# Patient Record
Sex: FEMALE | Race: AMERICAN INDIAN OR ALASKA NATIVE | NOT HISPANIC OR LATINO | Employment: FULL TIME | URBAN - METROPOLITAN AREA
[De-identification: names, ages, dates, MRNs, and addresses within clinical notes are randomized per-mention and may not be internally consistent; named-entity substitution may affect disease eponyms.]

---

## 2022-11-02 ENCOUNTER — TRANSFERRED RECORDS (OUTPATIENT)
Dept: HEALTH INFORMATION MANAGEMENT | Facility: CLINIC | Age: 32
End: 2022-11-02

## 2022-11-30 ENCOUNTER — TRANSFERRED RECORDS (OUTPATIENT)
Dept: HEALTH INFORMATION MANAGEMENT | Facility: CLINIC | Age: 32
End: 2022-11-30

## 2022-12-06 ENCOUNTER — APPOINTMENT (OUTPATIENT)
Dept: CARDIOLOGY | Facility: CLINIC | Age: 32
End: 2022-12-06
Attending: INTERNAL MEDICINE
Payer: COMMERCIAL

## 2022-12-06 ENCOUNTER — HOSPITAL ENCOUNTER (INPATIENT)
Facility: CLINIC | Age: 32
LOS: 56 days | Discharge: HOME | End: 2023-01-31
Attending: INTERNAL MEDICINE | Admitting: INTERNAL MEDICINE
Payer: COMMERCIAL

## 2022-12-06 ENCOUNTER — APPOINTMENT (OUTPATIENT)
Dept: GENERAL RADIOLOGY | Facility: CLINIC | Age: 32
End: 2022-12-06
Attending: INTERNAL MEDICINE
Payer: COMMERCIAL

## 2022-12-06 ENCOUNTER — TRANSFERRED RECORDS (OUTPATIENT)
Dept: HEALTH INFORMATION MANAGEMENT | Facility: CLINIC | Age: 32
End: 2022-12-06

## 2022-12-06 DIAGNOSIS — I50.9 ACUTE DECOMPENSATED HEART FAILURE (H): Primary | ICD-10-CM

## 2022-12-06 LAB
ALBUMIN MFR UR ELPH: 7.5 MG/DL
ALBUMIN SERPL BCG-MCNC: 3.4 G/DL (ref 3.5–5.2)
ALBUMIN SERPL BCG-MCNC: 3.6 G/DL (ref 3.5–5.2)
ALP SERPL-CCNC: 108 U/L (ref 35–104)
ALP SERPL-CCNC: 114 U/L (ref 35–104)
ALT SERPL W P-5'-P-CCNC: 15 U/L (ref 10–35)
ALT SERPL W P-5'-P-CCNC: 15 U/L (ref 10–35)
ANION GAP SERPL CALCULATED.3IONS-SCNC: 13 MMOL/L (ref 7–15)
ANION GAP SERPL CALCULATED.3IONS-SCNC: 14 MMOL/L (ref 7–15)
ANION GAP SERPL CALCULATED.3IONS-SCNC: 15 MMOL/L (ref 7–15)
APTT PPP: 35 SECONDS (ref 22–38)
AST SERPL W P-5'-P-CCNC: 29 U/L (ref 10–35)
AST SERPL W P-5'-P-CCNC: 31 U/L (ref 10–35)
ATRIAL RATE - MUSE: 88 BPM
BASE EXCESS BLDV CALC-SCNC: 2.1 MMOL/L (ref -7.7–1.9)
BILIRUB DIRECT SERPL-MCNC: <0.2 MG/DL (ref 0–0.3)
BILIRUB SERPL-MCNC: 0.3 MG/DL
BILIRUB SERPL-MCNC: 0.4 MG/DL
BUN SERPL-MCNC: 13.1 MG/DL (ref 6–20)
BUN SERPL-MCNC: 13.8 MG/DL (ref 6–20)
BUN SERPL-MCNC: 14.1 MG/DL (ref 6–20)
CA-I BLD-MCNC: 4.7 MG/DL (ref 4.4–5.2)
CALCIUM SERPL-MCNC: 9.3 MG/DL (ref 8.6–10)
CALCIUM SERPL-MCNC: 9.7 MG/DL (ref 8.6–10)
CALCIUM SERPL-MCNC: 9.8 MG/DL (ref 8.6–10)
CHLORIDE SERPL-SCNC: 98 MMOL/L (ref 98–107)
CHLORIDE SERPL-SCNC: 98 MMOL/L (ref 98–107)
CHLORIDE SERPL-SCNC: 99 MMOL/L (ref 98–107)
CREAT SERPL-MCNC: 0.69 MG/DL (ref 0.51–0.95)
CREAT SERPL-MCNC: 0.69 MG/DL (ref 0.51–0.95)
CREAT SERPL-MCNC: 0.7 MG/DL (ref 0.51–0.95)
CREAT UR-MCNC: 62 MG/DL
DEPRECATED HCO3 PLAS-SCNC: 20 MMOL/L (ref 22–29)
DEPRECATED HCO3 PLAS-SCNC: 21 MMOL/L (ref 22–29)
DEPRECATED HCO3 PLAS-SCNC: 22 MMOL/L (ref 22–29)
DIASTOLIC BLOOD PRESSURE - MUSE: NORMAL MMHG
ERYTHROCYTE [DISTWIDTH] IN BLOOD BY AUTOMATED COUNT: 14.4 % (ref 10–15)
FERRITIN SERPL-MCNC: 28 NG/ML (ref 6–175)
GFR SERPL CREATININE-BSD FRML MDRD: >90 ML/MIN/1.73M2
GLUCOSE BLDC GLUCOMTR-MCNC: 108 MG/DL (ref 70–99)
GLUCOSE BLDC GLUCOMTR-MCNC: 124 MG/DL (ref 70–99)
GLUCOSE BLDC GLUCOMTR-MCNC: 131 MG/DL (ref 70–99)
GLUCOSE BLDC GLUCOMTR-MCNC: 132 MG/DL (ref 70–99)
GLUCOSE BLDC GLUCOMTR-MCNC: 91 MG/DL (ref 70–99)
GLUCOSE SERPL-MCNC: 111 MG/DL (ref 70–99)
GLUCOSE SERPL-MCNC: 116 MG/DL (ref 70–99)
GLUCOSE SERPL-MCNC: 97 MG/DL (ref 70–99)
HBA1C MFR BLD: 5.8 %
HCO3 BLDV-SCNC: 27 MMOL/L (ref 21–28)
HCT VFR BLD AUTO: 37.4 % (ref 35–47)
HGB BLD-MCNC: 12.1 G/DL (ref 11.7–15.7)
INR PPP: 1.13 (ref 0.85–1.15)
INTERPRETATION ECG - MUSE: NORMAL
IRON BINDING CAPACITY (ROCHE): 569 UG/DL (ref 240–430)
IRON SATN MFR SERPL: 6 % (ref 15–46)
IRON SERPL-MCNC: 32 UG/DL (ref 37–145)
LACTATE SERPL-SCNC: 1.2 MMOL/L (ref 0.7–2)
LVEF ECHO: NORMAL
MAGNESIUM SERPL-MCNC: 1.9 MG/DL (ref 1.7–2.3)
MAGNESIUM SERPL-MCNC: 2 MG/DL (ref 1.7–2.3)
MAGNESIUM SERPL-MCNC: 2.1 MG/DL (ref 1.7–2.3)
MAGNESIUM SERPL-MCNC: 2.3 MG/DL (ref 1.7–2.3)
MCH RBC QN AUTO: 25 PG (ref 26.5–33)
MCHC RBC AUTO-ENTMCNC: 32.4 G/DL (ref 31.5–36.5)
MCV RBC AUTO: 77 FL (ref 78–100)
NT-PROBNP SERPL-MCNC: 1410 PG/ML (ref 0–450)
O2/TOTAL GAS SETTING VFR VENT: 21 %
OXYHGB MFR BLDV: 69 % (ref 70–75)
P AXIS - MUSE: NORMAL DEGREES
PCO2 BLDV: 40 MM HG (ref 40–50)
PH BLDV: 7.43 [PH] (ref 7.32–7.43)
PHOSPHATE SERPL-MCNC: 4.2 MG/DL (ref 2.5–4.5)
PHOSPHATE SERPL-MCNC: 4.7 MG/DL (ref 2.5–4.5)
PLATELET # BLD AUTO: 318 10E3/UL (ref 150–450)
PO2 BLDV: 40 MM HG (ref 25–47)
POTASSIUM SERPL-SCNC: 3.7 MMOL/L (ref 3.4–5.3)
POTASSIUM SERPL-SCNC: 3.7 MMOL/L (ref 3.4–5.3)
POTASSIUM SERPL-SCNC: 3.9 MMOL/L (ref 3.4–5.3)
POTASSIUM SERPL-SCNC: 4 MMOL/L (ref 3.4–5.3)
POTASSIUM SERPL-SCNC: 4.1 MMOL/L (ref 3.4–5.3)
PR INTERVAL - MUSE: 160 MS
PROT SERPL-MCNC: 7.3 G/DL (ref 6.4–8.3)
PROT SERPL-MCNC: 7.6 G/DL (ref 6.4–8.3)
PROT/CREAT 24H UR: 0.12 MG/MG CR (ref 0–0.2)
QRS DURATION - MUSE: 136 MS
QT - MUSE: 424 MS
QTC - MUSE: 513 MS
R AXIS - MUSE: 3 DEGREES
RBC # BLD AUTO: 4.84 10E6/UL (ref 3.8–5.2)
SODIUM SERPL-SCNC: 133 MMOL/L (ref 136–145)
SODIUM SERPL-SCNC: 133 MMOL/L (ref 136–145)
SODIUM SERPL-SCNC: 134 MMOL/L (ref 136–145)
SYSTOLIC BLOOD PRESSURE - MUSE: NORMAL MMHG
T AXIS - MUSE: -20 DEGREES
TROPONIN T SERPL HS-MCNC: 45 NG/L
TROPONIN T SERPL HS-MCNC: 45 NG/L
TSH SERPL DL<=0.005 MIU/L-ACNC: 2.96 UIU/ML (ref 0.3–4.2)
UFH PPP CHRO-ACNC: <0.1 IU/ML
VENTRICULAR RATE- MUSE: 88 BPM
WBC # BLD AUTO: 16.7 10E3/UL (ref 4–11)

## 2022-12-06 PROCEDURE — 83550 IRON BINDING TEST: CPT | Performed by: INTERNAL MEDICINE

## 2022-12-06 PROCEDURE — 250N000013 HC RX MED GY IP 250 OP 250 PS 637: Performed by: STUDENT IN AN ORGANIZED HEALTH CARE EDUCATION/TRAINING PROGRAM

## 2022-12-06 PROCEDURE — 83735 ASSAY OF MAGNESIUM: CPT | Performed by: INTERNAL MEDICINE

## 2022-12-06 PROCEDURE — 85027 COMPLETE CBC AUTOMATED: CPT | Performed by: INTERNAL MEDICINE

## 2022-12-06 PROCEDURE — 93306 TTE W/DOPPLER COMPLETE: CPT | Mod: 26 | Performed by: INTERNAL MEDICINE

## 2022-12-06 PROCEDURE — 85520 HEPARIN ASSAY: CPT | Performed by: INTERNAL MEDICINE

## 2022-12-06 PROCEDURE — 250N000011 HC RX IP 250 OP 636: Performed by: STUDENT IN AN ORGANIZED HEALTH CARE EDUCATION/TRAINING PROGRAM

## 2022-12-06 PROCEDURE — 36415 COLL VENOUS BLD VENIPUNCTURE: CPT | Performed by: INTERNAL MEDICINE

## 2022-12-06 PROCEDURE — 83036 HEMOGLOBIN GLYCOSYLATED A1C: CPT | Performed by: INTERNAL MEDICINE

## 2022-12-06 PROCEDURE — 83735 ASSAY OF MAGNESIUM: CPT | Performed by: STUDENT IN AN ORGANIZED HEALTH CARE EDUCATION/TRAINING PROGRAM

## 2022-12-06 PROCEDURE — 83605 ASSAY OF LACTIC ACID: CPT | Performed by: INTERNAL MEDICINE

## 2022-12-06 PROCEDURE — 71045 X-RAY EXAM CHEST 1 VIEW: CPT | Mod: 26 | Performed by: RADIOLOGY

## 2022-12-06 PROCEDURE — 82728 ASSAY OF FERRITIN: CPT | Performed by: INTERNAL MEDICINE

## 2022-12-06 PROCEDURE — 93005 ELECTROCARDIOGRAM TRACING: CPT

## 2022-12-06 PROCEDURE — 272N000278 HC DEVICE 5FR SECURACATH

## 2022-12-06 PROCEDURE — 99251 PR INITIAL INPATIENT CONSULT,LEVEL I: CPT | Mod: GC | Performed by: OBSTETRICS & GYNECOLOGY

## 2022-12-06 PROCEDURE — 255N000002 HC RX 255 OP 636: Performed by: STUDENT IN AN ORGANIZED HEALTH CARE EDUCATION/TRAINING PROGRAM

## 2022-12-06 PROCEDURE — 36569 INSJ PICC 5 YR+ W/O IMAGING: CPT

## 2022-12-06 PROCEDURE — 250N000013 HC RX MED GY IP 250 OP 250 PS 637: Performed by: INTERNAL MEDICINE

## 2022-12-06 PROCEDURE — 272N000451 HC KIT SHRLOCK 5FR POWER PICC DOUBLE LUMEN

## 2022-12-06 PROCEDURE — 84484 ASSAY OF TROPONIN QUANT: CPT | Performed by: STUDENT IN AN ORGANIZED HEALTH CARE EDUCATION/TRAINING PROGRAM

## 2022-12-06 PROCEDURE — 200N000002 HC R&B ICU UMMC

## 2022-12-06 PROCEDURE — 85730 THROMBOPLASTIN TIME PARTIAL: CPT | Performed by: INTERNAL MEDICINE

## 2022-12-06 PROCEDURE — 83880 ASSAY OF NATRIURETIC PEPTIDE: CPT | Performed by: INTERNAL MEDICINE

## 2022-12-06 PROCEDURE — 93010 ELECTROCARDIOGRAM REPORT: CPT | Mod: 76 | Performed by: INTERNAL MEDICINE

## 2022-12-06 PROCEDURE — 84443 ASSAY THYROID STIM HORMONE: CPT | Performed by: INTERNAL MEDICINE

## 2022-12-06 PROCEDURE — 84132 ASSAY OF SERUM POTASSIUM: CPT | Performed by: INTERNAL MEDICINE

## 2022-12-06 PROCEDURE — 99291 CRITICAL CARE FIRST HOUR: CPT | Mod: 25 | Performed by: INTERNAL MEDICINE

## 2022-12-06 PROCEDURE — 85610 PROTHROMBIN TIME: CPT | Performed by: INTERNAL MEDICINE

## 2022-12-06 PROCEDURE — 82248 BILIRUBIN DIRECT: CPT | Performed by: INTERNAL MEDICINE

## 2022-12-06 PROCEDURE — 84156 ASSAY OF PROTEIN URINE: CPT | Performed by: INTERNAL MEDICINE

## 2022-12-06 PROCEDURE — 82330 ASSAY OF CALCIUM: CPT | Performed by: STUDENT IN AN ORGANIZED HEALTH CARE EDUCATION/TRAINING PROGRAM

## 2022-12-06 PROCEDURE — 84100 ASSAY OF PHOSPHORUS: CPT | Performed by: INTERNAL MEDICINE

## 2022-12-06 PROCEDURE — 84450 TRANSFERASE (AST) (SGOT): CPT | Performed by: STUDENT IN AN ORGANIZED HEALTH CARE EDUCATION/TRAINING PROGRAM

## 2022-12-06 PROCEDURE — 999N000208 ECHOCARDIOGRAM COMPLETE

## 2022-12-06 PROCEDURE — 82805 BLOOD GASES W/O2 SATURATION: CPT | Performed by: STUDENT IN AN ORGANIZED HEALTH CARE EDUCATION/TRAINING PROGRAM

## 2022-12-06 PROCEDURE — 99223 1ST HOSP IP/OBS HIGH 75: CPT | Performed by: NURSE PRACTITIONER

## 2022-12-06 PROCEDURE — 36415 COLL VENOUS BLD VENIPUNCTURE: CPT | Performed by: STUDENT IN AN ORGANIZED HEALTH CARE EDUCATION/TRAINING PROGRAM

## 2022-12-06 PROCEDURE — 84155 ASSAY OF PROTEIN SERUM: CPT | Performed by: STUDENT IN AN ORGANIZED HEALTH CARE EDUCATION/TRAINING PROGRAM

## 2022-12-06 PROCEDURE — 84484 ASSAY OF TROPONIN QUANT: CPT | Performed by: INTERNAL MEDICINE

## 2022-12-06 PROCEDURE — 250N000011 HC RX IP 250 OP 636: Performed by: INTERNAL MEDICINE

## 2022-12-06 PROCEDURE — 71045 X-RAY EXAM CHEST 1 VIEW: CPT

## 2022-12-06 PROCEDURE — 80053 COMPREHEN METABOLIC PANEL: CPT | Performed by: INTERNAL MEDICINE

## 2022-12-06 RX ORDER — MAGNESIUM SULFATE HEPTAHYDRATE 40 MG/ML
2 INJECTION, SOLUTION INTRAVENOUS ONCE
Status: COMPLETED | OUTPATIENT
Start: 2022-12-06 | End: 2022-12-06

## 2022-12-06 RX ORDER — ACETAMINOPHEN 325 MG/1
650 TABLET ORAL EVERY 4 HOURS PRN
Status: DISCONTINUED | OUTPATIENT
Start: 2022-12-06 | End: 2023-01-24

## 2022-12-06 RX ORDER — HEPARIN SODIUM 10000 [USP'U]/100ML
0-5000 INJECTION, SOLUTION INTRAVENOUS CONTINUOUS
Status: DISPENSED | OUTPATIENT
Start: 2022-12-06 | End: 2022-12-27

## 2022-12-06 RX ORDER — MAGNESIUM HYDROXIDE/ALUMINUM HYDROXICE/SIMETHICONE 120; 1200; 1200 MG/30ML; MG/30ML; MG/30ML
30 SUSPENSION ORAL EVERY 4 HOURS PRN
Status: DISCONTINUED | OUTPATIENT
Start: 2022-12-06 | End: 2023-01-31 | Stop reason: HOSPADM

## 2022-12-06 RX ORDER — DEXTROSE MONOHYDRATE 25 G/50ML
25-50 INJECTION, SOLUTION INTRAVENOUS
Status: DISCONTINUED | OUTPATIENT
Start: 2022-12-06 | End: 2022-12-25 | Stop reason: CLARIF

## 2022-12-06 RX ORDER — METOPROLOL TARTRATE 25 MG/1
25 TABLET, FILM COATED ORAL 2 TIMES DAILY
Status: DISCONTINUED | OUTPATIENT
Start: 2022-12-06 | End: 2022-12-06

## 2022-12-06 RX ORDER — POTASSIUM CHLORIDE 1.5 G/1.58G
20 POWDER, FOR SOLUTION ORAL ONCE
Status: COMPLETED | OUTPATIENT
Start: 2022-12-06 | End: 2022-12-06

## 2022-12-06 RX ORDER — NICOTINE POLACRILEX 4 MG
15-30 LOZENGE BUCCAL
Status: DISCONTINUED | OUTPATIENT
Start: 2022-12-06 | End: 2022-12-25 | Stop reason: CLARIF

## 2022-12-06 RX ORDER — FUROSEMIDE 10 MG/ML
40 INJECTION INTRAMUSCULAR; INTRAVENOUS ONCE
Status: COMPLETED | OUTPATIENT
Start: 2022-12-06 | End: 2022-12-06

## 2022-12-06 RX ORDER — METOPROLOL TARTRATE 25 MG/1
25 TABLET, FILM COATED ORAL EVERY 8 HOURS
Status: DISCONTINUED | OUTPATIENT
Start: 2022-12-06 | End: 2022-12-07

## 2022-12-06 RX ORDER — LIDOCAINE 40 MG/G
CREAM TOPICAL
Status: DISCONTINUED | OUTPATIENT
Start: 2022-12-06 | End: 2022-12-06

## 2022-12-06 RX ORDER — HEPARIN SODIUM,PORCINE 10 UNIT/ML
5-20 VIAL (ML) INTRAVENOUS
Status: DISCONTINUED | OUTPATIENT
Start: 2022-12-06 | End: 2023-01-31 | Stop reason: HOSPADM

## 2022-12-06 RX ORDER — AMOXICILLIN 250 MG
2 CAPSULE ORAL 2 TIMES DAILY PRN
Status: DISCONTINUED | OUTPATIENT
Start: 2022-12-06 | End: 2022-12-27

## 2022-12-06 RX ORDER — LIDOCAINE 40 MG/G
CREAM TOPICAL
Status: ACTIVE | OUTPATIENT
Start: 2022-12-06 | End: 2022-12-09

## 2022-12-06 RX ORDER — HEPARIN SODIUM,PORCINE 10 UNIT/ML
5-20 VIAL (ML) INTRAVENOUS EVERY 24 HOURS
Status: DISCONTINUED | OUTPATIENT
Start: 2022-12-06 | End: 2023-01-31 | Stop reason: HOSPADM

## 2022-12-06 RX ORDER — POTASSIUM CHLORIDE 750 MG/1
40 TABLET, EXTENDED RELEASE ORAL ONCE
Status: COMPLETED | OUTPATIENT
Start: 2022-12-06 | End: 2022-12-06

## 2022-12-06 RX ORDER — AMOXICILLIN 250 MG
1 CAPSULE ORAL 2 TIMES DAILY PRN
Status: DISCONTINUED | OUTPATIENT
Start: 2022-12-06 | End: 2022-12-27

## 2022-12-06 RX ADMIN — METOPROLOL TARTRATE 25 MG: 25 TABLET, FILM COATED ORAL at 07:56

## 2022-12-06 RX ADMIN — SODIUM CHLORIDE, PRESERVATIVE FREE 5 ML: 5 INJECTION INTRAVENOUS at 20:07

## 2022-12-06 RX ADMIN — POTASSIUM CHLORIDE 20 MEQ: 1.5 POWDER, FOR SOLUTION ORAL at 22:00

## 2022-12-06 RX ADMIN — FUROSEMIDE 40 MG: 10 INJECTION, SOLUTION INTRAVENOUS at 06:22

## 2022-12-06 RX ADMIN — HEPARIN SODIUM 1200 UNITS/HR: 10000 INJECTION, SOLUTION INTRAVENOUS at 02:33

## 2022-12-06 RX ADMIN — MAGNESIUM SULFATE IN WATER 2 G: 40 INJECTION, SOLUTION INTRAVENOUS at 22:00

## 2022-12-06 RX ADMIN — HUMAN ALBUMIN MICROSPHERES AND PERFLUTREN 5 ML: 10; .22 INJECTION, SOLUTION INTRAVENOUS at 11:49

## 2022-12-06 RX ADMIN — POTASSIUM CHLORIDE 40 MEQ: 750 TABLET, EXTENDED RELEASE ORAL at 12:00

## 2022-12-06 RX ADMIN — METOPROLOL TARTRATE 25 MG: 25 TABLET, FILM COATED ORAL at 18:47

## 2022-12-06 RX ADMIN — ACETAMINOPHEN 650 MG: 325 TABLET, FILM COATED ORAL at 20:06

## 2022-12-06 RX ADMIN — MAGNESIUM SULFATE IN WATER 2 G: 40 INJECTION, SOLUTION INTRAVENOUS at 12:00

## 2022-12-06 RX ADMIN — HEPARIN SODIUM 1800 UNITS/HR: 10000 INJECTION, SOLUTION INTRAVENOUS at 18:34

## 2022-12-06 RX ADMIN — ACETAMINOPHEN 650 MG: 325 TABLET, FILM COATED ORAL at 07:56

## 2022-12-06 ASSESSMENT — ACTIVITIES OF DAILY LIVING (ADL)
ADLS_ACUITY_SCORE: 25
ADLS_ACUITY_SCORE: 25
HEARING_DIFFICULTY_OR_DEAF: NO
ADLS_ACUITY_SCORE: 26
TOILETING_ISSUES: NO
WALKING_OR_CLIMBING_STAIRS_DIFFICULTY: NO
ADLS_ACUITY_SCORE: 25
CONCENTRATING,_REMEMBERING_OR_MAKING_DECISIONS_DIFFICULTY: NO
WEAR_GLASSES_OR_BLIND: YES
ADLS_ACUITY_SCORE: 23
ADLS_ACUITY_SCORE: 26
DRESSING/BATHING_DIFFICULTY: NO
DOING_ERRANDS_INDEPENDENTLY_DIFFICULTY: NO
ADLS_ACUITY_SCORE: 20
ADLS_ACUITY_SCORE: 23
VISION_MANAGEMENT: GLASSES
DIFFICULTY_EATING/SWALLOWING: NO
ADLS_ACUITY_SCORE: 26
ADLS_ACUITY_SCORE: 26
CHANGE_IN_FUNCTIONAL_STATUS_SINCE_ONSET_OF_CURRENT_ILLNESS/INJURY: NO
DIFFICULTY_COMMUNICATING: NO
ADLS_ACUITY_SCORE: 25
FALL_HISTORY_WITHIN_LAST_SIX_MONTHS: NO

## 2022-12-06 NOTE — CONSULTS
Maternal-Fetal Medicine Consultation    Anjali Carmen  : 1990  MRN: [id number]    HPI:  Anjali Carmen is a 32 year old  at 24w1d by 6w1d US admitted to CICU for acute HFrEF, cardiac arrhythmia after transferring from [facility].    Patient states she presented to her primary OB approximately 1 week ago for significant fatigue upon waking. Patient states she has had chest pain and shortness of breath ongoing for some time, possibly years. Her fatigue became worse, so she went to OB clinic where they performed EKG and discovered cardiac arrhythmia. She was subsequently sent to ED at [facility] and admitted to [facility] service. She was diagnosed with HFrEF with EF 20% and paroxysmal atrial fibrilllation. She was started on metoprolol and heparin drip. She received a course of betamethasone -. She had a NNP consult. Patient was transferred to the [place] for concern for acute decompensation, especially at time of delivery, and potential need for ECMO.     Today, patient states she feels like her normal self, which is short of breath at baseline and intermittent chest pain that has both sharp and pressure components. She denies headaches, changes in vision. She does endorse some RUQ/epigastric pain from time to time. She is currently having no obstetric complaints and denies contractions, bleeding, leaking fluid. Her baby is active today.     Patient notes that she has had significant nausea and vomiting this pregnancy. She also states that she has lost approximately 50 pounds so far in this pregnancy. She recently had a growth ultrasound, which she states was normal. She has not yet had her GCT. She states her prior deliveries were uncomplicated. She denies history of HTN or diabetes. She denies history of PPH. She states she had two  followed by a CS in 2017 for Cat II tracing. She desires to TOLAC this pregnancy. This pregnancy is unplanned but desired.     Notably,  patient states her father passed away at the age of 30 due to an enlarged heart after an infection. She also states her grandmother passed from heart attack.     Pren[facility] Care:  [facility] in [place]    Obstetrics History:  OB History    Para Term  AB Living   4 3 3 0 0 3   SAB IAB Ectopic Multiple Live Births   0 0 0 0 3      # Outcome Date GA Lbr Henrique/2nd Weight Sex Delivery Anes PTL Lv   4 Current            3 Term      CS-LTranv   ANIA   2 Term      Vag-Spont   ANIA   1 Term      Vag-Spont   ANIA       Gynecologic History:  - Menstrual history: Normal periods per patient  - Denies prior cervical surgery or procedures  - Denies any history of frequent UTIs, vaginal infections, or STIs    Past Medical History:  No past medical history on file.    Past Surgical History:  Past Surgical History:   Procedure Laterality Date      SECTION         Current Medications:  Prior to Admission medications    Not on File       Allergies:  Patient has no known allergies.    Social History:   Occupation: Works for [employer]  Status:   Denies use of alcohol, drugs or smoking.    Family History:  Father with enlarged heart,  at age 30  Denies history of genetic disorders, preeclampsia, thromboembolic disease, bleeding disorders, developmental delay.    ROS:  10-point ROS negative except as in HPI     PHYSICAL EXAM:  Gen: NAD, resting in bed  Cardiac exam: Irregular beats on tele in patient's room, exam per cards fellow  Lung exam: Exam per cards fellow  Abd: Nontender, nondistended, Soft. Fundus difficult to assess due to habitus    Doptones: approximately 130s at admission       ASSESSMENT/PLAN:  Anjali Carmen is a 32 year old  at 24w1d by 6w1d US admitted to CICU on HD#1 for HFrEF and cardiac arrhythmia.       # Acute decompensated HFrEF  # Possible atrial fibrillation  - Admitted to CICU for management of new cardiac diagnoses  - Metoprolol for rate control safe in  pregnancy  - Lasix safe in pregnancy  - Recommend continue heparin gtt in setting of reduced ejection fraction  - BP goal 120-140/80-90. If patient begins to have pressures >160/110, call MFM for guidance of management     # IUP at 24w1d  - Bedside doptones on admission normal  - Growth US at OSH () with normal anatomy, EFW 87%ile, no need to repeat growth US at this time  - Prenatal labs collected at OSH  - HELLP labs normal  - UPC pending  - Recommend BID NST. L&D RN will perform NST BID, M team to communicate with nursing for staffing  - BMZ (-) at OSH, rescue BMZ PRN  - Mg for neuroprotection PRN  - Briefly discussed on admission that this pregnancy is not planned, but is desired. Briefly discussed that some patients in her situation may choose to terminate their pregnancies. Patient strongly desires to continue pregnancy and would desire full  resuscitation. She herself is also full code.  - Routine indications for emergent delivery including maternal decompensation or fetal compromise  - Patient had desired to TOLAC. In setting of decompensated HF and very high risk for PTD, a CD will most likely be the route of delivery.   - [place] requires routine prenatal care while in house, will need 28w labs and GCT    The patient was seen and evaluated with Dr. Joshi.     Thank you for allowing us to participate in the care of your patient. Please do not hesitate to contact us if you have further questions regarding the management of your patient.     Joesph Sheth MD  Obstetrics, Gynecology, and Women's Health  PGY3  4:26 AM 2022    Physician Attestation   I, Elena Joshi, , saw and evaluated Anjali Carmen with the resident.  I have reviewed and discussed with Dr. Sheth.    I personally reviewed the vital signs, medications, labs and imaging.    My key history or physical exam findings:   Transferred from [place] for higher level of care if maternal decompensation  requiring ECMO, etc.      Obstetrically, there have been no concerns this pregnancy. She had a normal detailed US on 12/1 with limited views of ductal arch and hand.  Normal growth- 87%ile.  Normal fluid.  No GCT, s/p BMZ x 2.  GBS positive.     HFrEF: Dr. Szymanski's team is managing her CV status.  mWHO class IV.     Echo today performed with [employer] (anticpated to be safe, though no data in pregnancy).  Felt benefit outweigh risks to further delineate the LV and evaluate for thrombus which would change maternal management.  LV with severely reduced function, thinning of the wall and hypokinesis of LV segments. Mild RV dilation and moderately reduced RV function.  Findings are compatible with ischemic CM.     Discussed with Dr. Szymanski - she will involve Interventional Cardiology to weigh in on possible coronary angiogram and possible stenting.  ASA and plavix can be utilized in pregnancy.  Arrhythmia is concerning and ischemic CM may be the etiology for short runs of VT.      We will discuss Anjali's case/care during our CardioObstetrics meeting tomorrow.      NST: reassuring for gestational age.     HF agents compatible with pregnancy- beta blockers, hydralazine, isosorbide, lasix.      Please reach out to our MFM team with any questions/concerns:  MFM resident pager (24/7): [phone]    MFM attending through Thursday at 7 am  [phone]    Elena Joshi DO  Date of Service (when I saw the patient): 12/06/22    Time Spent on this Encounter   I, Elena Joshi DO, spent a total of 30 minutes face to face or coordinating care of Anjali Carmen.  Over 50% of my time on the unit was spent counseling the patient and/or coordinating care regarding Anjali Johnson Bull.

## 2022-12-06 NOTE — Clinical Note
Report called to [facility] on 4E. VSS. TR band in place with 11cc in band. Chart sent back to unit with pt.

## 2022-12-06 NOTE — PLAN OF CARE
Patient monitored while on cardiovascular ICU. EFM as charted, patient denies any pain or contractions, leaking fluid or bleeding.

## 2022-12-06 NOTE — LETTER
To Whom It May Concern,    Anjali Carmen has been admitted to the [facility] since 12/6/2022 and is expected to remain at the hospital for an indefinite amount of time. Please excuse her from work pending her clinical course.    Thank you,  Shante Sharpe MD

## 2022-12-06 NOTE — H&P
[facility]  CARDIOLOGY HEART FAILURE SERVICE (CARDS II) H&P     Patient Name: Anjali Carmen    Medical Record Number: [id number]    YOB: 1990  Admit Date/Time: 2022 2:30 AM     History of Presenting Illness:    Anjali Carmen is a 32 year old female (24w1d) pregnant, A0 with no significant past medical history, who presents as a transfer from [facility] to [facility] on 2022 for further management of newly diagnosed systolic heart failure.     The patient lives in [place], [place] and initially presented to the [facility] on 2022 with complaints of palpitations, shortness of breath and tiredness since 2022, as well as chest pain radiating to the left arm of one day's duration. EKG at the clinic reportedly showed possible atrial fibrillation (EKG unavailable for review). Therefore, she was transfered from [place] to [facility] on 2022 for further evaluation.    On arrival to [place], she was hemodynamically stable, in NSR with HR 80's but decompensated from a volume perspective. Labs showed preserved end-organ function- creatinine 0.55 mg/dL, AST/ALT 33/11, T.Samy 0.6, HGB slightly low at 10.2 g/dL, WBC 9.9 PLT 271k, troponin 0.77 >> 0.6, . TTE on 2022 showed moderate to severe LV dilation (LVIDd 65 mm), severely reduced LV function,  LVEF 20%; mildly dilated RV with normal RV size, mild MR & TR. She was diuresed with IV lasix for pulmonary edema (40 mg BID f/b 80 mg BID for 3 days), initiated on metoprolol tartrate 25 mg BID and heparin gtt for Afib/cardiomyopathy. On telemetry, she was noted to have multiple runs of NSVT (strips below). The patient was also given betamethasone on  and  for fetal lung maturation. Due to concern for possible decompensation and high risk delivery, she was transferred to [facility] on 2022 for further management.      On interview today, the patient reports being in her usual state of health until this summer and denies any symptoms in  after her last delivery or over the past few years. She reports symptoms of tiredness, palpitations and dyspnea on exertion since 2022. She reports a sensation of skipped beats as well as short bursts of racing of the heart, 2-3 times a day, every day for the past 6 months. Additionally she reports poor energy levels and shortness of breath on exertion since July.  She reports progression of her symptoms with onset of PND and the desire to contemplate sleeping in a recliner since 2022.  She has been sleeping with 2 pillows since October; also reports pedal edema for the past 3 months.  She reports poor appetite, constant tiredness, and intermittent nausea for the past 3 to 4 months.  She reports an episode of chest tightness radiating to the left arm on 2022, which prompted her going to the [facility] clinic. She denies history of exertional angina. The patient reports going to the S clinic for her symptoms back in July, but was disappointed by the providers' response, therefore did not follow-up about the worsening severity of her symptoms.  She denies presyncope or syncope.  Additionally on ROS, the patient reports loss of weight despite being pregnant since 2022; previously weighed 357 pounds- down to 301 lbs at the time of admission >> further down to 292 lbs after diuresis.     She works as a  and is functionally very active at baseline, waking for 12-hour shifts 5 days a week; in addition to raising 3 kids.    OB-GYN history:     1st pregnancy: 2009- spontaneous vaginal delivery at term.    2nd pregnancy: 2012- spontaneous vaginal delivery at term.    3rd pregnancy: 2017-  at [facility] due to fetal distress (cord wrapped around the body).    4th pregnancy: current; estimated date of delivery  2023     According to the [facility] one-call triage note, the patient has history of preeclampsia during one of her pregnancies, however patient unaware of any history of preeclampsia.    Social history:     Lives at home in [place], [place] with her spouse who is a ; and 3 kids.     Parents in their 60s, functionally independent.  Multiple siblings, good social support network.     No history of alcohol, tobacco, marijuana or recreational drug use.     Family history:   1. Father  at 30 years of age secondary to 'enlarged heart'.  Patient believes his cardiomyopathy was infectious in etiology.  No other history of sudden cardiac death or cardiomyopathy in any other members of the family.  2. Grandma: DM.      Review Of Systems  A 4-point ROS was negative aside from those listed above.    OBJECTIVE FINDINGS:    Temp:  [98.2  F (36.8  C)] 98.2  F (36.8  C)  Pulse:  [88] 88  Resp:  [18] 18  BP: (114)/(57) 114/57  SpO2:  [96 %] 96 %    Gen: Patient is awake and alert, NAD. Appears comfortable.    HEENT: PERRLA, EOMI, MMM  Neck: JVP 10-12 mm Hg  Resp: clear to auscultation bilaterally, no crackles or wheezing   CV: RRR, no murmurs appreciated  Abd: soft, fetal heart beat heard with USG; NT, ND, no hepatosplenomegaly, normal BS  Ext: warm and well perfused, no LE edema    EK2022: NSR with sinus arrhythmia      Rhythm strips:                   TTE 2022 4:58 PM CST    1. Left Ventricle: Severely reduced left ventricular systolic function. The EF is visually estimated to be 20%. Global hypokinesis of the left ventricular wall segments. Grade II LVDD present. LVIDD 65 mm.   2. Right Ventricle: The right ventricle is mildly dilated. Normal RV systolic function.   3. Mitral Valve: There is mild regurgitation.  4. Tricuspid Valve: There is mild regurgitation.  5. No pericardial effusion.     Assessment and Plan:  Anjali Carmen is a 32 year old female  A0 and no significant past  medical history, who presents as a transfer from [facility] to [facility] on 12/6/2022 for further management of newly diagnosed, acute decompensated systolic heart failure (LVEF 20%) as well as high risk delivery.     # Acute decompensated systolic and diastolic heart failure  # ACC/AHA Stage C systolic heart failure  # Heart failure with reduced EF (LVEF 20%)  # Mild right ventricular systolic dysfunction  # Non-sustained VT    Etiology of HFrEF: uncertain at this time.  Concern for familial dilated cardiomyopathy given history of CM in father at 30 years of age. Last pregnancy in 2017, timeline not fitting in for PPCM. Greater concern for NICM over ICMP, however, no ischemic assessment performed so far. No known history of pre-existing structural heart disease.    1. Patient hemodynamically stable with preserved end-organ function. No indication for inotrope support/MCS at this time.   2. GDMT and diuresis as outlined below.  Traditional afterload targets for poor LV function and low LVEF of 20-25% cannot be met due to need for fetoplacental circulation.  Will target SBP in 120-140 range as per discussion with OB-GYN.  3. Repeat transthoracic echocardiogram to reassess biventricular function coming morning.   4. Will consider cardiac MRI w.r.t etiology of cardiomyopathy once clinically permissible.  5. Currently on heparin gtt for Afib and risk of LV thrombus formation given low LVEF and hypercoagulable state of pregnancy.     GDMT:   ACEi/ARB/ARNI: contraindicated due to pregnancy  BB: continue metoprolol tartrate 25 mg BID  Aldosterone antagonist: high adverse risk medication in pregnancy  SCD prophylaxis: does not meet criteria for implant  Fluid status: Approaching euvolemia. Decrease IV lasix dose from 80 mg BID to 40 mg coming AM.      # New onset possible paroxysmal atrial fibrillation:   [facility] at [facility] [place]- isolated episode, EKG unavailable for review. Currently in NSR.     -  Continue to monitor rhythm on telemetry.   - Currently on heparin gtt for AC, however, CHADS VASc low and isolated episode of Afib. Can consider discontinuing heparin gtt due to high risk of  delivery.     # 24 weeks 1 day gestational age:   - Appreciate MFM input.   - Check OB US for fetal growth coming AM.     # Screening for Gestational DM:   # Monitor for hyperglycemia  Risk factors: High BMI and received steroids for fetal lung maturation.     - Check HbA1c & urine proteins.   - q6 glucose checks with sliding scale insulin.     # Mild anemia:   HGB 10.2 g/dL on arrival (MCV 79). Retic count 1.9%  Etiology: AVINASH vs anemia in the setting of HF.    - Check iron, ferritin, TIBC levels.   - Limit blood draws.       Pt was discussed and evaluated with Sea Michaels MD, attending physician, who agrees with the assessment and plan above.     Karina Stark MD,   Cardiovascular Disease Fellow  Pager [phone]

## 2022-12-06 NOTE — CONSULTS
Electrophysiology Consultation Note   EP Attending: Dr.Lin Trudi Barclay.   Reason for consultation: runs of NSVT, AAT options in pregnancy.   Provider requesting consultation: , Cardiology II Service.  Date of Service: 12/6/2022      HPI:   Ms. Elizabeth Carmen is a 32 year old female who has no significant past medical history.   She presented to [facility] as a transfer from [facility] for further management of newly diagnosed systolic heart failure in pregnancy.  She initially presented to [facility] clinic on 11/30/2022 reporting palpitations, shortness of breath, and tiredness that had been ongoing since 7/2022.  She also reported that she was having some intermittent chest pain radiating to the left arm that started 1 day prior.  An ECG that was done at that clinic was reported to show AF however unavailable for our review.  She was transferred to [facility] and admitted on 11/30/2022.  She was found to be hypervolemic troponin 0.77 -> 0.6,  and preserved endorgan function with normal SCR and LFTs.  An echo showed moderate to severe LV dilation LVIDD 65 mm, severely reduced LV function LVEF 20%, mildly dilated RV, mild MR, and mild TR.  She underwent diuresis was started on metoprolol 25 mg twice daily.  On telemetry she was noted to have multiple runs of NSVT.  Due to ongoing concerns for possible decompensation and high risk for delivery, she was transferred to [facility] on 12/6/2022.  Here, she reports that she was in her usual state of health until 7/2022 when she started noticing fatigue, palpitations, PENA.  She also reports having a sensation of skipped beats as well as short bursts of racing heartbeat 2-3 times per day for the last 6 months.  She had progression of her symptoms with onset of PND since 10/2022.  She says she is a  and has 3 other young children at home and had normally been active without issues.  She has 3 other pregnancies the first 2 with  "spontaneous vaginal delivery in the last with  due to cord being wrapped around baby's body.  This is now her fourth pregnancy with estimated delivery of 3/27/2023.  She has no known prior cardiac history.  Her father  around age 30 secondary to \"enlarged heart\".  Patient believes that she was told his cardiomyopathy was infectious in etiology.  No other known history of SCD or centimeters in other family members.  An echo here shows LVEF 20-25%, severe diffuse hypokinesis, wall thinning and akinesis of the basal-mid inferior, basal-mid inferior septal, and basal inferior lateral segments,  akinesis of apical septum, mild RV dilation, moderately reduced RV function, and mild TI.  VSS.  Current cardiac medications include metoprolol and heparin gtt.    Past Medical History:   No past medical history on file.  Past Surgical History:   Past Surgical History:   Procedure Laterality Date     SECTION       Allergies: Per MAR   No Known Allergies  Medications:   Per MAR current outpatient cardiovascular medications include:   No medications prior to admission.     No current outpatient medications on file.     Current Facility-Administered Medications   Medication Dose Route Frequency    insulin aspart  1-3 Units Subcutaneous TID AC    insulin aspart  1-3 Units Subcutaneous At Bedtime    metoprolol tartrate  25 mg Oral BID    sodium chloride (PF)  3 mL Intracatheter Q8H     Family History:   No family history on file.  Social History:   Social History     Tobacco Use    Smoking status: Not on file    Smokeless tobacco: Not on file   Substance Use Topics    Alcohol use: Not on file       ROS:   A comprehensive 10 point ROS was negative other than as mentioned in HPI.    Physical Examination:   VITALS: /55   Pulse 85   Temp 97.9  F (36.6  C) (Oral)   Resp 15   Ht 1.753 m (5' 9\")   Wt 132.7 kg (292 lb 8.8 oz)   SpO2 96%   BMI 43.20 kg/m    GENERAL APPEARANCE: AxO, NAD   HEENT: NCAT, EOMI, " MMM. PERRLA.   NECK: Supple. No JVD or bruit. Good carotid upstroke.   CHEST: CTAB   CARDIOVASCULAR: S1S2, Reg, No m/r/g.   ABDOMEN: BS+, soft, pregnant   EXTREMITIES: trace pedal edema. Distal pulses intact.   NEURO: Grossly nonfocal.   PSYCH: Normal affect.  SKIN: Warm and dry.   Data:   Labs:  BMP  Recent Labs   Lab 22  1423 22  1143 22  0748 22  0202   NA  --  133*  --  133*   POTASSIUM 4.0 3.7  3.7  --  3.9   CHLORIDE  --  98  --  98   AYDEN  --  9.8  --  9.7   CO2  --  21*  --  20*   BUN  --  14.1  --  13.8   CR  --  0.70  --  0.69   GLC  --  108*  97 91 111*     CBC  Recent Labs   Lab 22  0202   WBC 16.7*   RBC 4.84   HGB 12.1   HCT 37.4   MCV 77*   MCH 25.0*   MCHC 32.4   RDW 14.4        INR  Recent Labs   Lab 22  0202   INR 1.13     EK2022: NSR with sinus arrhythmia       Rhythm strips:                  TTE 2022 4:58 PM CST    Left Ventricle: Severely reduced left ventricular systolic function. The EF is visually estimated to be 20%. Global hypokinesis of the left ventricular wall segments. Grade II LVDD present. LVIDD 65 mm.   Right Ventricle: The right ventricle is mildly dilated. Normal RV systolic function.   Mitral Valve: There is mild regurgitation.  Tricuspid Valve: There is mild regurgitation.  No pericardial effusion.   22 ECHO:   Interpretation Summary  Left ventricular function is decreased. The ejection fraction is 20-25%  (severely reduced). Severe diffuse hypokinesis is present. There is wall  thinning and akinesis of the basal-mid inferior, basal-mid inferoseptal and  basal inferolateral segments. There is akinesis of the apical septum. This is  compatible with ischemic cardiomyopathy.  Mild right ventricular dilation is present. Global right ventricular function  is moderately reduced.  Mild tricuspid insufficiency is present.  Pulmonary artery systolic pressure is normal.  IVC diameter and respiratory changes fall into an  intermediate range  suggesting an RA pressure of 8 mmHg.  No pericardial effusion is present.  Assessment:   Ms. Elizabeth Carmen is a 32 year old female who has no significant past medical history.   She presented to [facility] as a transfer from [facility] for further management of newly diagnosed systolic heart failure in pregnancy.  She initially presented to [facility] clinic on 2022 reporting palpitations, shortness of breath, and tiredness that had been ongoing since 2022.  She also reported that she was having some intermittent chest pain radiating to the left arm that started 1 day prior.  An ECG that was done at that clinic was reported to show AF however unavailable for our review.  She was transferred to [facility] and admitted on 2022.  She was found to be hypervolemic troponin 0.77 -> 0.6,  and preserved endorgan function with normal SCR and LFTs.  An echo showed moderate to severe LV dilation LVIDD 65 mm, severely reduced LV function LVEF 20%, mildly dilated RV, mild MR, and mild TR.  She underwent diuresis was started on metoprolol 25 mg twice daily.  On telemetry she was noted to have multiple runs of NSVT.  Due to ongoing concerns for possible decompensation and high risk for delivery, she was transferred to [facility] on 2022.  Here, she reports that she was in her usual state of health until 2022 when she started noticing fatigue, palpitations, PENA.  She also reports having a sensation of skipped beats as well as short bursts of racing heartbeat 2-3 times per day for the last 6 months.  She had progression of her symptoms with onset of PND since 10/2022.  She says she is a  and has 3 other young children at home and had normally been active without issues.  She has 3 other pregnancies the first 2 with spontaneous vaginal delivery in the last with  due to cord being wrapped around baby's body.  This is now her fourth pregnancy with  "estimated delivery of 3/27/2023.  She has no known prior cardiac history.  Her father  around age 30 secondary to \"enlarged heart\".  Patient believes that she was told his cardiomyopathy was infectious in etiology.  No other known history of SCD or centimeters in other family members.  An echo here shows LVEF 20-25%, severe diffuse hypokinesis, wall thinning and akinesis of the basal-mid inferior, basal-mid inferior septal, and basal inferior lateral segments,  akinesis of apical septum, mild RV dilation, moderately reduced RV function, and mild TI.  VSS.  Current cardiac medications include metoprolol and heparin gtt.  EP Recommendations:  NICM LVEF 20-25%, NYHA III  Runs of NSVT and multi-focal PVCs:  1. ACEi/ARB: Not currently on due to pregnancy.  2. BB: Continue metoprolol and uptitrate as possible  3. Aldosterone antagonist: Not currently on due to pregnancy.  4. SCD prophylaxis: Not currently indicated due to newly diagnosed HF/CM.  Will need to follow post partum after GDMT to assess if recovery or if LVEF remains suppressed after several months of treatment then may need to consider defibrillator.  5. Fluid status: Volume status is slightly difficult to assess but appears slightly hypervolemic.  6. NSVT/PVCs: Limited AAT options given pregnancy.  Sotalol is pregnancy category B and is likely the safest to use of the AAT's in the setting of pregnancy.  However, if patient is significantly decompensated or in cardiogenic shock this would obviously not be a great choice from a HF standpoint.  Mexiletine, procainamide, and flecainide are all pregnancy category C and could be considered but not preferred.  Amiodarone is pregnancy category D and should be avoided.  7. Etiology: Given LV dilation and family history high likelihood for genetic CM. Recommend CMR postpartum for further evaluation and consideration of genetic testing. One echo does mention possible ischemic etiology, thus do need to keep " hypercoagulable state and embolic MI on differential but less likely.     The patient states understanding and is agreeable with plan.   Thank you for allowing us to participate in the care of this patient.     The patient was discussed w/ . Carolina Barclay.  The above note reflects our joint plan.    NAHID Abernathy CNP  Electrophysiology Consult Service  Pager: [phone]    NOHEMY Total time spent on patient visit, reviewing notes, imaging, labs, orders, and completing necessary documentation: 60 minutes.  >50% of visit spent on counseling patient and/or coordination of care.    This is a shared visit. Patient seen and examined and management plan personally reviewed with the patient. I agree with the note above by NAHID/PA, Yany Cordova. I  reviewed today's vital signs and medications. I have reviewed and discussed with the advanced practice provider their physical examination, assessment, and plan.     Carolina Barclay MD, MS  EP/Cardiology Staff

## 2022-12-06 NOTE — LETTER
[facility] [address] [address]  Phone: [phone]    December 7, 2022        Anjali Carmen  [address]  [place] [address]          To whom it may concern:    RE: Anjali Carmen    Patient was admitted to the [facility] on 12/06/2022 and remains hospitalized at this time. Her clinical course is still evolving and it's not yet clear how long she'll be in the hospital. Thank you very much for your understanding.               Sincerely,        Beltran Doherty MD  [facility] Department of Cardiology

## 2022-12-06 NOTE — PLAN OF CARE
Admitted/transferred from: [facility]  Reason for admission/transfer: VT needing advanced cares  2 RN skin assessment: completed by Meredith Shoemaker, RN and Dayanna Olivares RN  Result of skin assessment and interventions/actions: WNL  Height, weight, drug calc weight: Done  Patient belongings (see Flowsheet): backpack of clothes, ring, cell phone, glasses, blanket, ivania bear  MDRO education added to care plan: N/A  ?

## 2022-12-06 NOTE — Clinical Note
dry, intact, no bleeding and no hematoma. 6Fr sheath removed from LRA. TR band in place with 11cc in balloon. Hemostasis obtained.

## 2022-12-06 NOTE — PROGRESS NOTES
MFM Antepartum Progress Note    Subjective:   The patient was resting and having an echocardiogram at the time of this visit.    Objective:  Vitals:    22 0600 22 0630 22 0700 22 0800   BP: 110/58 114/57  106/58   BP Location:    Left arm   Pulse: 74 77 84 104   Resp: 23 25 23 28   Temp:    98.2  F (36.8  C)   TempSrc:    Oral   SpO2: 95% 96% 96% 96%   Weight:       Height:           I/O last 3 completed shifts:  In: 141.4 [P.O.:100; I.V.:41.4]  Out: -     Gen: Resting comfortably in bed, NAD  Abd: Gravid, non-tender, non-distended    Imaging:  Echo,  - Interpretation Summary  Left ventricular function is decreased. The ejection fraction is 20-25% (severely reduced). Severe diffuse hypokinesis is present. There is wall thinning and akinesis of the basal-mid inferior, basal-mid inferoseptal and basal inferolateral segments. There is akinesis of the apical septum. This is compatible with ischemic cardiomyopathy. Mild right ventricular dilation is present. Global right ventricular function is moderately reduced. Mild tricuspid insufficiency is present. Pulmonary artery systolic pressure is normal. IVC diameter and respiratory changes fall into an intermediate range suggesting an RA pressure of 8 mmHg. No pericardial effusion is present.    Assessment/Plan:   Anjali Carmen is a 32 year old  at 24w1d by 6w1d US admitted to CICU for acute HFrEF, cardiac arrhythmia after transferring from [facility].    # Acute decompensated HFrEF  # Possible atrial fibrillation, VT  There are limited human reports on pharmacological therapy for the treatment of sustained ventricular tachycardia in hemodynamically stable patients; in general, intravenous procainamide and lidocaine are considered safe. Synchronized cardioversion is used if there is hemodynamically significant supraventricular tachycardia, atrial fibrillation, and ventricular tachyarrhythmia, similar to nonpregnant patients.  Digoxin, ?-blockers, and calcium channel blockers can be used for rate control; however, amiodarone should be avoided. If necessary, catheter ablation can be used if refractory to medication, avoiding/limiting fluoroscopy if possible and preferably delaying the ablation until the second trimester. (AHA, 2020)  - Admitted to CICU for management of new cardiac diagnoses  - Agree with continued heparin gtt in setting of reduced ejection fraction until further echocardiogram studies (also discussed at bedside use of Optison, which is FDA category B in pregnancy - has not been studied in the pregnant populations but the benefits outweigh the theoretical risks)  - BP goal 130/80, MAP >65. If patient begins to have blood pressures >160/110 for at least 15 minutes, call MFM for guidance of management (pager [phone]), although typically will initiate treatment with 5-10 mg of IV hydralazine or 20 mg of IV labetalol     # Pregnancy at 24w1d  - Growth US at OSH () with normal anatomy, EFW 87%ile, no need to repeat growth US at this time (typically repeated every 3-4 weeks)  - Prenatal labs collected at OSH  - Non-stress test (NST) by L&D RN BID  - Betamethasone (BMZ) given - at OSH, rescue BMZ PRN (typically not repeated until at least 2 weeks from initial course and if there is concern for delivery in the next week)  - Magnesium for neuroprotection if moving towards delivery prior to 32 weeks with 4g bolus followed by 2g/hr maintenance until delivery  - Routine indications for emergent delivery including maternal decompensation or fetal compromise  - Patient had desired to have a trial of labor after a prior  section. In setting of decompensated HF, will discuss delivery planning moving forward as she may not be a candidate to valsalva in labor.   - Patinet requires routine prenatal care while in house with next milestone at 28 weeks when typically Tdap is administered, as well as repeat CBC, RPR  (syphilis screening), and type and screen to evaluate antibodies, as well as one hour glucose challenge test (GCT)     The patient was seen and evaluated with Dr. Joshi.     Faculty note:  I was present and evaluated Ms. Elizabeth Carmen with Dr. Soliz.  We discussed the patient with the primary Cards team as well.  See above note for details on recommendations/management plans.     Elena Joshi DO FACOG  Maternal Fetal Medicine Specialist

## 2022-12-06 NOTE — PLAN OF CARE
Major Shift Events:  Neuro intact, on RA, frequent PVCs and short self-limiting runs of VT even with rest. Electrolytes replaced. FMF evaluated fetus at bedside, will return BID.  Plan: Optimize hemodynamics, continue to monitor fetus, likely place PICC for future F/E, advanced HF, and fetal management.  For vital signs and complete assessments, please see documentation flowsheets.    Problem: Risk for Delirium  Goal: Optimal Coping  Outcome: Progressing     Problem: Heart Failure  Goal: Optimal Coping  Outcome: Progressing  Goal: Stable Heart Rate and Rhythm  Outcome: Progressing  Goal: Fluid and Electrolyte Balance  Outcome: Progressing  Goal: Improved Oral Intake  Outcome: Progressing   Goal Outcome Evaluation:

## 2022-12-06 NOTE — LETTER
Date: Dec 5, 2022    TO WHOM IT MAY CONCERN:    Patient Anjali Carmen has been managed for acute obstetric and cardiovascular care in our hospital, and delivered her pregnancy on 2023 by  section. We recommend 12 weeks of parental leave for her post delivery due to her  section. I am a healthcare practitioner who is caring for Anjali during her pregnancy and I confirm that she delivered on the date listed above at the [facility].     Please contact me with any additional questions.     Thank you,       Luis Mast MD  Maternal Fetal Medicine  [phone]  [email or web address]

## 2022-12-07 LAB
ALBUMIN SERPL BCG-MCNC: 3.4 G/DL (ref 3.5–5.2)
ALP SERPL-CCNC: 105 U/L (ref 35–104)
ALT SERPL W P-5'-P-CCNC: 15 U/L (ref 10–35)
ANION GAP SERPL CALCULATED.3IONS-SCNC: 12 MMOL/L (ref 7–15)
ANION GAP SERPL CALCULATED.3IONS-SCNC: 14 MMOL/L (ref 7–15)
AST SERPL W P-5'-P-CCNC: 34 U/L (ref 10–35)
ATRIAL RATE - MUSE: 75 BPM
BASOPHILS # BLD AUTO: 0 10E3/UL (ref 0–0.2)
BASOPHILS NFR BLD AUTO: 0 %
BILIRUB SERPL-MCNC: 0.5 MG/DL
BUN SERPL-MCNC: 10.2 MG/DL (ref 6–20)
BUN SERPL-MCNC: 13.2 MG/DL (ref 6–20)
CALCIUM SERPL-MCNC: 9.3 MG/DL (ref 8.6–10)
CALCIUM SERPL-MCNC: 9.4 MG/DL (ref 8.6–10)
CHLORIDE SERPL-SCNC: 101 MMOL/L (ref 98–107)
CHLORIDE SERPL-SCNC: 97 MMOL/L (ref 98–107)
CREAT SERPL-MCNC: 0.58 MG/DL (ref 0.51–0.95)
CREAT SERPL-MCNC: 0.71 MG/DL (ref 0.51–0.95)
DEPRECATED HCO3 PLAS-SCNC: 20 MMOL/L (ref 22–29)
DEPRECATED HCO3 PLAS-SCNC: 21 MMOL/L (ref 22–29)
DIASTOLIC BLOOD PRESSURE - MUSE: NORMAL MMHG
EOSINOPHIL # BLD AUTO: 0.1 10E3/UL (ref 0–0.7)
EOSINOPHIL NFR BLD AUTO: 0 %
ERYTHROCYTE [DISTWIDTH] IN BLOOD BY AUTOMATED COUNT: 14.5 % (ref 10–15)
GFR SERPL CREATININE-BSD FRML MDRD: >90 ML/MIN/1.73M2
GFR SERPL CREATININE-BSD FRML MDRD: >90 ML/MIN/1.73M2
GLUCOSE BLDC GLUCOMTR-MCNC: 117 MG/DL (ref 70–99)
GLUCOSE BLDC GLUCOMTR-MCNC: 74 MG/DL (ref 70–99)
GLUCOSE BLDC GLUCOMTR-MCNC: 84 MG/DL (ref 70–99)
GLUCOSE BLDC GLUCOMTR-MCNC: 91 MG/DL (ref 70–99)
GLUCOSE SERPL-MCNC: 101 MG/DL (ref 70–99)
GLUCOSE SERPL-MCNC: 130 MG/DL (ref 70–99)
HCT VFR BLD AUTO: 35.1 % (ref 35–47)
HGB BLD-MCNC: 11.2 G/DL (ref 11.7–15.7)
IMM GRANULOCYTES # BLD: 0.1 10E3/UL
IMM GRANULOCYTES NFR BLD: 1 %
INTERPRETATION ECG - MUSE: NORMAL
LYMPHOCYTES # BLD AUTO: 3 10E3/UL (ref 0.8–5.3)
LYMPHOCYTES NFR BLD AUTO: 23 %
MAGNESIUM SERPL-MCNC: 2.2 MG/DL (ref 1.7–2.3)
MAGNESIUM SERPL-MCNC: 2.2 MG/DL (ref 1.7–2.3)
MCH RBC QN AUTO: 24.9 PG (ref 26.5–33)
MCHC RBC AUTO-ENTMCNC: 31.9 G/DL (ref 31.5–36.5)
MCV RBC AUTO: 78 FL (ref 78–100)
MONOCYTES # BLD AUTO: 0.7 10E3/UL (ref 0–1.3)
MONOCYTES NFR BLD AUTO: 6 %
NEUTROPHILS # BLD AUTO: 9.4 10E3/UL (ref 1.6–8.3)
NEUTROPHILS NFR BLD AUTO: 70 %
NRBC # BLD AUTO: 0 10E3/UL
NRBC BLD AUTO-RTO: 0 /100
P AXIS - MUSE: 41 DEGREES
PHOSPHATE SERPL-MCNC: 3.9 MG/DL (ref 2.5–4.5)
PLATELET # BLD AUTO: 289 10E3/UL (ref 150–450)
POTASSIUM SERPL-SCNC: 4.4 MMOL/L (ref 3.4–5.3)
POTASSIUM SERPL-SCNC: 4.5 MMOL/L (ref 3.4–5.3)
PR INTERVAL - MUSE: 152 MS
PROT SERPL-MCNC: 7.2 G/DL (ref 6.4–8.3)
QRS DURATION - MUSE: 130 MS
QT - MUSE: 426 MS
QTC - MUSE: 475 MS
R AXIS - MUSE: 0 DEGREES
RBC # BLD AUTO: 4.5 10E6/UL (ref 3.8–5.2)
SODIUM SERPL-SCNC: 131 MMOL/L (ref 136–145)
SODIUM SERPL-SCNC: 134 MMOL/L (ref 136–145)
SYSTOLIC BLOOD PRESSURE - MUSE: NORMAL MMHG
T AXIS - MUSE: -2 DEGREES
UFH PPP CHRO-ACNC: 0.15 IU/ML
UFH PPP CHRO-ACNC: 0.4 IU/ML
VENTRICULAR RATE- MUSE: 75 BPM
WBC # BLD AUTO: 13.3 10E3/UL (ref 4–11)

## 2022-12-07 PROCEDURE — 250N000013 HC RX MED GY IP 250 OP 250 PS 637: Performed by: INTERNAL MEDICINE

## 2022-12-07 PROCEDURE — 99152 MOD SED SAME PHYS/QHP 5/>YRS: CPT | Performed by: INTERNAL MEDICINE

## 2022-12-07 PROCEDURE — 250N000011 HC RX IP 250 OP 636: Performed by: INTERNAL MEDICINE

## 2022-12-07 PROCEDURE — 83735 ASSAY OF MAGNESIUM: CPT | Performed by: INTERNAL MEDICINE

## 2022-12-07 PROCEDURE — 84100 ASSAY OF PHOSPHORUS: CPT | Performed by: INTERNAL MEDICINE

## 2022-12-07 PROCEDURE — 93454 CORONARY ARTERY ANGIO S&I: CPT | Mod: 26 | Performed by: INTERNAL MEDICINE

## 2022-12-07 PROCEDURE — 83735 ASSAY OF MAGNESIUM: CPT | Performed by: STUDENT IN AN ORGANIZED HEALTH CARE EDUCATION/TRAINING PROGRAM

## 2022-12-07 PROCEDURE — 82310 ASSAY OF CALCIUM: CPT | Performed by: STUDENT IN AN ORGANIZED HEALTH CARE EDUCATION/TRAINING PROGRAM

## 2022-12-07 PROCEDURE — 93005 ELECTROCARDIOGRAM TRACING: CPT

## 2022-12-07 PROCEDURE — 250N000011 HC RX IP 250 OP 636: Performed by: STUDENT IN AN ORGANIZED HEALTH CARE EDUCATION/TRAINING PROGRAM

## 2022-12-07 PROCEDURE — 250N000009 HC RX 250: Performed by: INTERNAL MEDICINE

## 2022-12-07 PROCEDURE — 85520 HEPARIN ASSAY: CPT | Performed by: INTERNAL MEDICINE

## 2022-12-07 PROCEDURE — 250N000013 HC RX MED GY IP 250 OP 250 PS 637: Performed by: STUDENT IN AN ORGANIZED HEALTH CARE EDUCATION/TRAINING PROGRAM

## 2022-12-07 PROCEDURE — B2111ZZ FLUOROSCOPY OF MULTIPLE CORONARY ARTERIES USING LOW OSMOLAR CONTRAST: ICD-10-PCS | Performed by: INTERNAL MEDICINE

## 2022-12-07 PROCEDURE — 99292 CRITICAL CARE ADDL 30 MIN: CPT | Performed by: INTERNAL MEDICINE

## 2022-12-07 PROCEDURE — 93454 CORONARY ARTERY ANGIO S&I: CPT | Performed by: INTERNAL MEDICINE

## 2022-12-07 PROCEDURE — 200N000002 HC R&B ICU UMMC

## 2022-12-07 PROCEDURE — C1894 INTRO/SHEATH, NON-LASER: HCPCS | Performed by: INTERNAL MEDICINE

## 2022-12-07 PROCEDURE — 80053 COMPREHEN METABOLIC PANEL: CPT | Performed by: INTERNAL MEDICINE

## 2022-12-07 PROCEDURE — 99291 CRITICAL CARE FIRST HOUR: CPT | Mod: GC | Performed by: INTERNAL MEDICINE

## 2022-12-07 PROCEDURE — 85025 COMPLETE CBC W/AUTO DIFF WBC: CPT | Performed by: INTERNAL MEDICINE

## 2022-12-07 PROCEDURE — 272N000001 HC OR GENERAL SUPPLY STERILE: Performed by: INTERNAL MEDICINE

## 2022-12-07 RX ORDER — FLUMAZENIL 0.1 MG/ML
0.2 INJECTION, SOLUTION INTRAVENOUS
Status: ACTIVE | OUTPATIENT
Start: 2022-12-07 | End: 2022-12-08

## 2022-12-07 RX ORDER — POTASSIUM CHLORIDE 750 MG/1
40 TABLET, EXTENDED RELEASE ORAL ONCE
Status: COMPLETED | OUTPATIENT
Start: 2022-12-07 | End: 2022-12-07

## 2022-12-07 RX ORDER — HEPARIN SODIUM 1000 [USP'U]/ML
INJECTION, SOLUTION INTRAVENOUS; SUBCUTANEOUS
Status: DISCONTINUED | OUTPATIENT
Start: 2022-12-07 | End: 2022-12-07 | Stop reason: HOSPADM

## 2022-12-07 RX ORDER — ATROPINE SULFATE 0.1 MG/ML
0.5 INJECTION INTRAVENOUS
Status: ACTIVE | OUTPATIENT
Start: 2022-12-07 | End: 2022-12-08

## 2022-12-07 RX ORDER — NICARDIPINE HYDROCHLORIDE 2.5 MG/ML
INJECTION INTRAVENOUS
Status: DISCONTINUED | OUTPATIENT
Start: 2022-12-07 | End: 2022-12-07 | Stop reason: HOSPADM

## 2022-12-07 RX ORDER — NALOXONE HYDROCHLORIDE 0.4 MG/ML
0.4 INJECTION, SOLUTION INTRAMUSCULAR; INTRAVENOUS; SUBCUTANEOUS
Status: ACTIVE | OUTPATIENT
Start: 2022-12-07 | End: 2022-12-08

## 2022-12-07 RX ORDER — FENTANYL CITRATE 50 UG/ML
INJECTION, SOLUTION INTRAMUSCULAR; INTRAVENOUS
Status: DISCONTINUED | OUTPATIENT
Start: 2022-12-07 | End: 2022-12-07 | Stop reason: HOSPADM

## 2022-12-07 RX ORDER — FENTANYL CITRATE 50 UG/ML
25 INJECTION, SOLUTION INTRAMUSCULAR; INTRAVENOUS
Status: DISCONTINUED | OUTPATIENT
Start: 2022-12-07 | End: 2022-12-08

## 2022-12-07 RX ORDER — NALOXONE HYDROCHLORIDE 0.4 MG/ML
0.2 INJECTION, SOLUTION INTRAMUSCULAR; INTRAVENOUS; SUBCUTANEOUS
Status: ACTIVE | OUTPATIENT
Start: 2022-12-07 | End: 2022-12-08

## 2022-12-07 RX ORDER — NITROGLYCERIN 5 MG/ML
VIAL (ML) INTRAVENOUS
Status: DISCONTINUED | OUTPATIENT
Start: 2022-12-07 | End: 2022-12-07 | Stop reason: HOSPADM

## 2022-12-07 RX ORDER — MAGNESIUM SULFATE 1 G/100ML
1 INJECTION INTRAVENOUS ONCE
Status: COMPLETED | OUTPATIENT
Start: 2022-12-07 | End: 2022-12-07

## 2022-12-07 RX ORDER — OXYCODONE HYDROCHLORIDE 10 MG/1
10 TABLET ORAL EVERY 4 HOURS PRN
Status: DISCONTINUED | OUTPATIENT
Start: 2022-12-07 | End: 2022-12-27

## 2022-12-07 RX ORDER — OXYCODONE HYDROCHLORIDE 5 MG/1
5 TABLET ORAL EVERY 4 HOURS PRN
Status: DISCONTINUED | OUTPATIENT
Start: 2022-12-07 | End: 2022-12-27

## 2022-12-07 RX ORDER — IOPAMIDOL 755 MG/ML
INJECTION, SOLUTION INTRAVASCULAR
Status: DISCONTINUED | OUTPATIENT
Start: 2022-12-07 | End: 2022-12-07 | Stop reason: HOSPADM

## 2022-12-07 RX ORDER — SODIUM CHLORIDE 9 MG/ML
75 INJECTION, SOLUTION INTRAVENOUS CONTINUOUS
Status: ACTIVE | OUTPATIENT
Start: 2022-12-07 | End: 2022-12-07

## 2022-12-07 RX ORDER — FUROSEMIDE 10 MG/ML
40 INJECTION INTRAMUSCULAR; INTRAVENOUS ONCE
Status: COMPLETED | OUTPATIENT
Start: 2022-12-07 | End: 2022-12-07

## 2022-12-07 RX ORDER — MAGNESIUM SULFATE HEPTAHYDRATE 40 MG/ML
2 INJECTION, SOLUTION INTRAVENOUS ONCE
Status: COMPLETED | OUTPATIENT
Start: 2022-12-07 | End: 2022-12-07

## 2022-12-07 RX ORDER — ASPIRIN 325 MG
325 TABLET ORAL ONCE
Status: COMPLETED | OUTPATIENT
Start: 2022-12-07 | End: 2022-12-07

## 2022-12-07 RX ADMIN — MAGNESIUM SULFATE IN WATER 2 G: 40 INJECTION, SOLUTION INTRAVENOUS at 09:17

## 2022-12-07 RX ADMIN — ASPIRIN 325 MG ORAL TABLET 325 MG: 325 PILL ORAL at 15:04

## 2022-12-07 RX ADMIN — METOPROLOL TARTRATE 25 MG: 25 TABLET, FILM COATED ORAL at 02:36

## 2022-12-07 RX ADMIN — POTASSIUM CHLORIDE 40 MEQ: 750 TABLET, EXTENDED RELEASE ORAL at 09:16

## 2022-12-07 RX ADMIN — ACETAMINOPHEN 650 MG: 325 TABLET, FILM COATED ORAL at 02:36

## 2022-12-07 RX ADMIN — HEPARIN SODIUM 1950 UNITS/HR: 10000 INJECTION, SOLUTION INTRAVENOUS at 14:47

## 2022-12-07 RX ADMIN — HEPARIN SODIUM 1950 UNITS/HR: 10000 INJECTION, SOLUTION INTRAVENOUS at 02:37

## 2022-12-07 RX ADMIN — MAGNESIUM SULFATE IN DEXTROSE 1 G: 10 INJECTION, SOLUTION INTRAVENOUS at 23:01

## 2022-12-07 RX ADMIN — Medication 37.5 MG: at 15:04

## 2022-12-07 RX ADMIN — FUROSEMIDE 40 MG: 10 INJECTION, SOLUTION INTRAVENOUS at 09:17

## 2022-12-07 RX ADMIN — METOPROLOL TARTRATE 25 MG: 25 TABLET, FILM COATED ORAL at 10:11

## 2022-12-07 RX ADMIN — Medication 37.5 MG: at 21:47

## 2022-12-07 ASSESSMENT — ACTIVITIES OF DAILY LIVING (ADL)
ADLS_ACUITY_SCORE: 26
ADLS_ACUITY_SCORE: 24
ADLS_ACUITY_SCORE: 26

## 2022-12-07 NOTE — PROGRESS NOTES
Patient monitored on CVICU. See flowsheets for FHR and uterine activity. NST results documented. The patient denies leaking of fluids, bleeding, cramping, back pain, and or contractions.

## 2022-12-07 NOTE — PROGRESS NOTES
Cardiology ICU Progress Note  2022      ASSESSMENT/PLAN:  Anjali Carmen is a 32 year old female, admitted on 22. She year old female  A0 and no significant past medical history, who presents as a transfer from [facility] to [facility] on 2022 for further management of newly diagnosed, acute decompensated systolic heart failure (LVEF 20%) as well as high risk delivery.     Interval history:   Patient continues to have persistent premature ventricular tachycardia with metoprolol dosing increased from 25 mg bid to tid. TTE done on  notable for regional wall motion abnormalities combined with elevated high sensitivity troponin of 45, this was concerning for possible ischemic history vs. ACS. This concern was discussed with patient, MFM, and interventional cardiologist and patient decided to proceed with coronary angiogram today.     Of note, patient reports of dyspnea with her frequent ectopies but otherwise her chest pressure symptoms have improved and are very minimal. JVP ~13-14    Today:  - Increase metoprolol tartrate from 25 mg q8hr to  37.5 mg q8hr  - Give Lasix 40 mg IV x1  - Continue Heparin gtt  - Coronary angiogram done this afternoon; formal results pending but no intervention was needed/done   - Continue close monitoring in the ICU with frequent BMP checks to keep K > 4.5 and Mg > 2.5      ===NEURO===  No acute issues      ===CARDIOVASCULAR===  # Acute decompensated systolic and diastolic heart failure  # ACC/AHA Stage C systolic heart failure  # Heart failure with reduced EF (LVEF 20%)  # Mild right ventricular systolic dysfunction  # Non-sustained VT    Etiology of HFrEF: uncertain at this time.  Concern for familial dilated cardiomyopathy given history of CM in father at 30 years of age. Last pregnancy in 2017, timeline not fitting in for PPCM. Greater concern for NICM over ICMP, however, no ischemic assessment performed so far. No known history of pre-existing  structural heart disease.     1. Patient hemodynamically stable with preserved end-organ function. No indication for inotrope support/MCS at this time.   2. GDMT and diuresis as outlined below.  Traditional afterload targets for poor LV function and low LVEF of 20-25% cannot be met due to need for fetoplacental circulation.  Will target SBP in 120-140 range as per discussion with OB-GYN.  3. Repeat transthoracic echocardiogram done on 12/6 notable for severely reduced left ventricular function with an LVEF of 20-25%. Also notable for RWM (details below)  4. Will consider cardiac MRI w.r.t etiology of cardiomyopathy once clinically permissible (likely to be done post partum).  5. Currently on heparin gtt for Afib and risk of LV thrombus formation given low LVEF and hypercoagulable state of pregnancy.      GDMT:   ACEi/ARB/ARNI: contraindicated due to pregnancy  BB: continue metoprolol tartrate at 97.5 mg q8hr  Aldosterone antagonist: high adverse risk medication in pregnancy  SCD prophylaxis: does not meet criteria for implant  Fluid status: Approaching euvolemia. Give 40 mg IV Lasix x1 on 12/7       # New onset possible paroxysmal atrial fibrillation:   [facility] [place]- isolated episode, EKG unavailable for review. Currently in NSR.  - Continue to monitor rhythm on telemetry.   - Currently on heparin gtt for AC, however, CHADS VASc low and isolated episode of Afib. Will continue for now as its safe in pregnancy (discussed with MFM).     ===PULMONARY===  # No acute issues    ===GASTROINTESTINAL===  # No acute issues, tolerating oral meals without nausea.     ===RENAL===  # No acute issues    ===HEME/ONC===  # Mild anemia:   HGB 10.2 g/dL on arrival (MCV 79). Retic count 1.9%  Etiology: AVINASH vs anemia in the setting of HF.  - Check iron, ferritin, TIBC levels.   - Limit blood draws.     ===ENDOCRINE===  # 24 weeks 1 day gestational age:   - Appreciate MFM input.   - Check OB US for fetal growth  "coming AM.      # Screening for Gestational DM:   # Monitor for hyperglycemia  Risk factors: High BMI and received steroids for fetal lung maturation.      - Check HbA1c & urine proteins.   - q6 glucose checks with sliding scale insulin.     ===INFECTIOUS DISEASE===  # No acute issues    ===SKIN/MSK===  # No acute issues      Prophylaxis:  DVT: heparin gtt   GI: not indicated  Family: Updated via telephone by the bedside  Disposition: Critically Ill and needs to remain ICU status at this time.   Code Status: Full     Plan d/w Dr. Nessa M.D., who is in agreement. Please, see staff addendum for changes/additions to the plan.    Maicol Camacho MD, PhD  Cardiology Fellow  Pgr: [phone]    ===================================================================    SUBJECTIVE: JOAQUIN o/n.     Nursing notes reviewed.    OBJECTIVE:  BP 98/53   Pulse 73   Temp 98.2  F (36.8  C) (Oral)   Resp 19   Ht 1.753 m (5' 9\")   Wt 133.3 kg (293 lb 14 oz)   SpO2 97%   BMI 43.40 kg/m    Temp (24hrs), Av.2  F (36.8  C), Min:97.9  F (36.6  C), Max:98.7  F (37.1  C)      I/O:    Intake/Output Summary (Last 24 hours) at 2022 0753  Last data filed at 2022 0700  Gross per 24 hour   Intake 1651.45 ml   Output 1400 ml   Net 251.45 ml       Wt:   Wt Readings from Last 5 Encounters:   22 133.3 kg (293 lb 14 oz)       GEN: pleasant, no acute distress  HEENT: no icterus  CV: RRR, normal s1/s2, no murmurs/rubs/s3/s4, no heave. JVP 13-14.   CHEST: clear to ausculation bilaterally, no rales or wheezing  ABD: soft, non-tender, normal active bowel sounds  EXTR: pulses +2 bilaterally. No clubbing, cyanosis or edema.   NEURO: alert oriented, speech fluent/appropriate, motor grossly nonfocal    Labs: reviewed in EMR  CBC  Recent Labs   Lab 22  0450 22  0202   WBC 13.3* 16.7*   RBC 4.50 4.84   HGB 11.2* 12.1   HCT 35.1 37.4   MCV 78 77*   MCH 24.9* 25.0*   MCHC 31.9 32.4   RDW 14.5 14.4    318     BMP  Recent Labs "   Lab 12/07/22  0450 12/06/22  2154 12/06/22  1838 12/06/22  1622 12/06/22  1423 12/06/22  1143 12/06/22  0748 12/06/22  0202   *  --  134*  --   --  133*  --  133*   POTASSIUM 4.4  --  4.1  --  4.0 3.7  3.7  --  3.9   CHLORIDE 101  --  99  --   --  98  --  98   CO2 21*  --  22  --   --  21*  --  20*   ANIONGAP 12  --  13  --   --  14  --  15   * 132* 116* 131*  --  108*  97   < > 111*   BUN 10.2  --  13.1  --   --  14.1  --  13.8   CR 0.58  --  0.69  --   --  0.70  --  0.69   GFRESTIMATED >90  --  >90  --   --  >90  --  >90   AYDEN 9.3  --  9.3  --   --  9.8  --  9.7   MAG 2.2  --  2.1  --  2.3 1.9  --  2.0   PHOS 3.9  --   --   --   --  4.2  --  4.7*    < > = values in this interval not displayed.     Troponins:   No results found for: TROPI, TROPONIN, TROPR, TROPN  INR  Recent Labs   Lab 12/06/22  0202   INR 1.13       EKG  12/6/22: SNR at a ventricular rate of 88 with PVCs and interpolated PVCs    Echocardiogram (12/6/22):  Interpretation Summary  Left ventricular function is decreased. The ejection fraction is 20-25%  (severely reduced). Severe diffuse hypokinesis is present. There is wall  thinning and akinesis of the basal-mid inferior, basal-mid inferoseptal and  basal inferolateral segments. There is akinesis of the apical septum. This is  compatible with ischemic cardiomyopathy.  Mild right ventricular dilation is present. Global right ventricular function  is moderately reduced.  Mild tricuspid insufficiency is present.  Pulmonary artery systolic pressure is normal.  IVC diameter and respiratory changes fall into an intermediate range  suggesting an RA pressure of 8 mmHg.  No pericardial effusion is present.    Coronary Angiogram:  12/7/22:   Formal results pending, but no obstructive disease seen on preliminary evaluation.     Imaging: reviewed in EMR.

## 2022-12-07 NOTE — PROGRESS NOTES
M Antepartum Progress Note    Subjective:   Anjali is feeling well. She is not having chest pain, SOB, abdominal pain, vaginal bleeding.     Objective:  Vitals:    22 1200 22 1300 22 1400 22 1500   BP: 94/55 96/55 102/57 100/57   BP Location: Right arm      Pulse: 76 75 85 85   Resp: 18 20 20 20   Temp: 97.5  F (36.4  C)      TempSrc: Oral      SpO2: 94% 95% 98% 94%   Weight:       Height:           I/O last 3 completed shifts:  In: 1836.8 [P.O.:1360; I.V.:476.8]  Out: 2425 [Urine:2425]    Gen: Resting comfortably in bed, NAD  Abd: Gravid, non-tender, non-distended    Imaging:  Echo,  - Interpretation Summary  Left ventricular function is decreased. The ejection fraction is 20-25% (severely reduced). Severe diffuse hypokinesis is present. There is wall thinning and akinesis of the basal-mid inferior, basal-mid inferoseptal and basal inferolateral segments. There is akinesis of the apical septum. This is compatible with ischemic cardiomyopathy. Mild right ventricular dilation is present. Global right ventricular function is moderately reduced. Mild tricuspid insufficiency is present. Pulmonary artery systolic pressure is normal. IVC diameter and respiratory changes fall into an intermediate range suggesting an RA pressure of 8 mmHg. No pericardial effusion is present.    Assessment/Plan:   Anjali Carmen is a 32 year old  at 24w2d by 6w1d US admitted to CICU for acute HFrEF, cardiac arrhythmia after transferring from [facility].     # Acute decompensated HFrEF  # Possible atrial fibrillation, VT  There are limited human reports on pharmacological therapy for the treatment of sustained ventricular tachycardia in hemodynamically stable patients; in general, intravenous procainamide and lidocaine are considered safe. Synchronized cardioversion is used if there is hemodynamically significant supraventricular tachycardia, atrial fibrillation, and ventricular tachyarrhythmia,  similar to nonpregnant patients. Digoxin, ?-blockers, and calcium channel blockers can be used for rate control; however, amiodarone should be avoided. If necessary, catheter ablation can be used if refractory to medication, avoiding/limiting fluoroscopy if possible and preferably delaying the ablation until the second trimester. (AHA, 2020)  - Admitted to CICU for management of new cardiac diagnoses  - BP goal 130/80, MAP >65. If patient begins to have blood pressures >160/110 for at least 15 minutes, call M for guidance of management (pager [phone]), although typically will initiate treatment with 5-10 mg of IV hydralazine or 20 mg of IV labetalol  - Agree with continued anticoagulation/antiplatelet per primary team. Will need to consider anticoagulation at delivery. For regional anesthesia to be an option, we would need to stop anticoagulation/antiplatelet therapy. She will need an anesthesia consult and multi-disciplinary discussion regarding this.   - we support coronary angiography in this patient.    Positioning: L lateral tilt if possible   Monitoring: pre and post NST   Lead covering abdomen to reduce floro dose, encourage minimizing floro time, though this low dose is acceptable for the maternal benefit.      # Pregnancy at 24w2d  - Growth US at OSH (12/1) with normal anatomy, EFW 87%ile, no need to repeat growth US at this time (typically repeated every 3-4 weeks)  - Prenatal labs collected at OSH  - Non-stress test (NST) by L&D RN BID  - Betamethasone (BMZ) given 11/30-12/1 at OSH, rescue BMZ PRN (typically not repeated until at least 2 weeks from initial course and if there is concern for delivery in the next week)  - Magnesium for neuroprotection if moving towards delivery prior to 32 weeks with 4g bolus followed by 2g/hr maintenance until delivery  - Routine indications for emergent delivery including maternal decompensation or fetal compromise  - Patient had desired to have a trial of labor  after a prior  section. In setting of decompensated HF, will discuss delivery planning moving forward as she may not be a candidate to valsalva in labor. at this time, would recommend repeat CS, however, will continue to discuss this  - delivery timing: goal would be 37w, though she may require delivery sooner if she decompensates sooner, will continue to evaluate delivery timing during her stay.   - Jaclyn requires routine prenatal care while in house with next milestone at 28 weeks when typically Tdap is administered, as well as repeat CBC, RPR (syphilis screening), and type and screen to evaluate antibodies, as well as one hour glucose challenge test (GCT)     The patient was seen and evaluated with Dr. Joshi.     Dandre Hercules MD  OB/GYN PGY-3  2022 5:00 PM      Faculty note:  I saw Anjali with Dr. Szymanski and Dr. Hercules.  Anjali overall is feeling fairly well, but still symptomatic from short runs of VT.      No obstetric complaints.     Today our focus was no discussion of coronary angiography during pregnancy.  We reviewed the acceptable risks of radiation for this test and how the results may help the cardiac team considerably in managing her HF.      Discussed approach and positioning with the cardiac team.  Plan for NST before and after the procedure.     Continue cares per Cardiology.     Elena Joshi, DO FACOG  Maternal Fetal Medicine Specialist

## 2022-12-07 NOTE — PLAN OF CARE
Major Shift Events:    Neuro: A/O x4, follolwing commands, moving all extremities purposefully. SBA. Complained of Headache, given PRN tylenol   CV: afebrile. ST/SR with frequent PVCs and PACs. Did have a 6 beat run of V-tach, cards aware. Pt complained of heart palpations and flutter in her heart, PVCs and PACs were noted on EKG being more frequent. Given Metoprolol, did provide some relief, but flutter and palpations resumed some time after.  Cards team aware, continue to monitor HR . MAP >65, soft BP in the 90s.   Resp: 2L NC for comfort d/t to SOB, LS diminished.   GI: No BM, tolerating diet. Denied nausea  :  UOP adequate   IV/Drips:  L DL PICC  Heparin 1950 units/hr, not therapeutic   Plan: Monitor hemodynamics, trend anti xa, FWF to US fetus BID  For vital signs and complete assessments, please see documentation flowsheets.

## 2022-12-07 NOTE — PLAN OF CARE
Neuro: A&Ox4.   Cardiac: SR 70-90 with occasional PVC's; one 8 beat run of VT; felt palpitation and dizzy after.  BP 's/60.   Respiratory: Sating >92% on RA.  GI/: Adequate urine output. BM yesterday.  Diet/appetite: NPO prior to cath lab; ate dinner, good appetite.   Activity:  Assist of SB, up to chair and in room; tolerates activity well.  Pain: Denies   Skin: R A-line from angio.  LDA's:  Double lumen PICC to LUE, PIV x2    Plan: Angio this afternoon via L radial; TR Band on, started at 11 ml, down to 5 at 1900, oncoming RN to continue deflating.  Good radial pulse, CMS intact. Heparin was therapeutic at 1950 units/hr but was stopped for cath lab case, hit floor at 1730; plan to restart at 1930. Continue with POC. Notify primary team with changes.

## 2022-12-07 NOTE — PLAN OF CARE
Patient monitored on CVICU, see flowsheets for FHR and uterine activity. Patient denies cramping, back ache, contractions, leakage of fluid or bleeding.

## 2022-12-08 ENCOUNTER — APPOINTMENT (OUTPATIENT)
Dept: ULTRASOUND IMAGING | Facility: CLINIC | Age: 32
End: 2022-12-08
Attending: STUDENT IN AN ORGANIZED HEALTH CARE EDUCATION/TRAINING PROGRAM
Payer: COMMERCIAL

## 2022-12-08 ENCOUNTER — TRANSFERRED RECORDS (OUTPATIENT)
Dept: HEALTH INFORMATION MANAGEMENT | Facility: CLINIC | Age: 32
End: 2022-12-08

## 2022-12-08 LAB
ALBUMIN SERPL BCG-MCNC: 3.2 G/DL (ref 3.5–5.2)
ALP SERPL-CCNC: 102 U/L (ref 35–104)
ALT SERPL W P-5'-P-CCNC: 16 U/L (ref 10–35)
ANION GAP SERPL CALCULATED.3IONS-SCNC: 11 MMOL/L (ref 7–15)
ANION GAP SERPL CALCULATED.3IONS-SCNC: 11 MMOL/L (ref 7–15)
AST SERPL W P-5'-P-CCNC: 37 U/L (ref 10–35)
BASE EXCESS BLDV CALC-SCNC: -1.3 MMOL/L (ref -7.7–1.9)
BASE EXCESS BLDV CALC-SCNC: 0.2 MMOL/L (ref -7.7–1.9)
BASOPHILS # BLD AUTO: 0 10E3/UL (ref 0–0.2)
BASOPHILS NFR BLD AUTO: 0 %
BILIRUB SERPL-MCNC: 0.4 MG/DL
BUN SERPL-MCNC: 11.6 MG/DL (ref 6–20)
BUN SERPL-MCNC: 12 MG/DL (ref 6–20)
CALCIUM SERPL-MCNC: 8.9 MG/DL (ref 8.6–10)
CALCIUM SERPL-MCNC: 9.1 MG/DL (ref 8.6–10)
CHLORIDE SERPL-SCNC: 102 MMOL/L (ref 98–107)
CHLORIDE SERPL-SCNC: 99 MMOL/L (ref 98–107)
CREAT SERPL-MCNC: 0.66 MG/DL (ref 0.51–0.95)
CREAT SERPL-MCNC: 0.78 MG/DL (ref 0.51–0.95)
DEPRECATED HCO3 PLAS-SCNC: 21 MMOL/L (ref 22–29)
DEPRECATED HCO3 PLAS-SCNC: 22 MMOL/L (ref 22–29)
EOSINOPHIL # BLD AUTO: 0 10E3/UL (ref 0–0.7)
EOSINOPHIL NFR BLD AUTO: 0 %
ERYTHROCYTE [DISTWIDTH] IN BLOOD BY AUTOMATED COUNT: 14.3 % (ref 10–15)
GFR SERPL CREATININE-BSD FRML MDRD: >90 ML/MIN/1.73M2
GFR SERPL CREATININE-BSD FRML MDRD: >90 ML/MIN/1.73M2
GLUCOSE BLDC GLUCOMTR-MCNC: 125 MG/DL (ref 70–99)
GLUCOSE BLDC GLUCOMTR-MCNC: 142 MG/DL (ref 70–99)
GLUCOSE SERPL-MCNC: 122 MG/DL (ref 70–99)
GLUCOSE SERPL-MCNC: 131 MG/DL (ref 70–99)
HCO3 BLDV-SCNC: 23 MMOL/L (ref 21–28)
HCO3 BLDV-SCNC: 25 MMOL/L (ref 21–28)
HCT VFR BLD AUTO: 34.5 % (ref 35–47)
HGB BLD-MCNC: 10.8 G/DL (ref 11.7–15.7)
IMM GRANULOCYTES # BLD: 0.1 10E3/UL
IMM GRANULOCYTES NFR BLD: 1 %
LYMPHOCYTES # BLD AUTO: 2.6 10E3/UL (ref 0.8–5.3)
LYMPHOCYTES NFR BLD AUTO: 24 %
MAGNESIUM SERPL-MCNC: 2.3 MG/DL (ref 1.7–2.3)
MCH RBC QN AUTO: 24.8 PG (ref 26.5–33)
MCHC RBC AUTO-ENTMCNC: 31.3 G/DL (ref 31.5–36.5)
MCV RBC AUTO: 79 FL (ref 78–100)
MONOCYTES # BLD AUTO: 0.9 10E3/UL (ref 0–1.3)
MONOCYTES NFR BLD AUTO: 8 %
NEUTROPHILS # BLD AUTO: 7.3 10E3/UL (ref 1.6–8.3)
NEUTROPHILS NFR BLD AUTO: 67 %
NRBC # BLD AUTO: 0 10E3/UL
NRBC BLD AUTO-RTO: 0 /100
O2/TOTAL GAS SETTING VFR VENT: 21 %
O2/TOTAL GAS SETTING VFR VENT: 21 %
OXYHGB MFR BLDV: 66 % (ref 70–75)
OXYHGB MFR BLDV: 75 % (ref 70–75)
PCO2 BLDV: 38 MM HG (ref 40–50)
PCO2 BLDV: 40 MM HG (ref 40–50)
PH BLDV: 7.4 [PH] (ref 7.32–7.43)
PH BLDV: 7.41 [PH] (ref 7.32–7.43)
PHOSPHATE SERPL-MCNC: 3.7 MG/DL (ref 2.5–4.5)
PLATELET # BLD AUTO: 262 10E3/UL (ref 150–450)
PO2 BLDV: 39 MM HG (ref 25–47)
PO2 BLDV: 44 MM HG (ref 25–47)
POTASSIUM SERPL-SCNC: 4.1 MMOL/L (ref 3.4–5.3)
POTASSIUM SERPL-SCNC: 4.3 MMOL/L (ref 3.4–5.3)
PROT SERPL-MCNC: 6.9 G/DL (ref 6.4–8.3)
RBC # BLD AUTO: 4.36 10E6/UL (ref 3.8–5.2)
SODIUM SERPL-SCNC: 132 MMOL/L (ref 136–145)
SODIUM SERPL-SCNC: 134 MMOL/L (ref 136–145)
UFH PPP CHRO-ACNC: 0.22 IU/ML
UFH PPP CHRO-ACNC: 0.36 IU/ML
UFH PPP CHRO-ACNC: 0.46 IU/ML
WBC # BLD AUTO: 11 10E3/UL (ref 4–11)

## 2022-12-08 PROCEDURE — 82805 BLOOD GASES W/O2 SATURATION: CPT | Performed by: STUDENT IN AN ORGANIZED HEALTH CARE EDUCATION/TRAINING PROGRAM

## 2022-12-08 PROCEDURE — 83735 ASSAY OF MAGNESIUM: CPT | Performed by: INTERNAL MEDICINE

## 2022-12-08 PROCEDURE — 250N000011 HC RX IP 250 OP 636: Performed by: STUDENT IN AN ORGANIZED HEALTH CARE EDUCATION/TRAINING PROGRAM

## 2022-12-08 PROCEDURE — 84100 ASSAY OF PHOSPHORUS: CPT | Performed by: INTERNAL MEDICINE

## 2022-12-08 PROCEDURE — 200N000002 HC R&B ICU UMMC

## 2022-12-08 PROCEDURE — 250N000013 HC RX MED GY IP 250 OP 250 PS 637: Performed by: STUDENT IN AN ORGANIZED HEALTH CARE EDUCATION/TRAINING PROGRAM

## 2022-12-08 PROCEDURE — 99291 CRITICAL CARE FIRST HOUR: CPT | Mod: GC | Performed by: INTERNAL MEDICINE

## 2022-12-08 PROCEDURE — 250N000011 HC RX IP 250 OP 636: Performed by: INTERNAL MEDICINE

## 2022-12-08 PROCEDURE — 76811 OB US DETAILED SNGL FETUS: CPT | Mod: 26 | Performed by: OBSTETRICS & GYNECOLOGY

## 2022-12-08 PROCEDURE — 85520 HEPARIN ASSAY: CPT | Performed by: STUDENT IN AN ORGANIZED HEALTH CARE EDUCATION/TRAINING PROGRAM

## 2022-12-08 PROCEDURE — 76811 OB US DETAILED SNGL FETUS: CPT

## 2022-12-08 PROCEDURE — 80053 COMPREHEN METABOLIC PANEL: CPT | Performed by: INTERNAL MEDICINE

## 2022-12-08 PROCEDURE — 85025 COMPLETE CBC W/AUTO DIFF WBC: CPT | Performed by: INTERNAL MEDICINE

## 2022-12-08 PROCEDURE — 250N000013 HC RX MED GY IP 250 OP 250 PS 637: Performed by: INTERNAL MEDICINE

## 2022-12-08 PROCEDURE — 85520 HEPARIN ASSAY: CPT | Performed by: INTERNAL MEDICINE

## 2022-12-08 RX ORDER — NALOXONE HYDROCHLORIDE 0.4 MG/ML
0.2 INJECTION, SOLUTION INTRAMUSCULAR; INTRAVENOUS; SUBCUTANEOUS
Status: DISCONTINUED | OUTPATIENT
Start: 2022-12-08 | End: 2023-01-31 | Stop reason: HOSPADM

## 2022-12-08 RX ORDER — MAGNESIUM SULFATE 1 G/100ML
1 INJECTION INTRAVENOUS ONCE
Status: COMPLETED | OUTPATIENT
Start: 2022-12-08 | End: 2022-12-08

## 2022-12-08 RX ORDER — POTASSIUM CHLORIDE 20MEQ/15ML
20 LIQUID (ML) ORAL DAILY
Status: DISCONTINUED | OUTPATIENT
Start: 2022-12-08 | End: 2022-12-13

## 2022-12-08 RX ORDER — NALOXONE HYDROCHLORIDE 0.4 MG/ML
0.4 INJECTION, SOLUTION INTRAMUSCULAR; INTRAVENOUS; SUBCUTANEOUS
Status: DISCONTINUED | OUTPATIENT
Start: 2022-12-08 | End: 2023-01-31 | Stop reason: HOSPADM

## 2022-12-08 RX ORDER — PRENATAL VIT/IRON FUM/FOLIC AC 27MG-0.8MG
1 TABLET ORAL DAILY
Status: DISCONTINUED | OUTPATIENT
Start: 2022-12-08 | End: 2023-01-31 | Stop reason: HOSPADM

## 2022-12-08 RX ORDER — METOPROLOL TARTRATE 25 MG/1
25 TABLET, FILM COATED ORAL EVERY 6 HOURS
Status: DISCONTINUED | OUTPATIENT
Start: 2022-12-08 | End: 2022-12-09

## 2022-12-08 RX ORDER — FUROSEMIDE 10 MG/ML
40 INJECTION INTRAMUSCULAR; INTRAVENOUS ONCE
Status: COMPLETED | OUTPATIENT
Start: 2022-12-08 | End: 2022-12-08

## 2022-12-08 RX ADMIN — HEPARIN SODIUM 2100 UNITS/HR: 10000 INJECTION, SOLUTION INTRAVENOUS at 15:50

## 2022-12-08 RX ADMIN — METOPROLOL TARTRATE 25 MG: 25 TABLET, FILM COATED ORAL at 13:07

## 2022-12-08 RX ADMIN — POTASSIUM CHLORIDE 20 MEQ: 40 SOLUTION ORAL at 08:49

## 2022-12-08 RX ADMIN — SODIUM CHLORIDE, PRESERVATIVE FREE 5 ML: 5 INJECTION INTRAVENOUS at 15:14

## 2022-12-08 RX ADMIN — HEPARIN SODIUM 2100 UNITS/HR: 10000 INJECTION, SOLUTION INTRAVENOUS at 05:15

## 2022-12-08 RX ADMIN — ACETAMINOPHEN 650 MG: 325 TABLET, FILM COATED ORAL at 15:13

## 2022-12-08 RX ADMIN — FUROSEMIDE 40 MG: 10 INJECTION, SOLUTION INTRAVENOUS at 08:43

## 2022-12-08 RX ADMIN — METOPROLOL TARTRATE 25 MG: 25 TABLET, FILM COATED ORAL at 18:34

## 2022-12-08 RX ADMIN — Medication 37.5 MG: at 05:15

## 2022-12-08 RX ADMIN — MAGNESIUM SULFATE 1 G: 1 INJECTION INTRAVENOUS at 05:16

## 2022-12-08 RX ADMIN — SODIUM CHLORIDE, PRESERVATIVE FREE 5 ML: 5 INJECTION INTRAVENOUS at 17:53

## 2022-12-08 RX ADMIN — PRENATAL VIT W/ FE FUMARATE-FA TAB 27-0.8 MG 1 TABLET: 27-0.8 TAB at 21:19

## 2022-12-08 ASSESSMENT — ACTIVITIES OF DAILY LIVING (ADL)
ADLS_ACUITY_SCORE: 22
ADLS_ACUITY_SCORE: 24
ADLS_ACUITY_SCORE: 20
ADLS_ACUITY_SCORE: 22
ADLS_ACUITY_SCORE: 20
DEPENDENT_IADLS:: INDEPENDENT
ADLS_ACUITY_SCORE: 24
ADLS_ACUITY_SCORE: 20
ADLS_ACUITY_SCORE: 22
ADLS_ACUITY_SCORE: 20

## 2022-12-08 NOTE — PROGRESS NOTES
MFM Antepartum Progress Note    Subjective:   Anjali is feeling well. She is not having chest pain, SOB, abdominal pain, vaginal bleeding. She has no questions about her pregnancy.    Objective:  Vitals:    22 1000 22 1100 22 1200 22 1300   BP:  105/58 101/51    BP Location:   Right arm    Pulse: 74 74 70 81   Resp: 27 20 24 20   Temp:   99  F (37.2  C)    TempSrc:   Axillary    SpO2: 97% 97% 96% 96%   Weight:  130.6 kg (287 lb 14.4 oz)     Height:           I/O last 3 completed shifts:  In: 2145.01 [P.O.:1440; I.V.:705.01]  Out: 2550 [Urine:2550]    Gen: Resting comfortably in bed, NAD  Abd: Gravid, non-tender, non-distended    Imagin. Jaquez intrauterine pregnancy at 24 weeks 3 days gestational age here for evaluation of fetal anatomy.   2. No fetal anomalies commonly detected by ultrasound or soft markers of aneuploidy were identified in the detailed fetal anatomic survey within the limits of prenatal ultrasound, however some views were suboptimal, as described above.   3. Growth parameters and estimated fetal weight were consistent with established dates.  4. The amniotic fluid volume appeared normal.  5. On transabdominal imaging the cervix appears long and closed.    Assessment/Plan:   Anjali Carmen is a 32 year old  at 24w3d by 6w1d US admitted to CICU for acute HFrEF, cardiac arrhythmia after transferring from [facility]. The patient has had an echocardiogram since her arrival concerning for ischemic cardiomyopathy, which was ruled out by a cardiac catheterization on  with no coronary disease. Continuing to medically manage dilated cardiomyopathy with metoprolol and diuresis by cardiology team. MFM will continue to follow along closely and are available  at [phone].    # Acute decompensated HFrEF  # Possible atrial fibrillation, VT  - Admitted to CICU for management of new cardiac diagnoses  - BP goal 130/80, MAP >65. If patient begins to have  blood pressures >160/110 for at least 15 minutes, call [facility] for guidance of management (pager [phone]), although typically will initiate treatment with 5-10 mg of IV hydralazine or 20 mg of IV labetalol  - Agree with continued anticoagulation/antiplatelet per primary team. Will need to consider anticoagulation at delivery. For regional anesthesia to be an option, we would need to stop anticoagulation/antiplatelet therapy. She will need an anesthesia consult and multi-disciplinary discussion regarding this  - Metoprolol is safe in pregnancy with no dose restrictions     # Pregnancy at 24w2d  - Comprehensive ultrasound  with growth at 76th percentile  - Repeat growth US in 3 weeks  - Prenatal labs collected at OSH  - Non-stress test (NST) by L&D RN twice daily, consider daily NST once stable  - Betamethasone (BMZ) given - at OSH, rescue BMZ PRN (typically not repeated until at least 2 weeks from initial course and if there is concern for delivery in the next week)  - Magnesium for neuroprotection if moving towards delivery prior to 32 weeks with 4g bolus followed by 2g/hr maintenance until delivery  - Routine indications for emergent delivery including maternal decompensation or fetal compromise  - Patient had desired to have a trial of labor after a prior  section. In setting of decompensated HF, will discuss delivery planning moving forward as she may not be a candidate to valsalva in labor. at this time, would recommend repeat CS, however, will continue to discuss this  - Patinet requires routine prenatal care while in house with next milestone at 28 weeks when typically Tdap is administered, as well as repeat CBC, RPR (syphilis screening), and type and screen to evaluate antibodies, as well as one hour glucose challenge test (GCT)  - Reviewed that the goal will keep the patient pregnant as long as is safe for her with next milestone at 28 weeks     The patient was seen and evaluated with  Dr. Mast.    Physician Attestation     I, Luis Mast MD, saw this patient with the fellow and agree with the fellow's findings and plan of care as documented in the fellow s note.       I personally reviewed vital signs, medications, labs, imaging and EFM.     Key findings: Patient remains critically ill and will likely require inpatient care until delivery.       Luis Mast MD  Maternal Fetal Medicine  Date of Service (when I saw the patient): 12/08/22

## 2022-12-08 NOTE — CARE PLAN
Care Management Initial Consult    General Information  Assessment completed with: patient       Primary Care Provider verified and updated as needed:   Yes  Readmission within the last 30 days:   No        Advance Care Planning:            Communication Assessment  Patient's communication style: spoken language (English or Bilingual)    Hearing Difficulty or Deaf: no   Wear Glasses or Blind: yes    Cognitive  Cognitive/Neuro/Behavioral: WDL  Level of Consciousness: alert  Arousal Level: opens eyes spontaneously  Orientation: oriented x 4  Mood/Behavior: calm, cooperative  Best Language: 0 - No aphasia  Speech: clear, spontaneous, logical    Living Environment:   People in home:  and children  Current living Arrangements: home  Able to return to prior arrangements: Yes        Family/Social Support:  Care provided by: spouse  Provides care for:                  Description of Support System:      Spouse and parents       Current Resources:   Patient receiving home care services:  No     Community Resources:  Connected to a local [place].  Equipment currently used at home: none  Supplies currently used at home:  none    Employment/Financial:  Employment Status: full time       Financial Concerns:   none          Lifestyle & Psychosocial Needs:  Social Determinants of Health     Tobacco Use: Not on file   Alcohol Use: Not on file   Financial Resource Strain: Not on file   Food Insecurity: Not on file   Transportation Needs: Not on file   Physical Activity: Not on file   Stress: Not on file   Social Connections: Not on file   Intimate Partner Violence: Not on file   Depression: Not on file   Housing Stability: Not on file       Functional Status:  Prior to admission patient needed assistance: Independent              Mental Health Status:  No concerns          Chemical Dependency Status:                Values/Beliefs:  Spiritual, Cultural Beliefs, Zoroastrianism Practices, Values that affect care:               Additional Information:  SW met with Pt in her room. Pt was sitting up in the chair by the window. Pt was pleasant and spoke with SW. Pt indicated to have no questions or concerns regarding her cares and stated assistance from  is not needed at this time. Pt indicated to be  and to have three children. Pt stated to have support system of  and parents that are involved and supportive. Pt stated  and her oldest child will be visiting next week Tuesday. Pt stated to have a full time job as a  and has been working there for the past 9 years.  Pt's  is a . Pt stated parents are available to help with childcare as needed. Pt stated to have an OB GYN she sees in town. SW will continue to follow and provide assistance if/when needed.    Vira Burkett

## 2022-12-08 NOTE — INTERIM SUMMARY
Impression:  1. Acute congestive heart failure  2. NSVT  3. Anticoagulation  4. IUP  5. Cardiomyopathy  6. Anemia with microcytosis/iron deficiency  7. Family history of cardiomyopathy  8. Anemia with microcytosis  9. Elevated body mass index  10. Insulin resistance    Current Facility-Administered Medications   Medication     acetaminophen (TYLENOL) tablet 650 mg     alum & mag hydroxide-simethicone (MAALOX) suspension 30 mL     glucose gel 15-30 g    Or     dextrose 50 % injection 25-50 mL    Or     glucagon injection 1 mg     fentaNYL (PF) (SUBLIMAZE) injection 25 mcg     furosemide (LASIX) injection 40 mg     heparin 25,000 units in 0.45% NaCl 250 mL ANTICOAGULANT infusion     heparin lock flush 10 UNIT/ML injection 5-20 mL     heparin lock flush 10 UNIT/ML injection 5-20 mL     HOLD:  Metformin and metformin containing medications if patient received IV contrast with acute kidney injury or severe chronic kidney disease (stage IV or stage V; i.e., eGFR less than 30)     insulin aspart (NovoLOG) injection (RAPID ACTING)     insulin aspart (NovoLOG) injection (RAPID ACTING)     lidocaine (LMX4) cream     lidocaine 1 % 0.1-5 mL     medication instruction     metoprolol tartrate (LOPRESSOR) half-tab 37.5 mg     midazolam (VERSED) injection 0.5 mg     oxyCODONE (ROXICODONE) tablet 5 mg    Or     oxyCODONE IR (ROXICODONE) tablet 10 mg     Patient is already receiving anticoagulation with heparin, enoxaparin (LOVENOX), warfarin (COUMADIN)  or other anticoagulant medication     potassium chloride (KAYCIEL) solution 20 mEq     senna-docusate (SENOKOT-S/PERICOLACE) 8.6-50 MG per tablet 1 tablet    Or     senna-docusate (SENOKOT-S/PERICOLACE) 8.6-50 MG per tablet 2 tablet     sodium chloride (PF) 0.9% PF flush 10-20 mL     sodium chloride (PF) 0.9% PF flush 10-20 mL     sodium chloride (PF) 0.9% PF flush 10-40 mL     sodium chloride (PF) 0.9% PF flush 10-40 mL     sodium chloride (PF) 0.9% PF flush 10-40 mL     sodium  chloride (PF) 0.9% PF flush 3 mL     sodium chloride (PF) 0.9% PF flush 3 mL       Wt Readings from Last 24 Encounters:   22 133.3 kg (293 lb 14 oz)     Name: KELLEY ROYAL  MRN: [id number]  : 1990  Study Date: 2022 11:21 AM  Age: 32 yrs  Gender: Female  Patient Location: [facility]  Reason For Study: Heart Failure  Ordering Physician: ANNY SOMMERS  Referring Physician: OSMAR ESTRADA  Performed By: Hallie Rogers     BSA: 2.4 m2  Height: 69 in  Weight: 292 lb  BP: 102/53 mmHg  ______________________________________________________________________________  Procedure  Complete Portable Echo Adult. Contrast Optison. Optison (NDC #6640-3220-00)  given intravenously. Patient was given 5 ml mixture of 3 ml Optison and 6 ml  saline. 4 ml wasted.  ______________________________________________________________________________  Interpretation Summary  Left ventricular function is decreased. The ejection fraction is 20-25%  (severely reduced). Severe diffuse hypokinesis is present. There is wall  thinning and akinesis of the basal-mid inferior, basal-mid inferoseptal and  basal inferolateral segments. There is akinesis of the apical septum. This is  compatible with ischemic cardiomyopathy.  Mild right ventricular dilation is present. Global right ventricular function  is moderately reduced.  Mild tricuspid insufficiency is present.  Pulmonary artery systolic pressure is normal.  IVC diameter and respiratory changes fall into an intermediate range  suggesting an RA pressure of 8 mmHg.  No pericardial effusion is present.     ______________________________________________________________________________  Left Ventricle  Severe left ventricular dilation is present. Left ventricular function is  decreased. The ejection fraction is 20-25% (severely reduced). Severe diffuse  hypokinesis is present. There is wall thinning and akinesis of the basal-mid  inferior, basal-mid inferoseptal and basal inferolateral  segments. There is  akinesis of the apical septum. There is no apical thrombus.     Right Ventricle  Mild right ventricular dilation is present. Global right ventricular function  is moderately reduced.     Atria  Both atria appear normal.     Mitral Valve  Trace mitral insufficiency is present.     Aortic Valve  On Doppler interrogation, there is no significant stenosis or regurgitation.     Tricuspid Valve  Mild tricuspid insufficiency is present. The right ventricular systolic  pressure is approximated at 15.5 mmHg plus the right atrial pressure.  Pulmonary artery systolic pressure is normal.     Vessels  IVC diameter and respiratory changes fall into an intermediate range  suggesting an RA pressure of 8 mmHg.     Pericardium  No pericardial effusion is present.     ______________________________________________________________________________  MMode/2D Measurements & Calculations  IVSd: 1.1 cm  LVIDd: 6.5 cm  LVIDs: 6.0 cm  LVPWd: 1.0 cm  FS: 7.2 %  LV mass(C)d: 297.6 grams  LV mass(C)dI: 122.7 grams/m2  Ao root diam: 3.1 cm  asc Aorta Diam: 2.8 cm     LVOT diam: 2.3 cm  LVOT area: 4.2 cm2  LA Volume (BP): 65.1 ml  LA Volume Index (BP): 26.8 ml/m2  RWT: 0.31     Doppler Measurements & Calculations  PA acc time: 0.13 sec  TR max radha: 196.8 cm/sec  TR max PG: 15.5 mmHg

## 2022-12-08 NOTE — PLAN OF CARE
Major Shift Events: Pt A/Ox4. SR with PVCs. 2 short runs of VT noted this afternoon. VSS on RA. Metoprolol dose increased. Heparin gtt therapeutic @ 2100units/hr. IVP Lasix given with adequate response. Seen by OB RNs at bedside.  Plan: Continue to monitor pt, VS/rhythm, ectopy, etc.  For vital signs and complete assessments, please see documentation flowsheets.

## 2022-12-08 NOTE — PROGRESS NOTES
Please see full imaging report from View[employer] program under imaging tab.    1. Jaquez intrauterine pregnancy at 24 weeks 3 days gestational age here for evaluation of fetal anatomy.   2. No fetal anomalies commonly detected by ultrasound or soft markers of aneuploidy were identified in the detailed fetal anatomic survey within the limits of prenatal ultrasound, however some views were suboptimal, as described above.   3. Growth parameters and estimated fetal weight were consistent with established dates.  4. The amniotic fluid volume appeared normal.  5. On transabdominal imaging the cervix appears long and closed.    MFM team will update patient on reassuring US findings today. Recheck growth in 3 weeks.     Luis Mast MD  Maternal Fetal Medicine

## 2022-12-08 NOTE — PROGRESS NOTES
SPIRITUAL HEALTH SERVICES  SPIRITUAL ASSESSMENT Progress Note  [facility] 4E     REFERRAL SOURCE: LOS    Pt reported no needs at this time.   introduced role of SHS and let her know to reach out should she have further needs.   PLAN: SHS will remain available     Monika Arcos  Pager: [phone]

## 2022-12-08 NOTE — PROGRESS NOTES
Cardiology ICU Progress Note  2022      ASSESSMENT/PLAN:  Anjali Carmen is a 32 year old female, admitted on 22. She year old female  A0 and no significant past medical history, who presents as a transfer from [facility] to [facility] on 2022 for further management of newly diagnosed, acute decompensated systolic heart failure (LVEF 20%) as well as high risk delivery.     Interval history:   No acute overnight events. Continues to have frequent ectopies. Per chart reviews, appears to have had 3 runs of NSVT overnight ranging from 7 beats to 13 beats in duration. Patient was symptomatic during these episodes (woke her up from sleep, dyspnea). Patient reports that she is tired given poor sleep overnight but otherwise no issues this morning. JVP similar to yesterday at ~13 - 14 cm.     Today:  - Change metoprolol tartrate dosing from  37.5 mg q8hr to 25 mg q6hr; for a 25% increase (dose change and frequency discussed with pharmacy and MFM)   - Give Lasix 40 mg IV x1 this morning, monitor Is and Os and reassess this evening  - Continue close monitoring in the ICU with frequent BMP checks to keep K > 4.5 and Mg > 2.5  - Continue Heparin gtt     ===NEURO===  No acute issues      ===CARDIOVASCULAR===  # Acute decompensated systolic and diastolic heart failure  # ACC/AHA Stage C systolic heart failure  # Heart failure with reduced EF (LVEF 20%)  # Mild right ventricular systolic dysfunction  # Non-sustained VT    Etiology of HFrEF: uncertain at this time.  Concern for familial dilated cardiomyopathy given history of CM in father at 30 years of age. Last pregnancy in , timeline not fitting in for PPCM. Greater concern for NICM over ICMP, however, TTE on  with thinned out LV wall and notable regional wall motion abnormalities. Coronary angiogram done on  with no significat lesions seen.  No known history of pre-existing structural heart disease prior to this  admission.     1. Patient hemodynamically stable with preserved end-organ function. No indication for inotrope support/MCS at this time.   2. GDMT and diuresis as outlined below.  Traditional afterload targets for poor LV function and low LVEF of 20-25% cannot be met due to need for fetoplacental circulation.  Will target SBP in 120-140 range as per discussion with OB-GYN.  3. Repeat transthoracic echocardiogram done on 12/6 notable for severely reduced left ventricular function with an LVEF of 20-25%. Also notable for RWM (details below) Coronanry angiogram on 12/8 without significant CAD.   4. Will consider cardiac MRI w.r.t etiology of cardiomyopathy once clinically permissible (likely to be done post partum).  5. Currently on heparin gtt for Afib and risk of LV thrombus formation given low LVEF and hypercoagulable state of pregnancy.     GDMT:   ACEi/ARB/ARNI: contraindicated due to pregnancy  BB: continue metoprolol tartrate at slightly increased dose of 25 mg q6hr (changed from 37.5 mg q8hr)  Aldosterone antagonist: high adverse risk medication in pregnancy  SCD prophylaxis: does not meet criteria for implant  Fluid status: Approaching euvolemia. Give 40 mg IV Lasix x1 on 12/8, may need to re dose in the evening but will wait to reassess before     # New onset possible paroxysmal atrial fibrillation:   Diagnosed at the [facility] in [place]- isolated episode, EKG unavailable for review. Currently in NSR at [facility].  - Continue to monitor rhythm on telemetry.   - Currently on heparin gtt for AC, however, [id number] score of  2 (sex, congestive heart failure) and isolated episode of Afib. Will continue for now as its safe in pregnancy (discussed with MARYAM).     ===PULMONARY===  # No acute issues    ===GASTROINTESTINAL===  # No acute issues, tolerating oral meals without nausea.     ===RENAL===  # No acute issues    ===HEME/ONC===  # Mild anemia:   HGB 10.2 g/dL on arrival (MCV 79). Retic count 1.9%  Etiology: AVINASH vs  "anemia in the setting of HF.  - Check iron, ferritin, TIBC levels.   - Limit blood draws.     ===ENDOCRINE===  # 24 weeks 1 day gestational age:   - Appreciate MFM input., they are closely following with daily checks. They can be reached via their pager (listed and updated regularly on the summary page)  - OB US for fetal growth done on 22 with normal fetal findings and no abnormalities     # Screening for Gestational DM:   # Monitor for hyperglycemia  Risk factors: High BMI and received steroids for fetal lung maturation prior to transfer to [facility].      - HbA1c 5.8 on 22  - Urine proteins pending (ordered on 22)  - q6 glucose checks with sliding scale insulin (to monitor for hypoglycemia while in the ICU)     ===INFECTIOUS DISEASE===  # No acute issues    ===SKIN/MSK===  # No acute issues      Prophylaxis:  DVT: heparin gtt   GI: not indicated  Family: Updated via telephone by the bedside  Disposition: Critically Ill and needs to remain ICU status at this time.   Code Status: Full     Plan d/w Dr. Nessa M.D., who is in agreement. Please, see staff addendum for changes/additions to the plan.    Maicol Camacho MD, PhD  Cardiology Fellow  Pgr: [phone]    ===================================================================    SUBJECTIVE: JOAQUIN o/n.     Nursing notes reviewed.    OBJECTIVE:  /56   Pulse 79   Temp 98.1  F (36.7  C) (Oral)   Resp 23   Ht 1.753 m (5' 9\")   Wt 133.3 kg (293 lb 14 oz)   SpO2 98%   BMI 43.40 kg/m    Temp (24hrs), Av.2  F (36.8  C), Min:97.9  F (36.6  C), Max:98.7  F (37.1  C)      I/O:    Intake/Output Summary (Last 24 hours) at 2022 0753  Last data filed at 2022 0700  Gross per 24 hour   Intake 1651.45 ml   Output 1400 ml   Net 251.45 ml       Wt:   Wt Readings from Last 5 Encounters:   22 133.3 kg (293 lb 14 oz)       GEN: pleasant, no acute distress  HEENT: no icterus  CV: RRR, normal s1/s2, no murmurs/rubs/s3/s4, no heave. JVP ~ 14cm,. "   CHEST: clear to ausculation bilaterally, no rales or wheezing  ABD: soft, non-tender, normal active bowel sounds  EXTR: pulses +2 bilaterally. No clubbing, cyanosis or edema.   NEURO: alert oriented, speech fluent/appropriate, motor grossly nonfocal    Labs: reviewed in EMR  CBC  Recent Labs   Lab 12/08/22  0327 12/07/22  0450 12/06/22  0202   WBC 11.0 13.3* 16.7*   RBC 4.36 4.50 4.84   HGB 10.8* 11.2* 12.1   HCT 34.5* 35.1 37.4   MCV 79 78 77*   MCH 24.8* 24.9* 25.0*   MCHC 31.3* 31.9 32.4   RDW 14.3 14.5 14.4    289 318     BMP  Recent Labs   Lab 12/08/22  0327 12/07/22  2142 12/07/22  1900 12/07/22  1802 12/07/22  0850 12/07/22  0450 12/06/22  2154 12/06/22  1838 12/06/22  1423 12/06/22  1143 12/06/22  0748 12/06/22  0202   *  --  131*  --   --  134*  --  134*  --  133*  --  133*   POTASSIUM 4.1  --  4.5  --   --  4.4  --  4.1   < > 3.7  3.7  --  3.9   CHLORIDE 99  --  97*  --   --  101  --  99  --  98  --  98   CO2 22  --  20*  --   --  21*  --  22  --  21*  --  20*   ANIONGAP 11  --  14  --   --  12  --  13  --  14  --  15   * 117* 130* 74   < > 101*   < > 116*   < > 108*  97   < > 111*   BUN 12.0  --  13.2  --   --  10.2  --  13.1  --  14.1  --  13.8   CR 0.66  --  0.71  --   --  0.58  --  0.69  --  0.70  --  0.69   GFRESTIMATED >90  --  >90  --   --  >90  --  >90  --  >90  --  >90   AYDEN 8.9  --  9.4  --   --  9.3  --  9.3  --  9.8  --  9.7   MAG 2.3  --  2.2  --   --  2.2  --  2.1   < > 1.9  --  2.0   PHOS 3.7  --   --   --   --  3.9  --   --   --  4.2  --  4.7*    < > = values in this interval not displayed.     Troponins:   No results found for: TROPI, TROPONIN, TROPR, TROPN  INR  Recent Labs   Lab 12/06/22  0202   INR 1.13       EKG  12/6/22: SNR at a ventricular rate of 88 with PVCs and interpolated PVCs    Echocardiogram (12/6/22):  Interpretation Summary  Left ventricular function is decreased. The ejection fraction is 20-25%  (severely reduced). Severe diffuse hypokinesis is  present. There is wall  thinning and akinesis of the basal-mid inferior, basal-mid inferoseptal and  basal inferolateral segments. There is akinesis of the apical septum. This is  compatible with ischemic cardiomyopathy.  Mild right ventricular dilation is present. Global right ventricular function  is moderately reduced.  Mild tricuspid insufficiency is present.  Pulmonary artery systolic pressure is normal.  IVC diameter and respiratory changes fall into an intermediate range  suggesting an RA pressure of 8 mmHg.  No pericardial effusion is present.    Coronary Angiogram:  12/7/22:   Formal results pending, but no obstructive disease seen on preliminary evaluation.     Imaging: reviewed in EMR.

## 2022-12-08 NOTE — PLAN OF CARE
ICU End of Shift Summary. See flowsheets for vital signs and detailed assessment.    Changes this shift: Mag replaced x2, K+ replaced x1. TR band and arm board removed per orders, no bleeding noted. 3 runs of VT overnight, 13 beats, 7 beats, 7 beats. Occasional PVCs and PACs. Heparin below therapeutic level at 0230, increased to 2100 units/hr.     Plan: Xa @ 0830. Keep Mag >2.5, K+ >4.5 per Cards      Goal Outcome Evaluation:      Plan of Care Reviewed With: patient    Overall Patient Progress: no changeOverall Patient Progress: no change

## 2022-12-09 ENCOUNTER — TRANSFERRED RECORDS (OUTPATIENT)
Dept: HEALTH INFORMATION MANAGEMENT | Facility: CLINIC | Age: 32
End: 2022-12-09

## 2022-12-09 LAB
ALBUMIN SERPL BCG-MCNC: 3.1 G/DL (ref 3.5–5.2)
ALBUMIN SERPL BCG-MCNC: 3.4 G/DL (ref 3.5–5.2)
ALBUMIN SERPL BCG-MCNC: 3.4 G/DL (ref 3.5–5.2)
ALBUMIN SERPL BCG-MCNC: 3.5 G/DL (ref 3.5–5.2)
ALP SERPL-CCNC: 102 U/L (ref 35–104)
ALP SERPL-CCNC: 110 U/L (ref 35–104)
ALP SERPL-CCNC: 112 U/L (ref 35–104)
ALP SERPL-CCNC: 113 U/L (ref 35–104)
ALT SERPL W P-5'-P-CCNC: 12 U/L (ref 10–35)
ALT SERPL W P-5'-P-CCNC: 15 U/L (ref 10–35)
ALT SERPL W P-5'-P-CCNC: 17 U/L (ref 10–35)
ALT SERPL W P-5'-P-CCNC: 18 U/L (ref 10–35)
ANION GAP SERPL CALCULATED.3IONS-SCNC: 10 MMOL/L (ref 7–15)
ANION GAP SERPL CALCULATED.3IONS-SCNC: 13 MMOL/L (ref 7–15)
ANION GAP SERPL CALCULATED.3IONS-SCNC: 14 MMOL/L (ref 7–15)
ANION GAP SERPL CALCULATED.3IONS-SCNC: 9 MMOL/L (ref 7–15)
AST SERPL W P-5'-P-CCNC: 30 U/L (ref 10–35)
AST SERPL W P-5'-P-CCNC: 34 U/L (ref 10–35)
AST SERPL W P-5'-P-CCNC: 35 U/L (ref 10–35)
AST SERPL W P-5'-P-CCNC: 36 U/L (ref 10–35)
BASE EXCESS BLDV CALC-SCNC: -2.6 MMOL/L (ref -7.7–1.9)
BASE EXCESS BLDV CALC-SCNC: -2.8 MMOL/L (ref -7.7–1.9)
BASOPHILS # BLD AUTO: 0 10E3/UL (ref 0–0.2)
BASOPHILS NFR BLD AUTO: 0 %
BILIRUB SERPL-MCNC: 0.3 MG/DL
BILIRUB SERPL-MCNC: 0.3 MG/DL
BILIRUB SERPL-MCNC: 0.4 MG/DL
BILIRUB SERPL-MCNC: 0.4 MG/DL
BUN SERPL-MCNC: 10.7 MG/DL (ref 6–20)
BUN SERPL-MCNC: 9.5 MG/DL (ref 6–20)
BUN SERPL-MCNC: 9.9 MG/DL (ref 6–20)
BUN SERPL-MCNC: 9.9 MG/DL (ref 6–20)
CALCIUM SERPL-MCNC: 9.1 MG/DL (ref 8.6–10)
CALCIUM SERPL-MCNC: 9.2 MG/DL (ref 8.6–10)
CALCIUM SERPL-MCNC: 9.3 MG/DL (ref 8.6–10)
CALCIUM SERPL-MCNC: 9.4 MG/DL (ref 8.6–10)
CHLORIDE SERPL-SCNC: 101 MMOL/L (ref 98–107)
CHLORIDE SERPL-SCNC: 104 MMOL/L (ref 98–107)
CHLORIDE SERPL-SCNC: 104 MMOL/L (ref 98–107)
CHLORIDE SERPL-SCNC: 99 MMOL/L (ref 98–107)
CREAT SERPL-MCNC: 0.59 MG/DL (ref 0.51–0.95)
CREAT SERPL-MCNC: 0.64 MG/DL (ref 0.51–0.95)
CREAT SERPL-MCNC: 0.64 MG/DL (ref 0.51–0.95)
CREAT SERPL-MCNC: 0.71 MG/DL (ref 0.51–0.95)
DEPRECATED HCO3 PLAS-SCNC: 18 MMOL/L (ref 22–29)
DEPRECATED HCO3 PLAS-SCNC: 18 MMOL/L (ref 22–29)
DEPRECATED HCO3 PLAS-SCNC: 20 MMOL/L (ref 22–29)
DEPRECATED HCO3 PLAS-SCNC: 21 MMOL/L (ref 22–29)
EOSINOPHIL # BLD AUTO: 0 10E3/UL (ref 0–0.7)
EOSINOPHIL NFR BLD AUTO: 0 %
ERYTHROCYTE [DISTWIDTH] IN BLOOD BY AUTOMATED COUNT: 14.4 % (ref 10–15)
GFR SERPL CREATININE-BSD FRML MDRD: >90 ML/MIN/1.73M2
GLUCOSE BLDC GLUCOMTR-MCNC: 104 MG/DL (ref 70–99)
GLUCOSE BLDC GLUCOMTR-MCNC: 109 MG/DL (ref 70–99)
GLUCOSE BLDC GLUCOMTR-MCNC: 123 MG/DL (ref 70–99)
GLUCOSE BLDC GLUCOMTR-MCNC: 138 MG/DL (ref 70–99)
GLUCOSE BLDC GLUCOMTR-MCNC: 84 MG/DL (ref 70–99)
GLUCOSE BLDC GLUCOMTR-MCNC: 89 MG/DL (ref 70–99)
GLUCOSE SERPL-MCNC: 100 MG/DL (ref 70–99)
GLUCOSE SERPL-MCNC: 102 MG/DL (ref 70–99)
GLUCOSE SERPL-MCNC: 118 MG/DL (ref 70–99)
GLUCOSE SERPL-MCNC: 93 MG/DL (ref 70–99)
HCO3 BLDV-SCNC: 22 MMOL/L (ref 21–28)
HCO3 BLDV-SCNC: 23 MMOL/L (ref 21–28)
HCT VFR BLD AUTO: 33.8 % (ref 35–47)
HGB BLD-MCNC: 10.7 G/DL (ref 11.7–15.7)
IMM GRANULOCYTES # BLD: 0.1 10E3/UL
IMM GRANULOCYTES NFR BLD: 1 %
LYMPHOCYTES # BLD AUTO: 2.7 10E3/UL (ref 0.8–5.3)
LYMPHOCYTES NFR BLD AUTO: 24 %
MAGNESIUM SERPL-MCNC: 1.7 MG/DL (ref 1.7–2.3)
MAGNESIUM SERPL-MCNC: 1.7 MG/DL (ref 1.7–2.3)
MAGNESIUM SERPL-MCNC: 2.9 MG/DL (ref 1.7–2.3)
MCH RBC QN AUTO: 24.9 PG (ref 26.5–33)
MCHC RBC AUTO-ENTMCNC: 31.7 G/DL (ref 31.5–36.5)
MCV RBC AUTO: 79 FL (ref 78–100)
MONOCYTES # BLD AUTO: 0.8 10E3/UL (ref 0–1.3)
MONOCYTES NFR BLD AUTO: 7 %
NEUTROPHILS # BLD AUTO: 7.6 10E3/UL (ref 1.6–8.3)
NEUTROPHILS NFR BLD AUTO: 68 %
NRBC # BLD AUTO: 0 10E3/UL
NRBC BLD AUTO-RTO: 0 /100
O2/TOTAL GAS SETTING VFR VENT: 21 %
O2/TOTAL GAS SETTING VFR VENT: 21 %
OXYHGB MFR BLDV: 66 % (ref 70–75)
OXYHGB MFR BLDV: 72 % (ref 70–75)
PCO2 BLDV: 38 MM HG (ref 40–50)
PCO2 BLDV: 39 MM HG (ref 40–50)
PH BLDV: 7.37 [PH] (ref 7.32–7.43)
PH BLDV: 7.37 [PH] (ref 7.32–7.43)
PHOSPHATE SERPL-MCNC: 4.2 MG/DL (ref 2.5–4.5)
PHOSPHATE SERPL-MCNC: 4.2 MG/DL (ref 2.5–4.5)
PLATELET # BLD AUTO: 258 10E3/UL (ref 150–450)
PO2 BLDV: 39 MM HG (ref 25–47)
PO2 BLDV: 43 MM HG (ref 25–47)
POTASSIUM SERPL-SCNC: 4.3 MMOL/L (ref 3.4–5.3)
POTASSIUM SERPL-SCNC: 4.8 MMOL/L (ref 3.4–5.3)
PROT SERPL-MCNC: 6.8 G/DL (ref 6.4–8.3)
PROT SERPL-MCNC: 7.2 G/DL (ref 6.4–8.3)
PROT SERPL-MCNC: 7.2 G/DL (ref 6.4–8.3)
PROT SERPL-MCNC: 7.3 G/DL (ref 6.4–8.3)
RBC # BLD AUTO: 4.3 10E6/UL (ref 3.8–5.2)
SODIUM SERPL-SCNC: 130 MMOL/L (ref 136–145)
SODIUM SERPL-SCNC: 132 MMOL/L (ref 136–145)
SODIUM SERPL-SCNC: 133 MMOL/L (ref 136–145)
SODIUM SERPL-SCNC: 136 MMOL/L (ref 136–145)
UFH PPP CHRO-ACNC: 0.29 IU/ML
WBC # BLD AUTO: 11.2 10E3/UL (ref 4–11)

## 2022-12-09 PROCEDURE — 85520 HEPARIN ASSAY: CPT | Performed by: INTERNAL MEDICINE

## 2022-12-09 PROCEDURE — 82805 BLOOD GASES W/O2 SATURATION: CPT | Performed by: STUDENT IN AN ORGANIZED HEALTH CARE EDUCATION/TRAINING PROGRAM

## 2022-12-09 PROCEDURE — 250N000013 HC RX MED GY IP 250 OP 250 PS 637: Performed by: STUDENT IN AN ORGANIZED HEALTH CARE EDUCATION/TRAINING PROGRAM

## 2022-12-09 PROCEDURE — 83735 ASSAY OF MAGNESIUM: CPT | Performed by: INTERNAL MEDICINE

## 2022-12-09 PROCEDURE — 84450 TRANSFERASE (AST) (SGOT): CPT | Performed by: STUDENT IN AN ORGANIZED HEALTH CARE EDUCATION/TRAINING PROGRAM

## 2022-12-09 PROCEDURE — 85025 COMPLETE CBC W/AUTO DIFF WBC: CPT | Performed by: INTERNAL MEDICINE

## 2022-12-09 PROCEDURE — 83735 ASSAY OF MAGNESIUM: CPT | Performed by: STUDENT IN AN ORGANIZED HEALTH CARE EDUCATION/TRAINING PROGRAM

## 2022-12-09 PROCEDURE — 84100 ASSAY OF PHOSPHORUS: CPT | Performed by: STUDENT IN AN ORGANIZED HEALTH CARE EDUCATION/TRAINING PROGRAM

## 2022-12-09 PROCEDURE — 84155 ASSAY OF PROTEIN SERUM: CPT | Performed by: STUDENT IN AN ORGANIZED HEALTH CARE EDUCATION/TRAINING PROGRAM

## 2022-12-09 PROCEDURE — 80053 COMPREHEN METABOLIC PANEL: CPT | Performed by: STUDENT IN AN ORGANIZED HEALTH CARE EDUCATION/TRAINING PROGRAM

## 2022-12-09 PROCEDURE — 250N000011 HC RX IP 250 OP 636: Performed by: STUDENT IN AN ORGANIZED HEALTH CARE EDUCATION/TRAINING PROGRAM

## 2022-12-09 PROCEDURE — 200N000002 HC R&B ICU UMMC

## 2022-12-09 PROCEDURE — 99232 SBSQ HOSP IP/OBS MODERATE 35: CPT | Mod: GC | Performed by: INTERNAL MEDICINE

## 2022-12-09 PROCEDURE — 250N000011 HC RX IP 250 OP 636: Performed by: INTERNAL MEDICINE

## 2022-12-09 RX ORDER — MAGNESIUM SULFATE HEPTAHYDRATE 40 MG/ML
4 INJECTION, SOLUTION INTRAVENOUS ONCE
Status: COMPLETED | OUTPATIENT
Start: 2022-12-09 | End: 2022-12-09

## 2022-12-09 RX ORDER — POTASSIUM CHLORIDE 1.5 G/1.58G
40 POWDER, FOR SOLUTION ORAL ONCE
Status: COMPLETED | OUTPATIENT
Start: 2022-12-09 | End: 2022-12-09

## 2022-12-09 RX ORDER — POLYETHYLENE GLYCOL 3350 17 G/17G
17 POWDER, FOR SOLUTION ORAL DAILY
Status: DISCONTINUED | OUTPATIENT
Start: 2022-12-09 | End: 2023-01-31 | Stop reason: HOSPADM

## 2022-12-09 RX ORDER — FUROSEMIDE 10 MG/ML
40 INJECTION INTRAMUSCULAR; INTRAVENOUS ONCE
Status: COMPLETED | OUTPATIENT
Start: 2022-12-09 | End: 2022-12-09

## 2022-12-09 RX ORDER — POTASSIUM CHLORIDE 20MEQ/15ML
40 LIQUID (ML) ORAL ONCE
Status: COMPLETED | OUTPATIENT
Start: 2022-12-09 | End: 2022-12-09

## 2022-12-09 RX ADMIN — METOPROLOL TARTRATE 25 MG: 25 TABLET, FILM COATED ORAL at 05:39

## 2022-12-09 RX ADMIN — SODIUM CHLORIDE, PRESERVATIVE FREE 5 ML: 5 INJECTION INTRAVENOUS at 15:35

## 2022-12-09 RX ADMIN — POTASSIUM CHLORIDE 40 MEQ: 40 SOLUTION ORAL at 22:16

## 2022-12-09 RX ADMIN — POTASSIUM CHLORIDE 40 MEQ: 1.5 POWDER, FOR SOLUTION ORAL at 11:10

## 2022-12-09 RX ADMIN — METOPROLOL TARTRATE 25 MG: 25 TABLET, FILM COATED ORAL at 00:15

## 2022-12-09 RX ADMIN — MAGNESIUM SULFATE IN WATER 4 G: 40 INJECTION, SOLUTION INTRAVENOUS at 10:51

## 2022-12-09 RX ADMIN — Medication 37.5 MG: at 17:51

## 2022-12-09 RX ADMIN — PRENATAL VIT W/ FE FUMARATE-FA TAB 27-0.8 MG 1 TABLET: 27-0.8 TAB at 08:53

## 2022-12-09 RX ADMIN — POLYETHYLENE GLYCOL 3350 17 G: 17 POWDER, FOR SOLUTION ORAL at 11:10

## 2022-12-09 RX ADMIN — Medication 37.5 MG: at 23:09

## 2022-12-09 RX ADMIN — HEPARIN SODIUM 2100 UNITS/HR: 10000 INJECTION, SOLUTION INTRAVENOUS at 04:13

## 2022-12-09 RX ADMIN — Medication 37.5 MG: at 11:09

## 2022-12-09 RX ADMIN — FUROSEMIDE 40 MG: 10 INJECTION, SOLUTION INTRAVENOUS at 10:06

## 2022-12-09 RX ADMIN — HEPARIN SODIUM 2100 UNITS/HR: 10000 INJECTION, SOLUTION INTRAVENOUS at 15:45

## 2022-12-09 RX ADMIN — POTASSIUM CHLORIDE 20 MEQ: 40 SOLUTION ORAL at 08:53

## 2022-12-09 ASSESSMENT — ACTIVITIES OF DAILY LIVING (ADL)
ADLS_ACUITY_SCORE: 22

## 2022-12-09 NOTE — PROGRESS NOTES
[facility] Antepartum Progress Note    Subjective:   Anjali is feeling well physically. 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 She is usually quite active at work and at home, so it has been a difficult transition to being confined to her hospital room while feeling so poorly. She is looking forward to seeing her children next week. She was walking the unit with her nurse when we arrived.     The patient confirms that her father  at age 30 with a large heart after having untreated strep throat. She denies any viral infections this year.    She is not having chest pain, SOB, abdominal pain, vaginal bleeding. She has no questions about her pregnancy.    Objective:  Vitals:    22 1400 22 1500 22 1545 22 1600   BP: 110/57 116/57 107/56    BP Location:       Pulse: 81 86 84 94   Resp: 25 28 30 (!) 34   Temp:    98.3  F (36.8  C)   TempSrc:    Oral   SpO2: 92% 94% 94% 96%   Weight:       Height:           I/O last 3 completed shifts:  In: 2949 [P.O.:2430; I.V.:519]  Out: 4250 [Urine:4250]    Gen: Walking in the hallway without difficult, sitting up in the chair, tearful at times  Abd: Gravid, non-tender, non-distended    Assessment/Plan:   Anjali Carmen is a 32 year old  at 24w4d by 6w1d US admitted to CICU for acute HFrEF, cardiac arrhythmia after transferring from [facility].     The patient has had an echocardiogram since her arrival concerning for ischemic cardiomyopathy, which was ruled out by a cardiac catheterization on  with no coronary disease. Continuing to medically manage dilated cardiomyopathy with metoprolol and diuresis per cardiology team with improvement in her symptoms. Still unclear etiology for such severe disease at her age with no prior co-morbidities.    Will continue to evaluate ongoing need for admission on the [place] and consider transfer once stable and safe to  the antepartum unit, which the patient strongly desires. We reviewed this with the patient, because although she is feeling improved, she was noted to have a desaturation into the 70's while ambulating in the russo, resolved with rest.    MFM will continue to follow along closely and are available  at [phone].    # Acute decompensated HFrEF  # Possible atrial fibrillation, non-sustained VT  - Admitted to CICU for management of new cardiac diagnoses  - BP goal 130/80, MAP >65. If patient begins to have blood pressures >160/110 for at least 15 minutes, call MFM for guidance of management, although typically will initiate treatment with 5-10 mg of IV hydralazine or 20 mg of IV labetalol  - Agree with continued anticoagulation/antiplatelet per primary team. Will need to consider anticoagulation at delivery. For regional anesthesia to be an option, we would need to stop anticoagulation/antiplatelet therapy. She will need an anesthesia consult and multi-disciplinary discussion regarding this  - Metoprolol is safe in pregnancy with no dose restrictions     # Pregnancy  - Comprehensive ultrasound  with growth at 76th percentile, repeat growth US in 3 weeks ()  - Non-stress test (NST) by L&D RN twice daily, consider daily NST once stable  - Betamethasone (BMZ) given - at OSH for fetal lung maturity, rescue BMZ typically not repeated until at least 2 weeks from initial course and if there is concern for delivery in the next week  - Magnesium for neuroprotection if moving towards delivery prior to 32 weeks with 4g bolus followed by 2g/hr maintenance until delivery  - Routine indications for emergent delivery including maternal decompensation or fetal compromise  - Patient had desired to have a trial of labor after a prior  section. In setting of decompensated HF, will discuss delivery planning moving forward as she may not be a candidate to valsalva in labor. at this time, would recommend  repeat CS, however, will continue to discuss this  - S/p  section consent on admission  - Patient requires routine prenatal care while in house with next milestone at 28 weeks when typically Tdap is administered, as well as repeat CBC, RPR (syphilis screening), and type and screen to evaluate antibodies, as well as one hour glucose challenge test (GCT)     The patient was seen and evaluated with Dr. Mast.    Physician Attestation     I, Luis Mast MD, saw this patient with the fellow and agree with the fellow's findings and plan of care as documented in the fellow s note.       I personally reviewed vital signs, medications, labs, and EFM.     Anjali remains stable from an OB standpoint and continues to have adjustments in treatment for her heart failure. We will continue to follow closely. We may not be in person for rounding this weekend due to staffing and we discussed this with the patient today. We are available  for calls/questions [phone]    Luis Mast MD  Maternal Fetal Medicine  Date of Service (when I saw the patient): 22    Addendum 22 -   Please note that after we saw the patient today she had signed a release for her outside records and we noted 22 + strep culture and 22 +COVID-19 - results from [facility] in [place], [place]. The patient does report that her dad had an enlarged heart after a strep infection. This should be inquired about with the patient by her primary team - as we did not discuss with her today and we don't know if she underwent treatment for this strep infection (can't find evidence of antibiotic treatment in the records that were faxed to us.     Luis Mast MD

## 2022-12-09 NOTE — PLAN OF CARE
ICU End of Shift Summary. See flowsheets for vital signs and detailed assessment.    Changes this shift: Alert and oriented, VSS on RA. few short runs of VT overnight (2-3 beats). One extended run of VT at 0526, 2 runs of 10 beats coupled with short runs of 2-3 beats with intermittent beats of sinus, team notified. Delay in morning labs, new stat labs set, still awaiting results     Plan: follow up on AM CMP, mag, and phos. Goal to keep Mag >2.5 and K+ >4.5 per Cards.

## 2022-12-09 NOTE — PROGRESS NOTES
Cardiology ICU Progress Note  2022    I personally saw and examined with fellow  this patient who continues in ICU bed while monitoring pregnancy, evaluating ventricular ectopy,  She does not require ICU.  Emphasis should be maintaining or increasing physical prowess, while  Avoiding bedrests    ASSESSMENT/PLAN:  Anjali Carmen is a 32 year old female, admitted on 22. She year old female  A0 and no significant past medical history, who presents as a transfer from [facility] to [facility] on 2022 for further management of newly diagnosed, acute decompensated systolic heart failure (LVEF 20%) as well as high risk delivery.     Interval history:   No acute overnight events. Continues to have frequent ectopies. Per chart reviews, again had 3 runs of NSVT overnight ranging from 7 beats to 17 beats in duration similar to the night before. Patient remains symptomatic during these episodes. JVP similar to yesterday at ~13 - 14 cm.     Today:  - Increase metoprolol tartrate from 25 mg q6hr to 37.5 mg q6hr  - Give x1 dose of 40 mg IV Lasix monitor Is and Os and reassess this evening  - Continue close monitoring in the ICU with frequent BMP checks to keep K > 4.5 and Mg > 2.5 (lytes replaced this morning)  - Continue Heparin gtt (will reach out to [facility] to obtain records and confirm the AFib concern)      ===NEURO===  No acute issues    ===CARDIOVASCULAR===  # Acute decompensated systolic and diastolic heart failure  # ACC/AHA Stage C systolic heart failure  # Heart failure with reduced EF (LVEF 20%)  # Mild right ventricular systolic dysfunction  # Non-sustained VT    Etiology of HFrEF: uncertain at this time.  Concern for familial dilated cardiomyopathy given history of CM in father at 30 years of age. Last pregnancy in 2017, timeline not fitting in for PPCM. Greater concern for NICM over ICMP, however, TTE on  with thinned out LV wall and notable regional wall motion abnormalities.  Coronary angiogram done on 12/8 with no significat lesions seen.  No known history of pre-existing structural heart disease prior to this admission.     1. Patient hemodynamically stable with preserved end-organ function. No indication for inotrope support/MCS at this time.   2. GDMT and diuresis as outlined below.  Traditional afterload targets for poor LV function and low LVEF of 20-25% cannot be met due to need for fetoplacental circulation.  Will target SBP in 120-140 range as per discussion with OB-GYN.  3. Repeat transthoracic echocardiogram done on 12/6 notable for severely reduced left ventricular function with an LVEF of 20-25%. Also notable for RWM (details below) Coronanry angiogram on 12/8 without significant CAD.   4. Will consider cardiac MRI w.r.t etiology of cardiomyopathy once clinically permissible (likely to be done post partum).  5. Currently on heparin gtt for Afib and risk of LV thrombus formation given low LVEF and hypercoagulable state of pregnancy.     GDMT:   ACEi/ARB/ARNI: contraindicated due to pregnancy  BB: continue metoprolol tartrate at slightly increased dose of 37.5 mg q6hr (changed from 25 mg q6hr)  Aldosterone antagonist: high adverse risk medication in pregnancy  SCD prophylaxis: does not meet criteria for implant  Fluid status: Approaching euvolemia. Give 40 mg IV Lasix x1 on 12/8, with goal of net even to slightly net negative, BMP lytes with goal K > 4.5 and Mg > 2.5     # New onset possible paroxysmal atrial fibrillation:   Diagn[facility] in [place]- isolated episode, EKG unavailable for review. Currently in NSR at [facility].  - Continue to monitor rhythm on telemetry.   - Currently on heparin gtt for AC, however, [id number] score of  2 (sex, congestive heart failure) and isolated episode of Afib. Will continue for now as its safe in pregnancy (discussed with MFM).     ===PULMONARY===  # No acute issues    ===GASTROINTESTINAL===  # No acute issues, tolerating oral  "meals without nausea.     ===RENAL===  # No acute issues    ===HEME/ONC===  # Mild anemia:   HGB 10.2 g/dL on arrival (MCV 79). Retic count 1.9%  Etiology: AVINASH vs anemia in the setting of HF.  - Check iron, ferritin, TIBC levels.   - Limit blood draws.     ===ENDOCRINE===  # 24 weeks 4 days gestational age:   - Appreciate MFM input., they are closely following with daily checks. They can be reached via their pager (listed and updated regularly on the summary page)  - OB US for fetal growth done on 22 with normal fetal findings and no abnormalities     # Screening for Gestational DM:   # Monitor for hyperglycemia  Risk factors: High BMI and received steroids for fetal lung maturation prior to transfer to [facility].      - HbA1c 5.8 on 22  - Urine proteins pending (ordered on 22)  - q6 glucose checks with sliding scale insulin (to monitor for hypoglycemia while in the ICU)     ===INFECTIOUS DISEASE===  # No acute issues    ===SKIN/MSK===  # No acute issues      Prophylaxis:  DVT: heparin gtt   GI: not indicated  Family: Updated via telephone by the bedside  Disposition: Critically Ill and needs to remain ICU status at this time.   Code Status: Full     Plan d/w Dr. Denise M.D., who is in agreement. Please, see staff addendum for changes/additions to the plan.    Maicol Camacho MD, PhD  Cardiology Fellow  Pgr: [phone]    ===================================================================    SUBJECTIVE: JOAQUIN o/n.     Nursing notes reviewed.    OBJECTIVE:  /57   Pulse 81   Temp 98.2  F (36.8  C) (Oral)   Resp 25   Ht 1.753 m (5' 9\")   Wt 130.6 kg (287 lb 14.4 oz)   SpO2 92%   BMI 42.52 kg/m    Temp (24hrs), Av.2  F (36.8  C), Min:97.9  F (36.6  C), Max:98.7  F (37.1  C)      I/O:    Intake/Output Summary (Last 24 hours) at 2022 0753  Last data filed at 2022 0700  Gross per 24 hour   Intake 1651.45 ml   Output 1400 ml   Net 251.45 ml       Wt:   Wt Readings from Last 5 Encounters: "   12/08/22 130.6 kg (287 lb 14.4 oz)       GEN: pleasant, no acute distress  HEENT: no icterus  CV: RRR, normal s1/s2, no murmurs/rubs/s3/s4, no heave. JVP ~ 14cm,.   CHEST: clear to ausculation bilaterally, no rales or wheezing  ABD: soft, non-tender, normal active bowel sounds  EXTR: pulses +2 bilaterally. No clubbing, cyanosis or edema.   NEURO: alert oriented, speech fluent/appropriate, motor grossly nonfocal    Labs: reviewed in EMR  CBC  Recent Labs   Lab 12/09/22  0327 12/08/22  0327 12/07/22  0450 12/06/22  0202   WBC 11.2* 11.0 13.3* 16.7*   RBC 4.30 4.36 4.50 4.84   HGB 10.7* 10.8* 11.2* 12.1   HCT 33.8* 34.5* 35.1 37.4   MCV 79 79 78 77*   MCH 24.9* 24.8* 24.9* 25.0*   MCHC 31.7 31.3* 31.9 32.4   RDW 14.4 14.3 14.5 14.4    262 289 318     BMP  Recent Labs   Lab 12/09/22  1244 12/09/22  1238 12/09/22  0845 12/09/22  0658 12/09/22  0327 12/08/22  2118 12/08/22  2117 12/08/22  0840 12/08/22  0327 12/07/22  2142 12/07/22  1900 12/07/22  0850 12/07/22  0450   NA  --  130*  --  133* 136  --  134*  --  132*  --  131*  --  134*   POTASSIUM  --  4.8  --  4.3 4.3  --  4.3  --  4.1  --  4.5  --  4.4   CHLORIDE  --  99  --  104 104  --  102  --  99  --  97*  --  101   CO2  --  21*  --  20* 18*  --  21*  --  22  --  20*  --  21*   ANIONGAP  --  10  --  9 14  --  11  --  11  --  14  --  12   GLC 84 93 89 100* 102*   < > 131*   < > 122*   < > 130*   < > 101*   BUN  --  9.9  --  9.5 9.9  --  11.6  --  12.0  --  13.2  --  10.2   CR  --  0.71  --  0.59 0.64  --  0.78  --  0.66  --  0.71  --  0.58   GFRESTIMATED  --  >90  --  >90 >90  --  >90  --  >90  --  >90  --  >90   AYDEN  --  9.2  --  9.1 9.4  --  9.1  --  8.9  --  9.4  --  9.3   MAG  --   --   --  1.7 1.7  --   --   --  2.3  --  2.2  --  2.2   PHOS  --   --   --  4.2 4.2  --   --   --  3.7  --   --   --  3.9    < > = values in this interval not displayed.     Troponins:   No results found for: TROPI, TROPONIN, TROPR, TROPN  INR  Recent Labs   Lab 12/06/22  7354    INR 1.13       EKG  12/6/22: SNR at a ventricular rate of 88 with PVCs and interpolated PVCs    Echocardiogram (12/6/22):  Interpretation Summary  Left ventricular function is decreased. The ejection fraction is 20-25%  (severely reduced). Severe diffuse hypokinesis is present. There is wall  thinning and akinesis of the basal-mid inferior, basal-mid inferoseptal and  basal inferolateral segments. There is akinesis of the apical septum. This is  compatible with ischemic cardiomyopathy.  Mild right ventricular dilation is present. Global right ventricular function  is moderately reduced.  Mild tricuspid insufficiency is present.  Pulmonary artery systolic pressure is normal.  IVC diameter and respiratory changes fall into an intermediate range  suggesting an RA pressure of 8 mmHg.  No pericardial effusion is present.    Coronary Angiogram:  12/7/22:   Formal results pending, but no obstructive disease seen on preliminary evaluation.     Imaging: reviewed in EMR.

## 2022-12-09 NOTE — PROGRESS NOTES
ICU End of Shift Summary. See flowsheets for vital signs and detailed assessment.    Changes this shift: A&O x4. VSS. RA. Occasional runs of Vtach (6-13 beats). Cards team aware. Replaced K and Mag this afternoon. Diuresed 40mg lasix IV with good response.     Plan: Walk TID. Monitor ectopy, BP and electrolytes.

## 2022-12-10 ENCOUNTER — TRANSFERRED RECORDS (OUTPATIENT)
Dept: HEALTH INFORMATION MANAGEMENT | Facility: CLINIC | Age: 32
End: 2022-12-10

## 2022-12-10 LAB
ALBUMIN SERPL BCG-MCNC: 3.3 G/DL (ref 3.5–5.2)
ALBUMIN SERPL BCG-MCNC: 3.3 G/DL (ref 3.5–5.2)
ALBUMIN SERPL BCG-MCNC: 3.4 G/DL (ref 3.5–5.2)
ALP SERPL-CCNC: 105 U/L (ref 35–104)
ALP SERPL-CCNC: 105 U/L (ref 35–104)
ALP SERPL-CCNC: 111 U/L (ref 35–104)
ALT SERPL W P-5'-P-CCNC: 17 U/L (ref 10–35)
ALT SERPL W P-5'-P-CCNC: 19 U/L (ref 10–35)
ALT SERPL W P-5'-P-CCNC: 20 U/L (ref 10–35)
ANION GAP SERPL CALCULATED.3IONS-SCNC: 11 MMOL/L (ref 7–15)
ANION GAP SERPL CALCULATED.3IONS-SCNC: 11 MMOL/L (ref 7–15)
ANION GAP SERPL CALCULATED.3IONS-SCNC: 13 MMOL/L (ref 7–15)
AST SERPL W P-5'-P-CCNC: 34 U/L (ref 10–35)
AST SERPL W P-5'-P-CCNC: 38 U/L (ref 10–35)
AST SERPL W P-5'-P-CCNC: 39 U/L (ref 10–35)
BASE EXCESS BLDV CALC-SCNC: -3 MMOL/L (ref -7.7–1.9)
BASOPHILS # BLD AUTO: 0 10E3/UL (ref 0–0.2)
BASOPHILS NFR BLD AUTO: 0 %
BILIRUB SERPL-MCNC: 0.3 MG/DL
BILIRUB SERPL-MCNC: 0.3 MG/DL
BILIRUB SERPL-MCNC: 0.4 MG/DL
BUN SERPL-MCNC: 12.3 MG/DL (ref 6–20)
BUN SERPL-MCNC: 8.8 MG/DL (ref 6–20)
BUN SERPL-MCNC: 9 MG/DL (ref 6–20)
CALCIUM SERPL-MCNC: 8.7 MG/DL (ref 8.6–10)
CALCIUM SERPL-MCNC: 9.3 MG/DL (ref 8.6–10)
CALCIUM SERPL-MCNC: 9.5 MG/DL (ref 8.6–10)
CHLORIDE SERPL-SCNC: 100 MMOL/L (ref 98–107)
CHLORIDE SERPL-SCNC: 103 MMOL/L (ref 98–107)
CHLORIDE SERPL-SCNC: 103 MMOL/L (ref 98–107)
CREAT SERPL-MCNC: 0.58 MG/DL (ref 0.51–0.95)
CREAT SERPL-MCNC: 0.62 MG/DL (ref 0.51–0.95)
CREAT SERPL-MCNC: 0.77 MG/DL (ref 0.51–0.95)
DEPRECATED HCO3 PLAS-SCNC: 19 MMOL/L (ref 22–29)
DEPRECATED HCO3 PLAS-SCNC: 19 MMOL/L (ref 22–29)
DEPRECATED HCO3 PLAS-SCNC: 20 MMOL/L (ref 22–29)
EOSINOPHIL # BLD AUTO: 0.1 10E3/UL (ref 0–0.7)
EOSINOPHIL NFR BLD AUTO: 1 %
ERYTHROCYTE [DISTWIDTH] IN BLOOD BY AUTOMATED COUNT: 14.4 % (ref 10–15)
GFR SERPL CREATININE-BSD FRML MDRD: >90 ML/MIN/1.73M2
GLUCOSE BLDC GLUCOMTR-MCNC: 101 MG/DL (ref 70–99)
GLUCOSE BLDC GLUCOMTR-MCNC: 106 MG/DL (ref 70–99)
GLUCOSE BLDC GLUCOMTR-MCNC: 92 MG/DL (ref 70–99)
GLUCOSE BLDC GLUCOMTR-MCNC: 95 MG/DL (ref 70–99)
GLUCOSE SERPL-MCNC: 120 MG/DL (ref 70–99)
GLUCOSE SERPL-MCNC: 96 MG/DL (ref 70–99)
GLUCOSE SERPL-MCNC: 97 MG/DL (ref 70–99)
HCO3 BLDV-SCNC: 22 MMOL/L (ref 21–28)
HCT VFR BLD AUTO: 33.5 % (ref 35–47)
HGB BLD-MCNC: 10.6 G/DL (ref 11.7–15.7)
IMM GRANULOCYTES # BLD: 0.1 10E3/UL
IMM GRANULOCYTES NFR BLD: 1 %
LYMPHOCYTES # BLD AUTO: 2.9 10E3/UL (ref 0.8–5.3)
LYMPHOCYTES NFR BLD AUTO: 23 %
MAGNESIUM SERPL-MCNC: 1.6 MG/DL (ref 1.7–2.3)
MAGNESIUM SERPL-MCNC: 1.7 MG/DL (ref 1.7–2.3)
MAGNESIUM SERPL-MCNC: 1.9 MG/DL (ref 1.7–2.3)
MCH RBC QN AUTO: 24.7 PG (ref 26.5–33)
MCHC RBC AUTO-ENTMCNC: 31.6 G/DL (ref 31.5–36.5)
MCV RBC AUTO: 78 FL (ref 78–100)
MONOCYTES # BLD AUTO: 0.8 10E3/UL (ref 0–1.3)
MONOCYTES NFR BLD AUTO: 6 %
NEUTROPHILS # BLD AUTO: 8.9 10E3/UL (ref 1.6–8.3)
NEUTROPHILS NFR BLD AUTO: 69 %
NRBC # BLD AUTO: 0 10E3/UL
NRBC BLD AUTO-RTO: 0 /100
O2/TOTAL GAS SETTING VFR VENT: 21 %
OXYHGB MFR BLDV: 69 % (ref 70–75)
PCO2 BLDV: 37 MM HG (ref 40–50)
PH BLDV: 7.37 [PH] (ref 7.32–7.43)
PHOSPHATE SERPL-MCNC: 4.2 MG/DL (ref 2.5–4.5)
PLATELET # BLD AUTO: 257 10E3/UL (ref 150–450)
PO2 BLDV: 41 MM HG (ref 25–47)
POTASSIUM SERPL-SCNC: 3.8 MMOL/L (ref 3.4–5.3)
POTASSIUM SERPL-SCNC: 4.5 MMOL/L (ref 3.4–5.3)
POTASSIUM SERPL-SCNC: 4.5 MMOL/L (ref 3.4–5.3)
PROT SERPL-MCNC: 7 G/DL (ref 6.4–8.3)
PROT SERPL-MCNC: 7.1 G/DL (ref 6.4–8.3)
PROT SERPL-MCNC: 7.2 G/DL (ref 6.4–8.3)
RBC # BLD AUTO: 4.29 10E6/UL (ref 3.8–5.2)
SODIUM SERPL-SCNC: 132 MMOL/L (ref 136–145)
SODIUM SERPL-SCNC: 133 MMOL/L (ref 136–145)
SODIUM SERPL-SCNC: 134 MMOL/L (ref 136–145)
UFH PPP CHRO-ACNC: 0.31 IU/ML
WBC # BLD AUTO: 12.8 10E3/UL (ref 4–11)

## 2022-12-10 PROCEDURE — 250N000013 HC RX MED GY IP 250 OP 250 PS 637: Performed by: STUDENT IN AN ORGANIZED HEALTH CARE EDUCATION/TRAINING PROGRAM

## 2022-12-10 PROCEDURE — 84132 ASSAY OF SERUM POTASSIUM: CPT | Performed by: INTERNAL MEDICINE

## 2022-12-10 PROCEDURE — 83735 ASSAY OF MAGNESIUM: CPT | Performed by: STUDENT IN AN ORGANIZED HEALTH CARE EDUCATION/TRAINING PROGRAM

## 2022-12-10 PROCEDURE — 83735 ASSAY OF MAGNESIUM: CPT | Performed by: INTERNAL MEDICINE

## 2022-12-10 PROCEDURE — 200N000002 HC R&B ICU UMMC

## 2022-12-10 PROCEDURE — 84450 TRANSFERASE (AST) (SGOT): CPT | Performed by: STUDENT IN AN ORGANIZED HEALTH CARE EDUCATION/TRAINING PROGRAM

## 2022-12-10 PROCEDURE — 84155 ASSAY OF PROTEIN SERUM: CPT | Performed by: STUDENT IN AN ORGANIZED HEALTH CARE EDUCATION/TRAINING PROGRAM

## 2022-12-10 PROCEDURE — 85520 HEPARIN ASSAY: CPT | Performed by: STUDENT IN AN ORGANIZED HEALTH CARE EDUCATION/TRAINING PROGRAM

## 2022-12-10 PROCEDURE — 85025 COMPLETE CBC W/AUTO DIFF WBC: CPT | Performed by: INTERNAL MEDICINE

## 2022-12-10 PROCEDURE — 250N000011 HC RX IP 250 OP 636: Performed by: STUDENT IN AN ORGANIZED HEALTH CARE EDUCATION/TRAINING PROGRAM

## 2022-12-10 PROCEDURE — 84100 ASSAY OF PHOSPHORUS: CPT | Performed by: INTERNAL MEDICINE

## 2022-12-10 PROCEDURE — 250N000011 HC RX IP 250 OP 636: Performed by: INTERNAL MEDICINE

## 2022-12-10 PROCEDURE — 80053 COMPREHEN METABOLIC PANEL: CPT | Performed by: STUDENT IN AN ORGANIZED HEALTH CARE EDUCATION/TRAINING PROGRAM

## 2022-12-10 PROCEDURE — 99233 SBSQ HOSP IP/OBS HIGH 50: CPT | Mod: GC | Performed by: INTERNAL MEDICINE

## 2022-12-10 PROCEDURE — 82805 BLOOD GASES W/O2 SATURATION: CPT | Performed by: STUDENT IN AN ORGANIZED HEALTH CARE EDUCATION/TRAINING PROGRAM

## 2022-12-10 RX ORDER — MAGNESIUM SULFATE HEPTAHYDRATE 40 MG/ML
2 INJECTION, SOLUTION INTRAVENOUS ONCE
Status: COMPLETED | OUTPATIENT
Start: 2022-12-10 | End: 2022-12-10

## 2022-12-10 RX ORDER — FUROSEMIDE 10 MG/ML
40 INJECTION INTRAMUSCULAR; INTRAVENOUS DAILY
Status: DISCONTINUED | OUTPATIENT
Start: 2022-12-10 | End: 2022-12-11

## 2022-12-10 RX ORDER — POTASSIUM CHLORIDE 29.8 MG/ML
20 INJECTION INTRAVENOUS ONCE
Status: COMPLETED | OUTPATIENT
Start: 2022-12-10 | End: 2022-12-10

## 2022-12-10 RX ADMIN — POTASSIUM CHLORIDE 20 MEQ: 40 SOLUTION ORAL at 08:19

## 2022-12-10 RX ADMIN — Medication 37.5 MG: at 17:03

## 2022-12-10 RX ADMIN — Medication 37.5 MG: at 23:31

## 2022-12-10 RX ADMIN — MAGNESIUM SULFATE IN WATER 2 G: 40 INJECTION, SOLUTION INTRAVENOUS at 21:51

## 2022-12-10 RX ADMIN — HEPARIN SODIUM 2100 UNITS/HR: 10000 INJECTION, SOLUTION INTRAVENOUS at 02:40

## 2022-12-10 RX ADMIN — MAGNESIUM SULFATE IN WATER 2 G: 40 INJECTION, SOLUTION INTRAVENOUS at 19:44

## 2022-12-10 RX ADMIN — HEPARIN SODIUM 2100 UNITS/HR: 10000 INJECTION, SOLUTION INTRAVENOUS at 13:49

## 2022-12-10 RX ADMIN — PRENATAL VIT W/ FE FUMARATE-FA TAB 27-0.8 MG 1 TABLET: 27-0.8 TAB at 08:19

## 2022-12-10 RX ADMIN — Medication 37.5 MG: at 12:17

## 2022-12-10 RX ADMIN — POTASSIUM CHLORIDE 20 MEQ: 29.8 INJECTION, SOLUTION INTRAVENOUS at 20:42

## 2022-12-10 RX ADMIN — Medication 37.5 MG: at 05:34

## 2022-12-10 RX ADMIN — MAGNESIUM SULFATE IN WATER 2 G: 40 INJECTION, SOLUTION INTRAVENOUS at 05:34

## 2022-12-10 RX ADMIN — FUROSEMIDE 40 MG: 10 INJECTION, SOLUTION INTRAVENOUS at 17:02

## 2022-12-10 ASSESSMENT — ACTIVITIES OF DAILY LIVING (ADL)
ADLS_ACUITY_SCORE: 22
ADLS_ACUITY_SCORE: 20
ADLS_ACUITY_SCORE: 22
ADLS_ACUITY_SCORE: 22
ADLS_ACUITY_SCORE: 20
ADLS_ACUITY_SCORE: 22
ADLS_ACUITY_SCORE: 22

## 2022-12-10 NOTE — PROGRESS NOTES
Brief cardiology progress note    We were informed by the patient's  that patient was offended after our earlier meeting with her in the morning.  She felt that she was unfairly judged when she was encouraged to move around as much as possible.     We apologized for offending patient as the goal was to encourage her and help her feel empowered to ask for help if she needs it to be able to get up and walk around at least three times a day.     We explained that the reason for discussing activity level was to ensure that she does not get deconditioned while in the cardiac ICU setting as this is often the issue we run into with our ICU patients on drip medications.       Attending physician Dr. Walker.     Maicol Camacho MD, PhD  Cardiology Fellow  Pager: [phone]

## 2022-12-10 NOTE — PLAN OF CARE
Major Shift Events:  A&Ox4. Ambulated in halls. VSS on RA. SR with PACs/PVCs. Replaced K+ and Mag.   Plan: Keep Mag >2.5 and K+ > 4.5 per Cards.   For vital signs and complete assessments, please see documentation flowsheets.

## 2022-12-10 NOTE — PROGRESS NOTES
"  José Luis Walker M.D.  Cardiovascular Medicine  ICU critical care 45 minutes    I personally saw and examined this patient, discussed care with housestaff and other consultants, reviewed current laboratories and imaging studies, and conveyed impression and diagnostic/therapeutic plan to patient.    Problem List  1. IUP  2. Cardiomyopathy  3. Ventricular ectopy  4. Anemia    Plan:  1. Increase physical activity  2. PT consult and therapy  3. Avoid ACE, ARB, Entresto      History    The patient has no complaints.  She is starting to sleep at night.  She has PND, orthopnea.     Objective  /64 (BP Location: Right arm)   Pulse 82   Temp 98.2  F (36.8  C) (Oral)   Resp 29   Ht 1.753 m (5' 9\")   Wt 130.6 kg (287 lb 14.4 oz)   SpO2 97%   BMI 42.52 kg/m     Constitutional: alert, oriented, normal gait and station, normal mentation.  Oral: moist mucous membrans  Lymph: without pathologic adenopathy  Chest: clear to ausculation and percussion save for basilar atelectatic rales  Cor: No evidence of left or right ventricular activity.  Rhythm is regular.  S1 normal, S2 split physiologically. Murmurs are not present  Abdomen: without tenderness, rebound, guarding, masses, ascites  Extremities1+: Edema not present  Neuro: no focal defects, normal mentation  Skin: without open lesions  Psych: oriented, verbal, mental status in tact    Wt Readings from Last 24 Encounters:   12/08/22 130.6 kg (287 lb 14.4 oz)       Intake/Output Summary (Last 24 hours) at 12/10/2022 1428  Last data filed at 12/10/2022 1400  Gross per 24 hour   Intake 2843 ml   Output 2175 ml   Net 668 ml     Wt Readings from Last 5 Encounters:   12/08/22 130.6 kg (287 lb 14.4 oz)       Meds    heparin lock flush  5-20 mL Intracatheter Q24H     insulin aspart  1-3 Units Subcutaneous TID AC     insulin aspart  1-3 Units Subcutaneous At Bedtime     metoprolol tartrate  37.5 mg Oral Q6H MARISOL     polyethylene glycol  17 g Oral Daily     potassium chloride  20 " mEq Oral Daily     prenatal multivitamin w/iron  1 tablet Oral Daily     sodium chloride (PF)  10-40 mL Intracatheter Q8H     sodium chloride (PF)  10-40 mL Intracatheter Q8H     sodium chloride (PF)  3 mL Intracatheter Q8H       Labs  CMP  Recent Labs   Lab 12/10/22  1222 12/10/22  1216 12/10/22  0821 12/10/22  0340 12/09/22  2221 12/09/22  1945 12/09/22  1244 12/09/22  1238 12/09/22  0845 12/09/22  0658 12/09/22  0327 12/08/22  0840 12/08/22 0327   *  --   --  133*  --  132*  --  130*  --  133* 136   < > 132*   POTASSIUM 4.5  --   --  4.5  --  4.3  --  4.8  --  4.3 4.3   < > 4.1   CHLORIDE 103  --   --  103  --  101  --  99  --  104 104   < > 99   CO2 20*  --   --  19*  --  18*  --  21*  --  20* 18*   < > 22   ANIONGAP 11  --   --  11  --  13  --  10  --  9 14   < > 11   GLC 96 95 92 97   < > 118*   < > 93   < > 100* 102*   < > 122*   BUN 9.0  --   --  8.8  --  10.7  --  9.9  --  9.5 9.9   < > 12.0   CR 0.62  --   --  0.58  --  0.64  --  0.71  --  0.59 0.64   < > 0.66   GFRESTIMATED >90  --   --  >90  --  >90  --  >90  --  >90 >90   < > >90   AYDEN 9.5  --   --  9.3  --  9.3  --  9.2  --  9.1 9.4   < > 8.9   MAG  --   --   --  1.9  --  2.9*  --   --   --  1.7 1.7  --  2.3   PHOS  --   --   --  4.2  --   --   --   --   --  4.2 4.2  --  3.7   PROTTOTAL 7.0  --   --  7.2  --  7.2  --  7.3  --  6.8 7.2  --  6.9   ALBUMIN 3.3*  --   --  3.4*  --  3.4*  --  3.5  --  3.1* 3.4*  --  3.2*   BILITOTAL 0.3  --   --  0.4  --  0.3  --  0.4  --  0.4 0.3  --  0.4   ALKPHOS 105*  --   --  105*  --  112*  --  113*  --  102 110*  --  102   AST 39*  --   --  34  --  35  --  36*  --  30 34  --  37*   ALT 20  --   --  17  --  18  --  17  --  12 15  --  16    < > = values in this interval not displayed.     CBC  Recent Labs   Lab 12/10/22  0340 12/09/22  0327 12/08/22  0327 12/07/22  0450   WBC 12.8* 11.2* 11.0 13.3*   RBC 4.29 4.30 4.36 4.50   HGB 10.6* 10.7* 10.8* 11.2*   HCT 33.5* 33.8* 34.5* 35.1   MCV 78 79 79 78   MCH 24.7*  24.9* 24.8* 24.9*   MCHC 31.6 31.7 31.3* 31.9   RDW 14.4 14.4 14.3 14.5    258 262 289     INR  Recent Labs   Lab 22  0202   INR 1.13     Arterial Blood Gas  Recent Labs   Lab 12/10/22  0340 22  1534 22  0327 22  2117   O2PER 21  21 21       Imaging   RN: [id number]  : 1990  Study Date: 2022 11:21 AM  Age: 32 yrs  Gender: Female  Patient Location: [facility]  Reason For Study: Heart Failure  Ordering Physician: ANNY SOMMERS  Referring Physician: OSMAR ESTRADA  Performed By: Hallie Rogers     BSA: 2.4 m2  Height: 69 in  Weight: 292 lb  BP: 102/53 mmHg  ______________________________________________________________________________  Procedure  Complete Portable Echo Adult. Contrast Optison. Optison (NDC #2058-2558-82)  given intravenously. Patient was given 5 ml mixture of 3 ml Optison and 6 ml  saline. 4 ml wasted.  ______________________________________________________________________________  Interpretation Summary  Left ventricular function is decreased. The ejection fraction is 20-25%  (severely reduced). Severe diffuse hypokinesis is present. There is wall  thinning and akinesis of the basal-mid inferior, basal-mid inferoseptal and  basal inferolateral segments. There is akinesis of the apical septum. This is  compatible with ischemic cardiomyopathy.  Mild right ventricular dilation is present. Global right ventricular function  is moderately reduced.  Mild tricuspid insufficiency is present.  Pulmonary artery systolic pressure is normal.  IVC diameter and respiratory changes fall into an intermediate range  suggesting an RA pressure of 8 mmHg.  No pericardial effusion is present.     ______________________________________________________________________________  Left Ventricle  Severe left ventricular dilation is present. Left ventricular function is  decreased. The ejection fraction is 20-25% (severely reduced). Severe diffuse  hypokinesis is present. There is  wall thinning and akinesis of the basal-mid  inferior, basal-mid inferoseptal and basal inferolateral segments. There is  akinesis of the apical septum. There is no apical thrombus.     Right Ventricle  Mild right ventricular dilation is present. Global right ventricular function  is moderately reduced.     Atria  Both atria appear normal.     Mitral Valve  Trace mitral insufficiency is present.     Aortic Valve  On Doppler interrogation, there is no significant stenosis or regurgitation.     Tricuspid Valve  Mild tricuspid insufficiency is present. The right ventricular systolic  pressure is approximated at 15.5 mmHg plus the right atrial pressure.  Pulmonary artery systolic pressure is normal.     Vessels  IVC diameter and respiratory changes fall into an intermediate range  suggesting an RA pressure of 8 mmHg.     Pericardium  No pericardial effusion is present.     ______________________________________________________________________________  MMode/2D Measurements & Calculations  IVSd: 1.1 cm  LVIDd: 6.5 cm  LVIDs: 6.0 cm  LVPWd: 1.0 cm  FS: 7.2 %  LV mass(C)d: 297.6 grams  LV mass(C)dI: 122.7 grams/m2  Ao root diam: 3.1 cm  asc Aorta Diam: 2.8 cm     LVOT diam: 2.3 cm  LVOT area: 4.2 cm2  LA Volume (BP): 65.1 ml  LA Volume Index (BP): 26.8 ml/m2  RWT: 0.31     Doppler Measurements & Calculations  PA acc time: 0.13 sec  TR max radha: 196.8 cm/sec  TR max PG: 15.5 mmHg       Sinus rhythm   Non-specific intra-ventricular conduction block   Cannot rule out Anterior infarct , age undetermined   Abnormal ECG   When compared with ECG of 06-DEC-2022 07:40, (unconfirmed)   Aberrant conduction is no longer Present   Confirmed by Santa Leija ([id number]) on 12/7/2022 4:16:28 PM

## 2022-12-10 NOTE — PROGRESS NOTES
MFM consult team -     Patient not seen today given no change in OB status and weekend staffing coverage on [place].   Patient made aware of this plan yesterday.     Events overnight reviewed.   Support plan to increase metoprolol  Continue with fetal  Monitoring, which is reassuring at 140 with moderate variability, 10 x 10 accelerations, no decelerations and no contractions.     We will continue to follow closely.   Appreciate ongoing comprehensive care by cardiology team.     Luis Mast MD

## 2022-12-10 NOTE — PROGRESS NOTES
Cardiology ICU Progress Note        I personally saw and examined patient and agree with assumptions and comments recorded here. Patient continues to have symptomatic complex NSVT.      Critical Care ICU Note - Cardiology  José Luis Walker M.D.      The patient remains unstable in the ICU with on-going need for ventilator support, parenteral medications for the adjustment of blood pressure and cardiac output and maintenance of renal function.      The patient is seen for t necessitating  inotropic agents, vasopressors and afterload reducing agents; limited mechanical support remaintain blood pressure and secondary organ function    I personally reviewed:    Arterial and venous blood gases to assess acid base balance, oxygenation, and ventilator settings.      Hemodynamic parameters obtained by central hemodynamic monitoring including RAP, estimated LVEDP, pulmonary artery pressure, cardiac output and vascular resistances in order to adjust fluids and infused medications for blood pressure and cardiac output maintenance.      Volume status, renal function and nutritional support as judged by intake, output, daily weight and appropriate laboratories.    I reviewed individual and serial imaging studies including echocardiogram, chest x-ray, CT scan    I personally supervised the prescription of parenteral fluids, inotropes, vasodilators , and vasopressors in order to correct cardiac output, maintain urine output and renal function.    I personally met with patient or family to discuss current and future diagnostic and therapeutic strategies                      ASSESSMENT/PLAN:  Anjali Carmen is a 32 year old female, admitted on 22. She year old female  A0 and no significant past medical history, who presents as a transfer from [facility] to [facility] on 2022 for further management of newly diagnosed, acute decompensated systolic heart failure (LVEF 20%) as well as high risk delivery.      Interval history:   No acute overnight events. Overall, having less frequent ventricular ectopy. No concerns this morning.     Today:  - Continue metoprolol tartrate 37.5 mg every 6 hours   - Continue lasix 40 mg IV daily and continue to monitor BMP closely (keep K>4.5, Mg >2.5)  - Continue Heparin gtt  - Transfer to floor pending bed availability       ===NEURO===  No acute issues    ===CARDIOVASCULAR===  # Acute decompensated systolic and diastolic heart failure  # ACC/AHA Stage C systolic heart failure  # Heart failure with reduced EF (LVEF 20%)  # Mild right ventricular systolic dysfunction  # Non-sustained VT    Etiology of HFrEF: uncertain at this time.  Concern for familial dilated cardiomyopathy given history of CM in father at 30 years of age. Last pregnancy in 2017, timeline not fitting in for PPCM. Greater concern for NICM over ICMP, however, TTE on 12/7 with thinned out LV wall and notable regional wall motion abnormalities. Coronary angiogram done on 12/8 with no significat lesions seen.  No known history of pre-existing structural heart disease prior to this admission.     1. Patient hemodynamically stable with preserved end-organ function. No indication for inotrope support/MCS at this time.   2. GDMT and diuresis as outlined below.  Traditional afterload targets for poor LV function and low LVEF of 20-25% cannot be met due to need for fetoplacental circulation.  Will target SBP in 120-140 range as per discussion with OB-GYN.  3. Repeat transthoracic echocardiogram done on 12/6 notable for severely reduced left ventricular function with an LVEF of 20-25%. Also notable for RWM (details below) Coronanry angiogram on 12/8 without significant CAD.   4. Will consider cardiac MRI w.r.t etiology of cardiomyopathy once clinically permissible (likely to be done post partum).  5. Currently on heparin gtt for Afib and risk of LV thrombus formation given low LVEF and hypercoagulable state of  pregnancy.     GDMT:   ACEi/ARB/ARNI: contraindicated due to pregnancy  BB: continue metoprolol tartrate at slightly increased dose of 37.5 mg q6hr (changed from 25 mg q6hr)  Aldosterone antagonist: high adverse risk medication in pregnancy  SCD prophylaxis: does not meet criteria for implant  Fluid status: Approaching euvolemia. Continue lasix 40 mg IV daily and continue to monitor BMP closely (keep K>4.5, Mg >2.5)     # New onset possible paroxysmal atrial fibrillation:   [facility] [place]- isolated episode, EKG unavailable for review. Currently in NSR at [facility].  - Continue to monitor rhythm on telemetry.   - Currently on heparin gtt for AC, however, [id number] score of  2 (sex, congestive heart failure) and isolated episode of Afib. Will continue for now as its safe in pregnancy (discussed with MFM).     ===PULMONARY===  # No acute issues    ===GASTROINTESTINAL===  # No acute issues, tolerating oral meals without nausea.     ===RENAL===  # No acute issues    ===HEME/ONC===  # Mild anemia:   HGB 10.2 g/dL on arrival (MCV 79). Retic count 1.9%  Etiology: AVINASH vs anemia in the setting of HF.  - Check iron, ferritin, TIBC levels.   - Limit blood draws.     ===ENDOCRINE===  # 24 weeks 4 days gestational age:   - Appreciate MFM input., they are closely following with daily checks. They can be reached via their pager (listed and updated regularly on the summary page)  - OB US for fetal growth done on 12/8/22 with normal fetal findings and no abnormalities     # Screening for Gestational DM:   # Monitor for hyperglycemia  Risk factors: High BMI and received steroids for fetal lung maturation prior to transfer to [facility].      - HbA1c 5.8 on 12/7/22  - Urine proteins pending (ordered on 12/8/22)  - q6 glucose checks with sliding scale insulin (to monitor for hypoglycemia while in the ICU)     ===INFECTIOUS DISEASE===  # No acute issues    ===SKIN/MSK===  # No acute issues      Prophylaxis:  DVT: heparin  "gtt   GI: not indicated  Family: Updated via telephone by the bedside  Disposition: Critically Ill and needs to remain ICU status at this time.   Code Status: Full     Plan d/w Dr. Denise M.D., who is in agreement. Please, see staff addendum for changes/additions to the plan.    Beltran Doherty MD  Cardiology Fellow     ===================================================================    SUBJECTIVE: JOAQUIN o/n.     Nursing notes reviewed.    OBJECTIVE:  /56   Pulse 71   Temp 97.9  F (36.6  C) (Oral)   Resp 25   Ht 1.753 m (5' 9\")   Wt 130.6 kg (287 lb 14.4 oz)   SpO2 100%   BMI 42.52 kg/m    Temp (24hrs), Av.2  F (36.8  C), Min:97.9  F (36.6  C), Max:98.7  F (37.1  C)    GEN: pleasant, no acute distress  HEENT: no icterus  CV: RRR, normal s1/s2, no murmurs. JVP 12.   CHEST: clear to ausculation bilaterally, no rales or wheezing  ABD: soft, non-tender, normal active bowel sounds  EXTR: pulses +2 bilaterally. No edema.   NEURO: alert oriented, speech fluent/appropriate, motor grossly nonfocal    Labs: reviewed in EMR  CBC  Recent Labs   Lab 12/10/22  0340 22  0327 22  0327 22  0450   WBC 12.8* 11.2* 11.0 13.3*   RBC 4.29 4.30 4.36 4.50   HGB 10.6* 10.7* 10.8* 11.2*   HCT 33.5* 33.8* 34.5* 35.1   MCV 78 79 79 78   MCH 24.7* 24.9* 24.8* 24.9*   MCHC 31.6 31.7 31.3* 31.9   RDW 14.4 14.4 14.3 14.5    258 262 289     BMP  Recent Labs   Lab 12/10/22  0340 22  2221 22  1945 22  1755 22  1244 22  1238 22  0845 22  0658 22  0327 22  0840 22   *  --  132*  --   --  130*  --  133* 136   < > 132*   POTASSIUM 4.5  --  4.3  --   --  4.8  --  4.3 4.3   < > 4.1   CHLORIDE 103  --  101  --   --  99  --  104 104   < > 99   CO2 19*  --  18*  --   --  21*  --  20* 18*   < > 22   ANIONGAP 11  --  13  --   --  10  --  9 14   < > 11   GLC 97 138* 118* 109*   < > 93   < > 100* 102*   < > 122*   BUN 8.8  --  10.7  --   --  9.9  -- "  9.5 9.9   < > 12.0   CR 0.58  --  0.64  --   --  0.71  --  0.59 0.64   < > 0.66   GFRESTIMATED >90  --  >90  --   --  >90  --  >90 >90   < > >90   AYDEN 9.3  --  9.3  --   --  9.2  --  9.1 9.4   < > 8.9   MAG 1.9  --  2.9*  --   --   --   --  1.7 1.7  --  2.3   PHOS 4.2  --   --   --   --   --   --  4.2 4.2  --  3.7    < > = values in this interval not displayed.     Troponins:   No results found for: TROPI, TROPONIN, TROPR, TROPN  INR  Recent Labs   Lab 12/06/22  0202   INR 1.13       EKG  12/6/22: SNR at a ventricular rate of 88 with PVCs and interpolated PVCs    Echocardiogram (12/6/22):  Interpretation Summary  Left ventricular function is decreased. The ejection fraction is 20-25%  (severely reduced). Severe diffuse hypokinesis is present. There is wall  thinning and akinesis of the basal-mid inferior, basal-mid inferoseptal and  basal inferolateral segments. There is akinesis of the apical septum. This is  compatible with ischemic cardiomyopathy.  Mild right ventricular dilation is present. Global right ventricular function  is moderately reduced.  Mild tricuspid insufficiency is present.  Pulmonary artery systolic pressure is normal.  IVC diameter and respiratory changes fall into an intermediate range  suggesting an RA pressure of 8 mmHg.  No pericardial effusion is present.    Coronary Angiogram:  12/7/22:   Formal results pending, but no obstructive disease seen on preliminary evaluation.     Imaging: reviewed in EMR.

## 2022-12-11 ENCOUNTER — TRANSFERRED RECORDS (OUTPATIENT)
Dept: HEALTH INFORMATION MANAGEMENT | Facility: CLINIC | Age: 32
End: 2022-12-11

## 2022-12-11 LAB
ALBUMIN SERPL BCG-MCNC: 3.3 G/DL (ref 3.5–5.2)
ALBUMIN SERPL BCG-MCNC: 3.3 G/DL (ref 3.5–5.2)
ALBUMIN SERPL BCG-MCNC: 3.5 G/DL (ref 3.5–5.2)
ALP SERPL-CCNC: 100 U/L (ref 35–104)
ALP SERPL-CCNC: 104 U/L (ref 35–104)
ALP SERPL-CCNC: 109 U/L (ref 35–104)
ALT SERPL W P-5'-P-CCNC: 19 U/L (ref 10–35)
ALT SERPL W P-5'-P-CCNC: 19 U/L (ref 10–35)
ALT SERPL W P-5'-P-CCNC: 21 U/L (ref 10–35)
ANION GAP SERPL CALCULATED.3IONS-SCNC: 11 MMOL/L (ref 7–15)
ANION GAP SERPL CALCULATED.3IONS-SCNC: 12 MMOL/L (ref 7–15)
ANION GAP SERPL CALCULATED.3IONS-SCNC: 13 MMOL/L (ref 7–15)
AST SERPL W P-5'-P-CCNC: 38 U/L (ref 10–35)
AST SERPL W P-5'-P-CCNC: 38 U/L (ref 10–35)
AST SERPL W P-5'-P-CCNC: 39 U/L (ref 10–35)
BASE EXCESS BLDV CALC-SCNC: -3.3 MMOL/L (ref -7.7–1.9)
BASOPHILS # BLD AUTO: 0 10E3/UL (ref 0–0.2)
BASOPHILS NFR BLD AUTO: 0 %
BILIRUB SERPL-MCNC: 0.3 MG/DL
BUN SERPL-MCNC: 11.5 MG/DL (ref 6–20)
BUN SERPL-MCNC: 11.8 MG/DL (ref 6–20)
BUN SERPL-MCNC: 8.7 MG/DL (ref 6–20)
CALCIUM SERPL-MCNC: 9.1 MG/DL (ref 8.6–10)
CALCIUM SERPL-MCNC: 9.5 MG/DL (ref 8.6–10)
CALCIUM SERPL-MCNC: 9.9 MG/DL (ref 8.6–10)
CHLORIDE SERPL-SCNC: 101 MMOL/L (ref 98–107)
CHLORIDE SERPL-SCNC: 101 MMOL/L (ref 98–107)
CHLORIDE SERPL-SCNC: 104 MMOL/L (ref 98–107)
CREAT SERPL-MCNC: 0.59 MG/DL (ref 0.51–0.95)
CREAT SERPL-MCNC: 0.66 MG/DL (ref 0.51–0.95)
CREAT SERPL-MCNC: 0.72 MG/DL (ref 0.51–0.95)
DEPRECATED HCO3 PLAS-SCNC: 17 MMOL/L (ref 22–29)
DEPRECATED HCO3 PLAS-SCNC: 19 MMOL/L (ref 22–29)
DEPRECATED HCO3 PLAS-SCNC: 20 MMOL/L (ref 22–29)
EOSINOPHIL # BLD AUTO: 0 10E3/UL (ref 0–0.7)
EOSINOPHIL NFR BLD AUTO: 0 %
ERYTHROCYTE [DISTWIDTH] IN BLOOD BY AUTOMATED COUNT: 14.4 % (ref 10–15)
GFR SERPL CREATININE-BSD FRML MDRD: >90 ML/MIN/1.73M2
GLUCOSE SERPL-MCNC: 103 MG/DL (ref 70–99)
GLUCOSE SERPL-MCNC: 104 MG/DL (ref 70–99)
GLUCOSE SERPL-MCNC: 86 MG/DL (ref 70–99)
HCO3 BLDV-SCNC: 22 MMOL/L (ref 21–28)
HCT VFR BLD AUTO: 32.2 % (ref 35–47)
HGB BLD-MCNC: 10.3 G/DL (ref 11.7–15.7)
IMM GRANULOCYTES # BLD: 0.1 10E3/UL
IMM GRANULOCYTES NFR BLD: 1 %
LYMPHOCYTES # BLD AUTO: 3.1 10E3/UL (ref 0.8–5.3)
LYMPHOCYTES NFR BLD AUTO: 24 %
MAGNESIUM SERPL-MCNC: 1.9 MG/DL (ref 1.7–2.3)
MAGNESIUM SERPL-MCNC: 2.1 MG/DL (ref 1.7–2.3)
MAGNESIUM SERPL-MCNC: 2.3 MG/DL (ref 1.7–2.3)
MAGNESIUM SERPL-MCNC: 2.6 MG/DL (ref 1.7–2.3)
MCH RBC QN AUTO: 25.2 PG (ref 26.5–33)
MCHC RBC AUTO-ENTMCNC: 32 G/DL (ref 31.5–36.5)
MCV RBC AUTO: 79 FL (ref 78–100)
MONOCYTES # BLD AUTO: 0.7 10E3/UL (ref 0–1.3)
MONOCYTES NFR BLD AUTO: 6 %
NEUTROPHILS # BLD AUTO: 8.8 10E3/UL (ref 1.6–8.3)
NEUTROPHILS NFR BLD AUTO: 69 %
NRBC # BLD AUTO: 0 10E3/UL
NRBC BLD AUTO-RTO: 0 /100
O2/TOTAL GAS SETTING VFR VENT: 21 %
OXYHGB MFR BLDV: 67 % (ref 70–75)
PCO2 BLDV: 37 MM HG (ref 40–50)
PH BLDV: 7.37 [PH] (ref 7.32–7.43)
PHOSPHATE SERPL-MCNC: 4.5 MG/DL (ref 2.5–4.5)
PLATELET # BLD AUTO: 241 10E3/UL (ref 150–450)
PO2 BLDV: 40 MM HG (ref 25–47)
POTASSIUM SERPL-SCNC: 4 MMOL/L (ref 3.4–5.3)
POTASSIUM SERPL-SCNC: 4.2 MMOL/L (ref 3.4–5.3)
POTASSIUM SERPL-SCNC: 4.4 MMOL/L (ref 3.4–5.3)
POTASSIUM SERPL-SCNC: 4.5 MMOL/L (ref 3.4–5.3)
PROT SERPL-MCNC: 7 G/DL (ref 6.4–8.3)
PROT SERPL-MCNC: 7.4 G/DL (ref 6.4–8.3)
PROT SERPL-MCNC: 7.4 G/DL (ref 6.4–8.3)
RBC # BLD AUTO: 4.08 10E6/UL (ref 3.8–5.2)
SODIUM SERPL-SCNC: 131 MMOL/L (ref 136–145)
SODIUM SERPL-SCNC: 133 MMOL/L (ref 136–145)
SODIUM SERPL-SCNC: 134 MMOL/L (ref 136–145)
UFH PPP CHRO-ACNC: 0.43 IU/ML
WBC # BLD AUTO: 12.8 10E3/UL (ref 4–11)

## 2022-12-11 PROCEDURE — 84450 TRANSFERASE (AST) (SGOT): CPT | Performed by: STUDENT IN AN ORGANIZED HEALTH CARE EDUCATION/TRAINING PROGRAM

## 2022-12-11 PROCEDURE — 85520 HEPARIN ASSAY: CPT | Performed by: INTERNAL MEDICINE

## 2022-12-11 PROCEDURE — 82805 BLOOD GASES W/O2 SATURATION: CPT | Performed by: STUDENT IN AN ORGANIZED HEALTH CARE EDUCATION/TRAINING PROGRAM

## 2022-12-11 PROCEDURE — 250N000013 HC RX MED GY IP 250 OP 250 PS 637: Performed by: STUDENT IN AN ORGANIZED HEALTH CARE EDUCATION/TRAINING PROGRAM

## 2022-12-11 PROCEDURE — 214N000001 HC R&B CCU UMMC

## 2022-12-11 PROCEDURE — 250N000013 HC RX MED GY IP 250 OP 250 PS 637

## 2022-12-11 PROCEDURE — 80053 COMPREHEN METABOLIC PANEL: CPT | Performed by: STUDENT IN AN ORGANIZED HEALTH CARE EDUCATION/TRAINING PROGRAM

## 2022-12-11 PROCEDURE — 250N000013 HC RX MED GY IP 250 OP 250 PS 637: Performed by: INTERNAL MEDICINE

## 2022-12-11 PROCEDURE — 85025 COMPLETE CBC W/AUTO DIFF WBC: CPT | Performed by: INTERNAL MEDICINE

## 2022-12-11 PROCEDURE — 250N000011 HC RX IP 250 OP 636: Performed by: INTERNAL MEDICINE

## 2022-12-11 PROCEDURE — 84100 ASSAY OF PHOSPHORUS: CPT | Performed by: INTERNAL MEDICINE

## 2022-12-11 PROCEDURE — 84155 ASSAY OF PROTEIN SERUM: CPT | Performed by: STUDENT IN AN ORGANIZED HEALTH CARE EDUCATION/TRAINING PROGRAM

## 2022-12-11 PROCEDURE — 83735 ASSAY OF MAGNESIUM: CPT | Performed by: STUDENT IN AN ORGANIZED HEALTH CARE EDUCATION/TRAINING PROGRAM

## 2022-12-11 PROCEDURE — 250N000011 HC RX IP 250 OP 636: Performed by: STUDENT IN AN ORGANIZED HEALTH CARE EDUCATION/TRAINING PROGRAM

## 2022-12-11 PROCEDURE — 36592 COLLECT BLOOD FROM PICC: CPT

## 2022-12-11 PROCEDURE — 82805 BLOOD GASES W/O2 SATURATION: CPT

## 2022-12-11 PROCEDURE — 99233 SBSQ HOSP IP/OBS HIGH 50: CPT | Mod: GC | Performed by: INTERNAL MEDICINE

## 2022-12-11 RX ORDER — FUROSEMIDE 10 MG/ML
20 INJECTION INTRAMUSCULAR; INTRAVENOUS DAILY
Status: DISCONTINUED | OUTPATIENT
Start: 2022-12-12 | End: 2022-12-18

## 2022-12-11 RX ORDER — MAGNESIUM SULFATE HEPTAHYDRATE 40 MG/ML
4 INJECTION, SOLUTION INTRAVENOUS ONCE
Status: COMPLETED | OUTPATIENT
Start: 2022-12-11 | End: 2022-12-11

## 2022-12-11 RX ORDER — POTASSIUM CHLORIDE 20MEQ/15ML
10 LIQUID (ML) ORAL DAILY
Status: DISCONTINUED | OUTPATIENT
Start: 2022-12-11 | End: 2022-12-13

## 2022-12-11 RX ORDER — POTASSIUM CHLORIDE 29.8 MG/ML
20 INJECTION INTRAVENOUS ONCE
Status: COMPLETED | OUTPATIENT
Start: 2022-12-11 | End: 2022-12-11

## 2022-12-11 RX ORDER — MAGNESIUM SULFATE HEPTAHYDRATE 40 MG/ML
2 INJECTION, SOLUTION INTRAVENOUS ONCE
Status: COMPLETED | OUTPATIENT
Start: 2022-12-11 | End: 2022-12-11

## 2022-12-11 RX ORDER — POTASSIUM CHLORIDE 20MEQ/15ML
10 LIQUID (ML) ORAL ONCE
Status: COMPLETED | OUTPATIENT
Start: 2022-12-11 | End: 2022-12-11

## 2022-12-11 RX ADMIN — HEPARIN SODIUM 2100 UNITS/HR: 10000 INJECTION, SOLUTION INTRAVENOUS at 12:06

## 2022-12-11 RX ADMIN — ACETAMINOPHEN 650 MG: 325 TABLET, FILM COATED ORAL at 23:45

## 2022-12-11 RX ADMIN — HEPARIN SODIUM 2100 UNITS/HR: 10000 INJECTION, SOLUTION INTRAVENOUS at 01:01

## 2022-12-11 RX ADMIN — POTASSIUM CHLORIDE 20 MEQ: 29.8 INJECTION, SOLUTION INTRAVENOUS at 01:00

## 2022-12-11 RX ADMIN — Medication 37.5 MG: at 11:11

## 2022-12-11 RX ADMIN — POTASSIUM CHLORIDE 10 MEQ: 40 SOLUTION ORAL at 15:00

## 2022-12-11 RX ADMIN — Medication 37.5 MG: at 05:40

## 2022-12-11 RX ADMIN — MAGNESIUM SULFATE IN WATER 2 G: 40 INJECTION, SOLUTION INTRAVENOUS at 04:33

## 2022-12-11 RX ADMIN — POTASSIUM CHLORIDE 20 MEQ: 29.8 INJECTION, SOLUTION INTRAVENOUS at 15:00

## 2022-12-11 RX ADMIN — FUROSEMIDE 40 MG: 10 INJECTION, SOLUTION INTRAVENOUS at 07:30

## 2022-12-11 RX ADMIN — POTASSIUM CHLORIDE 20 MEQ: 40 SOLUTION ORAL at 07:30

## 2022-12-11 RX ADMIN — Medication 37.5 MG: at 17:14

## 2022-12-11 RX ADMIN — Medication 37.5 MG: at 23:45

## 2022-12-11 RX ADMIN — MAGNESIUM SULFATE IN WATER 4 G: 40 INJECTION, SOLUTION INTRAVENOUS at 15:00

## 2022-12-11 RX ADMIN — HEPARIN SODIUM 2100 UNITS/HR: 10000 INJECTION, SOLUTION INTRAVENOUS at 23:44

## 2022-12-11 RX ADMIN — POTASSIUM CHLORIDE 10 MEQ: 20 SOLUTION ORAL at 23:46

## 2022-12-11 RX ADMIN — PRENATAL VIT W/ FE FUMARATE-FA TAB 27-0.8 MG 1 TABLET: 27-0.8 TAB at 07:30

## 2022-12-11 ASSESSMENT — ACTIVITIES OF DAILY LIVING (ADL)
ADLS_ACUITY_SCORE: 20

## 2022-12-11 NOTE — PLAN OF CARE
Major Shift Events:  A&Ox4. VSS on RA. 27 beat run of self limiting VT. SR with PACs and PVCs. Mag and K+ replaced. Adequate output.    Plan: Monitor lytes and replete for K+ >4.5 and Mag >2.5  For vital signs and complete assessments, please see documentation flowsheets.

## 2022-12-11 NOTE — INTERIM SUMMARY
"TC to [place] to get update on patient status.     NST reviewed this am - reassuring and appropriate for gestational age, no contractions.     /57   Pulse 78   Temp 98.2  F (36.8  C) (Oral)   Resp 27   Ht 1.753 m (5' 9\")   Wt 130.6 kg (287 lb 14.4 oz)   SpO2 98%   BMI 42.52 kg/m        Per RN Tanya patient had ongoing intermittent runs of V tach overnight. CP is now resolved and patient walked and is doing well.     We will continue with daily fetal monitoring and plan to see her in person tomorrow and any time prior to that if circumstances change or OB issues arise. We will continue to send OB RN over for monitoring as well.     If patient status changes to that in which [facility] care (closer to OB and NICU) is appropriate, we would be happy to take her on the [facility] (medicine or ICU care with MFM or on OB unit if appropriate).     With any questions or urgent issues, please call OB/MFM team at pager [phone] 24/7.     Luis Mast MD    "

## 2022-12-11 NOTE — PROGRESS NOTES
"José Luis Walker M.D.  Cardiovascular Medicine    I personally saw and examined this patient, discussed care with housestaff and other consultants, reviewed current laboratories and imaging studies, and conveyed impression and diagnostic/therapeutic plan to patient.    Problem List  1. IUP  2. Cardiomyopathy  3. Ventricular ectopic activity  4. Elevated body mass index  5. Anemia  6. Diabetes  7. Iron deficiency    Plan:  1. Parenteral replacement of iron  2. Discontinue ICU    Ventricular ectopy has diminished and       History  The patient is seen for heart failure.  There is no interim history of chest pain, tightness, paroxysmal nocturnal dyspnea, orthopnea, peripheral edema, palpitation, pre-syncope, syncope, device discharge.  Exercise tolerance is stable.  The patient is attempting to exercise regularly and following a sodium restricted, calorically appropriate diet.  Medications are reviewed and the patient is taking medications as prescribed.  The patient is generally sleeping well as she transition from occupational night day reversal.    Objective  BP 96/55 (BP Location: Right arm)   Pulse 84   Temp 98.4  F (36.9  C) (Oral)   Resp (!) 35   Ht 1.753 m (5' 9\")   Wt 130.6 kg (287 lb 14.4 oz)   SpO2 96%   BMI 42.52 kg/m     Constitutional: alert, oriented, normal gait and station, normal mentation.  Oral: moist mucous membrans  Lymph: without pathologic adenopathy  Chest: clear to ausculation and percussion save basilar rales clear withcouhg  Cor: No evidence of left or right ventricular activity.  Rhythm is irregular.  S1 normal, S2 split physiologically. Murmurs are not present  Abdomen: without tenderness, rebound, guarding, masses, ascites  Extremities: Edema not present  Neuro: no focal defects, normal mentation  Skin: without open lesions  Psych: oriented, verbal, mental status in tact    Intake/Output Summary (Last 24 hours) at 12/11/2022 2698  Last data filed at 12/11/2022 1600  Gross per 24 hour "   Intake 3087 ml   Output 5175 ml   Net -2088 ml     Vitals:    12/06/22 0130 12/07/22 0600 12/08/22 1100   Weight: 132.7 kg (292 lb 8.8 oz) 133.3 kg (293 lb 14 oz) 130.6 kg (287 lb 14.4 oz)     Wt Readings from Last 5 Encounters:   12/08/22 130.6 kg (287 lb 14.4 oz)     Meds    [START ON 12/12/2022] furosemide  20 mg Intravenous Daily     heparin lock flush  5-20 mL Intracatheter Q24H     insulin aspart  1-3 Units Subcutaneous TID AC     insulin aspart  1-3 Units Subcutaneous At Bedtime     metoprolol tartrate  37.5 mg Oral Q6H MARISOL     polyethylene glycol  17 g Oral Daily     potassium chloride  10 mEq Oral Daily     potassium chloride  20 mEq Oral Daily     prenatal multivitamin w/iron  1 tablet Oral Daily     sodium chloride (PF)  10-40 mL Intracatheter Q8H     sodium chloride (PF)  3 mL Intracatheter Q8H       Labs  CMP  Recent Labs   Lab 12/11/22  1207 12/11/22  0330 12/10/22  2330 12/10/22  2049 12/10/22  1932 12/10/22  1706 12/10/22  1222 12/10/22  0821 12/10/22  0340 12/09/22  0845 12/09/22  0658 12/09/22  0327   * 134*  --   --  132*  --  134*  --  133*   < > 133* 136   POTASSIUM 4.2 4.5 4.0  --  3.8  --  4.5  --  4.5   < > 4.3 4.3   CHLORIDE 101 104  --   --  100  --  103  --  103   < > 104 104   CO2 20* 17*  --   --  19*  --  20*  --  19*   < > 20* 18*   ANIONGAP 12 13  --   --  13  --  11  --  11   < > 9 14   GLC 86 103*  --  101* 120*   < > 96   < > 97   < > 100* 102*   BUN 11.5 8.7  --   --  12.3  --  9.0  --  8.8   < > 9.5 9.9   CR 0.72 0.59  --   --  0.77  --  0.62  --  0.58   < > 0.59 0.64   GFRESTIMATED >90 >90  --   --  >90  --  >90  --  >90   < > >90 >90   AYDEN 9.9 9.1  --   --  8.7  --  9.5  --  9.3   < > 9.1 9.4   MAG 1.9 2.1 2.6*  --  1.6*  --  1.7  --  1.9   < > 1.7 1.7   PHOS  --  4.5  --   --   --   --   --   --  4.2  --  4.2 4.2   PROTTOTAL 7.4 7.0  --   --  7.1  --  7.0  --  7.2   < > 6.8 7.2   ALBUMIN 3.5 3.3*  --   --  3.3*  --  3.3*  --  3.4*   < > 3.1* 3.4*   BILITOTAL 0.3 0.3   --   --  0.3  --  0.3  --  0.4   < > 0.4 0.3   ALKPHOS 109* 100  --   --  111*  --  105*  --  105*   < > 102 110*   AST 38* 39*  --   --  38*  --  39*  --  34   < > 30 34   ALT 21 19  --   --  19  --  20  --  17   < > 12 15    < > = values in this interval not displayed.     CBC  Recent Labs   Lab 12/11/22  0330 12/10/22  0340 12/09/22  0327 12/08/22  0327   WBC 12.8* 12.8* 11.2* 11.0   RBC 4.08 4.29 4.30 4.36   HGB 10.3* 10.6* 10.7* 10.8*   HCT 32.2* 33.5* 33.8* 34.5*   MCV 79 78 79 79   MCH 25.2* 24.7* 24.9* 24.8*   MCHC 32.0 31.6 31.7 31.3*   RDW 14.4 14.4 14.4 14.3    257 258 262     INR  Recent Labs   Lab 12/06/22  0202   INR 1.13     Arterial Blood Gas  Recent Labs   Lab 12/11/22  0330 12/10/22  0340 12/09/22  1534 12/09/22  0327   O2PER 21 21 21 21       Imaging     Assessment/Plan

## 2022-12-11 NOTE — PROGRESS NOTES
Pt complains of heaviness and pressure in chest, and says she does not feel very good. Cards 2 fellow called and notified.

## 2022-12-11 NOTE — PROVIDER NOTIFICATION
12/11/22 1710   Provider Notification   Provider Name/Title Dr. Harrington   Method of Notification In Department   Notification Reason Decels  (FHT strip review)   FHT strip review with Dr. Harrington on return from [facility] ICU. Per provider no further interventions needed and to continue with plan of care.

## 2022-12-11 NOTE — PROGRESS NOTES
Overnight events:    Repleted K and Mag to maintain K>4.5 and Mag >2.5.    Net neg -740 ml in the last 24 hours.    SVO2: 67

## 2022-12-11 NOTE — PROVIDER NOTIFICATION
12/11/22 0932   Uterine Activity Assessment   Method external tocotransducer   Contraction Intensity no contractions   Uterine Resting Tone soft by palpation   Fetal Assessment   Fetal HR Assessment Method external US   Fetal HR (beats/min) 145   Fetal HR Baseline normal range   Fetal HR Variability moderate (amplitude range 6 to 25 bpm)   Fetal HR Accelerations increase 10 bpm above baseline lasting 10 seconds (less than 32 weeks gestation)   Fetal HR Decelerations variable   NST Start Time 0912   NST Stop Time 0932   NST Extended Time No   Non-stress Test Interpretation Appropriate for gestational age   Patient reports active fetal movement. Denies LOF, vaginal bleeding and cramping/ctx.

## 2022-12-12 LAB
ALBUMIN SERPL BCG-MCNC: 3.1 G/DL (ref 3.5–5.2)
ALBUMIN SERPL BCG-MCNC: 3.2 G/DL (ref 3.5–5.2)
ALBUMIN SERPL BCG-MCNC: 3.2 G/DL (ref 3.5–5.2)
ALP SERPL-CCNC: 96 U/L (ref 35–104)
ALP SERPL-CCNC: 98 U/L (ref 35–104)
ALP SERPL-CCNC: 99 U/L (ref 35–104)
ALT SERPL W P-5'-P-CCNC: 16 U/L (ref 10–35)
ALT SERPL W P-5'-P-CCNC: 17 U/L (ref 10–35)
ALT SERPL W P-5'-P-CCNC: 17 U/L (ref 10–35)
ANION GAP SERPL CALCULATED.3IONS-SCNC: 10 MMOL/L (ref 7–15)
ANION GAP SERPL CALCULATED.3IONS-SCNC: 10 MMOL/L (ref 7–15)
ANION GAP SERPL CALCULATED.3IONS-SCNC: 16 MMOL/L (ref 7–15)
AST SERPL W P-5'-P-CCNC: 33 U/L (ref 10–35)
AST SERPL W P-5'-P-CCNC: 34 U/L (ref 10–35)
AST SERPL W P-5'-P-CCNC: 37 U/L (ref 10–35)
BASE EXCESS BLDV CALC-SCNC: -2.7 MMOL/L (ref -7.7–1.9)
BASE EXCESS BLDV CALC-SCNC: -3 MMOL/L (ref -7.7–1.9)
BASE EXCESS BLDV CALC-SCNC: -3.1 MMOL/L (ref -7.7–1.9)
BASOPHILS # BLD AUTO: 0 10E3/UL (ref 0–0.2)
BASOPHILS NFR BLD AUTO: 0 %
BILIRUB SERPL-MCNC: 0.2 MG/DL
BILIRUB SERPL-MCNC: 0.3 MG/DL
BILIRUB SERPL-MCNC: 0.3 MG/DL
BUN SERPL-MCNC: 12.9 MG/DL (ref 6–20)
BUN SERPL-MCNC: 12.9 MG/DL (ref 6–20)
BUN SERPL-MCNC: 13.4 MG/DL (ref 6–20)
CALCIUM SERPL-MCNC: 9.3 MG/DL (ref 8.6–10)
CALCIUM SERPL-MCNC: 9.4 MG/DL (ref 8.6–10)
CALCIUM SERPL-MCNC: 9.6 MG/DL (ref 8.6–10)
CHLORIDE SERPL-SCNC: 102 MMOL/L (ref 98–107)
CHLORIDE SERPL-SCNC: 104 MMOL/L (ref 98–107)
CHLORIDE SERPL-SCNC: 104 MMOL/L (ref 98–107)
CREAT SERPL-MCNC: 0.69 MG/DL (ref 0.51–0.95)
CREAT SERPL-MCNC: 0.7 MG/DL (ref 0.51–0.95)
CREAT SERPL-MCNC: 0.7 MG/DL (ref 0.51–0.95)
DEPRECATED HCO3 PLAS-SCNC: 15 MMOL/L (ref 22–29)
DEPRECATED HCO3 PLAS-SCNC: 19 MMOL/L (ref 22–29)
DEPRECATED HCO3 PLAS-SCNC: 19 MMOL/L (ref 22–29)
EOSINOPHIL # BLD AUTO: 0.1 10E3/UL (ref 0–0.7)
EOSINOPHIL NFR BLD AUTO: 1 %
ERYTHROCYTE [DISTWIDTH] IN BLOOD BY AUTOMATED COUNT: 14.4 % (ref 10–15)
GFR SERPL CREATININE-BSD FRML MDRD: >90 ML/MIN/1.73M2
GLUCOSE BLDC GLUCOMTR-MCNC: 102 MG/DL (ref 70–99)
GLUCOSE BLDC GLUCOMTR-MCNC: 102 MG/DL (ref 70–99)
GLUCOSE BLDC GLUCOMTR-MCNC: 125 MG/DL (ref 70–99)
GLUCOSE BLDC GLUCOMTR-MCNC: 141 MG/DL (ref 70–99)
GLUCOSE BLDC GLUCOMTR-MCNC: 144 MG/DL (ref 70–99)
GLUCOSE BLDC GLUCOMTR-MCNC: 80 MG/DL (ref 70–99)
GLUCOSE BLDC GLUCOMTR-MCNC: 83 MG/DL (ref 70–99)
GLUCOSE BLDC GLUCOMTR-MCNC: 93 MG/DL (ref 70–99)
GLUCOSE BLDC GLUCOMTR-MCNC: 95 MG/DL (ref 70–99)
GLUCOSE SERPL-MCNC: 100 MG/DL (ref 70–99)
GLUCOSE SERPL-MCNC: 122 MG/DL (ref 70–99)
GLUCOSE SERPL-MCNC: 90 MG/DL (ref 70–99)
HCO3 BLDV-SCNC: 22 MMOL/L (ref 21–28)
HCT VFR BLD AUTO: 31.6 % (ref 35–47)
HGB BLD-MCNC: 9.9 G/DL (ref 11.7–15.7)
IMM GRANULOCYTES # BLD: 0.1 10E3/UL
IMM GRANULOCYTES NFR BLD: 1 %
LYMPHOCYTES # BLD AUTO: 3.1 10E3/UL (ref 0.8–5.3)
LYMPHOCYTES NFR BLD AUTO: 26 %
MAGNESIUM SERPL-MCNC: 1.8 MG/DL (ref 1.7–2.3)
MCH RBC QN AUTO: 25.3 PG (ref 26.5–33)
MCHC RBC AUTO-ENTMCNC: 31.3 G/DL (ref 31.5–36.5)
MCV RBC AUTO: 81 FL (ref 78–100)
MONOCYTES # BLD AUTO: 0.7 10E3/UL (ref 0–1.3)
MONOCYTES NFR BLD AUTO: 6 %
NEUTROPHILS # BLD AUTO: 8 10E3/UL (ref 1.6–8.3)
NEUTROPHILS NFR BLD AUTO: 66 %
NRBC # BLD AUTO: 0 10E3/UL
NRBC BLD AUTO-RTO: 0 /100
O2/TOTAL GAS SETTING VFR VENT: 0 %
O2/TOTAL GAS SETTING VFR VENT: 21 %
O2/TOTAL GAS SETTING VFR VENT: 21 %
OXYHGB MFR BLDV: 61 % (ref 70–75)
OXYHGB MFR BLDV: 67 % (ref 70–75)
OXYHGB MFR BLDV: 69 % (ref 70–75)
PCO2 BLDV: 36 MM HG (ref 40–50)
PCO2 BLDV: 37 MM HG (ref 40–50)
PCO2 BLDV: 39 MM HG (ref 40–50)
PH BLDV: 7.36 [PH] (ref 7.32–7.43)
PH BLDV: 7.38 [PH] (ref 7.32–7.43)
PH BLDV: 7.39 [PH] (ref 7.32–7.43)
PHOSPHATE SERPL-MCNC: 4.8 MG/DL (ref 2.5–4.5)
PLATELET # BLD AUTO: 213 10E3/UL (ref 150–450)
PO2 BLDV: 37 MM HG (ref 25–47)
PO2 BLDV: 39 MM HG (ref 25–47)
PO2 BLDV: 40 MM HG (ref 25–47)
POTASSIUM SERPL-SCNC: 4.1 MMOL/L (ref 3.4–5.3)
POTASSIUM SERPL-SCNC: 4.1 MMOL/L (ref 3.4–5.3)
POTASSIUM SERPL-SCNC: 4.2 MMOL/L (ref 3.4–5.3)
PROT SERPL-MCNC: 6.6 G/DL (ref 6.4–8.3)
PROT SERPL-MCNC: 7.1 G/DL (ref 6.4–8.3)
PROT SERPL-MCNC: 7.2 G/DL (ref 6.4–8.3)
RBC # BLD AUTO: 3.91 10E6/UL (ref 3.8–5.2)
SODIUM SERPL-SCNC: 131 MMOL/L (ref 136–145)
SODIUM SERPL-SCNC: 133 MMOL/L (ref 136–145)
SODIUM SERPL-SCNC: 135 MMOL/L (ref 136–145)
UFH PPP CHRO-ACNC: 0.27 IU/ML
WBC # BLD AUTO: 12 10E3/UL (ref 4–11)

## 2022-12-12 PROCEDURE — 84155 ASSAY OF PROTEIN SERUM: CPT | Performed by: STUDENT IN AN ORGANIZED HEALTH CARE EDUCATION/TRAINING PROGRAM

## 2022-12-12 PROCEDURE — 250N000013 HC RX MED GY IP 250 OP 250 PS 637

## 2022-12-12 PROCEDURE — 250N000011 HC RX IP 250 OP 636: Performed by: INTERNAL MEDICINE

## 2022-12-12 PROCEDURE — 214N000001 HC R&B CCU UMMC

## 2022-12-12 PROCEDURE — 83735 ASSAY OF MAGNESIUM: CPT | Performed by: STUDENT IN AN ORGANIZED HEALTH CARE EDUCATION/TRAINING PROGRAM

## 2022-12-12 PROCEDURE — 82805 BLOOD GASES W/O2 SATURATION: CPT

## 2022-12-12 PROCEDURE — 250N000013 HC RX MED GY IP 250 OP 250 PS 637: Performed by: STUDENT IN AN ORGANIZED HEALTH CARE EDUCATION/TRAINING PROGRAM

## 2022-12-12 PROCEDURE — 36592 COLLECT BLOOD FROM PICC: CPT | Performed by: STUDENT IN AN ORGANIZED HEALTH CARE EDUCATION/TRAINING PROGRAM

## 2022-12-12 PROCEDURE — 250N000013 HC RX MED GY IP 250 OP 250 PS 637: Performed by: INTERNAL MEDICINE

## 2022-12-12 PROCEDURE — 250N000011 HC RX IP 250 OP 636: Performed by: STUDENT IN AN ORGANIZED HEALTH CARE EDUCATION/TRAINING PROGRAM

## 2022-12-12 PROCEDURE — 85520 HEPARIN ASSAY: CPT | Performed by: INTERNAL MEDICINE

## 2022-12-12 PROCEDURE — 99231 SBSQ HOSP IP/OBS SF/LOW 25: CPT | Mod: GC | Performed by: INTERNAL MEDICINE

## 2022-12-12 PROCEDURE — 99233 SBSQ HOSP IP/OBS HIGH 50: CPT | Performed by: OBSTETRICS & GYNECOLOGY

## 2022-12-12 PROCEDURE — 84100 ASSAY OF PHOSPHORUS: CPT | Performed by: INTERNAL MEDICINE

## 2022-12-12 PROCEDURE — 84450 TRANSFERASE (AST) (SGOT): CPT | Performed by: STUDENT IN AN ORGANIZED HEALTH CARE EDUCATION/TRAINING PROGRAM

## 2022-12-12 PROCEDURE — 999N000128 HC STATISTIC PERIPHERAL IV START W/O US GUIDANCE

## 2022-12-12 PROCEDURE — 80053 COMPREHEN METABOLIC PANEL: CPT | Performed by: STUDENT IN AN ORGANIZED HEALTH CARE EDUCATION/TRAINING PROGRAM

## 2022-12-12 PROCEDURE — 85014 HEMATOCRIT: CPT | Performed by: INTERNAL MEDICINE

## 2022-12-12 PROCEDURE — 36415 COLL VENOUS BLD VENIPUNCTURE: CPT

## 2022-12-12 PROCEDURE — 36415 COLL VENOUS BLD VENIPUNCTURE: CPT | Performed by: STUDENT IN AN ORGANIZED HEALTH CARE EDUCATION/TRAINING PROGRAM

## 2022-12-12 RX ORDER — POTASSIUM CHLORIDE 20MEQ/15ML
20 LIQUID (ML) ORAL ONCE
Status: COMPLETED | OUTPATIENT
Start: 2022-12-12 | End: 2022-12-12

## 2022-12-12 RX ORDER — MAGNESIUM OXIDE 400 MG/1
400 TABLET ORAL EVERY 4 HOURS
Status: COMPLETED | OUTPATIENT
Start: 2022-12-12 | End: 2022-12-12

## 2022-12-12 RX ADMIN — POTASSIUM CHLORIDE 20 MEQ: 20 SOLUTION ORAL at 23:50

## 2022-12-12 RX ADMIN — Medication 37.5 MG: at 16:15

## 2022-12-12 RX ADMIN — POTASSIUM CHLORIDE 10 MEQ: 40 SOLUTION ORAL at 08:29

## 2022-12-12 RX ADMIN — HEPARIN SODIUM 2100 UNITS/HR: 10000 INJECTION, SOLUTION INTRAVENOUS at 11:48

## 2022-12-12 RX ADMIN — PRENATAL VIT W/ FE FUMARATE-FA TAB 27-0.8 MG 1 TABLET: 27-0.8 TAB at 08:29

## 2022-12-12 RX ADMIN — POTASSIUM CHLORIDE 20 MEQ: 40 SOLUTION ORAL at 08:29

## 2022-12-12 RX ADMIN — Medication 37.5 MG: at 05:48

## 2022-12-12 RX ADMIN — Medication 25 MG: at 23:49

## 2022-12-12 RX ADMIN — MAGNESIUM OXIDE TAB 400 MG (241.3 MG ELEMENTAL MG) 400 MG: 400 (241.3 MG) TAB at 08:29

## 2022-12-12 RX ADMIN — Medication 37.5 MG: at 11:44

## 2022-12-12 RX ADMIN — MAGNESIUM OXIDE TAB 400 MG (241.3 MG ELEMENTAL MG) 400 MG: 400 (241.3 MG) TAB at 11:44

## 2022-12-12 RX ADMIN — ACETAMINOPHEN 650 MG: 325 TABLET, FILM COATED ORAL at 19:46

## 2022-12-12 RX ADMIN — FUROSEMIDE 20 MG: 10 INJECTION, SOLUTION INTRAMUSCULAR; INTRAVENOUS at 08:29

## 2022-12-12 RX ADMIN — SODIUM CHLORIDE, PRESERVATIVE FREE 5 ML: 5 INJECTION INTRAVENOUS at 04:46

## 2022-12-12 ASSESSMENT — ACTIVITIES OF DAILY LIVING (ADL)
ADLS_ACUITY_SCORE: 20

## 2022-12-12 NOTE — PLAN OF CARE
Transfer 200Goal Outcome Evaluation:      Plan of Care Reviewed With: patient    Overall Patient Progress: no changeOverall Patient Progress: no change     Transfer 200-2330    D: Pt transferred from  around . Pt admitted from [facility]SH with c/o chest pain SOB. Pt has familiar history of CM ,dad  at 30, Pt currently 24 weeks pregnant and will require hospitalization until baby born which due date end of March. Pt has EF of 20-25 %.   A/I: Pt settled into room , oriented to bed controls and call light. Pt has been in SR 90's with multiple PAC, PVC. Pt has denied any c/o pain. Pt currently on heparin gtt at 2100 units next 10 a in am. Pt has fetal heart tones checked 2 times a day.  P: Continue current monitoring

## 2022-12-12 NOTE — PROGRESS NOTES
[id number] 12/12/2022    Patient is a 32 year old female admitted for HF with no extensive PMH who presented as a transfer from [facility] to [facility] on 12/6/2022 for further management of newly diagnosed, acute decompensated systolic heart failure (LVEF 20%) as well as high risk delivery. Patient is 24 weeks pregnant. Team is cards 2.      Neuro: A&Ox4  Cardiac: Sinus rhythm; Mg, Phos, and K+ protocols; anemia issues; heparin drip (next 10a tomorrow AM); history of Afib and VT; keep K>4.5, Mg >2.5  Respiratory: WDL; on room air; clear lungs; no cough  GI/: Rounded abdomen; produces urine without issue; bowel sounds active  Endocrine: BS ACHS  Diet/Appetite: 2 gram sodium diet; thin liquids  Skin: No overt skin issues noted  LDA: Left double lumen PICC; left PIV  Activity: Independent   Pain: Patient has no complaints of pain  Plan: Monitor and report any changes to the team    -1448: 6 second run of irregular tachycardic rhythm, wide complex

## 2022-12-12 NOTE — PLAN OF CARE
Goal Outcome Evaluation:      Plan of Care Reviewed With: patient    Overall Patient Progress: improvingOverall Patient Progress: improving    Outcome Evaluation: transfer from  12/11, heparin gtt infusing, K+ replaced for high replacement protocols    Neuro: A&O x4  Cardiac/Telemetry: SR w/ PVCs and PACs, HR's 90s. Multiple runs of asymptomatic, nonsustained VT (runs between 4-7 beats). MDs aware this has been occurring.   Respiratory: RA, no c/o SOB  GI/: voiding, LBM 12/11  Endocrine: ACHS, no insulin needed at HS  Diet/Appetite: 2 gram sodium restricted diet  Skin: skin intact  LDA: L forearm PIV infusing heparin at 2100 units/hr, L double lumen PICC saline locked, flushes and draws appropriately   Activity: independent   Pain: c/o headache, PRN tylenol given  Labs: antixa this morning was therapeutic, AM checks     Plan:   -heparin gtt, antiXa in AM's   -monitor HR/rhythm  -manage electrolytes  -plan to stay in hospital for close monitoring until she gives birth

## 2022-12-12 NOTE — PLAN OF CARE
Transfer  Transferred from:   Via: bed  Reason for transfer: Pt appropriate for 6C- improved patient condition  Family: Aware of transfer  Belongings: Sent with pt  Chart: Sent with pt  Medications: Meds from bin sent with pt  Report called from: EARLE Martínez    Belongings: Backpack, clothes, cell phone, ipad, apple pen, chargers, personal belongings    2 RN skin check completed by EARLE Darling and EARLE Stover

## 2022-12-12 NOTE — PROGRESS NOTES
Transferred to: 6C @ 2015  Status at time of transfer: Pt ambulatory, on room air  Belongings: w/ pt, phone, ipad, bags x2, backpack, ceremonial smudge  Bhardwaj removed? (if no, why?): N/A  Chart and medications: w/ pt  Family notified: aware

## 2022-12-12 NOTE — PROGRESS NOTES
Cardiology ICU Progress Note  I personally saw and examined this patient, reviewed imaging and laboratory studies, confirmed physical examination and discussed results and plan with patient       ASSESSMENT/PLAN:  Anjali Carmen is a 32 year old female, admitted on 22. She year old female  A0 and no significant past medical history, who presents as a transfer from [facility] to [facility] on 2022 for further management of newly diagnosed, acute decompensated systolic heart failure (LVEF 20%) as well as high risk delivery.     Interval history:   No acute overnight events. Transferred to acute care floor overnight without issues. Telemetry reviewed this AM with less ectopies and less NSVT with only two episodes of < 5 beats runs in the past 18 hrs. Patient reports feeling about the same today and had no complaints. Fetal monitoring done per MFM/OBGyn direction this morning without issues (notes pending).     Today:   - Continue metoprolol tartrate 37.5 mg every 6 hours   - Continue lasix 20 mg IV daily and continue to monitor BMP closely (keep K>4.5, Mg >2.5)  - Continue Heparin gtt      ===NEURO===  No acute issues    ===CARDIOVASCULAR===  # Acute decompensated systolic and diastolic heart failure  # ACC/AHA Stage C systolic heart failure  # Heart failure with reduced EF (LVEF 20%)  # Mild right ventricular systolic dysfunction  # Non-sustained VT    Etiology of HFrEF: uncertain at this time.  Concern for familial dilated cardiomyopathy given history of CM in father at 30 years of age. Last pregnancy in 2017, timeline not fitting in for PPCM. Greater concern for NICM over ICMP, however, TTE on  with thinned out LV wall and notable regional wall motion abnormalities. Coronary angiogram done on  with no significat lesions seen.  No known history of pre-existing structural heart disease prior to this admission.     1. Patient hemodynamically stable with preserved end-organ  function. No indication for inotrope support/MCS at this time.   2. GDMT and diuresis as outlined below.  Traditional afterload targets for poor LV function and low LVEF of 20-25% cannot be met due to need for fetoplacental circulation.  Will target SBP in 120-140 range as per discussion with OB-GYN.  3. Repeat transthoracic echocardiogram done on 12/6 notable for severely reduced left ventricular function with an LVEF of 20-25%. Also notable for RWM (details below) Coronanry angiogram on 12/8 without significant CAD.   4. Will consider cardiac MRI w.r.t etiology of cardiomyopathy once clinically permissible (likely to be done post partum).  5. Currently on heparin gtt for Afib and risk of LV thrombus formation given low LVEF and hypercoagulable state of pregnancy.     GDMT:   ACEi/ARB/ARNI: contraindicated due to pregnancy  BB: continue metoprolol tartrate at slightly increased dose of 37.5 mg q6hr (changed from 25 mg q6hr)  Aldosterone antagonist: high adverse risk medication in pregnancy  SCD prophylaxis: does not meet criteria for implant  Fluid status: Approaching euvolemia. Continue lasix 20 mg IV daily and continue to monitor BMP closely (keep K>4.5, Mg >2.5), was on 40 mg IV daily until today (12/12/22)     # New onset possible paroxysmal atrial fibrillation:   [facility] [place]- isolated episode, EKG unavailable for review. Currently in [facility]R at [facility].  - Continue to monitor rhythm on telemetry.   - Currently on heparin gtt for AC, however, [id number] score of  2 (sex, congestive heart failure) and isolated episode of Afib. Will continue for now as its safe in pregnancy (discussed with MFM).     ===PULMONARY===  # No acute issues    ===GASTROINTESTINAL===  # No acute issues, tolerating oral meals without nausea.     ===RENAL===  # No acute issues    ===HEME/ONC===  # Mild anemia:   HGB 10.2 g/dL on arrival (MCV 79). Retic count 1.9%  Etiology: AVINASH vs anemia in the setting of HF.  - Check  "iron, ferritin, TIBC levels.   - Limit blood draws.     ===ENDOCRINE===  # 24 weeks 4 days gestational age:   - Appreciate MFM input., they are closely following with daily checks. They can be reached via their pager (listed and updated regularly on the summary page)  - OB US for fetal growth done on 22 with normal fetal findings and no abnormalities     # Screening for Gestational DM:   # Monitor for hyperglycemia  Risk factors: High BMI and received steroids for fetal lung maturation prior to transfer to [facility].      - HbA1c 5.8 on 22  - Urine proteins pending (ordered on 22)  - q6 glucose checks with sliding scale insulin (to monitor for hypoglycemia while in the ICU)     ===INFECTIOUS DISEASE===  # No acute issues    ===SKIN/MSK===  # No acute issues      Prophylaxis:  DVT: heparin gtt   GI: not indicated  Family: Updated via telephone by the bedside  Disposition: Critically Ill and needs to remain ICU status at this time.   Code Status: Full     Plan d/w Dr. Denise M.D., who is in agreement. Please, see staff addendum for changes/additions to the plan.    Maicol Camacho MD, PhD  Cardiology Fellow  Pager: [phone]  ===================================================================    SUBJECTIVE: JOAQUIN o/n.     Nursing notes reviewed. See above for other details.     OBJECTIVE:  /67 (BP Location: Right arm)   Pulse 105   Temp 97.9  F (36.6  C) (Oral)   Resp 18   Ht 1.753 m (5' 9\")   Wt 134.2 kg (295 lb 14.4 oz)   SpO2 98%   BMI 43.70 kg/m    Temp (24hrs), Av.2  F (36.8  C), Min:97.9  F (36.6  C), Max:98.7  F (37.1  C)    GEN: pleasant, no acute distress  HEENT: no icterus  CV: RRR, normal s1/s2, no murmurs. JVP ~ 12.   CHEST: clear to ausculation bilaterally, no rales or wheezing  ABD: soft, non-tender, normal active bowel sounds  EXTR: pulses +2 bilaterally. Trace LE edema.   NEURO: alert oriented, speech fluent/appropriate, motor grossly nonfocal    Labs: reviewed in " EMR  CBC  Recent Labs   Lab 12/12/22  0453 12/11/22  0330 12/10/22  0340 12/09/22  0327   WBC 12.0* 12.8* 12.8* 11.2*   RBC 3.91 4.08 4.29 4.30   HGB 9.9* 10.3* 10.6* 10.7*   HCT 31.6* 32.2* 33.5* 33.8*   MCV 81 79 78 79   MCH 25.3* 25.2* 24.7* 24.9*   MCHC 31.3* 32.0 31.6 31.7   RDW 14.4 14.4 14.4 14.4    241 257 258     BMP  Recent Labs   Lab 12/12/22  1142 12/12/22  0828 12/12/22  0453 12/12/22  0005 12/11/22  1933 12/11/22  1921 12/11/22  1639 12/11/22  1207 12/11/22  0720 12/11/22  0330 12/10/22  0821 12/10/22  0340 12/09/22  0845 12/09/22  0658   NA  --   --  133*  --   --  131*  --  133*  --  134*   < > 133*   < > 133*   POTASSIUM  --   --  4.1  --   --  4.4  --  4.2  --  4.5   < > 4.5   < > 4.3   CHLORIDE  --   --  104  --   --  101  --  101  --  104   < > 103   < > 104   CO2  --   --  19*  --   --  19*  --  20*  --  17*   < > 19*   < > 20*   ANIONGAP  --   --  10  --   --  11  --  12  --  13   < > 11   < > 9   * 95 100* 141*   < > 104*   < > 86   < > 103*   < > 97   < > 100*   BUN  --   --  13.4  --   --  11.8  --  11.5  --  8.7   < > 8.8   < > 9.5   CR  --   --  0.70  --   --  0.66  --  0.72  --  0.59   < > 0.58   < > 0.59   GFRESTIMATED  --   --  >90  --   --  >90  --  >90  --  >90   < > >90   < > >90   AYDEN  --   --  9.4  --   --  9.5  --  9.9  --  9.1   < > 9.3   < > 9.1   MAG  --   --  1.8  --   --  2.3  --  1.9  --  2.1   < > 1.9   < > 1.7   PHOS  --   --  4.8*  --   --   --   --   --   --  4.5  --  4.2  --  4.2    < > = values in this interval not displayed.     Troponins:   No results found for: TROPI, TROPONIN, TROPR, TROPN  INR  Recent Labs   Lab 12/06/22  0202   INR 1.13       EKG  12/6/22: SNR at a ventricular rate of 88 with PVCs and interpolated PVCs    Echocardiogram (12/6/22):  Interpretation Summary  Left ventricular function is decreased. The ejection fraction is 20-25%  (severely reduced). Severe diffuse hypokinesis is present. There is wall  thinning and akinesis of the  basal-mid inferior, basal-mid inferoseptal and  basal inferolateral segments. There is akinesis of the apical septum. This is  compatible with ischemic cardiomyopathy.  Mild right ventricular dilation is present. Global right ventricular function  is moderately reduced.  Mild tricuspid insufficiency is present.  Pulmonary artery systolic pressure is normal.  IVC diameter and respiratory changes fall into an intermediate range  suggesting an RA pressure of 8 mmHg.  No pericardial effusion is present.    Coronary Angiogram:  12/7/22:   Formal results pending, but no obstructive disease seen on preliminary evaluation.     Imaging: reviewed in EMR.

## 2022-12-12 NOTE — PROVIDER NOTIFICATION
12/12/22 1700   Provider Notification   Provider Name/Title Dr Soliz   Method of Notification In Department   Request Evaluate - Remote   Notification Reason Decels   Reviewed fetal heart rate tracing with Dr Soliz. FHT having intermittent variables, baseline 140bpm, accelerations present, no contractions. After review, plan is continue to monitor as planned BID per Dr Soliz.

## 2022-12-12 NOTE — PROGRESS NOTES
MFM Antepartum Progress Note    Subjective:   Anjali is feeling well today and she was resting when we arrived.  She denies contractions, LOF or VB.  States the baby has been active.  She denies chest pain, shortness of breath, HA or vision changes.       Objective:  Vitals:    22 0010 22 0408 22 0747 22 1101   BP:  106/61 (!) 82/42 115/67   BP Location:  Right arm Right arm Right arm   Pulse:  81 65 105   Resp:  16 18 18   Temp:  97.7  F (36.5  C) 98.3  F (36.8  C) 97.9  F (36.6  C)   TempSrc:  Oral Oral Oral   SpO2:  98% 93% 98%   Weight: 134.2 kg (295 lb 14.4 oz)      Height:           I/O last 3 completed shifts:  In: 1746.55 [P.O.:950; I.V.:796.55]  Out: 1500 [Urine:1500]    Gen: Resting in bed.   Abd: Gravid, non-tender, non-distended    Assessment/Plan:   Anjali Cramen is a 32 year old  at 25w0d by 6w1d US admitted to CICU for acute HFrEF, cardiac arrhythmia after transferring from [facility].     The patient has had an echocardiogram since her arrival concerning for ischemic cardiomyopathy, which was ruled out by a cardiac catheterization on  with no coronary disease. Continuing to medically manage dilated cardiomyopathy with metoprolol and diuresis per cardiology team with improvement in her symptoms. Still unclear etiology for such severe disease at her age with no prior co-morbidities.    MFM will continue to follow along closely and are available  at [phone].    # Acute decompensated HFrEF  # Possible atrial fibrillation, non-sustained VT  - Admitted to CICU for management of new cardiac diagnoses  - BP goal 130/80, MAP >65. If patient begins to have blood pressures >160/110 for at least 15 minutes, call MFM for guidance of management, although typically will initiate treatment with 5-10 mg of IV hydralazine or 20 mg of IV labetalol  - Agree with continued anticoagulation per primary team. Will need to stop anticoagulation prior to delivery. For  regional anesthesia to be an option, we would need to stop anticoagulation/antiplatelet therapy. She will need an anesthesia consult and multi-disciplinary discussion regarding this.  - Metoprolol is safe in pregnancy with no dose restrictions  - There is no current indication for delivery, but if worsening symptoms of heart failure were noted, delivery may be indicated at that time.      # Pregnancy  - Comprehensive ultrasound  with growth at 76th percentile, repeat growth US in 3 weeks ()  - Non-stress test (NST) by L&D RN twice daily, consider daily NST once stable  - Betamethasone (BMZ) given - at OSH for fetal lung maturity, rescue BMZ typically not repeated until at least 2 weeks from initial course and if there is concern for delivery in the next week  - Magnesium for neuroprotection if moving towards delivery prior to 32 weeks with 4g bolus followed by 2g/hr maintenance until delivery  - Routine indications for emergent delivery including maternal decompensation or fetal compromise  - Patient had desired to have a trial of labor after a prior  section. In setting of decompensated HF, will discuss delivery planning moving forward as she may not be a candidate to valsalva in labor. at this time, would recommend repeat CS, however, will continue to discuss this  - S/p  section consent on admission  - Patient requires routine prenatal care while in house with next milestone at 28 weeks when typically Tdap is administered, as well as repeat CBC, RPR (syphilis screening), and type and screen to evaluate antibodies, as well as one hour glucose challenge test (GCT)  - Can stop serial glucose assessment at this time and we will coordinate GCT 2 weeks after her BMZ administration.      Jordyn Bills MD    Time Spent on this Encounter   I spent 35 minutes on the unit/floor managing the care of Anjali Carmen. Over 50% of my time was spent on the following:   - Counseling the  patient and/or family regarding: diagnostic results, prognosis, risks and benefits of treatment options and medical compliance  - Coordination of care with the: nurse and patient    In summary, Anjali Carmen is a  at 25w0d admitted with systolic heart failure, etiology uncertain.  Anjali continues to have non-sustained runs of Vtach for which she is continuing on metoprolol and close inpatient monitoring, in addition to gentle diuresis with Lasix 20 mg daily.  We will continue to follow along closely at this time.     Jordyn Bills MD

## 2022-12-13 ENCOUNTER — TRANSFERRED RECORDS (OUTPATIENT)
Dept: HEALTH INFORMATION MANAGEMENT | Facility: CLINIC | Age: 32
End: 2022-12-13

## 2022-12-13 LAB
ALBUMIN SERPL BCG-MCNC: 3.3 G/DL (ref 3.5–5.2)
ALBUMIN SERPL BCG-MCNC: 3.4 G/DL (ref 3.5–5.2)
ALP SERPL-CCNC: 104 U/L (ref 35–104)
ALP SERPL-CCNC: 99 U/L (ref 35–104)
ALT SERPL W P-5'-P-CCNC: 14 U/L (ref 10–35)
ALT SERPL W P-5'-P-CCNC: 20 U/L (ref 10–35)
ANION GAP SERPL CALCULATED.3IONS-SCNC: 14 MMOL/L (ref 7–15)
ANION GAP SERPL CALCULATED.3IONS-SCNC: 14 MMOL/L (ref 7–15)
AST SERPL W P-5'-P-CCNC: 39 U/L (ref 10–35)
AST SERPL W P-5'-P-CCNC: 40 U/L (ref 10–35)
BASE EXCESS BLDV CALC-SCNC: -3.8 MMOL/L (ref -7.7–1.9)
BASOPHILS # BLD AUTO: 0 10E3/UL (ref 0–0.2)
BASOPHILS NFR BLD AUTO: 0 %
BILIRUB SERPL-MCNC: 0.2 MG/DL
BILIRUB SERPL-MCNC: 0.3 MG/DL
BUN SERPL-MCNC: 11.7 MG/DL (ref 6–20)
BUN SERPL-MCNC: 12 MG/DL (ref 6–20)
CALCIUM SERPL-MCNC: 10 MG/DL (ref 8.6–10)
CALCIUM SERPL-MCNC: 10 MG/DL (ref 8.6–10)
CHLORIDE SERPL-SCNC: 102 MMOL/L (ref 98–107)
CHLORIDE SERPL-SCNC: 104 MMOL/L (ref 98–107)
CREAT SERPL-MCNC: 0.65 MG/DL (ref 0.51–0.95)
CREAT SERPL-MCNC: 0.71 MG/DL (ref 0.51–0.95)
DEPRECATED HCO3 PLAS-SCNC: 16 MMOL/L (ref 22–29)
DEPRECATED HCO3 PLAS-SCNC: 17 MMOL/L (ref 22–29)
EOSINOPHIL # BLD AUTO: 0.1 10E3/UL (ref 0–0.7)
EOSINOPHIL NFR BLD AUTO: 1 %
ERYTHROCYTE [DISTWIDTH] IN BLOOD BY AUTOMATED COUNT: 14.6 % (ref 10–15)
GFR SERPL CREATININE-BSD FRML MDRD: >90 ML/MIN/1.73M2
GFR SERPL CREATININE-BSD FRML MDRD: >90 ML/MIN/1.73M2
GLUCOSE SERPL-MCNC: 83 MG/DL (ref 70–99)
GLUCOSE SERPL-MCNC: 99 MG/DL (ref 70–99)
HCO3 BLDV-SCNC: 21 MMOL/L (ref 21–28)
HCT VFR BLD AUTO: 33.5 % (ref 35–47)
HGB BLD-MCNC: 10.5 G/DL (ref 11.7–15.7)
IMM GRANULOCYTES # BLD: 0.1 10E3/UL
IMM GRANULOCYTES NFR BLD: 1 %
LYMPHOCYTES # BLD AUTO: 3.5 10E3/UL (ref 0.8–5.3)
LYMPHOCYTES NFR BLD AUTO: 30 %
MAGNESIUM SERPL-MCNC: 1.7 MG/DL (ref 1.7–2.3)
MAGNESIUM SERPL-MCNC: 1.8 MG/DL (ref 1.7–2.3)
MCH RBC QN AUTO: 25.3 PG (ref 26.5–33)
MCHC RBC AUTO-ENTMCNC: 31.3 G/DL (ref 31.5–36.5)
MCV RBC AUTO: 81 FL (ref 78–100)
MONOCYTES # BLD AUTO: 0.7 10E3/UL (ref 0–1.3)
MONOCYTES NFR BLD AUTO: 6 %
NEUTROPHILS # BLD AUTO: 7.4 10E3/UL (ref 1.6–8.3)
NEUTROPHILS NFR BLD AUTO: 62 %
NRBC # BLD AUTO: 0 10E3/UL
NRBC BLD AUTO-RTO: 0 /100
O2/TOTAL GAS SETTING VFR VENT: 0 %
OXYHGB MFR BLDV: 52 % (ref 70–75)
PCO2 BLDV: 38 MM HG (ref 40–50)
PH BLDV: 7.36 [PH] (ref 7.32–7.43)
PHOSPHATE SERPL-MCNC: 5.2 MG/DL (ref 2.5–4.5)
PLATELET # BLD AUTO: 244 10E3/UL (ref 150–450)
PO2 BLDV: 32 MM HG (ref 25–47)
POTASSIUM SERPL-SCNC: 4.2 MMOL/L (ref 3.4–5.3)
POTASSIUM SERPL-SCNC: 4.5 MMOL/L (ref 3.4–5.3)
PROT SERPL-MCNC: 7.3 G/DL (ref 6.4–8.3)
PROT SERPL-MCNC: 7.5 G/DL (ref 6.4–8.3)
RBC # BLD AUTO: 4.15 10E6/UL (ref 3.8–5.2)
SODIUM SERPL-SCNC: 133 MMOL/L (ref 136–145)
SODIUM SERPL-SCNC: 134 MMOL/L (ref 136–145)
UFH PPP CHRO-ACNC: 0.33 IU/ML
WBC # BLD AUTO: 11.8 10E3/UL (ref 4–11)

## 2022-12-13 PROCEDURE — 83735 ASSAY OF MAGNESIUM: CPT | Performed by: INTERNAL MEDICINE

## 2022-12-13 PROCEDURE — 82805 BLOOD GASES W/O2 SATURATION: CPT

## 2022-12-13 PROCEDURE — 36415 COLL VENOUS BLD VENIPUNCTURE: CPT

## 2022-12-13 PROCEDURE — 85048 AUTOMATED LEUKOCYTE COUNT: CPT | Performed by: INTERNAL MEDICINE

## 2022-12-13 PROCEDURE — 84450 TRANSFERASE (AST) (SGOT): CPT | Performed by: STUDENT IN AN ORGANIZED HEALTH CARE EDUCATION/TRAINING PROGRAM

## 2022-12-13 PROCEDURE — 36592 COLLECT BLOOD FROM PICC: CPT | Performed by: STUDENT IN AN ORGANIZED HEALTH CARE EDUCATION/TRAINING PROGRAM

## 2022-12-13 PROCEDURE — 250N000013 HC RX MED GY IP 250 OP 250 PS 637: Performed by: STUDENT IN AN ORGANIZED HEALTH CARE EDUCATION/TRAINING PROGRAM

## 2022-12-13 PROCEDURE — 250N000011 HC RX IP 250 OP 636: Performed by: STUDENT IN AN ORGANIZED HEALTH CARE EDUCATION/TRAINING PROGRAM

## 2022-12-13 PROCEDURE — 85014 HEMATOCRIT: CPT | Performed by: INTERNAL MEDICINE

## 2022-12-13 PROCEDURE — 99232 SBSQ HOSP IP/OBS MODERATE 35: CPT | Mod: GC | Performed by: INTERNAL MEDICINE

## 2022-12-13 PROCEDURE — 84155 ASSAY OF PROTEIN SERUM: CPT | Performed by: STUDENT IN AN ORGANIZED HEALTH CARE EDUCATION/TRAINING PROGRAM

## 2022-12-13 PROCEDURE — 84100 ASSAY OF PHOSPHORUS: CPT | Performed by: INTERNAL MEDICINE

## 2022-12-13 PROCEDURE — 83735 ASSAY OF MAGNESIUM: CPT

## 2022-12-13 PROCEDURE — 250N000011 HC RX IP 250 OP 636: Performed by: INTERNAL MEDICINE

## 2022-12-13 PROCEDURE — 85520 HEPARIN ASSAY: CPT | Performed by: INTERNAL MEDICINE

## 2022-12-13 PROCEDURE — 250N000013 HC RX MED GY IP 250 OP 250 PS 637: Performed by: INTERNAL MEDICINE

## 2022-12-13 PROCEDURE — 80053 COMPREHEN METABOLIC PANEL: CPT | Performed by: STUDENT IN AN ORGANIZED HEALTH CARE EDUCATION/TRAINING PROGRAM

## 2022-12-13 PROCEDURE — 214N000001 HC R&B CCU UMMC

## 2022-12-13 RX ORDER — POTASSIUM CHLORIDE 20MEQ/15ML
20 LIQUID (ML) ORAL ONCE
Status: COMPLETED | OUTPATIENT
Start: 2022-12-13 | End: 2022-12-14

## 2022-12-13 RX ORDER — MAGNESIUM OXIDE 400 MG/1
400 TABLET ORAL EVERY 4 HOURS
Status: COMPLETED | OUTPATIENT
Start: 2022-12-13 | End: 2022-12-14

## 2022-12-13 RX ORDER — PRENATAL VIT/IRON FUM/FOLIC AC 27MG-0.8MG
1 TABLET ORAL DAILY
Status: ON HOLD | COMMUNITY
End: 2023-01-30

## 2022-12-13 RX ORDER — MAGNESIUM SULFATE HEPTAHYDRATE 40 MG/ML
2 INJECTION, SOLUTION INTRAVENOUS ONCE
Status: COMPLETED | OUTPATIENT
Start: 2022-12-13 | End: 2022-12-13

## 2022-12-13 RX ORDER — POTASSIUM CHLORIDE 20MEQ/15ML
30 LIQUID (ML) ORAL DAILY
Status: DISCONTINUED | OUTPATIENT
Start: 2022-12-14 | End: 2022-12-13

## 2022-12-13 RX ORDER — MAGNESIUM SULFATE 1 G/100ML
1 INJECTION INTRAVENOUS ONCE
Status: DISCONTINUED | OUTPATIENT
Start: 2022-12-13 | End: 2022-12-13

## 2022-12-13 RX ORDER — MAGNESIUM OXIDE 400 MG/1
400 TABLET ORAL EVERY 4 HOURS
Status: COMPLETED | OUTPATIENT
Start: 2022-12-13 | End: 2022-12-13

## 2022-12-13 RX ORDER — MAGNESIUM OXIDE 400 MG/1
400 TABLET ORAL 2 TIMES DAILY
Status: DISCONTINUED | OUTPATIENT
Start: 2022-12-14 | End: 2022-12-13

## 2022-12-13 RX ADMIN — MAGNESIUM SULFATE IN WATER 2 G: 40 INJECTION, SOLUTION INTRAVENOUS at 11:55

## 2022-12-13 RX ADMIN — FUROSEMIDE 20 MG: 10 INJECTION, SOLUTION INTRAMUSCULAR; INTRAVENOUS at 08:15

## 2022-12-13 RX ADMIN — Medication 400 MG: at 23:21

## 2022-12-13 RX ADMIN — SODIUM CHLORIDE, PRESERVATIVE FREE 10 ML: 5 INJECTION INTRAVENOUS at 06:11

## 2022-12-13 RX ADMIN — Medication 37.5 MG: at 06:06

## 2022-12-13 RX ADMIN — HEPARIN SODIUM 2100 UNITS/HR: 10000 INJECTION, SOLUTION INTRAVENOUS at 23:23

## 2022-12-13 RX ADMIN — Medication 37.5 MG: at 11:56

## 2022-12-13 RX ADMIN — SODIUM CHLORIDE, PRESERVATIVE FREE 7 ML: 5 INJECTION INTRAVENOUS at 17:32

## 2022-12-13 RX ADMIN — Medication 37.5 MG: at 17:32

## 2022-12-13 RX ADMIN — HEPARIN SODIUM 2100 UNITS/HR: 10000 INJECTION, SOLUTION INTRAVENOUS at 00:06

## 2022-12-13 RX ADMIN — Medication 37.5 MG: at 23:21

## 2022-12-13 RX ADMIN — PRENATAL VIT W/ FE FUMARATE-FA TAB 27-0.8 MG 1 TABLET: 27-0.8 TAB at 08:19

## 2022-12-13 RX ADMIN — HEPARIN SODIUM 2100 UNITS/HR: 10000 INJECTION, SOLUTION INTRAVENOUS at 12:05

## 2022-12-13 RX ADMIN — POTASSIUM CHLORIDE 10 MEQ: 40 SOLUTION ORAL at 08:17

## 2022-12-13 ASSESSMENT — ACTIVITIES OF DAILY LIVING (ADL)
ADLS_ACUITY_SCORE: 20
ADLS_ACUITY_SCORE: 24

## 2022-12-13 NOTE — PLAN OF CARE
Anjali Carmen is a 32 year old female, admitted on 22. She year old female  A0 and no significant past medical history, who presents as a transfer from [facility] to [facility] on 2022 for further management of newly diagnosed, acute decompensated systolic heart failure (LVEF 20%) as well as high risk delivery.     Temp: 98  F (36.7  C) Temp src: Oral BP: 107/55 Pulse: 84   Resp: 18 SpO2: 98 % O2 Device: None (Room air)        Neuro: AOx4  Resp: RA, no reports of SOB  Cardiac: SR w/ regular PVC's and PAC's  GI/: good urine output, no BM this shift  BG: ACHS but has not needed any coverage  LDA's: New left PIV put in and running Hep at 2100u/hr, double lumen PICC   Activity: Independent, up ad danis  Pain: reported headache at the beginning of shift. Gave tylenol, pt no longer reports pain  Skin: No deficits noted  Diet: 2 gram Na  Plan: Continue to monitor and contact provider with any changes/concerns.    OB pager # [phone]

## 2022-12-13 NOTE — PROGRESS NOTES
MFM Antepartum Progress Note    Subjective:   Denies contractions, leaking of fluid, vaginal bleeding, or decreased fetal movement.    Patient's  and two sons are here to visit from [place] today.    Objective:  Vitals:    22 2353 22 0240 22 0448   BP:  110/69  107/55   BP Location:  Right arm  Right arm   Pulse: 77 90  84   Resp:  18  18   Temp:  98.5  F (36.9  C)  98  F (36.7  C)   TempSrc:  Oral  Oral   SpO2:  99%  98%   Weight:   133.2 kg (293 lb 9.6 oz)    Height:           I/O last 3 completed shifts:  In: 1579.65 [P.O.:1160; I.V.:419.65]  Out: 2450 [Urine:2450]    Gen: Sitting up in bed, NAD  Resp: Speaking full sentences, breathing comfortably   Abd: Gravid, non-tender, non-distended    Assessment/Plan:   Anjali Carmen is a 32 year old  at 25w1d by 6w1d US admitted to CICU for acute HFrEF, cardiac arrhythmia after transferring from [facility].     The patient has had an echocardiogram since her arrival concerning for ischemic cardiomyopathy, which was ruled out by a cardiac catheterization on  with no coronary disease. Continuing to medically manage dilated cardiomyopathy with metoprolol and diuresis per cardiology team with improvement in her symptoms.     MFM will continue to follow along closely and are available  at [phone].    # Acute decompensated HFrEF  # Possible atrial fibrillation, non-sustained VT  - Admitted to CICU for management of new cardiac diagnoses, now transferred out of ICU  - BP goal 130/80, MAP >65. If patient begins to have blood pressures >160/110 for at least 15 minutes, call MFM for guidance of management, although typically will initiate treatment with 5-10 mg of IV hydralazine or 20 mg of IV labetalol  - Agree with continued anticoagulation per primary team. Will need to stop anticoagulation prior to delivery. For regional anesthesia to be an option, we would need to stop anticoagulation/antiplatelet  therapy. She will need an anesthesia consult and multi-disciplinary discussion regarding this.  - Metoprolol is safe in pregnancy with no dose restrictions  - There is no current indication for delivery, but if worsening symptoms of heart failure were noted, delivery may be indicated at that time.      # Pregnancy  - Comprehensive ultrasound  with growth at 76th percentile, repeat growth US in 3 weeks ()  - Non-stress test (NST) by L&D RN twice daily, consider daily NST once stable  - Betamethasone (BMZ) given - at OSH for fetal lung maturity, rescue BMZ typically not repeated until at least 2 weeks from initial course and if there is concern for delivery in the next week  - Magnesium for neuroprotection if moving towards delivery prior to 32 weeks with 4g bolus followed by 2g/hr maintenance until delivery  - Routine indications for emergent delivery including maternal decompensation or fetal compromise  - Patient had desired to have a trial of labor after a prior  section. In setting of decompensated HF, will discuss delivery planning moving forward as she may not be a candidate to valsalva in labor. at this time, would recommend repeat CS, however, will continue to discuss this  - S/p  section consent on admission  - Patient requires routine prenatal care while in house with next milestone at 28 weeks when typically Tdap is administered, as well as repeat CBC, RPR (syphilis screening), and type and screen to evaluate antibodies, as well as one hour glucose challenge test (GCT)  - Will coordinate GCT 2 weeks after her BMZ administration (12/15)     Meredith Soliz MD  Maternal Fetal Medicine Fellow    Physician Attestation   I saw this patient with the resident and agree with the resident/fellow's findings and plan of care as documented in the note.      I personally reviewed vital signs, medications, labs, imaging and EFM.    Key findings:  In summary, Anjali Carmen is a   at 25w1d  admitted with systolic heart failure, etiology uncertain.  Anjali continues to have non-sustained runs of Vtach for which she is continuing on metoprolol and close inpatient monitoring, in addition to gentle diuresis with Lasix 20 mg daily.  We will continue to follow along closely at this time.     Jordyn Bills MD  Date of Service (when I saw the patient): 22    Time Spent on this Encounter   I spent 20 minutes on the unit/floor managing the care of Anjali Carmen. Over 50% of my time was spent on the following:   - Counseling the patient and/or family regarding: diagnostic results  - Coordination of care with the: nurse and patient      In summary, Anjali Carmen is a  at 25w1d  admitted with systolic heart failure, etiology uncertain.  Anjali continues to have non-sustained runs of Vtach for which she is continuing on metoprolol and close inpatient monitoring, in addition to gentle diuresis with Lasix 20 mg daily.  We will continue to follow along closely at this time.     Jordyn Bills MD

## 2022-12-13 NOTE — PROGRESS NOTES
Cardiology ICU Progress Note        ASSESSMENT/PLAN:  Anjali Carmen is a 32 year old female, admitted on 22. She year old female  A0 and no significant past medical history, who presents as a transfer from  to [facility] on 2022 for further management of newly diagnosed, acute decompensated systolic heart failure (LVEF 20%) as well as high risk delivery.       Faculty Attestation  José Luis Walker M.D.    I personally saw and examined this patient, reviewed recent laboratories and imaging studies, discussed the case with the housestaff, and conveyed plan to the patient.  I answered all questions from patient and/or family. I agree with the examination, assessment and plan outlined here.        Changes Today:   - 2g magnesium sulfate ordered  - Keep K > 4.5 and Mg > 2.5  - Discontinued q6H blood sugar checks    ===NEURO===  No acute issues    ===CARDIOVASCULAR===  # Acute decompensated systolic and diastolic heart failure  # ACC/AHA Stage C systolic heart failure  # Heart failure with reduced EF (LVEF 20%)  # Mild right ventricular systolic dysfunction  # Non-sustained VT    Etiology of HFrEF: uncertain at this time.  Concern for familial dilated cardiomyopathy given history of CM in father at 30 years of age. Last pregnancy in , timeline not fitting in for PPCM. Greater concern for NICM over ICMP, however, TTE on  with thinned out LV wall and notable regional wall motion abnormalities. Coronary angiogram done on  with no significat lesions seen.  No known history of pre-existing structural heart disease prior to this admission.    1. Patient hemodynamically stable with preserved end-organ function. No indication for inotrope support/MCS at this time.   2. GDMT and diuresis as outlined below.  Traditional afterload targets for poor LV function and low LVEF of 20-25% cannot be met due to need for fetoplacental circulation.  Will target SBP in 120-140 range as per  discussion with OB-GYN.  3. Repeat transthoracic echocardiogram done on 12/6 notable for severely reduced left ventricular function with an LVEF of 20-25%. Also notable for RWM (details below) Coronanry angiogram on 12/8 without significant CAD.   4. Will consider cardiac MRI w.r.t etiology of cardiomyopathy once clinically permissible (likely to be done post partum).  5. Currently on heparin gtt for Afib and risk of LV thrombus formation given low LVEF and hypercoagulable state of pregnancy.     GDMT:   ACEi/ARB/ARNI: contraindicated due to pregnancy  BB: continue metoprolol tartrate 37.5 mg q6hr (changed from 25 mg q6hr)  Aldosterone antagonist: high adverse risk medication in pregnancy  SCD prophylaxis: does not meet criteria for implant  Fluid status: Approaching euvolemia. Continue lasix 20 mg IV daily and continue to monitor BMP closely (keep K>4.5, Mg >2.5), was on 40 mg IV daily until 12/12/22.     # New onset possible paroxysmal atrial fibrillation:   [facility] [place]- isolated episode, EKG unavailable for review. Currently in NSR at [facility].  - Continue to monitor rhythm on telemetry.   - Currently on heparin gtt for AC, however, [id number] score of 2 (sex, congestive heart failure) and isolated episode of Afib. Will continue for now as its safe in pregnancy (discussed with MFM).     ===PULMONARY===  # No acute issues    ===GASTROINTESTINAL===  # No acute issues, tolerating oral meals without nausea.     ===RENAL===  # No acute issues    ===HEME/ONC===  # Mild anemia:   HGB 10.2 g/dL on arrival (MCV 79). Retic count 1.9%  Etiology: AVINASH vs anemia in the setting of HF.  - Check iron, ferritin, TIBC levels.   - Limit blood draws.     ===ENDOCRINE===  # Pregnancy:   - Appreciate MFM input., they are closely following with daily checks. They can be reached via their pager (listed and updated regularly on the summary page)  - OB US for fetal growth done on 12/8/22 with normal fetal findings  "and no abnormalities     # Screening for Gestational DM:   # Monitor for hyperglycemia  Risk factors: High BMI and received steroids for fetal lung maturation prior to transfer to [facility].   - HbA1c 5.8 on 22  - Urine proteins pending (ordered on 22)     ===INFECTIOUS DISEASE===  # No acute issues    ===SKIN/MSK===  # No acute issues      Prophylaxis:  DVT: heparin gtt   GI: not indicated  Family: Updated by the bedside  Disposition: Floor  Code Status: Full      Plan d/w Dr. Denise M.D., who is in agreement. Please, see staff addendum for changes/additions to the plan.    Shante Sharpe MD  Internal Medicine, PGY-1  Pager: [phone]    ===================================================================    SUBJECTIVE:   No acute events overnight. Nursing notes reviewed. Feels sleepy this morning since she remains awake at night d/t prior work schedule. Denies new concerns. Denies chest pain, SOB, palpitations, extremity pain. Telemetry reviewed w/ a few episodes of NSVT (< 5 beats) over past 12 hrs.     OBJECTIVE:  /55 (BP Location: Right arm)   Pulse 84   Temp 98  F (36.7  C) (Oral)   Resp 18   Ht 1.753 m (5' 9\")   Wt 133.2 kg (293 lb 9.6 oz)   SpO2 98%   BMI 43.36 kg/m    Temp (24hrs), Av.2  F (36.8  C), Min:97.9  F (36.6  C), Max:98.7  F (37.1  C)    GEN: pleasant, no acute distress  HEENT: no icterus  CV: RRR, normal s1/s2, no murmurs. JVP ~ 12.   CHEST: clear to ausculation bilaterally, no rales or wheezing  ABD: soft, non-tender, normal active bowel sounds  EXTR: pulses +2 bilaterally. Trace LE edema.   NEURO: alert oriented, speech fluent/appropriate, motor grossly nonfocal    Labs: reviewed in EMR  CBC  Recent Labs   Lab 12/13/22  0705 22  0453 22  0330 12/10/22  0340   WBC 11.8* 12.0* 12.8* 12.8*   RBC 4.15 3.91 4.08 4.29   HGB 10.5* 9.9* 10.3* 10.6*   HCT 33.5* 31.6* 32.2* 33.5*   MCV 81 81 79 78   MCH 25.3* 25.3* 25.2* 24.7*   MCHC 31.3* 31.3* 32.0 31.6   RDW " 14.6 14.4 14.4 14.4    213 241 257     [facility]  Recent Labs   Lab 12/12/22  2205 12/12/22 2015 12/12/22  1142 12/12/22  1139 12/12/22  0828 12/12/22  0453 12/11/22  1933 12/11/22  1921 12/11/22  1639 12/11/22  1207 12/11/22  0720 12/11/22  0330 12/10/22  0821 12/10/22  0340 12/09/22  0845 12/09/22  0658   NA  --  131*  --  135*  --  133*  --  131*  --  133*  --  134*   < > 133*   < > 133*   POTASSIUM  --  4.1  --  4.2  --  4.1  --  4.4  --  4.2  --  4.5   < > 4.5   < > 4.3   CHLORIDE  --  102  --  104  --  104  --  101  --  101  --  104   < > 103   < > 104   CO2  --  19*  --  15*  --  19*  --  19*  --  20*  --  17*   < > 19*   < > 20*   ANIONGAP  --  10  --  16*  --  10  --  11  --  12  --  13   < > 11   < > 9   GLC 80 122* 102* 90   < > 100*   < > 104*   < > 86   < > 103*   < > 97   < > 100*   BUN  --  12.9  --  12.9  --  13.4  --  11.8  --  11.5  --  8.7   < > 8.8   < > 9.5   CR  --  0.69  --  0.70  --  0.70  --  0.66  --  0.72  --  0.59   < > 0.58   < > 0.59   GFRESTIMATED  --  >90  --  >90  --  >90  --  >90  --  >90  --  >90   < > >90   < > >90   AYDEN  --  9.3  --  9.6  --  9.4  --  9.5  --  9.9  --  9.1   < > 9.3   < > 9.1   MAG  --   --   --   --   --  1.8  --  2.3  --  1.9  --  2.1   < > 1.9   < > 1.7   PHOS  --   --   --   --   --  4.8*  --   --   --   --   --  4.5  --  4.2  --  4.2    < > = values in this interval not displayed.     Troponins:   No results found for: TROPI, TROPONIN, TROPR, TROPN  INR  No lab results found in last 7 days.    EKG  12/6/22: SNR at a ventricular rate of 88 with PVCs and interpolated PVCs    Echocardiogram (12/6/22):  Interpretation Summary  Left ventricular function is decreased. The ejection fraction is 20-25%  (severely reduced). Severe diffuse hypokinesis is present. There is wall  thinning and akinesis of the basal-mid inferior, basal-mid inferoseptal and  basal inferolateral segments. There is akinesis of the apical septum. This is  compatible with ischemic  cardiomyopathy.  Mild right ventricular dilation is present. Global right ventricular function  is moderately reduced.  Mild tricuspid insufficiency is present.  Pulmonary artery systolic pressure is normal.  IVC diameter and respiratory changes fall into an intermediate range  suggesting an RA pressure of 8 mmHg.  No pericardial effusion is present.    Coronary Angiogram:  12/7/22:   Formal results pending, but no obstructive disease seen on preliminary evaluation.     Imaging: reviewed in EMR.

## 2022-12-13 NOTE — PHARMACY-ADMISSION MEDICATION HISTORY
Admission Medication History Completed by Pharmacy    See [facility] Admission Navigator for allergy information, preferred outpatient pharmacy, prior to admission medications and immunization status.     Medication History Sources:     EMR review (transferred from [facility] on 12/6/22; per their admission H&P patient was only on a prenatal vitamin prior to admission)    Changes made to PTA medication list (reason):    Added: prenatal multivitamin    Deleted: None    Changed: None    Additional Information:    Patient was started on metoprolol tartrate, heparin infusion (for Atrial Fibrillation) and IV furosemide/KCl supplementation at [facility] to transfer but these were newly started during her hospital stay.     Medication Sig Last Dose Taking? Auth Provider   Prenatal Vit-Fe Fumarate-FA (PRENATAL MULTIVITAMIN W/IRON) 27-0.8 MG tablet Take 1 tablet by mouth daily  Yes Unknown, Entered By History       Date completed: 12/13/22    Medication history completed by: Callie Abad, PharmD, BCCP, BCPS

## 2022-12-14 LAB
ALBUMIN SERPL BCG-MCNC: 3.1 G/DL (ref 3.5–5.2)
ALP SERPL-CCNC: 92 U/L (ref 35–104)
ALT SERPL W P-5'-P-CCNC: 16 U/L (ref 10–35)
ANION GAP SERPL CALCULATED.3IONS-SCNC: 11 MMOL/L (ref 7–15)
AST SERPL W P-5'-P-CCNC: 37 U/L (ref 10–35)
BASE EXCESS BLDV CALC-SCNC: -3.6 MMOL/L (ref -7.7–1.9)
BASOPHILS # BLD AUTO: 0 10E3/UL (ref 0–0.2)
BASOPHILS NFR BLD AUTO: 0 %
BILIRUB SERPL-MCNC: 0.3 MG/DL
BUN SERPL-MCNC: 11 MG/DL (ref 6–20)
CALCIUM SERPL-MCNC: 9.1 MG/DL (ref 8.6–10)
CHLORIDE SERPL-SCNC: 105 MMOL/L (ref 98–107)
CREAT SERPL-MCNC: 0.63 MG/DL (ref 0.51–0.95)
DEPRECATED HCO3 PLAS-SCNC: 18 MMOL/L (ref 22–29)
EOSINOPHIL # BLD AUTO: 0.1 10E3/UL (ref 0–0.7)
EOSINOPHIL NFR BLD AUTO: 1 %
ERYTHROCYTE [DISTWIDTH] IN BLOOD BY AUTOMATED COUNT: 14.6 % (ref 10–15)
GFR SERPL CREATININE-BSD FRML MDRD: >90 ML/MIN/1.73M2
GLUCOSE SERPL-MCNC: 89 MG/DL (ref 70–99)
HCO3 BLDV-SCNC: 21 MMOL/L (ref 21–28)
HCT VFR BLD AUTO: 32.2 % (ref 35–47)
HGB BLD-MCNC: 10 G/DL (ref 11.7–15.7)
IMM GRANULOCYTES # BLD: 0.1 10E3/UL
IMM GRANULOCYTES NFR BLD: 1 %
LYMPHOCYTES # BLD AUTO: 3.1 10E3/UL (ref 0.8–5.3)
LYMPHOCYTES NFR BLD AUTO: 25 %
MAGNESIUM SERPL-MCNC: 1.7 MG/DL (ref 1.7–2.3)
MCH RBC QN AUTO: 25.2 PG (ref 26.5–33)
MCHC RBC AUTO-ENTMCNC: 31.1 G/DL (ref 31.5–36.5)
MCV RBC AUTO: 81 FL (ref 78–100)
MONOCYTES # BLD AUTO: 0.7 10E3/UL (ref 0–1.3)
MONOCYTES NFR BLD AUTO: 6 %
NEUTROPHILS # BLD AUTO: 8.1 10E3/UL (ref 1.6–8.3)
NEUTROPHILS NFR BLD AUTO: 67 %
NRBC # BLD AUTO: 0 10E3/UL
NRBC BLD AUTO-RTO: 0 /100
O2/TOTAL GAS SETTING VFR VENT: 0 %
OXYHGB MFR BLDV: 65 % (ref 70–75)
PCO2 BLDV: 37 MM HG (ref 40–50)
PH BLDV: 7.37 [PH] (ref 7.32–7.43)
PLATELET # BLD AUTO: 168 10E3/UL (ref 150–450)
PO2 BLDV: 38 MM HG (ref 25–47)
POTASSIUM SERPL-SCNC: 4.3 MMOL/L (ref 3.4–5.3)
PROT SERPL-MCNC: 6.8 G/DL (ref 6.4–8.3)
RBC # BLD AUTO: 3.97 10E6/UL (ref 3.8–5.2)
SODIUM SERPL-SCNC: 134 MMOL/L (ref 136–145)
UFH PPP CHRO-ACNC: 0.25 IU/ML
WBC # BLD AUTO: 12.2 10E3/UL (ref 4–11)

## 2022-12-14 PROCEDURE — 99356 PR PROLONGED SERV,INPATIENT,1ST HR: CPT | Performed by: INTERNAL MEDICINE

## 2022-12-14 PROCEDURE — 99233 SBSQ HOSP IP/OBS HIGH 50: CPT | Mod: GC | Performed by: OBSTETRICS & GYNECOLOGY

## 2022-12-14 PROCEDURE — 250N000013 HC RX MED GY IP 250 OP 250 PS 637: Performed by: STUDENT IN AN ORGANIZED HEALTH CARE EDUCATION/TRAINING PROGRAM

## 2022-12-14 PROCEDURE — 250N000013 HC RX MED GY IP 250 OP 250 PS 637: Performed by: INTERNAL MEDICINE

## 2022-12-14 PROCEDURE — 250N000011 HC RX IP 250 OP 636: Performed by: NURSE PRACTITIONER

## 2022-12-14 PROCEDURE — 250N000011 HC RX IP 250 OP 636

## 2022-12-14 PROCEDURE — 83735 ASSAY OF MAGNESIUM: CPT | Performed by: INTERNAL MEDICINE

## 2022-12-14 PROCEDURE — 250N000011 HC RX IP 250 OP 636: Performed by: STUDENT IN AN ORGANIZED HEALTH CARE EDUCATION/TRAINING PROGRAM

## 2022-12-14 PROCEDURE — 80053 COMPREHEN METABOLIC PANEL: CPT

## 2022-12-14 PROCEDURE — 214N000001 HC R&B CCU UMMC

## 2022-12-14 PROCEDURE — 85520 HEPARIN ASSAY: CPT | Performed by: INTERNAL MEDICINE

## 2022-12-14 PROCEDURE — 82805 BLOOD GASES W/O2 SATURATION: CPT

## 2022-12-14 PROCEDURE — 250N000013 HC RX MED GY IP 250 OP 250 PS 637: Performed by: NURSE PRACTITIONER

## 2022-12-14 PROCEDURE — 250N000011 HC RX IP 250 OP 636: Performed by: INTERNAL MEDICINE

## 2022-12-14 PROCEDURE — 36592 COLLECT BLOOD FROM PICC: CPT

## 2022-12-14 PROCEDURE — 85004 AUTOMATED DIFF WBC COUNT: CPT | Performed by: INTERNAL MEDICINE

## 2022-12-14 PROCEDURE — 99233 SBSQ HOSP IP/OBS HIGH 50: CPT | Mod: GC | Performed by: INTERNAL MEDICINE

## 2022-12-14 PROCEDURE — 36415 COLL VENOUS BLD VENIPUNCTURE: CPT

## 2022-12-14 PROCEDURE — 250N000013 HC RX MED GY IP 250 OP 250 PS 637

## 2022-12-14 RX ORDER — POTASSIUM CHLORIDE 20MEQ/15ML
40 LIQUID (ML) ORAL DAILY
Status: DISCONTINUED | OUTPATIENT
Start: 2022-12-14 | End: 2022-12-20

## 2022-12-14 RX ORDER — MAGNESIUM OXIDE 400 MG/1
400 TABLET ORAL EVERY 4 HOURS
Status: COMPLETED | OUTPATIENT
Start: 2022-12-14 | End: 2022-12-14

## 2022-12-14 RX ORDER — MAGNESIUM SULFATE HEPTAHYDRATE 40 MG/ML
2 INJECTION, SOLUTION INTRAVENOUS
Status: COMPLETED | OUTPATIENT
Start: 2022-12-14 | End: 2022-12-14

## 2022-12-14 RX ADMIN — ALTEPLASE 2 MG: 2.2 INJECTION, POWDER, LYOPHILIZED, FOR SOLUTION INTRAVENOUS at 22:00

## 2022-12-14 RX ADMIN — MAGNESIUM OXIDE TAB 400 MG (241.3 MG ELEMENTAL MG) 400 MG: 400 (241.3 MG) TAB at 18:29

## 2022-12-14 RX ADMIN — HEPARIN SODIUM 2100 UNITS/HR: 10000 INJECTION, SOLUTION INTRAVENOUS at 11:02

## 2022-12-14 RX ADMIN — Medication 400 MG: at 03:29

## 2022-12-14 RX ADMIN — PRENATAL VIT W/ FE FUMARATE-FA TAB 27-0.8 MG 1 TABLET: 27-0.8 TAB at 08:31

## 2022-12-14 RX ADMIN — Medication 37.5 MG: at 11:03

## 2022-12-14 RX ADMIN — MAGNESIUM SULFATE IN WATER 2 G: 40 INJECTION, SOLUTION INTRAVENOUS at 08:32

## 2022-12-14 RX ADMIN — POTASSIUM CHLORIDE 40 MEQ: 40 SOLUTION ORAL at 08:32

## 2022-12-14 RX ADMIN — HEPARIN SODIUM 2100 UNITS/HR: 10000 INJECTION, SOLUTION INTRAVENOUS at 22:12

## 2022-12-14 RX ADMIN — SODIUM CHLORIDE, PRESERVATIVE FREE 5 ML: 5 INJECTION INTRAVENOUS at 20:24

## 2022-12-14 RX ADMIN — MAGNESIUM SULFATE IN WATER 2 G: 40 INJECTION, SOLUTION INTRAVENOUS at 11:03

## 2022-12-14 RX ADMIN — Medication 37.5 MG: at 18:28

## 2022-12-14 RX ADMIN — FUROSEMIDE 20 MG: 10 INJECTION, SOLUTION INTRAMUSCULAR; INTRAVENOUS at 08:32

## 2022-12-14 RX ADMIN — Medication 37.5 MG: at 05:54

## 2022-12-14 RX ADMIN — Medication 37.5 MG: at 23:57

## 2022-12-14 RX ADMIN — SODIUM CHLORIDE, PRESERVATIVE FREE 5 ML: 5 INJECTION INTRAVENOUS at 06:22

## 2022-12-14 RX ADMIN — MAGNESIUM OXIDE TAB 400 MG (241.3 MG ELEMENTAL MG) 400 MG: 400 (241.3 MG) TAB at 13:35

## 2022-12-14 RX ADMIN — POTASSIUM CHLORIDE 20 MEQ: 20 SOLUTION ORAL at 01:02

## 2022-12-14 ASSESSMENT — ACTIVITIES OF DAILY LIVING (ADL)
ADLS_ACUITY_SCORE: 20
ADLS_ACUITY_SCORE: 23
ADLS_ACUITY_SCORE: 20
ADLS_ACUITY_SCORE: 23

## 2022-12-14 NOTE — PROGRESS NOTES
"CLINICAL NUTRITION SERVICES - ASSESSMENT NOTE     Nutrition Prescription    RECOMMENDATIONS FOR MDs/PROVIDERS TO ORDER:  Consider ordering 200-300 mg DHA/day.    Malnutrition Status:    Unable to determine due to pt has a do not disturb sign on door today until 16:00. Minimally, pt at risk.     Recommendations already ordered by Registered Dietitian (RD):  Oral supplements    Future/Additional Recommendations:  1. Rec continue current diet, as ordered. If oral intake decreases, rec liberalize diet order and consider continuing with no fluid restriction. Rec small, frequent meals to help increase oral intake.   2. Continue prenatal multivitamin with iron.   3. Monitor potential need to adjust scheduled miralax to prn.   4. Monitor phos trends. Phos of 5.2 on 2022.   5. BG was 89 on . Hgb A1c of 5.8 on 2022.   6. Monitor potential need for iron supplementation.      REASON FOR ASSESSMENT  Anjali Carmen is a/an 32 year old female assessed by the dietitian for LOS    NUTRITION/ADDITIONAL HISTORY  Pt not known to this service PTA.   Per H & P, \"32 year-old woman who is 24 weeks pregnant [A0] who presents with acute systolic heart failure from [facility]. She has had symptoms including fatigue, palpitations, and dyspnea since 2022. She reports poor appetite, constant tiredness, and intermittent nausea for the past 3 to 4 months. Functionally very active at baseline. According to the [facility] one-call triage note, the patient has history of preeclampsia during one of her pregnancies, however patient unaware of any history of preeclampsia.\" Poor energy levels. Pt was ordered to take, noting, PRENATAL MULTIVITAMIN W/IRON PTA.   Unable to obtain nutrition hx at this time. Pt has a do not disturb sign on door today until 16:00.    CURRENT NUTRITION ORDERS  Diet: 2 g Sodium since . Was NPO at times for tests/procedures.   Intake/Tolerance: Tolerating diet. Per nursing flowsheets (% " "intake), pt consuming 100% from 12/6-12/12. Good appetite per RN on 12/7. [employer] (meal ordering system) indicates food/beverages sent 12/11-12/13 totaled 1173 kcals and 57 g protein daily on average which does not meet estimated needs below. Suspect restrictive diet order is also impacting oral intake. Pt is ordering two to three meals daily.     LABS  Labs reviewed    MEDICATIONS  Medications reviewed    ANTHROPOMETRICS  Height: 175.3 cm (5' 9\")  Most Recent Weight: 132.6 kg (292 lb 6.4 oz)    IBW: 65.9 kg   BMI: Obesity Grade III BMI >40. BMI impacted by pregnancy.   Weight History: 141.5 kg = 312# (11/30/2022), 132.9 kg (12/5/2022) - Limited wt hx in chart review. Per provider H& P, \"Additionally on ROS, the patient reports loss of weight despite being pregnant since July 2022; previously weighed 357 pounds- down to 301 lbs at the time of admission >> further down to 292 lbs after diuresis. Difficult to assess wt gain with pregnancy and wt loss due to diuresis.\"   Dosing Weight: 82 kg (adjusted, based on lowest wt so far this admission of 130.6 kg on 12/8)    ASSESSED NUTRITION NEEDS (for inpatient hospital stay)  Estimated Energy Needs: 4529-7895 kcals/day (20 - 25 kcals/kg + 340 kcals/day for second trimester) vs [place] [employer] equation estimates 2890+ kcals/day  Justification: Decreased needs with BMI but increased needs with pregnancy. Rec highest end of this range.   Estimated Protein Needs: 115-140 grams protein/day (1.1 - 1.4 grams of pro/kg +25 g/ day) additional protein for braden pregnancy   Justification: Increased needs with heart failure and pregnancy  Estimated Fluid Needs: Up to 3000 mL/day   Justification: Increased needs with pregnancy but per provider, pending fluid status and pt has HF    PHYSICAL FINDINGS/OTHER FINDINGS  See malnutrition section below.  GI: Having zero to one stool daily. Last stool on 12/14. On scheduled miralax, held at times. Stools are soft and brown. "     MALNUTRITION  % Intake: < 75% for > 7 days (moderate)  % Weight Loss: Difficult to assess with diuresis and pregnancy.   Subcutaneous Fat Loss: Unable to assess. Pt has a do not disturb sign on door.  Muscle Loss: Unable to assess. Pt has a do not disturb sign on door.  Fluid Accumulation/Edema: None noted  Malnutrition Diagnosis: Unable to determine due to pt has a do not disturb sign on door today until 16:00. Minimally, pt at risk.     NUTRITION DIAGNOSIS  Inadequate oral intake related to recently decreased appetite, nausea, and diet restriction as evidenced by [employer] (meal ordering system) indicates food/beverages sent 12/11-12/13 totaled 1173 kcals and 57 g protein daily on average which does not meet estimated needs of approximately 0738-2552 kcals/day and 115-140 grams protein/day.    INTERVENTIONS  Implementation  Nutrition Education: Per provider order if indicated.  Medical food supplement therapy: Placed order for oral supplement trial, Ensure MAX at HS daily.     Goals  Patient to consume % of nutritionally adequate meal trays TID, or the equivalent with supplements/snacks.     Monitoring/Evaluation  Progress toward goals will be monitored and evaluated per protocol.       Nutrition will continue to follow.     Penny Maloney, MS, RD, LD, [facility]   6C Pgr: [phone]

## 2022-12-14 NOTE — PROGRESS NOTES
Cardiology Progress Note    I personally saw and examined this patient with resident.  She is clinically cardiac stable with no sustained arrhythmia, signs or symptoms of increasing congestive heart failure.  MFM fetal monitoring is stable.  However, patient is very unhappy with the care that she is receiving and feels that her voice is not heard nor that she is being respected.  I participated in a 60 minute prolonged family and nursing staff discussion individually and collectively with patient, patient's , Mukund, the head nurse for station, She requests:  1. Change in room  2. Ambulatory monitoring  3. Change in nursing staff  4. Discussed adverse effects of discharge home in view of pregnancy and cardiac status.      The patient agreed to stay providing substantial changes can be made over the next 48 hours.        ASSESSMENT/PLAN:  Anjali Carmen is a 32 year old female, admitted on 22. She year old female  A0 and no significant past medical history, who presents as a transfer from [facility] to [facility] on 2022 for further management of newly diagnosed, acute decompensated systolic heart failure (LVEF 20%) as well as high risk delivery.     Changes Today:   - No major changes  - Continue current doses of furosemide and metoprolol tartrate  - Keep K > 4.5 and Mg > 2.5    # Acute decompensated systolic and diastolic heart failure  # ACC/AHA Stage C systolic heart failure  # Heart failure with reduced EF (LVEF 20%)  # Mild right ventricular systolic dysfunction  # Non-sustained VT  Etiology of HFrEF: uncertain at this time.  Concern for familial dilated cardiomyopathy given history of CM in father at 30 years of age. Last pregnancy in , timeline not fitting in for PPCM. Greater concern for NICM over ICMP, however, TTE on  with thinned out LV wall and notable regional wall motion abnormalities. Coronary angiogram done on  with no significat lesions seen.  No known  history of pre-existing structural heart disease prior to this admission.  1. Patient hemodynamically stable with preserved end-organ function. No indication for inotrope support/MCS at this time.   2. GDMT and diuresis as outlined below.  Traditional afterload targets for poor LV function and low LVEF of 20-25% cannot be met due to need for fetoplacental circulation.  Will target /80 and MAP > 65. Repeat transthoracic echocardiogram done on 12/6 notable for severely reduced left ventricular function with an LVEF of 20-25%. Also notable for RWM (details below) Coronanry angiogram on 12/8 without significant CAD.   3. Will consider cardiac MRI w.r.t etiology of cardiomyopathy once clinically permissible (likely to be done post partum).  4. Currently on heparin gtt for Afib and risk of LV thrombus formation given low LVEF and hypercoagulable state of pregnancy.  GDMT:   ACEi/ARB/ARNI: contraindicated due to pregnancy  BB: continue metoprolol tartrate 37.5 mg q6hr (changed from 25 mg q6hr)  Aldosterone antagonist: high adverse risk medication in pregnancy  SCD prophylaxis: does not meet criteria for implant  Fluid status: Approaching euvolemia. Continue lasix 20 mg IV daily and continue to monitor BMP closely (keep K>4.5, Mg >2.5), was on 40 mg IV daily until 12/12/22.     # New onset possible paroxysmal atrial fibrillation  [facility] in [place]- isolated episode, EKG unavailable for review. Currently in NSR at [facility].  - Continue to monitor rhythm on telemetry.   - Currently on heparin gtt for AC, however, [id number] score of 2 (sex, congestive heart failure) and isolated episode of Afib. Will continue for now as its safe in pregnancy (discussed with MFM).     # Mild anemia  HGB 10.2 g/dL on arrival (MCV 79). Retic count 1.9%  Etiology: AVINASH vs anemia in the setting of HF and pregnancy.  - Check iron, ferritin, TIBC levels.   - Limit blood draws.     # Pregnancy  - Appreciate MFM input., they are  "closely following with daily checks. They can be reached via their pager (listed and updated regularly on the summary page)  - OB US for fetal growth done on 22 with normal fetal findings and no abnormalities     # Screening for gestational DM  # Monitor for hyperglycemia  Risk factors: High BMI and received steroids for fetal lung maturation prior to transfer to [facility].   - HbA1c 5.8 on 22  - Urine proteins pending (ordered on 22)       Prophylaxis:  DVT: heparin gtt   GI: not indicated  Family: Updated by the bedside  Disposition: Floor  Code Status: Full      Plan d/w Dr. Denise M.D., who is in agreement. Please, see staff addendum for changes/additions to the plan.    Shante Sharpe MD  Internal Medicine, PGY-1  Pager: [phone]    ===================================================================    SUBJECTIVE:   No acute events overnight. Nursing notes reviewed. Patient states that she feels well overall. No new concerns. Denies chest pain, SOB, palpitations, extremity pain. Family visited yesterday and will visit again today. Telemetry reviewed w/ a few episodes of NSVT (< 6 beats) over past 12 hrs.     OBJECTIVE:  /65 (BP Location: Right arm)   Pulse 83   Temp 98.3  F (36.8  C) (Oral)   Resp 18   Ht 1.753 m (5' 9\")   Wt 132.6 kg (292 lb 6.4 oz)   SpO2 98%   BMI 43.18 kg/m    Temp (24hrs), Av.2  F (36.8  C), Min:97.9  F (36.6  C), Max:98.7  F (37.1  C)    GEN: pleasant, no acute distress  HEENT: no icterus  CV: RRR, normal s1/s2, no murmurs. JVP ~ 8.   CHEST: clear to ausculation bilaterally, no rales or wheezing  ABD: soft, non-tender, normal active bowel sounds  EXTR: pulses +2 bilaterally. Trace LE edema.   NEURO: alert oriented, speech fluent/appropriate, motor grossly nonfocal    Labs: reviewed in EMR  CBC  Recent Labs   Lab 22  0631 22  0705 22  0453 22  0330   WBC 12.2* 11.8* 12.0* 12.8*   RBC 3.97 4.15 3.91 4.08   HGB 10.0* 10.5* 9.9* " 10.3*   HCT 32.2* 33.5* 31.6* 32.2*   MCV 81 81 81 79   MCH 25.2* 25.3* 25.3* 25.2*   MCHC 31.1* 31.3* 31.3* 32.0   RDW 14.6 14.6 14.4 14.4    244 213 241     BMP  Recent Labs   Lab 12/13/22  1612 12/13/22  0705 12/12/22  2205 12/12/22 2015 12/12/22  1142 12/12/22  1139 12/12/22  0828 12/12/22  0453 12/11/22  1933 12/11/22  1921 12/11/22  0720 12/11/22  0330 12/10/22  0821 12/10/22  0340   * 134*  --  131*  --  135*  --  133*  --  131*   < > 134*   < > 133*   POTASSIUM 4.2 4.5  --  4.1  --  4.2  --  4.1  --  4.4   < > 4.5   < > 4.5   CHLORIDE 102 104  --  102  --  104  --  104  --  101   < > 104   < > 103   CO2 17* 16*  --  19*  --  15*  --  19*  --  19*   < > 17*   < > 19*   ANIONGAP 14 14  --  10  --  16*  --  10  --  11   < > 13   < > 11   GLC 99 83 80 122*   < > 90   < > 100*   < > 104*   < > 103*   < > 97   BUN 12.0 11.7  --  12.9  --  12.9  --  13.4  --  11.8   < > 8.7   < > 8.8   CR 0.71 0.65  --  0.69  --  0.70  --  0.70  --  0.66   < > 0.59   < > 0.58   GFRESTIMATED >90 >90  --  >90  --  >90  --  >90  --  >90   < > >90   < > >90   AYDEN 10.0 10.0  --  9.3  --  9.6  --  9.4  --  9.5   < > 9.1   < > 9.3   MAG 1.8 1.7  --   --   --   --   --  1.8  --  2.3   < > 2.1   < > 1.9   PHOS  --  5.2*  --   --   --   --   --  4.8*  --   --   --  4.5  --  4.2    < > = values in this interval not displayed.     Troponins:   No results found for: TROPI, TROPONIN, TROPR, TROPN  INR  No lab results found in last 7 days.    Echocardiogram (12/6/22):  Interpretation Summary  Left ventricular function is decreased. The ejection fraction is 20-25%  (severely reduced). Severe diffuse hypokinesis is present. There is wall  thinning and akinesis of the basal-mid inferior, basal-mid inferoseptal and  basal inferolateral segments. There is akinesis of the apical septum. This is  compatible with ischemic cardiomyopathy.  Mild right ventricular dilation is present. Global right ventricular function  is moderately  reduced.  Mild tricuspid insufficiency is present.  Pulmonary artery systolic pressure is normal.  IVC diameter and respiratory changes fall into an intermediate range  suggesting an RA pressure of 8 mmHg.  No pericardial effusion is present.    Coronary Angiogram:  12/7/22:   Formal results pending, but no obstructive disease seen on preliminary evaluation.     Imaging: reviewed in EMR.

## 2022-12-14 NOTE — PLAN OF CARE
Goal Outcome Evaluation:      Plan of Care Reviewed With:  (Pt has do not disturb sign on door)    Overall Patient Progress: no changeOverall Patient Progress: no change    Outcome Evaluation: Encourage intake at meals and oral supplements. Pt with increased needs. Rec small, frequent meals.

## 2022-12-14 NOTE — SUMMARY OF CARE
-Pt's potassium replacement protocol is not consistent with instructions in the orders to keep the potassium level above 4.5.  -Efforts are being made to contact the cross cover of the primary group for clarification.

## 2022-12-14 NOTE — PLAN OF CARE
Anjali Carmen is a 32 year old female, admitted on 22. She year old female  A0 and no significant past medical history, who presents as a transfer from [facility] to [facility] on 2022 for further management of newly diagnosed, acute decompensated systolic heart failure (LVEF 20%) as well as high risk delivery.    Temp: 98.3  F (36.8  C) Temp src: Oral BP: 118/65 Pulse: 83   Resp: 18 SpO2: 98 % O2 Device: None (Room air)       Neuro: Aox4, communicates needs well, though doesn't ask for much  Resp: Sating well on RA, denies SOB  Cardiac: SR in 80's with occasional PVC, HFrEF (20%)  GI/: Good urine output and 2 BM's this shift  Endo: Was taken off glucose checks yesterday  LDA's: Left PIV running hep @2100 unit(s)/hr  Activity: Ilene/ up ad danis  Pain: Denies  Skin: No deficits  Diet: 2 G Na  Plan: Continue to monitor and contact Cards 2 with any changes/concerns    OB pager- [phone]

## 2022-12-15 ENCOUNTER — TRANSFERRED RECORDS (OUTPATIENT)
Dept: HEALTH INFORMATION MANAGEMENT | Facility: CLINIC | Age: 32
End: 2022-12-15

## 2022-12-15 LAB
ALBUMIN SERPL BCG-MCNC: 2.9 G/DL (ref 3.5–5.2)
ALP SERPL-CCNC: 88 U/L (ref 35–104)
ALT SERPL W P-5'-P-CCNC: 16 U/L (ref 10–35)
ANION GAP SERPL CALCULATED.3IONS-SCNC: 11 MMOL/L (ref 7–15)
AST SERPL W P-5'-P-CCNC: 38 U/L (ref 10–35)
BASOPHILS # BLD AUTO: 0.1 10E3/UL (ref 0–0.2)
BASOPHILS NFR BLD AUTO: 0 %
BILIRUB SERPL-MCNC: 0.3 MG/DL
BUN SERPL-MCNC: 11.7 MG/DL (ref 6–20)
CALCIUM SERPL-MCNC: 8.8 MG/DL (ref 8.6–10)
CHLORIDE SERPL-SCNC: 106 MMOL/L (ref 98–107)
CREAT SERPL-MCNC: 0.61 MG/DL (ref 0.51–0.95)
DEPRECATED HCO3 PLAS-SCNC: 16 MMOL/L (ref 22–29)
EOSINOPHIL # BLD AUTO: 0.1 10E3/UL (ref 0–0.7)
EOSINOPHIL NFR BLD AUTO: 1 %
ERYTHROCYTE [DISTWIDTH] IN BLOOD BY AUTOMATED COUNT: 14.6 % (ref 10–15)
GFR SERPL CREATININE-BSD FRML MDRD: >90 ML/MIN/1.73M2
GLUCOSE SERPL-MCNC: 83 MG/DL (ref 70–99)
HCT VFR BLD AUTO: 32.3 % (ref 35–47)
HGB BLD-MCNC: 10 G/DL (ref 11.7–15.7)
IMM GRANULOCYTES # BLD: 0.2 10E3/UL
IMM GRANULOCYTES NFR BLD: 1 %
LYMPHOCYTES # BLD AUTO: 3.2 10E3/UL (ref 0.8–5.3)
LYMPHOCYTES NFR BLD AUTO: 26 %
MAGNESIUM SERPL-MCNC: 1.7 MG/DL (ref 1.7–2.3)
MCH RBC QN AUTO: 25.3 PG (ref 26.5–33)
MCHC RBC AUTO-ENTMCNC: 31 G/DL (ref 31.5–36.5)
MCV RBC AUTO: 82 FL (ref 78–100)
MONOCYTES # BLD AUTO: 0.7 10E3/UL (ref 0–1.3)
MONOCYTES NFR BLD AUTO: 6 %
NEUTROPHILS # BLD AUTO: 8.1 10E3/UL (ref 1.6–8.3)
NEUTROPHILS NFR BLD AUTO: 66 %
NRBC # BLD AUTO: 0 10E3/UL
NRBC BLD AUTO-RTO: 0 /100
PHOSPHATE SERPL-MCNC: 4.5 MG/DL (ref 2.5–4.5)
PLATELET # BLD AUTO: 227 10E3/UL (ref 150–450)
POTASSIUM SERPL-SCNC: 4.3 MMOL/L (ref 3.4–5.3)
PROT SERPL-MCNC: 6.8 G/DL (ref 6.4–8.3)
RBC # BLD AUTO: 3.96 10E6/UL (ref 3.8–5.2)
SODIUM SERPL-SCNC: 133 MMOL/L (ref 136–145)
UFH PPP CHRO-ACNC: 0.28 IU/ML
WBC # BLD AUTO: 12.3 10E3/UL (ref 4–11)

## 2022-12-15 PROCEDURE — 85014 HEMATOCRIT: CPT | Performed by: INTERNAL MEDICINE

## 2022-12-15 PROCEDURE — 36592 COLLECT BLOOD FROM PICC: CPT

## 2022-12-15 PROCEDURE — 84100 ASSAY OF PHOSPHORUS: CPT | Performed by: INTERNAL MEDICINE

## 2022-12-15 PROCEDURE — 99232 SBSQ HOSP IP/OBS MODERATE 35: CPT | Performed by: OBSTETRICS & GYNECOLOGY

## 2022-12-15 PROCEDURE — 83735 ASSAY OF MAGNESIUM: CPT | Performed by: INTERNAL MEDICINE

## 2022-12-15 PROCEDURE — 250N000013 HC RX MED GY IP 250 OP 250 PS 637: Performed by: INTERNAL MEDICINE

## 2022-12-15 PROCEDURE — 214N000001 HC R&B CCU UMMC

## 2022-12-15 PROCEDURE — 250N000013 HC RX MED GY IP 250 OP 250 PS 637: Performed by: STUDENT IN AN ORGANIZED HEALTH CARE EDUCATION/TRAINING PROGRAM

## 2022-12-15 PROCEDURE — 85520 HEPARIN ASSAY: CPT | Performed by: INTERNAL MEDICINE

## 2022-12-15 PROCEDURE — 250N000013 HC RX MED GY IP 250 OP 250 PS 637: Performed by: NURSE PRACTITIONER

## 2022-12-15 PROCEDURE — 250N000011 HC RX IP 250 OP 636: Performed by: STUDENT IN AN ORGANIZED HEALTH CARE EDUCATION/TRAINING PROGRAM

## 2022-12-15 PROCEDURE — 99233 SBSQ HOSP IP/OBS HIGH 50: CPT | Mod: GC | Performed by: INTERNAL MEDICINE

## 2022-12-15 PROCEDURE — 250N000011 HC RX IP 250 OP 636: Performed by: INTERNAL MEDICINE

## 2022-12-15 PROCEDURE — 80053 COMPREHEN METABOLIC PANEL: CPT

## 2022-12-15 RX ORDER — MAGNESIUM OXIDE 400 MG/1
400 TABLET ORAL EVERY 4 HOURS
Status: COMPLETED | OUTPATIENT
Start: 2022-12-15 | End: 2022-12-15

## 2022-12-15 RX ADMIN — PRENATAL VIT W/ FE FUMARATE-FA TAB 27-0.8 MG 1 TABLET: 27-0.8 TAB at 08:47

## 2022-12-15 RX ADMIN — Medication 400 MG: at 13:19

## 2022-12-15 RX ADMIN — HEPARIN SODIUM 2100 UNITS/HR: 10000 INJECTION, SOLUTION INTRAVENOUS at 09:43

## 2022-12-15 RX ADMIN — Medication 37.5 MG: at 18:29

## 2022-12-15 RX ADMIN — HEPARIN SODIUM 2100 UNITS/HR: 10000 INJECTION, SOLUTION INTRAVENOUS at 22:13

## 2022-12-15 RX ADMIN — Medication 400 MG: at 08:47

## 2022-12-15 RX ADMIN — FUROSEMIDE 20 MG: 10 INJECTION, SOLUTION INTRAMUSCULAR; INTRAVENOUS at 08:47

## 2022-12-15 RX ADMIN — Medication 37.5 MG: at 23:18

## 2022-12-15 RX ADMIN — Medication 37.5 MG: at 05:05

## 2022-12-15 RX ADMIN — Medication 37.5 MG: at 13:19

## 2022-12-15 RX ADMIN — POTASSIUM CHLORIDE 40 MEQ: 40 SOLUTION ORAL at 08:47

## 2022-12-15 ASSESSMENT — ACTIVITIES OF DAILY LIVING (ADL)
ADLS_ACUITY_SCORE: 20

## 2022-12-15 NOTE — PLAN OF CARE
Pt alert, oriented, no c/o pain or nausea. Able to eat dinner and ensure supplement. Good PO fluid intake, adequate voiding. VSS. Frequent ectopy on monitor, PACs, PVCs. No runs of vtach this shift. Electrolytes corrected earlier today, recheck in am. Pt moved to a different room to allow for cardiac monitoring with tele box instead of hard wire to allow for ambulation without disconnecting from monitor.  and 2 sons present at bedside and supportive.     Red port of DL PICC occluded, orders for tpa, which is dwelling. Next shift to monitor. Heparin gtt continues at 2100units/hour.     Assess for changes, continue plan of care.       Goal Outcome Evaluation:      Plan of Care Reviewed With: patient    Overall Patient Progress: no change

## 2022-12-15 NOTE — PROGRESS NOTES
Cardiology Progress Note      Faculty Attestation  José Luis Walker M.D.    I personally saw and examined this patient with resident, reviewed recent laboratories and imaging studies, discussed the case with the housestaff, and conveyed plan to the patient.  I answered all questions from patient and/or family. I agree with the examination, assessment and plan outlined here.  Patients room has been changed and ambulatory monitoring provided.  She is feeling better than yesterday.  No complaints were provided by patient or .  One son has asthma and may need ER visit.  We suggested we would aid in arranging, if necessary..        ASSESSMENT/PLAN:  Anjali Carmen is a 32 year old female, admitted on 22. She year old female  A0 and no significant past medical history, who presents as a transfer from [facility] to [facility] on 2022 for further management of newly diagnosed, acute decompensated systolic heart failure (LVEF 20%) as well as high risk delivery.     Changes Today:   - No major changes  - Continue current doses of furosemide and metoprolol tartrate  - Keep K > 4.5 and Mg > 2.5    # Acute decompensated systolic and diastolic heart failure  # ACC/AHA Stage C systolic heart failure  # Heart failure with reduced EF (LVEF 20%)  # Mild right ventricular systolic dysfunction  # Non-sustained VT  Etiology of HFrEF: uncertain at this time.  Concern for familial dilated cardiomyopathy given history of CM in father at 30 years of age. Last pregnancy in , timeline not fitting in for PPCM. Greater concern for NICM over ICMP, however, TTE on  with thinned out LV wall and notable regional wall motion abnormalities. Coronary angiogram done on  with no significat lesions seen.  No known history of pre-existing structural heart disease prior to this admission.  1. Patient hemodynamically stable with preserved end-organ function. No indication for inotrope support/MCS at this  time.   2. GDMT and diuresis as outlined below.  Traditional afterload targets for poor LV function and low LVEF of 20-25% cannot be met due to need for fetoplacental circulation.  Will target /80 and MAP > 65. Repeat transthoracic echocardiogram done on 12/6 notable for severely reduced left ventricular function with an LVEF of 20-25%. Also notable for RWM (details below) Coronanry angiogram on 12/8 without significant CAD.   3. Will consider cardiac MRI w.r.t etiology of cardiomyopathy once clinically permissible (likely to be done post partum).  4. Currently on heparin gtt for Afib and risk of LV thrombus formation given low LVEF and hypercoagulable state of pregnancy.  5.   GDMT:   ACEi/ARB/ARNI: contraindicated due to pregnancy  BB: continue metoprolol tartrate 37.5 mg q6hr (changed from 25 mg q6hr)  Aldosterone antagonist: high adverse risk medication in pregnancy  SCD prophylaxis: does not meet criteria for implant  Fluid status: Approaching euvolemia. Continue lasix 20 mg IV daily and continue to monitor BMP closely (keep K>4.5, Mg >2.5), was on 40 mg IV daily until 12/12/22.     # New onset possible paroxysmal atrial fibrillation  [facility] [place]- isolated episode, EKG unavailable for review. Currently in [facility].  - Continue to monitor rhythm on telemetry.   - Currently on heparin gtt for AC, however, [id number] score of 2 (sex, congestive heart failure) and isolated episode of Afib. Will continue for now as its safe in pregnancy (discussed with MFM).     # Mild anemia  HGB 10.2 g/dL on arrival (MCV 79). Retic count 1.9%  Etiology: AVINASH vs anemia in the setting of HF and pregnancy.  - Check iron, ferritin, TIBC levels.   - Limit blood draws.     # Pregnancy  - Appreciate MFM input., they are closely following with daily checks. They can be reached via their pager (listed and updated regularly on the summary page)  - OB US for fetal growth done on 12/8/22 with normal fetal  "findings and no abnormalities     # Screening for gestational DM  # Monitor for hyperglycemia  Gestational age 25 wks and 3 days on 12/15  Risk factors: High BMI and received steroids for fetal lung maturation prior to transfer to [facility].   - HbA1c 5.8 on 22  - Urine proteins pending (ordered on 22)     Prophylaxis:  DVT: heparin gtt   GI: not indicated  Family: Updated by the bedside  Disposition: Floor  Code Status: Full      Plan d/w Dr. Denise M.D., who is in agreement. Please, see staff addendum for changes/additions to the plan.    Maicol Camacho MD, PhD  Cardiology Fellow  Pager: [phone]  ===================================================================    SUBJECTIVE:   No acute events overnight. Patient reports that she feels well and has no complaints today.     OBJECTIVE:  /57 (BP Location: Right arm)   Pulse 72   Temp 97.9  F (36.6  C) (Oral)   Resp 17   Ht 1.753 m (5' 9\")   Wt 132.6 kg (292 lb 6.4 oz)   SpO2 98%   BMI 43.18 kg/m    Temp (24hrs), Av.2  F (36.8  C), Min:97.9  F (36.6  C), Max:98.7  F (37.1  C)    GEN: pleasant, no acute distress  HEENT: no icterus  CV: RRR, normal s1/s2, no murmurs. JVP ~ 8-10 cm.   CHEST: clear to ausculation bilaterally, no rales or wheezing  ABD: soft, non-tender, normal active bowel sounds  EXTR: pulses +2 bilaterally. Trace LE edema.   NEURO: alert oriented, speech fluent/appropriate, motor grossly nonfocal    Labs: reviewed in EMR  CBC  Recent Labs   Lab 12/15/22  0646 22  0631 22  0705 22  0453   WBC 12.3* 12.2* 11.8* 12.0*   RBC 3.96 3.97 4.15 3.91   HGB 10.0* 10.0* 10.5* 9.9*   HCT 32.3* 32.2* 33.5* 31.6*   MCV 82 81 81 81   MCH 25.3* 25.2* 25.3* 25.3*   MCHC 31.0* 31.1* 31.3* 31.3*   RDW 14.6 14.6 14.6 14.4    168 244 213     BMP  Recent Labs   Lab 12/15/22  0646 22  0631 22  1612 22  0705 22  0828 22  0453 22  0720 22  0330   * 134* 133* 134*   < > 133*   < " > 134*   POTASSIUM 4.3 4.3 4.2 4.5   < > 4.1   < > 4.5   CHLORIDE 106 105 102 104   < > 104   < > 104   CO2 16* 18* 17* 16*   < > 19*   < > 17*   ANIONGAP 11 11 14 14   < > 10   < > 13   GLC 83 89 99 83   < > 100*   < > 103*   BUN 11.7 11.0 12.0 11.7   < > 13.4   < > 8.7   CR 0.61 0.63 0.71 0.65   < > 0.70   < > 0.59   GFRESTIMATED >90 >90 >90 >90   < > >90   < > >90   AYDEN 8.8 9.1 10.0 10.0   < > 9.4   < > 9.1   MAG 1.7 1.7 1.8 1.7  --  1.8   < > 2.1   PHOS 4.5  --   --  5.2*  --  4.8*  --  4.5    < > = values in this interval not displayed.     Troponins:   No results found for: TROPI, TROPONIN, TROPR, TROPN  INR  No lab results found in last 7 days.    Echocardiogram (12/6/22):  Interpretation Summary  Left ventricular function is decreased. The ejection fraction is 20-25%  (severely reduced). Severe diffuse hypokinesis is present. There is wall  thinning and akinesis of the basal-mid inferior, basal-mid inferoseptal and  basal inferolateral segments. There is akinesis of the apical septum. This is  compatible with ischemic cardiomyopathy.  Mild right ventricular dilation is present. Global right ventricular function  is moderately reduced.  Mild tricuspid insufficiency is present.  Pulmonary artery systolic pressure is normal.  IVC diameter and respiratory changes fall into an intermediate range  suggesting an RA pressure of 8 mmHg.  No pericardial effusion is present.    Coronary Angiogram:  12/7/22:   Formal results pending, but no obstructive disease seen on preliminary evaluation.     Imaging: reviewed in EMR.

## 2022-12-15 NOTE — PLAN OF CARE
End of Shift Summary. See flowsheets for vital signs and detailed assessment.    Changes this shift (8424-3752): A&Ox4. VSS on RA. Denies SOB, nausea, pain. Remains in SR 80-90's with frequent PVC's and PAC's. No BM this shift. Adequate UO. Heparin gtt remains at 2100 units/hr. Recheck this morning.     Plan: Continue to monitor patient, notify team of any changes.     Goal Outcome Evaluation:      Plan of Care Reviewed With: patient    Overall Patient Progress: no changeOverall Patient Progress: no change    Outcome Evaluation: VSS on RA. Heparin gtt remains, therapeutic on 2100 units/hr.

## 2022-12-15 NOTE — SIGNIFICANT EVENT
-Pt had been calmly interacting with this staff member through two shifts including discussing her medical issues and cares without complaint.  -The  arrived and spoke to me about a host of complaints about all interactions with all staff during this entire hospital stay.  -I spoke to the Pt directly, appologized for her dissatisfaction and she declined to name any complaints about me personally or suggest any changes.  -Instructions were given by the primary physicians group to have all prior staff who worked with this Pt to be banned from serving her and arrangements were made to modify physician interaction with the Pt.

## 2022-12-15 NOTE — PROGRESS NOTES
MFM Antepartum Progress Note    Subjective:   Denies contractions, leaking of fluid, vaginal bleeding, or decreased fetal movement.    Patient's  and two sons are here to visit from [place] today. The patient and her  are expressing extreme frustration with their care while in the hospital, feeling as though Anjali has been disrespected and she is stating that she plans to leave, even though she understands the risk of arrhythmia and sudden cardiac death. She is frustrated with the cardiac monitoring and feeling as though there is nothing happening while she is in the hospital.    Objective:  Vitals:    22 1647 22 1700 22 1824 22 1828   BP: (!) 116/98  111/60    BP Location: Right arm      Pulse:  88  81   Resp: 18      Temp: 98.6  F (37  C)      TempSrc: Oral      SpO2: 98%      Weight:       Height:         I/O last 3 completed shifts:  In: 1723 [P.O.:1209; I.V.:514]  Out: 3250 [Urine:3250]    Gen: Sitting up in bed, NAD  Resp: Speaking full sentences, breathing comfortably   Abd: Gravid, non-tender, non-distended    Assessment/Plan:   Anjali Carmen is a 32 year old  at 25w2d by 6w1d US admitted to CICU for acute HFrEF, cardiac arrhythmia after transferring from [facility].     The patient has had an echocardiogram since her arrival concerning for ischemic cardiomyopathy, which was ruled out by a cardiac catheterization on  with no coronary disease. Continuing to medically manage dilated cardiomyopathy with metoprolol and diuresis per cardiology team with improvement in her symptoms.     We expressed our sincere apologies for how Anjali has been feeling while she is in the hospital. We met with Anjali, her  Muknud, and the cardiology team to hear the concerns. She agrees to stay in house while the team attempts to make changes to improve her care delivery. She agrees to stay at least until Friday, .    We reviewed the rationale for our  recommendation for ongoing hospitalization despite her improvement in symptoms. We expressed our concern over a sudden unexpected arrhythmia that could not be acted upon if she is not in the hospital. She lives 1.5 hours from the nearest [facility] hospital, which is where she was transferred from, who felt she needed a higher level of care, so the [facility] is the closest hospital with the resources she needs, despite being 5 hours away from her home. Moreover, as pregnancy progresses, her volume will increase and the third trimester, as well as the postpartum periods are the most stressful times for cardiac disease.    MFM will continue to follow along closely and are available 24/7 at [phone].    # Acute decompensated HFrEF  # Possible atrial fibrillation, non-sustained VT  - Admitted to CICU for management of new cardiac diagnoses, now transferred out of ICU  - BP goal 130/80, MAP >65. If patient begins to have blood pressures >160/110 for at least 15 minutes, call MFM for guidance of management, although typically will initiate treatment with 5-10 mg of IV hydralazine or 20 mg of IV labetalol  - Agree with continued anticoagulation per primary team. Will need to stop anticoagulation prior to delivery. For regional anesthesia to be an option, we would need to stop anticoagulation/antiplatelet therapy. She will need an anesthesia consult and multi-disciplinary discussion regarding this.  - Metoprolol is safe in pregnancy with no dose restrictions  - There is no current indication for delivery, but if worsening symptoms of heart failure were noted, delivery may be indicated at that time.      # Pregnancy  - Comprehensive ultrasound 12/8 with growth at 76th percentile, repeat growth US in 3 weeks (12/29)  - Non-stress test (NST) by L&D RN twice daily, consider daily NST once stable  - Betamethasone (BMZ) given 11/30-12/1 at OSH for fetal lung maturity, rescue BMZ typically not repeated until at least  2 weeks from initial course and if there is concern for delivery in the next week  - Magnesium for neuroprotection if moving towards delivery prior to 32 weeks with 4g bolus followed by 2g/hr maintenance until delivery  - Routine indications for emergent delivery including maternal decompensation or fetal compromise  - Patient had desired to have a trial of labor after a prior  section. In setting of decompensated HF, will discuss delivery planning moving forward as she may not be a candidate to valsalva in labor. at this time, would recommend repeat CS, however, will continue to discuss this  - S/p  section consent on admission  - Patient requires routine prenatal care while in house with next milestone at 28 weeks when typically Tdap is administered, as well as repeat CBC, RPR (syphilis screening), and type and screen to evaluate antibodies, as well as one hour glucose challenge test (GCT)  - Will coordinate GCT 2 weeks after her BMZ administration (12/15)     Meredith Soliz MD  Maternal Fetal Medicine Fellow    Physician Attestation   I saw this patient with the resident and agree with the resident/fellow's findings and plan of care as documented in the note.      I personally reviewed vital signs, medications, labs, imaging and EFM.    Key findings: In summary, Anjali Carmen is a  at 25w2d  admitted with systolic heart failure, etiology uncertain.  Anjali continues to have non-sustained runs of Vtach for which she is continuing on metoprolol and close inpatient monitoring, in addition to gentle diuresis with Lasix 20 mg daily.  We had a long discussion today about how we can better work to meet Anjali's needs and ensure we are treating her in a manner that she deserved.  We extended our most sincere apologies that we have not met her expectations and will work as a team to improve thing for her.  We will continue to follow along closely at this time.    Jordyn Bills MD  Date of  Service (when I saw the patient): 22    Time Spent on this Encounter   I spent 50 minutes on the unit/floor managing the care of Anjali Carmen. Over 50% of my time was spent on the following:   - Counseling the patient and/or family regarding: diagnostic results, prognosis, risks and benefits of treatment options, recommended follow-up, medical compliance and prevention of disease  - Coordination of care with the: nurse and patient     In summary, Anjali Carmen is a  at 25w2d  admitted with systolic heart failure, etiology uncertain.  Anjali continues to have non-sustained runs of Vtach for which she is continuing on metoprolol and close inpatient monitoring, in addition to gentle diuresis with Lasix 20 mg daily.  We had a long discussion today about how we can better work to meet Anjali's needs and ensure we are treating her in a manner that she deserved.  We extended our most sincere apologies that we have not met her expectations and will work as a team to improve thing for her.  We will continue to follow along closely at this time.      Jordyn Bills MD

## 2022-12-15 NOTE — PROGRESS NOTES
MFM Antepartum Progress Note    Subjective:   Denies contractions, leaking of fluid, vaginal bleeding, or decreased fetal movement.    Patient's  and two sons are here to visit from [place] today. The patient and her  relay that their frustrations with Anjali's care while in the hospital have been addressed, and she agrees to stay inpatient at this time.       Objective:  Vitals:    12/15/22 0354 12/15/22 0505 12/15/22 0755 12/15/22 1100   BP: 106/72 110/65 102/62 116/74   BP Location: Right arm  Right arm Right arm   Pulse: 82 78 75 88   Resp: 18  18 17   Temp: 98.2  F (36.8  C)  98  F (36.7  C) 98  F (36.7  C)   TempSrc: Oral  Oral Oral   SpO2: 98%  99% 99%   Weight:       Height:         I/O last 3 completed shifts:  In: 3201.05 [P.O.:2640; I.V.:561.05]  Out: 3550 [Urine:3550]    Gen: Sitting up in bed, NAD  Resp: Speaking full sentences, breathing comfortably   Abd: Gravid, non-tender, non-distended    FHT: baseline 140, moderate variability, accelerations present, no decelerations.  [place]: 0 contractions in 10 minutes.   Category 1 tracing.     Assessment/Plan:   Anjali Carmen is a 32 year old  at 25w3d by 6w1d US admitted to CCU for acute HFrEF, cardiac arrhythmia after transferring from [facility].     The patient has had an echocardiogram since her arrival concerning for ischemic cardiomyopathy, which was ruled out by a cardiac catheterization on  with no coronary disease. Continuing to medically manage dilated cardiomyopathy with metoprolol and diuresis per cardiology team with improvement in her symptoms.     We expressed our sincere apologies for how Anjali has been feeling while she is in the hospital. We met with Anjali, her  Mukund, and the cardiology team to hear the concerns. Today, she agrees to stay in house and appreciates the teams attempts to make changes to improve her care delivery. Her  is taking the kids home tomorrow, and will return next  week. We will connect him with resources for hotel accommodations while Anjali is inpatient.     MFM will continue to follow along closely and are available  at [phone].    # Acute decompensated HFrEF  # Possible atrial fibrillation, non-sustained VT  - Admitted to CICU for management of new cardiac diagnoses, now transferred out of ICU  - BP goal 130/80, MAP >65. If patient begins to have blood pressures >160/110 for at least 15 minutes, call MFM for guidance of management, although typically will initiate treatment with 5-10 mg of IV hydralazine or 20 mg of IV labetalol  - Agree with continued anticoagulation per primary team. Will need to stop anticoagulation prior to delivery. For regional anesthesia to be an option, we would need to stop anticoagulation/antiplatelet therapy. She will need an anesthesia consult and multi-disciplinary discussion regarding this.  - Metoprolol is safe in pregnancy with no dose restrictions  - There is no current indication for delivery, but if worsening symptoms of heart failure were noted, delivery may be indicated at that time.      # Pregnancy  - Comprehensive ultrasound  with growth at 76th percentile, repeat growth US in 3 weeks ()  - Non-stress test (NST) by L&D RN twice daily, consider daily NST once stable  - Betamethasone (BMZ) given - at OSH for fetal lung maturity, rescue BMZ typically not repeated until at least 2 weeks from initial course and if there is concern for delivery in the next week  - Magnesium for neuroprotection if moving towards delivery prior to 32 weeks with 4g bolus followed by 2g/hr maintenance until delivery  - Routine indications for emergent delivery including maternal decompensation or fetal compromise  - Patient had desired to have a trial of labor after a prior  section. In setting of decompensated HF, will discuss delivery planning moving forward as she may not be a candidate to valsalva in labor. at this time,  would recommend repeat CS, however, will continue to discuss this  - S/p  section consent on admission  - Patient requires routine prenatal care while in house with next milestone at 28 weeks when typically Tdap is administered, as well as repeat CBC, RPR (syphilis screening), and type and screen to evaluate antibodies, as well as one hour glucose challenge test (GCT)  - Will coordinate GCT 2 weeks after her BMZ administration ()    This patient was seen by myself and Dr. Bills, Assessment and plan under advisement of Dr. Bills.   Dinah Ruvalcaba, MS4     Physician Attestation   I, Jordyn Bills MD, was present with the medical/NOHEMY student who participated in the service and in the documentation of the note.  I have verified the history and personally performed the physical exam and medical decision making.  I agree with the assessment and plan of care as documented in the note.      I personally reviewed vital signs, medications, labs, imaging and EFM.    In summary, Anjali Carmen is a  at 25w3d admitted with systolic heart failure, etiology uncertain.  Anjali continues to have non-sustained runs of Vtach for which she is continuing on metoprolol and close inpatient monitoring, in addition to gentle diuresis with Lasix 20 mg daily.   Anjali feels we have improved in meeting her needs and is satisfied with our response to her concerns.  We will continue to follow.     Jordyn Bills MD  Date of Service (when I saw the patient): 12/15/22    Time Spent on this Encounter   I spent 30 minutes on the unit/floor managing the care of Anjali aCrmen. Over 50% of my time was spent on the following:   - Counseling the patient and/or family regarding: diagnostic results, prognosis and medical compliance  - Coordination of care with the: nurse and patient    In summary, Anjali Carmen is a  at 25w3d admitted with systolic heart failure, etiology uncertain.  Anjali continues  to have non-sustained runs of Vtach for which she is continuing on metoprolol and close inpatient monitoring, in addition to gentle diuresis with Lasix 20 mg daily.   Anjali feels we have improved in meeting her needs and is satisfied with our response to her concerns.  We will continue to follow.     Jordyn Bills MD

## 2022-12-16 LAB
ALBUMIN SERPL BCG-MCNC: 3.4 G/DL (ref 3.5–5.2)
ALP SERPL-CCNC: 90 U/L (ref 35–104)
ALT SERPL W P-5'-P-CCNC: 22 U/L (ref 10–35)
ANION GAP SERPL CALCULATED.3IONS-SCNC: 11 MMOL/L (ref 7–15)
AST SERPL W P-5'-P-CCNC: 37 U/L (ref 10–35)
BASOPHILS # BLD AUTO: 0 10E3/UL (ref 0–0.2)
BASOPHILS NFR BLD AUTO: 0 %
BILIRUB SERPL-MCNC: 0.3 MG/DL
BUN SERPL-MCNC: 11.5 MG/DL (ref 6–20)
CALCIUM SERPL-MCNC: 9.7 MG/DL (ref 8.6–10)
CHLORIDE SERPL-SCNC: 104 MMOL/L (ref 98–107)
CREAT SERPL-MCNC: 0.64 MG/DL (ref 0.51–0.95)
DEPRECATED HCO3 PLAS-SCNC: 19 MMOL/L (ref 22–29)
EOSINOPHIL # BLD AUTO: 0.1 10E3/UL (ref 0–0.7)
EOSINOPHIL NFR BLD AUTO: 1 %
ERYTHROCYTE [DISTWIDTH] IN BLOOD BY AUTOMATED COUNT: 14.7 % (ref 10–15)
GFR SERPL CREATININE-BSD FRML MDRD: >90 ML/MIN/1.73M2
GLUCOSE SERPL-MCNC: 95 MG/DL (ref 70–99)
HCT VFR BLD AUTO: 32.4 % (ref 35–47)
HGB BLD-MCNC: 10.3 G/DL (ref 11.7–15.7)
IMM GRANULOCYTES # BLD: 0.1 10E3/UL
IMM GRANULOCYTES NFR BLD: 1 %
LYMPHOCYTES # BLD AUTO: 2.9 10E3/UL (ref 0.8–5.3)
LYMPHOCYTES NFR BLD AUTO: 27 %
MAGNESIUM SERPL-MCNC: 1.7 MG/DL (ref 1.7–2.3)
MCH RBC QN AUTO: 25.6 PG (ref 26.5–33)
MCHC RBC AUTO-ENTMCNC: 31.8 G/DL (ref 31.5–36.5)
MCV RBC AUTO: 81 FL (ref 78–100)
MONOCYTES # BLD AUTO: 0.6 10E3/UL (ref 0–1.3)
MONOCYTES NFR BLD AUTO: 6 %
NEUTROPHILS # BLD AUTO: 7 10E3/UL (ref 1.6–8.3)
NEUTROPHILS NFR BLD AUTO: 65 %
NRBC # BLD AUTO: 0 10E3/UL
NRBC BLD AUTO-RTO: 0 /100
PHOSPHATE SERPL-MCNC: 4.4 MG/DL (ref 2.5–4.5)
PLATELET # BLD AUTO: 246 10E3/UL (ref 150–450)
POTASSIUM SERPL-SCNC: 4.2 MMOL/L (ref 3.4–5.3)
PROT SERPL-MCNC: 7.3 G/DL (ref 6.4–8.3)
RBC # BLD AUTO: 4.02 10E6/UL (ref 3.8–5.2)
SODIUM SERPL-SCNC: 134 MMOL/L (ref 136–145)
UFH PPP CHRO-ACNC: 0.2 IU/ML
UFH PPP CHRO-ACNC: 0.31 IU/ML
WBC # BLD AUTO: 10.7 10E3/UL (ref 4–11)

## 2022-12-16 PROCEDURE — 99232 SBSQ HOSP IP/OBS MODERATE 35: CPT | Mod: GC | Performed by: STUDENT IN AN ORGANIZED HEALTH CARE EDUCATION/TRAINING PROGRAM

## 2022-12-16 PROCEDURE — 83735 ASSAY OF MAGNESIUM: CPT | Performed by: INTERNAL MEDICINE

## 2022-12-16 PROCEDURE — 250N000013 HC RX MED GY IP 250 OP 250 PS 637: Performed by: STUDENT IN AN ORGANIZED HEALTH CARE EDUCATION/TRAINING PROGRAM

## 2022-12-16 PROCEDURE — 84100 ASSAY OF PHOSPHORUS: CPT | Performed by: INTERNAL MEDICINE

## 2022-12-16 PROCEDURE — 36592 COLLECT BLOOD FROM PICC: CPT | Performed by: INTERNAL MEDICINE

## 2022-12-16 PROCEDURE — 85025 COMPLETE CBC W/AUTO DIFF WBC: CPT | Performed by: INTERNAL MEDICINE

## 2022-12-16 PROCEDURE — 250N000011 HC RX IP 250 OP 636: Performed by: INTERNAL MEDICINE

## 2022-12-16 PROCEDURE — 250N000011 HC RX IP 250 OP 636: Performed by: STUDENT IN AN ORGANIZED HEALTH CARE EDUCATION/TRAINING PROGRAM

## 2022-12-16 PROCEDURE — 80053 COMPREHEN METABOLIC PANEL: CPT

## 2022-12-16 PROCEDURE — 36415 COLL VENOUS BLD VENIPUNCTURE: CPT | Performed by: INTERNAL MEDICINE

## 2022-12-16 PROCEDURE — 214N000001 HC R&B CCU UMMC

## 2022-12-16 PROCEDURE — 250N000013 HC RX MED GY IP 250 OP 250 PS 637: Performed by: NURSE PRACTITIONER

## 2022-12-16 PROCEDURE — 85520 HEPARIN ASSAY: CPT | Performed by: INTERNAL MEDICINE

## 2022-12-16 PROCEDURE — 250N000013 HC RX MED GY IP 250 OP 250 PS 637: Performed by: INTERNAL MEDICINE

## 2022-12-16 RX ORDER — MAGNESIUM OXIDE 400 MG/1
400 TABLET ORAL EVERY 4 HOURS
Status: COMPLETED | OUTPATIENT
Start: 2022-12-16 | End: 2022-12-16

## 2022-12-16 RX ADMIN — Medication 37.5 MG: at 05:37

## 2022-12-16 RX ADMIN — SODIUM CHLORIDE, PRESERVATIVE FREE 5 ML: 5 INJECTION INTRAVENOUS at 14:42

## 2022-12-16 RX ADMIN — Medication 400 MG: at 10:31

## 2022-12-16 RX ADMIN — SODIUM CHLORIDE, PRESERVATIVE FREE 5 ML: 5 INJECTION INTRAVENOUS at 06:42

## 2022-12-16 RX ADMIN — Medication 37.5 MG: at 17:05

## 2022-12-16 RX ADMIN — POTASSIUM CHLORIDE 40 MEQ: 40 SOLUTION ORAL at 08:38

## 2022-12-16 RX ADMIN — SODIUM CHLORIDE, PRESERVATIVE FREE 5 ML: 5 INJECTION INTRAVENOUS at 17:04

## 2022-12-16 RX ADMIN — Medication 37.5 MG: at 23:16

## 2022-12-16 RX ADMIN — HEPARIN SODIUM 2250 UNITS/HR: 10000 INJECTION, SOLUTION INTRAVENOUS at 08:43

## 2022-12-16 RX ADMIN — FUROSEMIDE 20 MG: 10 INJECTION, SOLUTION INTRAMUSCULAR; INTRAVENOUS at 08:38

## 2022-12-16 RX ADMIN — PRENATAL VIT W/ FE FUMARATE-FA TAB 27-0.8 MG 1 TABLET: 27-0.8 TAB at 08:38

## 2022-12-16 RX ADMIN — HEPARIN SODIUM 2250 UNITS/HR: 10000 INJECTION, SOLUTION INTRAVENOUS at 18:49

## 2022-12-16 RX ADMIN — Medication 400 MG: at 14:46

## 2022-12-16 RX ADMIN — Medication 37.5 MG: at 12:25

## 2022-12-16 ASSESSMENT — ACTIVITIES OF DAILY LIVING (ADL)
ADLS_ACUITY_SCORE: 20

## 2022-12-16 NOTE — PLAN OF CARE
D: Admitted 12/6/2022 for further management of newly diagnosed, acute decompensated systolic heart failure (LVEF 20%) as well as high risk delivery.      I: Monitored vitals and assessed patient status.   Running: heparin gtt at 2100 units/hr     A: A&Ox4. On room air. SR with frequent PVCs and PACs 70s-80s, VSS, afebrile. Urinating adequately. LBM 12/15. Up ad danis.     P: Continue to monitor.

## 2022-12-16 NOTE — PROGRESS NOTES
Care Management Follow Up    Length of Stay (days): 10    Expected Discharge Date:  unknown      Concerns to be Addressed:     Hotel resources  Patient plan of care discussed at interdisciplinary rounds: Yes    Anticipated Discharge Disposition: Home        Additional Information:  Per the bedside RN this morning, pt was requesting local hotel resources for her  and her children. The pt is staying IP until delivery so assistance was needed in finding housing/lodging. SW printed out resources for local hotels, discounted hotels, and shuttle info for patients/families. SW met the pt at bedside and dropped it off to the pt. Pt didn't have any follow up questions and thanked the SW.    ___________________    NASIM Nesbitt, LGSW  6C   [facility]  Phone: [phone]  Pager: [phone]

## 2022-12-16 NOTE — PROGRESS NOTES
MFM Antepartum Progress Note    Subjective:   Denies contractions, leaking of fluid, vaginal bleeding, or decreased fetal movement. She denies chest pain, shortness of breath, or worsening edema. She is relieved to be in a room with a remote monitor so that she can be more ambulatory. Her two sons and , Mukund, are still visiting.    Of note, due to the acuity of the [facility]aternal Fetal Medicine service, the team was not able to travel to the [place] today. This visit was conducted via telephone with Anjali's consent and we will plan an in person visit this weekend.    Objective:  Vitals:    22 0535 22 0539 22 0800 22 0802   BP: 101/47  108/61    BP Location: Right arm  Right arm    Pulse: 75  75    Resp: 18  16    Temp: 98  F (36.7  C)   98.2  F (36.8  C)   TempSrc: Oral   Oral   SpO2: 98%  98%    Weight:  132.5 kg (292 lb 3.2 oz)     Height:         I/O last 3 completed shifts:  In: 1577.65 [P.O.:1200; I.V.:377.65]  Out: 2150 [Urine:2150]    Gen: No acute distress  Resp: Speaking full sentences    Assessment/Plan:   Anjali Carmen is a 32 year old  at 25w4d by 6w1d US admitted to CCU for acute HFrEF, cardiac arrhythmia after transferring from [facility].     The patient has had an echocardiogram since her arrival concerning for ischemic cardiomyopathy, which was ruled out by a cardiac catheterization on  with no coronary disease. Continuing to medically manage dilated cardiomyopathy with metoprolol and diuresis per cardiology team with improvement in her symptoms. She will be monitored inpatient with continuous telemetry given her risk of arrhythmia. She is high risk for volume overload and worsening of symptoms in the third trimester and postpartum.    [facility]ER[facility] will continue to follow along closely and are available  at [phone].    # Acute decompensated HFrEF  # Possible atrial fibrillation, non-sustained VT  - Admitted to CICU for management of new  cardiac diagnoses, now transferred out of ICU  - BP goal 130/80, MAP >65. If patient begins to have blood pressures >160/110 for at least 15 minutes, call MFM for guidance of management, although typically will initiate treatment with 5-10 mg of IV hydralazine or 20 mg of IV labetalol  - Agree with continued anticoagulation per primary team. Will need to stop anticoagulation prior to delivery. For regional anesthesia to be an option, we would need to stop anticoagulation/antiplatelet therapy. She will need an anesthesia consult and multi-disciplinary discussion regarding this.  - Metoprolol is safe in pregnancy with no dose restrictions  - There is no current indication for delivery, but if worsening symptoms of heart failure were noted, delivery may be indicated at that time.      # Pregnancy  - Comprehensive ultrasound  with growth at 76th percentile, repeat growth US in 3 weeks ()  - Non-stress test (NST) by L&D RN twice daily, consider daily NST once stable  - Betamethasone (BMZ) given - at OSH for fetal lung maturity, rescue BMZ typically not repeated until at least 2 weeks from initial course and if there is concern for delivery in the next week  - Magnesium for neuroprotection if moving towards delivery prior to 32 weeks with 4g bolus followed by 2g/hr maintenance until delivery  - Routine indications for emergent delivery including maternal decompensation or fetal compromise  - Patient had desired to have a trial of labor after a prior  section. In setting of decompensated HF, will discuss delivery planning moving forward as she may not be a candidate to valsalva in labor. at this time, would recommend repeat CS, however, will continue to discuss this  - S/p  section consent on admission  - Patient requires routine prenatal care while in house with next milestone at 28 weeks when typically Tdap is administered, as well as repeat CBC, RPR (syphilis screening), and type and  screen to evaluate antibodies, as well as one hour glucose challenge test (GCT)  - Will order 1 hour GCT for tomorrow to screen for diabetes in pregnancy, our team will follow up this result and advise on next steps    Discussed recommendations with Cardiology Fellow, Dr. Camacho.    Patient called with and discussed with MARYAM Shirley attending.    Meredith Soliz MD  Maternal Fetal Medicine Fellow

## 2022-12-16 NOTE — PLAN OF CARE
Vital signs:  Temp: 98.2  F (36.8  C) Temp src: Oral BP: 113/69 Pulse: 72   Resp: 17 SpO2: 98 % O2 Device: None (Room air)    Time of care: 7084-8060    D: newly diagnosed, acute decompensated systolic heart failure (LVEF 20%) as well as high risk delivery    I/A: A0x4, makes needs known. SR with frequent PACs and PVCs. On RA. BP stable and WDL. Denies pain, palpitations, SOB.   Heparin infusing and therapeutic at 2100 units/hour. Urinating adequately, had 1 BM. Ambulating independently.     P: Continue to monitor Pt status and report changes to Cards 2.     Patient is concerned about housing for her  and kids because they are unable to extend at the hotel they are staying at. They were originally planning to drive home tomorrow but do not feel safe to due to the snow. Requesting RNCC assistance tomorrow (12/16).

## 2022-12-16 NOTE — PROGRESS NOTES
Shift: 700 -     32 year old  at 25w4d by 6w1d US admitted to CCU for acute HFrEF, cardiac arrhythmia after transferring from [facility].      VS: Temp: 98.1  F (36.7  C) Temp src: Oral BP: 109/66 Pulse: 85   Resp: 16 SpO2: 96 % O2 Device: None (Room air)       Pain: Denies pain.   Neuro: A&Ox4. Calls appropriately. Cooperative with care.   Cardiac:   SR w/PAC's/PVC's. Heparin 2250, last Hep10a therapeutic at 0.31. Next check at 2300. Telemetry reported patient having 22 PVC's/minute starting at 1630 this shift, up from 10 PVC's/min. Team notified, recommended K+ recheck now, Medical team ordered for recheck in AM as planned. Patient is asymptomatic  Respiratory: Lung sounds clear/diminished on RA.   GI/Diet/Appetite: 2gm Na diet with good appetite. LBM 12/15.  :  Voiding w/adequate output.   LDA's: PICC, DL LUE.   Skin: Intact.   Activity: Up ad danis.  Tests/Procedures:   Pertinent Labs/Lab Collection: Mg replacement this shift. Scheduled KCl 40meq given.      Plan: Continue to monitor for irregular cardiac rhythm. Contact Cards 2 w/questions/concerns.

## 2022-12-16 NOTE — PROGRESS NOTES
Cardiology Progress Note    ASSESSMENT/PLAN:  Anjali Carmen is a 32 year old female, admitted on 22. She year old female  A0 and no significant past medical history, who presents as a transfer from [facility] to [facility] on 2022 for further management of newly diagnosed, acute decompensated systolic heart failure (LVEF 20%) as well as high risk delivery.     Interval history:   NAOE. Nursing notes reviewed. Patient reports doing well with no issues. She remains more aware of her premature beats/arrythmia when she is asleep but has no dyspnea orthopnea, syncope or pre-syncope.     Telemetry reviewed and the most NSVT runs patient have had in the past 24 hrs was 3 beat runs and other wise though she continues to have frequent PVCs and PACs but she has not had significant consecutive runs of NSVT which is an improvement when compared to her first few days of hospitalization.     Changes Today:   - No major changes   - Continue current doses of furosemide (20 mg IV daily) and metoprolol tartrate ( 37.5 mg q6hr)  - Keep K > 4.5 and Mg > 2.5  - If remain stable over the weekend, will plan on transitioning to oral equivalent of her IV Lasix and checking labs 3 times a week starting next week Monday.    # Acute decompensated systolic and diastolic heart failure  # ACC/AHA Stage C systolic heart failure  # Heart failure with reduced EF (LVEF 20%)  # Mild right ventricular systolic dysfunction  # Non-sustained VT  Etiology of HFrEF: uncertain at this time.  Concern for familial dilated cardiomyopathy given history of CM in father at 30 years of age. Last pregnancy in , timeline not fitting in for PPCM. Greater concern for NICM over ICMP, however, TTE on  with thinned out LV wall and notable regional wall motion abnormalities. Coronary angiogram done on  with no significat lesions seen.  No known history of pre-existing structural heart disease prior to this admission.  1. Patient  hemodynamically stable with preserved end-organ function. No indication for inotrope support/MCS at this time.   2. GDMT and diuresis as outlined below.  Traditional afterload targets for poor LV function and low LVEF of 20-25% cannot be met due to need for fetoplacental circulation.  Will target /80 and MAP > 65. Repeat transthoracic echocardiogram done on 12/6 notable for severely reduced left ventricular function with an LVEF of 20-25%. Also notable for RWM (details below) Coronanry angiogram on 12/8 without significant CAD.   3. Will consider cardiac MRI w.r.t etiology of cardiomyopathy once clinically permissible (likely to be done post partum).  4. Currently on heparin gtt for Afib and risk of LV thrombus formation given low LVEF and hypercoagulable state of pregnancy.  5.   GDMT:   ACEi/ARB/ARNI: contraindicated due to pregnancy  BB: continue metoprolol tartrate 37.5 mg q6hr (changed from 25 mg q6hr)  Aldosterone antagonist: high adverse risk medication in pregnancy  SCD prophylaxis: does not meet criteria for implant  Fluid status: Approaching euvolemia. Continue lasix 20 mg IV daily and continue to monitor BMP closely (keep K>4.5, Mg >2.5, of note, was on 40 mg IV daily until 12/12/22 when she was transitioned to 20 mg)     # New onset possible paroxysmal atrial fibrillation  [facility] [place]- isolated episode, EKG unavailable for review. Currently in NSR at [facility].  - Continue to monitor rhythm on telemetry.   - Currently on heparin gtt for AC, however, [id number] score of 2 (sex, congestive heart failure) and isolated episode of Afib. Will continue for now as its safe in pregnancy (discussed with MFM).     # Mild anemia  HGB 10.2 g/dL on arrival (MCV 79). Retic count 1.9%  Etiology: AVINASH vs anemia in the setting of HF and pregnancy.  - Check iron, ferritin, TIBC levels.   - Limit blood draws.     # Pregnancy  - Appreciate MFM input., they are closely following with daily checks.  "They can be reached via their pager (listed and updated regularly on the summary page)  - OB US for fetal growth done on 22 with normal fetal findings and no abnormalities     # Screening for gestational DM  # Monitor for hyperglycemia  Gestational age 25 wks and 4 days on   Risk factors: High BMI and received steroids for fetal lung maturation prior to transfer to [facility].   - HbA1c 5.8 on 22  - Urine proteins pending (ordered on 22)     Prophylaxis:  DVT: heparin gtt   GI: not indicated  Family: Updated by the bedside  Disposition: Floor  Code Status: Full      Plan d/w Dr. Ken M.D., who is in agreement. Please, see staff addendum for changes/additions to the plan.    Maicol Camacho MD, PhD  Cardiology Fellow  Pager: [phone]  ===================================================================    SUBJECTIVE:   No acute events overnight. Patient reports that she feels well and has no complaints today.     OBJECTIVE:  /66 (BP Location: Right arm)   Pulse 85   Temp 98.1  F (36.7  C) (Oral)   Resp 16   Ht 1.753 m (5' 9\")   Wt 132.5 kg (292 lb 3.2 oz)   SpO2 96%   BMI 43.15 kg/m    Temp (24hrs), Av.2  F (36.8  C), Min:97.9  F (36.6  C), Max:98.7  F (37.1  C)    GEN: pleasant, no acute distress  HEENT: no icterus  CV: RRR, normal s1/s2, no murmurs. JVP ~ 8-10 cm.   CHEST: clear to ausculation bilaterally, no rales or wheezing  ABD: soft, non-tender, normal active bowel sounds  EXTR: pulses +2 bilaterally. Trace LE edema.   NEURO: alert oriented, speech fluent/appropriate, motor grossly nonfocal    Labs: reviewed in EMR  CBC  Recent Labs   Lab 22  0648 12/15/22  0646 22  0631 22  0705   WBC 10.7 12.3* 12.2* 11.8*   RBC 4.02 3.96 3.97 4.15   HGB 10.3* 10.0* 10.0* 10.5*   HCT 32.4* 32.3* 32.2* 33.5*   MCV 81 82 81 81   MCH 25.6* 25.3* 25.2* 25.3*   MCHC 31.8 31.0* 31.1* 31.3*   RDW 14.7 14.6 14.6 14.6    227 168 244     [facility]  Recent Labs   Lab 22  0648 " 12/15/22  0646 12/14/22  0631 12/13/22  1612 12/13/22  0705 12/12/22  0828 12/12/22  0453   * 133* 134* 133* 134*   < > 133*   POTASSIUM 4.2 4.3 4.3 4.2 4.5   < > 4.1   CHLORIDE 104 106 105 102 104   < > 104   CO2 19* 16* 18* 17* 16*   < > 19*   ANIONGAP 11 11 11 14 14   < > 10   GLC 95 83 89 99 83   < > 100*   BUN 11.5 11.7 11.0 12.0 11.7   < > 13.4   CR 0.64 0.61 0.63 0.71 0.65   < > 0.70   GFRESTIMATED >90 >90 >90 >90 >90   < > >90   AYDEN 9.7 8.8 9.1 10.0 10.0   < > 9.4   MAG 1.7 1.7 1.7 1.8 1.7  --  1.8   PHOS 4.4 4.5  --   --  5.2*  --  4.8*    < > = values in this interval not displayed.     Troponins:   No results found for: TROPI, TROPONIN, TROPR, TROPN  INR  No lab results found in last 7 days.    Echocardiogram (12/6/22):  Interpretation Summary  Left ventricular function is decreased. The ejection fraction is 20-25%  (severely reduced). Severe diffuse hypokinesis is present. There is wall  thinning and akinesis of the basal-mid inferior, basal-mid inferoseptal and  basal inferolateral segments. There is akinesis of the apical septum. This is  compatible with ischemic cardiomyopathy.  Mild right ventricular dilation is present. Global right ventricular function  is moderately reduced.  Mild tricuspid insufficiency is present.  Pulmonary artery systolic pressure is normal.  IVC diameter and respiratory changes fall into an intermediate range  suggesting an RA pressure of 8 mmHg.  No pericardial effusion is present.    Coronary Angiogram:  12/7/22:   Formal results pending, but no obstructive disease seen on preliminary evaluation.     Imaging: reviewed in EMR.

## 2022-12-17 ENCOUNTER — TRANSFERRED RECORDS (OUTPATIENT)
Dept: CARDIOLOGY | Facility: CLINIC | Age: 32
End: 2022-12-17

## 2022-12-17 LAB
ALBUMIN SERPL BCG-MCNC: 3.2 G/DL (ref 3.5–5.2)
ALP SERPL-CCNC: 89 U/L (ref 35–104)
ALT SERPL W P-5'-P-CCNC: 21 U/L (ref 10–35)
ANION GAP SERPL CALCULATED.3IONS-SCNC: 13 MMOL/L (ref 7–15)
AST SERPL W P-5'-P-CCNC: 36 U/L (ref 10–35)
BASOPHILS # BLD AUTO: 0 10E3/UL (ref 0–0.2)
BASOPHILS NFR BLD AUTO: 0 %
BILIRUB SERPL-MCNC: 0.3 MG/DL
BUN SERPL-MCNC: 10.6 MG/DL (ref 6–20)
CALCIUM SERPL-MCNC: 9.5 MG/DL (ref 8.6–10)
CHLORIDE SERPL-SCNC: 105 MMOL/L (ref 98–107)
CREAT SERPL-MCNC: 0.62 MG/DL (ref 0.51–0.95)
DEPRECATED HCO3 PLAS-SCNC: 17 MMOL/L (ref 22–29)
EOSINOPHIL # BLD AUTO: 0.1 10E3/UL (ref 0–0.7)
EOSINOPHIL NFR BLD AUTO: 1 %
ERYTHROCYTE [DISTWIDTH] IN BLOOD BY AUTOMATED COUNT: 14.7 % (ref 10–15)
GFR SERPL CREATININE-BSD FRML MDRD: >90 ML/MIN/1.73M2
GLUCOSE 1H P 50 G GLC PO SERPL-MCNC: 173 MG/DL (ref 70–129)
GLUCOSE SERPL-MCNC: 76 MG/DL (ref 70–99)
HCT VFR BLD AUTO: 32.8 % (ref 35–47)
HGB BLD-MCNC: 10.2 G/DL (ref 11.7–15.7)
IMM GRANULOCYTES # BLD: 0.1 10E3/UL
IMM GRANULOCYTES NFR BLD: 1 %
LYMPHOCYTES # BLD AUTO: 2.9 10E3/UL (ref 0.8–5.3)
LYMPHOCYTES NFR BLD AUTO: 27 %
MAGNESIUM SERPL-MCNC: 1.6 MG/DL (ref 1.7–2.3)
MCH RBC QN AUTO: 25.7 PG (ref 26.5–33)
MCHC RBC AUTO-ENTMCNC: 31.1 G/DL (ref 31.5–36.5)
MCV RBC AUTO: 83 FL (ref 78–100)
MONOCYTES # BLD AUTO: 0.6 10E3/UL (ref 0–1.3)
MONOCYTES NFR BLD AUTO: 5 %
NEUTROPHILS # BLD AUTO: 7.3 10E3/UL (ref 1.6–8.3)
NEUTROPHILS NFR BLD AUTO: 66 %
NRBC # BLD AUTO: 0 10E3/UL
NRBC BLD AUTO-RTO: 0 /100
PLATELET # BLD AUTO: 231 10E3/UL (ref 150–450)
POTASSIUM SERPL-SCNC: 4 MMOL/L (ref 3.4–5.3)
PROT SERPL-MCNC: 7.1 G/DL (ref 6.4–8.3)
RBC # BLD AUTO: 3.97 10E6/UL (ref 3.8–5.2)
SODIUM SERPL-SCNC: 135 MMOL/L (ref 136–145)
UFH PPP CHRO-ACNC: 0.28 IU/ML
WBC # BLD AUTO: 11 10E3/UL (ref 4–11)

## 2022-12-17 PROCEDURE — 250N000013 HC RX MED GY IP 250 OP 250 PS 637: Performed by: STUDENT IN AN ORGANIZED HEALTH CARE EDUCATION/TRAINING PROGRAM

## 2022-12-17 PROCEDURE — 250N000011 HC RX IP 250 OP 636: Performed by: STUDENT IN AN ORGANIZED HEALTH CARE EDUCATION/TRAINING PROGRAM

## 2022-12-17 PROCEDURE — 250N000013 HC RX MED GY IP 250 OP 250 PS 637

## 2022-12-17 PROCEDURE — 99232 SBSQ HOSP IP/OBS MODERATE 35: CPT | Mod: GC | Performed by: STUDENT IN AN ORGANIZED HEALTH CARE EDUCATION/TRAINING PROGRAM

## 2022-12-17 PROCEDURE — 80053 COMPREHEN METABOLIC PANEL: CPT

## 2022-12-17 PROCEDURE — 250N000013 HC RX MED GY IP 250 OP 250 PS 637: Performed by: NURSE PRACTITIONER

## 2022-12-17 PROCEDURE — 214N000001 HC R&B CCU UMMC

## 2022-12-17 PROCEDURE — 36592 COLLECT BLOOD FROM PICC: CPT

## 2022-12-17 PROCEDURE — 85004 AUTOMATED DIFF WBC COUNT: CPT | Performed by: INTERNAL MEDICINE

## 2022-12-17 PROCEDURE — 250N000009 HC RX 250: Performed by: STUDENT IN AN ORGANIZED HEALTH CARE EDUCATION/TRAINING PROGRAM

## 2022-12-17 PROCEDURE — 36592 COLLECT BLOOD FROM PICC: CPT | Performed by: INTERNAL MEDICINE

## 2022-12-17 PROCEDURE — 250N000011 HC RX IP 250 OP 636: Performed by: INTERNAL MEDICINE

## 2022-12-17 PROCEDURE — 83735 ASSAY OF MAGNESIUM: CPT | Performed by: INTERNAL MEDICINE

## 2022-12-17 PROCEDURE — 82950 GLUCOSE TEST: CPT | Performed by: INTERNAL MEDICINE

## 2022-12-17 RX ORDER — MAGNESIUM OXIDE 400 MG/1
400 TABLET ORAL 2 TIMES DAILY
Status: DISCONTINUED | OUTPATIENT
Start: 2022-12-17 | End: 2022-12-20

## 2022-12-17 RX ORDER — MAGNESIUM SULFATE HEPTAHYDRATE 40 MG/ML
2 INJECTION, SOLUTION INTRAVENOUS ONCE
Status: COMPLETED | OUTPATIENT
Start: 2022-12-17 | End: 2022-12-17

## 2022-12-17 RX ORDER — METOPROLOL TARTRATE 50 MG
50 TABLET ORAL EVERY 6 HOURS SCHEDULED
Status: DISCONTINUED | OUTPATIENT
Start: 2022-12-17 | End: 2022-12-30

## 2022-12-17 RX ADMIN — HEPARIN SODIUM 2250 UNITS/HR: 10000 INJECTION, SOLUTION INTRAVENOUS at 15:39

## 2022-12-17 RX ADMIN — MAGNESIUM OXIDE TAB 400 MG (241.3 MG ELEMENTAL MG) 400 MG: 400 (241.3 MG) TAB at 19:38

## 2022-12-17 RX ADMIN — SODIUM CHLORIDE, PRESERVATIVE FREE 5 ML: 5 INJECTION INTRAVENOUS at 06:35

## 2022-12-17 RX ADMIN — ALCOHOL 50 G: 65 GEL TOPICAL at 09:56

## 2022-12-17 RX ADMIN — METOPROLOL TARTRATE 50 MG: 50 TABLET, FILM COATED ORAL at 18:00

## 2022-12-17 RX ADMIN — Medication 37.5 MG: at 06:14

## 2022-12-17 RX ADMIN — METOPROLOL TARTRATE 50 MG: 50 TABLET, FILM COATED ORAL at 23:54

## 2022-12-17 RX ADMIN — MAGNESIUM OXIDE TAB 400 MG (241.3 MG ELEMENTAL MG) 400 MG: 400 (241.3 MG) TAB at 10:00

## 2022-12-17 RX ADMIN — FUROSEMIDE 20 MG: 10 INJECTION, SOLUTION INTRAMUSCULAR; INTRAVENOUS at 07:35

## 2022-12-17 RX ADMIN — SODIUM CHLORIDE, PRESERVATIVE FREE 5 ML: 5 INJECTION INTRAVENOUS at 10:58

## 2022-12-17 RX ADMIN — SODIUM CHLORIDE, PRESERVATIVE FREE 5 ML: 5 INJECTION INTRAVENOUS at 15:44

## 2022-12-17 RX ADMIN — PRENATAL VIT W/ FE FUMARATE-FA TAB 27-0.8 MG 1 TABLET: 27-0.8 TAB at 07:35

## 2022-12-17 RX ADMIN — POTASSIUM CHLORIDE 40 MEQ: 40 SOLUTION ORAL at 07:35

## 2022-12-17 RX ADMIN — Medication 37.5 MG: at 11:11

## 2022-12-17 RX ADMIN — HEPARIN SODIUM 2250 UNITS/HR: 10000 INJECTION, SOLUTION INTRAVENOUS at 04:41

## 2022-12-17 RX ADMIN — MAGNESIUM SULFATE IN WATER 2 G: 40 INJECTION, SOLUTION INTRAVENOUS at 11:11

## 2022-12-17 ASSESSMENT — ACTIVITIES OF DAILY LIVING (ADL)
ADLS_ACUITY_SCORE: 20

## 2022-12-17 NOTE — PROGRESS NOTES
Cardiology Progress Note    ASSESSMENT/PLAN:  Anjali Carmen is a 32 year old  at 25w4d w/ no significant past medical history, who presents as a transfer from [facility] to [facility] on 2022 for further management of newly diagnosed, acute decompensated systolic heart failure (LVEF 20%) as well as high risk delivery.     Changes Today:   - Increased metoprolol tartrate to 50mg q6H  - Repleted and scheduled Mg  - Will transition to PO Lasix on   - Will decrease lab frequency on   - Keep K > 4.5 and Mg > 2.5    # Acute decompensated systolic and diastolic heart failure  # ACC/AHA Stage C systolic heart failure  # Heart failure with reduced EF (LVEF 20%)  # Mild right ventricular systolic dysfunction  # Non-sustained VT  Etiology of HFrEF: uncertain at this time.  Concern for familial dilated cardiomyopathy given history of CM in father at 30 years of age. Last pregnancy in , timeline not fitting in for PPCM. Greater concern for NICM over ICMP, however, TTE on  with thinned out LV wall and notable regional wall motion abnormalities. Coronary angiogram done on  with no significat lesions seen.  No known history of pre-existing structural heart disease prior to this admission.  1. Patient hemodynamically stable with preserved end-organ function. No indication for inotrope support/MCS at this time.   2. GDMT and diuresis as outlined below.  Traditional afterload targets for poor LV function and low LVEF of 20-25% cannot be met due to need for fetoplacental circulation.  Will target /80 and MAP > 65. Repeat transthoracic echocardiogram done on  notable for severely reduced left ventricular function with an LVEF of 20-25%. Also notable for RWM (details below) Coronanry angiogram on  without significant CAD.   3. Will consider cardiac MRI w.r.t etiology of cardiomyopathy once clinically permissible (likely to be done post partum).  4. Currently on heparin gtt  for Afib and risk of LV thrombus formation given low LVEF and hypercoagulable state of pregnancy.  GDMT:   ACEi/ARB/ARNI: contraindicated due to pregnancy  BB: continue metoprolol tartrate 50 mg q6hr  Aldosterone antagonist: high adverse risk medication in pregnancy  SCD prophylaxis: does not meet criteria for implant  Fluid status: Approaching euvolemia. Continue lasix 20 mg IV daily and continue to monitor BMP closely (keep K>4.5, Mg >2.5, of note, was on 40 mg IV daily until 12/12/22 when she was transitioned to 20 mg). Likely transition to PO lasix on 12/18.     # New onset possible paroxysmal atrial fibrillation  [facility] [place]- isolated episode, EKG unavailable for review. Currently in NSR at [facility].  - Continue to monitor rhythm on telemetry.   - Currently on heparin gtt for AC, however, [id number] score of 2 (sex, congestive heart failure) and isolated episode of Afib. Will continue for now as its safe in pregnancy (discussed with MFM).     # Mild anemia  HGB 10.2 g/dL on arrival (MCV 79). Retic count 1.9%  Etiology: AVINASH vs anemia in the setting of HF and pregnancy.  - Check iron, ferritin, TIBC levels.   - Limit blood draws.     # Pregnancy  Gestational age 25 wks and 5 days.  - Appreciate MFM input., they are closely following with daily checks. They can be reached via their pager (listed and updated regularly on the summary page)  - OB US for fetal growth done on 12/8/22 with normal fetal findings and no abnormalities  - Plan for 1 hour GCT to screen for DM    Prophylaxis:  DVT: heparin gtt   Disposition: Floor  Code Status: Full      Plan discussed with Dr. Ken M.D., who is in agreement. Please, see staff addendum for changes/additions to the plan.    Shante Sharpe MD  Internal Medicine, PGY-1  Pager: [phone]    ===================================================================    SUBJECTIVE:   No acute events overnight. Nursing notes reviewed. Patient reports feeling  "palpitations consistently from 9pm to 6am. Denies chest pain, SOB, PND, orthopnea, pre-syncope, syncope.     OBJECTIVE:  /60 (BP Location: Right arm, Patient Position: Supine, Cuff Size: Adult Regular)   Pulse 79   Temp 97.9  F (36.6  C) (Oral)   Resp 16   Ht 1.753 m (5' 9\")   Wt 133 kg (293 lb 4.8 oz)   SpO2 97%   BMI 43.31 kg/m    Temp (24hrs), Av.2  F (36.8  C), Min:97.9  F (36.6  C), Max:98.7  F (37.1  C)    GEN: pleasant, no acute distress  HEENT: no icterus  CV: RRR, normal s1/s2, no murmurs.   CHEST: clear to ausculation bilaterally, no rales or wheezing  ABD: soft, non-tender, normal active bowel sounds  EXTR: pulses +2 bilaterally. Trace LE edema.   NEURO: alert oriented, speech fluent/appropriate, motor grossly nonfocal    Telemetry (past 12 hrs):  - Most runs of NSVT 3 beats  - Longest run of NSVT 6 beats  - Frequent PACs and PVCs    Labs: reviewed in EMR  CBC  Recent Labs   Lab 22  0640 22  0648 12/15/22  0646 22  0631   WBC 11.0 10.7 12.3* 12.2*   RBC 3.97 4.02 3.96 3.97   HGB 10.2* 10.3* 10.0* 10.0*   HCT 32.8* 32.4* 32.3* 32.2*   MCV 83 81 82 81   MCH 25.7* 25.6* 25.3* 25.2*   MCHC 31.1* 31.8 31.0* 31.1*   RDW 14.7 14.7 14.6 14.6    246 227 168     BMP  Recent Labs   Lab 22  0640 22  0648 12/15/22  0646 22  0631 22  1612 22  0705 22  0828 22  0453   * 134* 133* 134*   < > 134*   < > 133*   POTASSIUM 4.0 4.2 4.3 4.3   < > 4.5   < > 4.1   CHLORIDE 105 104 106 105   < > 104   < > 104   CO2 17* 19* 16* 18*   < > 16*   < > 19*   ANIONGAP 13 11 11 11   < > 14   < > 10   GLC 76 95 83 89   < > 83   < > 100*   BUN 10.6 11.5 11.7 11.0   < > 11.7   < > 13.4   CR 0.62 0.64 0.61 0.63   < > 0.65   < > 0.70   GFRESTIMATED >90 >90 >90 >90   < > >90   < > >90   AYDEN 9.5 9.7 8.8 9.1   < > 10.0   < > 9.4   MAG 1.6* 1.7 1.7 1.7   < > 1.7  --  1.8   PHOS  --  4.4 4.5  --   --  5.2*  --  4.8*    < > = values in this interval not " displayed.     Troponins:   No results found for: TROPI, TROPONIN, TROPR, TROPN  INR  No lab results found in last 7 days.    Echocardiogram (12/6/22):  Interpretation Summary  Left ventricular function is decreased. The ejection fraction is 20-25%  (severely reduced). Severe diffuse hypokinesis is present. There is wall  thinning and akinesis of the basal-mid inferior, basal-mid inferoseptal and  basal inferolateral segments. There is akinesis of the apical septum. This is  compatible with ischemic cardiomyopathy.  Mild right ventricular dilation is present. Global right ventricular function  is moderately reduced.  Mild tricuspid insufficiency is present.  Pulmonary artery systolic pressure is normal.  IVC diameter and respiratory changes fall into an intermediate range  suggesting an RA pressure of 8 mmHg.  No pericardial effusion is present.    Coronary Angiogram:  12/7/22:   Formal results pending, but no obstructive disease seen on preliminary evaluation.     Imaging: reviewed in EMR.

## 2022-12-17 NOTE — PROGRESS NOTES
Neuro: A&Ox4  CV: SR w/ frequent PVCs. K/Mg checks this am. No edema noted. Normotensive, Afebrile  Resp: RA sats %  GI/: 2g Na diet. Voiding w/ adequate uop (~60mL/hr). No n/v. Having bms, none overnight  Skin: No issues  Lines: PICCx2 LUE. Heparin infusing @ 2250-therapeutic, next heparin level tomorrow am per protocol    Uneventful night. Will continue to monitor

## 2022-12-17 NOTE — PROGRESS NOTES
MFM Antepartum Progress Note    Subjective:   Anjali is very tired when I saw her today. States she did not get much sleep and desires rest. Is able to let me know that she feels fetal movement. No contractions or abdominal pain. No leaking of fluid.    Objective:  Vitals:    22 0612 22 0803 22 1121 22 1523   BP: 118/73 105/52 114/60 96/47   BP Location: Right arm Right arm Right arm Right arm   Patient Position:   Supine    Cuff Size:   Adult Regular    Pulse: 94 84 79 67   Resp: 16 16 16 16   Temp:  97.9  F (36.6  C)  97.9  F (36.6  C)   TempSrc:  Oral  Oral   SpO2: 97% 99% 97% 98%   Weight: 133 kg (293 lb 4.8 oz)      Height:         I/O last 3 completed shifts:  In: 1140 [P.O.:1140]  Out: 1200 [Urine:1200]    Gen: No acute distress  Resp: Speaking full sentences  Abd: nontender  Extrem: trace edema bilaterally    Assessment/Plan:   Anjali Carmen is a 32 year old  at 25w5d by 6w1d US admitted to CCU for acute HFrEF, cardiac arrhythmia after transferring from [facility].     The patient has had an echocardiogram since her arrival concerning for ischemic cardiomyopathy, which was ruled out by a cardiac catheterization on  with no coronary disease. Continuing to medically manage dilated cardiomyopathy with metoprolol and diuresis per cardiology team with improvement in her symptoms. She will be monitored inpatient with continuous telemetry given her risk of arrhythmia. She is high risk for volume overload and worsening of symptoms in the third trimester and postpartum.    MFM will continue to follow along closely and are available  at [phone].    # Acute decompensated HFrEF  # Possible atrial fibrillation, non-sustained VT  - Admitted to CICU for management of new cardiac diagnoses, now transferred out of ICU  - BP goal 130/80, MAP >65. If patient begins to have blood pressures >160/110 for at least 15 minutes, call MFM for guidance of management, although  typically will initiate treatment with 5-10 mg of IV hydralazine or 20 mg of IV labetalol  - Agree with continued anticoagulation per primary team. Will need to stop anticoagulation prior to delivery. For regional anesthesia to be an option, we would need to stop anticoagulation/antiplatelet therapy. She will need an anesthesia consult and multi-disciplinary discussion regarding this.  - Metoprolol is safe in pregnancy with no dose restrictions  - There is no current indication for delivery, but if worsening symptoms of heart failure were noted, delivery may be indicated at that time.      # Pregnancy  - Comprehensive ultrasound  with growth at 76th percentile, repeat growth US in 3 weeks ()  - Non-stress test (NST) by L&D RN twice daily, consider daily NST once stable  - Betamethasone (BMZ) given - at OSH for fetal lung maturity, rescue BMZ typically not repeated until at least 2 weeks from initial course and if there is concern for delivery in the next week  - Magnesium for neuroprotection if moving towards delivery prior to 32 weeks with 4g bolus followed by 2g/hr maintenance until delivery  - Routine indications for emergent delivery including maternal decompensation or fetal compromise  - Patient had desired to have a trial of labor after a prior  section. In setting of decompensated HF, will discuss delivery planning moving forward as she may not be a candidate to valsalva in labor. at this time, would recommend repeat CS, however, will continue to discuss this  - S/p  section consent on admission  - Patient requires routine prenatal care while in house with next milestone at 28 weeks when typically Tdap is administered, as well as repeat CBC, RPR (syphilis screening), and type and screen to evaluate antibodies, as well as one hour glucose challenge test (GCT)  - Upon review of records, GCT is elevated. Will need feasting 3 hour GTT. Will discuss with pt prior to  ordering    Yany Power MD  Maternal Fetal Medicine

## 2022-12-18 ENCOUNTER — TRANSFERRED RECORDS (OUTPATIENT)
Dept: HEALTH INFORMATION MANAGEMENT | Facility: CLINIC | Age: 32
End: 2022-12-18

## 2022-12-18 LAB
ALBUMIN SERPL BCG-MCNC: 3 G/DL (ref 3.5–5.2)
ALP SERPL-CCNC: 81 U/L (ref 35–104)
ALT SERPL W P-5'-P-CCNC: 22 U/L (ref 10–35)
ANION GAP SERPL CALCULATED.3IONS-SCNC: 13 MMOL/L (ref 7–15)
ANION GAP SERPL CALCULATED.3IONS-SCNC: 9 MMOL/L (ref 7–15)
AST SERPL W P-5'-P-CCNC: 33 U/L (ref 10–35)
BASE EXCESS BLDV CALC-SCNC: -5.1 MMOL/L (ref -7.7–1.9)
BILIRUB SERPL-MCNC: 0.3 MG/DL
BUN SERPL-MCNC: 7.6 MG/DL (ref 6–20)
BUN SERPL-MCNC: 9.6 MG/DL (ref 6–20)
CALCIUM SERPL-MCNC: 9.2 MG/DL (ref 8.6–10)
CALCIUM SERPL-MCNC: 9.4 MG/DL (ref 8.6–10)
CHLORIDE SERPL-SCNC: 101 MMOL/L (ref 98–107)
CHLORIDE SERPL-SCNC: 104 MMOL/L (ref 98–107)
CREAT SERPL-MCNC: 0.58 MG/DL (ref 0.51–0.95)
CREAT SERPL-MCNC: 0.67 MG/DL (ref 0.51–0.95)
DEPRECATED HCO3 PLAS-SCNC: 17 MMOL/L (ref 22–29)
DEPRECATED HCO3 PLAS-SCNC: 17 MMOL/L (ref 22–29)
ERYTHROCYTE [DISTWIDTH] IN BLOOD BY AUTOMATED COUNT: 14.9 % (ref 10–15)
GFR SERPL CREATININE-BSD FRML MDRD: >90 ML/MIN/1.73M2
GFR SERPL CREATININE-BSD FRML MDRD: >90 ML/MIN/1.73M2
GLUCOSE SERPL-MCNC: 105 MG/DL (ref 70–99)
GLUCOSE SERPL-MCNC: 141 MG/DL (ref 70–99)
HCO3 BLDV-SCNC: 19 MMOL/L (ref 21–28)
HCT VFR BLD AUTO: 31.1 % (ref 35–47)
HGB BLD-MCNC: 9.8 G/DL (ref 11.7–15.7)
MAGNESIUM SERPL-MCNC: 1.6 MG/DL (ref 1.7–2.3)
MCH RBC QN AUTO: 26.1 PG (ref 26.5–33)
MCHC RBC AUTO-ENTMCNC: 31.5 G/DL (ref 31.5–36.5)
MCV RBC AUTO: 83 FL (ref 78–100)
O2/TOTAL GAS SETTING VFR VENT: 0 %
OXYHGB MFR BLDV: 94 % (ref 70–75)
PCO2 BLDV: 32 MM HG (ref 40–50)
PH BLDV: 7.39 [PH] (ref 7.32–7.43)
PHOSPHATE SERPL-MCNC: 4.2 MG/DL (ref 2.5–4.5)
PLATELET # BLD AUTO: 200 10E3/UL (ref 150–450)
PO2 BLDV: 79 MM HG (ref 25–47)
POTASSIUM SERPL-SCNC: 3.8 MMOL/L (ref 3.4–5.3)
POTASSIUM SERPL-SCNC: 4.6 MMOL/L (ref 3.4–5.3)
PROT SERPL-MCNC: 6.5 G/DL (ref 6.4–8.3)
RBC # BLD AUTO: 3.75 10E6/UL (ref 3.8–5.2)
SODIUM SERPL-SCNC: 127 MMOL/L (ref 136–145)
SODIUM SERPL-SCNC: 134 MMOL/L (ref 136–145)
UFH PPP CHRO-ACNC: 0.3 IU/ML
WBC # BLD AUTO: 11.4 10E3/UL (ref 4–11)

## 2022-12-18 PROCEDURE — 250N000013 HC RX MED GY IP 250 OP 250 PS 637: Performed by: STUDENT IN AN ORGANIZED HEALTH CARE EDUCATION/TRAINING PROGRAM

## 2022-12-18 PROCEDURE — 250N000013 HC RX MED GY IP 250 OP 250 PS 637: Performed by: INTERNAL MEDICINE

## 2022-12-18 PROCEDURE — 82805 BLOOD GASES W/O2 SATURATION: CPT

## 2022-12-18 PROCEDURE — 80053 COMPREHEN METABOLIC PANEL: CPT

## 2022-12-18 PROCEDURE — 85014 HEMATOCRIT: CPT

## 2022-12-18 PROCEDURE — 250N000011 HC RX IP 250 OP 636: Performed by: STUDENT IN AN ORGANIZED HEALTH CARE EDUCATION/TRAINING PROGRAM

## 2022-12-18 PROCEDURE — 36415 COLL VENOUS BLD VENIPUNCTURE: CPT

## 2022-12-18 PROCEDURE — 999N000044 HC STATISTIC CVC DRESSING CHANGE

## 2022-12-18 PROCEDURE — 84100 ASSAY OF PHOSPHORUS: CPT | Performed by: INTERNAL MEDICINE

## 2022-12-18 PROCEDURE — 36415 COLL VENOUS BLD VENIPUNCTURE: CPT | Performed by: INTERNAL MEDICINE

## 2022-12-18 PROCEDURE — 83735 ASSAY OF MAGNESIUM: CPT | Performed by: INTERNAL MEDICINE

## 2022-12-18 PROCEDURE — 250N000013 HC RX MED GY IP 250 OP 250 PS 637: Performed by: NURSE PRACTITIONER

## 2022-12-18 PROCEDURE — 85520 HEPARIN ASSAY: CPT

## 2022-12-18 PROCEDURE — 99232 SBSQ HOSP IP/OBS MODERATE 35: CPT | Mod: GC | Performed by: STUDENT IN AN ORGANIZED HEALTH CARE EDUCATION/TRAINING PROGRAM

## 2022-12-18 PROCEDURE — 250N000011 HC RX IP 250 OP 636: Performed by: INTERNAL MEDICINE

## 2022-12-18 PROCEDURE — 250N000013 HC RX MED GY IP 250 OP 250 PS 637

## 2022-12-18 PROCEDURE — 214N000001 HC R&B CCU UMMC

## 2022-12-18 RX ORDER — FUROSEMIDE 40 MG
40 TABLET ORAL DAILY
Status: DISCONTINUED | OUTPATIENT
Start: 2022-12-18 | End: 2022-12-18

## 2022-12-18 RX ORDER — POTASSIUM CHLORIDE 750 MG/1
20 TABLET, EXTENDED RELEASE ORAL ONCE
Status: COMPLETED | OUTPATIENT
Start: 2022-12-18 | End: 2022-12-18

## 2022-12-18 RX ORDER — FUROSEMIDE 40 MG
40 TABLET ORAL DAILY
Status: DISCONTINUED | OUTPATIENT
Start: 2022-12-19 | End: 2023-01-10

## 2022-12-18 RX ADMIN — MAGNESIUM OXIDE TAB 400 MG (241.3 MG ELEMENTAL MG) 400 MG: 400 (241.3 MG) TAB at 19:35

## 2022-12-18 RX ADMIN — SODIUM CHLORIDE, PRESERVATIVE FREE 5 ML: 5 INJECTION INTRAVENOUS at 16:15

## 2022-12-18 RX ADMIN — METOPROLOL TARTRATE 50 MG: 50 TABLET, FILM COATED ORAL at 06:30

## 2022-12-18 RX ADMIN — METOPROLOL TARTRATE 50 MG: 50 TABLET, FILM COATED ORAL at 17:41

## 2022-12-18 RX ADMIN — MAGNESIUM OXIDE TAB 400 MG (241.3 MG ELEMENTAL MG) 400 MG: 400 (241.3 MG) TAB at 09:21

## 2022-12-18 RX ADMIN — HEPARIN SODIUM 2250 UNITS/HR: 10000 INJECTION, SOLUTION INTRAVENOUS at 02:15

## 2022-12-18 RX ADMIN — POTASSIUM CHLORIDE 40 MEQ: 40 SOLUTION ORAL at 09:19

## 2022-12-18 RX ADMIN — HEPARIN SODIUM 2250 UNITS/HR: 10000 INJECTION, SOLUTION INTRAVENOUS at 12:40

## 2022-12-18 RX ADMIN — METOPROLOL TARTRATE 50 MG: 50 TABLET, FILM COATED ORAL at 11:16

## 2022-12-18 RX ADMIN — HEPARIN SODIUM 2250 UNITS/HR: 10000 INJECTION, SOLUTION INTRAVENOUS at 23:18

## 2022-12-18 RX ADMIN — METOPROLOL TARTRATE 50 MG: 50 TABLET, FILM COATED ORAL at 23:39

## 2022-12-18 RX ADMIN — POTASSIUM CHLORIDE 20 MEQ: 750 TABLET, EXTENDED RELEASE ORAL at 11:16

## 2022-12-18 RX ADMIN — FUROSEMIDE 20 MG: 10 INJECTION, SOLUTION INTRAMUSCULAR; INTRAVENOUS at 09:20

## 2022-12-18 RX ADMIN — PRENATAL VIT W/ FE FUMARATE-FA TAB 27-0.8 MG 1 TABLET: 27-0.8 TAB at 09:20

## 2022-12-18 ASSESSMENT — ACTIVITIES OF DAILY LIVING (ADL)
ADLS_ACUITY_SCORE: 20

## 2022-12-18 NOTE — PROGRESS NOTES
Cardiology Progress Note    ASSESSMENT/PLAN:  Anjali Carmen is a 32 year old  at 25w4d w/ no significant past medical history, who presents as a transfer from  to [facility] on 2022 for further management of newly diagnosed, acute decompensated systolic heart failure (LVEF 20%) as well as high risk delivery.     Changes Today:   - Continue metoprolol tartrate to 50mg q6H  - Transitioned to PO Lasix  - Decreased lab frequency  - Keep K > 4.5 and Mg > 2.5  - Plan for GTT on  per MFM    # Acute decompensated systolic and diastolic heart failure  # ACC/AHA Stage C systolic heart failure  # Heart failure with reduced EF (LVEF 20%)  # Mild right ventricular systolic dysfunction  # Non-sustained VT  Etiology of HFrEF: uncertain at this time.  Concern for familial dilated cardiomyopathy given history of CM in father at 30 years of age. Last pregnancy in , timeline not fitting in for PPCM. Greater concern for NICM over ICMP, however, TTE on  with thinned out LV wall and notable regional wall motion abnormalities. Coronary angiogram done on  with no significat lesions seen.  No known history of pre-existing structural heart disease prior to this admission.  1. Patient hemodynamically stable with preserved end-organ function. No indication for inotrope support/MCS at this time.   2. GDMT and diuresis as outlined below.  Traditional afterload targets for poor LV function and low LVEF of 20-25% cannot be met due to need for fetoplacental circulation.  Will target /80 and MAP > 65. Repeat transthoracic echocardiogram done on  notable for severely reduced left ventricular function with an LVEF of 20-25%. Also notable for RWM (details below) Coronanry angiogram on  without significant CAD.   3. Will consider cardiac MRI w.r.t etiology of cardiomyopathy once clinically permissible (likely to be done post partum).  4. Currently on heparin gtt for Afib and risk of LV  thrombus formation given low LVEF and hypercoagulable state of pregnancy.  GDMT:   ACEi/ARB/ARNI: contraindicated due to pregnancy  BB: metoprolol tartrate 50 mg q6hr  Aldosterone antagonist: high adverse risk medication in pregnancy  SCD prophylaxis: does not meet criteria for implant  Fluid status: Lasix 40mg PO daily     # New onset possible paroxysmal atrial fibrillation  [facility] [place]- isolated episode, EKG unavailable for review. Currently in NSR at [facility].  - Continue to monitor rhythm on telemetry.   - Currently on heparin gtt for AC, however, [id number] score of 2 (sex, congestive heart failure) and isolated episode of Afib. Will continue for now as its safe in pregnancy (discussed with MFM).     # Mild anemia  HGB 10.2 g/dL on arrival (MCV 79). Retic count 1.9%  Etiology: AVINASH vs anemia in the setting of HF and pregnancy.  - Check iron, ferritin, TIBC levels.   - Limit blood draws.     # Pregnancy  Gestational age 25 wks and 6 days.  - Appreciate MFM input., they are closely following with daily checks. They can be reached via their pager (listed and updated regularly on the summary page)  - OB US for fetal growth done on 12/8/22 with normal fetal findings and no abnormalities  - Plan for GTT on 12/19    Prophylaxis:  DVT: heparin gtt   Disposition: Floor  Code Status: Full      Plan discussed with Dr. Ken M.D., who is in agreement. Please, see staff addendum for changes/additions to the plan.    Shante Sharpe MD  Internal Medicine, PGY-1  Pager: [phone]    ===================================================================    SUBJECTIVE:   No acute events overnight. Nursing notes reviewed. Patient states that she did not experience palpitations overnight. Denies chest pain, SOB, PND, orthopnea, pre-syncope, syncope. Reports fetal movement. Resting comfortably in bed.     OBJECTIVE:  /67 (BP Location: Right arm, Cuff Size: Adult Regular)   Pulse 77   Temp 98  F  "(36.7  C) (Oral)   Resp 18   Ht 1.753 m (5' 9\")   Wt 133.6 kg (294 lb 8 oz)   SpO2 100%   BMI 43.49 kg/m    Temp (24hrs), Av.2  F (36.8  C), Min:97.9  F (36.6  C), Max:98.7  F (37.1  C)    GEN: pleasant, no acute distress  HEENT: no icterus  CV: RRR, normal s1/s2, no murmurs.   CHEST: clear to ausculation bilaterally, no rales or wheezing  ABD: soft, non-tender, normal active bowel sounds  EXTR: pulses +2 bilaterally. Trace LE edema.   NEURO: alert oriented, speech fluent/appropriate, motor grossly nonfocal    Telemetry (past 12 hrs):  - Longest run of NSVT 5 beats  - Frequent PACs and PVCs (max 11/min)    Labs: reviewed in EMR  CBC  Recent Labs   Lab 22  0859 22  0640 22  0648 12/15/22  0646   WBC 11.4* 11.0 10.7 12.3*   RBC 3.75* 3.97 4.02 3.96   HGB 9.8* 10.2* 10.3* 10.0*   HCT 31.1* 32.8* 32.4* 32.3*   MCV 83 83 81 82   MCH 26.1* 25.7* 25.6* 25.3*   MCHC 31.5 31.1* 31.8 31.0*   RDW 14.9 14.7 14.7 14.6    231 246 227     BMP  Recent Labs   Lab 22  0859 22  0640 22  0648 12/15/22  0646 22  0631 22  1612 22  0705 22  0828 22  0453   * 135* 134* 133* 134*   < > 134*   < > 133*   POTASSIUM 3.8 4.0 4.2 4.3 4.3   < > 4.5   < > 4.1   CHLORIDE 101 105 104 106 105   < > 104   < > 104   CO2 17* 17* 19* 16* 18*   < > 16*   < > 19*   ANIONGAP 9 13 11 11 11   < > 14   < > 10   * 76 95 83 89   < > 83   < > 100*   BUN 7.6 10.6 11.5 11.7 11.0   < > 11.7   < > 13.4   CR 0.58 0.62 0.64 0.61 0.63   < > 0.65   < > 0.70   GFRESTIMATED >90 >90 >90 >90 >90   < > >90   < > >90   AYDEN 9.2 9.5 9.7 8.8 9.1   < > 10.0   < > 9.4   MAG  --  1.6* 1.7 1.7 1.7   < > 1.7  --  1.8   PHOS  --   --  4.4 4.5  --   --  5.2*  --  4.8*    < > = values in this interval not displayed.     Troponins:   No results found for: TROPI, TROPONIN, TROPR, TROPN  INR  No lab results found in last 7 days.    Echocardiogram (22):  Interpretation Summary  Left " ventricular function is decreased. The ejection fraction is 20-25%  (severely reduced). Severe diffuse hypokinesis is present. There is wall  thinning and akinesis of the basal-mid inferior, basal-mid inferoseptal and  basal inferolateral segments. There is akinesis of the apical septum. This is  compatible with ischemic cardiomyopathy.  Mild right ventricular dilation is present. Global right ventricular function  is moderately reduced.  Mild tricuspid insufficiency is present.  Pulmonary artery systolic pressure is normal.  IVC diameter and respiratory changes fall into an intermediate range  suggesting an RA pressure of 8 mmHg.  No pericardial effusion is present.    Coronary Angiogram:  12/7/22:   Formal results pending, but no obstructive disease seen on preliminary evaluation.     Imaging: reviewed in EMR.

## 2022-12-18 NOTE — PLAN OF CARE
Care provided from [id number]    Neuro: A&Ox4. Pupils are equal, round, and reactive to light. Speech is logical and clear. Pt denies headache.  Cardiac: Pt is on tele and has been in sinus rhythm this shift with occasional PVC/PAC. Pt denies chest pain or palpitations. Per tele, pt had episode of aberrant  A-fib at approximately 1550 while pt was taking a nap. Pt was asymptomatic and provider was notified.    Respiratory: Pt has been sating 98% or greater on room air. Pt denies shortness of breath or difficulty breathing.   GI/: Pt is independent and is up to bathroom as needed. Pt denies difficulty voiding or having a bowel movement. Bowel sounds audible in all four quadrants. Pt denies nausea. No bowel movement reported this shift.   Diet/appetite: Pt is on low sodium diet. Pt ordered and ate 100% of breakfast this AM. Pt also ate snacks that family brought late this AM. Pt ate 100% of lunch this afternoon.   Activity:  Pt is independent. Pt was up to bathroom x3.  Pain: Pt denies pain and rates 0/10  Skin: No deficits noted.  LDA's: Left PICC    Plan: Continue with POC. Notify Cards #2 team with changes.

## 2022-12-18 NOTE — PLAN OF CARE
D: 32 year old  at 25w4d by 6w1d US admitted to CCU for acute HFrEF, cardiac arrhythmia after transferring from [facility].       PRN:   Tele: SR w frequent PVC & PACs  O2: RA  Mobility: ind     A: Neuro: A/O x4.  Call light appropriate.  Able to make needs known.  Respiratory:  On room air.  Lung sounds clear.  Denies shortness of breath at rest.  Cardiac: VSS.  SR.   GI: Last BM .  No report of nausea or vomiting.  : Voiding adequate clear, yellow urine.  Endo:  Blood sugars ACHS.  Skin:  Intact  LDA: PICC DL    Pain: Denies  Isolation:   Precautions  Diet: 2g Na     P: Continue to monitor Pt status and report changes to Cards 2.      Houston Barr RN Goal Outcome Evaluation:      Plan of Care Reviewed With: patient    Overall Patient Progress: no change

## 2022-12-18 NOTE — PLAN OF CARE
Pt denies contractions, leaking of fluid, vaginal bleeding. Fetal assessment reactive see flow sheet for NST interpretation. Will continue with EFM assessments per order.

## 2022-12-18 NOTE — PROGRESS NOTES
Shift: 700    32 year old  at 25w4d by 6w1d US admitted to CCU for acute HFrEF, cardiac arrhythmia after transferring from [facility].      VS: Temp: 97.9  F (36.6  C) Temp src: Oral BP: 96/47 Pulse: 86   Resp: 16 SpO2: 98 % O2 Device: None (Room air)       Pain: Denies pain.   Neuro: A&Ox4. Calls appropriately. Cooperative with care.   Cardiac:   SR w/PAC's/PVC's. Metoprolol dose increased this shift to 50mg Q6hr. Heparin 2250, last Hep10a therapeutic at 0.28, recheck in AM. Per Medical team K+ 4.0 is WDL, no additional replacement needed. Mg low at 1.6, replaced with oral and IV Mg.  Respiratory: Lung sounds clear/diminished on RA.   GI/Diet/Appetite: 2gm Na diet with good appetite. LBM 12/17. Glucose gestational screening completed this shift.   :  Voiding w/adequate output.   LDA's: PICC, DL LUE.   Skin: Intact.   Activity: Up ad danis.  Tests/Procedures:   Pertinent Labs/Lab Collection: Mg replacement this shift. Scheduled KCl 40meq given.      Plan: Continue to monitor for irregular cardiac rhythm. Contact Cards 2 w/questions/concerns.

## 2022-12-18 NOTE — CARE PLAN
Data: Pt denies contractions, vaginal bleed, leaking of fluid, or decreased fetal movement. Fetal assessment Reactive, see flowsheet.  Interventions: Continue uterine/fetal assessment per order.  Observe for and notify care provider of indications of progressing labor or signs of fetal/maternal compromise.

## 2022-12-18 NOTE — PROGRESS NOTES
MFM Antepartum Progress Note    Subjective:   Spoke with Anjali over the phone. Feeling better today than yesterday. Reviewed the elevated one hour glucose. No contractions, loss of fluid, vaginal bleeding. Feeling fetal movement.    Objective:  Vitals:    22 0500 22 0630 22 0900 22 1117   BP:  101/56 104/58 106/67   BP Location:   Right arm Right arm   Cuff Size:   Adult Regular Adult Regular   Pulse:   72 77   Resp:    18   Temp:   97.8  F (36.6  C) 98  F (36.7  C)   TempSrc:   Oral Oral   SpO2:   99% 100%   Weight: 133.6 kg (294 lb 8 oz)      Height:         I/O last 3 completed shifts:  In: 2877.05 [P.O.:1800; I.V.:1077.05]  Out: -     Gen: No acute distress  Resp: Speaking full sentences    Assessment/Plan:   Anjali Carmen is a 32 year old  at 25w6d by 6w1d US admitted to CCU for acute HFrEF, cardiac arrhythmia after transferring from [facility].     The patient has had an echocardiogram since her arrival concerning for ischemic cardiomyopathy, which was ruled out by a cardiac catheterization on  with no coronary disease. Continuing to medically manage dilated cardiomyopathy with metoprolol and diuresis per cardiology team with improvement in her symptoms. She will be monitored inpatient with continuous telemetry given her risk of arrhythmia. She is high risk for volume overload and worsening of symptoms in the third trimester and postpartum.    MFM will continue to follow along closely and are available  at [phone].    # Acute decompensated HFrEF  # Possible atrial fibrillation, non-sustained VT  - Admitted to CICU for management of new cardiac diagnoses, now transferred out of ICU  - BP goal 130/80, MAP >65. If patient begins to have blood pressures >160/110 for at least 15 minutes, call MFM for guidance of management, although typically will initiate treatment with 5-10 mg of IV hydralazine or 20 mg of IV labetalol  - Agree with continued anticoagulation  per primary team. Will need to stop anticoagulation prior to delivery. For regional anesthesia to be an option, we would need to stop anticoagulation/antiplatelet therapy. She will need an anesthesia consult and multi-disciplinary discussion regarding this.  - Metoprolol is safe in pregnancy with no dose restrictions  - There is no current indication for delivery, but if worsening symptoms of heart failure were noted, delivery may be indicated at that time.      # Pregnancy  - Comprehensive ultrasound  with growth at 76th percentile, repeat growth US in 3 weeks ()  - Non-stress test (NST) by L&D RN twice daily, consider daily NST once stable  - Betamethasone (BMZ) given - at OSH for fetal lung maturity, rescue BMZ typically not repeated until at least 2 weeks from initial course and if there is concern for delivery in the next week  - Magnesium for neuroprotection if moving towards delivery prior to 32 weeks with 4g bolus followed by 2g/hr maintenance until delivery  - Routine indications for emergent delivery including maternal decompensation or fetal compromise  - Patient had desired to have a trial of labor after a prior  section. In setting of decompensated HF, will discuss delivery planning moving forward as she may not be a candidate to valsalva in labor. at this time, would recommend repeat CS, however, will continue to discuss this  - S/p  section consent on admission  - Patient requires routine prenatal care while in house with next milestone at 28 weeks when typically Tdap is administered, as well as repeat CBC, RPR (syphilis screening), and type and screen to evaluate antibodies   - Will order GTT for tomorrow morning.    Yany Power MD  Maternal Fetal Medicine   independent

## 2022-12-18 NOTE — PROVIDER NOTIFICATION
Time of notification: 4:05 PM  Provider notified: Caleb Grayson (Cards 2 resident)  Patient status: stable/asymptomatic  Temp:  [97.8  F (36.6  C)-98.1  F (36.7  C)] 98.1  F (36.7  C)  Pulse:  [72-86] 79  Resp:  [16-18] 18  BP: ()/(45-67) 91/45  SpO2:  [98 %-100 %] 98 %     Per tele, pt had episode of abarent a-fib at 1550 while taking a nap.      Orders received: None

## 2022-12-19 ENCOUNTER — TRANSFERRED RECORDS (OUTPATIENT)
Dept: HEALTH INFORMATION MANAGEMENT | Facility: CLINIC | Age: 32
End: 2022-12-19

## 2022-12-19 LAB
ALBUMIN SERPL BCG-MCNC: 3.4 G/DL (ref 3.5–5.2)
ALP SERPL-CCNC: 94 U/L (ref 35–104)
ALT SERPL W P-5'-P-CCNC: 24 U/L (ref 10–35)
ANION GAP SERPL CALCULATED.3IONS-SCNC: 13 MMOL/L (ref 7–15)
AST SERPL W P-5'-P-CCNC: 35 U/L (ref 10–35)
BASE EXCESS BLDV CALC-SCNC: -5.3 MMOL/L (ref -7.7–1.9)
BASOPHILS # BLD AUTO: 0.1 10E3/UL (ref 0–0.2)
BASOPHILS NFR BLD AUTO: 0 %
BILIRUB SERPL-MCNC: 0.3 MG/DL
BUN SERPL-MCNC: 11.1 MG/DL (ref 6–20)
CALCIUM SERPL-MCNC: 9.8 MG/DL (ref 8.6–10)
CHLORIDE SERPL-SCNC: 105 MMOL/L (ref 98–107)
CREAT SERPL-MCNC: 0.69 MG/DL (ref 0.51–0.95)
DEPRECATED HCO3 PLAS-SCNC: 17 MMOL/L (ref 22–29)
EOSINOPHIL # BLD AUTO: 0.1 10E3/UL (ref 0–0.7)
EOSINOPHIL NFR BLD AUTO: 1 %
ERYTHROCYTE [DISTWIDTH] IN BLOOD BY AUTOMATED COUNT: 15 % (ref 10–15)
GFR SERPL CREATININE-BSD FRML MDRD: >90 ML/MIN/1.73M2
GLUCOSE BLDC GLUCOMTR-MCNC: 122 MG/DL (ref 70–99)
GLUCOSE BLDC GLUCOMTR-MCNC: 77 MG/DL (ref 70–99)
GLUCOSE P FAST SERPL-MCNC: 166 MG/DL (ref 60–94)
GLUCOSE SERPL-MCNC: 127 MG/DL (ref 70–99)
GLUCOSE SERPL-MCNC: 135 MG/DL (ref 70–99)
GLUCOSE SERPL-MCNC: 74 MG/DL (ref 70–99)
HCO3 BLDV-SCNC: 19 MMOL/L (ref 21–28)
HCT VFR BLD AUTO: 33.5 % (ref 35–47)
HGB BLD-MCNC: 10.5 G/DL (ref 11.7–15.7)
IMM GRANULOCYTES # BLD: 0.2 10E3/UL
IMM GRANULOCYTES NFR BLD: 1 %
LYMPHOCYTES # BLD AUTO: 3 10E3/UL (ref 0.8–5.3)
LYMPHOCYTES NFR BLD AUTO: 25 %
MAGNESIUM SERPL-MCNC: 1.6 MG/DL (ref 1.7–2.3)
MCH RBC QN AUTO: 25.6 PG (ref 26.5–33)
MCHC RBC AUTO-ENTMCNC: 31.3 G/DL (ref 31.5–36.5)
MCV RBC AUTO: 82 FL (ref 78–100)
MONOCYTES # BLD AUTO: 0.6 10E3/UL (ref 0–1.3)
MONOCYTES NFR BLD AUTO: 5 %
NEUTROPHILS # BLD AUTO: 8 10E3/UL (ref 1.6–8.3)
NEUTROPHILS NFR BLD AUTO: 68 %
NRBC # BLD AUTO: 0 10E3/UL
NRBC BLD AUTO-RTO: 0 /100
O2/TOTAL GAS SETTING VFR VENT: 21 %
OXYHGB MFR BLDV: 90 % (ref 70–75)
PCO2 BLDV: 32 MM HG (ref 40–50)
PH BLDV: 7.38 [PH] (ref 7.32–7.43)
PLATELET # BLD AUTO: 253 10E3/UL (ref 150–450)
PO2 BLDV: 66 MM HG (ref 25–47)
POTASSIUM SERPL-SCNC: 4.2 MMOL/L (ref 3.4–5.3)
PROT SERPL-MCNC: 7.3 G/DL (ref 6.4–8.3)
RBC # BLD AUTO: 4.1 10E6/UL (ref 3.8–5.2)
SODIUM SERPL-SCNC: 135 MMOL/L (ref 136–145)
UFH PPP CHRO-ACNC: 0.4 IU/ML
WBC # BLD AUTO: 11.9 10E3/UL (ref 4–11)

## 2022-12-19 PROCEDURE — 250N000013 HC RX MED GY IP 250 OP 250 PS 637: Performed by: NURSE PRACTITIONER

## 2022-12-19 PROCEDURE — 83735 ASSAY OF MAGNESIUM: CPT

## 2022-12-19 PROCEDURE — 250N000011 HC RX IP 250 OP 636: Performed by: INTERNAL MEDICINE

## 2022-12-19 PROCEDURE — 250N000013 HC RX MED GY IP 250 OP 250 PS 637

## 2022-12-19 PROCEDURE — 36415 COLL VENOUS BLD VENIPUNCTURE: CPT

## 2022-12-19 PROCEDURE — 250N000011 HC RX IP 250 OP 636: Performed by: STUDENT IN AN ORGANIZED HEALTH CARE EDUCATION/TRAINING PROGRAM

## 2022-12-19 PROCEDURE — 82947 ASSAY GLUCOSE BLOOD QUANT: CPT | Performed by: INTERNAL MEDICINE

## 2022-12-19 PROCEDURE — 85520 HEPARIN ASSAY: CPT

## 2022-12-19 PROCEDURE — 214N000001 HC R&B CCU UMMC

## 2022-12-19 PROCEDURE — 80053 COMPREHEN METABOLIC PANEL: CPT

## 2022-12-19 PROCEDURE — 82805 BLOOD GASES W/O2 SATURATION: CPT

## 2022-12-19 PROCEDURE — 250N000013 HC RX MED GY IP 250 OP 250 PS 637: Performed by: STUDENT IN AN ORGANIZED HEALTH CARE EDUCATION/TRAINING PROGRAM

## 2022-12-19 PROCEDURE — 250N000009 HC RX 250: Performed by: STUDENT IN AN ORGANIZED HEALTH CARE EDUCATION/TRAINING PROGRAM

## 2022-12-19 PROCEDURE — 85025 COMPLETE CBC W/AUTO DIFF WBC: CPT

## 2022-12-19 PROCEDURE — 99232 SBSQ HOSP IP/OBS MODERATE 35: CPT | Mod: GC | Performed by: STUDENT IN AN ORGANIZED HEALTH CARE EDUCATION/TRAINING PROGRAM

## 2022-12-19 PROCEDURE — 82947 ASSAY GLUCOSE BLOOD QUANT: CPT | Performed by: STUDENT IN AN ORGANIZED HEALTH CARE EDUCATION/TRAINING PROGRAM

## 2022-12-19 PROCEDURE — 36415 COLL VENOUS BLD VENIPUNCTURE: CPT | Performed by: STUDENT IN AN ORGANIZED HEALTH CARE EDUCATION/TRAINING PROGRAM

## 2022-12-19 PROCEDURE — 36415 COLL VENOUS BLD VENIPUNCTURE: CPT | Performed by: INTERNAL MEDICINE

## 2022-12-19 RX ORDER — MAGNESIUM SULFATE HEPTAHYDRATE 40 MG/ML
2 INJECTION, SOLUTION INTRAVENOUS ONCE
Status: COMPLETED | OUTPATIENT
Start: 2022-12-19 | End: 2022-12-19

## 2022-12-19 RX ADMIN — MAGNESIUM OXIDE TAB 400 MG (241.3 MG ELEMENTAL MG) 400 MG: 400 (241.3 MG) TAB at 09:13

## 2022-12-19 RX ADMIN — Medication 100 G: at 11:14

## 2022-12-19 RX ADMIN — METOPROLOL TARTRATE 50 MG: 50 TABLET, FILM COATED ORAL at 05:35

## 2022-12-19 RX ADMIN — HEPARIN SODIUM 2250 UNITS/HR: 10000 INJECTION, SOLUTION INTRAVENOUS at 22:08

## 2022-12-19 RX ADMIN — HEPARIN SODIUM 2250 UNITS/HR: 10000 INJECTION, SOLUTION INTRAVENOUS at 10:37

## 2022-12-19 RX ADMIN — METOPROLOL TARTRATE 50 MG: 50 TABLET, FILM COATED ORAL at 11:23

## 2022-12-19 RX ADMIN — PRENATAL VIT W/ FE FUMARATE-FA TAB 27-0.8 MG 1 TABLET: 27-0.8 TAB at 09:12

## 2022-12-19 RX ADMIN — MAGNESIUM OXIDE TAB 400 MG (241.3 MG ELEMENTAL MG) 400 MG: 400 (241.3 MG) TAB at 19:46

## 2022-12-19 RX ADMIN — POLYETHYLENE GLYCOL 3350 17 G: 17 POWDER, FOR SOLUTION ORAL at 09:14

## 2022-12-19 RX ADMIN — METOPROLOL TARTRATE 50 MG: 50 TABLET, FILM COATED ORAL at 23:47

## 2022-12-19 RX ADMIN — FUROSEMIDE 40 MG: 40 TABLET ORAL at 09:12

## 2022-12-19 RX ADMIN — POTASSIUM CHLORIDE 40 MEQ: 40 SOLUTION ORAL at 09:12

## 2022-12-19 RX ADMIN — MAGNESIUM SULFATE IN WATER 2 G: 40 INJECTION, SOLUTION INTRAVENOUS at 09:12

## 2022-12-19 RX ADMIN — METOPROLOL TARTRATE 50 MG: 50 TABLET, FILM COATED ORAL at 18:01

## 2022-12-19 RX ADMIN — SODIUM CHLORIDE, PRESERVATIVE FREE 5 ML: 5 INJECTION INTRAVENOUS at 16:17

## 2022-12-19 RX ADMIN — SODIUM CHLORIDE, PRESERVATIVE FREE 5 ML: 5 INJECTION INTRAVENOUS at 12:33

## 2022-12-19 ASSESSMENT — ACTIVITIES OF DAILY LIVING (ADL)
ADLS_ACUITY_SCORE: 20
ADLS_ACUITY_SCORE: 24
ADLS_ACUITY_SCORE: 20

## 2022-12-19 NOTE — PLAN OF CARE
Neuro: A&Ox4. Calm and quiet  Cardiac: Afebrile, VSS. SR with frequent PVCs and PACs; had 12beats of accelerated junctional tachycardia this AM. Taking PO lasix. Respiratory: RA, denies SOB  GI/: Voiding spontaneously. Reporting last BM 12/18  Diet/appetite: Tolerating low Na diet. Denies nausea.   Activity: Up with independently  Pain: Denies   Skin: No new deficits noted.  Lines: L PICCx2  Drains: n/a  Replacements: Mag, K, and Phos protocol ordered. Mag 1.6 replaced.      Heparin infusing at 2250, next Xa check in AM. Gestational glucose tolerance testing done this AM. Letter from MD faxed to pt employer. Copy in paper chart.  Continue to monitor pt status and report changes to Cards 2.     Problem: Heart Failure  Goal: Optimal Coping  Outcome: Progressing  Goal: Optimal Cardiac Output  Outcome: Progressing  Goal: Stable Heart Rate and Rhythm  Outcome: Progressing

## 2022-12-19 NOTE — PROGRESS NOTES
MFM Antepartum Progress Note    Subjective:   Denies contractions, vaginal bleeding, leaking of fluid, or decreased fetal movement. She is overall feeling well, except she is hungry, as she has been waiting for her 3 hour GTT and it has been delayed as the wrong dose of glucose drink was sent up early this morning and the new one has not arrived yet.  She reports mild stomach upset from PO potassium repletion, which she usually takes with food.    She was offered spiritual health consultation and declined. Her family returned home and her  will return later this week.     Objective:  Vitals:    22 2340 22 0440 22 0535 22 0759   BP: 115/68 97/61 104/60 100/70   BP Location: Right arm Right arm  Right arm   Cuff Size:       Pulse: 80 71  73   Resp: 16 16  16   Temp: 98  F (36.7  C) 97.9  F (36.6  C)  98.1  F (36.7  C)   TempSrc: Oral Oral  Oral   SpO2: 97% 99%  97%   Weight:  132.8 kg (292 lb 12.8 oz)     Height:         I/O last 3 completed shifts:  In: 1040 [P.O.:500; I.V.:540]  Out: -     Gen: No acute distress  Resp: Speaking full sentences    Assessment/Plan:   Anjali Carmen is a 32 year old  at 26w0d by 6w1d US admitted to for acute HFrEF, cardiac arrhythmia after transferring from [facility].     The patient has had an echocardiogram since her arrival concerning for ischemic cardiomyopathy, which was ruled out by a cardiac catheterization on  with no coronary disease. Continuing to medically manage dilated cardiomyopathy with metoprolol and diuresis per cardiology team with improvement in her symptoms. She will be monitored inpatient with continuous telemetry given her risk of arrhythmia. She is high risk for volume overload and worsening of symptoms in the third trimester and postpartum.    Shortly after seeing Anjali, the 3 hour glucose tolerance test was under way. If 0-1 of the values are elevated, she needs no further glucose monitoring from a pregnancy  perspective (defer to primary team for other indications). However, if 2-4 values are elevated, then she would have a diagnosis of gestational diabetes. This doesn't always require medication. Therefore, we would recommend checking fasting blood glucose in the AM, and then 1 or 2 hour postprandial glucose checks. Goal blood glucose for AM fasting is 95 mg/dL, 1 hour postprandial check is under 140 mg/dL, and for 2 hour postprandial is 125 mg/dL. If after about a week of monitoring, >50% of blood glucose values are elevated, then we would typically initiate insulin for treatment of gestational diabetes. We will watch for these results today and communicate these recommendations to the primary team depending on the results.    MFM will continue to follow along closely and are available  at [phone].    # Acute decompensated HFrEF  # Possible atrial fibrillation, non-sustained VT  - BP goal 130/80, MAP >65. If patient begins to have blood pressures >160/110 for at least 15 minutes, call MFM for guidance of management, although typically will initiate treatment with 5-10 mg of IV hydralazine or 20 mg of IV labetalol  - Will need to stop anticoagulation prior to delivery. For regional anesthesia to be an option, we would need to stop anticoagulation/antiplatelet therapy. She will need an anesthesia consult and multi-disciplinary discussion regarding this.  - Metoprolol is safe in pregnancy with no dose restrictions     # Pregnancy  - Repeat growth US in 3 weeks ()  - Non-stress test (NST) BID - has been reassuring thus far.   - Betamethasone (BMZ) given - at OSH for fetal lung maturity  - Magnesium for neuroprotection if moving towards delivery prior to 32 weeks with 4g bolus followed by 2g/hr maintenance until delivery  - Routine indications for emergent delivery including maternal decompensation or fetal compromise  - S/p  section consent on admission  - At 28 weeks: Tdap, as well as  repeat CBC, RPR, and type and screen    Meredith Soliz MD  Maternal Fetal Medicine Fellow    Physician Attestation     I, Luis Mast MD, saw this patient with the fellow and agree with the fellow's findings and plan of care as documented in the fellow s note.       I personally reviewed vital signs, medications, labs, imaging and EFM and was present for her visit at the bedside today. The above is an accurate representation of our care today. We appreciate the ongoing care of her cardiology team. We are happy to take her on the [facility] OB service if she ever reaches a point where this is an option for her.      Luis Mast MD  Maternal Fetal Medicine  Date of Service (when I saw the patient): 12/19/22

## 2022-12-19 NOTE — PROGRESS NOTES
Cardiology Progress Note    ASSESSMENT/PLAN:  Anjali Carmen is a 32 year old  at 25w4d w/ no significant past medical history, who presents as a transfer from [facility] to [facility] on 2022 for further management of newly diagnosed, acute decompensated systolic heart failure (LVEF 20%) as well as high risk delivery.     Changes Today:   - Continue furosemide 40mg daily PO and metoprolol tartrate to 50mg q6H  - Magnesium repleted  - Keep K > 4.5 and Mg > 2.5  - GTT today  - Work letter provided in chart    # Acute decompensated systolic and diastolic heart failure  # ACC/AHA Stage C systolic heart failure  # Heart failure with reduced EF (LVEF 20%)  # Mild right ventricular systolic dysfunction  # Non-sustained VT  Etiology of HFrEF: uncertain at this time.  Concern for familial dilated cardiomyopathy given history of CM in father at 30 years of age. Last pregnancy in , timeline not fitting in for PPCM. Greater concern for NICM over ICMP, however, TTE on  with thinned out LV wall and notable regional wall motion abnormalities. Coronary angiogram done on  with no significat lesions seen.  No known history of pre-existing structural heart disease prior to this admission.  1. Patient hemodynamically stable with preserved end-organ function. No indication for inotrope support/MCS at this time.   2. GDMT and diuresis as outlined below.  Traditional afterload targets for poor LV function and low LVEF of 20-25% cannot be met due to need for fetoplacental circulation.  Will target /80 and MAP > 65. Repeat transthoracic echocardiogram done on  notable for severely reduced left ventricular function with an LVEF of 20-25%. Also notable for RWM (details below) Coronanry angiogram on  without significant CAD.   3. Will consider cardiac MRI w.r.t etiology of cardiomyopathy once clinically permissible (likely to be done post partum).  4. Currently on heparin gtt for Afib and  risk of LV thrombus formation given low LVEF and hypercoagulable state of pregnancy.  GDMT:   ACEi/ARB/ARNI: contraindicated due to pregnancy  BB: metoprolol tartrate 50 mg q6hr  Aldosterone antagonist: high adverse risk medication in pregnancy  SCD prophylaxis: does not meet criteria for implant  Fluid status: Lasix 40mg PO daily     # New onset possible paroxysmal atrial fibrillation  [facility] [place]- isolated episode, EKG unavailable for review. Currently in NSR at [facility].  - Continue to monitor rhythm on telemetry.   - Currently on heparin gtt for AC, however, [id number] score of 2 (sex, congestive heart failure) and isolated episode of Afib. Will continue for now as its safe in pregnancy (discussed with MFM).     # Pregnancy  Gestational age 26 wks. Betamethasone (BMZ) given - at OSH for fetal lung maturity. OB US for fetal growth done on 22 with normal fetal findings and no abnormalities.  - Appreciate MFM input., they are closely following with daily checks; they can be reached via their pager (listed and updated regularly on the summary page)  - Plan for GTT on   - Plan for growth US on   - Non-stress test BID  - Magnesium for neuroprotection if moving towards delivery prior to 32 weeks with 4g bolus followed by 2g/hr maintenance until delivery  - Routine indications for emergent delivery including maternal decompensation or fetal compromise  - S/p  section consent on admission  - At 28 weeks: Tdap, as well as repeat CBC, RPR, and type and screen    # Mild anemia  HGB 10.2 g/dL on arrival (MCV 79). Retic count 1.9%  Etiology: AVINASH vs anemia in the setting of HF and pregnancy.  - Check iron, ferritin, TIBC levels.   - Limit blood draws.     Prophylaxis:  DVT: heparin gtt   Disposition: Floor  Code Status: Full      Plan discussed with Dr. Ken M.D., who is in agreement. Please, see staff addendum for changes/additions to the plan.    Shante Sharpe,  "MD  Internal Medicine, PGY-1  Pager: [phone]    ===================================================================    SUBJECTIVE:   No acute events overnight. Nursing notes reviewed. Patient feels well this morning. Feels hungry while awaiting GTT. Denies increasing palpitations. Denies chest pain, SOB, PND, orthopnea, pre-syncope, syncope. Reports fetal movement.    OBJECTIVE:  /77 (BP Location: Right arm)   Pulse 90   Temp 97.7  F (36.5  C) (Oral)   Resp 16   Ht 1.753 m (5' 9\")   Wt 132.8 kg (292 lb 12.8 oz)   SpO2 96%   BMI 43.24 kg/m    Temp (24hrs), Av.2  F (36.8  C), Min:97.9  F (36.6  C), Max:98.7  F (37.1  C)    GEN: pleasant, no acute distress  HEENT: no icterus  CV: RRR, normal s1/s2, no murmurs.   CHEST: clear to ausculation bilaterally, no rales or wheezing  ABD: soft, non-tender, normal active bowel sounds  EXTR: pulses +2 bilaterally. Trace LE edema.   NEURO: alert oriented, speech fluent/appropriate, motor grossly nonfocal    Labs: reviewed in EMR  CBC  Recent Labs   Lab 22  0859 22  0640 22  0648   WBC 11.9* 11.4* 11.0 10.7   RBC 4.10 3.75* 3.97 4.02   HGB 10.5* 9.8* 10.2* 10.3*   HCT 33.5* 31.1* 32.8* 32.4*   MCV 82 83 83 81   MCH 25.6* 26.1* 25.7* 25.6*   MCHC 31.3* 31.5 31.1* 31.8   RDW 15.0 14.9 14.7 14.7    200 231 246     BMP  Recent Labs   Lab 228 22  1133 22  0859 22  0640 22  0648 12/15/22  0646 22  1612 22  0705   * 134* 127* 135* 134* 133*   < > 134*   POTASSIUM 4.2 4.6 3.8 4.0 4.2 4.3   < > 4.5   CHLORIDE 105 104 101 105 104 106   < > 104   CO2 17* 17* 17* 17* 19* 16*   < > 16*   ANIONGAP 13 13 9 13 11 11   < > 14   GLC 74 141* 105* 76 95 83   < > 83   BUN 11.1 9.6 7.6 10.6 11.5 11.7   < > 11.7   CR 0.69 0.67 0.58 0.62 0.64 0.61   < > 0.65   GFRESTIMATED >90 >90 >90 >90 >90 >90   < > >90   AYDEN 9.8 9.4 9.2 9.5 9.7 8.8   < > 10.0   MAG 1.6* 1.6*  --  1.6* 1.7 1.7   < > 1.7 "   PHOS  --  4.2  --   --  4.4 4.5  --  5.2*    < > = values in this interval not displayed.     Troponins:   No results found for: TROPI, TROPONIN, TROPR, TROPN  INR  No lab results found in last 7 days.    Echocardiogram (12/6/22):  Interpretation Summary  Left ventricular function is decreased. The ejection fraction is 20-25%  (severely reduced). Severe diffuse hypokinesis is present. There is wall  thinning and akinesis of the basal-mid inferior, basal-mid inferoseptal and  basal inferolateral segments. There is akinesis of the apical septum. This is  compatible with ischemic cardiomyopathy.  Mild right ventricular dilation is present. Global right ventricular function  is moderately reduced.  Mild tricuspid insufficiency is present.  Pulmonary artery systolic pressure is normal.  IVC diameter and respiratory changes fall into an intermediate range  suggesting an RA pressure of 8 mmHg.  No pericardial effusion is present.    Coronary Angiogram:  12/7/22:   Formal results pending, but no obstructive disease seen on preliminary evaluation.     Imaging: reviewed in EMR.

## 2022-12-19 NOTE — PLAN OF CARE
D: 32 year old  at 25w4d by 6w1d US admitted to CCU for acute HFrEF, cardiac arrhythmia after transferring from [facility].       PRN:   Tele: SR w frequent PVC & PACs  O2: RA  Mobility: ind     A: Neuro: A/O x4.  Call light appropriate.  Able to make needs known.  Respiratory:  On room air.  Lung sounds clear.  Denies shortness of breath at rest.  Cardiac: VSS.  SR. Had a run of 10 beats of A-fib - provider notified  GI: Last BM .  No report of nausea or vomiting.  : Voiding adequate clear, yellow urine.  Skin:  Intact  LDA: PICC DL    Pain: Denies  Diet: 2g Na, NPO since 0000     P: Do glucose test this morning. Continue to monitor Pt status and report changes to Cards 2.        Goal Outcome Evaluation:      Plan of Care Reviewed With: patient    Overall Patient Progress: no change

## 2022-12-20 ENCOUNTER — TRANSFERRED RECORDS (OUTPATIENT)
Dept: HEALTH INFORMATION MANAGEMENT | Facility: CLINIC | Age: 32
End: 2022-12-20

## 2022-12-20 LAB
GLUCOSE BLDC GLUCOMTR-MCNC: 124 MG/DL (ref 70–99)
GLUCOSE BLDC GLUCOMTR-MCNC: 78 MG/DL (ref 70–99)
GLUCOSE BLDC GLUCOMTR-MCNC: 97 MG/DL (ref 70–99)
MAGNESIUM SERPL-MCNC: 1.7 MG/DL (ref 1.7–2.3)
PHOSPHATE SERPL-MCNC: 4.3 MG/DL (ref 2.5–4.5)
POTASSIUM SERPL-SCNC: 4.1 MMOL/L (ref 3.4–5.3)
UFH PPP CHRO-ACNC: 0.29 IU/ML

## 2022-12-20 PROCEDURE — 250N000013 HC RX MED GY IP 250 OP 250 PS 637: Performed by: STUDENT IN AN ORGANIZED HEALTH CARE EDUCATION/TRAINING PROGRAM

## 2022-12-20 PROCEDURE — 250N000011 HC RX IP 250 OP 636: Performed by: INTERNAL MEDICINE

## 2022-12-20 PROCEDURE — 99232 SBSQ HOSP IP/OBS MODERATE 35: CPT | Mod: GC | Performed by: STUDENT IN AN ORGANIZED HEALTH CARE EDUCATION/TRAINING PROGRAM

## 2022-12-20 PROCEDURE — 250N000013 HC RX MED GY IP 250 OP 250 PS 637

## 2022-12-20 PROCEDURE — 250N000013 HC RX MED GY IP 250 OP 250 PS 637: Performed by: NURSE PRACTITIONER

## 2022-12-20 PROCEDURE — 999N000007 HC SITE CHECK

## 2022-12-20 PROCEDURE — 84100 ASSAY OF PHOSPHORUS: CPT | Performed by: INTERNAL MEDICINE

## 2022-12-20 PROCEDURE — 250N000013 HC RX MED GY IP 250 OP 250 PS 637: Performed by: INTERNAL MEDICINE

## 2022-12-20 PROCEDURE — 85520 HEPARIN ASSAY: CPT | Performed by: INTERNAL MEDICINE

## 2022-12-20 PROCEDURE — 36592 COLLECT BLOOD FROM PICC: CPT | Performed by: INTERNAL MEDICINE

## 2022-12-20 PROCEDURE — 84132 ASSAY OF SERUM POTASSIUM: CPT | Performed by: INTERNAL MEDICINE

## 2022-12-20 PROCEDURE — 83735 ASSAY OF MAGNESIUM: CPT | Performed by: INTERNAL MEDICINE

## 2022-12-20 PROCEDURE — 214N000001 HC R&B CCU UMMC

## 2022-12-20 PROCEDURE — 999N000044 HC STATISTIC CVC DRESSING CHANGE

## 2022-12-20 RX ORDER — MAGNESIUM SULFATE HEPTAHYDRATE 40 MG/ML
2 INJECTION, SOLUTION INTRAVENOUS ONCE
Status: COMPLETED | OUTPATIENT
Start: 2022-12-20 | End: 2022-12-20

## 2022-12-20 RX ORDER — MAGNESIUM OXIDE 400 MG/1
800 TABLET ORAL 2 TIMES DAILY
Status: DISCONTINUED | OUTPATIENT
Start: 2022-12-20 | End: 2023-01-07

## 2022-12-20 RX ORDER — MAGNESIUM OXIDE 400 MG/1
400 TABLET ORAL ONCE
Status: COMPLETED | OUTPATIENT
Start: 2022-12-20 | End: 2022-12-20

## 2022-12-20 RX ORDER — POTASSIUM CHLORIDE 20MEQ/15ML
80 LIQUID (ML) ORAL DAILY
Status: DISCONTINUED | OUTPATIENT
Start: 2022-12-21 | End: 2022-12-22

## 2022-12-20 RX ADMIN — PRENATAL VIT W/ FE FUMARATE-FA TAB 27-0.8 MG 1 TABLET: 27-0.8 TAB at 08:29

## 2022-12-20 RX ADMIN — METOPROLOL TARTRATE 50 MG: 50 TABLET, FILM COATED ORAL at 18:15

## 2022-12-20 RX ADMIN — POTASSIUM CHLORIDE 40 MEQ: 40 SOLUTION ORAL at 08:28

## 2022-12-20 RX ADMIN — HEPARIN SODIUM 2250 UNITS/HR: 10000 INJECTION, SOLUTION INTRAVENOUS at 08:18

## 2022-12-20 RX ADMIN — FUROSEMIDE 40 MG: 40 TABLET ORAL at 08:30

## 2022-12-20 RX ADMIN — METOPROLOL TARTRATE 50 MG: 50 TABLET, FILM COATED ORAL at 23:50

## 2022-12-20 RX ADMIN — METOPROLOL TARTRATE 50 MG: 50 TABLET, FILM COATED ORAL at 06:13

## 2022-12-20 RX ADMIN — MAGNESIUM SULFATE IN WATER 2 G: 40 INJECTION, SOLUTION INTRAVENOUS at 08:28

## 2022-12-20 RX ADMIN — POLYETHYLENE GLYCOL 3350 17 G: 17 POWDER, FOR SOLUTION ORAL at 08:28

## 2022-12-20 RX ADMIN — HEPARIN SODIUM 2250 UNITS/HR: 10000 INJECTION, SOLUTION INTRAVENOUS at 20:30

## 2022-12-20 RX ADMIN — METOPROLOL TARTRATE 50 MG: 50 TABLET, FILM COATED ORAL at 11:37

## 2022-12-20 RX ADMIN — MAGNESIUM OXIDE TAB 400 MG (241.3 MG ELEMENTAL MG) 800 MG: 400 (241.3 MG) TAB at 18:15

## 2022-12-20 RX ADMIN — ACETAMINOPHEN 650 MG: 325 TABLET, FILM COATED ORAL at 23:59

## 2022-12-20 RX ADMIN — Medication 400 MG: at 14:18

## 2022-12-20 RX ADMIN — MAGNESIUM OXIDE TAB 400 MG (241.3 MG ELEMENTAL MG) 400 MG: 400 (241.3 MG) TAB at 08:30

## 2022-12-20 ASSESSMENT — ACTIVITIES OF DAILY LIVING (ADL)
ADLS_ACUITY_SCORE: 20

## 2022-12-20 NOTE — PLAN OF CARE
Neuro: A&Ox4. Calm and quiet  Cardiac: Afebrile, VSS. SR with frequent PVCs and PACs; Taking PO lasix.   Respiratory: RA, denies SOB  GI/: Voiding spontaneously. Reporting last BM 12/19 and taking miralax.  Diet/appetite: Tolerating low Na diet. Nausea this AM with medications relieved by drinking fluids and breakfast.   Activity: Up independently  Pain: Denies   Skin: No new deficits noted. Pt washed up, CHG wipes done.   Lines: L PICCx2, dressing and caps changed today  Drains: n/a  Replacements: Mag, K, and Phos protocol ordered. Mag 1.7 replaced.      Heparin infusing at 2250, next Xa check in AM.  Fasting glucose checks in AM and glucose checks 1-2hrs after meals. Continue to monitor pt status and report changes to Cards 2.        Problem: Heart Failure  Goal: Optimal Cardiac Output  Outcome: Progressing  Goal: Stable Heart Rate and Rhythm  Outcome: Progressing  Goal: Fluid and Electrolyte Balance  Outcome: Progressing

## 2022-12-20 NOTE — PROGRESS NOTES
MFM Antepartum Progress Note    Subjective:   Denies contractions, vaginal bleeding, leaking of fluid, or decreased fetal movement.    Objective:  Vitals:    22 2340 22 0414 22 0813 22 1132   BP: 111/68 106/56 112/53 115/69   BP Location: Right arm Right arm Right arm Right arm   Cuff Size: Adult Large Adult Large Adult Large Adult Large   Pulse: 87 82 76 90   Resp: 16 16 16 16   Temp: 97.8  F (36.6  C) 97.5  F (36.4  C) 97.8  F (36.6  C) 97.8  F (36.6  C)   TempSrc: Oral Oral Oral Oral   SpO2: 98% 98% 98% 99%   Weight:  132.7 kg (292 lb 9.6 oz)     Height:         I/O last 3 completed shifts:  In: 1338.01 [P.O.:800; I.V.:538.01]  Out: 1825 [Urine:1825]     Gen: No acute distress  Resp: Speaking full sentences    FHT: 135 bpm, moderate variability, accelerations present, no decelerations  Olean: no contractions or irritability  Appropriate for gestational age    Assessment/Plan:   Anjali Carmen is a 32 year old  at 26w1d by 6w1d US admitted to for acute HFrEF, cardiac arrhythmia after transferring from [facility].     The patient has had an echocardiogram since her arrival concerning for ischemic cardiomyopathy, which was ruled out by a cardiac catheterization on  with no coronary disease. Continuing to medically manage dilated cardiomyopathy with metoprolol and diuresis per cardiology team with improvement in her symptoms. She will be monitored inpatient with continuous telemetry given her risk of arrhythmia. She is high risk for volume overload and worsening of symptoms in the third trimester and postpartum.    Yesterday, 3 hour glucose test ruled in Anjali for gestational diabetes. Usually, patients need about 1-2 weeks of blood glucose monitoring to determine whether they are diet controlled or require medication. We offered Anjali a nutrition consult today, which can be helpful for some patients who hope to avoid medication. She declined, but is reassured the option  is available in the future if she wanted. However, sometimes despite a patient's best dietary efforts, pregnancy simply worsens glucose intolerance. Therefore, we would recommend checking fasting blood glucose in the AM, and then 1 or 2 hour postprandial glucose checks after Anjali's meals of choice. She doesn't have a strong preference which meals, so we will start with lunch and dinner and then this can be adjusted to her preference.    Goal blood glucose for AM fasting is 95 mg/dL, 1 hour postprandial check is under 140 mg/dL, and for 2 hour postprandial is 120 mg/dL. If after 2 weeks of monitoring, if >50% of blood glucose values are elevated, then we would typically initiate medication for treatment of gestational diabetes. We will watch closely and advise on medication as that data is collected. She strongly prefers to avoid insulin and is motivated to continue to be diligent about her dietary choices. Metformin is a completely reasonable alternative to insulin    MFM will continue to follow along closely and are available 24/7 at [phone].    # Acute decompensated HFrEF  # Possible atrial fibrillation, non-sustained VT  - BP goal 130/80, MAP >65. If patient begins to have blood pressures >160/110 for at least 15 minutes, call MFM for guidance of management, although typically will initiate treatment with 5-10 mg of IV hydralazine or 20 mg of IV labetalol  - Will need to stop anticoagulation prior to delivery. For regional anesthesia to be an option, we would need to stop anticoagulation/antiplatelet therapy. She will need an anesthesia consult and multi-disciplinary discussion regarding this.  - Metoprolol is safe in pregnancy with no dose restrictions     # Gestational Diabetes  See above    # Pregnancy  - Repeat growth US in 3 weeks from last US (12/29)  - Non-stress test (NST) BID  - Betamethasone (BMZ) given 11/30-12/1 at OSH for fetal lung maturity  - Magnesium for neuroprotection if moving towards  delivery prior to 32 weeks with 4g bolus followed by 2g/hr maintenance until delivery  - Routine indications for emergent delivery including maternal decompensation or fetal compromise  - S/p  section consent on admission  - At 28 weeks: Tdap, as well as repeat CBC, RPR, and type and screen    Meredith Soliz MD  Maternal Fetal Medicine Fellow    Physician Attestation     I, Luis Mast MD, saw this patient with the fellow and agree with the fellow's findings and plan of care as documented in the fellow s note.       I personally reviewed vital signs, medications, labs, imaging and EFM.     Key findings: Meets criteria for ongoing inpatient cardiac care due to low EF and arrhythmias. We will continue to follow closely from an OB standpoint and now with GDM.       Luis Mast MD  Maternal Fetal Medicine  Date of Service (when I saw the patient): 22

## 2022-12-20 NOTE — PLAN OF CARE
Pt declines contractions, leaking of fluid, vaginal bleeding, and decreased fetal movement. See flow sheet for NST interpretation. NST extended 20 minutes due to variable decel. Reviewed with MD Delisa. Will continue EFM assessments per order.

## 2022-12-20 NOTE — PLAN OF CARE
Goal Outcome Evaluation:      Plan of Care Reviewed With: patient    Overall Patient Progress: no changeOverall Patient Progress: no change    Outcome Evaluation: heparin gtt remains infusing, morning antiXa checks, oral diuresis, PO metop, electrolyte replacement as needed    Neuro: A&Ox4  Cardiac/Telemetry: SR with PAC's and PVC's. BP's 100s-110s/60s-50s  Respiratory: RA, no c/o SOB or PENA  GI/: voiding adequate amounts in hat, LBM 12/19  Endocrine: BG checks fasting in AM and 2 hours after meals  Diet/Appetite: good appetite, 2 gram Na diet  Skin: WDL  LDA: PICC flushes and draws appropriately, infusing heparin at 2,250 units/hr  Activity: up ad danis  Pain: no c/o pain  Labs: antiXa this AM      Plan:  -heparin gtt  -PO diuretics  -q6 50 mg metop  -electrolyte replacement as needed   -monitor HR/rhythm and BP

## 2022-12-20 NOTE — PROGRESS NOTES
Cardiology Progress Note    ASSESSMENT/PLAN:  Anjali Carmen is a 32 year old  at 25w4d w/ no significant past medical history, who presents as a transfer from [facility] to [facility] on 2022 for further management of newly diagnosed, acute decompensated systolic heart failure (LVEF 20%) as well as high risk delivery.     Changes Today:   - Continue furosemide 40mg daily PO and metoprolol tartrate to 50mg q6H    # Acute decompensated systolic and diastolic heart failure  # ACC/AHA Stage C systolic heart failure  # Heart failure with reduced EF (LVEF 20%)  # Mild right ventricular systolic dysfunction  # Non-sustained VT  Etiology of HFrEF: uncertain at this time.  Concern for familial dilated cardiomyopathy given history of CM in father at 30 years of age. Last pregnancy in , timeline not fitting in for PPCM. Greater concern for NICM over ICMP, however, TTE on  with thinned out LV wall and notable regional wall motion abnormalities. Coronary angiogram done on  with no significat lesions seen.  No known history of pre-existing structural heart disease prior to this admission.  1. Patient hemodynamically stable with preserved end-organ function. No indication for inotrope support/MCS at this time.   2. GDMT and diuresis as outlined below.  Traditional afterload targets for poor LV function and low LVEF of 20-25% cannot be met due to need for fetoplacental circulation.  Will target /80 and MAP > 65. Repeat transthoracic echocardiogram done on  notable for severely reduced left ventricular function with an LVEF of 20-25%. Also notable for RWM (details below) Coronanry angiogram on  without significant CAD.   3. Will consider cardiac MRI w.r.t etiology of cardiomyopathy once clinically permissible (likely to be done post partum).  4. Currently on heparin gtt for Afib and risk of LV thrombus formation given low LVEF and hypercoagulable state of pregnancy.  GDMT:    ACEi/ARB/ARNI: contraindicated due to pregnancy  BB: metoprolol tartrate 50 mg q6hr  Aldosterone antagonist: high adverse risk medication in pregnancy  SCD prophylaxis: does not meet criteria for implant  Fluid status: Lasix 40mg PO daily     # New onset possible paroxysmal atrial fibrillation  Diagn[facility] in [place]- isolated episode, EKG unavailable for review. Currently in NSR at [facility].  - Continue to monitor rhythm on telemetry.   - Currently on heparin gtt for AC, however, [id number] score of 2 (sex, congestive heart failure) and isolated episode of Afib. Will continue for now as its safe in pregnancy (discussed with MFM).     # Pregnancy  Gestational age 26 wks. Betamethasone (BMZ) given - at OSH for fetal lung maturity. OB US for fetal growth done on 22 with normal fetal findings and no abnormalities.  - Appreciate MFM input., they are closely following with daily checks; they can be reached via their pager (listed and updated regularly on the summary page)  - Plan for GTT on   - Plan for growth US on   - Non-stress test BID  - Magnesium for neuroprotection if moving towards delivery prior to 32 weeks with 4g bolus followed by 2g/hr maintenance until delivery  - Routine indications for emergent delivery including maternal decompensation or fetal compromise  - S/p  section consent on admission  - At 28 weeks: Tdap, as well as repeat CBC, RPR, and type and screen    # Mild anemia  HGB 10.2 g/dL on arrival (MCV 79). Retic count 1.9%   iron, ferritin, TIBC levels show AVINASH  - Limit blood draws.     Prophylaxis:  DVT: heparin gtt   Disposition: Floor  Code Status: Full      Plan discussed with Dr. Ken M.D., who is in agreement. Please, see staff addendum for changes/additions to the plan.    Blair Romo   Cardiology Fellow  This note was created using Dragon dictation software, so please excuse any mistakes and incorrect syntax and  "semantics.      ===================================================================    SUBJECTIVE:       NAEO. Nursing notes reviewed. Tele without events    Pt denies cp, SOB, palpitations, lightheadedness, syncope. Eating and drinking well. Net even yesterday.       OBJECTIVE:  /56 (BP Location: Right arm, Cuff Size: Adult Large)   Pulse 82   Temp 97.5  F (36.4  C) (Oral)   Resp 16   Ht 1.753 m (5' 9\")   Wt 132.7 kg (292 lb 9.6 oz)   SpO2 98%   BMI 43.21 kg/m    Temp (24hrs), Av.2  F (36.8  C), Min:97.9  F (36.6  C), Max:98.7  F (37.1  C)    GEN: pleasant, no acute distress  HEENT: no icterus  CV: RRR, normal s1/s2, no murmurs.   CHEST: clear to ausculation bilaterally, no rales or wheezing  ABD: soft, non-tender, normal active bowel sounds  EXTR: pulses +2 bilaterally. No LE edema.   NEURO: alert oriented, speech fluent/appropriate, motor grossly nonfocal    Labs: reviewed in EMR  CBC  Recent Labs   Lab 22  0448 22  0859 22  0640 22  0648   WBC 11.9* 11.4* 11.0 10.7   RBC 4.10 3.75* 3.97 4.02   HGB 10.5* 9.8* 10.2* 10.3*   HCT 33.5* 31.1* 32.8* 32.4*   MCV 82 83 83 81   MCH 25.6* 26.1* 25.7* 25.6*   MCHC 31.3* 31.5 31.1* 31.8   RDW 15.0 14.9 14.7 14.7    200 231 246     BMP  Recent Labs   Lab 22  0537 22  1943 22  1607 22  1435 22  1333 22  0448 22  1133 22  0859 22  0640 22  0648 12/15/22  0646   NA  --   --   --   --   --  135* 134* 127* 135* 134* 133*   POTASSIUM 4.1  --   --   --   --  4.2 4.6 3.8 4.0 4.2 4.3   CHLORIDE  --   --   --   --   --  105 104 101 105 104 106   CO2  --   --   --   --   --  17* 17* 17* 17* 19* 16*   ANIONGAP  --   --   --   --   --  13 13 9 13 11 11   GLC  --  122* 77 127* 135* 74 141* 105* 76 95 83   BUN  --   --   --   --   --  11.1 9.6 7.6 10.6 11.5 11.7   CR  --   --   --   --   --  0.69 0.67 0.58 0.62 0.64 0.61   GFRESTIMATED  --   --   --   --   --  >90 >90 >90 >90 >90 >90 "   AYDEN  --   --   --   --   --  9.8 9.4 9.2 9.5 9.7 8.8   MAG 1.7  --   --   --   --  1.6* 1.6*  --  1.6* 1.7 1.7   PHOS 4.3  --   --   --   --   --  4.2  --   --  4.4 4.5     Troponins:   No results found for: TROPI, TROPONIN, TROPR, TROPN  INR  No lab results found in last 7 days.    Echocardiogram (12/6/22):  Interpretation Summary  Left ventricular function is decreased. The ejection fraction is 20-25%  (severely reduced). Severe diffuse hypokinesis is present. There is wall  thinning and akinesis of the basal-mid inferior, basal-mid inferoseptal and  basal inferolateral segments. There is akinesis of the apical septum. This is  compatible with ischemic cardiomyopathy.  Mild right ventricular dilation is present. Global right ventricular function  is moderately reduced.  Mild tricuspid insufficiency is present.  Pulmonary artery systolic pressure is normal.  IVC diameter and respiratory changes fall into an intermediate range  suggesting an RA pressure of 8 mmHg.  No pericardial effusion is present.    Coronary Angiogram:  12/7/22:   Formal results pending, but no obstructive disease seen on preliminary evaluation.     Imaging: reviewed in EMR.

## 2022-12-20 NOTE — PLAN OF CARE
Goal Outcome Evaluation:      Plan of Care Reviewed With: patient    Overall Patient Progress: no changeOverall Patient Progress: no change     Shift summary [id number]    D:32 year old  at 25w4d w/ no significant past medical history, who presents as a transfer from [facility] to [facility] on 2022 for further management of newly diagnosed, acute decompensated systolic heart failure (LVEF 20%) as well as high risk delivery.    A/I: Rapid response called upon initial evaluation of pt. Pt's BP 82/35 recheck 70's/ 20's. Pt stated she had just woke up. HR in the 70's. Pt placed in reverse trendelenburg and 's/60's.  MD's came and evaluated. Pt had been NPO for gestational diabetes' testing. MD's came and evaluated and instructed pt to get up and walk around and eat something. Review of pt's chart shows pt typically runs low upon awaking. Pt reports episodes of lightheadedness and dizziness. Pt has been SR 70-90's with 0-4 PAC's. Pt's lungs diminished , sating well on RA. Pt up ambulating independently. Pt denies any report of fetal abnormalities. Pt's diabetic testing indicated need for further monitoring. Pt is to be checked each am fasting and then 2 hours after eating meals. Pt's FSBG this evening have been WNL. Pt denies any c/o pain. Pt voiding and has had BM. Pt has heparin gtt infusing via PICC at 2250 units hr next 10 a in am. Mag 1.6 pt started on po magnesium BID and given 2 gm IV mag.   P: Continue to monitor closely and update MD with concerns. Continue current POC

## 2022-12-20 NOTE — PROVIDER NOTIFICATION
22 1600   Call Information   Date of Call 22   Time of Call 1600   Name of person requesting the team Nancy Bailey   Title of person requesting team RN   RRT Arrival time 1601   Time RRT ended 1620   Reason for call   Type of RRT Adult   Primary reason for call Cardiovascular;Staff concerned   Cardiovascular SBP less than 90   Was patient transferred from the ED, ICU, or PACU within last 24 hours prior to RRT call? No   SBAR   Situation Hypotension 70/24 with MAP 71.   Background  at 26w0d by 6w1d US admitted to for acute HFrEF, cardiac arrhythmia after transferring from [facility]. Acute decompensated HFrEF, possible Afib, non-sustained VT, pregnancy.   Notable History/Conditions Cardiac   Assessment A+O x 4.  Denies C.P., SOB, fever, chills, n/v. Afebrile with vss.  Walked to bathroom and back without lightheadedness.  Repeat BP x 2;  103-106/60's with MAP mid 70's.  Remains in RA with 02 sat upper 90's, easy respirations.   Interventions No interventions   Patient Outcome   Patient Outcome Stabilized on unit   RRT Team   Attending/Primary/Covering Physician Primary team at bedside.   Date Attending Physician notified 22   Time Attending Physician notified 1600   Lead RN Lawanda Ramos   RN Nancy Bailey   RT N/A   Post RRT Intervention Assessment   Post RRT Assessment Stable/Improved   Date Follow Up Done 22   Time Follow Up Done 1820   Comments No further episodes of hypotension. MAP 82.  Afebrile with vss.  Remains in RA with easy respirations.

## 2022-12-21 LAB
ALBUMIN SERPL BCG-MCNC: 3.2 G/DL (ref 3.5–5.2)
ALP SERPL-CCNC: 88 U/L (ref 35–104)
ALT SERPL W P-5'-P-CCNC: 20 U/L (ref 10–35)
ANION GAP SERPL CALCULATED.3IONS-SCNC: 7 MMOL/L (ref 7–15)
AST SERPL W P-5'-P-CCNC: 34 U/L (ref 10–35)
BASOPHILS # BLD AUTO: 0 10E3/UL (ref 0–0.2)
BASOPHILS NFR BLD AUTO: 0 %
BILIRUB SERPL-MCNC: 0.3 MG/DL
BUN SERPL-MCNC: 9.5 MG/DL (ref 6–20)
CALCIUM SERPL-MCNC: 9.1 MG/DL (ref 8.6–10)
CHLORIDE SERPL-SCNC: 105 MMOL/L (ref 98–107)
CREAT SERPL-MCNC: 0.62 MG/DL (ref 0.51–0.95)
DEPRECATED HCO3 PLAS-SCNC: 22 MMOL/L (ref 22–29)
EOSINOPHIL # BLD AUTO: 0.1 10E3/UL (ref 0–0.7)
EOSINOPHIL NFR BLD AUTO: 1 %
ERYTHROCYTE [DISTWIDTH] IN BLOOD BY AUTOMATED COUNT: 15.2 % (ref 10–15)
GFR SERPL CREATININE-BSD FRML MDRD: >90 ML/MIN/1.73M2
GLUCOSE BLDC GLUCOMTR-MCNC: 81 MG/DL (ref 70–99)
GLUCOSE SERPL-MCNC: 88 MG/DL (ref 70–99)
HCT VFR BLD AUTO: 30.6 % (ref 35–47)
HGB BLD-MCNC: 9.9 G/DL (ref 11.7–15.7)
IMM GRANULOCYTES # BLD: 0.1 10E3/UL
IMM GRANULOCYTES NFR BLD: 1 %
LYMPHOCYTES # BLD AUTO: 2.5 10E3/UL (ref 0.8–5.3)
LYMPHOCYTES NFR BLD AUTO: 25 %
MAGNESIUM SERPL-MCNC: 1.8 MG/DL (ref 1.7–2.3)
MCH RBC QN AUTO: 26.1 PG (ref 26.5–33)
MCHC RBC AUTO-ENTMCNC: 32.4 G/DL (ref 31.5–36.5)
MCV RBC AUTO: 81 FL (ref 78–100)
MONOCYTES # BLD AUTO: 0.6 10E3/UL (ref 0–1.3)
MONOCYTES NFR BLD AUTO: 6 %
NEUTROPHILS # BLD AUTO: 6.9 10E3/UL (ref 1.6–8.3)
NEUTROPHILS NFR BLD AUTO: 67 %
NRBC # BLD AUTO: 0 10E3/UL
NRBC BLD AUTO-RTO: 0 /100
PHOSPHATE SERPL-MCNC: 4.2 MG/DL (ref 2.5–4.5)
PLATELET # BLD AUTO: 230 10E3/UL (ref 150–450)
POTASSIUM SERPL-SCNC: 4.2 MMOL/L (ref 3.4–5.3)
PROT SERPL-MCNC: 7 G/DL (ref 6.4–8.3)
RBC # BLD AUTO: 3.79 10E6/UL (ref 3.8–5.2)
SODIUM SERPL-SCNC: 134 MMOL/L (ref 136–145)
UFH PPP CHRO-ACNC: 0.3 IU/ML
WBC # BLD AUTO: 10.3 10E3/UL (ref 4–11)

## 2022-12-21 PROCEDURE — 85025 COMPLETE CBC W/AUTO DIFF WBC: CPT

## 2022-12-21 PROCEDURE — 85520 HEPARIN ASSAY: CPT | Performed by: INTERNAL MEDICINE

## 2022-12-21 PROCEDURE — 84100 ASSAY OF PHOSPHORUS: CPT | Performed by: INTERNAL MEDICINE

## 2022-12-21 PROCEDURE — 99232 SBSQ HOSP IP/OBS MODERATE 35: CPT | Mod: GC | Performed by: STUDENT IN AN ORGANIZED HEALTH CARE EDUCATION/TRAINING PROGRAM

## 2022-12-21 PROCEDURE — 250N000013 HC RX MED GY IP 250 OP 250 PS 637: Performed by: STUDENT IN AN ORGANIZED HEALTH CARE EDUCATION/TRAINING PROGRAM

## 2022-12-21 PROCEDURE — 214N000001 HC R&B CCU UMMC

## 2022-12-21 PROCEDURE — 36415 COLL VENOUS BLD VENIPUNCTURE: CPT

## 2022-12-21 PROCEDURE — 82435 ASSAY OF BLOOD CHLORIDE: CPT

## 2022-12-21 PROCEDURE — 83735 ASSAY OF MAGNESIUM: CPT

## 2022-12-21 PROCEDURE — 250N000013 HC RX MED GY IP 250 OP 250 PS 637

## 2022-12-21 PROCEDURE — 250N000011 HC RX IP 250 OP 636: Performed by: INTERNAL MEDICINE

## 2022-12-21 PROCEDURE — 250N000011 HC RX IP 250 OP 636: Performed by: STUDENT IN AN ORGANIZED HEALTH CARE EDUCATION/TRAINING PROGRAM

## 2022-12-21 PROCEDURE — 250N000013 HC RX MED GY IP 250 OP 250 PS 637: Performed by: INTERNAL MEDICINE

## 2022-12-21 RX ORDER — MAGNESIUM OXIDE 400 MG/1
400 TABLET ORAL EVERY 4 HOURS
Status: COMPLETED | OUTPATIENT
Start: 2022-12-21 | End: 2022-12-21

## 2022-12-21 RX ADMIN — POLYETHYLENE GLYCOL 3350 17 G: 17 POWDER, FOR SOLUTION ORAL at 08:03

## 2022-12-21 RX ADMIN — SODIUM CHLORIDE, PRESERVATIVE FREE 10 ML: 5 INJECTION INTRAVENOUS at 15:49

## 2022-12-21 RX ADMIN — MAGNESIUM OXIDE TAB 400 MG (241.3 MG ELEMENTAL MG) 800 MG: 400 (241.3 MG) TAB at 08:04

## 2022-12-21 RX ADMIN — METOPROLOL TARTRATE 50 MG: 50 TABLET, FILM COATED ORAL at 06:09

## 2022-12-21 RX ADMIN — MAGNESIUM OXIDE TAB 400 MG (241.3 MG ELEMENTAL MG) 800 MG: 400 (241.3 MG) TAB at 20:29

## 2022-12-21 RX ADMIN — METOPROLOL TARTRATE 50 MG: 50 TABLET, FILM COATED ORAL at 23:53

## 2022-12-21 RX ADMIN — FUROSEMIDE 40 MG: 40 TABLET ORAL at 08:03

## 2022-12-21 RX ADMIN — POTASSIUM CHLORIDE 80 MEQ: 40 SOLUTION ORAL at 08:03

## 2022-12-21 RX ADMIN — PRENATAL VIT W/ FE FUMARATE-FA TAB 27-0.8 MG 1 TABLET: 27-0.8 TAB at 08:03

## 2022-12-21 RX ADMIN — MAGNESIUM OXIDE TAB 400 MG (241.3 MG ELEMENTAL MG) 400 MG: 400 (241.3 MG) TAB at 08:03

## 2022-12-21 RX ADMIN — HEPARIN SODIUM 2250 UNITS/HR: 10000 INJECTION, SOLUTION INTRAVENOUS at 17:28

## 2022-12-21 RX ADMIN — HEPARIN SODIUM 2250 UNITS/HR: 10000 INJECTION, SOLUTION INTRAVENOUS at 06:10

## 2022-12-21 ASSESSMENT — ACTIVITIES OF DAILY LIVING (ADL)
ADLS_ACUITY_SCORE: 20

## 2022-12-21 NOTE — PROGRESS NOTES
CLINICAL NUTRITION SERVICES - REASSESSMENT NOTE     Nutrition Prescription    RECOMMENDATIONS FOR MDs/PROVIDERS TO ORDER:  Consider ordering 200-300 mg DHA/day.    Malnutrition Status:    Patient does not meet two of the established criteria necessary for diagnosing malnutrition but is at risk for malnutrition    Recommendations already ordered by Registered Dietitian (RD):  One-time Special K bar  Modified oral supplement    Future/Additional Recommendations:  1. Rec continue current diet, as ordered. Consider liberalizing diet order (3 g sodium diet) and consider continuing with no fluid restriction. Rec small, frequent meals to help increase oral intake. Protein intake was encouraged.   2. Continue prenatal multivitamin with iron.   3. Monitor potential need to adjust scheduled miralax to prn.   4. Monitor phos trends. Phos of 4.2 on 12/21/2022.   5. Hgb A1c of 5.8 on 12/6/2022. Monitor BG. Per provider note 12/20, ruled in for gestational diabetes.   6. Monitor potential need for iron supplementation.      EVALUATION OF THE PROGRESS TOWARD GOALS   Diet: 2 g Sodium since 12/7. Ordered to receive Ensure Max Protein at HS.   Intake: Tolerating diet. Per % intake flowsheets, pt is consistently consuming 100% with of meals with a good appetite with the exception of 75% on 12/15, 50% once on 12/17, and 0% once on 12/19. [employer] (meal ordering system) indicates food/beverages sent 12/17, 12/18, and 12/20 totaled 1323 kcals and 83 g protein daily on average. Not meeting estimated needs of approximately 2897-1472 kcals/day (20 - 25 kcals/kg) and 115-140 grams protein/day (1.1 - 1.4 grams of pro/kg). Per discussion with pt, does not feel restricted by diet order. States family was here to visit but they have travelled home (they did not bring in food for her so far this admission). States she tried vanilla and chocolate Ensure Max Protein and prefers chocolate.      NEW FINDINGS   Nutrition hx: Per discussion with pt,  "her appetite was good PTA. States she was eating adequately and incorporating snacks. Aware of sodium restriction.   Weight History: 141.5 kg = 312# (11/30/2022), 132.9 kg (12/5/2022), 132.7 kg (12/6, admit), 133.4 kg (12/21) - Limited wt hx in chart review. Per provider H& P, \"Additionally on ROS, the patient reports loss of weight despite being pregnant since July 2022; previously weighed 357 pounds- down to 301 lbs at the time of admission >> further down to 292 lbs after diuresis. Difficult to assess wt gain with pregnancy and wt loss due to diuresis.\"     Per provider note 12/20: \"Yesterday, 3 hour glucose test ruled in Anjali for gestational diabetes. Usually, patients need about 1-2 weeks of blood glucose monitoring to determine whether they are diet controlled or require medication. We offered Anjali a nutrition consult today, which can be helpful for some patients who hope to avoid medication. She declined, but is reassured the option is available in the future if she wanted. However, sometimes despite a patient's best dietary efforts, pregnancy simply worsens glucose intolerance. Therefore, we would recommend checking fasting blood glucose in the AM, and then 1 or 2 hour postprandial glucose checks after Anjali's meals of choice. She doesn't have a strong preference which meals, so we will start with lunch and dinner and then this can be adjusted to her preference.\"  GI: Pt having zero to one stool daily. Last stool was on 12/21. Per RN 12/20, \"Nausea this AM with medications relieved by drinking fluids and breakfast.\"     MALNUTRITION  % Intake: < 75% for > 7 days (moderate)  % Weight Loss: Difficult to assess with 26w1d and diuresis  Subcutaneous Fat Loss: None observed but difficult to assess with BMI  Muscle Loss: None observed but difficult to assess with BMI  Fluid Accumulation/Edema: None noted  Malnutrition Diagnosis: Patient does not meet two of the established criteria necessary for diagnosing " malnutrition but is at risk for malnutrition    Previous Goals   Patient to consume % of nutritionally adequate meal trays TID, or the equivalent with supplements/snacks.  Evaluation: Not meeting.     Previous Nutrition Diagnosis  Inadequate oral intake related to recently decreased appetite, nausea, and diet restriction as evidenced by [employer] (meal ordering system) indicates food/beverages sent 12/11-12/13 totaled 1173 kcals and 57 g protein daily on average which does not meet estimated needs of approximately 2901-8141 kcals/day and 115-140 grams protein/day.  Evaluation: Unresolved.     CURRENT NUTRITION DIAGNOSIS  Inadequate oral intake related to recently inadequate intake as evidenced by [employer] (meal ordering system) indicates food/beverages sent over three days totaled 1323 kcals and 83 g protein daily on average which does not meet estimated needs of approximately 6617-4159 kcals/day and 115-140 grams protein/day.    INTERVENTIONS  Implementation  Nutrition education for nutrition relationship to health/disease: Discussed increased needs and explained rationale. Rec she try to order additional food based on meals she is ordering. Gave room service sodium menu.   Modify composition of meals/snacks: Offered to order protein-containing snacks. Pt politely declined at this time.   Medical food supplement therapy: Modified oral supplements to send chocolate Ensure Max Protein. Placed a one-time Special K bar. Gave oral supplement menu.     Goals  Patient to consume % of nutritionally adequate meal trays TID, or the equivalent with supplements/snacks.    Monitoring/Evaluation  Progress toward goals will be monitored and evaluated per protocol.     Nutrition will continue to follow.      Penny Maloney, MS, RD, LD, [facility]   6C Pgr: [phone]

## 2022-12-21 NOTE — PROGRESS NOTES
MFM Antepartum Progress Note    Subjective:   Denies contractions, vaginal bleeding, leaking of fluid, or decreased fetal movement. She was sleeping on arrival, but easily awoken.    Objective:  Vitals:    22 0659 22 0700 22 0802 22 1115   BP: 102/51  118/58 93/52   BP Location: Right arm   Right arm   Cuff Size:       Pulse: 80  84 74   Resp: 16   18   Temp: 97.8  F (36.6  C)   98.3  F (36.8  C)   TempSrc: Oral   Oral   SpO2: 98% 97%  98%   Weight:       Height:         I/O last 3 completed shifts:  In: 1873.63 [P.O.:1340; I.V.:533.63]  Out: 900 [Urine:900]     Gen: No acute distress  Resp: Speaking full sentences    Pending NST for today    Assessment/Plan:   Anjali Carmen is a 32 year old  at 26w2d by 6w1d US admitted to for acute HFrEF, cardiac arrhythmia after transferring from [facility].     The patient has had an echocardiogram since her arrival concerning for ischemic cardiomyopathy, which was ruled out by a cardiac catheterization on  with no coronary disease. Continuing to medically manage dilated cardiomyopathy with metoprolol and diuresis per cardiology team with improvement in her symptoms. She will be monitored inpatient with continuous telemetry given her risk of arrhythmia. She is high risk for volume overload and worsening of symptoms in the third trimester and postpartum.    Blood glucose values yesterday within normal for fasting and postprandial values. Continue to monitor and will reassess for need for medication in 2 weeks.    MFM will continue to follow along closely and are available  at [phone].    # Acute decompensated HFrEF  # Possible atrial fibrillation, non-sustained VT  - BP goal 130/80, MAP >65. If patient begins to have blood pressures >160/110 for at least 15 minutes, call MFM for guidance of management, although typically will initiate treatment with 5-10 mg of IV hydralazine or 20 mg of IV labetalol  - Will need to stop  anticoagulation prior to delivery. For regional anesthesia to be an option, we would need to stop anticoagulation/antiplatelet therapy. She will need an anesthesia consult and multi-disciplinary discussion regarding this.  - Metoprolol is safe in pregnancy with no dose restrictions     # Gestational Diabetes  - Goal blood glucose for AM fasting is 95 mg/dL  - 1 hour postprandial check is under 140 mg/dL and for 2 hour postprandial is 120 mg/d  - If after 2 weeks of monitoring, if >50% of blood glucose values are elevated, then we would typically initiate medication for treatment of gestational diabetes - Anjali prefers Metformin over insulin  -  monitoring to be determined by need for medications    # Pregnancy  - Repeat growth US in 3 weeks from last US ()  - Non-stress test (NST) BID  - Betamethasone (BMZ) given - at OSH for fetal lung maturity  - Magnesium for neuroprotection if moving towards delivery prior to 32 weeks with 4g bolus followed by 2g/hr maintenance until delivery  - Routine indications for emergent delivery including maternal decompensation or fetal compromise  - S/p  section consent on admission  - At 28 weeks: Tdap, as well as repeat CBC, RPR, and type and screen    Meredith Soliz MD  Maternal Fetal Medicine Fellow    Physician Attestation     I, Luis Mast MD, saw this patient with the fellow and agree with the fellow's findings and plan of care as documented in the fellow s note.       I personally reviewed vital signs, medications, labs, and fetal heart rate monitoring. Patient remains stable but requires ongoing inpatient care due to low EF and risk of arrhythmia.     Luis Mast MD  Maternal Fetal Medicine  Date of Service (when I saw the patient): 22

## 2022-12-21 NOTE — PLAN OF CARE
Goal Outcome Evaluation:      Plan of Care Reviewed With: patient    Overall Patient Progress: decliningOverall Patient Progress: declining    Outcome Evaluation: Encourage adequate nutrition intake as pt with increased needs, including protein needs. Discussed high protein options. Pt is receiving a low carb, high protein oral suppelment

## 2022-12-21 NOTE — PLAN OF CARE
Neuro: WDL; vision corrected     -glasses available and used    Respiratory: WDL (lung sounds diminished but clear)   -Deep breaths encouraged    CV: Generalized edema, irregular heart sounds   -EKG monitoring   -Heart Rhythm: SR PACs  Outstanding lab(s): K 4.2 (pending replacement protocol change), Mg 1.8 replaced   Replacement protocol(s): K, Mg, Phos rechecks 0700 12/22    GI: WDL; audible sounds mildly difficult to hear r/t pregnancy   -Last BM 12/21   -Miralax given as ordered    : WDL    I&O: Hep gtt therapeutic    Musculoskeletal: WDL   -Encouraged to ambulate OOB    Psychosocial: WDL   -Questions answered   -Family available by phone    Skin: WDL   -double lumen PICC L arm; dressing and caps changed 12/20   -Wrist band updated    Plan: Plan to continue being monitored through pregnancy and birth; high-risk pregnancy related to low EF. Hep gtt continued. Monitoring electrolytes.      Provider(s) involved in care: CARDS 2, OB  Provider Notification(s):   Sticky note(s) left   Cards 2: Are you able to change the K protocol order to reflect a need for replacement when K<4.5? Unable to change on this end. Thanks. Letty Rush RN on 12/21/2022 at 1:08 PM  12/21 Cards 2: Are you able to set Metoprolol BP parameters? Thanks!  Letty Rush RN on 12/21/2022 at 6:30 PM        Problem: Plan of Care - These are the overarching goals to be used throughout the patient stay.    Goal: Plan of Care Review  Description: The Plan of Care Review/Shift note should be completed every shift.  The Outcome Evaluation is a brief statement about your assessment that the patient is improving, declining, or no change.  This information will be displayed automatically on your shift note.  Outcome: Progressing  Flowsheets (Taken 12/21/2022 0914)  Outcome Evaluation: Heparin gtt infusing with lab recheck scheduled 12/211. Diuresing via oral Lasix - output sufficient. Managing electrolyte replacements. Continued education on plan  "of care. Goal-setting encouraged. Plan to stay at hospital thru birth r/t high risk pregnancy and need for acute care.  Plan of Care Reviewed With: patient  Overall Patient Progress: no change  Goal: Patient-Specific Goal (Individualized)  Description: You can add care plan individualizations to a care plan. Examples of Individualization might be:  \"Parent requests to be called daily at 9am for status\", \"I have a hard time hearing out of my right ear\", or \"Do not touch me to wake me up as it startles me\".  Outcome: Progressing  Flowsheets (Taken 12/21/2022 0914)  Individualized Care Needs: Heparin gtt infusing with lab recheck scheduled 12/211. Diuresing via oral Lasix - output sufficient. Managing electrolyte replacements.  Anxieties, Fears or Concerns: Concerns regarding the high risk pregnancy, and being educated. Helpful to have fetal monitoring BID  Patient/Family-Specific Goals (Include Timeframe):    No particular goals stated for the day    encouraged to rest and continue with plan of care  Goal: Absence of Hospital-Acquired Illness or Injury  Outcome: Progressing  Intervention: Identify and Manage Fall Risk  Recent Flowsheet Documentation  Taken 12/21/2022 0802 by Letty Rush RN  Safety Promotion/Fall Prevention:    assistive device/personal items within reach    lighting adjusted    nonskid shoes/slippers when out of bed    treat underlying cause    room door open  Intervention: Prevent Skin Injury  Recent Flowsheet Documentation  Taken 12/21/2022 0802 by Letty Rush RN  Body Position:    position changed independently    sitting up in bed  Intervention: Prevent and Manage VTE (Venous Thromboembolism) Risk  Recent Flowsheet Documentation  Taken 12/21/2022 0802 by Letty Rush RN  VTE Prevention/Management: SCDs (sequential compression devices) off  Goal: Optimal Comfort and Wellbeing  Outcome: Progressing  Intervention: Provide Person-Centered Care  Recent Flowsheet Documentation  Taken " 12/21/2022 0802 by Letty Rush RN  Trust Relationship/Rapport:    care explained    choices provided    emotional support provided    questions encouraged    thoughts/feelings acknowledged  Goal: Readiness for Transition of Care  Outcome: Progressing     Problem: Risk for Delirium  Goal: Optimal Coping  Outcome: Progressing  Goal: Improved Behavioral Control  Outcome: Progressing  Intervention: Minimize Safety Risk  Recent Flowsheet Documentation  Taken 12/21/2022 0802 by Letty Rush RN  Trust Relationship/Rapport:    care explained    choices provided    emotional support provided    questions encouraged    thoughts/feelings acknowledged  Goal: Improved Attention and Thought Clarity  Outcome: Progressing  Goal: Improved Sleep  Outcome: Progressing     Problem: Heart Failure  Goal: Optimal Coping  Outcome: Progressing  Goal: Optimal Cardiac Output  Outcome: Progressing  Goal: Stable Heart Rate and Rhythm  Outcome: Progressing  Goal: Optimal Functional Ability  Outcome: Progressing  Intervention: Optimize Functional Ability  Recent Flowsheet Documentation  Taken 12/21/2022 0802 by Letty Rush RN  Activity Management: sitting, edge of bed  Goal: Fluid and Electrolyte Balance  Outcome: Progressing  Goal: Improved Oral Intake  Outcome: Progressing  Goal: Effective Oxygenation and Ventilation  Outcome: Progressing  Intervention: Promote Airway Secretion Clearance  Recent Flowsheet Documentation  Taken 12/21/2022 0802 by Letty Rush RN  Activity Management: sitting, edge of bed  Goal: Effective Breathing Pattern During Sleep  Outcome: Progressing  Intervention: Monitor and Manage Obstructive Sleep Apnea  Recent Flowsheet Documentation  Taken 12/21/2022 0802 by Letty Rush RN  Medication Review/Management:    medications reviewed    high-risk medications identified     Problem: Maternal-Fetal Wellbeing  Goal: Optimal Maternal-Fetal Wellbeing  Outcome: Progressing   Goal Outcome Evaluation:       Plan of Care Reviewed With: patient    Overall Patient Progress: no changeOverall Patient Progress: no change    Outcome Evaluation: Heparin gtt infusing with lab recheck scheduled 12/211. Diuresing via oral Lasix - output sufficient. Managing electrolyte replacements. Continued education on plan of care. Goal-setting encouraged. Plan to stay at hospital thru birth r/t high risk pregnancy and need for acute care.       [General Appearance - Well Developed] : well developed [General Appearance - Well Nourished] : well nourished [Normal Conjunctiva] : the conjunctiva exhibited no abnormalities [Normal Oropharynx] : normal oropharynx [II] : II [Neck Appearance] : the appearance of the neck was normal [Heart Rate And Rhythm] : heart rate and rhythm were normal [Heart Sounds] : normal S1 and S2 [Murmurs] : no murmurs present [Arterial Pulses Normal] : the arterial pulses were normal [Edema] : no peripheral edema present [Respiration, Rhythm And Depth] : normal respiratory rhythm and effort [Exaggerated Use Of Accessory Muscles For Inspiration] : no accessory muscle use [Auscultation Breath Sounds / Voice Sounds] : lungs were clear to auscultation bilaterally [Lungs Percussion] : the lungs were normal to percussion [Abnormal Walk] : normal gait [Nail Clubbing] : no clubbing of the fingernails [Cyanosis, Localized] : no localized cyanosis [No Focal Deficits] : no focal deficits [Oriented To Time, Place, And Person] : oriented to person, place, and time [Impaired Insight] : insight and judgment were intact [Affect] : the affect was normal [Memory Recent] : recent memory was not impaired [] : no rash [FreeTextEntry1] : no chest wall abn

## 2022-12-21 NOTE — PLAN OF CARE
Goal Outcome Evaluation:      Plan of Care Reviewed With: patient    Overall Patient Progress: no changeOverall Patient Progress: no change    Outcome Evaluation: heparin gtt remains infusing, morning antiXa checks, oral diureses, PO metop, electrolyte replacement as needed    Neuro: A&Ox4  Cardiac/Telemetry: SR with PAC's and PVC's. VSS.  Respiratory: RA, no c/o SOB or PENA  GI/: voiding adequate amounts in hat, LBM 12/19  Endocrine: BG checks fasting in AM and 2 hours after meals. BG of 81 this AM, pt encourage to eat or drink. Stated she is going to order breakfast.   Diet/Appetite: good appetite, 2 gram Na diet  Skin: WDL  LDA: PICC flushes and draws appropriately, infusing heparin at 2,250 units/hr  Activity: up ad danis  Pain: no c/o pain  Labs: antiXa this AM was therapeutic, next check 12/22 AM     Plan:  -heparin gtt  -PO diuretics  -q6 50 mg metop  -electrolyte replacement as needed   -monitor HR/rhythm and BP

## 2022-12-21 NOTE — PROGRESS NOTES
Cardiology Progress Note    ASSESSMENT/PLAN:  Anjali Carmen is a 32 year old  at 25w4d w/ no significant past medical history, who presents as a transfer from [facility] to [facility] on 2022 for further management of newly diagnosed, acute decompensated systolic heart failure (LVEF 20%) as well as high risk delivery.     Changes Today:   - Continue furosemide 40mg daily PO and metoprolol tartrate to 50mg q6H    # Acute decompensated systolic and diastolic heart failure  # ACC/AHA Stage C systolic heart failure  # Heart failure with reduced EF (LVEF 20%)  # Mild right ventricular systolic dysfunction  # Non-sustained VT  Etiology of HFrEF: uncertain at this time.  Concern for familial dilated cardiomyopathy given history of CM in father at 30 years of age. Last pregnancy in , timeline not fitting in for PPCM. Greater concern for NICM over ICMP, however, TTE on  with thinned out LV wall and notable regional wall motion abnormalities. Coronary angiogram done on  with no significat lesions seen.  No known history of pre-existing structural heart disease prior to this admission.  1. Patient hemodynamically stable with preserved end-organ function. No indication for inotrope support/MCS at this time.   2. GDMT and diuresis as outlined below.  Traditional afterload targets for poor LV function and low LVEF of 20-25% cannot be met due to need for fetoplacental circulation.  Will target /80 and MAP > 65. Repeat transthoracic echocardiogram done on  notable for severely reduced left ventricular function with an LVEF of 20-25%. Also notable for RWM (details below) Coronanry angiogram on  without significant CAD.   3. Will consider cardiac MRI w.r.t etiology of cardiomyopathy once clinically permissible (likely to be done post partum).  4. Currently on heparin gtt for Afib and risk of LV thrombus formation given low LVEF and hypercoagulable state of pregnancy.  GDMT:    ACEi/ARB/ARNI: contraindicated due to pregnancy  BB: metoprolol tartrate 50 mg q6hr  Aldosterone antagonist: high adverse risk medication in pregnancy  SCD prophylaxis: does not meet criteria for implant  Fluid status: Lasix 40mg PO daily     # New onset possible paroxysmal atrial fibrillation  [facility] in [place]- isolated episode, EKG unavailable for review. Currently in NSR at [facility].  - Continue to monitor rhythm on telemetry.   - Currently on heparin gtt for AC, however, [id number] score of 2 (sex, congestive heart failure) and isolated episode of Afib. Will continue for now as its safe in pregnancy (discussed with MFM).     # Pregnancy  Gestational age 26 wks. Betamethasone (BMZ) given - at OSH for fetal lung maturity. OB US for fetal growth done on 22 with normal fetal findings and no abnormalities.  - Appreciate MFM input., they are closely following with daily checks; they can be reached via their pager (listed and updated regularly on the summary page)  - Plan for GTT on   - Plan for growth US on   - Non-stress test BID  - Magnesium for neuroprotection if moving towards delivery prior to 32 weeks with 4g bolus followed by 2g/hr maintenance until delivery  - Routine indications for emergent delivery including maternal decompensation or fetal compromise  - S/p  section consent on admission  - At 28 weeks: Tdap, as well as repeat CBC, RPR, and type and screen    # Mild anemia  HGB 10.2 g/dL on arrival (MCV 79). Retic count 1.9%   iron, ferritin, TIBC levels show AVINASH  - Limit blood draws.     Prophylaxis:  DVT: heparin gtt   Disposition: Floor  Code Status: Full      Plan discussed with Dr. Ken M.D., who is in agreement. Please, see staff addendum for changes/additions to the plan.    Blair Romo   Cardiology Fellow  This note was created using Dragon dictation software, so please excuse any mistakes and incorrect syntax and  "semantics.      ===================================================================    SUBJECTIVE:       NAEO. Nursing notes reviewed. Tele: 4 beats NVST x2 overnight.   Net negative 200 ml. Made 1.125 L urine.   Nausea this AM.     OBJECTIVE:  BP 93/52 (BP Location: Right arm)   Pulse 74   Temp 98.3  F (36.8  C) (Oral)   Resp 18   Ht 1.753 m (5' 9\")   Wt 133.4 kg (294 lb)   SpO2 98%   BMI 43.42 kg/m    Temp (24hrs), Av.2  F (36.8  C), Min:97.9  F (36.6  C), Max:98.7  F (37.1  C)    GEN: pleasant, no acute distress  HEENT: no icterus  CV: RRR, normal s1/s2, no murmurs.   CHEST: clear to ausculation bilaterally, no rales or wheezing  ABD: soft, non-tender, normal active bowel sounds  EXTR: pulses +2 bilaterally. No LE edema.   NEURO: alert oriented, speech fluent/appropriate, motor grossly nonfocal    Labs: reviewed in EMR  CBC  Recent Labs   Lab 226 22  0448 22  0859 22  0640   WBC 10.3 11.9* 11.4* 11.0   RBC 3.79* 4.10 3.75* 3.97   HGB 9.9* 10.5* 9.8* 10.2*   HCT 30.6* 33.5* 31.1* 32.8*   MCV 81 82 83 83   MCH 26.1* 25.6* 26.1* 25.7*   MCHC 32.4 31.3* 31.5 31.1*   RDW 15.2* 15.0 14.9 14.7    253 200 231     BMP  Recent Labs   Lab 22  0616 22  0446 22  1946 22  1133 22  0818 22  0537 22  1333 22  0448 22  1133 22  0859 22  0640 22  0648   NA  --  134*  --   --   --   --   --  135* 134* 127*   < > 134*   POTASSIUM  --  4.2  --   --   --  4.1  --  4.2 4.6 3.8   < > 4.2   CHLORIDE  --  105  --   --   --   --   --  105 104 101   < > 104   CO2  --  22  --   --   --   --   --  17* 17* 17*   < > 19*   ANIONGAP  --  7  --   --   --   --   --  13 13 9   < > 11   GLC 81 88 124* 97   < >  --    < > 74 141* 105*   < > 95   BUN  --  9.5  --   --   --   --   --  11.1 9.6 7.6   < > 11.5   CR  --  0.62  --   --   --   --   --  0.69 0.67 0.58   < > 0.64   GFRESTIMATED  --  >90  --   --   --   --   --  >90 >90 >90   " < > >90   AYDEN  --  9.1  --   --   --   --   --  9.8 9.4 9.2   < > 9.7   MAG  --  1.8  --   --   --  1.7  --  1.6* 1.6*  --    < > 1.7   PHOS  --  4.2  --   --   --  4.3  --   --  4.2  --   --  4.4    < > = values in this interval not displayed.     Troponins:   No results found for: TROPI, TROPONIN, TROPR, TROPN  INR  No lab results found in last 7 days.    Echocardiogram (12/6/22):  Interpretation Summary  Left ventricular function is decreased. The ejection fraction is 20-25%  (severely reduced). Severe diffuse hypokinesis is present. There is wall  thinning and akinesis of the basal-mid inferior, basal-mid inferoseptal and  basal inferolateral segments. There is akinesis of the apical septum. This is  compatible with ischemic cardiomyopathy.  Mild right ventricular dilation is present. Global right ventricular function  is moderately reduced.  Mild tricuspid insufficiency is present.  Pulmonary artery systolic pressure is normal.  IVC diameter and respiratory changes fall into an intermediate range  suggesting an RA pressure of 8 mmHg.  No pericardial effusion is present.    Coronary Angiogram:  12/7/22:   Formal results pending, but no obstructive disease seen on preliminary evaluation.     Imaging: reviewed in EMR.

## 2022-12-21 NOTE — PLAN OF CARE
Goal Outcome Evaluation:       Shift summary [id number]    D: 32 year old  at 25w4d w/ no significant past medical history, who presents as a transfer from [facility] to [facility] on 2022 for further management of newly diagnosed, acute decompensated systolic heart failure (LVEF 20%) as well as high risk delivery.     A/I: Pt has been SB/SR 50-90 0-16 PVC 0-10 PAC, Pt has kept to herself most of shift. Pt   Slept on and off all shift. Pt's eating. Pt continues on heparin gtt at 2250 next 10 a in am. Pt's  apparently coming to visit. Pt would like him to stay in room. Pt is here until  her delivery. Pt has reported no complaints    P: Continue current  POC and monitoring.

## 2022-12-22 LAB
GLUCOSE BLDC GLUCOMTR-MCNC: 106 MG/DL (ref 70–99)
GLUCOSE BLDC GLUCOMTR-MCNC: 108 MG/DL (ref 70–99)
GLUCOSE BLDC GLUCOMTR-MCNC: 111 MG/DL (ref 70–99)
MAGNESIUM SERPL-MCNC: 1.8 MG/DL (ref 1.7–2.3)
PHOSPHATE SERPL-MCNC: 4.6 MG/DL (ref 2.5–4.5)
POTASSIUM SERPL-SCNC: 4.9 MMOL/L (ref 3.4–5.3)
UFH PPP CHRO-ACNC: 0.18 IU/ML
UFH PPP CHRO-ACNC: 0.23 IU/ML
UFH PPP CHRO-ACNC: >1.1 IU/ML

## 2022-12-22 PROCEDURE — 250N000011 HC RX IP 250 OP 636: Performed by: INTERNAL MEDICINE

## 2022-12-22 PROCEDURE — 85520 HEPARIN ASSAY: CPT | Performed by: INTERNAL MEDICINE

## 2022-12-22 PROCEDURE — 250N000011 HC RX IP 250 OP 636: Performed by: STUDENT IN AN ORGANIZED HEALTH CARE EDUCATION/TRAINING PROGRAM

## 2022-12-22 PROCEDURE — 83735 ASSAY OF MAGNESIUM: CPT | Performed by: INTERNAL MEDICINE

## 2022-12-22 PROCEDURE — 84132 ASSAY OF SERUM POTASSIUM: CPT | Performed by: INTERNAL MEDICINE

## 2022-12-22 PROCEDURE — 250N000013 HC RX MED GY IP 250 OP 250 PS 637: Performed by: STUDENT IN AN ORGANIZED HEALTH CARE EDUCATION/TRAINING PROGRAM

## 2022-12-22 PROCEDURE — 84100 ASSAY OF PHOSPHORUS: CPT | Performed by: INTERNAL MEDICINE

## 2022-12-22 PROCEDURE — 214N000001 HC R&B CCU UMMC

## 2022-12-22 PROCEDURE — 36415 COLL VENOUS BLD VENIPUNCTURE: CPT | Performed by: INTERNAL MEDICINE

## 2022-12-22 PROCEDURE — 250N000013 HC RX MED GY IP 250 OP 250 PS 637

## 2022-12-22 PROCEDURE — 36592 COLLECT BLOOD FROM PICC: CPT | Performed by: INTERNAL MEDICINE

## 2022-12-22 PROCEDURE — 99232 SBSQ HOSP IP/OBS MODERATE 35: CPT | Mod: GC | Performed by: STUDENT IN AN ORGANIZED HEALTH CARE EDUCATION/TRAINING PROGRAM

## 2022-12-22 RX ORDER — MAGNESIUM SULFATE HEPTAHYDRATE 40 MG/ML
2 INJECTION, SOLUTION INTRAVENOUS ONCE
Status: COMPLETED | OUTPATIENT
Start: 2022-12-22 | End: 2022-12-22

## 2022-12-22 RX ORDER — POTASSIUM CHLORIDE 20MEQ/15ML
40 LIQUID (ML) ORAL 2 TIMES DAILY
Status: DISCONTINUED | OUTPATIENT
Start: 2022-12-23 | End: 2022-12-26

## 2022-12-22 RX ADMIN — METOPROLOL TARTRATE 50 MG: 50 TABLET, FILM COATED ORAL at 18:36

## 2022-12-22 RX ADMIN — FUROSEMIDE 40 MG: 40 TABLET ORAL at 07:41

## 2022-12-22 RX ADMIN — MAGNESIUM OXIDE TAB 400 MG (241.3 MG ELEMENTAL MG) 800 MG: 400 (241.3 MG) TAB at 20:06

## 2022-12-22 RX ADMIN — MAGNESIUM OXIDE TAB 400 MG (241.3 MG ELEMENTAL MG) 800 MG: 400 (241.3 MG) TAB at 07:42

## 2022-12-22 RX ADMIN — POTASSIUM CHLORIDE 80 MEQ: 40 SOLUTION ORAL at 07:42

## 2022-12-22 RX ADMIN — METOPROLOL TARTRATE 50 MG: 50 TABLET, FILM COATED ORAL at 06:05

## 2022-12-22 RX ADMIN — POLYETHYLENE GLYCOL 3350 17 G: 17 POWDER, FOR SOLUTION ORAL at 07:41

## 2022-12-22 RX ADMIN — SODIUM CHLORIDE, PRESERVATIVE FREE 5 ML: 5 INJECTION INTRAVENOUS at 10:09

## 2022-12-22 RX ADMIN — HEPARIN SODIUM 2250 UNITS/HR: 10000 INJECTION, SOLUTION INTRAVENOUS at 04:37

## 2022-12-22 RX ADMIN — PRENATAL VIT W/ FE FUMARATE-FA TAB 27-0.8 MG 1 TABLET: 27-0.8 TAB at 07:42

## 2022-12-22 RX ADMIN — HEPARIN SODIUM 1900 UNITS/HR: 10000 INJECTION, SOLUTION INTRAVENOUS at 16:53

## 2022-12-22 RX ADMIN — METOPROLOL TARTRATE 50 MG: 50 TABLET, FILM COATED ORAL at 11:57

## 2022-12-22 RX ADMIN — METOPROLOL TARTRATE 50 MG: 50 TABLET, FILM COATED ORAL at 23:29

## 2022-12-22 RX ADMIN — MAGNESIUM SULFATE IN WATER 2 G: 40 INJECTION, SOLUTION INTRAVENOUS at 11:57

## 2022-12-22 RX ADMIN — SODIUM CHLORIDE, PRESERVATIVE FREE 10 ML: 5 INJECTION INTRAVENOUS at 15:14

## 2022-12-22 ASSESSMENT — ACTIVITIES OF DAILY LIVING (ADL)
ADLS_ACUITY_SCORE: 20

## 2022-12-22 NOTE — PROGRESS NOTES
Cardiology Progress Note    ASSESSMENT/PLAN:  Anjali Carmen is a 32 year old  at 25w4d w/ no significant past medical history, who presents as a transfer from [facility] to [facility] on 2022 for further management of newly diagnosed, acute decompensated systolic heart failure (LVEF 20%) as well as high risk delivery.     Changes Today:   - Continue furosemide 40mg daily PO and metoprolol tartrate to 50mg q6H  - Anti Xa level supratherapeutic; heparin temporarily held and infusion dose decreased    # Acute decompensated systolic and diastolic heart failure  # ACC/AHA Stage C systolic heart failure  # Heart failure with reduced EF (LVEF 20%)  # Mild right ventricular systolic dysfunction  # Non-sustained VT  Etiology of HFrEF: uncertain at this time.  Concern for familial dilated cardiomyopathy given history of CM in father at 30 years of age. Last pregnancy in , timeline not fitting in for PPCM. Greater concern for NICM over ICMP, however, TTE on  with thinned out LV wall and notable regional wall motion abnormalities. Coronary angiogram done on  with no significat lesions seen.  No known history of pre-existing structural heart disease prior to this admission.  1. Patient hemodynamically stable with preserved end-organ function. No indication for inotrope support/MCS at this time.   2. GDMT and diuresis as outlined below.  Traditional afterload targets for poor LV function and low LVEF of 20-25% cannot be met due to need for fetoplacental circulation.  Will target /80 and MAP > 65. Repeat transthoracic echocardiogram done on  notable for severely reduced left ventricular function with an LVEF of 20-25%. Also notable for RWM (details below) Coronanry angiogram on  without significant CAD.   3. Will consider cardiac MRI w.r.t etiology of cardiomyopathy once clinically permissible (likely to be done post partum).  4. Currently on heparin gtt for Afib and risk of LV  thrombus formation given low LVEF and hypercoagulable state of pregnancy.  GDMT:   ACEi/ARB/ARNI: contraindicated due to pregnancy  BB: metoprolol tartrate 50 mg q6hr  Aldosterone antagonist: high adverse risk medication in pregnancy  SCD prophylaxis: does not meet criteria for implant  Fluid status: Lasix 40mg PO daily     # New onset possible paroxysmal atrial fibrillation  Diagn[facility] at [facility] in [place]- isolated episode, EKG unavailable for review. Currently in NSR at [facility].  - Continue to monitor rhythm on telemetry.   - Currently on heparin gtt for AC, however, [id number] score of 2 (sex, congestive heart failure) and isolated episode of Afib. Will continue for now as its safe in pregnancy (discussed with MFM).     # Pregnancy  Gestational age 26 wks. Betamethasone (BMZ) given - at OSH for fetal lung maturity. OB US for fetal growth done on 22 with normal fetal findings and no abnormalities.  - Appreciate MFM input., they are closely following with daily checks; they can be reached via their pager (listed and updated regularly on the summary page)  - Plan for GTT on   - Plan for growth US on   - Non-stress test BID  - Magnesium for neuroprotection if moving towards delivery prior to 32 weeks with 4g bolus followed by 2g/hr maintenance until delivery  - Routine indications for emergent delivery including maternal decompensation or fetal compromise  - S/p  section consent on admission  - At 28 weeks: Tdap, as well as repeat CBC, RPR, and type and screen    # Mild anemia, stable  HGB 10.2 g/dL on arrival (MCV 79). Retic count 1.9%. Iron, ferritin, TIBC levels show AVINASH.  - Limit blood draws.     Prophylaxis:  DVT: heparin gtt   Disposition: Floor  Code Status: Full      Plan discussed with Dr. Ken M.D., who is in agreement. Please, see staff addendum for changes/additions to the plan.    Shante Sharpe MD  Internal Medicine,  "[id number]    ===================================================================    SUBJECTIVE:   No acute events overnight. Nursing notes reviewed. Nausea resolved. Brief, intermittent palpitations, but unchanged compared to prior. No new concerns this morning.     OBJECTIVE:  BP 97/57 (BP Location: Right arm)   Pulse 75   Temp 97.8  F (36.6  C) (Oral)   Resp 18   Ht 1.753 m (5' 9\")   Wt 132.7 kg (292 lb 9.6 oz)   SpO2 95%   BMI 43.21 kg/m    Temp (24hrs), Av.2  F (36.8  C), Min:97.9  F (36.6  C), Max:98.7  F (37.1  C)    GEN: pleasant, no acute distress  HEENT: no icterus  CV: RRR, normal s1/s2, no murmurs.   CHEST: clear to ausculation bilaterally, no rales or wheezing  ABD: soft, non-tender, normal active bowel sounds  EXTR: pulses +2 bilaterally. Trace LE edema.   NEURO: alert oriented, speech fluent/appropriate, motor grossly nonfocal    Telemetry:  - NSVT (max 9 beats)    Labs: reviewed in EMR  CBC  Recent Labs   Lab 22  0446 22  0448 22  0859 22  0640   WBC 10.3 11.9* 11.4* 11.0   RBC 3.79* 4.10 3.75* 3.97   HGB 9.9* 10.5* 9.8* 10.2*   HCT 30.6* 33.5* 31.1* 32.8*   MCV 81 82 83 83   MCH 26.1* 25.6* 26.1* 25.7*   MCHC 32.4 31.3* 31.5 31.1*   RDW 15.2* 15.0 14.9 14.7    253 200 231     BMP  Recent Labs   Lab 22  0604 22  0616 22  0446 22  1946 22  0818 22  0537 22  1333 22  0448 22  1133 22  0859 22  0640 22  0648   NA  --   --  134*  --   --   --   --  135* 134* 127*   < > 134*   POTASSIUM  --   --  4.2  --   --  4.1  --  4.2 4.6 3.8   < > 4.2   CHLORIDE  --   --  105  --   --   --   --  105 104 101   < > 104   CO2  --   --  22  --   --   --   --  17* 17* 17*   < > 19*   ANIONGAP  --   --  7  --   --   --   --  13 13 9   < > 11   * 81 88 124*   < >  --    < > 74 141* 105*   < > 95   BUN  --   --  9.5  --   --   --   --  11.1 9.6 7.6   < > 11.5   CR  --   --  0.62  --   --   --   --  0.69 0.67 " 0.58   < > 0.64   GFRESTIMATED  --   --  >90  --   --   --   --  >90 >90 >90   < > >90   AYDEN  --   --  9.1  --   --   --   --  9.8 9.4 9.2   < > 9.7   MAG  --   --  1.8  --   --  1.7  --  1.6* 1.6*  --    < > 1.7   PHOS  --   --  4.2  --   --  4.3  --   --  4.2  --   --  4.4    < > = values in this interval not displayed.     Troponins:   No results found for: TROPI, TROPONIN, TROPR, TROPN  INR  No lab results found in last 7 days.    Echocardiogram (12/6/22):  Interpretation Summary  Left ventricular function is decreased. The ejection fraction is 20-25%  (severely reduced). Severe diffuse hypokinesis is present. There is wall  thinning and akinesis of the basal-mid inferior, basal-mid inferoseptal and  basal inferolateral segments. There is akinesis of the apical septum. This is  compatible with ischemic cardiomyopathy.  Mild right ventricular dilation is present. Global right ventricular function  is moderately reduced.  Mild tricuspid insufficiency is present.  Pulmonary artery systolic pressure is normal.  IVC diameter and respiratory changes fall into an intermediate range  suggesting an RA pressure of 8 mmHg.  No pericardial effusion is present.    Coronary Angiogram:  12/7/22:   Formal results pending, but no obstructive disease seen on preliminary evaluation.     Imaging: reviewed in EMR.

## 2022-12-22 NOTE — PROGRESS NOTES
Tele informed RN that pt had 14 beat run of afib around 0415. Pt asymptomatic, VSS (BP 91/60). MD paged, no interventions ordered at this time.

## 2022-12-22 NOTE — PROVIDER NOTIFICATION
12/21/22 2015   Uterine Activity Assessment   Method external tocotransducer   Contraction Frequency (Minutes) none   Contraction Intensity no contractions   Uterine Resting Tone soft by palpation   Fetal Assessment   Fetal HR Assessment Method external US   Fetal HR (beats/min) 150   Fetal HR Baseline normal range   Fetal HR Variability moderate (amplitude range 6 to 25 bpm)   Fetal HR Accelerations increase 15 bpm above baseline lasting 15 seconds   Fetal HR Decelerations absent       Pt denies ctx, LOF, bleeding. FHR and toco monitoring normal. Plan to continue BID monitoring.

## 2022-12-22 NOTE — PLAN OF CARE
Neuro: WDL; vision corrected                           -glasses available and used     Respiratory: WDL              -Deep breaths encouraged     CV: Generalized edema, irregular heart sounds              -EKG monitoring              -Heart Rhythm: SR PACs; PVCs with a few bigeminal beats noted  Outstanding lab(s): Xa result in AM >1.10; provider notified              Replacement protocol(s): K, Mg, Phos rechecks 0700 12/23. Magnesium replaced IV form this afternoon.     GI: WDL; audible sounds mildly difficult to hear r/t pregnancy              -Last BM 12/22              -Miralax given as ordered     : WDL     I&O: Hep gtt continued; at shift change, running at 1900units/hr     Musculoskeletal: WDL              -Encouraged to ambulate OOB; ambulates in room BRP independently     Psychosocial: WDL              -Questions answered              -Family available by phone on multiple occasions     Skin: WDL              -double lumen PICC L arm; dressing and caps changed 12/20              -Upon shift change, still needs CHG wipes done and electrode patch replacement    Other: Fetal monitoring shows healthy baby.     Plan: Continue being monitored through pregnancy and birth; high-risk pregnancy related to low EF. Hep gtt continued. Monitoring electrolytes. Currently VSS. Stable. Denies pain.    Provider(s) involved in care: OB, Cards 2  Provider Notification(s):   Cards NOHEMY: Provider in room; discussed Xa critical lab value at 0740 and plan to continue the heparin gtt protocol. Also spoke with this provider regarding the NCO to keep K> 4.5 -for now plan to stick to high replacement protocol unless RN hears otherwise.        Goal Outcome Evaluation:      Plan of Care Reviewed With: patient, spouse    Overall Patient Progress: no changeOverall Patient Progress: no change    Outcome Evaluation: Encourage activity, continue with heparin and fetal monitoring

## 2022-12-22 NOTE — PLAN OF CARE
Goal Outcome Evaluation:      Plan of Care Reviewed With: patient    Overall Patient Progress: no changeOverall Patient Progress: no change    Outcome Evaluation: heparin gtt remains infusing, replace lytes as needed, encourage activity    Neuro: A&Ox4  Cardiac/Telemetry: SR with PAC's and PVC's. VSS. Tele informed RN that pt had 14 beat run of afib around 0415. Pt asymptomatic, VSS (BP 91/60). MD paged, no interventions ordered at this time.   Respiratory: RA, no c/o SOB or PENA  GI/: voiding adequate amounts in hat, LBM 12/21  Endocrine: BG checks fasting in AM and 2 hours after lunch and dinner. BG of 106 this AM  Diet/Appetite: good appetite, 2 gram Na diet  Skin: WDL  LDA: PICC flushes and draws appropriately, infusing heparin at 2,250 units/hr  Activity: up ad danis  Pain: no c/o pain  Labs: antiXa this AM     Plan:  -heparin gtt  -PO diuretics  -q6 50 mg metop  -electrolyte replacement as needed   -monitor HR/rhythm and BP

## 2022-12-23 LAB
ALBUMIN SERPL BCG-MCNC: 3.3 G/DL (ref 3.5–5.2)
ALP SERPL-CCNC: 94 U/L (ref 35–104)
ALT SERPL W P-5'-P-CCNC: 20 U/L (ref 10–35)
ANION GAP SERPL CALCULATED.3IONS-SCNC: 14 MMOL/L (ref 7–15)
AST SERPL W P-5'-P-CCNC: 35 U/L (ref 10–35)
BASOPHILS # BLD AUTO: 0 10E3/UL (ref 0–0.2)
BASOPHILS NFR BLD AUTO: 0 %
BILIRUB SERPL-MCNC: 0.4 MG/DL
BUN SERPL-MCNC: 8.5 MG/DL (ref 6–20)
CALCIUM SERPL-MCNC: 9.7 MG/DL (ref 8.6–10)
CHLORIDE SERPL-SCNC: 104 MMOL/L (ref 98–107)
CREAT SERPL-MCNC: 0.64 MG/DL (ref 0.51–0.95)
DEPRECATED HCO3 PLAS-SCNC: 16 MMOL/L (ref 22–29)
EOSINOPHIL # BLD AUTO: 0 10E3/UL (ref 0–0.7)
EOSINOPHIL NFR BLD AUTO: 0 %
ERYTHROCYTE [DISTWIDTH] IN BLOOD BY AUTOMATED COUNT: 15.2 % (ref 10–15)
GFR SERPL CREATININE-BSD FRML MDRD: >90 ML/MIN/1.73M2
GLUCOSE BLDC GLUCOMTR-MCNC: 110 MG/DL (ref 70–99)
GLUCOSE BLDC GLUCOMTR-MCNC: 156 MG/DL (ref 70–99)
GLUCOSE BLDC GLUCOMTR-MCNC: 88 MG/DL (ref 70–99)
GLUCOSE BLDC GLUCOMTR-MCNC: 92 MG/DL (ref 70–99)
GLUCOSE SERPL-MCNC: 75 MG/DL (ref 70–99)
HCT VFR BLD AUTO: 31.4 % (ref 35–47)
HGB BLD-MCNC: 9.8 G/DL (ref 11.7–15.7)
IMM GRANULOCYTES # BLD: 0.1 10E3/UL
IMM GRANULOCYTES NFR BLD: 1 %
LYMPHOCYTES # BLD AUTO: 2.5 10E3/UL (ref 0.8–5.3)
LYMPHOCYTES NFR BLD AUTO: 23 %
MAGNESIUM SERPL-MCNC: 1.7 MG/DL (ref 1.7–2.3)
MCH RBC QN AUTO: 25.4 PG (ref 26.5–33)
MCHC RBC AUTO-ENTMCNC: 31.2 G/DL (ref 31.5–36.5)
MCV RBC AUTO: 81 FL (ref 78–100)
MONOCYTES # BLD AUTO: 0.5 10E3/UL (ref 0–1.3)
MONOCYTES NFR BLD AUTO: 5 %
NEUTROPHILS # BLD AUTO: 7.4 10E3/UL (ref 1.6–8.3)
NEUTROPHILS NFR BLD AUTO: 71 %
NRBC # BLD AUTO: 0 10E3/UL
NRBC BLD AUTO-RTO: 0 /100
PHOSPHATE SERPL-MCNC: 4.5 MG/DL (ref 2.5–4.5)
PLATELET # BLD AUTO: 222 10E3/UL (ref 150–450)
POTASSIUM SERPL-SCNC: 4.2 MMOL/L (ref 3.4–5.3)
PROT SERPL-MCNC: 7.1 G/DL (ref 6.4–8.3)
RBC # BLD AUTO: 3.86 10E6/UL (ref 3.8–5.2)
SODIUM SERPL-SCNC: 134 MMOL/L (ref 136–145)
UFH PPP CHRO-ACNC: 0.26 IU/ML
UFH PPP CHRO-ACNC: 0.28 IU/ML
WBC # BLD AUTO: 10.5 10E3/UL (ref 4–11)

## 2022-12-23 PROCEDURE — 250N000011 HC RX IP 250 OP 636: Performed by: STUDENT IN AN ORGANIZED HEALTH CARE EDUCATION/TRAINING PROGRAM

## 2022-12-23 PROCEDURE — 99232 SBSQ HOSP IP/OBS MODERATE 35: CPT | Mod: GC | Performed by: INTERNAL MEDICINE

## 2022-12-23 PROCEDURE — 85520 HEPARIN ASSAY: CPT

## 2022-12-23 PROCEDURE — 84100 ASSAY OF PHOSPHORUS: CPT | Performed by: INTERNAL MEDICINE

## 2022-12-23 PROCEDURE — 80053 COMPREHEN METABOLIC PANEL: CPT

## 2022-12-23 PROCEDURE — 36415 COLL VENOUS BLD VENIPUNCTURE: CPT | Performed by: INTERNAL MEDICINE

## 2022-12-23 PROCEDURE — 85520 HEPARIN ASSAY: CPT | Performed by: INTERNAL MEDICINE

## 2022-12-23 PROCEDURE — 83735 ASSAY OF MAGNESIUM: CPT

## 2022-12-23 PROCEDURE — 214N000001 HC R&B CCU UMMC

## 2022-12-23 PROCEDURE — 250N000013 HC RX MED GY IP 250 OP 250 PS 637: Performed by: INTERNAL MEDICINE

## 2022-12-23 PROCEDURE — 99233 SBSQ HOSP IP/OBS HIGH 50: CPT | Mod: 25 | Performed by: OBSTETRICS & GYNECOLOGY

## 2022-12-23 PROCEDURE — 85025 COMPLETE CBC W/AUTO DIFF WBC: CPT

## 2022-12-23 PROCEDURE — 250N000013 HC RX MED GY IP 250 OP 250 PS 637

## 2022-12-23 PROCEDURE — 250N000013 HC RX MED GY IP 250 OP 250 PS 637: Performed by: STUDENT IN AN ORGANIZED HEALTH CARE EDUCATION/TRAINING PROGRAM

## 2022-12-23 PROCEDURE — 250N000011 HC RX IP 250 OP 636: Performed by: INTERNAL MEDICINE

## 2022-12-23 PROCEDURE — 59025 FETAL NON-STRESS TEST: CPT | Mod: 26 | Performed by: OBSTETRICS & GYNECOLOGY

## 2022-12-23 PROCEDURE — 36592 COLLECT BLOOD FROM PICC: CPT | Performed by: INTERNAL MEDICINE

## 2022-12-23 RX ORDER — MAGNESIUM OXIDE 400 MG/1
400 TABLET ORAL EVERY 4 HOURS
Status: COMPLETED | OUTPATIENT
Start: 2022-12-23 | End: 2022-12-23

## 2022-12-23 RX ADMIN — MAGNESIUM OXIDE TAB 400 MG (241.3 MG ELEMENTAL MG) 800 MG: 400 (241.3 MG) TAB at 20:25

## 2022-12-23 RX ADMIN — METOPROLOL TARTRATE 50 MG: 50 TABLET, FILM COATED ORAL at 06:25

## 2022-12-23 RX ADMIN — METOPROLOL TARTRATE 50 MG: 50 TABLET, FILM COATED ORAL at 18:30

## 2022-12-23 RX ADMIN — SODIUM CHLORIDE, PRESERVATIVE FREE 5 ML: 5 INJECTION INTRAVENOUS at 16:24

## 2022-12-23 RX ADMIN — Medication 400 MG: at 11:56

## 2022-12-23 RX ADMIN — HEPARIN SODIUM 2050 UNITS/HR: 10000 INJECTION, SOLUTION INTRAVENOUS at 04:20

## 2022-12-23 RX ADMIN — Medication 400 MG: at 08:41

## 2022-12-23 RX ADMIN — FUROSEMIDE 40 MG: 40 TABLET ORAL at 08:40

## 2022-12-23 RX ADMIN — POTASSIUM CHLORIDE 40 MEQ: 40 SOLUTION ORAL at 08:40

## 2022-12-23 RX ADMIN — POTASSIUM CHLORIDE 40 MEQ: 40 SOLUTION ORAL at 20:25

## 2022-12-23 RX ADMIN — PRENATAL VIT W/ FE FUMARATE-FA TAB 27-0.8 MG 1 TABLET: 27-0.8 TAB at 08:40

## 2022-12-23 RX ADMIN — HEPARIN SODIUM 2050 UNITS/HR: 10000 INJECTION, SOLUTION INTRAVENOUS at 16:24

## 2022-12-23 RX ADMIN — METOPROLOL TARTRATE 50 MG: 50 TABLET, FILM COATED ORAL at 11:56

## 2022-12-23 RX ADMIN — MAGNESIUM OXIDE TAB 400 MG (241.3 MG ELEMENTAL MG) 800 MG: 400 (241.3 MG) TAB at 08:40

## 2022-12-23 RX ADMIN — POLYETHYLENE GLYCOL 3350 17 G: 17 POWDER, FOR SOLUTION ORAL at 08:41

## 2022-12-23 ASSESSMENT — ACTIVITIES OF DAILY LIVING (ADL)
ADLS_ACUITY_SCORE: 20

## 2022-12-23 NOTE — PROVIDER NOTIFICATION
12/23/22 1000   Uterine Activity Assessment   Contraction Frequency (Minutes) 0   Contraction Duration (seconds) 0   Contraction Intensity no contractions   Uterine Resting Tone soft by palpation   Fetal Assessment   Fetal HR Assessment Method external US   Fetal HR (beats/min) 140   Fetal HR Baseline normal range   Fetal HR Variability moderate (amplitude range 6 to 25 bpm)   Fetal HR Accelerations increase 15 bpm above baseline lasting 15 seconds   Fetal HR Decelerations absent     Pt denies LOF, bleeding and contractions. Pt reports active fetal movement. Fetal monitoring for 20 minutes.

## 2022-12-23 NOTE — PROGRESS NOTES
[facility] Antepartum Progress Note    Subjective:   Denies contractions, vaginal bleeding, leaking of fluid, or decreased fetal movement. Had palpitations earlier while on left side, but now resolved with no chest pain, shortness of breath, or peripheral edema.    Objective:  Vitals:    22 0525 22 0623 22 0737 22 1158   BP: 102/49  119/65 108/65   BP Location: Right arm  Right arm Right arm   Pulse: 76  81 81   Resp: 16  16 18   Temp: 97.5  F (36.4  C)  97.6  F (36.4  C) 97.8  F (36.6  C)   TempSrc: Oral  Oral Oral   SpO2: 95%  97% 98%   Weight:  132.4 kg (291 lb 14.4 oz)     Height:         I/O last 3 completed shifts:  In: 742 [P.O.:240; I.V.:502]  Out: 600 [Urine:600]     Gen: No acute distress  CV: RRR  Resp: CTAB  Ext; no edema    Assessment/Plan:   Anjali Carmen is a 32 year old  at 27w1d by 6w1d US admitted to for acute HFrEF, cardiac arrhythmia after transferring from [facility].     The patient has had an echocardiogram since her arrival concerning for ischemic cardiomyopathy, which was ruled out by a cardiac catheterization on  with no coronary disease. Continuing to medically manage dilated cardiomyopathy with metoprolol and diuresis per cardiology team with improvement in her symptoms. She will be monitored inpatient with continuous telemetry given her risk of arrhythmia. She is high risk for volume overload and worsening of symptoms in the third trimester and postpartum, but has been doing very well since her admission.    We discussed contraception today. She is not interested in permanent contraception. She would like an IUD placed at the time of delivery with depo-provera for additional coverage against pregnancy. She was told that she cannot have other pregnancies and she is not certain this is her wish. She was counseled that certainly a future pregnancy would need to be timed and planned ideally and that if her cardiac function recovers prior to another  pregnancy, then pregnancy is not absolutely contraindicated. However, she would require MFM and cardiology care throughout any future pregnancy and close follow up in the interim.    We plan to visit twice a week given Anjali's stability, but continue twice daily NSTs. MFM will continue to follow along peripherally between visits and we are available  at [phone].    # Acute decompensated HFrEF  # Possible atrial fibrillation, non-sustained VT  - Metoprolol is safe in pregnancy with no dose restrictions  - Continuous telemetry given arrhythmia   - Consider repeat echocardiogram in order to guide anticoagulation and delivery timing/planning (last 4 weeks ago on admission)  - Continue therapeutic anticoagulation while EF < 35%, but from obstetric perspective okay for heparin drip or Lovenox  - Appreciate care by primary cardiology team     # Gestational Diabetes  - Goal blood glucose for AM fasting is 95 mg/dL  - 1 hour postprandial check is under 140 mg/dL and for 2 hour postprandial is 120 mg/d  - If after 2 weeks of monitoring, if >50% of blood glucose values are elevated, then we would typically initiate medication for treatment of gestational diabetes - Anjali prefers Metformin over insulin  -  monitoring to be determined by need for medications    # Pregnancy  - Repeat growth US in 3 weeks from last US ()  - Non-stress test (NST) BID  - Betamethasone (BMZ) given - at OSH for fetal lung maturity  - Magnesium for neuroprotection if moving towards delivery prior to 32 weeks with 6g bolus followed by 2g/hr maintenance until delivery  - Routine indications for emergent delivery including maternal decompensation or fetal compromise  - S/p  section consent on admission  - At 28 weeks: Tdap, as well as repeat CBC, RPR, and type and screen  - Plan IUD and depo-provera prior to discharge postpartum    Meredith Soliz MD  Maternal Fetal Medicine Fellow    MARYAM Attending Attestation  I  saw this patient with the resident and agree with the resident's findings and plan of care as documented in the resident's note. I personally reviewed her vital signs, medications, labs, pertinent imaging, and fetal monitoring. In summary, Ms. Elizabeth Carmen is a 32 year old  at 27w1d admitted in the setting of newly diagnosed acute heart failure with reduced ejection fraction (HFrEF). On echo her LVEF was 20-25% with severe diffuse hypokinesis and areas of wall thinning/akinesis. She was also noted to have mild right ventricular dilation and moderately reduced right ventricular function. No evidence of pulmonary hypertension or evidence of cardiac ischemia on cardiac catheterization . She has been admitted to the cardiology service for medical optimization and cardiac monitoring given intermittent ventricular arrhythmias. Her medical history is also complicated by A1GDM and obesity (BMI 43).      From a cardiac standpoint she is currently hemodynamically stable. Goal BP <130/80 mmHg and MAP > 65. On lasix 40mg daily to maintain euvolemia but held this AM given relative hypotension and net negative 800mL. Also receiving beta blockade with Metoprolol 50mg Q 6 hrs. Is on telemetry given frequent ectopy (atrial fibrillation) but ventricular arrhythmias. Currently episodes are self limiting = mostly in NSR. Afterload reduction held given relative hypotension however can be used as indicated/tolerated.     Additionally, given EF 20-25% and increased risk of thrombosis in this setting she is currently therapeutically anticoagulated on IV heparin drip. Discussed with primary team our recommendation to continue therapeutic anticoagulation if EF < 35% through pregnancy and until 6 weeks postpartum. Okay to transition, if desired, to LMWH (1mg/kg every 12 hour dosing). Anti-Xa level should be drawn 4-6 hours after her 3rd dose has been administered with a goal Anti-Xa level of 0.6-1.0 U/mL. Would recommend a repeat  "maternal echocardiogram at 28 weeks to reassess cardiac function especially given expected peak cardiac load between 28-32 weeks. Further frequency of echos based on repeat echo findings.    From a pregnancy standpoint - prenatal care is up to date. She received BMZ -, consider repeat course if worsening clinical status and < 34 weeks. If proceeding with delivery will need Magnesium for neuroprotection 6g bolus followed by 2g/hr until delivery. Repeat assessment of fetal growth . Continue twice daily NSTs and MFM rounding 2x weekly or sooner as needed. Continue fasting and 1 hour postprandial glucose checks given A1GDM. Iron deficiency anemia - receiving IV iron.     From a delivery perspective she has a history of  x 2 followed by a  delivery. She originally was interested in TOLAC. Mode of delivery at this time is likely via repeat  but will ultimately depend on clinical status and gestational age/circumstances surrounding delivery. Timing of delivery tentatively planned for 34 weeks - again ultimate timing based on clinic status, will continue to monitor and communicate with primary team closely. Currently delivery planned for [place].     /65 (BP Location: Right arm)   Pulse 81   Temp 97.8  F (36.6  C) (Oral)   Resp 18   Ht 1.753 m (5' 9\")   Wt 132.4 kg (291 lb 14.4 oz)   SpO2 98%   BMI 43.11 kg/m        FHT: Baseline 140s, moderate variability, +accels, no decels  TOCO: None  NST interpretation for today: reactive, reassuring and appropriate for GA    Time Spent on this Encounter   I spent a total of 40 minutes face-to-face or coordinating care of Ms. Elizabeth Carmen. Over 50% of my time on the unit was spent counseling the patient and /or coordinating care regarding diagnosis, diagnostic results, prognosis and risks and benefits of treatment options.     Date of service (when I saw the patient): 2022     Rox Donovan MD  , " OB/GYN  Maternal-Fetal Medicine  [phone] (Pager)

## 2022-12-23 NOTE — PLAN OF CARE
"/70 (BP Location: Right arm, Cuff Size: Adult Large)   Pulse 87   Temp 97.9  F (36.6  C) (Oral)   Resp 16   Ht 1.753 m (5' 9\")   Wt 131.4 kg (289 lb 9.6 oz)   SpO2 98%   BMI 42.77 kg/m       Shift: 9407-6773    Status: 32 year old  at 25w4d w/ no significant past medical history, who presents as a transfer from [facility] to [facility] on 2022 for further management of newly diagnosed, acute decompensated systolic heart failure (LVEF 20%) as well as high risk delivery  Neuro: A&O x4. Able to make needs known   Respiratory: RA, sating high 90s. Denies SOB  Cardiac: SR, HR 80s. Denies cardiac chest pain. BP's soft but within order parameters  GI/: Voids w/out difficulty. LBM yesterday   Diet: 2g NA  Pain: Denies   Skin: No concerns noted   IV Access: L DL PICC, one infusing hep gtt @  unit(s)/hr. 10a recheck at 0530  Labs: Reviewed,   Activity: Up ad  danis     Plan: Continue w/ POC     "

## 2022-12-23 NOTE — PROGRESS NOTES
MFM Antepartum Progress Note    Subjective:   Denies contractions, vaginal bleeding, leaking of fluid, or decreased fetal movement.  She is feeling good today.  Denies chest pain, SOB, palpitations or cough.     Objective:  Vitals:    22 0623 22 0813 22 1130 22 1534   BP: 100/70 102/69 103/60 101/59   BP Location: Right arm Right arm Right arm Left arm   Cuff Size: Adult Large Adult Large Adult Large Adult Large   Pulse: 87 83 87 78   Resp: 16 17 16 18   Temp:  98.2  F (36.8  C) 97.9  F (36.6  C) 97.5  F (36.4  C)   TempSrc:  Oral Oral Oral   SpO2:  98% 97% 98%   Weight: 131.4 kg (289 lb 9.6 oz)      Height:         I/O last 3 completed shifts:  In: 2190.83 [P.O.:2100; I.V.:90.83]  Out: 1300 [Urine:1300]     Gen: No acute distress  Resp: Speaking full sentences  Abd: Gravid, soft, NT, no palp ctx  Ext: No edema    NST : normal baseline, + accels, no decels  [place]: Quiet    Assessment/Plan:   Anjali Carmen is a 32 year old  at 26w4d by 6w1d US admitted to for acute HFrEF, cardiac arrhythmia after transferring from [facility].     The patient has had an echocardiogram since her arrival concerning for ischemic cardiomyopathy, which was ruled out by a cardiac catheterization on  with no coronary disease. Continuing to medically manage dilated cardiomyopathy with metoprolol and diuresis per cardiology team with improvement in her symptoms. She will be monitored inpatient with continuous telemetry given her risk of arrhythmia. She is high risk for volume overload and worsening of symptoms in the third trimester and postpartum.    Blood glucose values yesterday within normal for fasting and postprandial values. Continue to monitor fasting and one hour post-prandial and will reassess for need for medication in 2 weeks.    MFM will continue to follow along closely and are available  at [phone].  We will continue BID external fetal monitoring and MFM will round on Anjali  twice a week, which she is aware of.  However, we will come assess her promptly if any change in maternal status or any signs/symptoms of  labor or any FHR changes.     # Acute decompensated HFrEF  # Possible atrial fibrillation, non-sustained VT  - BP goal 130/80, MAP >65. If patient begins to have blood pressures >160/110 for at least 15 minutes, call M for guidance of management, although typically will initiate treatment with 5-10 mg of IV hydralazine or 20 mg of IV labetalol  - Will need to stop anticoagulation prior to delivery. For regional anesthesia to be an option, we would need to stop anticoagulation/antiplatelet therapy. She will need an anesthesia consult and multi-disciplinary discussion regarding this.  - Metoprolol is safe in pregnancy with no dose restrictions     # Gestational Diabetes  - Recommend QID blood glucose checks (fasting, and 1 hour after each meal)  - Goal blood glucose for AM fasting is 95 mg/dL  - 1 hour postprandial check is under 140 mg/dL  - If after 2 weeks of monitoring, if >50% of blood glucose values are elevated, then we would typically initiate medication for treatment of gestational diabetes - Anjali prefers Metformin over insulin  -  monitoring to be determined by need for medications    # Pregnancy  - Repeat growth US in 3 weeks from last US ()  - Non-stress test (NST) BID  - Betamethasone (BMZ) given - at OSH for fetal lung maturity  - Magnesium for neuroprotection if moving towards delivery prior to 32 weeks with 4g bolus followed by 2g/hr maintenance until delivery  - Routine indications for emergent delivery including maternal decompensation or fetal compromise  - S/p  section consent on admission  - Plan CS at 34 weeks, or sooner if more frequent arrhythmia or decline in cardiac function.   - At 28 weeks: Tdap, as well as repeat CBC, RPR, and type and screen    Jordyn Bills MD    Time Spent on this Encounter   I spent 40  minutes on the unit/floor managing the care of Anjali Carmen. Over 50% of my time was spent on the following:   - Counseling the patient and/or family regarding: diagnostic results, prognosis, risks and benefits of treatment options, recommended follow-up and medical compliance  - Coordination of care with the: nurse and patient    In summary, Anjali Carmen is a  at 26w4d admitted with systolic heart failure, etiology uncertain.  Anjali continues to have non-sustained runs of Vtach for which she is continuing on metoprolol and close inpatient monitoring, in addition to gentle diuresis with Lasix 40 mg daily.   We will continue to follow with twice daily fetal surveillance and twice weekly MFM in-person visits.  We will continue to follow along as well in between in-person visits and will come assess Anjali promptly with any change in maternal or fetal/pregnnacy status.    Jordyn Bills MD

## 2022-12-23 NOTE — PROGRESS NOTES
D: Pt admitted from [facility] to [facility] on 12/6/2022 for further management of newly diagnosed, acute decompensated systolic heart failure (LVEF 20%) as well as high risk delivery. Pt is pregnant at 25w4d w/ no significant past medical history,     I/A:   Neuro: A&O x4.   Cardiac: SR, PVCs HR 70-80s.   Respiratory: Sats >90% on RA, denied SOB.  GI/: Adequate UOP. Good appetite. BG taken 1-2 hours after lunch and dinner.  IV/Drips: Double lumen PICC in LUE  *Heparin gtt running at 2,050 units/hr, Xa to be drawn in the am.   Activity: Up ad danis, steady gait. Ambulating in room. In between bed and chair throughout the day.        P: OB continuing to monitor daily. Notifying Cards 2 team of changes.

## 2022-12-23 NOTE — PROGRESS NOTES
Cardiology Progress Note    ASSESSMENT/PLAN:  Anjali Carmen is a 32 year old  at 25w4d w/ no significant past medical history, who presents as a transfer from [facility] to [facility] on 2022 for further management of newly diagnosed, acute decompensated systolic heart failure (LVEF 20%) as well as high risk delivery.     Changes Today:   - No changes  - Continue furosemide 40mg daily PO and metoprolol tartrate to 50mg q6H    # Acute decompensated systolic and diastolic heart failure  # ACC/AHA Stage C systolic heart failure  # Heart failure with reduced EF (LVEF 20%)  # Mild right ventricular systolic dysfunction  # Non-sustained VT  Etiology of HFrEF: uncertain at this time.  Concern for familial dilated cardiomyopathy given history of CM in father at 30 years of age. Last pregnancy in , timeline not fitting in for PPCM. Greater concern for NICM over ICMP, however, TTE on  with thinned out LV wall and notable regional wall motion abnormalities. Coronary angiogram done on  with no significat lesions seen.  No known history of pre-existing structural heart disease prior to this admission.  1. Patient hemodynamically stable with preserved end-organ function. No indication for inotrope support/MCS at this time.   2. GDMT and diuresis as outlined below.  Traditional afterload targets for poor LV function and low LVEF of 20-25% cannot be met due to need for fetoplacental circulation.  Will target /80 and MAP > 65. Repeat transthoracic echocardiogram done on  notable for severely reduced left ventricular function with an LVEF of 20-25%. Also notable for RWM (details below) Coronanry angiogram on  without significant CAD.   3. Will consider cardiac MRI w.r.t etiology of cardiomyopathy once clinically permissible (likely to be done post partum).  4. Currently on heparin gtt for Afib and risk of LV thrombus formation given low LVEF and hypercoagulable state of  pregnancy.  GDMT:   ACEi/ARB/ARNI: contraindicated due to pregnancy  BB: metoprolol tartrate 50 mg q6hr  Aldosterone antagonist: high adverse risk medication in pregnancy  SCD prophylaxis: does not meet criteria for implant  Fluid status: Lasix 40mg PO daily     # New onset possible paroxysmal atrial fibrillation  [facility] [place]- isolated episode, EKG unavailable for review. Currently in NSR at [facility].  - Continue to monitor rhythm on telemetry.   - Currently on heparin gtt for AC, however, [id number] score of 2 (sex, congestive heart failure) and isolated episode of Afib. Will continue for now as its safe in pregnancy (discussed with MFM).     # Pregnancy  Gestational age 26 wks. Betamethasone (BMZ) given - at OSH for fetal lung maturity. OB US for fetal growth done on 22 with normal fetal findings and no abnormalities.  - Appreciate MFM input., they are closely following with daily checks; they can be reached via their pager (listed and updated regularly on the summary page)  - Plan for GTT on   - Plan for growth US on   - Non-stress test BID  - Magnesium for neuroprotection if moving towards delivery prior to 32 weeks with 4g bolus followed by 2g/hr maintenance until delivery  - Routine indications for emergent delivery including maternal decompensation or fetal compromise  - S/p  section consent on admission  - At 28 weeks: Tdap, as well as repeat CBC, RPR, and type and screen    # Mild anemia, stable  HGB 10.2 g/dL on arrival (MCV 79). Retic count 1.9%. Iron, ferritin, TIBC levels show AVINASH.  - Limit blood draws.     Prophylaxis:  DVT: heparin gtt   Disposition: Floor  Code Status: Full      Plan discussed with , who is in agreement. Please see staff addendum for changes/additions to the plan.    Shante Sharpe MD  Internal Medicine, PGY-1    ===================================================================    SUBJECTIVE:   No acute events  "overnight. Nursing notes reviewed. Patient denies concerns this morning. Intermittent, brief, unchanged palpitations. Denies chest pain, SOB, lightheadedness, nausea, vomiting.    OBJECTIVE:  /69 (BP Location: Right arm, Cuff Size: Adult Large)   Pulse 83   Temp 98.2  F (36.8  C) (Oral)   Resp 17   Ht 1.753 m (5' 9\")   Wt 131.4 kg (289 lb 9.6 oz)   SpO2 98%   BMI 42.77 kg/m    Temp (24hrs), Av.2  F (36.8  C), Min:97.9  F (36.6  C), Max:98.7  F (37.1  C)    GEN: pleasant, no acute distress  HEENT: no icterus  CV: RRR, normal s1/s2, no murmurs.   CHEST: clear to ausculation bilaterally, no rales or wheezing  ABD: soft, non-tender, normal active bowel sounds  EXTR: pulses +2 bilaterally. No LE edema.   NEURO: alert oriented, speech fluent/appropriate, motor grossly nonfocal    Telemetry:  - Intermittent NSVT (max 6 beats), PACs, PVCs    Labs: reviewed in EMR  CBC  Recent Labs   Lab 22  0557 22  0446 22  0448 22  0859   WBC 10.5 10.3 11.9* 11.4*   RBC 3.86 3.79* 4.10 3.75*   HGB 9.8* 9.9* 10.5* 9.8*   HCT 31.4* 30.6* 33.5* 31.1*   MCV 81 81 82 83   MCH 25.4* 26.1* 25.6* 26.1*   MCHC 31.2* 32.4 31.3* 31.5   RDW 15.2* 15.2* 15.0 14.9    230 253 200     BMP  Recent Labs   Lab 22  0557 22  2220 22  1615 22  1017 22  0604 22  0616 22  0446 22  0818 22  0537 22  1333 22  0448 22  1133   *  --   --   --   --   --  134*  --   --   --  135* 134*   POTASSIUM 4.2  --   --  4.9  --   --  4.2  --  4.1  --  4.2 4.6   CHLORIDE 104  --   --   --   --   --  105  --   --   --  105 104   CO2 16*  --   --   --   --   --  22  --   --   --  17* 17*   ANIONGAP 14  --   --   --   --   --  7  --   --   --  13 13   GLC 75 111* 108*  --  106*   < > 88   < >  --    < > 74 141*   BUN 8.5  --   --   --   --   --  9.5  --   --   --  11.1 9.6   CR 0.64  --   --   --   --   --  0.62  --   --   --  0.69 0.67   GFRESTIMATED >90  " --   --   --   --   --  >90  --   --   --  >90 >90   AYDEN 9.7  --   --   --   --   --  9.1  --   --   --  9.8 9.4   MAG 1.7  --   --  1.8  --   --  1.8  --  1.7  --  1.6* 1.6*   PHOS 4.5  --   --  4.6*  --   --  4.2  --  4.3  --   --  4.2    < > = values in this interval not displayed.     Troponins:   No results found for: TROPI, TROPONIN, TROPR, TROPN  INR  No lab results found in last 7 days.    Echocardiogram (12/6/22):  Interpretation Summary  Left ventricular function is decreased. The ejection fraction is 20-25%  (severely reduced). Severe diffuse hypokinesis is present. There is wall  thinning and akinesis of the basal-mid inferior, basal-mid inferoseptal and  basal inferolateral segments. There is akinesis of the apical septum. This is  compatible with ischemic cardiomyopathy.  Mild right ventricular dilation is present. Global right ventricular function  is moderately reduced.  Mild tricuspid insufficiency is present.  Pulmonary artery systolic pressure is normal.  IVC diameter and respiratory changes fall into an intermediate range  suggesting an RA pressure of 8 mmHg.  No pericardial effusion is present.    Coronary Angiogram:  12/7/22:   Formal results pending, but no obstructive disease seen on preliminary evaluation.     Imaging: reviewed in EMR.

## 2022-12-24 LAB
GLUCOSE BLDC GLUCOMTR-MCNC: 101 MG/DL (ref 70–99)
GLUCOSE BLDC GLUCOMTR-MCNC: 94 MG/DL (ref 70–99)
HOLD SPECIMEN: NORMAL
MAGNESIUM SERPL-MCNC: 1.7 MG/DL (ref 1.7–2.3)
PHOSPHATE SERPL-MCNC: 4.5 MG/DL (ref 2.5–4.5)
POTASSIUM SERPL-SCNC: 4.2 MMOL/L (ref 3.4–5.3)
UFH PPP CHRO-ACNC: 0.29 IU/ML

## 2022-12-24 PROCEDURE — 250N000013 HC RX MED GY IP 250 OP 250 PS 637: Performed by: STUDENT IN AN ORGANIZED HEALTH CARE EDUCATION/TRAINING PROGRAM

## 2022-12-24 PROCEDURE — 36592 COLLECT BLOOD FROM PICC: CPT | Performed by: INTERNAL MEDICINE

## 2022-12-24 PROCEDURE — 250N000011 HC RX IP 250 OP 636: Performed by: INTERNAL MEDICINE

## 2022-12-24 PROCEDURE — 99232 SBSQ HOSP IP/OBS MODERATE 35: CPT | Performed by: NURSE PRACTITIONER

## 2022-12-24 PROCEDURE — 250N000011 HC RX IP 250 OP 636: Performed by: NURSE PRACTITIONER

## 2022-12-24 PROCEDURE — 84132 ASSAY OF SERUM POTASSIUM: CPT | Performed by: INTERNAL MEDICINE

## 2022-12-24 PROCEDURE — 83735 ASSAY OF MAGNESIUM: CPT | Performed by: INTERNAL MEDICINE

## 2022-12-24 PROCEDURE — 250N000011 HC RX IP 250 OP 636: Performed by: STUDENT IN AN ORGANIZED HEALTH CARE EDUCATION/TRAINING PROGRAM

## 2022-12-24 PROCEDURE — 250N000013 HC RX MED GY IP 250 OP 250 PS 637: Performed by: INTERNAL MEDICINE

## 2022-12-24 PROCEDURE — 84100 ASSAY OF PHOSPHORUS: CPT | Performed by: INTERNAL MEDICINE

## 2022-12-24 PROCEDURE — 85520 HEPARIN ASSAY: CPT | Performed by: INTERNAL MEDICINE

## 2022-12-24 PROCEDURE — 214N000001 HC R&B CCU UMMC

## 2022-12-24 PROCEDURE — 258N000003 HC RX IP 258 OP 636: Performed by: NURSE PRACTITIONER

## 2022-12-24 PROCEDURE — 250N000013 HC RX MED GY IP 250 OP 250 PS 637

## 2022-12-24 RX ORDER — DIPHENHYDRAMINE HYDROCHLORIDE 50 MG/ML
50 INJECTION INTRAMUSCULAR; INTRAVENOUS
Status: DISCONTINUED | OUTPATIENT
Start: 2022-12-24 | End: 2022-12-29

## 2022-12-24 RX ORDER — METHYLPREDNISOLONE SODIUM SUCCINATE 125 MG/2ML
125 INJECTION, POWDER, LYOPHILIZED, FOR SOLUTION INTRAMUSCULAR; INTRAVENOUS
Status: DISCONTINUED | OUTPATIENT
Start: 2022-12-24 | End: 2022-12-29

## 2022-12-24 RX ORDER — MAGNESIUM SULFATE HEPTAHYDRATE 40 MG/ML
2 INJECTION, SOLUTION INTRAVENOUS ONCE
Status: COMPLETED | OUTPATIENT
Start: 2022-12-24 | End: 2022-12-24

## 2022-12-24 RX ADMIN — MAGNESIUM OXIDE TAB 400 MG (241.3 MG ELEMENTAL MG) 800 MG: 400 (241.3 MG) TAB at 08:04

## 2022-12-24 RX ADMIN — POTASSIUM CHLORIDE 40 MEQ: 40 SOLUTION ORAL at 08:04

## 2022-12-24 RX ADMIN — METOPROLOL TARTRATE 50 MG: 50 TABLET, FILM COATED ORAL at 00:17

## 2022-12-24 RX ADMIN — HEPARIN SODIUM 2050 UNITS/HR: 10000 INJECTION, SOLUTION INTRAVENOUS at 16:39

## 2022-12-24 RX ADMIN — METOPROLOL TARTRATE 50 MG: 50 TABLET, FILM COATED ORAL at 11:44

## 2022-12-24 RX ADMIN — IRON SUCROSE 200 MG: 20 INJECTION, SOLUTION INTRAVENOUS at 14:20

## 2022-12-24 RX ADMIN — METOPROLOL TARTRATE 50 MG: 50 TABLET, FILM COATED ORAL at 23:50

## 2022-12-24 RX ADMIN — FUROSEMIDE 40 MG: 40 TABLET ORAL at 08:03

## 2022-12-24 RX ADMIN — METOPROLOL TARTRATE 50 MG: 50 TABLET, FILM COATED ORAL at 05:06

## 2022-12-24 RX ADMIN — POTASSIUM CHLORIDE 40 MEQ: 40 SOLUTION ORAL at 20:05

## 2022-12-24 RX ADMIN — ACETAMINOPHEN 650 MG: 325 TABLET, FILM COATED ORAL at 20:05

## 2022-12-24 RX ADMIN — PRENATAL VIT W/ FE FUMARATE-FA TAB 27-0.8 MG 1 TABLET: 27-0.8 TAB at 08:03

## 2022-12-24 RX ADMIN — MAGNESIUM SULFATE IN WATER 2 G: 40 INJECTION, SOLUTION INTRAVENOUS at 11:43

## 2022-12-24 RX ADMIN — HEPARIN SODIUM 2050 UNITS/HR: 10000 INJECTION, SOLUTION INTRAVENOUS at 04:44

## 2022-12-24 RX ADMIN — METOPROLOL TARTRATE 50 MG: 50 TABLET, FILM COATED ORAL at 17:48

## 2022-12-24 RX ADMIN — MAGNESIUM OXIDE TAB 400 MG (241.3 MG ELEMENTAL MG) 800 MG: 400 (241.3 MG) TAB at 20:05

## 2022-12-24 RX ADMIN — SODIUM CHLORIDE, PRESERVATIVE FREE 5 ML: 5 INJECTION INTRAVENOUS at 07:05

## 2022-12-24 RX ADMIN — SODIUM CHLORIDE, PRESERVATIVE FREE 5 ML: 5 INJECTION INTRAVENOUS at 14:49

## 2022-12-24 ASSESSMENT — ACTIVITIES OF DAILY LIVING (ADL)
ADLS_ACUITY_SCORE: 20

## 2022-12-24 NOTE — PROVIDER NOTIFICATION
12/24/22 0926   Uterine Activity Assessment   Method external tocotransducer   Contraction Intensity no contractions   Uterine Resting Tone soft by palpation   Fetal Assessment   Fetal Movement active   Fetal HR Assessment Method external US   Fetal HR (beats/min) 140   Fetal HR Baseline normal range   Fetal HR Variability moderate (amplitude range 6 to 25 bpm)   Fetal HR Accelerations increase 15 bpm above baseline lasting 15 seconds   Fetal HR Decelerations variable   NST Start Time 0905   NST Stop Time 0925   Non-stress Test Interpretation Appropriate for gestational age     Denies LOF, cramping/ctx, and vaginal bleeding. Reports +FM.

## 2022-12-24 NOTE — PROGRESS NOTES
[facility]   Cardiology II Service / Advanced Heart Failure  Daily Progress Note      Patient: Anjali Carmen  MRN: [id number]  Admission Date: 2022  Hospital Day # 18    Assessment and Plan: Anjali Carmen is a 32 year old female  with no significant past medical history, who presented as a transfer from [facility] to [facility] on 2022 for further management of newly diagnosed, acute decompensated systolic heart failure (LVEF 20%) and high risk delivery.    [facility] available  at [phone]    Today's Plan:  -start iron supplementation    # Acute on chronic systolic heart failure/HFrEF secondary to NICM (?familial)    # Moderately reduced RV function   # NSVT  Stage C. NYHA Class difficult to discern. Concern for familial dilated cardiomyopathy given history of cardiomyopathy in father at 30 years of age. Last pregnancy in , timeline not fitting in for PPCM. TTE  with thinned out LV wall and notable regional wall motion abnormalities. Coronary angiogram  with no significat lesions seen. No known history of pre-existing structural heart disease prior to this admission. GDMT and diuresis as outlined below. Traditional afterload reducing agents for poor LV function and low LVEF of 20-25% cannot be met due to need for fetoplacental circulation.  Will target /80 and MAP > 65. Repeat TTE  LVEF of 20-25%, RWM (details below), moderately reduced RV function, mild TR, no pericardial effusion.     Fluid status: euvolemic lasix 40 mg daily PO  ACEi/ARB/ARNI/afterload reduction: contraindicated in pregnancy  BB: metoprolol tartrate 50 mg q6hr  Aldosterone antagonist: high adverse risk medication in pregnancy  SGLT2i: deferred  SCD prophylaxis: decision deferred during medication uptitration  Anticoagulation: continue heparin gtt for risk of LV thrombus in the setting of low EF, regional WMA, and hypercoaguable state of pregnancy  Other: for further  workup, plan for cardiac MRI in the postpartum setting +/- genetic testing/counseling referral    # New onset possible paroxysmal atrial fibrillation  [facility] [place]- isolated episode, EKG unavailable for review. Currently in NSR at [facility].  - telemetry.   - heparin gtt, [id number] score of 2 (sex, HF) and isolated episode of Afib. Will continue for now as its safe in pregnancy (discussed with MFM).      # Pregnancy  Gestational age 26 wks. Betamethasone (BMZ) given - at OSH for fetal lung maturity. OB US for fetal growth done on 22 with normal fetal findings and no abnormalities.  - Appreciate MFM management, closely following with daily checks; they can be reached via their pager (listed and updated regularly on the summary page)  - growth US   - NST BID  - Magnesium for neuroprotection if moving towards delivery prior to 32 weeks with 4g bolus followed by 2g/hr maintenance until delivery  - Routine indications for emergent delivery including maternal decompensation or fetal compromise  - S/p  section consent on admission  - At 28 weeks(~1/3/23): Tdap, repeat CBC, RPR, and type and screen  - per MFM, BP goal 130/80, MAP >65    - If patient begins to have blood pressures >160/110 for at least 15 minutes, call MFM for guidance of management, although typically will initiate treatment with 5-10 mg of IV hydralazine or 20 mg of IV labetalol    # Mild anemia, multifactorial  Due to pregnancy and HF. Hgb 10.2 on admission (MCV 79). Retic count 1.9%. Iron, ferritin, TIBC levels show AVINASH (say 6%). Hgh 9s-low 10s.   - limit blood draws  - start iron supplement    # Gestational Diabetes  - QID blood glucose checks (fasting, and 1 hour after each meal)   - goal blood glucose for AM fasting 95 mg/dL   - 1 hour postprandial check is under 140 mg/dL  - If after 2 weeks of monitoring, if >50% of blood glucose values are elevated, then we would typically initiate medication for  "treatment of gestational diabetes - Anjali prefers Metformin over insulin  -  monitoring to be determined by need for medications    FEN: 2g Na  PROPHY:  Heparin gtt  LINES:  PICC  DISPO:  Pending delivery  CODE STATUS:  Full code    Geovanna Hoganser, MONI, NP-C  Advanced Heart Failure/Cardiology II Service  Pager [phone] [facility] [phone]      Patient discussed with Dr. Abdi.        30 minutes spent on the date of the encounter doing chart review, history and exam, documentation and further activities per the note    ================================================================    Subjective/24-Hr Events:   Last 24 hr care team notes reviewed. No overnight events. 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 Declines intervention. Denies shortness of breath, LE edema, Lightheadedness, chest pain. No tele events.     ROS:  4 point ROS including respiratory, CV, GI and  (other than that noted in the HPI) is negative.     Medications: Reviewed in EPIC.     Physical Exam:   BP 95/63 (BP Location: Left arm, Cuff Size: Adult Large)   Pulse 72   Temp 97.7  F (36.5  C) (Oral)   Resp 16   Ht 1.753 m (5' 9\")   Wt 131.7 kg (290 lb 6.4 oz)   SpO2 98%   BMI 42.88 kg/m      GENERAL: Appears comfortable, in no distress.  HEENT: Eye symmetrical, no discharge or icterus bilaterally. Mucous membranes moist and without lesions.  NECK: Supple, JVD not elevated.   CV: RRR, +S1S2,  murmur, rub, or gallop.   RESPIRATORY: Respirations regular, even, and unlabored. Lungs CTA throughout.    GI: Soft, gravid and non distended with normoactive bowel sounds present in all quadrants. No tenderness, rebound, guarding.   EXTREMITIES: No peripheral edema. 2+ bilateral pedal pulses.   NEUROLOGIC: Alert and oriented x 3. No focal deficits.   MUSCULOSKELETAL: No joint swelling or tenderness.   SKIN: No jaundice. No rashes or lesions.     Labs:  CMP  Recent Labs   Lab 22  2202 22  1747 22  1423 22  1133 " 12/23/22  0557 12/22/22  1615 12/22/22  1017 12/21/22  0616 12/21/22  0446 12/20/22  0818 12/20/22  0537 12/19/22  1333 12/19/22  0448 12/18/22  1133 12/18/22  0859 12/18/22  0859   NA  --   --   --   --  134*  --   --   --  134*  --   --   --  135* 134*  --  127*   POTASSIUM  --   --   --   --  4.2  --  4.9  --  4.2  --  4.1  --  4.2 4.6  --  3.8   CHLORIDE  --   --   --   --  104  --   --   --  105  --   --   --  105 104  --  101   CO2  --   --   --   --  16*  --   --   --  22  --   --   --  17* 17*  --  17*   ANIONGAP  --   --   --   --  14  --   --   --  7  --   --   --  13 13  --  9   GLC 92 156* 110* 88 75   < >  --    < > 88   < >  --    < > 74 141*  --  105*   BUN  --   --   --   --  8.5  --   --   --  9.5  --   --   --  11.1 9.6  --  7.6   CR  --   --   --   --  0.64  --   --   --  0.62  --   --   --  0.69 0.67  --  0.58   GFRESTIMATED  --   --   --   --  >90  --   --   --  >90  --   --   --  >90 >90  --  >90   AYDEN  --   --   --   --  9.7  --   --   --  9.1  --   --   --  9.8 9.4  --  9.2   MAG  --   --   --   --  1.7  --  1.8  --  1.8  --  1.7  --  1.6* 1.6*   < >  --    PHOS  --   --   --   --  4.5  --  4.6*  --  4.2  --  4.3  --   --  4.2   < >  --    PROTTOTAL  --   --   --   --  7.1  --   --   --  7.0  --   --   --  7.3  --   --  6.5   ALBUMIN  --   --   --   --  3.3*  --   --   --  3.2*  --   --   --  3.4*  --   --  3.0*   BILITOTAL  --   --   --   --  0.4  --   --   --  0.3  --   --   --  0.3  --   --  0.3   ALKPHOS  --   --   --   --  94  --   --   --  88  --   --   --  94  --   --  81   AST  --   --   --   --  35  --   --   --  34  --   --   --  35  --   --  33   ALT  --   --   --   --  20  --   --   --  20  --   --   --  24  --   --  22    < > = values in this interval not displayed.       CBC  Recent Labs   Lab 12/23/22  0557 12/21/22  0446 12/19/22  0448 12/18/22  0859   WBC 10.5 10.3 11.9* 11.4*   RBC 3.86 3.79* 4.10 3.75*   HGB 9.8* 9.9* 10.5* 9.8*   HCT 31.4* 30.6* 33.5* 31.1*   MCV 81 81 82  83   MCH 25.4* 26.1* 25.6* 26.1*   MCHC 31.2* 32.4 31.3* 31.5   RDW 15.2* 15.2* 15.0 14.9    230 253 200

## 2022-12-24 NOTE — CARE PLAN
Patient resting this afternoon, offers no obstetric complaints. Baby very active, difficult to monitor, extended due to intermittent tracing. EFM reviewed with Dr Amaya.

## 2022-12-24 NOTE — PLAN OF CARE
"BP 95/63 (BP Location: Left arm, Cuff Size: Adult Large)   Pulse 72   Temp 97.7  F (36.5  C) (Oral)   Resp 16   Ht 1.753 m (5' 9\")   Wt 131.7 kg (290 lb 6.4 oz)   SpO2 98%   BMI 42.88 kg/m        Shift: [id number]     Status: 32 year old  at 25w4d w/ no significant past medical history, who presents as a transfer from [facility] to [facility] on 2022 for further management of newly diagnosed, acute decompensated systolic heart failure (LVEF 20%) as well as high risk delivery    Neuro: A&O x4. Able to make needs known   Respiratory: RA, sating high 90s. Denies SOB  Cardiac: SR, denies cardiac chest pain. BP's soft but within order parameters  GI/: Voids w/out difficulty. LBM yesterday   Diet: 2g NA  Pain: Denies   Skin: No concerns noted   IV Access: L DL PICC, one infusing hep gtt @ 0 unit(s)/hr. 10a recheck at 0500 (not yet collected)  Labs: Reviewed, BS 92  Activity: Up ad danis      Plan: Follow-up w/ 10a results once collected per order protocol. Continue w/ POC     "

## 2022-12-24 NOTE — PROGRESS NOTES
D/I- Tolerated IV iron infusion, IV mag replaced per protocol, fetal assessment completed by faustino gallardo RN  A-Stable; no changes  P- Heparin Xa tomorrow morning per protocol

## 2022-12-25 LAB
ALBUMIN SERPL BCG-MCNC: 3.5 G/DL (ref 3.5–5.2)
ALP SERPL-CCNC: 102 U/L (ref 35–104)
ALT SERPL W P-5'-P-CCNC: 21 U/L (ref 10–35)
ANION GAP SERPL CALCULATED.3IONS-SCNC: 14 MMOL/L (ref 7–15)
AST SERPL W P-5'-P-CCNC: 34 U/L (ref 10–35)
BASOPHILS # BLD AUTO: 0 10E3/UL (ref 0–0.2)
BASOPHILS NFR BLD AUTO: 0 %
BILIRUB SERPL-MCNC: 0.3 MG/DL
BUN SERPL-MCNC: 12 MG/DL (ref 6–20)
CALCIUM SERPL-MCNC: 10 MG/DL (ref 8.6–10)
CHLORIDE SERPL-SCNC: 105 MMOL/L (ref 98–107)
CREAT SERPL-MCNC: 0.79 MG/DL (ref 0.51–0.95)
DEPRECATED HCO3 PLAS-SCNC: 17 MMOL/L (ref 22–29)
EOSINOPHIL # BLD AUTO: 0.1 10E3/UL (ref 0–0.7)
EOSINOPHIL NFR BLD AUTO: 1 %
ERYTHROCYTE [DISTWIDTH] IN BLOOD BY AUTOMATED COUNT: 15.3 % (ref 10–15)
GFR SERPL CREATININE-BSD FRML MDRD: >90 ML/MIN/1.73M2
GLUCOSE BLDC GLUCOMTR-MCNC: 108 MG/DL (ref 70–99)
GLUCOSE BLDC GLUCOMTR-MCNC: 110 MG/DL (ref 70–99)
GLUCOSE BLDC GLUCOMTR-MCNC: 117 MG/DL (ref 70–99)
GLUCOSE SERPL-MCNC: 85 MG/DL (ref 70–99)
HCT VFR BLD AUTO: 34.5 % (ref 35–47)
HGB BLD-MCNC: 10.7 G/DL (ref 11.7–15.7)
IMM GRANULOCYTES # BLD: 0.1 10E3/UL
IMM GRANULOCYTES NFR BLD: 1 %
LYMPHOCYTES # BLD AUTO: 2 10E3/UL (ref 0.8–5.3)
LYMPHOCYTES NFR BLD AUTO: 21 %
MAGNESIUM SERPL-MCNC: 1.8 MG/DL (ref 1.7–2.3)
MCH RBC QN AUTO: 25.2 PG (ref 26.5–33)
MCHC RBC AUTO-ENTMCNC: 31 G/DL (ref 31.5–36.5)
MCV RBC AUTO: 81 FL (ref 78–100)
MONOCYTES # BLD AUTO: 0.6 10E3/UL (ref 0–1.3)
MONOCYTES NFR BLD AUTO: 6 %
NEUTROPHILS # BLD AUTO: 6.7 10E3/UL (ref 1.6–8.3)
NEUTROPHILS NFR BLD AUTO: 71 %
NRBC # BLD AUTO: 0 10E3/UL
NRBC BLD AUTO-RTO: 0 /100
PLATELET # BLD AUTO: 248 10E3/UL (ref 150–450)
POTASSIUM SERPL-SCNC: 4.5 MMOL/L (ref 3.4–5.3)
PROT SERPL-MCNC: 7.7 G/DL (ref 6.4–8.3)
RBC # BLD AUTO: 4.24 10E6/UL (ref 3.8–5.2)
SODIUM SERPL-SCNC: 136 MMOL/L (ref 136–145)
UFH PPP CHRO-ACNC: 0.34 IU/ML
WBC # BLD AUTO: 9.4 10E3/UL (ref 4–11)

## 2022-12-25 PROCEDURE — 250N000011 HC RX IP 250 OP 636: Performed by: INTERNAL MEDICINE

## 2022-12-25 PROCEDURE — 80053 COMPREHEN METABOLIC PANEL: CPT

## 2022-12-25 PROCEDURE — 99232 SBSQ HOSP IP/OBS MODERATE 35: CPT | Performed by: NURSE PRACTITIONER

## 2022-12-25 PROCEDURE — 250N000013 HC RX MED GY IP 250 OP 250 PS 637: Performed by: STUDENT IN AN ORGANIZED HEALTH CARE EDUCATION/TRAINING PROGRAM

## 2022-12-25 PROCEDURE — 83735 ASSAY OF MAGNESIUM: CPT

## 2022-12-25 PROCEDURE — 250N000013 HC RX MED GY IP 250 OP 250 PS 637

## 2022-12-25 PROCEDURE — 250N000011 HC RX IP 250 OP 636: Performed by: STUDENT IN AN ORGANIZED HEALTH CARE EDUCATION/TRAINING PROGRAM

## 2022-12-25 PROCEDURE — 85520 HEPARIN ASSAY: CPT | Performed by: INTERNAL MEDICINE

## 2022-12-25 PROCEDURE — 250N000013 HC RX MED GY IP 250 OP 250 PS 637: Performed by: INTERNAL MEDICINE

## 2022-12-25 PROCEDURE — 214N000001 HC R&B CCU UMMC

## 2022-12-25 PROCEDURE — 99232 SBSQ HOSP IP/OBS MODERATE 35: CPT | Mod: GC | Performed by: INTERNAL MEDICINE

## 2022-12-25 PROCEDURE — 258N000003 HC RX IP 258 OP 636: Performed by: NURSE PRACTITIONER

## 2022-12-25 PROCEDURE — 85025 COMPLETE CBC W/AUTO DIFF WBC: CPT

## 2022-12-25 PROCEDURE — 250N000011 HC RX IP 250 OP 636: Performed by: NURSE PRACTITIONER

## 2022-12-25 PROCEDURE — 36592 COLLECT BLOOD FROM PICC: CPT

## 2022-12-25 RX ORDER — MAGNESIUM OXIDE 400 MG/1
400 TABLET ORAL EVERY 4 HOURS
Status: COMPLETED | OUTPATIENT
Start: 2022-12-25 | End: 2022-12-25

## 2022-12-25 RX ORDER — MAGNESIUM SULFATE 1 G/100ML
1 INJECTION INTRAVENOUS ONCE
Status: DISCONTINUED | OUTPATIENT
Start: 2022-12-25 | End: 2022-12-25

## 2022-12-25 RX ADMIN — POTASSIUM CHLORIDE 40 MEQ: 40 SOLUTION ORAL at 09:04

## 2022-12-25 RX ADMIN — METOPROLOL TARTRATE 50 MG: 50 TABLET, FILM COATED ORAL at 18:11

## 2022-12-25 RX ADMIN — METOPROLOL TARTRATE 50 MG: 50 TABLET, FILM COATED ORAL at 11:25

## 2022-12-25 RX ADMIN — MAGNESIUM OXIDE TAB 400 MG (241.3 MG ELEMENTAL MG) 800 MG: 400 (241.3 MG) TAB at 18:54

## 2022-12-25 RX ADMIN — METOPROLOL TARTRATE 50 MG: 50 TABLET, FILM COATED ORAL at 05:19

## 2022-12-25 RX ADMIN — PRENATAL VIT W/ FE FUMARATE-FA TAB 27-0.8 MG 1 TABLET: 27-0.8 TAB at 09:04

## 2022-12-25 RX ADMIN — Medication 400 MG: at 15:50

## 2022-12-25 RX ADMIN — FUROSEMIDE 40 MG: 40 TABLET ORAL at 09:04

## 2022-12-25 RX ADMIN — MAGNESIUM OXIDE TAB 400 MG (241.3 MG ELEMENTAL MG) 800 MG: 400 (241.3 MG) TAB at 09:04

## 2022-12-25 RX ADMIN — IRON SUCROSE 200 MG: 20 INJECTION, SOLUTION INTRAVENOUS at 15:49

## 2022-12-25 RX ADMIN — Medication 400 MG: at 11:25

## 2022-12-25 RX ADMIN — HEPARIN SODIUM 2050 UNITS/HR: 10000 INJECTION, SOLUTION INTRAVENOUS at 04:07

## 2022-12-25 RX ADMIN — POTASSIUM CHLORIDE 40 MEQ: 40 SOLUTION ORAL at 18:53

## 2022-12-25 RX ADMIN — SODIUM CHLORIDE, PRESERVATIVE FREE 5 ML: 5 INJECTION INTRAVENOUS at 16:17

## 2022-12-25 RX ADMIN — HEPARIN SODIUM 2050 UNITS/HR: 10000 INJECTION, SOLUTION INTRAVENOUS at 16:35

## 2022-12-25 ASSESSMENT — ACTIVITIES OF DAILY LIVING (ADL)
ADLS_ACUITY_SCORE: 24
ADLS_ACUITY_SCORE: 24
ADLS_ACUITY_SCORE: 20
ADLS_ACUITY_SCORE: 24
ADLS_ACUITY_SCORE: 20
ADLS_ACUITY_SCORE: 20
ADLS_ACUITY_SCORE: 24
ADLS_ACUITY_SCORE: 20
ADLS_ACUITY_SCORE: 24

## 2022-12-25 NOTE — PROGRESS NOTES
[facility]   Cardiology II Service / Advanced Heart Failure  Daily Progress Note      Patient: Anjali Carmen  MRN: [id number]  Admission Date: 2022  Hospital Day # 19    Assessment and Plan: Anjali Carmen is a 32 year old female  with no significant past medical history, who presented as a transfer from [facility] to [facility] on 2022 for further management of newly diagnosed, acute decompensated systolic heart failure (LVEF 20%) and high risk delivery.    [facility] available  at [phone]    Today's Plan:  - No changes  - Continue furosemide and metoprolol tartrate  - Continue to monitor for sustained arrhythmias    # Acute on chronic systolic heart failure/HFrEF, unknown etiology  # Moderately reduced RV function   # NSVT  Stage C. NYHA Class difficult to discern. Concern for familial dilated cardiomyopathy given history of cardiomyopathy in father at 30 years of age. Last pregnancy in , timeline not fitting in for PPCM. TTE  with thinned out LV wall and notable regional wall motion abnormalities. Coronary angiogram  with no significat lesions seen. No known history of pre-existing structural heart disease prior to this admission. GDMT and diuresis as outlined below. Traditional afterload reducing agents for poor LV function and low LVEF of 20-25% cannot be met due to need for fetoplacental circulation.  Will target /80 and MAP > 65. Repeat TTE  LVEF of 20-25%, RWM (details below), moderately reduced RV function, mild TR, no pericardial effusion.   Fluid status: euvolemic lasix 40 mg daily PO  ACEi/ARB/ARNI/afterload reduction: contraindicated in pregnancy  BB: metoprolol tartrate 50 mg q6hr  Aldosterone antagonist: high adverse risk medication in pregnancy  SGLT2i: deferred  SCD prophylaxis: decision deferred during medication uptitration  Anticoagulation: continue heparin gtt for risk of LV thrombus in the setting of low EF, regional  WMA, and hypercoaguable state of pregnancy  Other: for further workup, plan for cardiac MRI in the postpartum setting +/- genetic testing/counseling referral    # New onset possible paroxysmal atrial fibrillation  Diagnosed at the [facility] in [place]- isolated episode, EKG unavailable for review. Currently in NSR at [facility].  - telemetry.   - heparin gtt, [id number] score of 2 (sex, HF) and isolated episode of Afib. Will continue for now as its safe in pregnancy (discussed with MFM).      # Pregnancy  Gestational age 26 wks. Betamethasone (BMZ) given - at OSH for fetal lung maturity. OB US for fetal growth done on 22 with normal fetal findings and no abnormalities.  - Appreciate MFM management, closely following with daily checks; they can be reached via their pager (listed and updated regularly on the summary page)  - growth US   - NST BID  - Magnesium for neuroprotection if moving towards delivery prior to 32 weeks with 4g bolus followed by 2g/hr maintenance until delivery  - Routine indications for emergent delivery including maternal decompensation or fetal compromise  - S/p  section consent on admission  - At 28 weeks(~1/3/23): Tdap, repeat CBC, RPR, and type and screen  - per MFM, BP goal 130/80, MAP >65    - If patient begins to have blood pressures >160/110 for at least 15 minutes, call MFM for guidance of management, although typically will initiate treatment with 5-10 mg of IV hydralazine or 20 mg of IV labetalol    # Mild anemia, multifactorial  Due to pregnancy and HF. Hgb 10.2 on admission (MCV 79). Retic count 1.9%. Iron, ferritin, TIBC levels show AVINASH (say 6%). Hgh 9s-low 10s.   - Venofer (-)    # Gestational Diabetes  - QID blood glucose checks (fasting, and 1 hour after each meal)   - goal blood glucose for AM fasting 95 mg/dL   - 1 hour postprandial check is under 140 mg/dL  - If after 2 weeks of monitoring, if >50% of blood glucose values are elevated, then we  "would typically initiate medication for treatment of gestational diabetes - Anjali prefers Metformin over insulin  -  monitoring to be determined by need for medications    FEN: 2g Na  PROPHY:  Heparin gtt  LINES:  PICC  DISPO:  Pending delivery  CODE STATUS:  Full code    Shante Sharpe MD  Internal Medicine, PGY-1    Patient discussed with Dr. Abdi.      ================================================================    Subjective/24-Hr Events:   No acute events overnight. No new concerns this morning. One episode of brief, seconds-long palpitations yesterday. Denies chest pain, SOB, lightheadedness, LE edema. Telemetry reviewed with maximum 12 beats NSVT.    Medications: Reviewed in EPIC.     Physical Exam:   BP 94/64 (BP Location: Right arm)   Pulse 90   Temp 98.3  F (36.8  C) (Oral)   Resp 16   Ht 1.753 m (5' 9\")   Wt 132.1 kg (291 lb 4.8 oz)   SpO2 98%   BMI 43.02 kg/m      GENERAL: Appears comfortable, in no distress.  HEENT: Eye symmetrical, no discharge or icterus bilaterally. Mucous membranes moist and without lesions.  NECK: Supple, JVD not elevated.   CV: RRR, +S1S2,  murmur, rub, or gallop.   RESPIRATORY: Respirations regular, even, and unlabored. Lungs CTA throughout.    GI: Soft, gravid and non distended with normoactive bowel sounds present in all quadrants. No tenderness, rebound, guarding.   EXTREMITIES: No peripheral edema. 2+ bilateral pedal pulses.   NEUROLOGIC: Alert and oriented x 3. No focal deficits.   MUSCULOSKELETAL: No joint swelling or tenderness.   SKIN: No jaundice. No rashes or lesions.     Labs:  CMP  Recent Labs   Lab 22  0610 22  0546 22  1641 22  1151 22  0710 22  1133 22  0557 22  1615 22  1017 22  0616 22  0446 22  1333 22  0448     --   --   --   --   --  134*  --   --   --  134*  --  135*   POTASSIUM 4.5  --   --   --  4.2  --  4.2  --  4.9  --  4.2   < > 4.2   CHLORIDE " 105  --   --   --   --   --  104  --   --   --  105  --  105   CO2 17*  --   --   --   --   --  16*  --   --   --  22  --  17*   ANIONGAP 14  --   --   --   --   --  14  --   --   --  7  --  13   GLC 85 108* 101* 94  --    < > 75   < >  --    < > 88   < > 74   BUN 12.0  --   --   --   --   --  8.5  --   --   --  9.5  --  11.1   CR 0.79  --   --   --   --   --  0.64  --   --   --  0.62  --  0.69   GFRESTIMATED >90  --   --   --   --   --  >90  --   --   --  >90  --  >90   AYDEN 10.0  --   --   --   --   --  9.7  --   --   --  9.1  --  9.8   MAG 1.8  --   --   --  1.7  --  1.7  --  1.8  --  1.8   < > 1.6*   PHOS  --   --   --   --  4.5  --  4.5  --  4.6*  --  4.2   < >  --    PROTTOTAL 7.7  --   --   --   --   --  7.1  --   --   --  7.0  --  7.3   ALBUMIN 3.5  --   --   --   --   --  3.3*  --   --   --  3.2*  --  3.4*   BILITOTAL 0.3  --   --   --   --   --  0.4  --   --   --  0.3  --  0.3   ALKPHOS 102  --   --   --   --   --  94  --   --   --  88  --  94   AST 34  --   --   --   --   --  35  --   --   --  34  --  35   ALT 21  --   --   --   --   --  20  --   --   --  20  --  24    < > = values in this interval not displayed.       CBC  Recent Labs   Lab 12/25/22  0610 12/23/22  0557 12/21/22  0446 12/19/22 0448   WBC 9.4 10.5 10.3 11.9*   RBC 4.24 3.86 3.79* 4.10   HGB 10.7* 9.8* 9.9* 10.5*   HCT 34.5* 31.4* 30.6* 33.5*   MCV 81 81 81 82   MCH 25.2* 25.4* 26.1* 25.6*   MCHC 31.0* 31.2* 32.4 31.3*   RDW 15.3* 15.2* 15.2* 15.0    222 230 253

## 2022-12-25 NOTE — PLAN OF CARE
D: Admitted 12/6/2022 for further management of newly diagnosed, acute decompensated systolic heart failure (LVEF 20%) as well as high risk delivery.      I: Monitored vitals and assessed patient status.   Running: heparin gtt at 2050 units/hr, 10a level this AM pending  PRN: tylenol x1 for headache     A: A&Ox4. On room air. SR with frequent PVCs and PACs 70s-90s, VSS, afebrile. Urinating adequately. LBM 12/25. Intermittent nausea. Up ad danis.     P: Continue to monitor.

## 2022-12-25 NOTE — CARE PLAN
Patient offers no obstetric complaints- denies, bleeding, david, LOF. Baby is active, appropriate for gestational age. EFM brought for Dr Copeland to assess.

## 2022-12-26 LAB
BASE EXCESS BLDV CALC-SCNC: -2.3 MMOL/L (ref -7.7–1.9)
GLUCOSE BLDC GLUCOMTR-MCNC: 79 MG/DL (ref 70–99)
HCO3 BLDV-SCNC: 22 MMOL/L (ref 21–28)
HOLD SPECIMEN: NORMAL
MAGNESIUM SERPL-MCNC: 1.8 MG/DL (ref 1.7–2.3)
O2/TOTAL GAS SETTING VFR VENT: 21 %
OXYHGB MFR BLDV: 64 % (ref 70–75)
PCO2 BLDV: 35 MM HG (ref 40–50)
PH BLDV: 7.41 [PH] (ref 7.32–7.43)
PO2 BLDV: 36 MM HG (ref 25–47)
UFH PPP CHRO-ACNC: 0.33 IU/ML

## 2022-12-26 PROCEDURE — 258N000003 HC RX IP 258 OP 636: Performed by: NURSE PRACTITIONER

## 2022-12-26 PROCEDURE — 82805 BLOOD GASES W/O2 SATURATION: CPT

## 2022-12-26 PROCEDURE — 250N000011 HC RX IP 250 OP 636: Performed by: STUDENT IN AN ORGANIZED HEALTH CARE EDUCATION/TRAINING PROGRAM

## 2022-12-26 PROCEDURE — 250N000011 HC RX IP 250 OP 636: Performed by: NURSE PRACTITIONER

## 2022-12-26 PROCEDURE — 85520 HEPARIN ASSAY: CPT | Performed by: INTERNAL MEDICINE

## 2022-12-26 PROCEDURE — 214N000001 HC R&B CCU UMMC

## 2022-12-26 PROCEDURE — 99232 SBSQ HOSP IP/OBS MODERATE 35: CPT | Performed by: NURSE PRACTITIONER

## 2022-12-26 PROCEDURE — 250N000013 HC RX MED GY IP 250 OP 250 PS 637: Performed by: NURSE PRACTITIONER

## 2022-12-26 PROCEDURE — 83735 ASSAY OF MAGNESIUM: CPT | Performed by: INTERNAL MEDICINE

## 2022-12-26 PROCEDURE — 250N000013 HC RX MED GY IP 250 OP 250 PS 637: Performed by: STUDENT IN AN ORGANIZED HEALTH CARE EDUCATION/TRAINING PROGRAM

## 2022-12-26 PROCEDURE — 250N000013 HC RX MED GY IP 250 OP 250 PS 637: Performed by: INTERNAL MEDICINE

## 2022-12-26 PROCEDURE — 36592 COLLECT BLOOD FROM PICC: CPT | Performed by: INTERNAL MEDICINE

## 2022-12-26 PROCEDURE — 250N000011 HC RX IP 250 OP 636: Performed by: INTERNAL MEDICINE

## 2022-12-26 PROCEDURE — 250N000013 HC RX MED GY IP 250 OP 250 PS 637

## 2022-12-26 RX ORDER — POTASSIUM CHLORIDE 20MEQ/15ML
20 LIQUID (ML) ORAL 3 TIMES DAILY
Status: DISCONTINUED | OUTPATIENT
Start: 2022-12-26 | End: 2023-01-31 | Stop reason: HOSPADM

## 2022-12-26 RX ORDER — PYRIDOXINE HCL (VITAMIN B6) 25 MG
25 TABLET ORAL DAILY
Status: DISCONTINUED | OUTPATIENT
Start: 2022-12-26 | End: 2023-01-24

## 2022-12-26 RX ORDER — MAGNESIUM OXIDE 400 MG/1
400 TABLET ORAL EVERY 4 HOURS
Status: COMPLETED | OUTPATIENT
Start: 2022-12-26 | End: 2022-12-26

## 2022-12-26 RX ADMIN — PRENATAL VIT W/ FE FUMARATE-FA TAB 27-0.8 MG 1 TABLET: 27-0.8 TAB at 08:11

## 2022-12-26 RX ADMIN — IRON SUCROSE 200 MG: 20 INJECTION, SOLUTION INTRAVENOUS at 15:20

## 2022-12-26 RX ADMIN — SODIUM CHLORIDE, PRESERVATIVE FREE 5 ML: 5 INJECTION INTRAVENOUS at 09:53

## 2022-12-26 RX ADMIN — FUROSEMIDE 40 MG: 40 TABLET ORAL at 08:11

## 2022-12-26 RX ADMIN — POTASSIUM CHLORIDE 20 MEQ: 40 SOLUTION ORAL at 18:59

## 2022-12-26 RX ADMIN — METOPROLOL TARTRATE 50 MG: 50 TABLET, FILM COATED ORAL at 13:10

## 2022-12-26 RX ADMIN — METOPROLOL TARTRATE 50 MG: 50 TABLET, FILM COATED ORAL at 18:59

## 2022-12-26 RX ADMIN — MAGNESIUM OXIDE TAB 400 MG (241.3 MG ELEMENTAL MG) 400 MG: 400 (241.3 MG) TAB at 16:52

## 2022-12-26 RX ADMIN — HEPARIN SODIUM 2050 UNITS/HR: 10000 INJECTION, SOLUTION INTRAVENOUS at 17:10

## 2022-12-26 RX ADMIN — MAGNESIUM OXIDE TAB 400 MG (241.3 MG ELEMENTAL MG) 800 MG: 400 (241.3 MG) TAB at 19:00

## 2022-12-26 RX ADMIN — SODIUM CHLORIDE, PRESERVATIVE FREE 5 ML: 5 INJECTION INTRAVENOUS at 15:46

## 2022-12-26 RX ADMIN — METOPROLOL TARTRATE 50 MG: 50 TABLET, FILM COATED ORAL at 00:41

## 2022-12-26 RX ADMIN — MAGNESIUM OXIDE TAB 400 MG (241.3 MG ELEMENTAL MG) 400 MG: 400 (241.3 MG) TAB at 13:16

## 2022-12-26 RX ADMIN — HEPARIN SODIUM 2050 UNITS/HR: 10000 INJECTION, SOLUTION INTRAVENOUS at 05:26

## 2022-12-26 RX ADMIN — POTASSIUM CHLORIDE 40 MEQ: 40 SOLUTION ORAL at 08:11

## 2022-12-26 RX ADMIN — Medication 25 MG: at 10:50

## 2022-12-26 RX ADMIN — POTASSIUM CHLORIDE 20 MEQ: 40 SOLUTION ORAL at 13:14

## 2022-12-26 RX ADMIN — METOPROLOL TARTRATE 50 MG: 50 TABLET, FILM COATED ORAL at 05:38

## 2022-12-26 RX ADMIN — MAGNESIUM OXIDE TAB 400 MG (241.3 MG ELEMENTAL MG) 800 MG: 400 (241.3 MG) TAB at 08:12

## 2022-12-26 ASSESSMENT — ACTIVITIES OF DAILY LIVING (ADL)
ADLS_ACUITY_SCORE: 20
ADLS_ACUITY_SCORE: 20
ADLS_ACUITY_SCORE: 24
ADLS_ACUITY_SCORE: 24
ADLS_ACUITY_SCORE: 20
ADLS_ACUITY_SCORE: 24
ADLS_ACUITY_SCORE: 24
ADLS_ACUITY_SCORE: 20
ADLS_ACUITY_SCORE: 20
ADLS_ACUITY_SCORE: 24
ADLS_ACUITY_SCORE: 20
ADLS_ACUITY_SCORE: 20

## 2022-12-26 NOTE — PLAN OF CARE
D: Admitted 12/6/2022 for further management of newly diagnosed, acute decompensated systolic heart failure (LVEF 20%) as well as high risk delivery.      I: Monitored vitals and assessed patient status.   Running: heparin gtt at 2050 units/hr, 10a level this AM pending     A: A&Ox4. On room air. SR with frequent PVCs and PACs 70s-90s, VSS, afebrile. Urinating adequately. LBM 12/25. Up ad danis.     P: Continue to monitor

## 2022-12-26 NOTE — PROGRESS NOTES
[facility]   Cardiology II Service / Advanced Heart Failure  Daily Progress Note      Patient: Anjali Carmen  MRN: [id number]  Admission Date: 2022  Hospital Day # 20    Assessment and Plan: Anjali Carmen is a 32 year old female  with no significant past medical history, who presented as a transfer from [facility] to [facility] on 2022 for further management of newly diagnosed, acute decompensated systolic heart failure (LVEF 20%) and high risk delivery.    [facility] available  at [phone]    Today's Plan:  -start vitB6 for nausea  -trial trigger avoidance, avoiding empty stomach, alize etc   -space out kcl - 20 meq tid (instead of 40 clark bid), BMP tomorrow    # Acute on chronic systolic heart failure/HFrEF secondary to NICM (?familial)    # Moderately reduced RV function   # NSVT  Stage C. NYHA Class difficult to discern. Concern for familial dilated cardiomyopathy given history of cardiomyopathy in father at 30 years of age. Last pregnancy in , timeline not fitting in for PPCM. TTE  with thinned out LV wall and notable regional wall motion abnormalities. Coronary angiogram  with no significat lesions seen. No known history of pre-existing structural heart disease prior to this admission. GDMT and diuresis as outlined below. Traditional afterload reducing agents for poor LV function and low LVEF of 20-25% cannot be met due to need for fetoplacental circulation.  Will target /80 and MAP > 65. Repeat TTE  LVEF of 20-25%, RWM (details below), moderately reduced RV function, mild TR, no pericardial effusion.     Fluid status: euvolemic lasix 40 mg daily PO  ACEi/ARB/ARNI/afterload reduction: contraindicated in pregnancy  BB: metoprolol tartrate 50 mg q6hr  Aldosterone antagonist: high adverse risk medication in pregnancy  SGLT2i: deferred  SCD prophylaxis: decision deferred during medication uptitration  Anticoagulation: heparin gtt for risk  of LV thrombus in the setting of low EF, regional WMA, and hypercoaguable state of pregnancy  Other: for further workup, plan for cardiac MRI in the postpartum setting +/- genetic testing/counseling referral    # New onset possible paroxysmal atrial fibrillation  [facility] [place]- isolated episode, EKG unavailable for review. Currently in NSR at [facility].  - telemetry  - heparin gtt, [id number] score of 2 (sex, HF) and isolated episode of Afib. Will continue for now as its safe in pregnancy (discussed with MFM previously).      # Pregnancy  Gestational age 26 wks. Betamethasone (BMZ) given - at OSH for fetal lung maturity. OB US for fetal growth done on 22 with normal fetal findings and no abnormalities.  - Appreciate MFM management, closely following with daily checks; they can be reached via their pager (listed and updated regularly on the summary page)  - growth US   - NST BID  - Magnesium for neuroprotection if moving towards delivery prior to 32 weeks with 4g bolus followed by 2g/hr maintenance until delivery  - Routine indications for emergent delivery including maternal decompensation or fetal compromise  - S/p  section consent on admission  - At 28 weeks(~1/3/23): Tdap, repeat CBC, RPR, and type and screen  - per MFM, BP goal 130/80, MAP >65    - If patient begins to have blood pressures >160/110 for at least 15 minutes, call MFM for guidance of management, although typically will initiate treatment with 5-10 mg of IV hydralazine or 20 mg of IV labetalol    # Mild anemia, multifactorial  Due to pregnancy and HF. Hgb 10.2 on admission (MCV 79). Retic count 1.9%. Iron, ferritin, TIBC levels show AVINASH (say 6%). Hgh 9s-low 10s.   - limit blood draws  - IV iron 200 mg daily x5 (-)    # Gestational Diabetes  - QID blood glucose checks (fasting, and 1 hour after each meal)   - goal blood glucose for AM fasting 95 mg/dL   - 1 hour postprandial check is under 140  "mg/dL  - If after 2 weeks of monitoring, if >50% of blood glucose values are elevated, then we would typically initiate medication for treatment of gestational diabetes - Anjali prefers Metformin over insulin  -  monitoring to be determined by need for medications    # Nausea  Due to pregnancy and likely taking PO medications on empty stomach  - start vitB6  - trigger avoidance, snack frequent snacks, alize  - encourage bland foods in AM prior to medications    FEN: 2g Na  PROPHY:  Heparin gtt  LINES:  PICC  DISPO:  Pending delivery  CODE STATUS:  Full code    Geovanna Rubio DNP, NP-C  Advanced Heart Failure/Cardiology II Service  Pager [phone] [facility] [id number]      Patient discussed with Dr. Abdi.        30 minutes spent on the date of the encounter doing chart review, history and exam, documentation and further activities per the note    ================================================================    Subjective/24-Hr Events:   Last 24 hr care team notes reviewed. No overnight events. Only complaint today is nausea - no vomiting. tolerating PO. Occurs just in the mornings after taking medications, especially potassium.     ROS:  4 point ROS including respiratory, CV, GI and  (other than that noted in the HPI) is negative.     Medications: Reviewed in EPIC.     Physical Exam:   /50 (BP Location: Right arm)   Pulse 82   Temp 97.7  F (36.5  C) (Oral)   Resp 16   Ht 1.753 m (5' 9\")   Wt 131.5 kg (290 lb)   SpO2 100%   BMI 42.83 kg/m      GENERAL: Appears comfortable, in no distress.  HEENT: Eye symmetrical, no discharge or icterus bilaterally. Mucous membranes moist and without lesions.  NECK: Supple, JVD not elevated.   CV: RRR, +S1S2,  murmur, rub, or gallop.   RESPIRATORY: Respirations regular, even, and unlabored. Lungs CTA throughout.    GI: Soft, gravid and non distended with normoactive bowel sounds present in all quadrants. No tenderness, rebound, guarding.   EXTREMITIES: No " peripheral edema. 2+ bilateral pedal pulses.   NEUROLOGIC: Alert and oriented x 3. No focal deficits.   MUSCULOSKELETAL: No joint swelling or tenderness.   SKIN: No jaundice. No rashes or lesions.     Labs:  CMP  Recent Labs   Lab 12/26/22  0541 12/25/22  1900 12/25/22  1317 12/25/22  0610 12/24/22  1151 12/24/22  0710 12/23/22  1133 12/23/22  0557 12/22/22  1615 12/22/22  1017 12/21/22  0616 12/21/22  0446   NA  --   --   --  136  --   --   --  134*  --   --   --  134*   POTASSIUM  --   --   --  4.5  --  4.2  --  4.2  --  4.9  --  4.2   CHLORIDE  --   --   --  105  --   --   --  104  --   --   --  105   CO2  --   --   --  17*  --   --   --  16*  --   --   --  22   ANIONGAP  --   --   --  14  --   --   --  14  --   --   --  7   GLC 79 117* 110* 85   < >  --    < > 75   < >  --    < > 88   BUN  --   --   --  12.0  --   --   --  8.5  --   --   --  9.5   CR  --   --   --  0.79  --   --   --  0.64  --   --   --  0.62   GFRESTIMATED  --   --   --  >90  --   --   --  >90  --   --   --  >90   AYDEN  --   --   --  10.0  --   --   --  9.7  --   --   --  9.1   MAG  --   --   --  1.8  --  1.7  --  1.7  --  1.8  --  1.8   PHOS  --   --   --   --   --  4.5  --  4.5  --  4.6*  --  4.2   PROTTOTAL  --   --   --  7.7  --   --   --  7.1  --   --   --  7.0   ALBUMIN  --   --   --  3.5  --   --   --  3.3*  --   --   --  3.2*   BILITOTAL  --   --   --  0.3  --   --   --  0.4  --   --   --  0.3   ALKPHOS  --   --   --  102  --   --   --  94  --   --   --  88   AST  --   --   --  34  --   --   --  35  --   --   --  34   ALT  --   --   --  21  --   --   --  20  --   --   --  20    < > = values in this interval not displayed.       CBC  Recent Labs   Lab 12/25/22  0610 12/23/22  0557 12/21/22  0446   WBC 9.4 10.5 10.3   RBC 4.24 3.86 3.79*   HGB 10.7* 9.8* 9.9*   HCT 34.5* 31.4* 30.6*   MCV 81 81 81   MCH 25.2* 25.4* 26.1*   MCHC 31.0* 31.2* 32.4   RDW 15.3* 15.2* 15.2*    222 199

## 2022-12-26 NOTE — PROGRESS NOTES
NST performed, see flowsheets. Pt denies leaking of fluids, vaginal bleeding, and contractions. Active fetal movement.

## 2022-12-26 NOTE — PROGRESS NOTES
D/I- Tolerated IV iron infusion, mag replaced per protocol, fetal assessment completed by faustino gallardo RN. Crackers and ginger ale provided for nausea.  A-Stable; VSS, SR with frequent PVC's  P- Heparin Xa tomorrow morning per protocol

## 2022-12-26 NOTE — PROVIDER NOTIFICATION
12/25/22 1736   Fetal Assessment   Fetal Movement active   Fetal HR Assessment Method external US   Fetal HR (beats/min) 145   Fetal HR Baseline normal range   Fetal HR Variability moderate (amplitude range 6 to 25 bpm)   Fetal HR Accelerations increase 15 bpm above baseline lasting 15 seconds   Fetal HR Decelerations absent  (small variables less than 15 seconds)   NST Start Time 1716   NST Stop Time 1736   NST Extended Time No   Non-stress Test Interpretation Appropriate for gestational age   Patient offers no obstetrical complaints. Patient denies LOF, vaginal bleeding, and cramping/contractions. EFM reviewed with Dr. Copeland.

## 2022-12-26 NOTE — PROGRESS NOTES
D/I- Tolerated IV iron infusion, mag replaced per protocol, fetal assessment completed by [place] RN  A-Stable; VSS, PVC's more frequent today. Patient states she notices more PVC's when she hasn't slept during day.    P- Heparin Xa tomorrow morning per protocol

## 2022-12-27 LAB
ALBUMIN SERPL BCG-MCNC: 3.4 G/DL (ref 3.5–5.2)
ALP SERPL-CCNC: 99 U/L (ref 35–104)
ALT SERPL W P-5'-P-CCNC: 18 U/L (ref 10–35)
ANION GAP SERPL CALCULATED.3IONS-SCNC: 14 MMOL/L (ref 7–15)
AST SERPL W P-5'-P-CCNC: 35 U/L (ref 10–35)
BASOPHILS # BLD AUTO: 0 10E3/UL (ref 0–0.2)
BASOPHILS NFR BLD AUTO: 0 %
BILIRUB SERPL-MCNC: 0.3 MG/DL
BUN SERPL-MCNC: 8.2 MG/DL (ref 6–20)
CALCIUM SERPL-MCNC: 9.7 MG/DL (ref 8.6–10)
CHLORIDE SERPL-SCNC: 104 MMOL/L (ref 98–107)
CREAT SERPL-MCNC: 0.61 MG/DL (ref 0.51–0.95)
DEPRECATED HCO3 PLAS-SCNC: 15 MMOL/L (ref 22–29)
EOSINOPHIL # BLD AUTO: 0.1 10E3/UL (ref 0–0.7)
EOSINOPHIL NFR BLD AUTO: 1 %
ERYTHROCYTE [DISTWIDTH] IN BLOOD BY AUTOMATED COUNT: 15.6 % (ref 10–15)
GFR SERPL CREATININE-BSD FRML MDRD: >90 ML/MIN/1.73M2
GLUCOSE BLDC GLUCOMTR-MCNC: 105 MG/DL (ref 70–99)
GLUCOSE BLDC GLUCOMTR-MCNC: 128 MG/DL (ref 70–99)
GLUCOSE BLDC GLUCOMTR-MCNC: 153 MG/DL (ref 70–99)
GLUCOSE SERPL-MCNC: 84 MG/DL (ref 70–99)
HCT VFR BLD AUTO: 33.6 % (ref 35–47)
HGB BLD-MCNC: 10.4 G/DL (ref 11.7–15.7)
IMM GRANULOCYTES # BLD: 0.1 10E3/UL
IMM GRANULOCYTES NFR BLD: 1 %
LYMPHOCYTES # BLD AUTO: 2.2 10E3/UL (ref 0.8–5.3)
LYMPHOCYTES NFR BLD AUTO: 19 %
MAGNESIUM SERPL-MCNC: 1.7 MG/DL (ref 1.7–2.3)
MCH RBC QN AUTO: 25.4 PG (ref 26.5–33)
MCHC RBC AUTO-ENTMCNC: 31 G/DL (ref 31.5–36.5)
MCV RBC AUTO: 82 FL (ref 78–100)
MONOCYTES # BLD AUTO: 0.7 10E3/UL (ref 0–1.3)
MONOCYTES NFR BLD AUTO: 6 %
NEUTROPHILS # BLD AUTO: 8.6 10E3/UL (ref 1.6–8.3)
NEUTROPHILS NFR BLD AUTO: 73 %
NRBC # BLD AUTO: 0 10E3/UL
NRBC BLD AUTO-RTO: 0 /100
PHOSPHATE SERPL-MCNC: 4 MG/DL (ref 2.5–4.5)
PLATELET # BLD AUTO: 250 10E3/UL (ref 150–450)
POTASSIUM SERPL-SCNC: 4.3 MMOL/L (ref 3.4–5.3)
PROT SERPL-MCNC: 7.6 G/DL (ref 6.4–8.3)
RBC # BLD AUTO: 4.1 10E6/UL (ref 3.8–5.2)
SODIUM SERPL-SCNC: 133 MMOL/L (ref 136–145)
UFH PPP CHRO-ACNC: 0.37 IU/ML
WBC # BLD AUTO: 11.6 10E3/UL (ref 4–11)

## 2022-12-27 PROCEDURE — 250N000013 HC RX MED GY IP 250 OP 250 PS 637: Performed by: NURSE PRACTITIONER

## 2022-12-27 PROCEDURE — 99232 SBSQ HOSP IP/OBS MODERATE 35: CPT | Performed by: NURSE PRACTITIONER

## 2022-12-27 PROCEDURE — 250N000013 HC RX MED GY IP 250 OP 250 PS 637: Performed by: INTERNAL MEDICINE

## 2022-12-27 PROCEDURE — 258N000003 HC RX IP 258 OP 636: Performed by: NURSE PRACTITIONER

## 2022-12-27 PROCEDURE — 250N000013 HC RX MED GY IP 250 OP 250 PS 637: Performed by: STUDENT IN AN ORGANIZED HEALTH CARE EDUCATION/TRAINING PROGRAM

## 2022-12-27 PROCEDURE — 99233 SBSQ HOSP IP/OBS HIGH 50: CPT | Mod: 25 | Performed by: OBSTETRICS & GYNECOLOGY

## 2022-12-27 PROCEDURE — 250N000011 HC RX IP 250 OP 636: Performed by: NURSE PRACTITIONER

## 2022-12-27 PROCEDURE — 250N000011 HC RX IP 250 OP 636: Performed by: INTERNAL MEDICINE

## 2022-12-27 PROCEDURE — 999N000044 HC STATISTIC CVC DRESSING CHANGE

## 2022-12-27 PROCEDURE — 80053 COMPREHEN METABOLIC PANEL: CPT

## 2022-12-27 PROCEDURE — 250N000013 HC RX MED GY IP 250 OP 250 PS 637

## 2022-12-27 PROCEDURE — 84100 ASSAY OF PHOSPHORUS: CPT | Performed by: INTERNAL MEDICINE

## 2022-12-27 PROCEDURE — 85025 COMPLETE CBC W/AUTO DIFF WBC: CPT

## 2022-12-27 PROCEDURE — 59025 FETAL NON-STRESS TEST: CPT | Mod: 26 | Performed by: OBSTETRICS & GYNECOLOGY

## 2022-12-27 PROCEDURE — 214N000001 HC R&B CCU UMMC

## 2022-12-27 PROCEDURE — 36592 COLLECT BLOOD FROM PICC: CPT

## 2022-12-27 PROCEDURE — 85520 HEPARIN ASSAY: CPT | Performed by: INTERNAL MEDICINE

## 2022-12-27 PROCEDURE — 83735 ASSAY OF MAGNESIUM: CPT

## 2022-12-27 RX ORDER — POTASSIUM CHLORIDE 20MEQ/15ML
20 LIQUID (ML) ORAL ONCE
Status: COMPLETED | OUTPATIENT
Start: 2022-12-27 | End: 2022-12-27

## 2022-12-27 RX ORDER — ENOXAPARIN SODIUM 150 MG/ML
130 INJECTION SUBCUTANEOUS EVERY 12 HOURS
Status: DISCONTINUED | OUTPATIENT
Start: 2022-12-27 | End: 2022-12-29

## 2022-12-27 RX ORDER — MAGNESIUM SULFATE HEPTAHYDRATE 40 MG/ML
2 INJECTION, SOLUTION INTRAVENOUS ONCE
Status: COMPLETED | OUTPATIENT
Start: 2022-12-27 | End: 2022-12-27

## 2022-12-27 RX ADMIN — HEPARIN SODIUM 2050 UNITS/HR: 10000 INJECTION, SOLUTION INTRAVENOUS at 15:03

## 2022-12-27 RX ADMIN — Medication 25 MG: at 08:50

## 2022-12-27 RX ADMIN — MAGNESIUM OXIDE TAB 400 MG (241.3 MG ELEMENTAL MG) 800 MG: 400 (241.3 MG) TAB at 08:50

## 2022-12-27 RX ADMIN — METOPROLOL TARTRATE 50 MG: 50 TABLET, FILM COATED ORAL at 09:31

## 2022-12-27 RX ADMIN — MAGNESIUM OXIDE TAB 400 MG (241.3 MG ELEMENTAL MG) 800 MG: 400 (241.3 MG) TAB at 19:53

## 2022-12-27 RX ADMIN — POTASSIUM CHLORIDE 20 MEQ: 40 SOLUTION ORAL at 08:50

## 2022-12-27 RX ADMIN — POTASSIUM CHLORIDE 20 MEQ: 20 SOLUTION ORAL at 20:17

## 2022-12-27 RX ADMIN — PRENATAL VIT W/ FE FUMARATE-FA TAB 27-0.8 MG 1 TABLET: 27-0.8 TAB at 08:50

## 2022-12-27 RX ADMIN — METOPROLOL TARTRATE 50 MG: 50 TABLET, FILM COATED ORAL at 19:53

## 2022-12-27 RX ADMIN — METOPROLOL TARTRATE 50 MG: 50 TABLET, FILM COATED ORAL at 14:59

## 2022-12-27 RX ADMIN — POLYETHYLENE GLYCOL 3350 17 G: 17 POWDER, FOR SOLUTION ORAL at 08:50

## 2022-12-27 RX ADMIN — ACETAMINOPHEN 650 MG: 325 TABLET, FILM COATED ORAL at 08:55

## 2022-12-27 RX ADMIN — MAGNESIUM SULFATE IN WATER 2 G: 40 INJECTION, SOLUTION INTRAVENOUS at 11:00

## 2022-12-27 RX ADMIN — ENOXAPARIN SODIUM 130 MG: 150 INJECTION SUBCUTANEOUS at 20:17

## 2022-12-27 RX ADMIN — IRON SUCROSE 200 MG: 20 INJECTION, SOLUTION INTRAVENOUS at 16:38

## 2022-12-27 RX ADMIN — FUROSEMIDE 40 MG: 40 TABLET ORAL at 08:50

## 2022-12-27 RX ADMIN — HEPARIN SODIUM 2050 UNITS/HR: 10000 INJECTION, SOLUTION INTRAVENOUS at 03:21

## 2022-12-27 RX ADMIN — METOPROLOL TARTRATE 50 MG: 50 TABLET, FILM COATED ORAL at 00:15

## 2022-12-27 ASSESSMENT — ACTIVITIES OF DAILY LIVING (ADL)
ADLS_ACUITY_SCORE: 20

## 2022-12-27 NOTE — PROGRESS NOTES
[facility]   Cardiology II Service / Advanced Heart Failure  Daily Progress Note      Patient: Anjali Carmen  MRN: [id number]  Admission Date: 2022  Hospital Day # 21    Assessment and Plan: Anjali Carmen is a 32 year old female  with no significant past medical history, who presented as a transfer from [facility] to [facility] on 2022 for further management of newly diagnosed, acute decompensated systolic heart failure (LVEF 20%) and high risk delivery.    [facility] available  at [phone]    Today's Plan:  -give metoprolol unless HR <50 or MAP<65  -hold lasix for the morning and reassess  Addendum: ok to switch heparin gtt to Lovenox per OB team, anticoag indicated so long as EF<35%, echo early next week per their recs    # Acute on chronic systolic heart failure/HFrEF secondary to NICM (?familial)    # Moderately reduced RV function   # NSVT  # Frequent PVCs, PACs  Stage C. NYHA Class difficult to discern. Concern for familial dilated cardiomyopathy given history of cardiomyopathy in father at 30 years of age. Last pregnancy in , timeline not fitting in for PPCM. TTE  with thinned out LV wall and notable regional wall motion abnormalities. Coronary angiogram  with no significat lesions seen. No known history of pre-existing structural heart disease prior to this admission. GDMT and diuresis as outlined below. Traditional afterload reducing agents for poor LV function and low LVEF of 20-25% cannot be met due to need for fetoplacental circulation.  Will target /80 and MAP > 65. Repeat TTE  LVEF of 20-25%, RWM (details below), moderately reduced RV function, mild TR, no pericardial effusion.     Fluid status: euvolemic lasix 40 mg daily PO, may be slightly hypovolemic, hold AM dose  ACEi/ARB/ARNI/afterload reduction: contraindicated in pregnancy  BB: metoprolol tartrate 50 mg q6hr  Aldosterone antagonist: high adverse risk medication in  pregnancy  SGLT2i: deferred  SCD prophylaxis: decision deferred during medication uptitration  Anticoagulation: heparin gtt for risk of LV thrombus in the setting of low EF, regional WMA, and hypercoaguable state of pregnancy (OB guidelines recommend therapeutic AC for EF<35%)  Other: for further workup, plan for cardiac MRI in the postpartum setting +/- genetic testing/counseling referral    # New onset possible paroxysmal atrial fibrillation  Diagnosed at the [facility] in [place], isolated episode, EKG unavailable for review. Currently in NSR at [facility] with frequent ectopy  - telemetry, on metoprolol as above  - heparin gtt, [id number] score of 2 (sex, HF) and isolated episode of Afib. Will continue for now as its safe in pregnancy (discussed with MFM previously).      # Pregnancy  Gestational age 26 wks. Betamethasone (BMZ) given - at OSH for fetal lung maturity. OB US for fetal growth done on 22 with normal fetal findings and no abnormalities.  - Appreciate MFM management, closely following with daily checks; they can be reached via their pager (listed and updated regularly on the summary page)  - growth US   - NST BID  - Magnesium for neuroprotection if moving towards delivery prior to 32 weeks with 6g bolus followed by 2g/hr maintenance until delivery  - Routine indications for emergent delivery including maternal decompensation or fetal compromise  - S/p  section consent on admission  - At 28 weeks(~1/3/23): Tdap, repeat CBC, RPR, and type and screen  - per MFM, BP goal 130/80, MAP >65    - If patient begins to have blood pressures >160/110 for at least 15 minutes, call MFM for guidance of management, although typically will initiate treatment with 5-10 mg of IV hydralazine or 20 mg of IV labetalol    # Mild anemia, multifactorial  Due to pregnancy and HF. Hgb 10.2 on admission (MCV 79). Retic count 1.9%. Iron, ferritin, TIBC levels show AVINASH (say 6%). Hgh 9s-low 10s.   - limit blood  "draws  - IV iron 200 mg daily x5 (-)    # Gestational Diabetes  - QID blood glucose checks (fasting, and 1 hour after each meal)   - goal blood glucose for AM fasting 95 mg/dL   - 1 hour postprandial check is under 140 mg/dL  - If after 2 weeks of monitoring, if >50% of blood glucose values are elevated, then we would typically initiate medication for treatment of gestational diabetes - Anjali prefers Metformin over insulin  -  monitoring to be determined by need for medications      # Nausea  Due to pregnancy and likely taking PO medications on empty stomach  - vitB6  - trigger avoidance, snack frequent snacks, alize  - encourage bland foods in AM prior to medications    FEN: 2g Na  PROPHY:  Heparin gtt  LINES:  PICC  DISPO:  Pending delivery  CODE STATUS:  Full code    Geovanna Rubio DNP, NP-C  Advanced Heart Failure/Cardiology II Service  Pager [phone] [facility] [id number]      Patient discussed with Dr. Abdi.        30 minutes spent on the date of the encounter doing chart review, history and exam, documentation and further activities per the note    ================================================================    Subjective/24-Hr Events:   Last 24 hr care team notes reviewed. Had some palpitations this morning, worse after metoprolol dose was held (held for MAP 56). Tele showed increased ectopy. No sustained arrhythmias.. BP now improved. Denies LE edema, orthopnea, or PND.     ROS:  4 point ROS including respiratory, CV, GI and  (other than that noted in the HPI) is negative.     Medications: Reviewed in EPIC.     Physical Exam:   /65 (BP Location: Right arm)   Pulse 81   Temp 97.6  F (36.4  C) (Oral)   Resp 16   Ht 1.753 m (5' 9\")   Wt 132.4 kg (291 lb 14.4 oz)   SpO2 97%   BMI 43.11 kg/m      GENERAL: Appears comfortable, in no distress.  HEENT: Eye symmetrical, no discharge or icterus bilaterally. Mucous membranes moist and without lesions.  NECK: Supple, JVD not " elevated.   CV: RRR, +S1S2,  murmur, rub, or gallop.   RESPIRATORY: Respirations regular, even, and unlabored. Lungs CTA throughout.    GI: Soft, gravid and non distended with normoactive bowel sounds present in all quadrants. No tenderness, rebound, guarding.   EXTREMITIES: No peripheral edema. 2+ bilateral pedal pulses.   NEUROLOGIC: Alert and oriented x 3. No focal deficits.   MUSCULOSKELETAL: No joint swelling or tenderness.   SKIN: No jaundice. No rashes or lesions.     Labs:  CMP  Recent Labs   Lab 12/27/22  0712 12/27/22  0627 12/26/22  0958 12/26/22  0541 12/25/22  1900 12/25/22  1317 12/25/22  0610 12/24/22  1151 12/24/22  0710 12/23/22  1133 12/23/22  0557 12/22/22  1615 12/22/22  1017 12/21/22  0616 12/21/22  0446   *  --   --   --   --   --  136  --   --   --  134*  --   --   --  134*   POTASSIUM 4.3  --   --   --   --   --  4.5  --  4.2  --  4.2  --  4.9  --  4.2   CHLORIDE 104  --   --   --   --   --  105  --   --   --  104  --   --   --  105   CO2 15*  --   --   --   --   --  17*  --   --   --  16*  --   --   --  22   ANIONGAP 14  --   --   --   --   --  14  --   --   --  14  --   --   --  7   GLC 84 105*  --  79 117*   < > 85   < >  --    < > 75   < >  --    < > 88   BUN 8.2  --   --   --   --   --  12.0  --   --   --  8.5  --   --   --  9.5   CR 0.61  --   --   --   --   --  0.79  --   --   --  0.64  --   --   --  0.62   GFRESTIMATED >90  --   --   --   --   --  >90  --   --   --  >90  --   --   --  >90   AYDEN 9.7  --   --   --   --   --  10.0  --   --   --  9.7  --   --   --  9.1   MAG 1.7  --  1.8  --   --   --  1.8  --  1.7  --  1.7  --  1.8  --  1.8   PHOS 4.0  --   --   --   --   --   --   --  4.5  --  4.5  --  4.6*  --  4.2   PROTTOTAL 7.6  --   --   --   --   --  7.7  --   --   --  7.1  --   --   --  7.0   ALBUMIN 3.4*  --   --   --   --   --  3.5  --   --   --  3.3*  --   --   --  3.2*   BILITOTAL 0.3  --   --   --   --   --  0.3  --   --   --  0.4  --   --   --  0.3   ALKPHOS 99  --    --   --   --   --  102  --   --   --  94  --   --   --  88   AST 35  --   --   --   --   --  34  --   --   --  35  --   --   --  34   ALT 18  --   --   --   --   --  21  --   --   --  20  --   --   --  20    < > = values in this interval not displayed.       CBC  Recent Labs   Lab 12/27/22  0712 12/25/22  0610 12/23/22  0557 12/21/22  0446   WBC 11.6* 9.4 10.5 10.3   RBC 4.10 4.24 3.86 3.79*   HGB 10.4* 10.7* 9.8* 9.9*   HCT 33.6* 34.5* 31.4* 30.6*   MCV 82 81 81 81   MCH 25.4* 25.2* 25.4* 26.1*   MCHC 31.0* 31.0* 31.2* 32.4   RDW 15.6* 15.3* 15.2* 15.2*    248 222 230

## 2022-12-27 NOTE — PLAN OF CARE
Neuro: A&Ox4  Cardiac: SR with frequent PACs and PVCs. C/o palpitations this am, more controlled after scheduled metoprolol given.   Respiratory: Room air. Lungs clear  GI/: Voiding hat. BM today  Diet/appetite: 2gm sodium diet. Monitor blood glucose 1 hour after meals.   Activity: Up ad danis in room  Pain: Headache in AM controlled with PRN Tylenol  Skin: intact  Lines: PICC left arm. Heparin infusing at 2050units/hr.  Drains: none  Labs: Labs reviewed. Magnesium replaced per protocol.       Flat affect. Able to make needs known. Denies nausea this shift, small snacks between meals. Fetal monitoring this shift completed by OB nurse. Plan to start Lovenox tonight and discontinue heparin drip. Continue to monitor patient. Notify Cards 2 with changes or concerns.

## 2022-12-27 NOTE — PLAN OF CARE
D: Admitted 12/6/2022 for further management of newly diagnosed, acute decompensated systolic heart failure (LVEF 20%) as well as high risk delivery.      I: Monitored vitals and assessed patient status.   Running: heparin gtt at 2050 units/hr, 10a level this AM pending     A: A&Ox4. On room air. SR with frequent PVCs and PACs 70s-90s, VSS, afebrile. Intermittent nausea relieved with water and food. Urinating adequately. LBM 12/26. Up ad danis.     P: Continue to monitor.

## 2022-12-28 LAB
GLUCOSE BLDC GLUCOMTR-MCNC: 107 MG/DL (ref 70–99)
GLUCOSE BLDC GLUCOMTR-MCNC: 133 MG/DL (ref 70–99)
GLUCOSE BLDC GLUCOMTR-MCNC: 82 MG/DL (ref 70–99)
MAGNESIUM SERPL-MCNC: 1.6 MG/DL (ref 1.7–2.3)
PHOSPHATE SERPL-MCNC: 3.9 MG/DL (ref 2.5–4.5)
POTASSIUM SERPL-SCNC: 4.3 MMOL/L (ref 3.4–5.3)
UFH PPP CHRO-ACNC: 0.24 IU/ML

## 2022-12-28 PROCEDURE — 250N000013 HC RX MED GY IP 250 OP 250 PS 637: Performed by: NURSE PRACTITIONER

## 2022-12-28 PROCEDURE — 99232 SBSQ HOSP IP/OBS MODERATE 35: CPT | Performed by: NURSE PRACTITIONER

## 2022-12-28 PROCEDURE — 250N000013 HC RX MED GY IP 250 OP 250 PS 637: Performed by: INTERNAL MEDICINE

## 2022-12-28 PROCEDURE — 250N000011 HC RX IP 250 OP 636: Performed by: NURSE PRACTITIONER

## 2022-12-28 PROCEDURE — 250N000013 HC RX MED GY IP 250 OP 250 PS 637: Performed by: STUDENT IN AN ORGANIZED HEALTH CARE EDUCATION/TRAINING PROGRAM

## 2022-12-28 PROCEDURE — 250N000011 HC RX IP 250 OP 636: Performed by: STUDENT IN AN ORGANIZED HEALTH CARE EDUCATION/TRAINING PROGRAM

## 2022-12-28 PROCEDURE — 250N000013 HC RX MED GY IP 250 OP 250 PS 637

## 2022-12-28 PROCEDURE — 83735 ASSAY OF MAGNESIUM: CPT | Performed by: INTERNAL MEDICINE

## 2022-12-28 PROCEDURE — 250N000011 HC RX IP 250 OP 636: Performed by: INTERNAL MEDICINE

## 2022-12-28 PROCEDURE — 84100 ASSAY OF PHOSPHORUS: CPT | Performed by: INTERNAL MEDICINE

## 2022-12-28 PROCEDURE — 36415 COLL VENOUS BLD VENIPUNCTURE: CPT | Performed by: INTERNAL MEDICINE

## 2022-12-28 PROCEDURE — 258N000003 HC RX IP 258 OP 636: Performed by: NURSE PRACTITIONER

## 2022-12-28 PROCEDURE — 84132 ASSAY OF SERUM POTASSIUM: CPT | Performed by: INTERNAL MEDICINE

## 2022-12-28 PROCEDURE — 85520 HEPARIN ASSAY: CPT | Performed by: INTERNAL MEDICINE

## 2022-12-28 PROCEDURE — 214N000001 HC R&B CCU UMMC

## 2022-12-28 RX ORDER — MAGNESIUM SULFATE HEPTAHYDRATE 40 MG/ML
2 INJECTION, SOLUTION INTRAVENOUS ONCE
Status: COMPLETED | OUTPATIENT
Start: 2022-12-28 | End: 2022-12-28

## 2022-12-28 RX ORDER — MAGNESIUM OXIDE 400 MG/1
400 TABLET ORAL EVERY 4 HOURS
Status: DISCONTINUED | OUTPATIENT
Start: 2022-12-28 | End: 2022-12-28

## 2022-12-28 RX ADMIN — METOPROLOL TARTRATE 50 MG: 50 TABLET, FILM COATED ORAL at 20:47

## 2022-12-28 RX ADMIN — Medication 25 MG: at 08:45

## 2022-12-28 RX ADMIN — ACETAMINOPHEN 650 MG: 325 TABLET, FILM COATED ORAL at 18:38

## 2022-12-28 RX ADMIN — METOPROLOL TARTRATE 50 MG: 50 TABLET, FILM COATED ORAL at 14:42

## 2022-12-28 RX ADMIN — POTASSIUM CHLORIDE 20 MEQ: 40 SOLUTION ORAL at 20:47

## 2022-12-28 RX ADMIN — POTASSIUM CHLORIDE 20 MEQ: 40 SOLUTION ORAL at 10:50

## 2022-12-28 RX ADMIN — ENOXAPARIN SODIUM 130 MG: 150 INJECTION SUBCUTANEOUS at 20:47

## 2022-12-28 RX ADMIN — MAGNESIUM OXIDE TAB 400 MG (241.3 MG ELEMENTAL MG) 800 MG: 400 (241.3 MG) TAB at 20:47

## 2022-12-28 RX ADMIN — FUROSEMIDE 40 MG: 40 TABLET ORAL at 10:49

## 2022-12-28 RX ADMIN — MAGNESIUM SULFATE IN WATER 2 G: 40 INJECTION, SOLUTION INTRAVENOUS at 10:48

## 2022-12-28 RX ADMIN — METOPROLOL TARTRATE 50 MG: 50 TABLET, FILM COATED ORAL at 02:03

## 2022-12-28 RX ADMIN — PRENATAL VIT W/ FE FUMARATE-FA TAB 27-0.8 MG 1 TABLET: 27-0.8 TAB at 08:45

## 2022-12-28 RX ADMIN — MAGNESIUM OXIDE TAB 400 MG (241.3 MG ELEMENTAL MG) 800 MG: 400 (241.3 MG) TAB at 08:45

## 2022-12-28 RX ADMIN — POTASSIUM CHLORIDE 20 MEQ: 40 SOLUTION ORAL at 14:42

## 2022-12-28 RX ADMIN — ENOXAPARIN SODIUM 130 MG: 150 INJECTION SUBCUTANEOUS at 08:45

## 2022-12-28 RX ADMIN — METOPROLOL TARTRATE 50 MG: 50 TABLET, FILM COATED ORAL at 08:45

## 2022-12-28 RX ADMIN — SODIUM CHLORIDE, PRESERVATIVE FREE 10 ML: 5 INJECTION INTRAVENOUS at 16:27

## 2022-12-28 RX ADMIN — IRON SUCROSE 200 MG: 20 INJECTION, SOLUTION INTRAVENOUS at 14:42

## 2022-12-28 ASSESSMENT — ACTIVITIES OF DAILY LIVING (ADL)
ADLS_ACUITY_SCORE: 20

## 2022-12-28 NOTE — PLAN OF CARE
Goal Outcome Evaluation:      Plan of Care Reviewed With: patient    Overall Patient Progress: improvingOverall Patient Progress: improving    Outcome Evaluation: Encourage oral intake, increase nutritional supplement from once daily to twice daily

## 2022-12-28 NOTE — PROGRESS NOTES
CLINICAL NUTRITION SERVICES - REASSESSMENT NOTE     Nutrition Prescription    RECOMMENDATIONS FOR MDs/PROVIDERS TO ORDER:  Consider ordering 200-300 mg DHA/day.    Malnutrition Status:    Patient does not meet two of the established criteria necessary for diagnosing malnutrition but is at risk for malnutrition    Recommendations already ordered by Registered Dietitian (RD):  1. Ensure max protein twice daily     Future/Additional Recommendations:  -- monitor oral intake of meals and supplements      EVALUATION OF THE PROGRESS TOWARD GOALS   Diet: 3 g Sodium with ensure enlive once daily   Intake: Anjali reports her appetite is good. She is hungry. She feels she is restricted in her options on the menu (diet updated 12/28 to be less restrictive with 32 gram Na v 2 gram Na diet     She would like to increase receiving supplements to twice daily    Per health-touch patient is ordering [phone] kcals and 65-85 g protein over the last few days.      NEW FINDINGS   Labs (12/28): Mg++ 1.6 mg/dL (L)    Meds:   Iron sucrose  Mag-ox  Miralax  Prenatal multivitamin w/iron    GI: LBM (12/27) x 1    Weight: weight appears stable at this time. Continue with current dosing weight.     MALNUTRITION  % Intake: < 75% for > 7 days (moderate)  % Weight Loss: None noted  Subcutaneous Fat Loss: None observed  Muscle Loss: None observed  Fluid Accumulation/Edema: None noted  Malnutrition Diagnosis: Patient does not meet two of the established criteria necessary for diagnosing malnutrition but is at risk for malnutrition    Previous Goals   Patient to consume % of nutritionally adequate meal trays TID, or the equivalent with supplements/snacks.  Evaluation: Not met    Previous Nutrition Diagnosis  Inadequate oral intake  Evaluation: Improving    CURRENT NUTRITION DIAGNOSIS  Inadequate oral intake related to diet restriction as evidenced by [employer] (meal ordering system)       INTERVENTIONS  Implementation  Medical food  supplement therapy    Goals  Patient to consume % of nutritionally adequate meal trays TID, or the equivalent with supplements/snacks.    Monitoring/Evaluation  Progress toward goals will be monitored and evaluated per protocol.    Marta Persaud, MS/RD/JAELYN  6C RD pager: [phone]  Weekend/Holiday RD pager: [phone]

## 2022-12-28 NOTE — PROGRESS NOTES
[facility]   Cardiology II Service / Advanced Heart Failure  Daily Progress Note      Patient: Anjali Carmen  MRN: [id number]  Admission Date: 2022  Hospital Day # 22    Assessment and Plan: Anjali Carmen is a 32 year old female  with no significant past medical history, who presented as a transfer from [facility] to [facility] on 2022 for further management of newly diagnosed, acute decompensated systolic heart failure (LVEF 20%) and high risk delivery.    [facility] available  at [phone]    Today's Plan:  -check Xa 4-6 hr after third lovenox injection per OB (~0200)  -continue lasix 40 mg daily for now  -growth US tomorrow  -replete Mg IV per protocol    # Acute on chronic systolic heart failure/HFrEF secondary to NICM (?familial)    # Moderately reduced RV function   # NSVT  # Frequent PVCs, PACs  Stage C. NYHA Class difficult to discern. Concern for familial dilated cardiomyopathy given history of cardiomyopathy in father at 30 years of age. Last pregnancy in , timeline not fitting in for PPCM. TTE  with thinned out LV wall and notable regional wall motion abnormalities. Coronary angiogram  with no significat lesions seen. No known history of pre-existing structural heart disease prior to this admission. GDMT and diuresis as outlined below. Traditional afterload reducing agents for poor LV function and low LVEF of 20-25% cannot be met due to need for fetoplacental circulation.  Will target /80 and MAP > 65. Repeat TTE  LVEF of 20-25%, RWM (details below), moderately reduced RV function, mild TR, no pericardial effusion.     Fluid status: euvolemic lasix 40 mg daily PO  ACEi/ARB/ARNI/afterload reduction: contraindicated in pregnancy  BB: metoprolol tartrate 50 mg q6hr  Aldosterone antagonist: high adverse risk medication in pregnancy  SGLT2i: deferred  SCD prophylaxis: decision deferred during medication uptitration  Anticoagulation:  risk of LV thrombus in the setting of low EF, regional WMA, and hypercoaguable state of pregnancy (OB guidelines recommend therapeutic AC for EF<35%); changed from heparin gtt to Lovenox 1 mg/kg q12hr on , check Xa 4-6 hours after 3rd dose (around 0200 tonight) has been administered, goal Anti-Xa level of 0.6-1.0 U/mL  Other: for further workup, plan for cardiac MRI in the postpartum setting +/- genetic testing/counseling referral    # New onset possible paroxysmal atrial fibrillation  [facility] [place], isolated episode, EKG unavailable for review. Currently in NSR at [facility] with frequent ectopy. [id number] score of 2 (sex, HF) - on AC as above for pregnancy and low EF.   - telemetry, metoprolol as above  - Lovenox as above     # Pregnancy  Gestational age 27w2d. Betamethasone (BMZ) given - at OSH for fetal lung maturity. OB US for fetal growth done on 22 with normal fetal findings and no abnormalities.  - Appreciate MFM management, closely following with daily checks; can be reached via their pager  - growth US   - NST BID  - Magnesium for neuroprotection if moving towards delivery prior to 32 weeks with 6g bolus followed by 2g/hr maintenance until delivery  - Routine indications for emergent delivery including maternal decompensation or fetal compromise  - S/p  section consent on admission  - At 28 weeks(~1/3/23): Tdap, repeat CBC, RPR, and type and screen  - per MFM, BP goal 130/80, MAP >65    - If blood pressures >160/110 for at least 15 minutes, call MFM for guidance of management, although typically will initiate treatment with 5-10 mg of IV hydralazine or 20 mg of IV labetalol    # Mild anemia, multifactorial  Due to pregnancy and HF. Hgb 10.2 on admission (MCV 79). Retic count 1.9%. Iron, ferritin, TIBC levels show AVINASH (say 6%). Hgh 9s-low 10s.   - limit blood draws  - IV iron 200 mg daily x5 (-)    # Gestational Diabetes  - QID blood glucose  "checks (fasting, and 1 hour after each meal)   - goal blood glucose for AM fasting 95 mg/dL   - 1 hour postprandial check is under 140 mg/dL  - If after 2 weeks of monitoring, if >50% of blood glucose values are elevated, then we would typically initiate medication for treatment of gestational diabetes - Anjali prefers Metformin over insulin  -  monitoring to be determined by need for medications    # Nausea, improved  Due to pregnancy and likely taking PO medications on empty stomach  - vitB6 daily  - trigger avoidance, snack frequent snacks, alize  - encourage bland foods in AM prior to medications    FEN: 3g Na  PROPHY:  lovenox  LINES:  PICC  DISPO:  Pending delivery  CODE STATUS:  Full code    Geovanna Rubio, MONI, NP-C  Advanced Heart Failure/Cardiology II Service  Pager [phone] [facility] [phone]      Patient discussed with Dr. Abdi.        30 minutes spent on the date of the encounter doing chart review, history and exam, documentation and further activities per the note    ================================================================    Subjective/24-Hr Events:   Last 24 hr care team notes reviewed. Nausea improved. Lightheadedness improved. Sleeping well - especially after stopping heparin. No LE edema, orthopnea, PND. Breathing feels comfortable.     ROS:  4 point ROS including respiratory, CV, GI and  (other than that noted in the HPI) is negative.     Medications: Reviewed in EPIC.     Physical Exam:   BP 98/52 (BP Location: Right arm, Cuff Size: Adult Large)   Pulse 79   Temp 97.5  F (36.4  C) (Oral)   Resp 14   Ht 1.753 m (5' 9\")   Wt 132.4 kg (291 lb 14.4 oz)   SpO2 99%   BMI 43.11 kg/m      GENERAL: Appears comfortable, in no distress.  HEENT: Eye symmetrical, no discharge or icterus bilaterally. Mucous membranes moist and without lesions.  NECK: Supple, JVD not elevated.   CV: RRR, +S1S2,  murmur, rub, or gallop.   RESPIRATORY: Respirations regular, even, and unlabored. Lungs " CTA throughout.    GI: Soft, gravid and non distended with normoactive bowel sounds present in all quadrants. No tenderness, rebound, guarding.   EXTREMITIES: No peripheral edema. 2+ bilateral pedal pulses.   NEUROLOGIC: Alert and oriented x 3. No focal deficits.   MUSCULOSKELETAL: No joint swelling or tenderness.   SKIN: No jaundice. No rashes or lesions.     Labs:  CMP  Recent Labs   Lab 12/28/22  0551 12/27/22  1800 12/27/22  1405 12/27/22  0712 12/27/22  0627 12/26/22  0958 12/25/22  1317 12/25/22  0610 12/24/22  1151 12/24/22  0710 12/23/22  1133 12/23/22  0557 12/22/22  1615 12/22/22  1017   NA  --   --   --  133*  --   --   --  136  --   --   --  134*  --   --    POTASSIUM  --   --   --  4.3  --   --   --  4.5  --  4.2  --  4.2  --  4.9   CHLORIDE  --   --   --  104  --   --   --  105  --   --   --  104  --   --    CO2  --   --   --  15*  --   --   --  17*  --   --   --  16*  --   --    ANIONGAP  --   --   --  14  --   --   --  14  --   --   --  14  --   --    GLC 82 153* 128* 84   < >  --    < > 85   < >  --    < > 75   < >  --    BUN  --   --   --  8.2  --   --   --  12.0  --   --   --  8.5  --   --    CR  --   --   --  0.61  --   --   --  0.79  --   --   --  0.64  --   --    GFRESTIMATED  --   --   --  >90  --   --   --  >90  --   --   --  >90  --   --    AYDEN  --   --   --  9.7  --   --   --  10.0  --   --   --  9.7  --   --    MAG  --   --   --  1.7  --  1.8  --  1.8  --  1.7  --  1.7  --  1.8   PHOS  --   --   --  4.0  --   --   --   --   --  4.5  --  4.5  --  4.6*   PROTTOTAL  --   --   --  7.6  --   --   --  7.7  --   --   --  7.1  --   --    ALBUMIN  --   --   --  3.4*  --   --   --  3.5  --   --   --  3.3*  --   --    BILITOTAL  --   --   --  0.3  --   --   --  0.3  --   --   --  0.4  --   --    ALKPHOS  --   --   --  99  --   --   --  102  --   --   --  94  --   --    AST  --   --   --  35  --   --   --  34  --   --   --  35  --   --    ALT  --   --   --  18  --   --   --  21  --   --   --  20  --    --     < > = values in this interval not displayed.       CBC  Recent Labs   Lab 12/27/22  0712 12/25/22  0610 12/23/22  0557   WBC 11.6* 9.4 10.5   RBC 4.10 4.24 3.86   HGB 10.4* 10.7* 9.8*   HCT 33.6* 34.5* 31.4*   MCV 82 81 81   MCH 25.4* 25.2* 25.4*   MCHC 31.0* 31.0* 31.2*   RDW 15.6* 15.3* 15.2*    248 222

## 2022-12-28 NOTE — PLAN OF CARE
Care from: 1500 - 1930    Neuro: A&Ox4.   Cardiac: SR with frequent PVCs/PACs. Denies current palpitations/chest pains/SOB. VSS.   Respiratory: Sating >98% on RA.  GI/: Adequate urine output - voids in hat. BM X1 today  Diet/appetite: Tolerating 2gm sodium diet.   Activity:  Independent, up ad danis in room  Pain: At acceptable level on current regimen.   Skin: No new deficits noted.  LDA's: L DL PICC - purple infusing heparin and red - SL  POCT: Fasting AM BG, and monitor BG 1hr after lunch/dinner    Plan: Continue to monitor Pt status and report changes to Cards 2 treatment team. Encourage daily activities. Heparin infusing at 2050units/hr - order to stop at 2000 and switch to lovenox.

## 2022-12-28 NOTE — PLAN OF CARE
[id number]  No acute events overnight.  Patient denies pain.  Slept soundly between cares.  Karen Rubin RN

## 2022-12-28 NOTE — PLAN OF CARE
Care from: 0700 - 1930     Neuro: A&Ox4.   Cardiac: SR with frequent PVCs/PACs. Denies current palpitations/chest pains/SOB. VSS.     Respiratory: Sating >98% on RA.  GI/: Adequate urine output - voids in hat. BM X1 today  Diet/appetite: Tolerating 3gm sodium diet.   Activity:  Independent, up ad danis in room/halls  Pain: At acceptable level on current regimen. Did have some minor hip pain from walking in halls - tylenol given x1  Skin: No new deficits noted.  LDA's: L DL PICC - SL  POCT: Fasting AM BG, and monitor BG 1hr after lunch/dinner     Plan: Continue to monitor Pt status and report changes to Cards 2 treatment team. Encourage daily activities.

## 2022-12-29 ENCOUNTER — APPOINTMENT (OUTPATIENT)
Dept: ULTRASOUND IMAGING | Facility: CLINIC | Age: 32
End: 2022-12-29
Attending: INTERNAL MEDICINE
Payer: COMMERCIAL

## 2022-12-29 LAB
ALBUMIN SERPL BCG-MCNC: 3.3 G/DL (ref 3.5–5.2)
ALP SERPL-CCNC: 94 U/L (ref 35–104)
ALT SERPL W P-5'-P-CCNC: 20 U/L (ref 10–35)
ANION GAP SERPL CALCULATED.3IONS-SCNC: 13 MMOL/L (ref 7–15)
AST SERPL W P-5'-P-CCNC: 36 U/L (ref 10–35)
BASOPHILS # BLD AUTO: 0 10E3/UL (ref 0–0.2)
BASOPHILS NFR BLD AUTO: 0 %
BILIRUB SERPL-MCNC: 0.3 MG/DL
BUN SERPL-MCNC: 11.9 MG/DL (ref 6–20)
CALCIUM SERPL-MCNC: 9.9 MG/DL (ref 8.6–10)
CHLORIDE SERPL-SCNC: 105 MMOL/L (ref 98–107)
CREAT SERPL-MCNC: 0.68 MG/DL (ref 0.51–0.95)
DEPRECATED HCO3 PLAS-SCNC: 15 MMOL/L (ref 22–29)
EOSINOPHIL # BLD AUTO: 0 10E3/UL (ref 0–0.7)
EOSINOPHIL NFR BLD AUTO: 0 %
ERYTHROCYTE [DISTWIDTH] IN BLOOD BY AUTOMATED COUNT: 16.2 % (ref 10–15)
GFR SERPL CREATININE-BSD FRML MDRD: >90 ML/MIN/1.73M2
GLUCOSE BLDC GLUCOMTR-MCNC: 89 MG/DL (ref 70–99)
GLUCOSE SERPL-MCNC: 89 MG/DL (ref 70–99)
HCT VFR BLD AUTO: 32.9 % (ref 35–47)
HGB BLD-MCNC: 10.5 G/DL (ref 11.7–15.7)
IMM GRANULOCYTES # BLD: 0.1 10E3/UL
IMM GRANULOCYTES NFR BLD: 1 %
LMWH PPP CHRO-ACNC: 0.98 IU/ML
LYMPHOCYTES # BLD AUTO: 2.2 10E3/UL (ref 0.8–5.3)
LYMPHOCYTES NFR BLD AUTO: 22 %
MAGNESIUM SERPL-MCNC: 1.7 MG/DL (ref 1.7–2.3)
MAGNESIUM SERPL-MCNC: 1.7 MG/DL (ref 1.7–2.3)
MCH RBC QN AUTO: 25.9 PG (ref 26.5–33)
MCHC RBC AUTO-ENTMCNC: 31.9 G/DL (ref 31.5–36.5)
MCV RBC AUTO: 81 FL (ref 78–100)
MONOCYTES # BLD AUTO: 0.5 10E3/UL (ref 0–1.3)
MONOCYTES NFR BLD AUTO: 6 %
NEUTROPHILS # BLD AUTO: 6.9 10E3/UL (ref 1.6–8.3)
NEUTROPHILS NFR BLD AUTO: 71 %
NRBC # BLD AUTO: 0 10E3/UL
NRBC BLD AUTO-RTO: 0 /100
PHOSPHATE SERPL-MCNC: 4.5 MG/DL (ref 2.5–4.5)
PLATELET # BLD AUTO: 242 10E3/UL (ref 150–450)
POTASSIUM SERPL-SCNC: 4.4 MMOL/L (ref 3.4–5.3)
POTASSIUM SERPL-SCNC: 4.4 MMOL/L (ref 3.4–5.3)
PROT SERPL-MCNC: 7.1 G/DL (ref 6.4–8.3)
RBC # BLD AUTO: 4.05 10E6/UL (ref 3.8–5.2)
SODIUM SERPL-SCNC: 133 MMOL/L (ref 136–145)
UFH PPP CHRO-ACNC: 0.83 IU/ML
WBC # BLD AUTO: 9.8 10E3/UL (ref 4–11)

## 2022-12-29 PROCEDURE — 83735 ASSAY OF MAGNESIUM: CPT | Performed by: INTERNAL MEDICINE

## 2022-12-29 PROCEDURE — 250N000013 HC RX MED GY IP 250 OP 250 PS 637: Performed by: STUDENT IN AN ORGANIZED HEALTH CARE EDUCATION/TRAINING PROGRAM

## 2022-12-29 PROCEDURE — 250N000011 HC RX IP 250 OP 636: Performed by: STUDENT IN AN ORGANIZED HEALTH CARE EDUCATION/TRAINING PROGRAM

## 2022-12-29 PROCEDURE — 99232 SBSQ HOSP IP/OBS MODERATE 35: CPT | Performed by: NURSE PRACTITIONER

## 2022-12-29 PROCEDURE — 250N000013 HC RX MED GY IP 250 OP 250 PS 637

## 2022-12-29 PROCEDURE — 76816 OB US FOLLOW-UP PER FETUS: CPT | Mod: 26 | Performed by: OBSTETRICS & GYNECOLOGY

## 2022-12-29 PROCEDURE — 76816 OB US FOLLOW-UP PER FETUS: CPT

## 2022-12-29 PROCEDURE — 85025 COMPLETE CBC W/AUTO DIFF WBC: CPT

## 2022-12-29 PROCEDURE — 36415 COLL VENOUS BLD VENIPUNCTURE: CPT | Performed by: INTERNAL MEDICINE

## 2022-12-29 PROCEDURE — 250N000011 HC RX IP 250 OP 636: Performed by: NURSE PRACTITIONER

## 2022-12-29 PROCEDURE — 84132 ASSAY OF SERUM POTASSIUM: CPT

## 2022-12-29 PROCEDURE — 85520 HEPARIN ASSAY: CPT | Performed by: NURSE PRACTITIONER

## 2022-12-29 PROCEDURE — 36592 COLLECT BLOOD FROM PICC: CPT | Performed by: NURSE PRACTITIONER

## 2022-12-29 PROCEDURE — 250N000013 HC RX MED GY IP 250 OP 250 PS 637: Performed by: NURSE PRACTITIONER

## 2022-12-29 PROCEDURE — 250N000011 HC RX IP 250 OP 636: Performed by: INTERNAL MEDICINE

## 2022-12-29 PROCEDURE — 85520 HEPARIN ASSAY: CPT | Performed by: INTERNAL MEDICINE

## 2022-12-29 PROCEDURE — 83735 ASSAY OF MAGNESIUM: CPT

## 2022-12-29 PROCEDURE — 84100 ASSAY OF PHOSPHORUS: CPT | Performed by: INTERNAL MEDICINE

## 2022-12-29 PROCEDURE — 84132 ASSAY OF SERUM POTASSIUM: CPT | Performed by: INTERNAL MEDICINE

## 2022-12-29 PROCEDURE — 214N000001 HC R&B CCU UMMC

## 2022-12-29 PROCEDURE — 36592 COLLECT BLOOD FROM PICC: CPT | Performed by: INTERNAL MEDICINE

## 2022-12-29 PROCEDURE — 250N000013 HC RX MED GY IP 250 OP 250 PS 637: Performed by: INTERNAL MEDICINE

## 2022-12-29 RX ORDER — ENOXAPARIN SODIUM 150 MG/ML
120 INJECTION SUBCUTANEOUS EVERY 12 HOURS
Status: DISCONTINUED | OUTPATIENT
Start: 2022-12-29 | End: 2023-01-21

## 2022-12-29 RX ORDER — MAGNESIUM OXIDE 400 MG/1
400 TABLET ORAL EVERY 4 HOURS
Status: COMPLETED | OUTPATIENT
Start: 2022-12-29 | End: 2022-12-29

## 2022-12-29 RX ADMIN — Medication 25 MG: at 07:47

## 2022-12-29 RX ADMIN — METOPROLOL TARTRATE 50 MG: 50 TABLET, FILM COATED ORAL at 07:47

## 2022-12-29 RX ADMIN — MAGNESIUM OXIDE TAB 400 MG (241.3 MG ELEMENTAL MG) 800 MG: 400 (241.3 MG) TAB at 20:58

## 2022-12-29 RX ADMIN — PRENATAL VIT W/ FE FUMARATE-FA TAB 27-0.8 MG 1 TABLET: 27-0.8 TAB at 07:47

## 2022-12-29 RX ADMIN — METOPROLOL TARTRATE 50 MG: 50 TABLET, FILM COATED ORAL at 16:18

## 2022-12-29 RX ADMIN — Medication 400 MG: at 07:47

## 2022-12-29 RX ADMIN — POTASSIUM CHLORIDE 20 MEQ: 40 SOLUTION ORAL at 16:18

## 2022-12-29 RX ADMIN — POTASSIUM CHLORIDE 20 MEQ: 40 SOLUTION ORAL at 20:58

## 2022-12-29 RX ADMIN — SODIUM CHLORIDE, PRESERVATIVE FREE 5 ML: 5 INJECTION INTRAVENOUS at 05:15

## 2022-12-29 RX ADMIN — POTASSIUM CHLORIDE 20 MEQ: 40 SOLUTION ORAL at 07:46

## 2022-12-29 RX ADMIN — METOPROLOL TARTRATE 50 MG: 50 TABLET, FILM COATED ORAL at 02:59

## 2022-12-29 RX ADMIN — METOPROLOL TARTRATE 50 MG: 50 TABLET, FILM COATED ORAL at 20:58

## 2022-12-29 RX ADMIN — SODIUM CHLORIDE, PRESERVATIVE FREE 10 ML: 5 INJECTION INTRAVENOUS at 16:21

## 2022-12-29 RX ADMIN — Medication 400 MG: at 10:40

## 2022-12-29 RX ADMIN — ENOXAPARIN SODIUM 120 MG: 120 INJECTION SUBCUTANEOUS at 20:58

## 2022-12-29 RX ADMIN — ENOXAPARIN SODIUM 130 MG: 150 INJECTION SUBCUTANEOUS at 07:43

## 2022-12-29 RX ADMIN — MAGNESIUM OXIDE TAB 400 MG (241.3 MG ELEMENTAL MG) 800 MG: 400 (241.3 MG) TAB at 07:46

## 2022-12-29 RX ADMIN — FUROSEMIDE 40 MG: 40 TABLET ORAL at 07:46

## 2022-12-29 ASSESSMENT — ACTIVITIES OF DAILY LIVING (ADL)
ADLS_ACUITY_SCORE: 20

## 2022-12-29 NOTE — PLAN OF CARE
Overview: Patient had fetal growth ultrasound today, WDL. VSS and denies pain. Mg replaced. Spouse en route to hospital from [place].    Neuro: Vision corrected     -glasses available and used    Respiratory: WDL   -Deep breaths encouraged    CV: Irregular heart rhythm   -EKG monitoring continued, active telemetry orders, Metoprolol given as ordered   -Heart Rhythm: SR with occasional PACs, PVCs  Outstanding lab(s): mg 1.7   Replacement protocol(s): K, Mg, Phos - Mg needed replacement. All rechecks 12/30    GI: Intermittent nausea   -Denies antiemetic use, aromatherapy    -Good appetite   -Checking blood sugars r/t gestational diabetes   -Carb count    : WDL   -Patient saves urine for I&O measurement(s)    I&O: PICC SL    Musculoskeletal: WDL   -Up independently    Psychosocial: WDL    Skin: WDL   -PICC line compromised after shower; redressed by RN    Plan: Patient will stay through pregnancy and deliver here; in the mean time, continuing with fetal ultrasounds, EKG monitoring for patient, electrolyte replacements      Provider(s) involved in care: OB, CARDS 2  Provider Notification(s): NA      Problem: Plan of Care - These are the overarching goals to be used throughout the patient stay.    Goal: Plan of Care Review  Description: The Plan of Care Review/Shift note should be completed every shift.  The Outcome Evaluation is a brief statement about your assessment that the patient is improving, declining, or no change.  This information will be displayed automatically on your shift note.  Outcome: Progressing  Flowsheets (Taken 12/29/2022 0809)  Outcome Evaluation: Patient stable on SQ Lovenox with no current need to return to having heparin gtt. Oral intake and voids appropriately, good appetite. No acute issues noted on fetal ultrasound. Family involved by telephone and spouse plans to visit [facility] 12/29  Plan of Care Reviewed With: patient  Overall Patient Progress: improving  Goal: Patient-Specific Goal  "(Individualized)  Description: You can add care plan individualizations to a care plan. Examples of Individualization might be:  \"Parent requests to be called daily at 9am for status\", \"I have a hard time hearing out of my right ear\", or \"Do not touch me to wake me up as it startles me\".  Outcome: Progressing  Flowsheets (Taken 12/29/2022 0809)  Individualized Care Needs: Diuretic therapy, electrolyte replacements, fetal monitoring  Anxieties, Fears or Concerns: none at this time  Patient/Family-Specific Goals (Include Timeframe): See family soon and continue to have good outcomes on fetal ultrasounds  Goal: Absence of Hospital-Acquired Illness or Injury  Outcome: Progressing  Intervention: Identify and Manage Fall Risk  Recent Flowsheet Documentation  Taken 12/29/2022 0750 by Letty Rush RN  Safety Promotion/Fall Prevention:    assistive device/personal items within reach    clutter free environment maintained    lighting adjusted    safety round/check completed    treat underlying cause    patient and family education  Intervention: Prevent Skin Injury  Recent Flowsheet Documentation  Taken 12/29/2022 0750 by Letty Rush RN  Body Position:    position changed independently    side-lying  Intervention: Prevent and Manage VTE (Venous Thromboembolism) Risk  Recent Flowsheet Documentation  Taken 12/29/2022 0750 by Letty Rush RN  VTE Prevention/Management:    SCDs (sequential compression devices) off    patient refused intervention  Goal: Optimal Comfort and Wellbeing  Outcome: Progressing  Intervention: Provide Person-Centered Care  Recent Flowsheet Documentation  Taken 12/29/2022 0750 by Letty Rush RN  Trust Relationship/Rapport:    thoughts/feelings acknowledged    emotional support provided    care explained    choices provided  Goal: Readiness for Transition of Care  Outcome: Progressing     Problem: Risk for Delirium  Goal: Optimal Coping  Outcome: Progressing  Goal: Improved Behavioral " Control  Outcome: Progressing  Intervention: Minimize Safety Risk  Recent Flowsheet Documentation  Taken 12/29/2022 0750 by Letty Rush RN  Trust Relationship/Rapport:    thoughts/feelings acknowledged    emotional support provided    care explained    choices provided  Goal: Improved Attention and Thought Clarity  Outcome: Progressing  Goal: Improved Sleep  Outcome: Progressing     Problem: Heart Failure  Goal: Optimal Coping  Outcome: Progressing  Goal: Optimal Cardiac Output  Outcome: Progressing  Goal: Stable Heart Rate and Rhythm  Outcome: Progressing  Goal: Optimal Functional Ability  Outcome: Progressing  Intervention: Optimize Functional Ability  Recent Flowsheet Documentation  Taken 12/29/2022 0750 by Letty Rush RN  Activity Management: back to bed  Goal: Fluid and Electrolyte Balance  Outcome: Progressing  Goal: Improved Oral Intake  Outcome: Progressing  Goal: Effective Oxygenation and Ventilation  Outcome: Progressing  Intervention: Promote Airway Secretion Clearance  Recent Flowsheet Documentation  Taken 12/29/2022 0750 by Letty Rush RN  Cough And Deep Breathing: done with encouragement  Activity Management: back to bed  Goal: Effective Breathing Pattern During Sleep  Outcome: Progressing  Intervention: Monitor and Manage Obstructive Sleep Apnea  Recent Flowsheet Documentation  Taken 12/29/2022 0750 by Letty Rush RN  Medication Review/Management:    medications reviewed    high-risk medications identified     Problem: Maternal-Fetal Wellbeing  Goal: Optimal Maternal-Fetal Wellbeing  Outcome: Progressing     Problem: Oral Intake Inadequate  Goal: Improved Oral Intake  Outcome: Progressing   Goal Outcome Evaluation:      Plan of Care Reviewed With: patient    Overall Patient Progress: improvingOverall Patient Progress: improving    Outcome Evaluation: Patient stable on SQ Lovenox with no current need to return to having heparin gtt. Oral intake and voids appropriately, good  appetite. No acute issues noted on fetal ultrasound. Family involved by telephone and spouse plans to visit [facility] 12/29

## 2022-12-29 NOTE — PHARMACY-ANTICOAGULATION SERVICE
Clinical Pharmacy Note- Enoxaparin Anti-Xa Evaluation for ADULT Patients    Date of Service 2022  Patient's  1990   Patient's age:  32 year old  Patient's Weight used for enoxaparin dosin.6 kg (290 lb 3.2 oz)  Patient's BMI: Body mass index is 42.86 kg/m .   Enoxaparin Indication: DVT/PE treatment (LV thrombus in the setting of cardiomyopathy and hypercoaguable state in pregnancy)  Goal LMWH anti-Xa level for ADULT patients: 0.5-1 international unit(s)/mL  Current Enoxaparin Regimen: 1 mg/kg subcutaneous Q 12 hours  - service asked to not use the 0.75 mg/kg dosing strategy to start despite BMI >40    Creatinine for last 3 days:   Creatinine   Date Value Ref Range Status   2022 0.68 0.51 - 0.95 mg/dL Final   2022 0.61 0.51 - 0.95 mg/dL Final   2022 0.79 0.51 - 0.95 mg/dL Final      Current estimated CrCL:  Serum creatinine: 0.68 mg/dL 22 0521  Estimated creatinine clearance: 176.5 mL/min     Platelet count for last 3 days:   Platelet Count   Date Value Ref Range Status   2022 242 150 - 450 10e3/uL Final   2022 250 150 - 450 10e3/uL Final   2022 248 150 - 450 10e3/uL Final      Recent Enoxaparin anti-Xa Levels (past 3 days):   Recent Labs     22  1208   ALMWH 0.98       ASSESSMENT OF LEVELS AND CURRENT REGIMEN:   1) Enoxaparin anti-XA level was drawn 4.33 hours post 4th dose at steady state.    2) Current LMWH anti-Xa peak level is: at the high end of the goal range    3) Renal Function:  Scr changed > 50% in last 72 hours? No  Urine Output: Good    4) Platelet Counts have been assessed and are: Stable    RECOMMENDATIONS:    1) Enoxaparin Regimen/Dose Plan: Decrease dose to  120 mg SQ Q12h given on the high end of the desired dosing range and level drawn a bit late. Also concern for some accumulation given the long duration she is likely to be on this medication.   2) Recheck Enoxaparin anti-Xa levels: 4 hours after the 4th dose of the new  regimen (anticipate next level 12/31/22).    The pharmacist will continue to monitor and follow enoxaparin LMWH anti-Xa levels as needed.      Please contact pharmacy if enoxaparin needs to be held for a procedure or if goal levels change.    Callie Abad, PharmD, BCCP, BCPS

## 2022-12-29 NOTE — PROGRESS NOTES
The patient was seen for an ultrasound with Maternal-Fetal Medicine today.  For a detailed report of the ultrasound examination, please see the ultrasound report which can be found under the imaging tab.    We will round on Anjali tomorrow. Spoke with her today on the phone regarding her ultrasound. She is doing well. Happy to be off the heparin drip. She is having worsening ectopy on telemetry and has noticed more palpitations at night. Denies chest pain or shortness of breath. +FM. Denies bleeding, leakage of fluid, or contractions.    -Will round tomorrow  -Will bring FMLA paperwork over during rounds  -Coordinating with NICU for them to have a virtual consult in AM when her partner is there    Rox Donovan MD  , OB/GYN  Maternal-Fetal Medicine  [phone] (Pager)

## 2022-12-29 NOTE — PROGRESS NOTES
[facility]   Cardiology II Service / Advanced Heart Failure  Daily Progress Note      Patient: Anjali Carmen  MRN: [id number]  Admission Date: 2022  Hospital Day # 23    Assessment and Plan: Anjali Carmen is a 32 year old female  with no significant past medical history, who presented as a transfer from [facility] to [facility] on 2022 for further management of newly diagnosed, acute decompensated systolic heart failure (LVEF 20%) and high risk delivery.    [facility] available  at [phone]    Today's Plan:  -growth US today  -will discuss increasing metoprolol for frequency ectopy    # Acute on chronic systolic heart failure/HFrEF secondary to NICM (?familial)    # Moderately reduced RV function   # NSVT  # Frequent PVCs, PACs  Stage C. NYHA Class difficult to discern. Concern for familial dilated cardiomyopathy given history of cardiomyopathy in father at 30 years of age. Last pregnancy in , timeline not fitting in for PPCM. TTE  with thinned out LV wall and notable regional wall motion abnormalities. Coronary angiogram  with no significat lesions seen. No known history of pre-existing structural heart disease prior to this admission. GDMT and diuresis as outlined below. Traditional afterload reducing agents for poor LV function and low LVEF of 20-25% cannot be met due to need for fetoplacental circulation.  Will target /80 and MAP > 65. Repeat TTE  LVEF of 20-25%, RWM (details below), moderately reduced RV function, mild TR, no pericardial effusion.     Fluid status: euvolemic lasix 40 mg daily PO  ACEi/ARB/ARNI/afterload reduction: contraindicated in pregnancy  BB: metoprolol tartrate 50 mg q6hr, consider increased dose  Aldosterone antagonist: high adverse risk medication in pregnancy  SGLT2i: deferred  SCD prophylaxis: decision deferred during medication uptitration  Anticoagulation: risk of LV thrombus in the setting of low EF,  regional WMA, and hypercoaguable state of pregnancy (OB guidelines recommend therapeutic AC for EF<35%); changed from heparin gtt to Lovenox 1 mg/kg q12hr on , Xa 4-6 hours after 3rd dose was 0.8 - goal 0.6-1.0 U/mL, continue current dose  Other: for further workup, plan for cardiac MRI in the postpartum setting +/- genetic testing/counseling referral    # New onset possible paroxysmal atrial fibrillation  [facility] [place], isolated episode, EKG unavailable for review. Currently in NSR at [facility] with frequent ectopy. [id number] score of 2 (sex, HF) - on AC as above for pregnancy and low EF.   - telemetry, metoprolol as above  - Lovenox as above     # Pregnancy  Gestational age 27w2d. Betamethasone (BMZ) given - at OSH for fetal lung maturity. OB US for fetal growth done on 22 with normal fetal findings and no abnormalities.  - Appreciate MFM management, closely following with daily checks; can be reached via their pager  - growth US today  - NST BID  - Magnesium for neuroprotection if moving towards delivery prior to 32 weeks with 6g bolus followed by 2g/hr maintenance until delivery  - Routine indications for emergent delivery including maternal decompensation or fetal compromise  - S/p  section consent on admission  - At 28 weeks(~1/3/23): Tdap, repeat CBC, RPR, and type and screen  - per MFM, BP goal 130/80, MAP >65    - If blood pressures >160/110 for at least 15 minutes, call MFM for guidance of management, although typically will initiate treatment with 5-10 mg of IV hydralazine or 20 mg of IV labetalol    # Mild anemia, multifactorial  Due to pregnancy and HF. Hgb 10.2 on admission (MCV 79). Retic count 1.9%. Iron, ferritin, TIBC levels show AVINASH (say 6%). Hgh 9s-low 10s. IV iron 200 mg daily x5 (-). Hgb stable mid 10s.   - limit blood draws  -     # Gestational Diabetes  - QID blood glucose checks (fasting, and 1 hour after each meal)   - goal blood  "glucose for AM fasting 95 mg/dL   - 1 hour postprandial check is under 140 mg/dL  - If after 2 weeks of monitoring, if >50% of blood glucose values are elevated, then we would typically initiate medication for treatment of gestational diabetes - Anjali prefers Metformin over insulin  -  monitoring to be determined by need for medications    # Nausea, improved  Due to pregnancy and likely taking PO medications on empty stomach  - vitB6 daily  - trigger avoidance, snack frequent snacks, alize  - encourage bland foods in AM prior to medications    FEN: 3g Na  PROPHY:  lovenox  LINES:  PICC  DISPO:  Pending delivery  CODE STATUS:  Full code    Geovanna Rubio, MONI, NP-C  Advanced Heart Failure/Cardiology II Service  Pager [phone] [facility] [id number]      Patient discussed with Dr. Abdi.        30 minutes spent on the date of the encounter doing chart review, history and exam, documentation and further activities per the note    ================================================================    Subjective/24-Hr Events:   Last 24 hr care team notes reviewed. No overnight events. Had US this morning which she was excited about. Per patient baby growing appropriately. Denies orthopnea, PND, LE edema. No lightheadedness. Feels palpitations in brief episodes, seconds at a time. Worse when laying on left side. BP stable.     ROS:  4 point ROS including respiratory, CV, GI and  (other than that noted in the HPI) is negative.     Medications: Reviewed in EPIC.     Physical Exam:   BP 99/57 (BP Location: Right arm, Cuff Size: Adult Large)   Pulse 84   Temp 97.8  F (36.6  C) (Oral)   Resp 18   Ht 1.753 m (5' 9\")   Wt 131.6 kg (290 lb 3.2 oz)   SpO2 99%   BMI 42.86 kg/m      GENERAL: Appears comfortable, in no distress.  HEENT: Eye symmetrical, no discharge or icterus bilaterally. Mucous membranes moist and without lesions.  NECK: Supple, JVD not elevated but difficult to assess.   CV: RRR with ectopy, +S1S2,  " murmur, rub, or gallop.   RESPIRATORY: Respirations regular, even, and unlabored. Lungs CTA throughout.    GI: Soft, gravid and non distended with normoactive bowel sounds present in all quadrants. No tenderness, rebound, guarding.   EXTREMITIES: No peripheral edema. 2+ bilateral pedal pulses.   NEUROLOGIC: Alert and oriented x 3. No focal deficits.   MUSCULOSKELETAL: No joint swelling or tenderness.   SKIN: No jaundice. No rashes or lesions.     Labs:  CMP  Recent Labs   Lab 12/29/22  0521 12/29/22  0505 12/28/22  1823 12/28/22  1401 12/28/22  0735 12/27/22  1405 12/27/22  0712 12/27/22  0627 12/26/22  0958 12/25/22  1317 12/25/22  0610 12/24/22  1151 12/24/22  0710 12/23/22  1133 12/23/22  0557   *  --   --   --   --   --  133*  --   --   --  136  --   --   --  134*   POTASSIUM 4.4  4.4  --   --   --  4.3  --  4.3  --   --   --  4.5  --  4.2  --  4.2   CHLORIDE 105  --   --   --   --   --  104  --   --   --  105  --   --   --  104   CO2 15*  --   --   --   --   --  15*  --   --   --  17*  --   --   --  16*   ANIONGAP 13  --   --   --   --   --  14  --   --   --  14  --   --   --  14   GLC 89 89 133* 107*  --    < > 84   < >  --    < > 85   < >  --    < > 75   BUN 11.9  --   --   --   --   --  8.2  --   --   --  12.0  --   --   --  8.5   CR 0.68  --   --   --   --   --  0.61  --   --   --  0.79  --   --   --  0.64   GFRESTIMATED >90  --   --   --   --   --  >90  --   --   --  >90  --   --   --  >90   AYDEN 9.9  --   --   --   --   --  9.7  --   --   --  10.0  --   --   --  9.7   MAG 1.7  1.7  --   --   --  1.6*  --  1.7  --  1.8  --  1.8  --  1.7  --  1.7   PHOS 4.5  --   --   --  3.9  --  4.0  --   --   --   --   --  4.5  --  4.5   PROTTOTAL 7.1  --   --   --   --   --  7.6  --   --   --  7.7  --   --   --  7.1   ALBUMIN 3.3*  --   --   --   --   --  3.4*  --   --   --  3.5  --   --   --  3.3*   BILITOTAL 0.3  --   --   --   --   --  0.3  --   --   --  0.3  --   --   --  0.4   ALKPHOS 94  --   --   --   --    --  99  --   --   --  102  --   --   --  94   AST 36*  --   --   --   --   --  35  --   --   --  34  --   --   --  35   ALT 20  --   --   --   --   --  18  --   --   --  21  --   --   --  20    < > = values in this interval not displayed.       CBC  Recent Labs   Lab 12/29/22  0521 12/27/22  0712 12/25/22  0610 12/23/22  0557   WBC 9.8 11.6* 9.4 10.5   RBC 4.05 4.10 4.24 3.86   HGB 10.5* 10.4* 10.7* 9.8*   HCT 32.9* 33.6* 34.5* 31.4*   MCV 81 82 81 81   MCH 25.9* 25.4* 25.2* 25.4*   MCHC 31.9 31.0* 31.0* 31.2*   RDW 16.2* 15.6* 15.3* 15.2*    [phone]

## 2022-12-29 NOTE — PLAN OF CARE
D: Pt admitted 12/6 for acute decompensated heart failure and high risk delivery. Transfer from outside hospital.     I: Monitored vitals and assessed pt status. Performed care and interventions as charted.     A: No voiced concerns overnight. Tele - SR with frequent multi-focal PVCs/ PACs. Less ectopy noted after scheduled metoprolol. BP WNL. On RA.     Independent with care.     P: Continue to monitor pt status and report changes to care team.    Natalie Rohfritsch, RN on 12/29/2022 at 5:12 AM

## 2022-12-30 LAB
GLUCOSE BLDC GLUCOMTR-MCNC: 100 MG/DL (ref 70–99)
GLUCOSE BLDC GLUCOMTR-MCNC: 104 MG/DL (ref 70–99)
GLUCOSE BLDC GLUCOMTR-MCNC: 132 MG/DL (ref 70–99)
HOLD SPECIMEN: NORMAL
MAGNESIUM SERPL-MCNC: 1.7 MG/DL (ref 1.7–2.3)
PHOSPHATE SERPL-MCNC: 4.2 MG/DL (ref 2.5–4.5)
POTASSIUM SERPL-SCNC: 4.3 MMOL/L (ref 3.4–5.3)

## 2022-12-30 PROCEDURE — 250N000011 HC RX IP 250 OP 636: Performed by: NURSE PRACTITIONER

## 2022-12-30 PROCEDURE — 250N000013 HC RX MED GY IP 250 OP 250 PS 637: Performed by: NURSE PRACTITIONER

## 2022-12-30 PROCEDURE — 250N000013 HC RX MED GY IP 250 OP 250 PS 637: Performed by: STUDENT IN AN ORGANIZED HEALTH CARE EDUCATION/TRAINING PROGRAM

## 2022-12-30 PROCEDURE — 250N000011 HC RX IP 250 OP 636: Performed by: INTERNAL MEDICINE

## 2022-12-30 PROCEDURE — 250N000013 HC RX MED GY IP 250 OP 250 PS 637

## 2022-12-30 PROCEDURE — 214N000001 HC R&B CCU UMMC

## 2022-12-30 PROCEDURE — 250N000011 HC RX IP 250 OP 636: Performed by: STUDENT IN AN ORGANIZED HEALTH CARE EDUCATION/TRAINING PROGRAM

## 2022-12-30 PROCEDURE — 84132 ASSAY OF SERUM POTASSIUM: CPT | Performed by: INTERNAL MEDICINE

## 2022-12-30 PROCEDURE — 99233 SBSQ HOSP IP/OBS HIGH 50: CPT | Mod: GC | Performed by: INTERNAL MEDICINE

## 2022-12-30 PROCEDURE — 99222 1ST HOSP IP/OBS MODERATE 55: CPT | Performed by: PHYSICIAN ASSISTANT

## 2022-12-30 PROCEDURE — 83735 ASSAY OF MAGNESIUM: CPT | Performed by: INTERNAL MEDICINE

## 2022-12-30 PROCEDURE — 84100 ASSAY OF PHOSPHORUS: CPT | Performed by: INTERNAL MEDICINE

## 2022-12-30 PROCEDURE — 36592 COLLECT BLOOD FROM PICC: CPT | Performed by: INTERNAL MEDICINE

## 2022-12-30 RX ORDER — MAGNESIUM SULFATE HEPTAHYDRATE 40 MG/ML
2 INJECTION, SOLUTION INTRAVENOUS ONCE
Status: COMPLETED | OUTPATIENT
Start: 2022-12-30 | End: 2022-12-30

## 2022-12-30 RX ADMIN — MAGNESIUM SULFATE IN WATER 2 G: 40 INJECTION, SOLUTION INTRAVENOUS at 09:23

## 2022-12-30 RX ADMIN — POTASSIUM CHLORIDE 20 MEQ: 40 SOLUTION ORAL at 08:21

## 2022-12-30 RX ADMIN — MAGNESIUM OXIDE TAB 400 MG (241.3 MG ELEMENTAL MG) 800 MG: 400 (241.3 MG) TAB at 08:20

## 2022-12-30 RX ADMIN — PRENATAL VIT W/ FE FUMARATE-FA TAB 27-0.8 MG 1 TABLET: 27-0.8 TAB at 08:20

## 2022-12-30 RX ADMIN — FUROSEMIDE 40 MG: 40 TABLET ORAL at 08:20

## 2022-12-30 RX ADMIN — SODIUM CHLORIDE, PRESERVATIVE FREE 5 ML: 5 INJECTION INTRAVENOUS at 18:39

## 2022-12-30 RX ADMIN — METOPROLOL TARTRATE 50 MG: 50 TABLET, FILM COATED ORAL at 03:55

## 2022-12-30 RX ADMIN — POTASSIUM CHLORIDE 20 MEQ: 40 SOLUTION ORAL at 19:38

## 2022-12-30 RX ADMIN — ENOXAPARIN SODIUM 120 MG: 120 INJECTION SUBCUTANEOUS at 08:20

## 2022-12-30 RX ADMIN — METOPROLOL TARTRATE 50 MG: 50 TABLET, FILM COATED ORAL at 08:20

## 2022-12-30 RX ADMIN — POTASSIUM CHLORIDE 20 MEQ: 40 SOLUTION ORAL at 14:14

## 2022-12-30 RX ADMIN — SODIUM CHLORIDE, PRESERVATIVE FREE 5 ML: 5 INJECTION INTRAVENOUS at 06:58

## 2022-12-30 RX ADMIN — Medication 25 MG: at 08:21

## 2022-12-30 RX ADMIN — METOPROLOL TARTRATE 75 MG: 25 TABLET, FILM COATED ORAL at 19:39

## 2022-12-30 RX ADMIN — METOPROLOL TARTRATE 75 MG: 25 TABLET, FILM COATED ORAL at 14:15

## 2022-12-30 RX ADMIN — MAGNESIUM OXIDE TAB 400 MG (241.3 MG ELEMENTAL MG) 800 MG: 400 (241.3 MG) TAB at 19:39

## 2022-12-30 RX ADMIN — ENOXAPARIN SODIUM 120 MG: 120 INJECTION SUBCUTANEOUS at 19:39

## 2022-12-30 RX ADMIN — POLYETHYLENE GLYCOL 3350 17 G: 17 POWDER, FOR SOLUTION ORAL at 08:21

## 2022-12-30 ASSESSMENT — ACTIVITIES OF DAILY LIVING (ADL)
ADLS_ACUITY_SCORE: 20

## 2022-12-30 NOTE — PLAN OF CARE
Goal Outcome Evaluation:  [id number]:    HX:32 year old female  with no significant past medical history, who presented as a transfer from [facility] to [facility] on 2022 for further management of newly diagnosed, acute decompensated systolic heart failure (LVEF 20%) and high risk delivery    Cardiac:SR 90s with frequent ectopy, PVC, PAC.   VS:VSS.  IV:DL PICC-HL  Tubes:NA  Neuro:A/Ox4  Resp:Denies shortness of breath. On RA.   GI/:Voiding-not saving urine, states had BM today.   Nutrition:Regular diet with good PO.   Endo:AM glucose 200, 1 hour .   Skin:Intact.  Activity:Up ad danis, states she has walked in halls.   Pain:Denies pain.  Social: present after 1000. NICU education via ipad at 1030. OB assessment at 1000.   Plan:/continue to monitor for signs of premature labor, pre-eclampsia. Allow patient to verbalize feelings and frustrations. Continue current cares and notify providers with questions and concerns.

## 2022-12-30 NOTE — CONSULTS
[facility]                Neonatology Antepartum Counseling Consult:  I was asked to provide antepartum counseling for Anjali Carmen at the request of Sea Szymanski MD secondary to pending  delivery. Ms. Anjali Carmen is currently 27 weeks/ 4 days gestation and has a history significant for:  Patient Active Problem List   Diagnosis     Acute decompensated heart failure (H)      Betamethasone was administered on -.     Per chart review and conversation with patient, current plan is to deliver around 34 weeks gestation, unless clinical course of mom or baby dictates delivery sooner.     Ms. Anjali Carmen, accompanied by her , was counseled via virtual visit through [employer] portal. At patient request, majority of consult focused on expected hospital course, potential risks, and outcomes associated with an infant born at 34 weeks gestation. If delivery is indicated before 34 weeks, the NICU team would be happy to provide additional consultation specific to gestational age at time of delivery.     The counseling included: morbidity, mortality, initial delivery room stabilization, respiratory course, lung development, infection, nutrition, growth and development, and long term outcomes.    I also explained the basic criteria for discharge: that the baby had to be free of apnea, able to maintain their body temperature, able to feed by bottle or breast well enough to attain an adequate pattern of weight gain and growth.  The patient had no remaining questions but was encouraged to contact the NICU via their caregivers should any arise.  Please feel free to call and thank you for involving the NICU team in the care of your patient.       Floor Time (min): 15  Face to Face Time (min): 45  Total Time (minutes): 60  More than 50% of my time was spent in direct, face to face, antepartum counseling with the above patient.    Sara Rodriguez PA-C  2022   Advanced Practice Providers  [facility]

## 2022-12-30 NOTE — PROGRESS NOTES
[facility]   Cardiology II Service / Advanced Heart Failure  Daily Progress Note      Patient: Anjali Carmen  MRN: [id number]  Admission Date: 2022  Hospital Day # 24    Assessment and Plan: Anjali Carmen is a 32 year old female  with no significant past medical history, who presented as a transfer from [facility] to [facility] on 2022 for further management of newly diagnosed, acute decompensated systolic heart failure (LVEF 20%) and high risk delivery.    [facility] available  at [phone]    Today's Plan:  -d/w MFM today, ok to increase Lopressor for NSVT/frequent ectopy as growth US this week was reassuring  -continue to monitor tele  -echo early next week   -recheck LMWH level early next week  -Mg replacement protocol goal >2, please use IV    # Acute on chronic systolic heart failure/HFrEF secondary to NICM (?familial)    # Moderately reduced RV function   # NSVT  # Frequent PVCs, PACs  Stage C. NYHA Class difficult to discern. Concern for familial dilated cardiomyopathy given history of cardiomyopathy in father at 30 years of age. Last pregnancy in , timeline not fitting in for PPCM. TTE  with thinned out LV wall and notable regional wall motion abnormalities. Coronary angiogram  with no significat lesions seen. No known history of pre-existing structural heart disease prior to this admission. GDMT and diuresis as outlined below. Traditional afterload reducing agents for poor LV function and low LVEF of 20-25% cannot be met due to need for fetoplacental circulation.  Will target /80 and MAP > 65. Repeat TTE  LVEF of 20-25%, RWM (details below), moderately reduced RV function, mild TR, no pericardial effusion.     Fluid status: euvolemic lasix 40 mg daily PO  ACEi/ARB/ARNI/afterload reduction: contraindicated in pregnancy  BB: increase metoprolol tartrate 75 mg q6hr, nursing: hold if HR<50 or sBP<90 and call Cards2  Aldosterone  antagonist: high adverse risk medication in pregnancy  SGLT2i: deferred  SCD prophylaxis: decision deferred during medication uptitration  Anticoagulation: risk of LV thrombus in the setting of low EF, regional WMA, and hypercoaguable state of pregnancy (OB guidelines recommend therapeutic AC for EF<35%); changed from heparin gtt to Lovenox 1 mg/kg q12hr on , LMWH Xa 4-6 hours after steady state was 0.98 - goal 0.6-1.0 U/mL, dose decreased per pharmacy to 120 mg q12hr  Other: for further workup, plan for cardiac MRI in the postpartum setting +/- genetic testing/counseling referral    # New onset possible paroxysmal atrial fibrillation  [facility] in [place], isolated episode, EKG unavailable for review. Currently in NSR at [facility] with frequent ectopy. [id number] score of 2 (sex, HF) - on AC as above for pregnancy and low EF.   - telemetry, metoprolol as above  - Lovenox as above     # High risk pregnancy  Gestational age 27w4d. . Betamethasone (BMZ) given - at OSH for fetal lung maturity. OB US for fetal growth done on 22 with normal fetal findings and no abnormalities. .   - Appreciate MFM management, closely following with daily checks; can be reached via their pager above  - growth US  reassuring for normal fetal growth  - NST BID  - Magnesium for neuroprotection if moving towards delivery prior to 32 weeks with 6g bolus followed by 2g/hr maintenance until delivery  - Routine indications for emergent delivery including maternal decompensation or fetal compromise  - S/p  section consent on admission  - At 28 weeks(~1/3/23): Tdap, repeat CBC, RPR, and type and screen  - per MFM, BP goal 130/80, MAP >65    - If blood pressures >160/110 for at least 15 minutes, call MFM for guidance of management, although typically will initiate treatment with 5-10 mg of IV hydralazine (preferred from cardiac standpoint) or 20 mg of IV labetalol    # Mild anemia, multifactorial  Due  "to pregnancy and HF. Hgb 10.2 on admission (MCV 79). Retic count 1.9%. Iron, ferritin, TIBC levels show AVINASH (say 6%). Hgh 9s-low 10s. IV iron 200 mg daily x5 (-). Hgb stable mid 10s.   - limit blood draws  - intermittent CBD    # Gestational Diabetes  - QID blood glucose checks (fasting, and 1 hour after each meal)   - goal blood glucose for AM fasting 95 mg/dL   - 1 hour postprandial check is under 140 mg/dL  - If after 2 weeks of monitoring, if >50% of blood glucose values are elevated, then we would typically initiate medication for treatment of gestational diabetes - Anjali prefers Metformin over insulin, A< BS<90 and BS almost all <150  - MFM following  -  monitoring to be determined by need for medications    # Nausea, improved  Due to pregnancy and likely taking PO medications on empty stomach  - vitB6 daily  - trigger avoidance, snack frequent snacks, alize  - encourage bland foods in AM prior to medications    FEN: 3g Na  PROPHY:  lovenox  LINES:  PICC  DISPO:  Pending delivery  CODE STATUS:  Full code    Geovanna Rubio, MONI, NP-C  Advanced Heart Failure/Cardiology II Service  Pager [phone] [facility] [id number]      Patient discussed with Dr. Chavarria.      30 minutes spent on the date of the encounter doing chart review, history and exam, documentation and further activities per the note    ================================================================    Subjective/24-Hr Events:   Last 24 hr care team notes reviewed. No overnight events. 10 beats NSVT overnight, some palpitations with this. BP stable. No new symptoms otherwise. Breathing comfortable.  Mukund is here visiting.     ROS:  4 point ROS including respiratory, CV, GI and  (other than that noted in the HPI) is negative.     Medications: Reviewed in EPIC.     Physical Exam:   /82 (BP Location: Right arm, Cuff Size: Adult Large)   Pulse 92   Temp 97.7  F (36.5  C) (Oral)   Resp 15   Ht 1.753 m (5' 9\")   Wt 131.5 kg " (290 lb)   SpO2 98%   BMI 42.83 kg/m      GENERAL: Appears comfortable, in no distress.  HEENT: Eye symmetrical, no discharge or icterus bilaterally. Mucous membranes moist and without lesions.  NECK: Supple, JVD not elevated but difficult to assess.   CV: RRR with ectopy, +S1S2,  murmur, rub, or gallop.   RESPIRATORY: Respirations regular, even, and unlabored. Lungs CTA throughout.    GI: Soft, gravid and non distended with normoactive bowel sounds present in all quadrants. No tenderness, rebound, guarding.   EXTREMITIES: No peripheral edema. 2+ bilateral pedal pulses.   NEUROLOGIC: Alert and oriented x 3. No focal deficits.   MUSCULOSKELETAL: No joint swelling or tenderness.   SKIN: No jaundice. No rashes or lesions.     Labs:  CMP  Recent Labs   Lab 12/30/22  0702 12/30/22  0625 12/29/22  0521 12/29/22  0505 12/28/22  1823 12/28/22  1401 12/28/22  0735 12/27/22  1405 12/27/22  0712 12/25/22  1317 12/25/22  0610   NA  --   --  133*  --   --   --   --   --  133*  --  136   POTASSIUM 4.3  --  4.4  4.4  --   --   --  4.3  --  4.3  --  4.5   CHLORIDE  --   --  105  --   --   --   --   --  104  --  105   CO2  --   --  15*  --   --   --   --   --  15*  --  17*   ANIONGAP  --   --  13  --   --   --   --   --  14  --  14   GLC  --  100* 89 89 133*   < >  --    < > 84   < > 85   BUN  --   --  11.9  --   --   --   --   --  8.2  --  12.0   CR  --   --  0.68  --   --   --   --   --  0.61  --  0.79   GFRESTIMATED  --   --  >90  --   --   --   --   --  >90  --  >90   AYDEN  --   --  9.9  --   --   --   --   --  9.7  --  10.0   MAG 1.7  --  1.7  1.7  --   --   --  1.6*  --  1.7   < > 1.8   PHOS 4.2  --  4.5  --   --   --  3.9  --  4.0  --   --    PROTTOTAL  --   --  7.1  --   --   --   --   --  7.6  --  7.7   ALBUMIN  --   --  3.3*  --   --   --   --   --  3.4*  --  3.5   BILITOTAL  --   --  0.3  --   --   --   --   --  0.3  --  0.3   ALKPHOS  --   --  94  --   --   --   --   --  99  --  102   AST  --   --  36*  --   --   --    --   --  35  --  34   ALT  --   --  20  --   --   --   --   --  18  --  21    < > = values in this interval not displayed.       CBC  Recent Labs   Lab 12/29/22  0521 12/27/22  0712 12/25/22  0610   WBC 9.8 11.6* 9.4   RBC 4.05 4.10 4.24   HGB 10.5* 10.4* 10.7*   HCT 32.9* 33.6* 34.5*   MCV 81 82 81   MCH 25.9* 25.4* 25.2*   MCHC 31.9 31.0* 31.0*   RDW 16.2* 15.6* 15.3*    250 248

## 2022-12-30 NOTE — PLAN OF CARE
D: Pt admitted 12/6 for acute decompensated heart failure and high risk delivery. Transfer from outside hospital.      I: Monitored vitals and assessed pt status. Performed care and interventions as charted.     A: Tele - SR with frequent multifocal PVCs/PACs. Pt states she experiences palpitations at times but only for a few seconds, particularly when lying on left side. Pt did have x1 episode of non-sustained VT - 10 beats. Pt did not endorse any symptoms. SBP high 90s. On RA.     No reported concerns.     Independent with care.  visited at .     P: Continue to monitor pt status and report changes to care team.

## 2022-12-31 LAB
ALBUMIN SERPL BCG-MCNC: 3.1 G/DL (ref 3.5–5.2)
ALP SERPL-CCNC: 91 U/L (ref 35–104)
ALT SERPL W P-5'-P-CCNC: 25 U/L (ref 10–35)
ANION GAP SERPL CALCULATED.3IONS-SCNC: 12 MMOL/L (ref 7–15)
AST SERPL W P-5'-P-CCNC: 37 U/L (ref 10–35)
BASOPHILS # BLD AUTO: 0 10E3/UL (ref 0–0.2)
BASOPHILS NFR BLD AUTO: 0 %
BILIRUB SERPL-MCNC: 0.3 MG/DL
BUN SERPL-MCNC: 9.9 MG/DL (ref 6–20)
CALCIUM SERPL-MCNC: 9 MG/DL (ref 8.6–10)
CHLORIDE SERPL-SCNC: 104 MMOL/L (ref 98–107)
CREAT SERPL-MCNC: 0.57 MG/DL (ref 0.51–0.95)
DEPRECATED HCO3 PLAS-SCNC: 17 MMOL/L (ref 22–29)
EOSINOPHIL # BLD AUTO: 0 10E3/UL (ref 0–0.7)
EOSINOPHIL NFR BLD AUTO: 0 %
ERYTHROCYTE [DISTWIDTH] IN BLOOD BY AUTOMATED COUNT: 16.7 % (ref 10–15)
GFR SERPL CREATININE-BSD FRML MDRD: >90 ML/MIN/1.73M2
GLUCOSE BLDC GLUCOMTR-MCNC: 109 MG/DL (ref 70–99)
GLUCOSE BLDC GLUCOMTR-MCNC: 117 MG/DL (ref 70–99)
GLUCOSE SERPL-MCNC: 101 MG/DL (ref 70–99)
HCT VFR BLD AUTO: 32.5 % (ref 35–47)
HGB BLD-MCNC: 9.9 G/DL (ref 11.7–15.7)
IMM GRANULOCYTES # BLD: 0.1 10E3/UL
IMM GRANULOCYTES NFR BLD: 1 %
LYMPHOCYTES # BLD AUTO: 1.9 10E3/UL (ref 0.8–5.3)
LYMPHOCYTES NFR BLD AUTO: 23 %
MAGNESIUM SERPL-MCNC: 1.8 MG/DL (ref 1.7–2.3)
MCH RBC QN AUTO: 26 PG (ref 26.5–33)
MCHC RBC AUTO-ENTMCNC: 30.5 G/DL (ref 31.5–36.5)
MCV RBC AUTO: 85 FL (ref 78–100)
MONOCYTES # BLD AUTO: 0.5 10E3/UL (ref 0–1.3)
MONOCYTES NFR BLD AUTO: 6 %
NEUTROPHILS # BLD AUTO: 5.8 10E3/UL (ref 1.6–8.3)
NEUTROPHILS NFR BLD AUTO: 70 %
NRBC # BLD AUTO: 0 10E3/UL
NRBC BLD AUTO-RTO: 0 /100
PHOSPHATE SERPL-MCNC: 3.8 MG/DL (ref 2.5–4.5)
PLATELET # BLD AUTO: 220 10E3/UL (ref 150–450)
POTASSIUM SERPL-SCNC: 4.1 MMOL/L (ref 3.4–5.3)
PROT SERPL-MCNC: 6.9 G/DL (ref 6.4–8.3)
RBC # BLD AUTO: 3.81 10E6/UL (ref 3.8–5.2)
SODIUM SERPL-SCNC: 133 MMOL/L (ref 136–145)
WBC # BLD AUTO: 8.4 10E3/UL (ref 4–11)

## 2022-12-31 PROCEDURE — 85025 COMPLETE CBC W/AUTO DIFF WBC: CPT

## 2022-12-31 PROCEDURE — 83735 ASSAY OF MAGNESIUM: CPT

## 2022-12-31 PROCEDURE — 250N000013 HC RX MED GY IP 250 OP 250 PS 637: Performed by: NURSE PRACTITIONER

## 2022-12-31 PROCEDURE — 99232 SBSQ HOSP IP/OBS MODERATE 35: CPT | Performed by: NURSE PRACTITIONER

## 2022-12-31 PROCEDURE — 59025 FETAL NON-STRESS TEST: CPT | Mod: 26 | Performed by: OBSTETRICS & GYNECOLOGY

## 2022-12-31 PROCEDURE — 250N000013 HC RX MED GY IP 250 OP 250 PS 637: Performed by: STUDENT IN AN ORGANIZED HEALTH CARE EDUCATION/TRAINING PROGRAM

## 2022-12-31 PROCEDURE — 250N000013 HC RX MED GY IP 250 OP 250 PS 637

## 2022-12-31 PROCEDURE — 250N000011 HC RX IP 250 OP 636: Performed by: STUDENT IN AN ORGANIZED HEALTH CARE EDUCATION/TRAINING PROGRAM

## 2022-12-31 PROCEDURE — 250N000011 HC RX IP 250 OP 636: Performed by: INTERNAL MEDICINE

## 2022-12-31 PROCEDURE — 250N000013 HC RX MED GY IP 250 OP 250 PS 637: Performed by: INTERNAL MEDICINE

## 2022-12-31 PROCEDURE — 214N000001 HC R&B CCU UMMC

## 2022-12-31 PROCEDURE — 84100 ASSAY OF PHOSPHORUS: CPT | Performed by: NURSE PRACTITIONER

## 2022-12-31 PROCEDURE — 80053 COMPREHEN METABOLIC PANEL: CPT

## 2022-12-31 PROCEDURE — 99232 SBSQ HOSP IP/OBS MODERATE 35: CPT | Mod: 25 | Performed by: OBSTETRICS & GYNECOLOGY

## 2022-12-31 RX ORDER — MAGNESIUM SULFATE HEPTAHYDRATE 40 MG/ML
2 INJECTION, SOLUTION INTRAVENOUS ONCE
Status: COMPLETED | OUTPATIENT
Start: 2022-12-31 | End: 2022-12-31

## 2022-12-31 RX ADMIN — METOPROLOL TARTRATE 75 MG: 25 TABLET, FILM COATED ORAL at 19:39

## 2022-12-31 RX ADMIN — ACETAMINOPHEN 650 MG: 325 TABLET, FILM COATED ORAL at 19:46

## 2022-12-31 RX ADMIN — ENOXAPARIN SODIUM 120 MG: 120 INJECTION SUBCUTANEOUS at 19:39

## 2022-12-31 RX ADMIN — MAGNESIUM OXIDE TAB 400 MG (241.3 MG ELEMENTAL MG) 800 MG: 400 (241.3 MG) TAB at 08:50

## 2022-12-31 RX ADMIN — SODIUM CHLORIDE, PRESERVATIVE FREE 10 ML: 5 INJECTION INTRAVENOUS at 18:40

## 2022-12-31 RX ADMIN — POTASSIUM CHLORIDE 20 MEQ: 40 SOLUTION ORAL at 19:39

## 2022-12-31 RX ADMIN — Medication 25 MG: at 08:50

## 2022-12-31 RX ADMIN — METOPROLOL TARTRATE 75 MG: 25 TABLET, FILM COATED ORAL at 08:49

## 2022-12-31 RX ADMIN — FUROSEMIDE 40 MG: 40 TABLET ORAL at 08:50

## 2022-12-31 RX ADMIN — METOPROLOL TARTRATE 75 MG: 25 TABLET, FILM COATED ORAL at 02:03

## 2022-12-31 RX ADMIN — POTASSIUM CHLORIDE 20 MEQ: 40 SOLUTION ORAL at 15:49

## 2022-12-31 RX ADMIN — METOPROLOL TARTRATE 75 MG: 25 TABLET, FILM COATED ORAL at 15:49

## 2022-12-31 RX ADMIN — MAGNESIUM SULFATE IN WATER 2 G: 40 INJECTION, SOLUTION INTRAVENOUS at 06:19

## 2022-12-31 RX ADMIN — MAGNESIUM OXIDE TAB 400 MG (241.3 MG ELEMENTAL MG) 800 MG: 400 (241.3 MG) TAB at 19:39

## 2022-12-31 RX ADMIN — POTASSIUM CHLORIDE 20 MEQ: 40 SOLUTION ORAL at 08:51

## 2022-12-31 RX ADMIN — ENOXAPARIN SODIUM 120 MG: 120 INJECTION SUBCUTANEOUS at 08:49

## 2022-12-31 RX ADMIN — SODIUM CHLORIDE, PRESERVATIVE FREE 5 ML: 5 INJECTION INTRAVENOUS at 05:19

## 2022-12-31 RX ADMIN — PRENATAL VIT W/ FE FUMARATE-FA TAB 27-0.8 MG 1 TABLET: 27-0.8 TAB at 08:49

## 2022-12-31 ASSESSMENT — ACTIVITIES OF DAILY LIVING (ADL)
ADLS_ACUITY_SCORE: 20

## 2022-12-31 NOTE — PLAN OF CARE
Transferred from [facility] in [place] on 2022 for further management of newly diagnosed, acute decompensated systolic heart failure (LVEF 20%) and high risk delivery. No significant past medical history. , currently at 27 weeks gestation.    Code status: FULL     Team: cards 2      Neuro: A&O4, calls appropriately  Cardiac/Tele:  SR in 90s w frequent PACs and PVCs  Respiratory: RA  GI/: urinating via toilet- not saving urine, LBM   Diet/Appetite: 3g Na, no caffiene  Endocrine: BG checks AM fasting + 1hr after meals  Skin: no new skin issues noted  LDAs: DL PICC- heparin locked  Activity: ind  Pain: denies  Replacements: K, Mag, Phos protocols ordered. Protocols ran this AM (), mag replaced IV.     Plan: Continue to monitor for signs of premature labor and preeclampsia. Continue POC and notify providers with updates.    Time cared for [phone]

## 2022-12-31 NOTE — PLAN OF CARE
"Shift: 1500- 1930    Neuro: A&Ox4. Calm, cooperative, pleasant. Spouse at bedside participating in care. Labile mood, states \"not happy to be starting my year of this way.\"  Cardiac: Telemetry- NS with frequent PVCs/PACs. VSS  Respiratory: RA, tolerating well.  GI/: Voiding spontaneously and w/o difficulty, BM this AM.  Diet/Appetite: Diet: Snacks/Supplements Adult: Ensure Max Protein (bariatric); Between Meals  Combination Diet 3 gm NA Diet; No Caffeine Diet  Snacks/Supplements Adult: Other; Please send chocolate-flavored oral supplements despite the no caffeine diet, ok per provider. Please do not send Ensure MAX protein MOCHA (which has 100 mg caffeine); With Meals      appetite good.  Activity: Up ad danis. Encouraged walking.  Pain: Denies.   Skin: No new deficits noted.  Lines: L double lumen PICC, hep locked    I/O this shift:  In: 480 [P.O.:480]  Out: -     P: Continue MFM BID, glucose checks BID through 1/3/23         Goal Outcome Evaluation:      Plan of Care Reviewed With: patient    Overall Patient Progress: improvingOverall Patient Progress: improving           "

## 2022-12-31 NOTE — PLAN OF CARE
Goal Outcome Evaluation:  [id number]:     HX:32 year old female  with no significant past medical history, who presented as a transfer from [facility] to [facility] on 2022 for further management of newly diagnosed, acute decompensated systolic heart failure (LVEF 20%) and high risk delivery     Cardiac:SR 90s with frequent ectopy, PVC, PAC.   VS:VSS.  IV:DL PICC-HL  Tubes:NA  Neuro:A/Ox4  Resp:Denies shortness of breath. On RA.   GI/:Voiding-not saving urine, states had BM today.   Nutrition:Regular diet with good PO.   Endo:glucose checks changed to BID  Skin:Intact.  Activity:Up ad danis, states she has walked in halls.   Pain:Denies pain.  Social:No family present. Has cell phone at bedside.  Plan:continue to monitor for signs of premature labor, pre-eclampsia. Allow patient to verbalize feelings and frustrations. Continue current cares and notify providers with questions and concerns.

## 2022-12-31 NOTE — PROGRESS NOTES
[facility]   Cardiology II Service / Advanced Heart Failure  Daily Progress Note  Date of Service: 2022      Patient: Anjali Carmen  MRN: [id number]  Admission Date: 2022  Hospital Day # 25    Assessment and Plan: Anjali Carmen is a 32 year old female  with no significant past medical history, who presented as a transfer from [facility] to [facility] on 2022 for further management of newly diagnosed, acute decompensated systolic heart failure (LVEF 20%) and high risk delivery.     MFM available  at [phone]    Acute on Chronic SCHF secondary to NICM, pregnancy related with questionable familial component. RV failure. Note family history of father with CM at age 30. TTE  with EF 20-25%, thinned out LV wall, and notable regional wall motion abnormalities. Coronary angiogram  with no significat le-sions seen.  Stage C, NYHA Class III  ACEi/ARB/ARNI: Contraindicated in pregnancy.   BB Metoprolol Tartrate 75 mg q6h. Nursing: hold if HR<50 or SBP<90 and call Cards2  Aldosterone antagonist contraindicated due to pregnancy  SCD prophylaxis GDMT  Fluid status euvolemic. Lasix 40 mg po daily   - Lovenox given high risk for LV thrombus in setting of antepartum, regional wall motion abnormalities, and low EF.   - Cardiac MRI and genetic testing postpartum recommended.     New Onset PAF. NSVT. Frequent PVC and PAC's. [facility] [place], isolated episode, EKG unavailable for review. Currently in NSR at [facility] with frequent ectopy. [id number] score of 2 (sex, HF) - on AC as above for pregnancy and low EF.   - Metoprolol Tartrate 75 mg q6h. Nursing: hold if HR<50 or sBP<90 and call Cards2  - Lovenox as above    High risk pregnancy  Gestational age 27w4d. . Betamethasone (BMZ) given - at OSH for fetal lung maturity. OB US for fetal growth done on 22 with normal fetal findings and no abnormalities. growth US  reassuring  for normal fetal growth  - Appreciate MFM management, closely following with daily checks; can be reached via their pager above  - NST BID  - Magnesium for neuroprotection if moving towards delivery prior to 32 weeks with 6g bolus followed by 2g/hr maintenance until delivery  - S/p  section consent on admission  - At 28 weeks(~1/3/23): Tdap, repeat CBC, RPR, and type and screen  - per MFM, BP goal 130/80, MAP >65                 - If blood pressures >160/110 for at least 15 minutes, call MFM for guidance of management, although typically will initiate treatment with 5-10 mg of IV hydralazine (preferred from cardiac standpoint) or 20 mg of IV labetalol    Gestational DM.   QID blood glucose checks (fasting, and 1 hour after each meal)                - goal blood glucose for AM fasting 95 mg/dL                - 1 hour postprandial check is under 140 mg/dL  - If after 2 weeks of monitoring, if >50% of blood glucose values are elevated, then we would typically initiate medication for treatment of gestational diabetes   - MFM following  -  monitoring to be determined by need for medications    Mild anemia, multifactorial  Due to pregnancy and HF. Hgb 10.2 on admission (MCV 79). Retic count 1.9%. Iron, ferritin, TIBC levels show AVINASH (say 6%). Hgh 9s-low 10s. IV iron 200 mg daily x5 (-). Hgb stable mid 10s.   - limit blood draws    FEN: 3 gram sodium diet   PROPHY:  Lovenox  LINES:  PICC  DISPO:  Pending delivery   CODE STATUS:  Full Code   ================================================================    Interval History/ROS: She denies fever, chills, chest pain, palpitations, cough, nausea, vomiting, diarrhea, and LE edema. She is tolerating oral intake.     Last 24 hr care team notes reviewed.   ROS:  4 point ROS including Respiratory, CV, GI and , other than that noted in the HPI, is negative.     Medications: Reviewed in EPIC.     Physical Exam:   /68 (BP Location: Right arm)    "Pulse 76   Temp 98.2  F (36.8  C) (Oral)   Resp 16   Ht 1.753 m (5' 9\")   Wt 132.1 kg (291 lb 3.6 oz)   SpO2 99%   BMI 43.01 kg/m    GENERAL: Appears alert and oriented times three.   HEENT: Eye symmetrical and free of discharge bilaterally. Mucous membranes moist and without lesions.  NECK: Supple and without lymphadenopathy. JVD mid neck, skewed in setting of pregnancy.   CV: RRR, S1S2 present without murmur, rub, or gallop.   RESPIRATORY: Respirations regular, even, and unlabored. Lungs CTA throughout.   GI: Soft, gravid, with normoactive bowel sounds present in all quadrants. No tenderness, rebound, guarding. No organomegaly.   EXTREMITIES: No peripheral edema. 2+ bilateral pedal pulses.   NEUROLOGIC: Alert and orientated x 3. CN II-XII grossly intact. No focal deficits.   MUSCULOSKELETAL: No joint swelling or tenderness.   SKIN: No jaundice. No rashes or lesions.     Data:  CMPRecent Labs   Lab 12/31/22  0626 12/31/22  0514 12/30/22  1938 12/30/22  1355 12/30/22  0702 12/30/22  0625 12/29/22  0521 12/28/22  1401 12/28/22  0735 12/27/22  1405 12/27/22  0712 12/25/22  1317 12/25/22  0610   NA  --  133*  --   --   --   --  133*  --   --   --  133*  --  136   POTASSIUM  --  4.1  --   --  4.3  --  4.4  4.4  --  4.3  --  4.3  --  4.5   CHLORIDE  --  104  --   --   --   --  105  --   --   --  104  --  105   CO2  --  17*  --   --   --   --  15*  --   --   --  15*  --  17*   ANIONGAP  --  12  --   --   --   --  13  --   --   --  14  --  14   * 101* 132* 104*  --    < > 89   < >  --    < > 84   < > 85   BUN  --  9.9  --   --   --   --  11.9  --   --   --  8.2  --  12.0   CR  --  0.57  --   --   --   --  0.68  --   --   --  0.61  --  0.79   GFRESTIMATED  --  >90  --   --   --   --  >90  --   --   --  >90  --  >90   AYDEN  --  9.0  --   --   --   --  9.9  --   --   --  9.7  --  10.0   MAG  --  1.8  --   --  1.7  --  1.7  1.7  --  1.6*  --  1.7   < > 1.8   PHOS  --  3.8  --   --  4.2  --  4.5  --  3.9  --  4.0  "  < >  --    PROTTOTAL  --  6.9  --   --   --   --  7.1  --   --   --  7.6  --  7.7   ALBUMIN  --  3.1*  --   --   --   --  3.3*  --   --   --  3.4*  --  3.5   BILITOTAL  --  0.3  --   --   --   --  0.3  --   --   --  0.3  --  0.3   ALKPHOS  --  91  --   --   --   --  94  --   --   --  99  --  102   AST  --  37*  --   --   --   --  36*  --   --   --  35  --  34   ALT  --  25  --   --   --   --  20  --   --   --  18  --  21    < > = values in this interval not displayed.     CBC  Recent Labs   Lab 12/31/22  0514 12/29/22  0521 12/27/22  0712 12/25/22  0610   WBC 8.4 9.8 11.6* 9.4   RBC 3.81 4.05 4.10 4.24   HGB 9.9* 10.5* 10.4* 10.7*   HCT 32.5* 32.9* 33.6* 34.5*   MCV 85 81 82 81   MCH 26.0* 25.9* 25.4* 25.2*   MCHC 30.5* 31.9 31.0* 31.0*   RDW 16.7* 16.2* 15.6* 15.3*    242 250 248     Time/Communication  I personally spent a total of 30 minutes. Of that 15 minutes was counseling/coordination of patient's care. Plan of care discussed with patient. See my note above for details. Patient discussed with Dr. Chavarria.      Stacie Pisano [facility]  12/31/2022

## 2022-12-31 NOTE — PROGRESS NOTES
MFM:  Not able to see patient in person today- will see her tomorrow. Called Anjali on the phone.  She is doing well from an obstetric standpoint.  +FM.  Happy to hear about baby's growth yesterday.  Wondering why she still needs such frequent BS checks.  Explained that she has GDM and even if they are normal with diet control, the BS may get higher and require treatment later in the pregnancy.     Elena Joshi, DO FACOG  Maternal Fetal Medicine Specialist

## 2023-01-01 LAB
GLUCOSE BLDC GLUCOMTR-MCNC: 105 MG/DL (ref 70–99)
GLUCOSE BLDC GLUCOMTR-MCNC: 120 MG/DL (ref 70–99)
HOLD SPECIMEN: NORMAL
LMWH PPP CHRO-ACNC: 0.66 IU/ML
MAGNESIUM SERPL-MCNC: 1.6 MG/DL (ref 1.7–2.3)
PHOSPHATE SERPL-MCNC: 3.6 MG/DL (ref 2.5–4.5)
POTASSIUM SERPL-SCNC: 4.2 MMOL/L (ref 3.4–5.3)

## 2023-01-01 PROCEDURE — 99233 SBSQ HOSP IP/OBS HIGH 50: CPT | Mod: GC | Performed by: INTERNAL MEDICINE

## 2023-01-01 PROCEDURE — 250N000011 HC RX IP 250 OP 636: Performed by: STUDENT IN AN ORGANIZED HEALTH CARE EDUCATION/TRAINING PROGRAM

## 2023-01-01 PROCEDURE — 250N000013 HC RX MED GY IP 250 OP 250 PS 637: Performed by: STUDENT IN AN ORGANIZED HEALTH CARE EDUCATION/TRAINING PROGRAM

## 2023-01-01 PROCEDURE — 84100 ASSAY OF PHOSPHORUS: CPT | Performed by: INTERNAL MEDICINE

## 2023-01-01 PROCEDURE — 250N000013 HC RX MED GY IP 250 OP 250 PS 637

## 2023-01-01 PROCEDURE — 250N000011 HC RX IP 250 OP 636: Performed by: INTERNAL MEDICINE

## 2023-01-01 PROCEDURE — 36415 COLL VENOUS BLD VENIPUNCTURE: CPT | Performed by: INTERNAL MEDICINE

## 2023-01-01 PROCEDURE — 85520 HEPARIN ASSAY: CPT | Performed by: INTERNAL MEDICINE

## 2023-01-01 PROCEDURE — 36592 COLLECT BLOOD FROM PICC: CPT | Performed by: INTERNAL MEDICINE

## 2023-01-01 PROCEDURE — 83735 ASSAY OF MAGNESIUM: CPT | Performed by: INTERNAL MEDICINE

## 2023-01-01 PROCEDURE — 214N000001 HC R&B CCU UMMC

## 2023-01-01 PROCEDURE — 250N000013 HC RX MED GY IP 250 OP 250 PS 637: Performed by: NURSE PRACTITIONER

## 2023-01-01 PROCEDURE — 84132 ASSAY OF SERUM POTASSIUM: CPT | Performed by: INTERNAL MEDICINE

## 2023-01-01 RX ORDER — SENNOSIDES 8.6 MG
8.6 TABLET ORAL 2 TIMES DAILY
Status: DISCONTINUED | OUTPATIENT
Start: 2023-01-01 | End: 2023-01-31 | Stop reason: HOSPADM

## 2023-01-01 RX ORDER — MAGNESIUM SULFATE HEPTAHYDRATE 40 MG/ML
2 INJECTION, SOLUTION INTRAVENOUS ONCE
Status: COMPLETED | OUTPATIENT
Start: 2023-01-01 | End: 2023-01-01

## 2023-01-01 RX ADMIN — SODIUM CHLORIDE, PRESERVATIVE FREE 5 ML: 5 INJECTION INTRAVENOUS at 07:18

## 2023-01-01 RX ADMIN — METOPROLOL TARTRATE 75 MG: 25 TABLET, FILM COATED ORAL at 02:58

## 2023-01-01 RX ADMIN — METOPROLOL TARTRATE 75 MG: 25 TABLET, FILM COATED ORAL at 19:36

## 2023-01-01 RX ADMIN — POTASSIUM CHLORIDE 20 MEQ: 40 SOLUTION ORAL at 10:48

## 2023-01-01 RX ADMIN — MAGNESIUM OXIDE TAB 400 MG (241.3 MG ELEMENTAL MG) 800 MG: 400 (241.3 MG) TAB at 19:37

## 2023-01-01 RX ADMIN — ENOXAPARIN SODIUM 120 MG: 120 INJECTION SUBCUTANEOUS at 09:47

## 2023-01-01 RX ADMIN — MAGNESIUM OXIDE TAB 400 MG (241.3 MG ELEMENTAL MG) 800 MG: 400 (241.3 MG) TAB at 10:50

## 2023-01-01 RX ADMIN — SODIUM CHLORIDE, PRESERVATIVE FREE 10 ML: 5 INJECTION INTRAVENOUS at 15:52

## 2023-01-01 RX ADMIN — POTASSIUM CHLORIDE 20 MEQ: 40 SOLUTION ORAL at 19:36

## 2023-01-01 RX ADMIN — PRENATAL VIT W/ FE FUMARATE-FA TAB 27-0.8 MG 1 TABLET: 27-0.8 TAB at 10:49

## 2023-01-01 RX ADMIN — POLYETHYLENE GLYCOL 3350 17 G: 17 POWDER, FOR SOLUTION ORAL at 10:48

## 2023-01-01 RX ADMIN — Medication 25 MG: at 10:49

## 2023-01-01 RX ADMIN — ENOXAPARIN SODIUM 120 MG: 120 INJECTION SUBCUTANEOUS at 19:36

## 2023-01-01 RX ADMIN — SODIUM CHLORIDE, PRESERVATIVE FREE 5 ML: 5 INJECTION INTRAVENOUS at 13:46

## 2023-01-01 RX ADMIN — POTASSIUM CHLORIDE 20 MEQ: 40 SOLUTION ORAL at 14:34

## 2023-01-01 RX ADMIN — METOPROLOL TARTRATE 75 MG: 25 TABLET, FILM COATED ORAL at 10:49

## 2023-01-01 RX ADMIN — MAGNESIUM SULFATE IN WATER 2 G: 40 INJECTION, SOLUTION INTRAVENOUS at 14:33

## 2023-01-01 RX ADMIN — FUROSEMIDE 40 MG: 40 TABLET ORAL at 10:48

## 2023-01-01 RX ADMIN — METOPROLOL TARTRATE 75 MG: 25 TABLET, FILM COATED ORAL at 14:34

## 2023-01-01 ASSESSMENT — ACTIVITIES OF DAILY LIVING (ADL)
ADLS_ACUITY_SCORE: 20

## 2023-01-01 NOTE — PHARMACY-ANTICOAGULATION SERVICE
Clinical Pharmacy Note- Enoxaparin Anti-Xa Evaluation for ADULT Patients    Date of Service 2023  Patient's  1990   Patient's age:  32 year old  Patient's Weight used for enoxaparin dosin.9 kg (293 lb 1.6 oz)  Patient's BMI: Body mass index is 43.28 kg/m .   Enoxaparin Indication: DVT/PE treatment (LV thrombus in the setting of cardiomyopathy and hypercoaguable state in pregnancy)  Goal LMWH anti-Xa level for ADULT patients: 0.5-1 IU/mL (1 mg/kg or 0.75 mg/kg BID dose)  Current Enoxaparin Regimen: 1 mg/kg subcutaneous Q 12 hours - service asked to not use the 0.75 mg/kg dosing strategy to start despite BMI >40    Creatinine for last 3 days:   Creatinine   Date Value Ref Range Status   2022 0.57 0.51 - 0.95 mg/dL Final   2022 0.68 0.51 - 0.95 mg/dL Final   2022 0.61 0.51 - 0.95 mg/dL Final      Current estimated CrCL:  Serum creatinine: 0.57 mg/dL 22 0514  Estimated creatinine clearance: 211.7 mL/min     Platelet count for last 3 days:   Platelet Count   Date Value Ref Range Status   2022 220 150 - 450 10e3/uL Final   2022 242 150 - 450 10e3/uL Final   2022 250 150 - 450 10e3/uL Final      Recent Enoxaparin anti-Xa Levels (past 3 days):   Recent Labs     23  1350   ALMWH 0.66     ASSESSMENT OF LEVELS AND CURRENT REGIMEN:   1) Enoxaparin anti-XA level was drawn 4 hours post 6th dose at steady state.    2) Current LMWH anti-Xa peak level is: AT GOAL    3) Renal Function:  Scr changed > 50% in last 72 hours? No  Urine Output: Good    4) Platelet Counts have been assessed and are: Stable    RECOMMENDATIONS:    1) Enoxaparin Regimen/Dose Plan: NO change  2) Recheck Enoxaparin anti-Xa levels: Other within 7 days given changing weight and coaguability with pregnancy    The pharmacist will continue to monitor and follow enoxaparin LMWH anti-Xa levels as needed.      Please contact pharmacy if enoxaparin needs to be held for a procedure or if goal  levels change.    Trupti Morgan, Pharm.D

## 2023-01-01 NOTE — PROGRESS NOTES
[facility]   Cardiology II Service / Advanced Heart Failure  Daily Progress Note  Date of Service: 2023      Patient: Anjali Carmen  MRN: [id number]  Admission Date: 2022  Hospital Day # 26    Assessment and Plan: Anjali Carmen is a 32 year old female  with no significant past medical history, who presented as a transfer from [facility] to [facility] on 2022 for further management of newly diagnosed, acute decompensated systolic heart failure (LVEF 20%) and high risk delivery.     MFM available  at [phone]    Changes Today:  - No changes; continue with same furosemide and metoprolol doses  - TTE likely on  or 1/3  - Recheck LMWH level on     Acute on Chronic SCHF secondary to NICM, pregnancy related with questionable familial component. RV failure. Note family history of father with CM at age 30. TTE  with EF 20-25%, thinned out LV wall, and notable regional wall motion abnormalities. Coronary angiogram  with no significat le-sions seen.  Stage C, NYHA Class III  ACEi/ARB/ARNI: Contraindicated in pregnancy.   BB Metoprolol Tartrate 75 mg q6h. Nursing: hold if HR<50 or SBP<90 and call Cards2  Aldosterone antagonist contraindicated due to pregnancy  SCD prophylaxis GDMT  Fluid status euvolemic. Lasix 40 mg po daily   - Lovenox given high risk for LV thrombus in setting of antepartum, regional wall motion abnormalities, and low EF.   - Cardiac MRI and genetic testing postpartum recommended.     New Onset PAF. NSVT. Frequent PVC and PAC's. [facility] in [place], isolated episode, EKG unavailable for review. Currently in NSR at [facility] with frequent ectopy. [id number] score of 2 (sex, HF) - on AC as above for pregnancy and low EF.   - Metoprolol Tartrate 75 mg q6h. Nursing: hold if HR<50 or sBP<90 and call Cards2  - Lovenox as above    High risk pregnancy  Gestational age 27w4d. . Betamethasone (BMZ) given - at OSH  for fetal lung maturity. OB US for fetal growth done on 22 with normal fetal findings and no abnormalities. growth US  reassuring for normal fetal growth  - Appreciate MFM management, closely following with daily checks; can be reached via their pager above  - NST BID  - Magnesium for neuroprotection if moving towards delivery prior to 32 weeks with 6g bolus followed by 2g/hr maintenance until delivery  - S/p  section consent on admission  - At 28 weeks(~1/3/23): Tdap, repeat CBC, RPR, and type and screen  - per MFM, BP goal 130/80, MAP >65                 - If blood pressures >160/110 for at least 15 minutes, call MFM for guidance of management, although typically will initiate treatment with 5-10 mg of IV hydralazine (preferred from cardiac standpoint) or 20 mg of IV labetalol    Gestational DM.   QID blood glucose checks (fasting, and 1 hour after each meal)                - goal blood glucose for AM fasting 95 mg/dL                - 1 hour postprandial check is under 140 mg/dL  - If after 2 weeks of monitoring, if >50% of blood glucose values are elevated, then we would typically initiate medication for treatment of gestational diabetes   - MFM following  -  monitoring to be determined by need for medications    Mild anemia, multifactorial  Due to pregnancy and HF. Hgb 10.2 on admission (MCV 79). Retic count 1.9%. Iron, ferritin, TIBC levels show AVINASH (say 6%). Hgh 9s-low 10s. IV iron 200 mg daily x5 (-). Hgb stable mid 10s.   - limit blood draws    FEN: 3 gram sodium diet   PROPHY:  Lovenox  LINES:  PICC  DISPO:  Pending delivery   CODE STATUS:  Full Code   ================================================================    Interval History/ROS:   No acute events overnight. Denies chest pain, palpitations, SOB, PENA, nausea, vomiting. Telemetry reviewed. PACs, PVCs, NSVT (max 11 beats).    Last 24 hr care team notes reviewed.   ROS:  4 point ROS including Respiratory, CV, GI  "and , other than that noted in the HPI, is negative.     Medications: Reviewed in EPIC.     Physical Exam:   BP 98/66 (BP Location: Right arm)   Pulse 90   Temp 98.4  F (36.9  C) (Oral)   Resp 16   Ht 1.753 m (5' 9\")   Wt 132.9 kg (293 lb 1.6 oz)   SpO2 100%   BMI 43.28 kg/m    GENERAL: Appears alert and oriented times three.   HEENT: Eye symmetrical and free of discharge bilaterally. Mucous membranes moist and without lesions.  NECK: Supple and without lymphadenopathy. JVD mid neck in setting of pregnancy.   CV: RRR, S1S2 present without murmur, rub, or gallop.   RESPIRATORY: Respirations regular, even, and unlabored. Lungs CTA throughout.   GI: Soft, gravid, with normoactive bowel sounds present in all quadrants. No tenderness, rebound, guarding. No organomegaly.   EXTREMITIES: No peripheral edema. 2+ bilateral pedal pulses.   NEUROLOGIC: Alert and orientated x 3. CN II-XII grossly intact. No focal deficits.   MUSCULOSKELETAL: No joint swelling or tenderness.   SKIN: No jaundice. No rashes or lesions.     Data:  CMP  Recent Labs   Lab 01/01/23  0722 01/01/23  0619 12/31/22  1327 12/31/22  0626 12/31/22  0514 12/30/22  1355 12/30/22  0702 12/30/22  0625 12/29/22  0521 12/27/22  1405 12/27/22  0712   NA  --   --   --   --  133*  --   --   --  133*  --  133*   POTASSIUM 4.2  --   --   --  4.1  --  4.3  --  4.4  4.4   < > 4.3   CHLORIDE  --   --   --   --  104  --   --   --  105  --  104   CO2  --   --   --   --  17*  --   --   --  15*  --  15*   ANIONGAP  --   --   --   --  12  --   --   --  13  --  14   GLC  --  105* 117* 109* 101*   < >  --    < > 89   < > 84   BUN  --   --   --   --  9.9  --   --   --  11.9  --  8.2   CR  --   --   --   --  0.57  --   --   --  0.68  --  0.61   GFRESTIMATED  --   --   --   --  >90  --   --   --  >90  --  >90   AYDEN  --   --   --   --  9.0  --   --   --  9.9  --  9.7   MAG 1.6*  --   --   --  1.8  --  1.7  --  1.7  1.7   < > 1.7   PHOS 3.6  --   --   --  3.8  --  4.2  --  " 4.5   < > 4.0   PROTTOTAL  --   --   --   --  6.9  --   --   --  7.1  --  7.6   ALBUMIN  --   --   --   --  3.1*  --   --   --  3.3*  --  3.4*   BILITOTAL  --   --   --   --  0.3  --   --   --  0.3  --  0.3   ALKPHOS  --   --   --   --  91  --   --   --  94  --  99   AST  --   --   --   --  37*  --   --   --  36*  --  35   ALT  --   --   --   --  25  --   --   --  20  --  18    < > = values in this interval not displayed.     CBC  Recent Labs   Lab 12/31/22  0514 12/29/22  0521 12/27/22  0712   WBC 8.4 9.8 11.6*   RBC 3.81 4.05 4.10   HGB 9.9* 10.5* 10.4*   HCT 32.5* 32.9* 33.6*   MCV 85 81 82   MCH 26.0* 25.9* 25.4*   MCHC 30.5* 31.9 31.0*   RDW 16.7* 16.2* 15.6*    242 250

## 2023-01-01 NOTE — PROGRESS NOTES
D: Transferred from [facility] in [place] on 2022 for further management of newly diagnosed, acute decompensated systolic heart failure (LVEF 20%) and high risk delivery. No significant past medical history. , currently at 27 weeks gestation.    I: Monitored vitals and assessed pt status.     A: A0x4. VSS, on RA. SR with frequent PVCs and PACs. Afebrile. Denies pain. DL PICC heparin locked. LBM  per pt report. Blood sugars 1 hr post lunch/dinner. 3g Na+ and no caffeine diet. Up ad danis, did not want to ambulate in hallway during shift.    P: Report changes to Cards 2/OB team.  Provided care from [phone]

## 2023-01-01 NOTE — PROGRESS NOTES
M progress note: Delayed entry due to patient care    Subjective:   Anjali feels well overall.  No obstetric complaints.  She is tolerating the increased metoprolol dose well (having ectopy and increased short runs of ventricular tachycardias).  Desires delivery as scheduled at 34 weeks (23) via repeat CD on [place].  Plans IUD post CD and also will use Depo provera.      Echo planned for early this upcoming week.     Objective:    Vitals:    22 0721 22 1100 22 1547 22 1937   BP: 103/68 109/58 112/65 98/67   BP Location: Right arm  Right arm Right arm   Pulse: 76 84 88 80   Resp: 16 18  18   Temp: 98.2  F (36.8  C) 98.1  F (36.7  C) 98  F (36.7  C) 98.2  F (36.8  C)   TempSrc: Oral Oral Oral Oral   SpO2: 99%  100% 98%   Weight:       Height:           Intake/Output Summary (Last 24 hours) at 20228  Last data filed at 2022 1900  Gross per 24 hour   Intake 1140 ml   Output --   Net 1140 ml          Gen: No acute distress, sitting in chair eating lunch  CV: RRR  Resp: non labored  Abd:gravid, non tender, obese  Ext; no edema    NST: , mod variability, +accels, small carrot shaped decels x 2  [place]: quiet     Assessment/Plan:   Anjali Carmen is a 32 year old  at 27w5d by 6w1d US admitted to for acute HFrEF, cardiac arrhythmia after transferring from [facility].      The patient has had an echocardiogram since her arrival concerning for ischemic cardiomyopathy, which was ruled out by a cardiac catheterization on  with no coronary disease. Continuing to medically manage dilated cardiomyopathy with metoprolol and diuresis per cardiology team with improvement in her symptoms. She will be monitored inpatient with continuous telemetry given her risk of arrhythmia. She is high risk for volume overload and worsening of symptoms in the third trimester and postpartum, but has been doing very well since her admission.     She is not interested in permanent  contraception. She would like an IUD placed at the time of delivery with depo-provera for additional coverage against pregnancy. She was told that she cannot have other pregnancies and she is not certain this is her wish. She was counseled that certainly a future pregnancy would need to be timed and planned ideally and that if her cardiac function recovers prior to another pregnancy, then pregnancy is not absolutely contraindicated. However, she would require MFM and cardiology care throughout any future pregnancy and close follow up in the interim.     We plan to visit twice a week given Anjali's stability, but continue twice daily NSTs. MFM will continue to follow along peripherally between visits and we are available  at [phone].     # Acute decompensated HFrEF  # Possible atrial fibrillation, non-sustained VT  - Metoprolol is safe in pregnancy with no dose restrictions. On 75mg q 6hr.   - Continuous telemetry given arrhythmia   - Consider repeat echocardiogram in order to guide anticoagulation and delivery timing/planning (last 4 weeks ago on admission). Echo planned for early next week.  - Continue therapeutic anticoagulation while EF < 35%, but from obstetric perspective okay for heparin drip or Lovenox  - Appreciate care by primary cardiology team     # Gestational Diabetes  - Goal blood glucose for AM fasting is 95 mg/dL  - 1 hour postprandial check is under 140 mg/dL and for 2 hour postprandial is 120 mg/d  - If after 2 weeks of monitoring, if >50% of blood glucose values are elevated, then we would typically initiate medication for treatment of gestational diabetes - Anjali prefers Metformin over insulin  -  monitoring to be determined by need for medications  - As the majority of BS have been within target goal we can perform fasting and 1 PP per day.      # Pregnancy  - Repeat growth US in 3 weeks from last US ()  - Non-stress test (NST) BID  - Betamethasone (BMZ) given -  at OSH for fetal lung maturity. Candidate for rescue BMZ.   - Magnesium for neuroprotection if moving towards delivery prior to 32 weeks with 6g bolus followed by 2g/hr maintenance until delivery  - Routine indications for emergent delivery including maternal decompensation or fetal compromise  - S/p  section consent on admission  - At 28 weeks: Tdap, as well as repeat CBC, RPR, and type and screen  - Plan IUD and depo-provera prior to discharge postpartum     Elena Joshi DO FACOG  Maternal Fetal Medicine Specialist  A total of 25 minutes of time was spent in counseling with patient and her .

## 2023-01-01 NOTE — PLAN OF CARE
Transferred from [facility] in [place] on 2022 for further management of newly diagnosed, acute decompensated systolic heart failure (LVEF 20%) and high risk delivery. No significant past medical history. , currently at 27 weeks gestation.     Code status: FULL     Team: cards 2        Neuro: A&O4, calls appropriately  Cardiac/Tele:  SR in 90s w frequent PACs and PVCs  Respiratory: RA  GI/: urinating via toilet- not saving urine, LBM   Diet/Appetite: 3g Na, no caffeine  Endocrine: BG checks AM fasting + 1hr after lunch OR dinner  Skin: no new skin issues noted  LDAs: DL PICC- heparin locked  Activity: ind  Pain: denies.  Replacements: K, Mag, Phos protocols ordered.     Plan: Continue to monitor for signs of premature labor and preeclampsia. Continue POC and notify providers with updates.     Time cared for [phone]

## 2023-01-01 NOTE — PROVIDER NOTIFICATION
01/01/23 1003   Fetal Assessment   Fetal Movement active   Fetal HR Assessment Method external US   Fetal HR (beats/min) 145   Fetal HR Baseline normal range   Fetal HR Variability moderate (amplitude range 6 to 25 bpm)   Fetal HR Accelerations increase 10 bpm above baseline lasting 10 seconds (less than 32 weeks gestation)   Fetal HR Decelerations absent  (small variable less than 15 seconds)     Denies LOF, vaginal bleeding and contractions. Reports +fetal movement. Offers no obstetrical complaints.

## 2023-01-02 ENCOUNTER — APPOINTMENT (OUTPATIENT)
Dept: CARDIOLOGY | Facility: CLINIC | Age: 33
End: 2023-01-02
Attending: NURSE PRACTITIONER
Payer: COMMERCIAL

## 2023-01-02 LAB
ALBUMIN SERPL BCG-MCNC: 3.3 G/DL (ref 3.5–5.2)
ALP SERPL-CCNC: 97 U/L (ref 35–104)
ALT SERPL W P-5'-P-CCNC: 32 U/L (ref 10–35)
ANION GAP SERPL CALCULATED.3IONS-SCNC: 13 MMOL/L (ref 7–15)
AST SERPL W P-5'-P-CCNC: 40 U/L (ref 10–35)
BASOPHILS # BLD AUTO: 0 10E3/UL (ref 0–0.2)
BASOPHILS NFR BLD AUTO: 0 %
BILIRUB SERPL-MCNC: 0.3 MG/DL
BUN SERPL-MCNC: 8 MG/DL (ref 6–20)
CALCIUM SERPL-MCNC: 9.7 MG/DL (ref 8.6–10)
CHLORIDE SERPL-SCNC: 104 MMOL/L (ref 98–107)
CREAT SERPL-MCNC: 0.62 MG/DL (ref 0.51–0.95)
DEPRECATED HCO3 PLAS-SCNC: 17 MMOL/L (ref 22–29)
EOSINOPHIL # BLD AUTO: 0 10E3/UL (ref 0–0.7)
EOSINOPHIL NFR BLD AUTO: 0 %
ERYTHROCYTE [DISTWIDTH] IN BLOOD BY AUTOMATED COUNT: 17.5 % (ref 10–15)
GFR SERPL CREATININE-BSD FRML MDRD: >90 ML/MIN/1.73M2
GLUCOSE BLDC GLUCOMTR-MCNC: 117 MG/DL (ref 70–99)
GLUCOSE BLDC GLUCOMTR-MCNC: 126 MG/DL (ref 70–99)
GLUCOSE SERPL-MCNC: 108 MG/DL (ref 70–99)
HCT VFR BLD AUTO: 32.4 % (ref 35–47)
HGB BLD-MCNC: 10.2 G/DL (ref 11.7–15.7)
IMM GRANULOCYTES # BLD: 0.1 10E3/UL
IMM GRANULOCYTES NFR BLD: 1 %
LVEF ECHO: NORMAL
LYMPHOCYTES # BLD AUTO: 2 10E3/UL (ref 0.8–5.3)
LYMPHOCYTES NFR BLD AUTO: 21 %
MAGNESIUM SERPL-MCNC: 1.8 MG/DL (ref 1.7–2.3)
MCH RBC QN AUTO: 26.3 PG (ref 26.5–33)
MCHC RBC AUTO-ENTMCNC: 31.5 G/DL (ref 31.5–36.5)
MCV RBC AUTO: 84 FL (ref 78–100)
MONOCYTES # BLD AUTO: 0.5 10E3/UL (ref 0–1.3)
MONOCYTES NFR BLD AUTO: 5 %
NEUTROPHILS # BLD AUTO: 6.9 10E3/UL (ref 1.6–8.3)
NEUTROPHILS NFR BLD AUTO: 73 %
NRBC # BLD AUTO: 0 10E3/UL
NRBC BLD AUTO-RTO: 0 /100
PHOSPHATE SERPL-MCNC: 3.5 MG/DL (ref 2.5–4.5)
PLATELET # BLD AUTO: 224 10E3/UL (ref 150–450)
POTASSIUM SERPL-SCNC: 4.2 MMOL/L (ref 3.4–5.3)
PROT SERPL-MCNC: 7.2 G/DL (ref 6.4–8.3)
RBC # BLD AUTO: 3.88 10E6/UL (ref 3.8–5.2)
SODIUM SERPL-SCNC: 134 MMOL/L (ref 136–145)
WBC # BLD AUTO: 9.5 10E3/UL (ref 4–11)

## 2023-01-02 PROCEDURE — 36592 COLLECT BLOOD FROM PICC: CPT

## 2023-01-02 PROCEDURE — 93325 DOPPLER ECHO COLOR FLOW MAPG: CPT

## 2023-01-02 PROCEDURE — 250N000013 HC RX MED GY IP 250 OP 250 PS 637: Performed by: NURSE PRACTITIONER

## 2023-01-02 PROCEDURE — 93308 TTE F-UP OR LMTD: CPT | Mod: 26 | Performed by: STUDENT IN AN ORGANIZED HEALTH CARE EDUCATION/TRAINING PROGRAM

## 2023-01-02 PROCEDURE — 999N000044 HC STATISTIC CVC DRESSING CHANGE

## 2023-01-02 PROCEDURE — 250N000013 HC RX MED GY IP 250 OP 250 PS 637: Performed by: INTERNAL MEDICINE

## 2023-01-02 PROCEDURE — 250N000011 HC RX IP 250 OP 636: Performed by: STUDENT IN AN ORGANIZED HEALTH CARE EDUCATION/TRAINING PROGRAM

## 2023-01-02 PROCEDURE — 93321 DOPPLER ECHO F-UP/LMTD STD: CPT

## 2023-01-02 PROCEDURE — 84100 ASSAY OF PHOSPHORUS: CPT | Performed by: INTERNAL MEDICINE

## 2023-01-02 PROCEDURE — 93325 DOPPLER ECHO COLOR FLOW MAPG: CPT | Mod: 26 | Performed by: STUDENT IN AN ORGANIZED HEALTH CARE EDUCATION/TRAINING PROGRAM

## 2023-01-02 PROCEDURE — 83735 ASSAY OF MAGNESIUM: CPT

## 2023-01-02 PROCEDURE — 80053 COMPREHEN METABOLIC PANEL: CPT

## 2023-01-02 PROCEDURE — 250N000013 HC RX MED GY IP 250 OP 250 PS 637: Performed by: STUDENT IN AN ORGANIZED HEALTH CARE EDUCATION/TRAINING PROGRAM

## 2023-01-02 PROCEDURE — 250N000011 HC RX IP 250 OP 636: Performed by: INTERNAL MEDICINE

## 2023-01-02 PROCEDURE — 85025 COMPLETE CBC W/AUTO DIFF WBC: CPT

## 2023-01-02 PROCEDURE — 99231 SBSQ HOSP IP/OBS SF/LOW 25: CPT | Performed by: NURSE PRACTITIONER

## 2023-01-02 PROCEDURE — 93321 DOPPLER ECHO F-UP/LMTD STD: CPT | Mod: 26 | Performed by: STUDENT IN AN ORGANIZED HEALTH CARE EDUCATION/TRAINING PROGRAM

## 2023-01-02 PROCEDURE — 214N000001 HC R&B CCU UMMC

## 2023-01-02 PROCEDURE — 82947 ASSAY GLUCOSE BLOOD QUANT: CPT

## 2023-01-02 PROCEDURE — 250N000013 HC RX MED GY IP 250 OP 250 PS 637

## 2023-01-02 RX ORDER — MAGNESIUM OXIDE 400 MG/1
400 TABLET ORAL EVERY 4 HOURS
Status: COMPLETED | OUTPATIENT
Start: 2023-01-02 | End: 2023-01-02

## 2023-01-02 RX ADMIN — POTASSIUM CHLORIDE 20 MEQ: 40 SOLUTION ORAL at 15:18

## 2023-01-02 RX ADMIN — POTASSIUM CHLORIDE 20 MEQ: 40 SOLUTION ORAL at 08:59

## 2023-01-02 RX ADMIN — METOPROLOL TARTRATE 75 MG: 25 TABLET, FILM COATED ORAL at 02:50

## 2023-01-02 RX ADMIN — MAGNESIUM OXIDE TAB 400 MG (241.3 MG ELEMENTAL MG) 400 MG: 400 (241.3 MG) TAB at 15:18

## 2023-01-02 RX ADMIN — SODIUM CHLORIDE, PRESERVATIVE FREE 5 ML: 5 INJECTION INTRAVENOUS at 15:18

## 2023-01-02 RX ADMIN — POTASSIUM CHLORIDE 20 MEQ: 40 SOLUTION ORAL at 19:29

## 2023-01-02 RX ADMIN — MAGNESIUM OXIDE TAB 400 MG (241.3 MG ELEMENTAL MG) 400 MG: 400 (241.3 MG) TAB at 19:32

## 2023-01-02 RX ADMIN — ENOXAPARIN SODIUM 120 MG: 120 INJECTION SUBCUTANEOUS at 08:59

## 2023-01-02 RX ADMIN — ENOXAPARIN SODIUM 120 MG: 120 INJECTION SUBCUTANEOUS at 19:28

## 2023-01-02 RX ADMIN — MAGNESIUM OXIDE TAB 400 MG (241.3 MG ELEMENTAL MG) 800 MG: 400 (241.3 MG) TAB at 09:00

## 2023-01-02 RX ADMIN — PRENATAL VIT W/ FE FUMARATE-FA TAB 27-0.8 MG 1 TABLET: 27-0.8 TAB at 09:00

## 2023-01-02 RX ADMIN — Medication 25 MG: at 09:00

## 2023-01-02 RX ADMIN — METOPROLOL TARTRATE 75 MG: 25 TABLET, FILM COATED ORAL at 09:00

## 2023-01-02 RX ADMIN — MAGNESIUM OXIDE TAB 400 MG (241.3 MG ELEMENTAL MG) 800 MG: 400 (241.3 MG) TAB at 19:33

## 2023-01-02 RX ADMIN — ACETAMINOPHEN 650 MG: 325 TABLET, FILM COATED ORAL at 19:43

## 2023-01-02 RX ADMIN — SODIUM CHLORIDE, PRESERVATIVE FREE 5 ML: 5 INJECTION INTRAVENOUS at 12:07

## 2023-01-02 RX ADMIN — METOPROLOL TARTRATE 75 MG: 25 TABLET, FILM COATED ORAL at 15:18

## 2023-01-02 RX ADMIN — METOPROLOL TARTRATE 75 MG: 25 TABLET, FILM COATED ORAL at 19:31

## 2023-01-02 RX ADMIN — FUROSEMIDE 40 MG: 40 TABLET ORAL at 09:00

## 2023-01-02 ASSESSMENT — ACTIVITIES OF DAILY LIVING (ADL)
ADLS_ACUITY_SCORE: 20

## 2023-01-02 NOTE — PROGRESS NOTES
[facility]   Cardiology II Service / Advanced Heart Failure  Daily Progress Note  Date of Service: 2023      Patient: Anjali Carmen  MRN: [id number]  Admission Date: 2022  Hospital Day # 27    Assessment and Plan: Anjali Carmen is a 32 year old female  with no significant past medical history, who presented as a transfer from [facility] to [facility] on 2022 for further management of newly diagnosed, acute decompensated systolic heart failure (LVEF 20%) and high risk delivery.     MFM available  at [phone]     Acute on Chronic SCHF secondary to NICM, pregnancy related with questionable familial component. RV failure. Note family history of father with CM at age 30. TTE  with EF 20-25%, thinned out LV wall, and notable regional wall motion abnormalities. Coronary angiogram  with no significat le-sions seen.  Stage C, NYHA Class III  ACEi/ARB/ARNI: Contraindicated in pregnancy.   BB Metoprolol Tartrate 75 mg q6h. Nursing: hold if HR<50 or SBP<90 and call Cards2  Aldosterone antagonist contraindicated due to pregnancy  SCD prophylaxis GDMT  Fluid status euvolemic. Lasix 40 mg po daily   - Lovenox given high risk for LV thrombus in setting of antepartum, regional wall motion abnormalities, and low EF.   - Cardiac MRI and genetic testing postpartum recommended.      New Onset PAF. NSVT. Frequent PVC and PAC's. [facility] [place], isolated episode, EKG unavailable for review. Currently in NSR at [facility] with frequent ectopy. [id number] score of 2 (sex, HF) - on AC as above for pregnancy and low EF.   - Metoprolol Tartrate 75 mg q6h. Nursing: hold if HR<50 or sBP<90 and call Cards2  - Lovenox as above     High risk pregnancy  Gestational age 27w4d. . Betamethasone (BMZ) given - at OSH for fetal lung maturity. OB US for fetal growth done on 22 with normal fetal findings and no abnormalities. growth US   reassuring for normal fetal growth  - Appreciate MFM management, closely following with daily checks; can be reached via their pager above  - NST BID  - Magnesium for neuroprotection if moving towards delivery prior to 32 weeks with 6g bolus followed by 2g/hr maintenance until delivery  - S/p  section consent on admission  - At 28 weeks(~1/3/23): Tdap, repeat CBC, RPR, and type and screen  - per MFM, BP goal 130/80, MAP >65                 - If blood pressures >160/110 for at least 15 minutes, call MFM for guidance of management, although typically will initiate treatment with 5-10 mg of IV hydralazine (preferred from cardiac standpoint) or 20 mg of IV labetalol     Gestational DM.   QID blood glucose checks (fasting, and 1 hour after each meal)                - goal blood glucose for AM fasting 95 mg/dL                - 1 hour postprandial check is under 140 mg/dL  - If after 2 weeks of monitoring, if >50% of blood glucose values are elevated, then we would typically initiate medication for treatment of gestational diabetes   - MFM following  -  monitoring to be determined by need for medications     Mild anemia, multifactorial  Due to pregnancy and HF. Hgb 10.2 on admission (MCV 79). Retic count 1.9%. Iron, ferritin, TIBC levels show AVINASH (say 6%). Hgh 9s-low 10s. IV iron 200 mg daily x5 (-). Hgb stable mid 10s.   - limit blood draws     FEN: 3 gram sodium diet   PROPHY:  Lovenox  LINES:  PICC  DISPO:  Pending delivery   CODE STATUS:  Full Code   ================================================================    Interval History/ROS: She noted palpitations while lying on her left side last HS, resolved this AM. She denies fever, chills, chest pain, SOB, cough, nausea, vomiting, diarrhea, and LE edema. She is tolerating oral intake.     Last 24 hr care team notes reviewed.   ROS:  4 point ROS including Respiratory, CV, GI and , other than that noted in the HPI, is negative.  "    Medications: Reviewed in EPIC.     Physical Exam:   BP 95/58 (BP Location: Right arm)   Pulse 83   Temp 97.3  F (36.3  C) (Oral)   Resp 18   Ht 1.753 m (5' 9\")   Wt 132.9 kg (293 lb 1.6 oz)   SpO2 99%   BMI 43.28 kg/m    GENERAL: Appears alert and oriented times three.   HEENT: Eye symmetrical and free of discharge bilaterally. Mucous membranes moist and without lesions.  NECK: Supple and without lymphadenopathy. JVD mid neck, skewed by pregnancy.   CV: Irregular, controlled. S1S2 present without murmur, rub, or gallop.   RESPIRATORY: Respirations regular, even, and unlabored. Lungs CTA throughout.   GI: Soft, gravid, and  with normoactive bowel sounds present in all quadrants. No tenderness, rebound, guarding. No organomegaly.   EXTREMITIES: No peripheral edema. 2+ bilateral pedal pulses.   NEUROLOGIC: Alert and orientated x 3. CN II-XII grossly intact. No focal deficits.   MUSCULOSKELETAL: No joint swelling or tenderness.   SKIN: No jaundice. No rashes or lesions.     Data:  CMPRecent Labs   Lab 01/02/23  0616 01/01/23  1841 01/01/23  0722 01/01/23  0619 12/31/22  1327 12/31/22  0626 12/31/22  0514 12/30/22  1355 12/30/22  0702 12/30/22  0625 12/29/22  0521 12/27/22  1405 12/27/22  0712   NA  --   --   --   --   --   --  133*  --   --   --  133*  --  133*   POTASSIUM  --   --  4.2  --   --   --  4.1  --  4.3  --  4.4  4.4   < > 4.3   CHLORIDE  --   --   --   --   --   --  104  --   --   --  105  --  104   CO2  --   --   --   --   --   --  17*  --   --   --  15*  --  15*   ANIONGAP  --   --   --   --   --   --  12  --   --   --  13  --  14   * 120*  --  105* 117*   < > 101*   < >  --    < > 89   < > 84   BUN  --   --   --   --   --   --  9.9  --   --   --  11.9  --  8.2   CR  --   --   --   --   --   --  0.57  --   --   --  0.68  --  0.61   GFRESTIMATED  --   --   --   --   --   --  >90  --   --   --  >90  --  >90   AYDEN  --   --   --   --   --   --  9.0  --   --   --  9.9  --  9.7   MAG  --   --  " 1.6*  --   --   --  1.8  --  1.7  --  1.7  1.7   < > 1.7   PHOS  --   --  3.6  --   --   --  3.8  --  4.2  --  4.5   < > 4.0   PROTTOTAL  --   --   --   --   --   --  6.9  --   --   --  7.1  --  7.6   ALBUMIN  --   --   --   --   --   --  3.1*  --   --   --  3.3*  --  3.4*   BILITOTAL  --   --   --   --   --   --  0.3  --   --   --  0.3  --  0.3   ALKPHOS  --   --   --   --   --   --  91  --   --   --  94  --  99   AST  --   --   --   --   --   --  37*  --   --   --  36*  --  35   ALT  --   --   --   --   --   --  25  --   --   --  20  --  18    < > = values in this interval not displayed.     CBC  Recent Labs   Lab 12/31/22  0514 12/29/22  0521 12/27/22  0712   WBC 8.4 9.8 11.6*   RBC 3.81 4.05 4.10   HGB 9.9* 10.5* 10.4*   HCT 32.5* 32.9* 33.6*   MCV 85 81 82   MCH 26.0* 25.9* 25.4*   MCHC 30.5* 31.9 31.0*   RDW 16.7* 16.2* 15.6*    242 250         Time/Communication  I personally spent a total of 30 minutes. Of that 15 minutes was counseling/coordination of patient's care. Plan of care discussed with patient. See my note above for details. Patient discussed with Dr. Chavarria.      Stacie Pisano [facility]  1/2/2023

## 2023-01-02 NOTE — PLAN OF CARE
D: Pt transferred from [facility] to [facility] on 12/6/2022 for further management of newly diagnosed, acute decompensated systolic heart failure (LVEF 20%) and high risk delivery. No significant PMH.     I: Monitored vitals and assessed pt status.   Tele: SR w/ freq PAC/PVC's, HR 80's  Mobility: Up ind      A: AOx4. VSS. Afebrile. Urinating adequately. Denies any pain. Mag replaced today.      P: Continue to monitor Pt status and report changes to Cards 2/OB.

## 2023-01-02 NOTE — PLAN OF CARE
Transferred from [facility] in [place] on 2022 for further management of newly diagnosed, acute decompensated systolic heart failure (LVEF 20%) and high risk delivery. No significant past medical history. , currently at 27 weeks gestation.     Code status: FULL     Team: cards 2        Neuro: A&O4, calls appropriately  Cardiac/Tele:  SR in 80s w frequent PACs and PVCs  Respiratory: RA  GI/: urinating via toilet- not saving urine, LBM   Diet/Appetite: 3g Na, no caffeine  Endocrine: BG checks AM fasting + 1hr after lunch OR dinner  Skin: no new skin issues noted  LDAs: DL PICC- heparin locked  Activity: ind  Pain: denies.  Replacements: K, Mag, Phos protocols ordered.     Plan: Continue to monitor for signs of premature labor and preeclampsia. Continue POC and notify providers with updates.     Time cared for [id number]

## 2023-01-03 LAB
ABO/RH(D): NORMAL
ANTIBODY SCREEN: NEGATIVE
GLUCOSE BLDC GLUCOMTR-MCNC: 91 MG/DL (ref 70–99)
MAGNESIUM SERPL-MCNC: 1.6 MG/DL (ref 1.7–2.3)
PHOSPHATE SERPL-MCNC: 4.1 MG/DL (ref 2.5–4.5)
POTASSIUM SERPL-SCNC: 4.1 MMOL/L (ref 3.4–5.3)
SPECIMEN EXPIRATION DATE: NORMAL
T PALLIDUM AB SER QL: NONREACTIVE

## 2023-01-03 PROCEDURE — 84100 ASSAY OF PHOSPHORUS: CPT | Performed by: INTERNAL MEDICINE

## 2023-01-03 PROCEDURE — 250N000013 HC RX MED GY IP 250 OP 250 PS 637: Performed by: NURSE PRACTITIONER

## 2023-01-03 PROCEDURE — 36592 COLLECT BLOOD FROM PICC: CPT | Performed by: NURSE PRACTITIONER

## 2023-01-03 PROCEDURE — 59025 FETAL NON-STRESS TEST: CPT | Mod: 26 | Performed by: OBSTETRICS & GYNECOLOGY

## 2023-01-03 PROCEDURE — 99231 SBSQ HOSP IP/OBS SF/LOW 25: CPT | Performed by: PHYSICIAN ASSISTANT

## 2023-01-03 PROCEDURE — 90715 TDAP VACCINE 7 YRS/> IM: CPT | Performed by: STUDENT IN AN ORGANIZED HEALTH CARE EDUCATION/TRAINING PROGRAM

## 2023-01-03 PROCEDURE — 83735 ASSAY OF MAGNESIUM: CPT | Performed by: INTERNAL MEDICINE

## 2023-01-03 PROCEDURE — 250N000013 HC RX MED GY IP 250 OP 250 PS 637: Performed by: STUDENT IN AN ORGANIZED HEALTH CARE EDUCATION/TRAINING PROGRAM

## 2023-01-03 PROCEDURE — 86850 RBC ANTIBODY SCREEN: CPT | Performed by: NURSE PRACTITIONER

## 2023-01-03 PROCEDURE — 250N000013 HC RX MED GY IP 250 OP 250 PS 637: Performed by: INTERNAL MEDICINE

## 2023-01-03 PROCEDURE — 250N000013 HC RX MED GY IP 250 OP 250 PS 637

## 2023-01-03 PROCEDURE — 36415 COLL VENOUS BLD VENIPUNCTURE: CPT | Performed by: INTERNAL MEDICINE

## 2023-01-03 PROCEDURE — 250N000011 HC RX IP 250 OP 636: Performed by: INTERNAL MEDICINE

## 2023-01-03 PROCEDURE — 250N000011 HC RX IP 250 OP 636: Performed by: STUDENT IN AN ORGANIZED HEALTH CARE EDUCATION/TRAINING PROGRAM

## 2023-01-03 PROCEDURE — 86901 BLOOD TYPING SEROLOGIC RH(D): CPT | Performed by: NURSE PRACTITIONER

## 2023-01-03 PROCEDURE — 214N000001 HC R&B CCU UMMC

## 2023-01-03 PROCEDURE — 250N000011 HC RX IP 250 OP 636: Performed by: PHYSICIAN ASSISTANT

## 2023-01-03 PROCEDURE — 99231 SBSQ HOSP IP/OBS SF/LOW 25: CPT | Mod: 25 | Performed by: OBSTETRICS & GYNECOLOGY

## 2023-01-03 PROCEDURE — 84132 ASSAY OF SERUM POTASSIUM: CPT | Performed by: INTERNAL MEDICINE

## 2023-01-03 PROCEDURE — 86780 TREPONEMA PALLIDUM: CPT | Performed by: NURSE PRACTITIONER

## 2023-01-03 PROCEDURE — 90471 IMMUNIZATION ADMIN: CPT | Performed by: STUDENT IN AN ORGANIZED HEALTH CARE EDUCATION/TRAINING PROGRAM

## 2023-01-03 RX ORDER — MAGNESIUM SULFATE HEPTAHYDRATE 40 MG/ML
2 INJECTION, SOLUTION INTRAVENOUS ONCE
Status: COMPLETED | OUTPATIENT
Start: 2023-01-03 | End: 2023-01-03

## 2023-01-03 RX ORDER — MAGNESIUM OXIDE 400 MG/1
400 TABLET ORAL EVERY 4 HOURS
Status: COMPLETED | OUTPATIENT
Start: 2023-01-03 | End: 2023-01-03

## 2023-01-03 RX ADMIN — MAGNESIUM OXIDE TAB 400 MG (241.3 MG ELEMENTAL MG) 400 MG: 400 (241.3 MG) TAB at 15:05

## 2023-01-03 RX ADMIN — ENOXAPARIN SODIUM 120 MG: 120 INJECTION SUBCUTANEOUS at 08:38

## 2023-01-03 RX ADMIN — MAGNESIUM SULFATE IN WATER 2 G: 40 INJECTION, SOLUTION INTRAVENOUS at 10:58

## 2023-01-03 RX ADMIN — MAGNESIUM OXIDE TAB 400 MG (241.3 MG ELEMENTAL MG) 800 MG: 400 (241.3 MG) TAB at 20:13

## 2023-01-03 RX ADMIN — MAGNESIUM OXIDE TAB 400 MG (241.3 MG ELEMENTAL MG) 800 MG: 400 (241.3 MG) TAB at 08:39

## 2023-01-03 RX ADMIN — PRENATAL VIT W/ FE FUMARATE-FA TAB 27-0.8 MG 1 TABLET: 27-0.8 TAB at 08:38

## 2023-01-03 RX ADMIN — POTASSIUM CHLORIDE 20 MEQ: 40 SOLUTION ORAL at 20:13

## 2023-01-03 RX ADMIN — POTASSIUM CHLORIDE 20 MEQ: 40 SOLUTION ORAL at 08:40

## 2023-01-03 RX ADMIN — METOPROLOL TARTRATE 75 MG: 25 TABLET, FILM COATED ORAL at 08:38

## 2023-01-03 RX ADMIN — METOPROLOL TARTRATE 75 MG: 25 TABLET, FILM COATED ORAL at 02:51

## 2023-01-03 RX ADMIN — METOPROLOL TARTRATE 75 MG: 25 TABLET, FILM COATED ORAL at 20:13

## 2023-01-03 RX ADMIN — Medication 25 MG: at 08:38

## 2023-01-03 RX ADMIN — ACETAMINOPHEN 650 MG: 325 TABLET, FILM COATED ORAL at 20:13

## 2023-01-03 RX ADMIN — FUROSEMIDE 40 MG: 40 TABLET ORAL at 08:38

## 2023-01-03 RX ADMIN — MAGNESIUM OXIDE TAB 400 MG (241.3 MG ELEMENTAL MG) 400 MG: 400 (241.3 MG) TAB at 08:43

## 2023-01-03 RX ADMIN — SODIUM CHLORIDE, PRESERVATIVE FREE 5 ML: 5 INJECTION INTRAVENOUS at 15:04

## 2023-01-03 RX ADMIN — SODIUM CHLORIDE, PRESERVATIVE FREE 5 ML: 5 INJECTION INTRAVENOUS at 07:05

## 2023-01-03 RX ADMIN — ENOXAPARIN SODIUM 120 MG: 120 INJECTION SUBCUTANEOUS at 20:13

## 2023-01-03 RX ADMIN — POTASSIUM CHLORIDE 20 MEQ: 40 SOLUTION ORAL at 15:04

## 2023-01-03 RX ADMIN — CLOSTRIDIUM TETANI TOXOID ANTIGEN (FORMALDEHYDE INACTIVATED), CORYNEBACTERIUM DIPHTHERIAE TOXOID ANTIGEN (FORMALDEHYDE INACTIVATED), BORDETELLA PERTUSSIS TOXOID ANTIGEN (GLUTARALDEHYDE INACTIVATED), BORDETELLA PERTUSSIS FILAMENTOUS HEMAGGLUTININ ANTIGEN (FORMALDEHYDE INACTIVATED), BORDETELLA PERTUSSIS PERTACTIN ANTIGEN, AND BORDETELLA PERTUSSIS FIMBRIAE 2/3 ANTIGEN 0.5 ML: 5; 2; 2.5; 5; 3; 5 INJECTION, SUSPENSION INTRAMUSCULAR at 20:17

## 2023-01-03 RX ADMIN — METOPROLOL TARTRATE 75 MG: 25 TABLET, FILM COATED ORAL at 15:03

## 2023-01-03 ASSESSMENT — ACTIVITIES OF DAILY LIVING (ADL)
ADLS_ACUITY_SCORE: 20

## 2023-01-03 NOTE — PLAN OF CARE
Transferred from [facility] in [place] on 2022 for further management of newly diagnosed, acute decompensated systolic heart failure (LVEF 20%) and high risk delivery. No significant past medical history. , currently at 27 weeks gestation.     Code status: FULL     Team: cards 2        Neuro: A&O4, calls appropriately  Cardiac/Tele:  SR in 80s w frequent PACs and PVCs  Respiratory: RA  GI/: urinating via toilet- not saving urine, LBM 1/2  Diet/Appetite: 3g Na, no caffeine  Skin: no new skin issues noted  LDAs: DL PICC- heparin locked  Activity: ind  Pain: c/o hip pain r/t pregnancy, relieved with PRN tylenol  Replacements: K, Mag, Phos protocols ordered.     Plan: Continue to monitor for signs of premature labor and preeclampsia. Continue POC and notify providers with updates.     Time cared for [id number]

## 2023-01-03 NOTE — PROGRESS NOTES
[facility]   Cardiology II Service / Advanced Heart Failure  Daily Progress Note      Patient: Anjali Carmen  MRN: [id number]  Admission Date: 2022  Hospital Day # 28    Assessment and Plan: Anjali Carmen is a 32 year old female  with no significant past medical history, who presented as a transfer from [facility] to [facility] on 2022 for further management of newly diagnosed, acute decompensated systolic heart failure (LVEF 20% now 30-35%) and high risk delivery.     MFM available  at [phone]     Acute on Chronic SCHF secondary to NICM, pregnancy related with questionable familial component. RV failure. Note family history of father with CM at age 30. TTE  with EF 20-25%, thinned out LV wall, and notable regional wall motion abnormalities. Coronary angiogram  with no significat lesions seen. EF is improved slightly on 23 ECHO to 30-35%.  Stage C, NYHA Class III  ACEi/ARB/ARNI: Contraindicated in pregnancy.   BB Metoprolol Tartrate 75 mg q6h. Nursing: hold if HR<50 or SBP<90 and call Cards2  Aldosterone antagonist contraindicated due to pregnancy  SCD prophylaxis GDMT  Fluid status euvolemic. Lasix 40 mg po daily   - Lovenox given high risk for LV thrombus in setting of antepartum, regional wall motion abnormalities, and low EF.   - Cardiac MRI and genetic testing postpartum recommended.      New Onset PAF. NSVT. Frequent PVC and PAC's.  [id number] score of 2 (sex, HF) - on AC as above for pregnancy and low EF.   [facility] [place], isolated episode, EKG unavailable for review. Currently in NSR at [facility] with frequent ectopy. Having 7-17 PVCs per minute, 3 episodes of NSVT and SVT In the last 25 hours, 5-10 beats.  - Maintain mag >2.4 and K >4.0  - Metoprolol Tartrate 75 mg q6h. Nursing: hold if HR<50 or sBP<90 and call Cards2  - Lovenox as above     High risk pregnancy  Gestational age 28w1d. . Betamethasone (BMZ) given  - at OSH for fetal lung maturity. OB US for fetal growth done on 22 with normal fetal findings and no abnormalities. growth US  reassuring for normal fetal growth  - Appreciate MFM management, closely following with daily checks; can be reached via their pager above  - NST BID  - Magnesium for neuroprotection if moving towards delivery prior to 32 weeks with 6g bolus followed by 2g/hr maintenance until delivery  - S/p  section consent on admission  - At 28 weeks(~1/3/23): Tdap, repeat CBC, RPR, and type and screen  - per MFM, BP goal 130/80, MAP >65                 - If blood pressures >160/110 for at least 15 minutes, call MFM for guidance of management, although typically will initiate treatment with 5-10 mg of IV hydralazine (preferred from cardiac standpoint) or 20 mg of IV labetalol     Gestational DM.   QID blood glucose checks (fasting, and 1 hour after each meal)                - goal blood glucose for AM fasting 95 mg/dL                - 1 hour postprandial check is under 140 mg/dL  - If after 2 weeks of monitoring, if >50% of blood glucose values are elevated, then we would typically initiate medication for treatment of gestational diabetes   - MFM following  -  monitoring to be determined by need for medications     Mild anemia, multifactorial  Due to pregnancy and HF. Hgb 10.2 on admission (MCV 79). Retic count 1.9%. Iron, ferritin, TIBC levels show AVINASH (say 6%). Hgh 9s-low 10s. IV iron 200 mg daily x5 (-). Hgb stable mid 10s.   - limit blood draws     FEN: 3 gram sodium diet   PROPHY:  Lovenox  LINES:  PICC  DISPO:  Pending delivery   CODE STATUS:  Full Code   ================================================================    Interval History/ROS: Last night she had her first night of not feeling palpitations. She denies fever, chills, chest pain, SOB, cough, nausea, vomiting, diarrhea, and LE edema. Does not feel like she is retaining any fluid. She is  "tolerating oral intake.     Last 24 hr care team notes reviewed.   ROS:  4 point ROS including Respiratory, CV, GI and , other than that noted in the HPI, is negative.     Medications: Reviewed in EPIC.     Physical Exam:   /62 (BP Location: Right arm)   Pulse 86   Temp 97.7  F (36.5  C) (Oral)   Resp 16   Ht 1.753 m (5' 9\")   Wt 133.4 kg (294 lb 3.2 oz)   SpO2 97%   BMI 43.45 kg/m    GENERAL: Appears alert and interacting appropriately.  HEENT: Eye symmetrical and free of discharge bilaterally.  NECK: Supple and without lymphadenopathy. JVD mid neck, skewed by pregnancy.   CV: Irregular, controlled. S1S2 present without murmur, rub, or gallop.   RESPIRATORY: Respirations regular, even, and unlabored. Lungs CTA throughout.   GI: Soft, gravid, and  with normoactive bowel sounds present in all quadrants. No tenderness, rebound, guarding. No organomegaly.   EXTREMITIES: No peripheral edema. All extremities are warm and well perfused.  NEUROLOGIC: Alert and interacting appropriately. No focal deficits.   MUSCULOSKELETAL: No joint swelling or tenderness.   SKIN: No jaundice. No rashes or lesions.     Data:  CMP  Recent Labs   Lab 01/03/23  0710 01/03/23  0621 01/02/23  1352 01/02/23  1212 01/02/23  0616 01/01/23  1841 01/01/23  0722 12/31/22  0626 12/31/22  0514 12/30/22  0625 12/29/22  0521   NA  --   --   --  134*  --   --   --   --  133*  --  133*   POTASSIUM 4.1  --   --  4.2  --   --  4.2  --  4.1   < > 4.4  4.4   CHLORIDE  --   --   --  104  --   --   --   --  104  --  105   CO2  --   --   --  17*  --   --   --   --  17*  --  15*   ANIONGAP  --   --   --  13  --   --   --   --  12  --  13   GLC  --  91 117* 108* 126*   < >  --    < > 101*   < > 89   BUN  --   --   --  8.0  --   --   --   --  9.9  --  11.9   CR  --   --   --  0.62  --   --   --   --  0.57  --  0.68   GFRESTIMATED  --   --   --  >90  --   --   --   --  >90  --  >90   AYDEN  --   --   --  9.7  --   --   --   --  9.0  --  9.9   MAG 1.6*  " --   --  1.8  --   --  1.6*  --  1.8   < > 1.7  1.7   PHOS 4.1  --   --  3.5  --   --  3.6  --  3.8   < > 4.5   PROTTOTAL  --   --   --  7.2  --   --   --   --  6.9  --  7.1   ALBUMIN  --   --   --  3.3*  --   --   --   --  3.1*  --  3.3*   BILITOTAL  --   --   --  0.3  --   --   --   --  0.3  --  0.3   ALKPHOS  --   --   --  97  --   --   --   --  91  --  94   AST  --   --   --  40*  --   --   --   --  37*  --  36*   ALT  --   --   --  32  --   --   --   --  25  --  20    < > = values in this interval not displayed.     CBC  Recent Labs   Lab 01/02/23  1212 12/31/22  0514 12/29/22  0521   WBC 9.5 8.4 9.8   RBC 3.88 3.81 4.05   HGB 10.2* 9.9* 10.5*   HCT 32.4* 32.5* 32.9*   MCV 84 85 81   MCH 26.3* 26.0* 25.9*   MCHC 31.5 30.5* 31.9   RDW 17.5* 16.7* 16.2*    220 242         Time/Communication  I personally spent a total of 30 minutes. Of that 15 minutes was counseling/coordination of patient's care. Plan of care discussed with patient. See my note above for details. Patient discussed with Dr. Chavarria.      Nita Pedraza PA-C

## 2023-01-03 NOTE — PROGRESS NOTES
Maternal-Fetal Medicine Progress Note:    S: Patient overall doing well and has no concerns at this time. Endorses regular fetal movement; denies vaginal bleeding, leakage of fluid, or contractions. Denies chest pain, dyspnea, palpitations. Glucose values have been checked in the morning and they are true fasting values.     O:  Vitals:    01/03/23 0619 01/03/23 0810 01/03/23 1123 01/03/23 1522   BP:  114/85 108/62 111/65   BP Location:  Right arm Right arm Right arm   Pulse:   86 87   Resp:  18 16 16   Temp:  98.3  F (36.8  C) 97.7  F (36.5  C) 98.1  F (36.7  C)   TempSrc:  Oral Oral Oral   SpO2:   97% 99%   Weight: 133.4 kg (294 lb 3.2 oz)      Height:         Gen Appear: NAD  CV: RRR  Pulm: CTAB  Abdomen: gravid, soft, non-tender to palpation  Extrem: Trace bilateral LE edema, no calf tenderness    Echo 1/2/2023:  Interpretation Summary  Left ventricular function is decreased. The ejection fraction is 30-35%  (moderately reduced).  There is wall thinning and akinesis of the basal-mid inferior, basal-mid  inferoseptal and basal inferolateral segments.  Global right ventricular function is mildly to moderately reduced.  Mild to moderate tricuspid insufficiency is present.  Right ventricular systolic pressure is 45mmHg above the right atrial pressure.  Pulmonary hypertension is present.  IVC diameter <2.1 cm collapsing >50% with sniff suggests a normal RA pressure  of 3 mmHg.  No pericardial effusion is present.     This study was compared with the study from 12/6/2022. The left ventricular  function has improved.  ______________________________________________________________________________  Left Ventricle  Mild left ventricular dilation is present. Mild concentric wall thickening  consistent with left ventricular hypertrophy is present. Left ventricular  function is decreased. The ejection fraction is 30-35% (moderately reduced).  There is wall thinning and akinesis of the basal-mid inferior,  basal-mid  inferoseptal and basal inferolateral segments.     Right Ventricle  The right ventricle is normal size. Global right ventricular function is  mildly to moderately reduced.     Mitral Valve  The mitral valve is normal. Trace mitral insufficiency is present.     Aortic Valve  The valve leaflets are not well visualized. On Doppler interrogation, there is  no significant stenosis or regurgitation.     Tricuspid Valve  The tricuspid valve is normal. Mild to moderate tricuspid insufficiency is  present. Right ventricular systolic pressure is 45mmHg above the right atrial  pressure. Pulmonary hypertension is present.     Pulmonic Valve  The pulmonic valve is normal. Trace pulmonic insufficiency is present.     Vessels  The aorta root is normal. IVC diameter <2.1 cm collapsing >50% with sniff  suggests a normal RA pressure of 3 mmHg.     Pericardium  No pericardial effusion is present.     Compared to Previous Study  This study was compared with the study from 2022 . The left ventricular  function has improved.   _____________________________________________________________________________  MMode/2D Measurements & Calculations  IVSd: 1.2 cm  LVIDd: 6.2 cm  LVIDs: 5.5 cm  LVPWd: 1.1 cm  FS: 10.3 %  LV mass(C)d: 318.0 grams  LV mass(C)dI: 130.9 grams/m2     RWT: 0.36     Doppler Measurements & Calculations  PA acc time: 0.10 sec  TR max radha: 336.9 cm/sec  TR max P.4 mmHg    Treponema Antibody: non-reactive    Fetal heart monitoring:  Baseline 150, moderate variability, positive accels, no decels  [place]: none    A/P: 32 year old  at 28w1d admitted for acute HFrEF during pregnancy. She had an echocardiogram yesterday that showed EF 30-35%. She is currently on Lasix and metoprolol.    Acute decompensated HFrEF  - Patient is asymptomatic at this time and appears euvolemic on exam. She had a repeat echo yesterday and EF improved at 30-35%  - She is currently on metoprolol 75 mg q 6 hrs and lasix 40 mg  daily. No contraindications to increasing dose of metoprolol from a pregnancy standpoint, if needed from.   - Agree with Lovenox given risk of LV thrombus.  - Appreciate cares per primary cardiology team    Gestational Diabetes  - Goal blood glucose for AM fasting is <95 mg/dL  - 1 hour postprandial check is <140 mg/dL and for 2 hour postprandial is <120 mg/d  - We discussed that some of the fasting values have been outside of the goal. Will monitor for the next few days, though if elevated, would recommend starting insulin. Patient declines use of insulin, if indicated, and would prefer metformin instead.    Routine prenatal care  - Non-stress test (NST) BID  - Repeat growth ultrasound (performed by [facility]) on   - Betamethasone (BMZ) given - at OSH for fetal lung maturity. Candidate for rescue BMZ.   - Magnesium for neuroprotection if moving towards delivery prior to 32 weeks with 6g bolus followed by 2g/hr maintenance until delivery  - Routine indications for emergent delivery including maternal decompensation or fetal compromise  - S/p  section consent on admission  - s/p third trimester RPR, which was normal.   - Discussed and recommend TDAP and patient amenable. Order placed (discussed with Nita Pedraza, cardiology team).   - Plan IUD and depo-provera prior to discharge postpartum.    Seen and discussed with Dr. Zhang.    Johnnie Garcia MD  Maternal-Fetal Medicine Fellow      Physician Attestation   I saw this patient with the resident and agree with the resident/fellow's findings and plan of care as documented in the note.      Key findings: 31 yo  at 28w1d admitted with HFrEF during pregnancy. Currently receiving medical management with metoprolol and lasix. Repeat echocardiogram performed yesterday with improvement in EF to 30-35%. Martha reports that she is doing well, just misses her kids and is trying to find things to do to keep her occupied.     We discussed the recent  intermittently elevated fasting blood sugars. If >50% above goal we discussed the recommendation for insulin. However, Anjali does not desire insulin and would decline treatment if recommended, but stated that she would be open to metformin.    No fetal or obstetric concerns at this point. Feeling well. FHT reassuring with no uterine activity on uterine monitoring. Plan to continue twice daily monitoring.    Recommend TDaP, which patient is in agreement with receiving.    MANAGEMENT DISCUSSED with the following over the past 24 hours: Cardiology team, OB nursing   Tests ORDERED & REVIEWED in the past 24 hours:  Tests ORDERED in the past 24 hours:   Tests REVIEWED in the past 24 hours: NST, maternal echocardiogram    25 MINUTES SPENT BY ME on the date of service doing chart review, history, exam, documentation & further activities per the note.    Angeli Zhang MD  Date of Service (when I saw the patient): 01/03/23

## 2023-01-04 LAB
ALBUMIN SERPL BCG-MCNC: 3 G/DL (ref 3.5–5.2)
ALP SERPL-CCNC: 88 U/L (ref 35–104)
ALT SERPL W P-5'-P-CCNC: 23 U/L (ref 10–35)
ANION GAP SERPL CALCULATED.3IONS-SCNC: 11 MMOL/L (ref 7–15)
AST SERPL W P-5'-P-CCNC: 32 U/L (ref 10–35)
BASOPHILS # BLD AUTO: 0 10E3/UL (ref 0–0.2)
BASOPHILS NFR BLD AUTO: 0 %
BILIRUB SERPL-MCNC: 0.2 MG/DL
BUN SERPL-MCNC: 8 MG/DL (ref 6–20)
CALCIUM SERPL-MCNC: 9 MG/DL (ref 8.6–10)
CHLORIDE SERPL-SCNC: 105 MMOL/L (ref 98–107)
CREAT SERPL-MCNC: 0.57 MG/DL (ref 0.51–0.95)
DEPRECATED HCO3 PLAS-SCNC: 18 MMOL/L (ref 22–29)
EOSINOPHIL # BLD AUTO: 0 10E3/UL (ref 0–0.7)
EOSINOPHIL NFR BLD AUTO: 1 %
ERYTHROCYTE [DISTWIDTH] IN BLOOD BY AUTOMATED COUNT: 17.6 % (ref 10–15)
GFR SERPL CREATININE-BSD FRML MDRD: >90 ML/MIN/1.73M2
GLUCOSE BLDC GLUCOMTR-MCNC: 117 MG/DL (ref 70–99)
GLUCOSE BLDC GLUCOMTR-MCNC: 87 MG/DL (ref 70–99)
GLUCOSE SERPL-MCNC: 93 MG/DL (ref 70–99)
HCT VFR BLD AUTO: 30 % (ref 35–47)
HGB BLD-MCNC: 9.3 G/DL (ref 11.7–15.7)
IMM GRANULOCYTES # BLD: 0.1 10E3/UL
IMM GRANULOCYTES NFR BLD: 1 %
LYMPHOCYTES # BLD AUTO: 1.8 10E3/UL (ref 0.8–5.3)
LYMPHOCYTES NFR BLD AUTO: 23 %
MAGNESIUM SERPL-MCNC: 1.8 MG/DL (ref 1.7–2.3)
MCH RBC QN AUTO: 25.8 PG (ref 26.5–33)
MCHC RBC AUTO-ENTMCNC: 31 G/DL (ref 31.5–36.5)
MCV RBC AUTO: 83 FL (ref 78–100)
MONOCYTES # BLD AUTO: 0.4 10E3/UL (ref 0–1.3)
MONOCYTES NFR BLD AUTO: 5 %
NEUTROPHILS # BLD AUTO: 5.7 10E3/UL (ref 1.6–8.3)
NEUTROPHILS NFR BLD AUTO: 70 %
NRBC # BLD AUTO: 0 10E3/UL
NRBC BLD AUTO-RTO: 0 /100
PHOSPHATE SERPL-MCNC: 3.9 MG/DL (ref 2.5–4.5)
PLATELET # BLD AUTO: 200 10E3/UL (ref 150–450)
POTASSIUM SERPL-SCNC: 4.1 MMOL/L (ref 3.4–5.3)
PROT SERPL-MCNC: 6.6 G/DL (ref 6.4–8.3)
RBC # BLD AUTO: 3.6 10E6/UL (ref 3.8–5.2)
SODIUM SERPL-SCNC: 134 MMOL/L (ref 136–145)
WBC # BLD AUTO: 8.1 10E3/UL (ref 4–11)

## 2023-01-04 PROCEDURE — 250N000013 HC RX MED GY IP 250 OP 250 PS 637: Performed by: STUDENT IN AN ORGANIZED HEALTH CARE EDUCATION/TRAINING PROGRAM

## 2023-01-04 PROCEDURE — 83735 ASSAY OF MAGNESIUM: CPT

## 2023-01-04 PROCEDURE — 250N000013 HC RX MED GY IP 250 OP 250 PS 637: Performed by: NURSE PRACTITIONER

## 2023-01-04 PROCEDURE — 84100 ASSAY OF PHOSPHORUS: CPT | Performed by: INTERNAL MEDICINE

## 2023-01-04 PROCEDURE — 36592 COLLECT BLOOD FROM PICC: CPT | Performed by: INTERNAL MEDICINE

## 2023-01-04 PROCEDURE — 250N000011 HC RX IP 250 OP 636: Performed by: STUDENT IN AN ORGANIZED HEALTH CARE EDUCATION/TRAINING PROGRAM

## 2023-01-04 PROCEDURE — 80053 COMPREHEN METABOLIC PANEL: CPT

## 2023-01-04 PROCEDURE — 99231 SBSQ HOSP IP/OBS SF/LOW 25: CPT | Performed by: PHYSICIAN ASSISTANT

## 2023-01-04 PROCEDURE — 214N000001 HC R&B CCU UMMC

## 2023-01-04 PROCEDURE — 85025 COMPLETE CBC W/AUTO DIFF WBC: CPT

## 2023-01-04 PROCEDURE — 250N000013 HC RX MED GY IP 250 OP 250 PS 637

## 2023-01-04 PROCEDURE — 250N000011 HC RX IP 250 OP 636: Performed by: INTERNAL MEDICINE

## 2023-01-04 RX ORDER — MAGNESIUM SULFATE HEPTAHYDRATE 40 MG/ML
2 INJECTION, SOLUTION INTRAVENOUS ONCE
Status: COMPLETED | OUTPATIENT
Start: 2023-01-04 | End: 2023-01-04

## 2023-01-04 RX ADMIN — FUROSEMIDE 40 MG: 40 TABLET ORAL at 08:48

## 2023-01-04 RX ADMIN — ENOXAPARIN SODIUM 120 MG: 120 INJECTION SUBCUTANEOUS at 08:48

## 2023-01-04 RX ADMIN — METOPROLOL TARTRATE 75 MG: 25 TABLET, FILM COATED ORAL at 20:12

## 2023-01-04 RX ADMIN — METOPROLOL TARTRATE 75 MG: 25 TABLET, FILM COATED ORAL at 08:47

## 2023-01-04 RX ADMIN — METOPROLOL TARTRATE 75 MG: 25 TABLET, FILM COATED ORAL at 02:05

## 2023-01-04 RX ADMIN — MAGNESIUM OXIDE TAB 400 MG (241.3 MG ELEMENTAL MG) 800 MG: 400 (241.3 MG) TAB at 08:47

## 2023-01-04 RX ADMIN — POTASSIUM CHLORIDE 20 MEQ: 40 SOLUTION ORAL at 21:01

## 2023-01-04 RX ADMIN — SODIUM CHLORIDE, PRESERVATIVE FREE 10 ML: 5 INJECTION INTRAVENOUS at 17:38

## 2023-01-04 RX ADMIN — METOPROLOL TARTRATE 75 MG: 25 TABLET, FILM COATED ORAL at 14:23

## 2023-01-04 RX ADMIN — MAGNESIUM SULFATE IN WATER 2 G: 40 INJECTION, SOLUTION INTRAVENOUS at 12:34

## 2023-01-04 RX ADMIN — ENOXAPARIN SODIUM 120 MG: 120 INJECTION SUBCUTANEOUS at 20:12

## 2023-01-04 RX ADMIN — PRENATAL VIT W/ FE FUMARATE-FA TAB 27-0.8 MG 1 TABLET: 27-0.8 TAB at 08:48

## 2023-01-04 RX ADMIN — MAGNESIUM OXIDE TAB 400 MG (241.3 MG ELEMENTAL MG) 800 MG: 400 (241.3 MG) TAB at 20:12

## 2023-01-04 RX ADMIN — POTASSIUM CHLORIDE 20 MEQ: 40 SOLUTION ORAL at 14:28

## 2023-01-04 RX ADMIN — Medication 25 MG: at 08:47

## 2023-01-04 RX ADMIN — POTASSIUM CHLORIDE 20 MEQ: 40 SOLUTION ORAL at 08:47

## 2023-01-04 ASSESSMENT — ACTIVITIES OF DAILY LIVING (ADL)
ADLS_ACUITY_SCORE: 20

## 2023-01-04 NOTE — PROVIDER NOTIFICATION
01/04/23 0934   Fetal Assessment   Fetal Movement active   Fetal HR Assessment Method external US   Fetal HR (beats/min) 140   Fetal HR Baseline normal range   Fetal HR Variability moderate (amplitude range 6 to 25 bpm)   Fetal HR Accelerations increase 15 bpm above baseline lasting 15 seconds   Fetal HR Decelerations absent   NST Start Time 0911   NST Stop Time 0934   NST Extended Time No   Non-stress Test Interpretation Reactive;Appropriate for gestational age     Patient denies LOF, vaginal bleeding, and cramping/ctx. Reports +FM. Strip reviewed with Dr. Copeland. Pt offers no complaints.

## 2023-01-04 NOTE — PLAN OF CARE
Goal Outcome Evaluation:      Plan of Care Reviewed With: patient    Overall Patient Progress: improvingOverall Patient Progress: improving    Outcome Evaluation: Encourage oral intake. Pt with increased protein/kcal needs. Encourage two Ensure MAX protein oral supplements daily.

## 2023-01-04 NOTE — PLAN OF CARE
Goal Outcome Evaluation:      Plan of Care Reviewed With: patient    Overall Patient Progress: no changeOverall Patient Progress: no change    Outcome Evaluation: fetal checks twice a day, PO metop, PO lasix, continue POC    Neuro: A&Ox4  Cardiac/Telemetry: SR, PACs and PVCs, declined CP or palpitations  Respiratory: RA, no c/o SOB  GI/: voiding w/o issue, LBM 1/3/2023  Diet/Appetite: good appetite, declines nausea  Skin: WDL  LDA: double lumen PICC heparin locked  Activity: up ad danis  Pain: no c/o pain, tylenol given for preemptive pain related to Tdap vaccine     Plan:  -continue POC

## 2023-01-04 NOTE — PROGRESS NOTES
Former[facility]   Cardiology II Service / Advanced Heart Failure  Daily Progress Note      Patient: Anjali Carmen  MRN: [id number]  Admission Date: 2022  Hospital Day # 29    Assessment and Plan: Anjali Carmen is a 32 year old female  with no significant past medical history, who presented as a transfer from [facility] to [facility] on 2022 for further management of newly diagnosed, acute decompensated systolic heart failure (LVEF 20% now 30-35%) and high risk delivery.     MF available  at [phone]    Today's plan  - Replace magnesium IV today  - Fasting blood sugar every morning and 1 hour after each lunch  - Received Tdap 0.5 on 1/3/23  - Continue tele     Acute on Chronic SCHF secondary to NICM, pregnancy related with questionable familial component. RV failure. Note family history of father with CM at age 30. TTE  with EF 20-25%, thinned out LV wall, and notable regional wall motion abnormalities. Coronary angiogram  with no significat lesions seen. EF is improved slightly on 23 ECHO to 30-35%.  Stage C, NYHA Class III  ACEi/ARB/ARNI: Contraindicated in pregnancy.   BB Metoprolol Tartrate 75 mg q6h. Nursing: hold if HR<50 or SBP<90 and call Cards2  Aldosterone antagonist contraindicated due to pregnancy  SCD prophylaxis GDMT  Fluid status euvolemic. Lasix 40 mg po daily   - Lovenox given high risk for LV thrombus in setting of antepartum, regional wall motion abnormalities, and low EF.   - Cardiac MRI and genetic testing postpartum recommended.      New Onset PAF. NSVT. Frequent PVC and PAC's.  [id number] score of 2 (sex, HF) - on AC as above for pregnancy and low EF.   [facility] [place], isolated episode, EKG unavailable for review. Currently in NSR at [facility] with frequent ectopy. Having 7-17 PVCs per minute, 3 episodes of NSVT and SVT In the last 25 hours, 5-10 beats.  - Maintain mag >2.4 and K >4.0  - Metoprolol Tartrate  75 mg q6h. Nursing: hold if HR<50 or sBP<90 and call Cards2  - Lovenox as above     High risk pregnancy  Gestational age 28w1d. . Betamethasone (BMZ) given - at OSH for fetal lung maturity. OB US for fetal growth done on 22 with normal fetal findings and no abnormalities. growth US  reassuring for normal fetal growth  - Appreciate MFM management, closely following with daily checks; can be reached via their pager above  - NST BID  - Magnesium for neuroprotection if moving towards delivery prior to 32 weeks with 6g bolus followed by 2g/hr maintenance until delivery  - S/p  section consent on admission  - per MFM, BP goal 130/80, MAP >65                 - If blood pressures >160/110 for at least 15 minutes, call MFM for guidance of management, although typically will initiate treatment with 5-10 mg of IV hydralazine (preferred from cardiac standpoint) or 20 mg of IV labetalol     Gestational DM.   QID blood glucose checks (fasting, and 1 hour after each meal)- doing only fasting and after lunch  per patient, will discuss with OB/Gyn                - goal blood glucose for AM fasting 95 mg/dL                - 1 hour postprandial check is under 140 mg/dL  - If after 2 weeks of monitoring, if >50% of blood glucose values are elevated, then we would typically initiate medication for treatment of gestational diabetes. Patient declining insulin, would be open to metformin.  - MFM following  -  monitoring to be determined by need for medications     Mild anemia, multifactorial  Due to pregnancy and HF. Hgb 10.2 on admission (MCV 79). Retic count 1.9%. Iron, ferritin, TIBC levels show AVINASH (say 6%). Hgh 9s-low 10s. IV iron 200 mg daily x5 (-). Hgb stable mid 10s.   - limit blood draws     FEN: 3 gram sodium diet   PROPHY:  Lovenox  LINES:  PICC  DISPO:  Pending delivery   CODE STATUS:  Full Code   ================================================================    Interval  "History/ROS: No symptomatic palpitations in the last day. She denies fever, chills, chest pain, SOB, cough, nausea, vomiting, diarrhea, and LE edema. Does not feel like she is retaining any fluid. She is tolerating oral intake. She is ambulating. No concerns.    Last 24 hr care team notes reviewed.   ROS:  4 point ROS including Respiratory, CV, GI and , other than that noted in the HPI, is negative.     Medications: Reviewed in EPIC.     Physical Exam:   /67 (BP Location: Right arm)   Pulse 81   Temp 98.6  F (37  C) (Oral)   Resp 18   Ht 1.753 m (5' 9\")   Wt 134.3 kg (296 lb)   SpO2 99%   BMI 43.71 kg/m    GENERAL: Appears alert and interacting appropriately.  HEENT: Eye symmetrical and free of discharge bilaterally.  NECK: Supple and without lymphadenopathy. JVD mid neck, skewed by pregnancy.   CV: Irregular, controlled. S1S2 present without murmur, rub, or gallop.   RESPIRATORY: Respirations regular, even, and unlabored. Lungs CTA throughout.   GI: Soft, gravid, and  with normoactive bowel sounds present in all quadrants. No tenderness, rebound, guarding. No organomegaly.   EXTREMITIES: No peripheral edema. All extremities are warm and well perfused.  NEUROLOGIC: Alert and interacting appropriately. No focal deficits.   MUSCULOSKELETAL: No joint swelling or tenderness.   SKIN: No jaundice. No rashes or lesions.     Data:  CMP  Recent Labs   Lab 01/04/23  0845 01/04/23  0658 01/03/23  0710 01/03/23  0621 01/02/23  1352 01/02/23  1212 01/01/23  1841 01/01/23  0722 12/31/22  0626 12/31/22  0514 12/30/22  0625 12/29/22  0521   NA  --  134*  --   --   --  134*  --   --   --  133*  --  133*   POTASSIUM  --  4.1 4.1  --   --  4.2  --  4.2  --  4.1   < > 4.4  4.4   CHLORIDE  --  105  --   --   --  104  --   --   --  104  --  105   CO2  --  18*  --   --   --  17*  --   --   --  17*  --  15*   ANIONGAP  --  11  --   --   --  13  --   --   --  12  --  13   GLC 87 93  --  91 117* 108*   < >  --    < > 101*   < " > 89   BUN  --  8.0  --   --   --  8.0  --   --   --  9.9  --  11.9   CR  --  0.57  --   --   --  0.62  --   --   --  0.57  --  0.68   GFRESTIMATED  --  >90  --   --   --  >90  --   --   --  >90  --  >90   AYDEN  --  9.0  --   --   --  9.7  --   --   --  9.0  --  9.9   MAG  --  1.8 1.6*  --   --  1.8  --  1.6*  --  1.8   < > 1.7  1.7   PHOS  --  3.9 4.1  --   --  3.5  --  3.6  --  3.8   < > 4.5   PROTTOTAL  --  6.6  --   --   --  7.2  --   --   --  6.9  --  7.1   ALBUMIN  --  3.0*  --   --   --  3.3*  --   --   --  3.1*  --  3.3*   BILITOTAL  --  0.2  --   --   --  0.3  --   --   --  0.3  --  0.3   ALKPHOS  --  88  --   --   --  97  --   --   --  91  --  94   AST  --  32  --   --   --  40*  --   --   --  37*  --  36*   ALT  --  23  --   --   --  32  --   --   --  25  --  20    < > = values in this interval not displayed.     CBC  Recent Labs   Lab 01/04/23  0658 01/02/23  1212 12/31/22  0514 12/29/22  0521   WBC 8.1 9.5 8.4 9.8   RBC 3.60* 3.88 3.81 4.05   HGB 9.3* 10.2* 9.9* 10.5*   HCT 30.0* 32.4* 32.5* 32.9*   MCV 83 84 85 81   MCH 25.8* 26.3* 26.0* 25.9*   MCHC 31.0* 31.5 30.5* 31.9   RDW 17.6* 17.5* 16.7* 16.2*    224 220 242         Time/Communication  I personally spent a total of 30 minutes. Of that 15 minutes was counseling/coordination of patient's care. Plan of care discussed with patient. See my note above for details. Patient discussed with Dr. Chavarria.      Nita Pedraza PA-C

## 2023-01-04 NOTE — PROGRESS NOTES
CLINICAL NUTRITION SERVICES - REASSESSMENT NOTE     Nutrition Prescription    RECOMMENDATIONS FOR MDs/PROVIDERS TO ORDER:  Consider ordering 200-300 mg DHA/day.    Malnutrition Status:    Patient does not meet two of the established criteria necessary for diagnosing malnutrition    Recommendations already ordered by Registered Dietitian (RD):  None at this time.     Future/Additional Recommendations:  1. Continue current diet, as ordered. Rec continuing with no fluid restriction, as able. Small, frequent meals are recommended to help increase oral intake. Encourage high protein options.   2. Continue prenatal multivitamin with iron.   3. Monitor potential need to adjust scheduled miralax to prn.   4. Monitor potential need for additional iron supplementation.   5. Hgb A1c of 5.8 on 12/6/2022. Monitor BG. Per provider note 12/20, ruled in for gestational diabetes.      EVALUATION OF THE PROGRESS TOWARD GOALS   Diet: 3 g Sodium+ no caffeine. Ordered to receive chocolate Ensure MAX Protein at 14:00 and HS.   Intake: Tolerating oral intake. Per % intake flowsheets, pt consuming 100% with a good appetite 12/29-12/30, % with a good appetite 12/31, % with a poor to good appetite 1/1, 100% with a good appetite 1/2-1/3, and % with a good appetite 1/4. [employer] (meal ordering system) indicates food/beverages sent 1/1-1/3 totaled 1884 kcals and 126 g protein daily on average. Ordering three meals daily. Stays within her sodium restriction. Per discussion with pt (1/4), find the 3 g sodium diet is easier to order from rather than the 2 g sodium diet. Has some snacks in her room and has a fridge. Her family is back home. States she is consistently consuming two oral supplements daily.      NEW FINDINGS   OB: 12/29/2022 Maternal Fetal U/S: Growth parameters and estimated fetal weight were consistent with established dates. Jaquez intrauterine pregnancy at 27w3d gestational age  GI: Having zero to one stool  "daily. Last stool on 1/4. Last miralax received on 1/1. Having formed, brown stools. No N/V or diarrhea.  Weight History: 141.5 kg = 312# (11/30/2022), 132.9 kg (12/5/2022), 132.7 kg (12/6, admit), 133.4 kg (12/21), 134.3 kg (1/4/2023) - Limited wt hx in chart review. Per provider H& P, \"Additionally on ROS, the patient reports loss of weight despite being pregnant since July 2022; previously weighed 357 pounds- down to 301 lbs at the time of admission >> further down to 292 lbs after diuresis. Difficult to assess wt gain with pregnancy and wt loss due to diuresis.\"       ASSESSED NUTRITION NEEDS (for inpatient hospital stay)   Dosing Weight: 82 kg (adjusted, based on lowest wt so far this admission of 130.6 kg on 12/8)   Estimated Energy Needs: 0045-9215 kcals/day (20 - 25 kcals/kg) (includes + 452 kcals/day for third trimester) vs [employer] equation estimate of 2890+ kcals/day   Justification: Decreased needs with BMI but increased needs with pregnancy. Rec highest end of this range.   Estimated Protein Needs: 115-140 grams protein/day (1.1 - 1.4 grams of pro/kg) (includes +25 g/ day additional protein for braden pregnancy)   Justification: Increased needs with heart failure and pregnancy   Estimated Fluid Needs: Up to 3000 mL/day   Justification: Increased needs with pregnancy but per provider, pending fluid status and pt has HF    MALNUTRITION  % Intake: Decreased intake does not meet criteria  % Weight Loss: Difficult to assess with pregnancy and diuresis this admission.   Subcutaneous Fat Loss: None observed  Muscle Loss: None observed  Fluid Accumulation/Edema: Does not meet criteria  Malnutrition Diagnosis: Patient does not meet two of the established criteria necessary for diagnosing malnutrition    Previous Goals   Patient to consume % of nutritionally adequate meal trays TID, or the equivalent with supplements/snacks.  Evaluation: Meeting at times.     Previous Nutrition " Diagnosis  Inadequate oral intake related to diet restriction as evidenced by [employer] (meal ordering system)    Evaluation: Unresolved.     CURRENT NUTRITION DIAGNOSIS  Inadequate oral intake related to diet restriction as evidenced by food/beverages sent 1/1-1/3 totaled 1884 kcals and 126 g protein daily on average while estimated needs are 4951-5986 kcals/day (20 - 25 kcals/kg) and 115-140 grams protein/day (1.1 - 1.4 grams of pro/kg).    INTERVENTIONS  Implementation  Medical food supplement therapy, Nutrition education for nutrition relationship to health/disease: Discussed increased kcal/protein needs. Encouraged oral intake adequacy and intake of two oral supplements daily.     Goals  Patient to consume % of nutritionally adequate meal trays TID, or the equivalent with supplements/snacks.    Monitoring/Evaluation  Progress toward goals will be monitored and evaluated per protocol.     Nutrition will continue to follow.      Penny Maloney, MS, RD, LD, [facility]   6C Pgr: [phone]

## 2023-01-05 LAB
GLUCOSE BLDC GLUCOMTR-MCNC: 124 MG/DL (ref 70–99)
GLUCOSE BLDC GLUCOMTR-MCNC: 79 MG/DL (ref 70–99)
MAGNESIUM SERPL-MCNC: 1.6 MG/DL (ref 1.7–2.3)
PHOSPHATE SERPL-MCNC: 3.5 MG/DL (ref 2.5–4.5)
POTASSIUM SERPL-SCNC: 4 MMOL/L (ref 3.4–5.3)

## 2023-01-05 PROCEDURE — 250N000013 HC RX MED GY IP 250 OP 250 PS 637: Performed by: NURSE PRACTITIONER

## 2023-01-05 PROCEDURE — 84100 ASSAY OF PHOSPHORUS: CPT | Performed by: INTERNAL MEDICINE

## 2023-01-05 PROCEDURE — 258N000003 HC RX IP 258 OP 636: Performed by: STUDENT IN AN ORGANIZED HEALTH CARE EDUCATION/TRAINING PROGRAM

## 2023-01-05 PROCEDURE — 99233 SBSQ HOSP IP/OBS HIGH 50: CPT | Mod: GC | Performed by: INTERNAL MEDICINE

## 2023-01-05 PROCEDURE — 36592 COLLECT BLOOD FROM PICC: CPT | Performed by: INTERNAL MEDICINE

## 2023-01-05 PROCEDURE — 250N000013 HC RX MED GY IP 250 OP 250 PS 637: Performed by: STUDENT IN AN ORGANIZED HEALTH CARE EDUCATION/TRAINING PROGRAM

## 2023-01-05 PROCEDURE — 250N000011 HC RX IP 250 OP 636: Performed by: INTERNAL MEDICINE

## 2023-01-05 PROCEDURE — 250N000011 HC RX IP 250 OP 636: Performed by: STUDENT IN AN ORGANIZED HEALTH CARE EDUCATION/TRAINING PROGRAM

## 2023-01-05 PROCEDURE — 84132 ASSAY OF SERUM POTASSIUM: CPT | Performed by: INTERNAL MEDICINE

## 2023-01-05 PROCEDURE — 250N000013 HC RX MED GY IP 250 OP 250 PS 637

## 2023-01-05 PROCEDURE — 83735 ASSAY OF MAGNESIUM: CPT | Performed by: INTERNAL MEDICINE

## 2023-01-05 PROCEDURE — 214N000001 HC R&B CCU UMMC

## 2023-01-05 RX ORDER — MAGNESIUM SULFATE HEPTAHYDRATE 40 MG/ML
2 INJECTION, SOLUTION INTRAVENOUS ONCE
Status: COMPLETED | OUTPATIENT
Start: 2023-01-05 | End: 2023-01-05

## 2023-01-05 RX ADMIN — ENOXAPARIN SODIUM 120 MG: 120 INJECTION SUBCUTANEOUS at 09:35

## 2023-01-05 RX ADMIN — Medication 25 MG: at 09:36

## 2023-01-05 RX ADMIN — MAGNESIUM OXIDE TAB 400 MG (241.3 MG ELEMENTAL MG) 800 MG: 400 (241.3 MG) TAB at 09:36

## 2023-01-05 RX ADMIN — METOPROLOL TARTRATE 75 MG: 25 TABLET, FILM COATED ORAL at 20:36

## 2023-01-05 RX ADMIN — POTASSIUM CHLORIDE 20 MEQ: 40 SOLUTION ORAL at 09:41

## 2023-01-05 RX ADMIN — MAGNESIUM SULFATE HEPTAHYDRATE 3 G: 500 INJECTION, SOLUTION INTRAMUSCULAR; INTRAVENOUS at 18:40

## 2023-01-05 RX ADMIN — METOPROLOL TARTRATE 75 MG: 25 TABLET, FILM COATED ORAL at 02:56

## 2023-01-05 RX ADMIN — FUROSEMIDE 40 MG: 40 TABLET ORAL at 09:36

## 2023-01-05 RX ADMIN — MAGNESIUM OXIDE TAB 400 MG (241.3 MG ELEMENTAL MG) 800 MG: 400 (241.3 MG) TAB at 20:36

## 2023-01-05 RX ADMIN — MAGNESIUM SULFATE IN WATER 2 G: 40 INJECTION, SOLUTION INTRAVENOUS at 13:23

## 2023-01-05 RX ADMIN — POTASSIUM CHLORIDE 20 MEQ: 40 SOLUTION ORAL at 13:23

## 2023-01-05 RX ADMIN — METOPROLOL TARTRATE 75 MG: 25 TABLET, FILM COATED ORAL at 13:23

## 2023-01-05 RX ADMIN — POTASSIUM CHLORIDE 20 MEQ: 40 SOLUTION ORAL at 20:37

## 2023-01-05 RX ADMIN — ENOXAPARIN SODIUM 120 MG: 120 INJECTION SUBCUTANEOUS at 20:37

## 2023-01-05 RX ADMIN — METOPROLOL TARTRATE 75 MG: 25 TABLET, FILM COATED ORAL at 09:35

## 2023-01-05 RX ADMIN — PRENATAL VIT W/ FE FUMARATE-FA TAB 27-0.8 MG 1 TABLET: 27-0.8 TAB at 09:35

## 2023-01-05 ASSESSMENT — ACTIVITIES OF DAILY LIVING (ADL)
ADLS_ACUITY_SCORE: 20
ADLS_ACUITY_SCORE: 20
ADLS_ACUITY_SCORE: 21
ADLS_ACUITY_SCORE: 20
ADLS_ACUITY_SCORE: 20
ADLS_ACUITY_SCORE: 21
ADLS_ACUITY_SCORE: 21
ADLS_ACUITY_SCORE: 20
ADLS_ACUITY_SCORE: 21
ADLS_ACUITY_SCORE: 20
ADLS_ACUITY_SCORE: 21
ADLS_ACUITY_SCORE: 20

## 2023-01-05 NOTE — PROGRESS NOTES
[id number]  Major Shift Events: Neurologically intact. SR with frequent ectopy. Denies pain/nausea. Up ad danis. Fetal monitoring BID per [place] nurses. Replaced mag of 1.6.  Plan: Continue with plan of care.   For vital signs and complete assessments, please see documentation flowsheets.

## 2023-01-05 NOTE — PROGRESS NOTES
[facility]   Cardiology II Service / Advanced Heart Failure  Daily Progress Note      Patient: Anjali Carmen  MRN: [id number]  Admission Date: 2022  Hospital Day # 30    Assessment and Plan: Anjali Carmen is a 32 year old female  with no significant past medical history, who presented as a transfer from [facility] to [facility] on 2022 for further management of newly diagnosed, acute decompensated systolic heart failure (LVEF 20% now 30-35%) and high risk delivery.     MFM available  at [phone]    Today's plan  - Will replace electrolytes accordingly. Awaiting for labs  - Fasting blood sugar every morning and 1 hour after each lunch  - Received Tdap 0.5 on 1/3/23  - Continue tele     Acute on Chronic SCHF secondary to NICM, pregnancy related with questionable familial component. RV failure. Note family history of father with CM at age 30. TTE  with EF 20-25%, thinned out LV wall, and notable regional wall motion abnormalities. Coronary angiogram  with no significat lesions seen. EF is improved slightly on 23 ECHO to 30-35%.  Stage C, NYHA Class III  ACEi/ARB/ARNI: Contraindicated in pregnancy.   BB Metoprolol Tartrate 75 mg q6h. Nursing: hold if HR<50 or SBP<90 and call Cards2  Aldosterone antagonist contraindicated due to pregnancy  SCD prophylaxis GDMT  Fluid status euvolemic. Lasix 40 mg po daily   - Lovenox given high risk for LV thrombus in setting of antepartum, regional wall motion abnormalities, and low EF.   - Cardiac MRI and genetic testing postpartum recommended.      New Onset PAF. NSVT. Frequent PVC and PAC's.  [id number] score of 2 (sex, HF) - on AC as above for pregnancy and low EF.   [facility] [place], isolated episode, EKG unavailable for review. Currently in NSR at [facility] with frequent ectopy. Having 7-17 PVCs per minute, 3 episodes of NSVT and SVT In the last 25 hours, 5-10 beats.  - Maintain mag >2.4 and  K >4.0  - Metoprolol Tartrate 75 mg q6h. Nursing: hold if HR<50 or sBP<90 and call Cards2  - Lovenox as above     High risk pregnancy  Gestational age 28w1d. . Betamethasone (BMZ) given - at OSH for fetal lung maturity. OB US for fetal growth done on 22 with normal fetal findings and no abnormalities. growth US  reassuring for normal fetal growth  - Appreciate MFM management, closely following with daily checks; can be reached via their pager above  - NST BID  - Magnesium for neuroprotection if moving towards delivery prior to 32 weeks with 6g bolus followed by 2g/hr maintenance until delivery  - S/p  section consent on admission  - per MFM, BP goal 130/80, MAP >65                 - If blood pressures >160/110 for at least 15 minutes, call MFM for guidance of management, although typically will initiate treatment with 5-10 mg of IV hydralazine (preferred from cardiac standpoint) or 20 mg of IV labetalol     Gestational DM.   QID blood glucose checks (fasting, and 1 hour after each meal)- doing only fasting and after lunch  per patient, will discuss with OB/Gyn                - goal blood glucose for AM fasting 95 mg/dL                - 1 hour postprandial check is under 140 mg/dL  - If after 2 weeks of monitoring, if >50% of blood glucose values are elevated, then we would typically initiate medication for treatment of gestational diabetes. Patient declining insulin, would be open to metformin.  - MFM following  -  monitoring to be determined by need for medications     Mild anemia, multifactorial  Due to pregnancy and HF. Hgb 10.2 on admission (MCV 79). Retic count 1.9%. Iron, ferritin, TIBC levels show AVINASH (say 6%). Hgh 9s-low 10s. IV iron 200 mg daily x5 (-). Hgb stable mid 10s.   - limit blood draws     FEN: 3 gram sodium diet   PROPHY:  Lovenox  LINES:  PICC  DISPO:  Pending delivery   CODE STATUS:  Full Code  "  ================================================================    Interval History/ROS:   Patient comfortably sleeping this morning. She told me not having any symptoms. No chest pain or SOB.   Medications: Reviewed in EPIC.     Physical Exam:   /55 (BP Location: Right arm)   Pulse 83   Temp 98.1  F (36.7  C) (Oral)   Resp 15   Ht 1.753 m (5' 9\")   Wt 134.3 kg (296 lb)   SpO2 98%   BMI 43.71 kg/m    GENERAL: Appears alert and in good spirits  HEENT: Eye symmetrical and free of discharge bilaterally.  NECK: Supple and without lymphadenopathy.   CV: Irregular, controlled. S1S2 present without murmur, rub, or gallop.   RESPIRATORY: Respirations regular, even, and unlabored. Lungs CTA throughout.   GI: Soft, gravid, and  with normoactive bowel sounds present in all quadrants. No tenderness, rebound, guarding. No organomegaly.   EXTREMITIES: No peripheral edema. All extremities are warm and well perfused.  NEUROLOGIC: Alert and interacting appropriately. No focal deficits.   MUSCULOSKELETAL: No joint swelling or tenderness.   SKIN: No jaundice. No rashes or lesions.     Data:  CMP  Recent Labs   Lab 01/04/23  1422 01/04/23  0845 01/04/23  0658 01/03/23  0710 01/03/23  0621 01/02/23  1352 01/02/23  1212 01/01/23  1841 01/01/23  0722 12/31/22  0626 12/31/22  0514   NA  --   --  134*  --   --   --  134*  --   --   --  133*   POTASSIUM  --   --  4.1 4.1  --   --  4.2  --  4.2  --  4.1   CHLORIDE  --   --  105  --   --   --  104  --   --   --  104   CO2  --   --  18*  --   --   --  17*  --   --   --  17*   ANIONGAP  --   --  11  --   --   --  13  --   --   --  12   * 87 93  --  91   < > 108*   < >  --    < > 101*   BUN  --   --  8.0  --   --   --  8.0  --   --   --  9.9   CR  --   --  0.57  --   --   --  0.62  --   --   --  0.57   GFRESTIMATED  --   --  >90  --   --   --  >90  --   --   --  >90   AYDEN  --   --  9.0  --   --   --  9.7  --   --   --  9.0   MAG  --   --  1.8 1.6*  --   --  1.8  --  1.6*  " --  1.8   PHOS  --   --  3.9 4.1  --   --  3.5  --  3.6  --  3.8   PROTTOTAL  --   --  6.6  --   --   --  7.2  --   --   --  6.9   ALBUMIN  --   --  3.0*  --   --   --  3.3*  --   --   --  3.1*   BILITOTAL  --   --  0.2  --   --   --  0.3  --   --   --  0.3   ALKPHOS  --   --  88  --   --   --  97  --   --   --  91   AST  --   --  32  --   --   --  40*  --   --   --  37*   ALT  --   --  23  --   --   --  32  --   --   --  25    < > = values in this interval not displayed.     CBC  Recent Labs   Lab 01/04/23  0658 01/02/23  1212 12/31/22  0514   WBC 8.1 9.5 8.4   RBC 3.60* 3.88 3.81   HGB 9.3* 10.2* 9.9*   HCT 30.0* 32.4* 32.5*   MCV 83 84 85   MCH 25.8* 26.3* 26.0*   MCHC 31.0* 31.5 30.5*   RDW 17.6* 17.5* 16.7*    224 220

## 2023-01-05 NOTE — PLAN OF CARE
D: Admitted 12/6/2022 for further management of newly diagnosed, acute decompensated systolic heart failure (LVEF 20%) as well as high risk delivery.      I: Monitored vitals and assessed patient status.      A: A&Ox4. On room air. SR with frequent PVCs and PACs 70s-90s. VSS, except one low MAP of 58, team aware. Afebrile. Intermittent nausea. Urinating adequately. LBM 1/4. Up ad danis.     P: Continue to monitor.

## 2023-01-05 NOTE — PLAN OF CARE
D: Transfer from [facility] to [facility] on 12/6/2022 for further management of newly diagnosed, acute decompensated systolic heart failure (LVEF 20% now 30-35%) and high risk delivery.     I: Monitored vitals and assessed pt status.   Tele: Sinus rhythm with PVCs.   O2: Room air.  Mobility: Independent.    A: A0x4. VSS. Afebrile. Urinating adequately.     P: Continue to monitor Pt status and report changes to cards 2.    Temp:  [97.4  F (36.3  C)-98.6  F (37  C)] 98.2  F (36.8  C)  Pulse:  [78-91] 91  Resp:  [16-18] 16  BP: ()/(43-68) 103/61  SpO2:  [97 %-99 %] 99 %      Goal Outcome Evaluation:      Plan of Care Reviewed With: patient

## 2023-01-06 LAB
ALBUMIN SERPL BCG-MCNC: 3.1 G/DL (ref 3.5–5.2)
ALP SERPL-CCNC: 88 U/L (ref 35–104)
ALT SERPL W P-5'-P-CCNC: 21 U/L (ref 10–35)
ANION GAP SERPL CALCULATED.3IONS-SCNC: 12 MMOL/L (ref 7–15)
AST SERPL W P-5'-P-CCNC: 30 U/L (ref 10–35)
BASOPHILS # BLD AUTO: 0 10E3/UL (ref 0–0.2)
BASOPHILS NFR BLD AUTO: 0 %
BILIRUB SERPL-MCNC: 0.2 MG/DL
BUN SERPL-MCNC: 10.4 MG/DL (ref 6–20)
CALCIUM SERPL-MCNC: 9 MG/DL (ref 8.6–10)
CHLORIDE SERPL-SCNC: 107 MMOL/L (ref 98–107)
CREAT SERPL-MCNC: 0.54 MG/DL (ref 0.51–0.95)
DEPRECATED HCO3 PLAS-SCNC: 17 MMOL/L (ref 22–29)
EOSINOPHIL # BLD AUTO: 0 10E3/UL (ref 0–0.7)
EOSINOPHIL NFR BLD AUTO: 1 %
ERYTHROCYTE [DISTWIDTH] IN BLOOD BY AUTOMATED COUNT: 17.7 % (ref 10–15)
GFR SERPL CREATININE-BSD FRML MDRD: >90 ML/MIN/1.73M2
GLUCOSE BLDC GLUCOMTR-MCNC: 135 MG/DL (ref 70–99)
GLUCOSE SERPL-MCNC: 98 MG/DL (ref 70–99)
HCT VFR BLD AUTO: 30.1 % (ref 35–47)
HGB BLD-MCNC: 9.4 G/DL (ref 11.7–15.7)
IMM GRANULOCYTES # BLD: 0.1 10E3/UL
IMM GRANULOCYTES NFR BLD: 1 %
LMWH PPP CHRO-ACNC: 0.61 IU/ML
LYMPHOCYTES # BLD AUTO: 2 10E3/UL (ref 0.8–5.3)
LYMPHOCYTES NFR BLD AUTO: 24 %
MAGNESIUM SERPL-MCNC: 1.7 MG/DL (ref 1.7–2.3)
MCH RBC QN AUTO: 26.2 PG (ref 26.5–33)
MCHC RBC AUTO-ENTMCNC: 31.2 G/DL (ref 31.5–36.5)
MCV RBC AUTO: 84 FL (ref 78–100)
MONOCYTES # BLD AUTO: 0.4 10E3/UL (ref 0–1.3)
MONOCYTES NFR BLD AUTO: 5 %
NEUTROPHILS # BLD AUTO: 5.7 10E3/UL (ref 1.6–8.3)
NEUTROPHILS NFR BLD AUTO: 69 %
NRBC # BLD AUTO: 0 10E3/UL
NRBC BLD AUTO-RTO: 0 /100
PLATELET # BLD AUTO: 190 10E3/UL (ref 150–450)
POTASSIUM SERPL-SCNC: 4.1 MMOL/L (ref 3.4–5.3)
PROT SERPL-MCNC: 6.4 G/DL (ref 6.4–8.3)
RBC # BLD AUTO: 3.59 10E6/UL (ref 3.8–5.2)
SODIUM SERPL-SCNC: 136 MMOL/L (ref 136–145)
WBC # BLD AUTO: 8.3 10E3/UL (ref 4–11)

## 2023-01-06 PROCEDURE — 99232 SBSQ HOSP IP/OBS MODERATE 35: CPT | Mod: GC | Performed by: STUDENT IN AN ORGANIZED HEALTH CARE EDUCATION/TRAINING PROGRAM

## 2023-01-06 PROCEDURE — 258N000003 HC RX IP 258 OP 636: Performed by: STUDENT IN AN ORGANIZED HEALTH CARE EDUCATION/TRAINING PROGRAM

## 2023-01-06 PROCEDURE — 250N000011 HC RX IP 250 OP 636: Performed by: INTERNAL MEDICINE

## 2023-01-06 PROCEDURE — 250N000013 HC RX MED GY IP 250 OP 250 PS 637: Performed by: NURSE PRACTITIONER

## 2023-01-06 PROCEDURE — 250N000013 HC RX MED GY IP 250 OP 250 PS 637: Performed by: STUDENT IN AN ORGANIZED HEALTH CARE EDUCATION/TRAINING PROGRAM

## 2023-01-06 PROCEDURE — 36592 COLLECT BLOOD FROM PICC: CPT

## 2023-01-06 PROCEDURE — 250N000011 HC RX IP 250 OP 636: Performed by: STUDENT IN AN ORGANIZED HEALTH CARE EDUCATION/TRAINING PROGRAM

## 2023-01-06 PROCEDURE — 83735 ASSAY OF MAGNESIUM: CPT

## 2023-01-06 PROCEDURE — 250N000013 HC RX MED GY IP 250 OP 250 PS 637: Performed by: INTERNAL MEDICINE

## 2023-01-06 PROCEDURE — 999N000044 HC STATISTIC CVC DRESSING CHANGE

## 2023-01-06 PROCEDURE — 85520 HEPARIN ASSAY: CPT | Performed by: INTERNAL MEDICINE

## 2023-01-06 PROCEDURE — 36592 COLLECT BLOOD FROM PICC: CPT | Performed by: INTERNAL MEDICINE

## 2023-01-06 PROCEDURE — 80053 COMPREHEN METABOLIC PANEL: CPT

## 2023-01-06 PROCEDURE — 250N000013 HC RX MED GY IP 250 OP 250 PS 637

## 2023-01-06 PROCEDURE — 214N000001 HC R&B CCU UMMC

## 2023-01-06 PROCEDURE — 85025 COMPLETE CBC W/AUTO DIFF WBC: CPT

## 2023-01-06 RX ADMIN — SODIUM CHLORIDE, PRESERVATIVE FREE 5 ML: 5 INJECTION INTRAVENOUS at 06:50

## 2023-01-06 RX ADMIN — ENOXAPARIN SODIUM 120 MG: 120 INJECTION SUBCUTANEOUS at 09:25

## 2023-01-06 RX ADMIN — SODIUM CHLORIDE, PRESERVATIVE FREE 10 ML: 5 INJECTION INTRAVENOUS at 18:43

## 2023-01-06 RX ADMIN — Medication 25 MG: at 09:25

## 2023-01-06 RX ADMIN — METOPROLOL TARTRATE 75 MG: 25 TABLET, FILM COATED ORAL at 20:20

## 2023-01-06 RX ADMIN — FUROSEMIDE 40 MG: 40 TABLET ORAL at 09:27

## 2023-01-06 RX ADMIN — SODIUM CHLORIDE, PRESERVATIVE FREE 5 ML: 5 INJECTION INTRAVENOUS at 13:19

## 2023-01-06 RX ADMIN — MAGNESIUM SULFATE HEPTAHYDRATE 3 G: 500 INJECTION, SOLUTION INTRAMUSCULAR; INTRAVENOUS at 11:03

## 2023-01-06 RX ADMIN — POTASSIUM CHLORIDE 20 MEQ: 40 SOLUTION ORAL at 13:32

## 2023-01-06 RX ADMIN — METOPROLOL TARTRATE 75 MG: 25 TABLET, FILM COATED ORAL at 13:32

## 2023-01-06 RX ADMIN — ACETAMINOPHEN 650 MG: 325 TABLET, FILM COATED ORAL at 20:32

## 2023-01-06 RX ADMIN — MAGNESIUM OXIDE TAB 400 MG (241.3 MG ELEMENTAL MG) 800 MG: 400 (241.3 MG) TAB at 09:27

## 2023-01-06 RX ADMIN — METOPROLOL TARTRATE 75 MG: 25 TABLET, FILM COATED ORAL at 09:25

## 2023-01-06 RX ADMIN — MAGNESIUM OXIDE TAB 400 MG (241.3 MG ELEMENTAL MG) 800 MG: 400 (241.3 MG) TAB at 20:20

## 2023-01-06 RX ADMIN — PRENATAL VIT W/ FE FUMARATE-FA TAB 27-0.8 MG 1 TABLET: 27-0.8 TAB at 09:25

## 2023-01-06 RX ADMIN — POTASSIUM CHLORIDE 20 MEQ: 40 SOLUTION ORAL at 09:25

## 2023-01-06 RX ADMIN — METOPROLOL TARTRATE 75 MG: 25 TABLET, FILM COATED ORAL at 02:57

## 2023-01-06 RX ADMIN — POTASSIUM CHLORIDE 20 MEQ: 40 SOLUTION ORAL at 20:25

## 2023-01-06 RX ADMIN — ENOXAPARIN SODIUM 120 MG: 120 INJECTION SUBCUTANEOUS at 20:21

## 2023-01-06 ASSESSMENT — ACTIVITIES OF DAILY LIVING (ADL)
ADLS_ACUITY_SCORE: 20
ADLS_ACUITY_SCORE: 21
ADLS_ACUITY_SCORE: 21
ADLS_ACUITY_SCORE: 20
ADLS_ACUITY_SCORE: 20
ADLS_ACUITY_SCORE: 21
ADLS_ACUITY_SCORE: 20
ADLS_ACUITY_SCORE: 20
ADLS_ACUITY_SCORE: 21
ADLS_ACUITY_SCORE: 21

## 2023-01-06 NOTE — PHARMACY-PHARMACOTHERAPY NOTE
Clinical Pharmacy Note- Enoxaparin Anti-Xa Evaluation for ADULT Patients    Date of Service 2023  Patient's  1990   Patient's age:  32 year old  Patient's Weight used for enoxaparin dosin.3 kg (296 lb)  Patient's BMI: Body mass index is 43.71 kg/m .   Enoxaparin Indication: Other DVT/PE treatment (LV thrombus in the setting of cardiomyopathy and hypercoaguable state in pregnancy)  Goal LMWH anti-Xa level for ADULT patients: 0.5-1 IU/mL (1 mg/kg or 0.75 mg/kg BID dose)  Current Enoxaparin Regimen: Other 1 mg/kg subcutaneous Q 12 hours - service asked to not use the 0.75 mg/kg dosing strategy to start despite BMI >40    Creatinine for last 3 days:   Creatinine   Date Value Ref Range Status   2023 0.54 0.51 - 0.95 mg/dL Final   2023 0.57 0.51 - 0.95 mg/dL Final   2023 0.62 0.51 - 0.95 mg/dL Final      Current estimated CrCL:  Serum creatinine: 0.54 mg/dL 23 0656  Estimated creatinine clearance: 224.6 mL/min     Platelet count for last 3 days:   Platelet Count   Date Value Ref Range Status   2023 190 150 - 450 10e3/uL Final   2023 200 150 - 450 10e3/uL Final   2023 224 150 - 450 10e3/uL Final      Recent Enoxaparin anti-Xa Levels (past 3 days):   Recent Labs     23  1322   ALMWH 0.61       ASSESSMENT OF LEVELS AND CURRENT REGIMEN:   1) Enoxaparin anti-XA level was drawn 4 hours post 16 dose at steady state.    2) Current LMWH anti-Xa peak level is: AT GOAL    3) Renal Function:  Scr changed > 50% in last 72 hours? No  Urine Output: Good    4) Platelet Counts have been assessed and are: Stable    RECOMMENDATIONS:    1) Enoxaparin Regimen/Dose Plan: NO change  2) Recheck Enoxaparin anti-Xa levels: Other within 7 days given changing weight and coaguability with pregnancy    The pharmacist will continue to monitor and follow enoxaparin LMWH anti-Xa levels as needed.      Please contact pharmacy if enoxaparin needs to be held for a procedure or if goal  levels change.    Elaine Rader, Pharm.D

## 2023-01-06 NOTE — PLAN OF CARE
"D: Patient admitted 12/6/22 from outside hospital for management of newly diagnosed heart failure and high risk delivery.    I: Monitored and assessed patient status; nursing cares provided.    A:   Today's Highlights/Changes:    Mg 1.7, replaced with one time order for Magnesium 3 grams IV. Next Mg level to be checked in am.    Blood sugar checks today: Fasting am BS=98 (goal is 95) ; 1 hour post-lunch ND=946 (goal is <140).     15-beat run of NSVT at 16:38, Cards 2 MD notified. Pt reports feeling \"heart beat really fast\" during these runs, denies dizziness, lightheadedness, and chest pain at this time.     Neuro: A&Ox4, pleasant, calm, cooperative  Cardiac: Sinus Rhythm with frequent PAC's and PVC's  Resp: on RA, lungs clear, denies shortness of breath  GI/: BMx1 this shift; 3 gram Na Restricted Diet, good appetite. Adequate urine output--using hat in BR, euvolemic, on lasix 40 mg po daily  Skin: No concerns  Pain: Denies  Lytes: Mg 1.7, replaced  LDA's: Left DL PICC, caps changed, HL'd   Activity: Up independently in room      Plan: Continue with current plan of care. Contact Cards 2 for questions/concerns related to cardiac status and OB/Gyn via [facility] pager number for pregnancy-related questions/concerns.     Angeli Eduardo RN        "

## 2023-01-06 NOTE — PROGRESS NOTES
[facility]   Cardiology II Service / Advanced Heart Failure  Daily Progress Note      Patient: Anjali Carmen  MRN: [id number]  Admission Date: 2022  Hospital Day # 31    Assessment and Plan: Anjali Carmen is a 32 year old female  with no significant past medical history, who presented as a transfer from [facility] to [facility] on 2022 for further management of newly diagnosed, acute decompensated systolic heart failure (LVEF 20% now 30-35%) and high risk delivery.     [facility] available  at [phone]    Today's plan  - continue telemetry monitoring   - Replace electrolytes as detailed below   - Fasting blood sugar every morning and 1 hour after each lunch  - Received Tdap 0.5 on 1/3/23    Acute on Chronic SCHF secondary to NICM, pregnancy related with questionable familial component. RV failure. Note family history of father with CM at age 30. TTE  with EF 20-25%, thinned out LV wall, and notable regional wall motion abnormalities. Coronary angiogram  with no significat lesions seen. EF is improved slightly on 23 ECHO to 30-35%.  Stage C, NYHA Class III  ACEi/ARB/ARNI: Contraindicated in pregnancy.   BB Metoprolol Tartrate 75 mg q6h. Nursing: hold if HR<50 or SBP<90 and call Cards2  Aldosterone antagonist contraindicated due to pregnancy  SCD prophylaxis GDMT  Fluid status euvolemic. Lasix 40 mg po daily   - Lovenox given high risk for LV thrombus in setting of antepartum, regional wall motion abnormalities, and low EF.   - Cardiac MRI and genetic testing postpartum recommended.      New Onset PAF. NSVT. Frequent PVC and PAC's.  [id number] score of 2 (sex, HF) - on AC as above for pregnancy and low EF.   [facility] [place], isolated episode, EKG unavailable for review. Currently in NSR at [facility] with frequent ectopy. Having 7-17 PVCs per minute, 3 episodes of NSVT and SVT In the last 25 hours, 5-10 beats.  - Maintain mag >2.4 and K  >4.0  - Metoprolol Tartrate 75 mg q6h. Nursing: hold if HR<50 or sBP<90 and call Cards2  - Lovenox as above     High risk pregnancy  Gestational age 28w1d. . Betamethasone (BMZ) given - at OSH for fetal lung maturity. OB US for fetal growth done on 22 with normal fetal findings and no abnormalities. growth US  reassuring for normal fetal growth  - Appreciate MFM management, closely following with daily checks; can be reached via their pager above  - NST BID  - Magnesium for neuroprotection if moving towards delivery prior to 32 weeks with 6g bolus followed by 2g/hr maintenance until delivery  - S/p  section consent on admission  - per MFM, BP goal 130/80, MAP >65                 - If blood pressures >160/110 for at least 15 minutes, call MFM for guidance of management, although typically will initiate treatment with 5-10 mg of IV hydralazine (preferred from cardiac standpoint) or 20 mg of IV labetalol     Gestational DM.   QID blood glucose checks (fasting, and 1 hour after each meal)- doing only fasting and after lunch  per patient, will discuss with OB/Gyn                - goal blood glucose for AM fasting 95 mg/dL                - 1 hour postprandial check is under 140 mg/dL  - If after 2 weeks of monitoring, if >50% of blood glucose values are elevated, then we would typically initiate medication for treatment of gestational diabetes. Patient declining insulin, would be open to metformin.  - MFM following  -  monitoring to be determined by need for medications     Mild anemia, multifactorial  Due to pregnancy and HF. Hgb 10.2 on admission (MCV 79). Retic count 1.9%. Iron, ferritin, TIBC levels show AVINASH (say 6%). Hgh 9s-low 10s. IV iron 200 mg daily x5 (-). Hgb stable mid 10s.   - limit blood draws     FEN: 3 gram sodium diet   PROPHY:  Lovenox  LINES:  PICC  DISPO:  Pending delivery   CODE STATUS:  Full Code  "  ================================================================    Interval History/ROS:   Patient in good spirits. No chest pain or SOB.       Medications: Reviewed in EPIC.     Physical Exam:   /70   Pulse 85   Temp 98  F (36.7  C) (Oral)   Resp 16   Ht 1.753 m (5' 9\")   Wt 134.3 kg (296 lb)   SpO2 98%   BMI 43.71 kg/m    GENERAL: Appears alert and in good spirits  HEENT: Eye symmetrical and free of discharge bilaterally.  NECK: Supple and without lymphadenopathy.   CV: Irregular, controlled. S1S2 present without murmur, rub, or gallop.   RESPIRATORY: Respirations regular, even, and unlabored. Lungs CTA throughout.   GI: Soft, gravid, and  with normoactive bowel sounds present in all quadrants. No tenderness, rebound, guarding. No organomegaly.   EXTREMITIES: No peripheral edema. All extremities are warm and well perfused.  NEUROLOGIC: Alert and interacting appropriately. No focal deficits.   MUSCULOSKELETAL: No joint swelling or tenderness.   SKIN: No jaundice. No rashes or lesions.     Data:  CMP  Recent Labs   Lab 01/06/23  0656 01/05/23  1425 01/05/23  1113 01/05/23  0934 01/04/23  1422 01/04/23  0845 01/04/23  0658 01/03/23  0710 01/02/23  1352 01/02/23  1212 12/31/22  0626 12/31/22  0514     --   --   --   --   --  134*  --   --  134*  --  133*   POTASSIUM 4.1  --  4.0  --   --   --  4.1 4.1  --  4.2   < > 4.1   CHLORIDE 107  --   --   --   --   --  105  --   --  104  --  104   CO2 17*  --   --   --   --   --  18*  --   --  17*  --  17*   ANIONGAP 12  --   --   --   --   --  11  --   --  13  --  12   GLC 98 124*  --  79 117*   < > 93  --    < > 108*   < > 101*   BUN 10.4  --   --   --   --   --  8.0  --   --  8.0  --  9.9   CR 0.54  --   --   --   --   --  0.57  --   --  0.62  --  0.57   GFRESTIMATED >90  --   --   --   --   --  >90  --   --  >90  --  >90   AYDEN 9.0  --   --   --   --   --  9.0  --   --  9.7  --  9.0   MAG 1.7  --  1.6*  --   --   --  1.8 1.6*  --  1.8   < > 1.8   PHOS  " --   --  3.5  --   --   --  3.9 4.1  --  3.5   < > 3.8   PROTTOTAL 6.4  --   --   --   --   --  6.6  --   --  7.2  --  6.9   ALBUMIN 3.1*  --   --   --   --   --  3.0*  --   --  3.3*  --  3.1*   BILITOTAL 0.2  --   --   --   --   --  0.2  --   --  0.3  --  0.3   ALKPHOS 88  --   --   --   --   --  88  --   --  97  --  91   AST 30  --   --   --   --   --  32  --   --  40*  --  37*   ALT 21  --   --   --   --   --  23  --   --  32  --  25    < > = values in this interval not displayed.     CBC  Recent Labs   Lab 01/06/23  0656 01/04/23  0658 01/02/23  1212 12/31/22  0514   WBC 8.3 8.1 9.5 8.4   RBC 3.59* 3.60* 3.88 3.81   HGB 9.4* 9.3* 10.2* 9.9*   HCT 30.1* 30.0* 32.4* 32.5*   MCV 84 83 84 85   MCH 26.2* 25.8* 26.3* 26.0*   MCHC 31.2* 31.0* 31.5 30.5*   RDW 17.7* 17.6* 17.5* 16.7*    200 224 220

## 2023-01-07 LAB
GLUCOSE BLDC GLUCOMTR-MCNC: 106 MG/DL (ref 70–99)
GLUCOSE BLDC GLUCOMTR-MCNC: 95 MG/DL (ref 70–99)
HOLD SPECIMEN: NORMAL
MAGNESIUM SERPL-MCNC: 1.6 MG/DL (ref 1.7–2.3)
POTASSIUM SERPL-SCNC: 4.4 MMOL/L (ref 3.4–5.3)

## 2023-01-07 PROCEDURE — 250N000011 HC RX IP 250 OP 636: Performed by: STUDENT IN AN ORGANIZED HEALTH CARE EDUCATION/TRAINING PROGRAM

## 2023-01-07 PROCEDURE — 250N000011 HC RX IP 250 OP 636: Performed by: INTERNAL MEDICINE

## 2023-01-07 PROCEDURE — 999N000007 HC SITE CHECK

## 2023-01-07 PROCEDURE — 99231 SBSQ HOSP IP/OBS SF/LOW 25: CPT | Performed by: OBSTETRICS & GYNECOLOGY

## 2023-01-07 PROCEDURE — 250N000013 HC RX MED GY IP 250 OP 250 PS 637: Performed by: NURSE PRACTITIONER

## 2023-01-07 PROCEDURE — 83735 ASSAY OF MAGNESIUM: CPT

## 2023-01-07 PROCEDURE — 36415 COLL VENOUS BLD VENIPUNCTURE: CPT

## 2023-01-07 PROCEDURE — 214N000001 HC R&B CCU UMMC

## 2023-01-07 PROCEDURE — 250N000013 HC RX MED GY IP 250 OP 250 PS 637

## 2023-01-07 PROCEDURE — 84132 ASSAY OF SERUM POTASSIUM: CPT | Performed by: INTERNAL MEDICINE

## 2023-01-07 PROCEDURE — 250N000013 HC RX MED GY IP 250 OP 250 PS 637: Performed by: STUDENT IN AN ORGANIZED HEALTH CARE EDUCATION/TRAINING PROGRAM

## 2023-01-07 PROCEDURE — 99232 SBSQ HOSP IP/OBS MODERATE 35: CPT | Mod: GC | Performed by: STUDENT IN AN ORGANIZED HEALTH CARE EDUCATION/TRAINING PROGRAM

## 2023-01-07 PROCEDURE — 250N000011 HC RX IP 250 OP 636

## 2023-01-07 PROCEDURE — 258N000003 HC RX IP 258 OP 636

## 2023-01-07 RX ADMIN — SODIUM CHLORIDE, PRESERVATIVE FREE 5 ML: 5 INJECTION INTRAVENOUS at 08:00

## 2023-01-07 RX ADMIN — POTASSIUM CHLORIDE 20 MEQ: 40 SOLUTION ORAL at 13:47

## 2023-01-07 RX ADMIN — POTASSIUM CHLORIDE 20 MEQ: 40 SOLUTION ORAL at 20:05

## 2023-01-07 RX ADMIN — MAGNESIUM OXIDE TAB 400 MG (241.3 MG ELEMENTAL MG) 800 MG: 400 (241.3 MG) TAB at 08:22

## 2023-01-07 RX ADMIN — FUROSEMIDE 40 MG: 40 TABLET ORAL at 08:22

## 2023-01-07 RX ADMIN — PRENATAL VIT W/ FE FUMARATE-FA TAB 27-0.8 MG 1 TABLET: 27-0.8 TAB at 08:22

## 2023-01-07 RX ADMIN — Medication 1000 MG: at 20:06

## 2023-01-07 RX ADMIN — METOPROLOL TARTRATE 75 MG: 25 TABLET, FILM COATED ORAL at 02:11

## 2023-01-07 RX ADMIN — ENOXAPARIN SODIUM 120 MG: 120 INJECTION SUBCUTANEOUS at 08:27

## 2023-01-07 RX ADMIN — METOPROLOL TARTRATE 75 MG: 25 TABLET, FILM COATED ORAL at 08:22

## 2023-01-07 RX ADMIN — SODIUM CHLORIDE, PRESERVATIVE FREE 10 ML: 5 INJECTION INTRAVENOUS at 18:34

## 2023-01-07 RX ADMIN — POTASSIUM CHLORIDE 20 MEQ: 40 SOLUTION ORAL at 08:23

## 2023-01-07 RX ADMIN — MAGNESIUM SULFATE HEPTAHYDRATE 3 G: 500 INJECTION, SOLUTION INTRAMUSCULAR; INTRAVENOUS at 10:26

## 2023-01-07 RX ADMIN — METOPROLOL TARTRATE 75 MG: 25 TABLET, FILM COATED ORAL at 13:47

## 2023-01-07 RX ADMIN — ENOXAPARIN SODIUM 120 MG: 120 INJECTION SUBCUTANEOUS at 20:08

## 2023-01-07 RX ADMIN — METOPROLOL TARTRATE 75 MG: 25 TABLET, FILM COATED ORAL at 20:06

## 2023-01-07 RX ADMIN — Medication 25 MG: at 08:22

## 2023-01-07 ASSESSMENT — ACTIVITIES OF DAILY LIVING (ADL)
ADLS_ACUITY_SCORE: 20

## 2023-01-07 NOTE — PLAN OF CARE
Temp: 98.1  F (36.7  C) Temp src: Oral BP: 92/46 Pulse: 84   Resp: 16 SpO2: 98 % O2 Device: None (Room air)       Shift: [id number]  D: Patient admitted 12/6 from OSH for management of newly diagnosed acute decompensated systolic HF and high risk delivery.       I: Monitored vitals and assessed pt status.     A:  Neuro: A&O x4. Slept well between cares throughout the night. Up independent in room.  Cardiac: Tele in place, SR with frequent PVC's & PAC's. Afebrile. Pt denies chest pain or dizziness.  Resp: VSS on RA, lung sounds clear, denies SOB  GI/: Adequate urine output. No BM during shift. 3g Na restricted diet.  Skin: No new deficits noted  LDA's: Left DL PICC in place Hep locked.  Pain: PRN tylenol given for hip pain. Pt denied hot/cold pack.  AM weight: 296 lb    Plan: Continue to monitor and follow POC. Notify Cards 2 with any cardiac changes and OB/Gyn via [facility] pager [phone] with pregnancy changes.

## 2023-01-07 NOTE — PROGRESS NOTES
[facility]   Cardiology II Service / Advanced Heart Failure  Daily Progress Note      Patient: Anjali Carmen  MRN: [id number]  Admission Date: 2022  Hospital Day # 32    Assessment and Plan: Anjali Carmen is a 32 year old female  with no significant past medical history, who presented as a transfer from [facility] to [facility] on 2022 for further management of newly diagnosed, acute decompensated systolic heart failure (LVEF 20% now 30-35%) and high risk delivery.     [facility] available  at [phone]    Today's plan  - continue telemetry monitoring   - Replace electrolytes as detailed below    > Magnesium Sulfate 3gm given   > Scheduled Magnesium Oxide increased 800mg BID -> 1000mg BID  - Fasting blood sugar every morning and 1 hour after each lunch  - Received Tdap 0.5 on 1/3/23    Acute on Chronic SCHF secondary to NICM, pregnancy related with questionable familial component. RV failure. Note family history of father with CM at age 30. TTE  with EF 20-25%, thinned out LV wall, and notable regional wall motion abnormalities. Coronary angiogram  with no significat lesions seen. EF is improved slightly on 23 ECHO to 30-35%.  Stage C, NYHA Class III  ACEi/ARB/ARNI: Contraindicated in pregnancy.   BB Metoprolol Tartrate 75 mg q6h. Nursing: hold if HR<50 or SBP<90 and call Cards2  Aldosterone antagonist contraindicated due to pregnancy  SCD prophylaxis GDMT  Fluid status euvolemic. Lasix 40 mg po daily   - Lovenox given high risk for LV thrombus in setting of antepartum, regional wall motion abnormalities, and low EF.   - Cardiac MRI and genetic testing postpartum recommended.      New Onset PAF. NSVT. Frequent PVC and PAC's.  [id number] score of 2 (sex, HF) - on AC as above for pregnancy and low EF.   [facility] [place], isolated episode, EKG unavailable for review. Currently in NSR at [facility] with frequent ectopy. Having 7-17 PVCs per  minute, 3 episodes of NSVT and SVT In the last 25 hours, 5-10 beats.  - Maintain mag >2.4 and K >4.0  - Metoprolol Tartrate 75 mg q6h. Nursing: hold if HR<50 or sBP<90 and call Cards2  - Lovenox as above     High risk pregnancy  Gestational age 28w1d. . Betamethasone (BMZ) given - at OSH for fetal lung maturity. OB US for fetal growth done on 22 with normal fetal findings and no abnormalities. growth US  reassuring for normal fetal growth  - Appreciate MFM management, closely following with daily checks; can be reached via their pager above  - NST BID  - Magnesium for neuroprotection if moving towards delivery prior to 32 weeks with 6g bolus followed by 2g/hr maintenance until delivery  - S/p  section consent on admission  - per MFM, BP goal 130/80, MAP >65                 - If blood pressures >160/110 for at least 15 minutes, call MFM for guidance of management, although typically will initiate treatment with 5-10 mg of IV hydralazine (preferred from cardiac standpoint) or 20 mg of IV labetalol     Gestational DM.   QID blood glucose checks (fasting, and 1 hour after each meal)- doing only fasting and after lunch  per patient, will discuss with OB/Gyn                - goal blood glucose for AM fasting 95 mg/dL                - 1 hour postprandial check is under 140 mg/dL  - If after 2 weeks of monitoring, if >50% of blood glucose values are elevated, then we would typically initiate medication for treatment of gestational diabetes. Patient declining insulin, would be open to metformin.  - MFM following  -  monitoring to be determined by need for medications     Mild anemia, multifactorial  Due to pregnancy and HF. Hgb 10.2 on admission (MCV 79). Retic count 1.9%. Iron, ferritin, TIBC levels show AVINASH (say 6%). Hgh 9s-low 10s. IV iron 200 mg daily x5 (-). Hgb stable mid 10s.   - limit blood draws     FEN: 3 gram sodium diet   PROPHY:  Lovenox  LINES:  PICC  DISPO:   "Pending delivery   CODE STATUS:  Full Code   ================================================================    Interval History/ROS:   NAEON. Nursing notes reviewed. Patient sitting up in bed, eating breakfast. Voiced concerns about her work as a ; this had caused her to be emotional yesterday as she is unable to work. She asked to fill out [facility] paperwork which she will do on Monday      Medications: Reviewed in EPIC.     Physical Exam:   BP 98/57 (BP Location: Right arm)   Pulse 72   Temp 98.4  F (36.9  C) (Oral)   Resp 16   Ht 1.753 m (5' 9\")   Wt 134.3 kg (296 lb)   SpO2 100%   BMI 43.71 kg/m    GENERAL: Appears alert and in good spirits  HEENT: Eye symmetrical and free of discharge bilaterally.  NECK: Supple and without lymphadenopathy.   CV: Irregular, controlled. S1S2 present without murmur, rub, or gallop.   RESPIRATORY: Respirations regular, even, and unlabored. Lungs CTA throughout.   GI: Soft, gravid, and  with normoactive bowel sounds present in all quadrants. No tenderness, rebound, guarding. No organomegaly.   EXTREMITIES: No peripheral edema. All extremities are warm and well perfused.  NEUROLOGIC: Alert and interacting appropriately. No focal deficits.   MUSCULOSKELETAL: No joint swelling or tenderness.   SKIN: No jaundice. No rashes or lesions.     Data:  CMP  Recent Labs   Lab 01/06/23  1329 01/06/23  0656 01/05/23  1425 01/05/23  1113 01/05/23  0934 01/04/23  0845 01/04/23  0658 01/03/23  0710 01/02/23  1352 01/02/23  1212   NA  --  136  --   --   --   --  134*  --   --  134*   POTASSIUM  --  4.1  --  4.0  --   --  4.1 4.1  --  4.2   CHLORIDE  --  107  --   --   --   --  105  --   --  104   CO2  --  17*  --   --   --   --  18*  --   --  17*   ANIONGAP  --  12  --   --   --   --  11  --   --  13   * 98 124*  --  79   < > 93  --    < > 108*   BUN  --  10.4  --   --   --   --  8.0  --   --  8.0   CR  --  0.54  --   --   --   --  0.57  --   --  0.62   GFRESTIMATED  -- "  >90  --   --   --   --  >90  --   --  >90   AYDEN  --  9.0  --   --   --   --  9.0  --   --  9.7   MAG  --  1.7  --  1.6*  --   --  1.8 1.6*  --  1.8   PHOS  --   --   --  3.5  --   --  3.9 4.1  --  3.5   PROTTOTAL  --  6.4  --   --   --   --  6.6  --   --  7.2   ALBUMIN  --  3.1*  --   --   --   --  3.0*  --   --  3.3*   BILITOTAL  --  0.2  --   --   --   --  0.2  --   --  0.3   ALKPHOS  --  88  --   --   --   --  88  --   --  97   AST  --  30  --   --   --   --  32  --   --  40*   ALT  --  21  --   --   --   --  23  --   --  32    < > = values in this interval not displayed.     CBC  Recent Labs   Lab 01/06/23  0656 01/04/23  0658 01/02/23  1212   WBC 8.3 8.1 9.5   RBC 3.59* 3.60* 3.88   HGB 9.4* 9.3* 10.2*   HCT 30.1* 30.0* 32.4*   MCV 84 83 84   MCH 26.2* 25.8* 26.3*   MCHC 31.2* 31.0* 31.5   RDW 17.7* 17.6* 17.5*    200 224

## 2023-01-07 NOTE — PROGRESS NOTES
Maternal-Fetal Medicine Progress Note:    S: Patient overall doing well. Regular fetal movement. No vaginal bleeding, leakage of fluid, or contractions. She is having some lower lateral pelvic discomfort with walking. No chest pain, dyspnea, palpitations. Anjali shared that she had a lot of stress related to her job and required paperwork yesterday and felt like she wanted to leave. She is doing better this am.    O:  Vitals:    01/07/23 0156 01/07/23 0200 01/07/23 0622 01/07/23 0718   BP:  92/46  98/57   BP Location:  Right arm  Right arm   Cuff Size:  Adult Large     Pulse: 81 84  72   Resp: 16 16  16   Temp:    98.4  F (36.9  C)   TempSrc:    Oral   SpO2:    100%   Weight:   134.3 kg (296 lb)    Height:         Gen Appear: NAD  Abdomen: gravid, soft, non-tender to palpation  Extrem: Trace bilateral LE edema, no calf tenderness    Fetal heart monitoring 1/6/22 pm:  Baseline 135, moderate variability, positive accels, no decels  [place]: none    Echo 1/2/2023:  Interpretation Summary  Left ventricular function is decreased. The ejection fraction is 30-35%  (moderately reduced).  There is wall thinning and akinesis of the basal-mid inferior, basal-mid  inferoseptal and basal inferolateral segments.  Global right ventricular function is mildly to moderately reduced.  Mild to moderate tricuspid insufficiency is present.  Right ventricular systolic pressure is 45mmHg above the right atrial pressure.  Pulmonary hypertension is present.  IVC diameter <2.1 cm collapsing >50% with sniff suggests a normal RA pressure  of 3 mmHg.  No pericardial effusion is present.     This study was compared with the study from 12/6/2022. The left ventricular  function has improved.  ______________________________________________________________________________  Left Ventricle  Mild left ventricular dilation is present. Mild concentric wall thickening  consistent with left ventricular hypertrophy is present. Left ventricular  function is  decreased. The ejection fraction is 30-35% (moderately reduced).  There is wall thinning and akinesis of the basal-mid inferior, basal-mid  inferoseptal and basal inferolateral segments.     Right Ventricle  The right ventricle is normal size. Global right ventricular function is  mildly to moderately reduced.     Mitral Valve  The mitral valve is normal. Trace mitral insufficiency is present.     Aortic Valve  The valve leaflets are not well visualized. On Doppler interrogation, there is  no significant stenosis or regurgitation.     Tricuspid Valve  The tricuspid valve is normal. Mild to moderate tricuspid insufficiency is  present. Right ventricular systolic pressure is 45mmHg above the right atrial  pressure. Pulmonary hypertension is present.     Pulmonic Valve  The pulmonic valve is normal. Trace pulmonic insufficiency is present.     Vessels  The aorta root is normal. IVC diameter <2.1 cm collapsing >50% with sniff  suggests a normal RA pressure of 3 mmHg.     Pericardium  No pericardial effusion is present.     Compared to Previous Study  This study was compared with the study from 2022 . The left ventricular  function has improved.   _____________________________________________________________________________  MMode/2D Measurements & Calculations  IVSd: 1.2 cm  LVIDd: 6.2 cm  LVIDs: 5.5 cm  LVPWd: 1.1 cm  FS: 10.3 %  LV mass(C)d: 318.0 grams  LV mass(C)dI: 130.9 grams/m2     RWT: 0.36     Doppler Measurements & Calculations  PA acc time: 0.10 sec  TR max radha: 336.9 cm/sec  TR max P.4 mmHg    Treponema Antibody: non-reactive    Recent Labs   Lab 23  1329 23  0656 23  1425 23  0934 23  1422 23  0845   * 98 124* 79 117* 87       A/P: 32 year old  at 28w5d admitted for acute HFrEF during pregnancy. Most recent echo on 23 showed EF 30-35%. She is currently on Lasix and metoprolol.    Acute decompensated HFrEF  - Patient is asymptomatic at this time  and appears euvolemic on exam. Repeat echo yesterday and EF improved at 30-35%  - She is currently on metoprolol 75 mg q 6 hrs and lasix 40 mg daily. No contraindications to increasing dose of metoprolol from a pregnancy standpoint, if needed from.   - Agree with Lovenox given risk of LV thrombus  - Appreciate cares per primary cardiology team    Gestational Diabetes  - Goal blood glucose for AM fasting is <95 mg/dL  - 1 hour postprandial check is <140 mg/dL and for 2 hour postprandial is <120 mg/d  - Some fasting values have been outside of the goal, but not >50%. Patient declines insulin if needed, but would agree to metformin if needed.    Routine prenatal care  - Non-stress test (NST) BID  - Repeat growth ultrasound (performed by [facility]) on   - Betamethasone (BMZ) given - at OSH for fetal lung maturity. Candidate for rescue BMZ.   - Magnesium for neuroprotection if moving towards delivery prior to 32 weeks with 6g bolus followed by 2g/hr maintenance until delivery  - Routine indications for emergent delivery including maternal decompensation or fetal compromise  - S/p  section consent on admission  - s/p third trimester RPR, which was normal.   - s/p Tdap on 1/3/23  - Plan IUD and depo-provera prior to discharge postpartum.  - Signed [place] paperwork given to her today    Anemia  Likely related to pregnancy, heart failure, serial blood draws, but also component of iron deficiency anemia.  Last Hgb 9.4 on 23; ferritin 28 on 23  -s/p IV iron infusion   -Recommend oral iron replacement    MANAGEMENT DISCUSSED with the following over the past 24 hours: OB nursing   Tests REVIEWED in the past 24 hours: NST, maternal echocardiogram, glucose, hemoglobin    25 MINUTES SPENT BY ME on the date of service doing chart review, history, exam, documentation & further activities per the note.    Angeli Zhang MD  Date of Service (when I saw the patient): 23

## 2023-01-08 LAB
ALBUMIN SERPL BCG-MCNC: 3 G/DL (ref 3.5–5.2)
ALP SERPL-CCNC: 85 U/L (ref 35–104)
ALT SERPL W P-5'-P-CCNC: 19 U/L (ref 10–35)
ANION GAP SERPL CALCULATED.3IONS-SCNC: 11 MMOL/L (ref 7–15)
AST SERPL W P-5'-P-CCNC: 30 U/L (ref 10–35)
BASOPHILS # BLD AUTO: 0 10E3/UL (ref 0–0.2)
BASOPHILS NFR BLD AUTO: 0 %
BILIRUB SERPL-MCNC: 0.3 MG/DL
BUN SERPL-MCNC: 9.7 MG/DL (ref 6–20)
CALCIUM SERPL-MCNC: 8.9 MG/DL (ref 8.6–10)
CHLORIDE SERPL-SCNC: 107 MMOL/L (ref 98–107)
CREAT SERPL-MCNC: 0.56 MG/DL (ref 0.51–0.95)
DEPRECATED HCO3 PLAS-SCNC: 16 MMOL/L (ref 22–29)
EOSINOPHIL # BLD AUTO: 0 10E3/UL (ref 0–0.7)
EOSINOPHIL NFR BLD AUTO: 0 %
ERYTHROCYTE [DISTWIDTH] IN BLOOD BY AUTOMATED COUNT: 18.3 % (ref 10–15)
GFR SERPL CREATININE-BSD FRML MDRD: >90 ML/MIN/1.73M2
GLUCOSE BLDC GLUCOMTR-MCNC: 92 MG/DL (ref 70–99)
GLUCOSE SERPL-MCNC: 86 MG/DL (ref 70–99)
HCT VFR BLD AUTO: 31.2 % (ref 35–47)
HGB BLD-MCNC: 9.6 G/DL (ref 11.7–15.7)
IMM GRANULOCYTES # BLD: 0.1 10E3/UL
IMM GRANULOCYTES NFR BLD: 1 %
LYMPHOCYTES # BLD AUTO: 2.1 10E3/UL (ref 0.8–5.3)
LYMPHOCYTES NFR BLD AUTO: 23 %
MAGNESIUM SERPL-MCNC: 1.7 MG/DL (ref 1.7–2.3)
MCH RBC QN AUTO: 26.1 PG (ref 26.5–33)
MCHC RBC AUTO-ENTMCNC: 30.8 G/DL (ref 31.5–36.5)
MCV RBC AUTO: 85 FL (ref 78–100)
MONOCYTES # BLD AUTO: 0.5 10E3/UL (ref 0–1.3)
MONOCYTES NFR BLD AUTO: 5 %
NEUTROPHILS # BLD AUTO: 6.4 10E3/UL (ref 1.6–8.3)
NEUTROPHILS NFR BLD AUTO: 71 %
NRBC # BLD AUTO: 0 10E3/UL
NRBC BLD AUTO-RTO: 0 /100
PLATELET # BLD AUTO: 202 10E3/UL (ref 150–450)
POTASSIUM SERPL-SCNC: 4.1 MMOL/L (ref 3.4–5.3)
PROT SERPL-MCNC: 6.5 G/DL (ref 6.4–8.3)
RBC # BLD AUTO: 3.68 10E6/UL (ref 3.8–5.2)
SODIUM SERPL-SCNC: 134 MMOL/L (ref 136–145)
WBC # BLD AUTO: 9.1 10E3/UL (ref 4–11)

## 2023-01-08 PROCEDURE — 250N000013 HC RX MED GY IP 250 OP 250 PS 637

## 2023-01-08 PROCEDURE — 80053 COMPREHEN METABOLIC PANEL: CPT

## 2023-01-08 PROCEDURE — 83735 ASSAY OF MAGNESIUM: CPT

## 2023-01-08 PROCEDURE — 250N000011 HC RX IP 250 OP 636: Performed by: INTERNAL MEDICINE

## 2023-01-08 PROCEDURE — 85025 COMPLETE CBC W/AUTO DIFF WBC: CPT

## 2023-01-08 PROCEDURE — 99232 SBSQ HOSP IP/OBS MODERATE 35: CPT | Mod: GC | Performed by: STUDENT IN AN ORGANIZED HEALTH CARE EDUCATION/TRAINING PROGRAM

## 2023-01-08 PROCEDURE — 250N000013 HC RX MED GY IP 250 OP 250 PS 637: Performed by: STUDENT IN AN ORGANIZED HEALTH CARE EDUCATION/TRAINING PROGRAM

## 2023-01-08 PROCEDURE — 250N000011 HC RX IP 250 OP 636: Performed by: STUDENT IN AN ORGANIZED HEALTH CARE EDUCATION/TRAINING PROGRAM

## 2023-01-08 PROCEDURE — 36415 COLL VENOUS BLD VENIPUNCTURE: CPT

## 2023-01-08 PROCEDURE — 214N000001 HC R&B CCU UMMC

## 2023-01-08 PROCEDURE — 250N000011 HC RX IP 250 OP 636

## 2023-01-08 PROCEDURE — 250N000013 HC RX MED GY IP 250 OP 250 PS 637: Performed by: NURSE PRACTITIONER

## 2023-01-08 RX ORDER — MAGNESIUM SULFATE HEPTAHYDRATE 40 MG/ML
2 INJECTION, SOLUTION INTRAVENOUS ONCE
Status: COMPLETED | OUTPATIENT
Start: 2023-01-08 | End: 2023-01-08

## 2023-01-08 RX ADMIN — POTASSIUM CHLORIDE 20 MEQ: 40 SOLUTION ORAL at 20:55

## 2023-01-08 RX ADMIN — SODIUM CHLORIDE, PRESERVATIVE FREE 5 ML: 5 INJECTION INTRAVENOUS at 07:18

## 2023-01-08 RX ADMIN — POTASSIUM CHLORIDE 20 MEQ: 40 SOLUTION ORAL at 14:27

## 2023-01-08 RX ADMIN — METOPROLOL TARTRATE 75 MG: 25 TABLET, FILM COATED ORAL at 20:56

## 2023-01-08 RX ADMIN — METOPROLOL TARTRATE 75 MG: 25 TABLET, FILM COATED ORAL at 09:29

## 2023-01-08 RX ADMIN — METOPROLOL TARTRATE 75 MG: 25 TABLET, FILM COATED ORAL at 01:41

## 2023-01-08 RX ADMIN — MAGNESIUM SULFATE IN WATER 2 G: 40 INJECTION, SOLUTION INTRAVENOUS at 11:39

## 2023-01-08 RX ADMIN — POTASSIUM CHLORIDE 20 MEQ: 40 SOLUTION ORAL at 08:36

## 2023-01-08 RX ADMIN — PRENATAL VIT W/ FE FUMARATE-FA TAB 27-0.8 MG 1 TABLET: 27-0.8 TAB at 08:35

## 2023-01-08 RX ADMIN — ENOXAPARIN SODIUM 120 MG: 120 INJECTION SUBCUTANEOUS at 08:34

## 2023-01-08 RX ADMIN — METOPROLOL TARTRATE 75 MG: 25 TABLET, FILM COATED ORAL at 14:27

## 2023-01-08 RX ADMIN — ENOXAPARIN SODIUM 120 MG: 120 INJECTION SUBCUTANEOUS at 20:56

## 2023-01-08 RX ADMIN — Medication 1000 MG: at 08:36

## 2023-01-08 RX ADMIN — FUROSEMIDE 40 MG: 40 TABLET ORAL at 08:35

## 2023-01-08 RX ADMIN — Medication 1000 MG: at 20:55

## 2023-01-08 RX ADMIN — Medication 25 MG: at 08:35

## 2023-01-08 RX ADMIN — SODIUM CHLORIDE, PRESERVATIVE FREE 10 ML: 5 INJECTION INTRAVENOUS at 17:43

## 2023-01-08 ASSESSMENT — ACTIVITIES OF DAILY LIVING (ADL)
ADLS_ACUITY_SCORE: 20

## 2023-01-08 NOTE — PROVIDER NOTIFICATION
01/07/23 2020   Uterine Activity Assessment   Method external tocotransducer   Contraction Frequency (Minutes) none   Contraction Intensity no contractions   Uterine Resting Tone soft by palpation   Fetal Assessment   Fetal HR Assessment Method external US   Fetal HR (beats/min) 140   Fetal HR Baseline normal range   Fetal HR Variability moderate (amplitude range 6 to 25 bpm)   Fetal HR Accelerations increase 15 bpm above baseline lasting 15 seconds   Fetal HR Decelerations absent   RN Strip Review Continuous       Pt denies contractions, vaginal bleeding, or leaking. No other complaints at this time.

## 2023-01-08 NOTE — PLAN OF CARE
D: Patient admitted 12/6/22 from outside hospital for management of newly diagnosed heart failure and high risk delivery.     I: Monitored and assessed patient status; nursing cares provided.     A:   Today's Highlights/Changes:    Mg 1.6, replaced with one time order for Magnesium 3 grams IV. Next Mg level to be checked in am. Twice daily scheduled magnesium dose increased.    Blood sugar checks today: Fasting am BS=95 (goal is 95) ; 1 hour post-lunch LP=192 (goal is <140).     Neuro: A&Ox4, pleasant, calm, cooperative  Cardiac: Sinus Rhythm with frequent PAC's and PVC's, no reported runs of NSVT over this shift.  Resp: on RA, lungs clear, denies shortness of breath  GI/:  3 gram Na Restricted Diet, good appetite. Adequate urine output--using hat in BR, euvolemic, on lasix 40 mg po daily  Skin: No concerns  Pain: Denies  Lytes: Mg 1.6, replaced  LDA's: Left DL PICC, HL'd   Activity: Up independently in room        Plan: Continue with current plan of care. Contact Cards 2 for questions/concerns related to cardiac status and OB/Gyn via [facility] pager number for pregnancy-related questions/concerns.      Angeli Eduardo, RN  Cardiology

## 2023-01-08 NOTE — PROVIDER NOTIFICATION
Notified team that patient had 35 beats of VT w/ rate of 146 bpm around 0430. Patient was sleeping, asymptomatic. BP 96/62 MAP 71. Called lab to expedite morning labs. Will continue to monitor.

## 2023-01-08 NOTE — PLAN OF CARE
Temp: 98  F (36.7  C) Temp src: Oral BP: 96/62 Pulse: 78   Resp: 16 SpO2: 99 % O2 Device: None (Room air)       Shift: 6005-1290  D: Patient admitted 12/6 from OSH for management of newly diagnosed acute decompensated systolic HF and high risk delivery.       I: Monitored vitals and assessed pt status.     A:  Neuro: A&O x4. Slept well between cares throughout the night. Up independent in room.  Cardiac: Tele in place, SR with frequent PVC's & PAC's. Afebrile. Pt denies chest pain or dizziness. Team notified of 35 beats of VT w/ rate of 146 bpm around 0430, pt asymptomatic, VSS.   Resp: On RA, lung sounds clear, denies SOB  GI/: Adequate urine output. No BM during shift. 3g Na restricted diet. BS check (fasting AM & 1 hour post-lunch).  Skin: small raised firm bump on left upper arm where Lovenox subcutaneous injection being done. Unsure of hematoma or subcutaneous pocketing of medication.  LDA's: Left DL PICC in place Hep locked.  Pain: Denies pain  AM weight: 297 lb    Plan: Continue to monitor and follow POC. Notify Cards 2 with any cardiac changes and OB/Gyn via [facility] pager [phone] with pregnancy changes.

## 2023-01-08 NOTE — PROGRESS NOTES
[facility]   Cardiology II Service / Advanced Heart Failure  Daily Progress Note      Patient: Anjali Carmen  MRN: [id number]  Admission Date: 2022  Hospital Day # 33    Assessment and Plan: Anjali Carmen is a 32 year old female  with no significant past medical history, who presented as a transfer from [facility] to [facility] on 2022 for further management of newly diagnosed, acute decompensated systolic heart failure (LVEF 20% now 30-35%) and high risk delivery.     [facility] available  at [phone]    Today's plan  - continue telemetry monitoring   - No changes to plan today  - Received Tdap 0.5 on 1/3/23    Acute on Chronic SCHF secondary to NICM, pregnancy related with questionable familial component. RV failure. Note family history of father with CM at age 30. TTE  with EF 20-25%, thinned out LV wall, and notable regional wall motion abnormalities. Coronary angiogram  with no significat lesions seen. EF is improved slightly on 23 ECHO to 30-35%.  Stage C, NYHA Class III  - GDMT:   > ACEi/ARB/ARNI: Contraindicated in pregnancy.    > BB: Metoprolol Tartrate 75 mg q6h. Nursing: hold if HR<50 or SBP<90 and call Cards2   > MRA: contraindicated due to pregnancy  SCD prophylaxis  Fluid status euvolemic. Lasix 40 mg po daily   - Lovenox given high risk for LV thrombus in setting of antepartum, regional wall motion abnormalities, and low EF.   - Cardiac MRI and genetic testing postpartum recommended.      New Onset PAF. NSVT. Frequent PVC and PAC's.  [id number] score of 2 (sex, HF) - on AC as above for pregnancy and low EF.   [facility] [place], isolated episode, EKG unavailable for review. Currently in NSR at [facility] with frequent ectopy. Having 7-17 PVCs per minute, 3 episodes of NSVT and SVT In the last 25 hours, 5-10 beats.  - Maintain mag >2.4 and K >4.0  - Metoprolol Tartrate 75 mg q6h. Nursing: hold if HR<50 or sBP<90 and call  Cards2  - Lovenox as above     High risk pregnancy  Gestational age 28w1d. . Betamethasone (BMZ) given - at OSH for fetal lung maturity. OB US for fetal growth done on 22 with normal fetal findings and no abnormalities. growth US  reassuring for normal fetal growth  - Appreciate MFM management, closely following with daily checks; can be reached via their pager above  - NST BID  - Magnesium for neuroprotection if moving towards delivery prior to 32 weeks with 6g bolus followed by 2g/hr maintenance until delivery  - S/p  section consent on admission  - per MFM, BP goal 130/80, MAP >65                 - If blood pressures >160/110 for at least 15 minutes, call MFM for guidance of management, although typically will initiate treatment with 5-10 mg of IV hydralazine (preferred from cardiac standpoint) or 20 mg of IV labetalol     Gestational DM.   QID blood glucose checks (fasting, and 1 hour after each meal)- doing only fasting and after lunch  per patient, will discuss with OB/Gyn                - goal blood glucose for AM fasting 95 mg/dL                - 1 hour postprandial check is under 140 mg/dL  - If after 2 weeks of monitoring, if >50% of blood glucose values are elevated, then we would typically initiate medication for treatment of gestational diabetes. Patient declining insulin, would be open to metformin.  - MFM following  -  monitoring to be determined by need for medications     Mild anemia, multifactorial  Due to pregnancy and HF. Hgb 10.2 on admission (MCV 79). Retic count 1.9%. Iron, ferritin, TIBC levels show AVINASH (say 6%). Hgh 9s-low 10s. IV iron 200 mg daily x5 (-). Hgb stable mid 10s.   - limit blood draws     FEN: 3 gram sodium diet   PROPHY:  Lovenox  LINES:  PICC  DISPO:  Pending delivery   CODE STATUS:  Full Code   ================================================================    Interval History/ROS:   NAEON. Nursing notes reviewed. Patient  "resting in bed with no acute complaints. Appears exhausted.      Medications: Reviewed in EPIC.     Physical Exam:   BP 96/62 (BP Location: Right arm, Cuff Size: Adult Large)   Pulse 78   Temp 98  F (36.7  C) (Oral)   Resp 16   Ht 1.753 m (5' 9\")   Wt 134.7 kg (297 lb)   SpO2 99%   BMI 43.86 kg/m    GENERAL: No acute distress, tired appearing  HEENT: Eye symmetrical and free of discharge bilaterally.  NECK: Supple and without lymphadenopathy.   CV: Irregular, controlled. S1S2 present without murmur, rub, or gallop.   RESPIRATORY: Respirations regular, even, and unlabored. Lungs CTA throughout.   GI: Soft, gravid, and  with normoactive bowel sounds present in all quadrants. No tenderness, rebound, guarding. No organomegaly.   EXTREMITIES: No peripheral edema. All extremities are warm and well perfused.  NEUROLOGIC: Alert and interacting appropriately. No focal deficits.   MUSCULOSKELETAL: No joint swelling or tenderness.   SKIN: No jaundice. No rashes or lesions.     Data:  CMP  Recent Labs   Lab 01/07/23  1337 01/07/23  0811 01/07/23  0801 01/06/23  1329 01/06/23  0656 01/05/23  1425 01/05/23  1113 01/04/23  0845 01/04/23  0658 01/03/23  0710 01/02/23  1352 01/02/23  1212   NA  --   --   --   --  136  --   --   --  134*  --   --  134*   POTASSIUM  --   --  4.4  --  4.1  --  4.0  --  4.1 4.1  --  4.2   CHLORIDE  --   --   --   --  107  --   --   --  105  --   --  104   CO2  --   --   --   --  17*  --   --   --  18*  --   --  17*   ANIONGAP  --   --   --   --  12  --   --   --  11  --   --  13   * 95  --  135* 98   < >  --    < > 93  --    < > 108*   BUN  --   --   --   --  10.4  --   --   --  8.0  --   --  8.0   CR  --   --   --   --  0.54  --   --   --  0.57  --   --  0.62   GFRESTIMATED  --   --   --   --  >90  --   --   --  >90  --   --  >90   AYDEN  --   --   --   --  9.0  --   --   --  9.0  --   --  9.7   MAG  --   --  1.6*  --  1.7  --  1.6*  --  1.8 1.6*  --  1.8   PHOS  --   --   --   --   --   -- "  3.5  --  3.9 4.1  --  3.5   PROTTOTAL  --   --   --   --  6.4  --   --   --  6.6  --   --  7.2   ALBUMIN  --   --   --   --  3.1*  --   --   --  3.0*  --   --  3.3*   BILITOTAL  --   --   --   --  0.2  --   --   --  0.2  --   --  0.3   ALKPHOS  --   --   --   --  88  --   --   --  88  --   --  97   AST  --   --   --   --  30  --   --   --  32  --   --  40*   ALT  --   --   --   --  21  --   --   --  23  --   --  32    < > = values in this interval not displayed.     CBC  Recent Labs   Lab 01/06/23  0656 01/04/23  0658 01/02/23  1212   WBC 8.3 8.1 9.5   RBC 3.59* 3.60* 3.88   HGB 9.4* 9.3* 10.2*   HCT 30.1* 30.0* 32.4*   MCV 84 83 84   MCH 26.2* 25.8* 26.3*   MCHC 31.2* 31.0* 31.5   RDW 17.7* 17.6* 17.5*    200 224

## 2023-01-09 LAB
GLUCOSE BLDC GLUCOMTR-MCNC: 124 MG/DL (ref 70–99)
GLUCOSE BLDC GLUCOMTR-MCNC: 90 MG/DL (ref 70–99)
MAGNESIUM SERPL-MCNC: 1.6 MG/DL (ref 1.7–2.3)
POTASSIUM SERPL-SCNC: 3.7 MMOL/L (ref 3.4–5.3)

## 2023-01-09 PROCEDURE — 250N000013 HC RX MED GY IP 250 OP 250 PS 637

## 2023-01-09 PROCEDURE — 250N000011 HC RX IP 250 OP 636

## 2023-01-09 PROCEDURE — 250N000013 HC RX MED GY IP 250 OP 250 PS 637: Performed by: NURSE PRACTITIONER

## 2023-01-09 PROCEDURE — 250N000013 HC RX MED GY IP 250 OP 250 PS 637: Performed by: STUDENT IN AN ORGANIZED HEALTH CARE EDUCATION/TRAINING PROGRAM

## 2023-01-09 PROCEDURE — 84132 ASSAY OF SERUM POTASSIUM: CPT | Performed by: INTERNAL MEDICINE

## 2023-01-09 PROCEDURE — 250N000011 HC RX IP 250 OP 636: Performed by: INTERNAL MEDICINE

## 2023-01-09 PROCEDURE — 214N000001 HC R&B CCU UMMC

## 2023-01-09 PROCEDURE — 99231 SBSQ HOSP IP/OBS SF/LOW 25: CPT | Mod: GC | Performed by: STUDENT IN AN ORGANIZED HEALTH CARE EDUCATION/TRAINING PROGRAM

## 2023-01-09 PROCEDURE — 36592 COLLECT BLOOD FROM PICC: CPT

## 2023-01-09 PROCEDURE — 250N000011 HC RX IP 250 OP 636: Performed by: STUDENT IN AN ORGANIZED HEALTH CARE EDUCATION/TRAINING PROGRAM

## 2023-01-09 PROCEDURE — 36415 COLL VENOUS BLD VENIPUNCTURE: CPT | Performed by: INTERNAL MEDICINE

## 2023-01-09 PROCEDURE — 83735 ASSAY OF MAGNESIUM: CPT

## 2023-01-09 RX ORDER — MAGNESIUM SULFATE HEPTAHYDRATE 40 MG/ML
2 INJECTION, SOLUTION INTRAVENOUS ONCE
Status: COMPLETED | OUTPATIENT
Start: 2023-01-09 | End: 2023-01-09

## 2023-01-09 RX ADMIN — SENNOSIDES 8.6 MG: 8.6 TABLET, COATED ORAL at 19:38

## 2023-01-09 RX ADMIN — POTASSIUM CHLORIDE 20 MEQ: 40 SOLUTION ORAL at 08:25

## 2023-01-09 RX ADMIN — PRENATAL VIT W/ FE FUMARATE-FA TAB 27-0.8 MG 1 TABLET: 27-0.8 TAB at 08:26

## 2023-01-09 RX ADMIN — FUROSEMIDE 40 MG: 40 TABLET ORAL at 08:27

## 2023-01-09 RX ADMIN — METOPROLOL TARTRATE 75 MG: 25 TABLET, FILM COATED ORAL at 19:37

## 2023-01-09 RX ADMIN — POTASSIUM CHLORIDE 20 MEQ: 40 SOLUTION ORAL at 13:56

## 2023-01-09 RX ADMIN — Medication 1000 MG: at 19:38

## 2023-01-09 RX ADMIN — POTASSIUM CHLORIDE 20 MEQ: 40 SOLUTION ORAL at 19:38

## 2023-01-09 RX ADMIN — Medication 25 MG: at 08:26

## 2023-01-09 RX ADMIN — ENOXAPARIN SODIUM 120 MG: 120 INJECTION SUBCUTANEOUS at 08:27

## 2023-01-09 RX ADMIN — MAGNESIUM SULFATE IN WATER 2 G: 40 INJECTION, SOLUTION INTRAVENOUS at 11:34

## 2023-01-09 RX ADMIN — SODIUM CHLORIDE, PRESERVATIVE FREE 5 ML: 5 INJECTION INTRAVENOUS at 07:03

## 2023-01-09 RX ADMIN — METOPROLOL TARTRATE 75 MG: 25 TABLET, FILM COATED ORAL at 13:57

## 2023-01-09 RX ADMIN — ENOXAPARIN SODIUM 120 MG: 120 INJECTION SUBCUTANEOUS at 19:37

## 2023-01-09 RX ADMIN — SODIUM CHLORIDE, PRESERVATIVE FREE 15 ML: 5 INJECTION INTRAVENOUS at 15:53

## 2023-01-09 RX ADMIN — METOPROLOL TARTRATE 75 MG: 25 TABLET, FILM COATED ORAL at 01:45

## 2023-01-09 RX ADMIN — Medication 1000 MG: at 08:26

## 2023-01-09 ASSESSMENT — ACTIVITIES OF DAILY LIVING (ADL)
ADLS_ACUITY_SCORE: 20

## 2023-01-09 NOTE — PLAN OF CARE
Temp: 98.4  F (36.9  C) Temp src: Oral BP: 101/68 Pulse: 74   Resp: 16 SpO2: 98 % O2 Device: None (Room air)       Shift: 5561-7328  D: Patient admitted 12/6 from OSH for management of newly diagnosed acute decompensated systolic HF and high risk delivery.       I: Monitored vitals and assessed pt status.     A:  Neuro: A&O x4. Slept well between cares throughout the night. Up independent in room.  Cardiac: Tele in place, SR with frequent PVC's & PAC's. Afebrile. Pt denies chest pain or dizziness  Resp: On RA, lung sounds clear, denies SOB  GI/: Adequate urine output. No BM during shift. 3g Na restricted diet. BS check (fasting AM & 1 hour post-lunch).  Skin: No new deficits noted  LDA's: Left DL PICC in place Hep locked.  Pain: Denies pain  AM weight: 297 lb 1.6 oz    Plan: Continue to monitor and follow POC. Notify Cards 2 with any cardiac changes and OB/Gyn via [facility] pager [phone] with pregnancy changes.

## 2023-01-09 NOTE — PROVIDER NOTIFICATION
01/08/23 2115   Uterine Activity Assessment   Method external tocotransducer   Contraction Intensity no contractions   Fetal Assessment   Fetal Movement active   Fetal HR Assessment Method external US   Fetal HR (beats/min) 150   Fetal HR Baseline normal range   Fetal HR Variability moderate (amplitude range 6 to 25 bpm)   Fetal HR Accelerations increase 10 bpm above baseline lasting 10 seconds (less than 32 weeks gestation)   Fetal HR Decelerations variable   RN Strip Review Continuous   NST Start Time 2115   NST Stop Time 2140   NST Extended Time No   Non-stress Test Interpretation Appropriate for gestational age;Reactive

## 2023-01-09 NOTE — PLAN OF CARE
Neuro: A&Ox4.   Cardiac: SR with some PVC's and PAC's, denies chest pain, Afebrile, low blood pressure at times.   Respiratory: RA, denies SOB   GI/: Voiding spontaneously. 1 BM this shift.   Diet/appetite: Tolerating 3 g Na diet. intermittent nausea   Activity: Up independently     Pain: Denies   Skin: No new deficits noted.  Lines: PICC, saline locked  Drains: None  Replacements: Mg was 1.6 this morning, received 2 g IV Mg+, not other replacement needed.    Pt has been resting comfortably throughout day, will continue to monitor and follow plan of care, notify Cards 2 with any cardiac changes and OB/Gyn with any pregnancy changes.      Problem: Plan of Care - These are the overarching goals to be used throughout the patient stay.    Goal: Plan of Care Review  Description: The Plan of Care Review/Shift note should be completed every shift.  The Outcome Evaluation is a brief statement about your assessment that the patient is improving, declining, or no change.  This information will be displayed automatically on your shift note.  Outcome: Progressing  Flowsheets (Taken 1/9/2023 0538)  Plan of Care Reviewed With: patient     Problem: Oral Intake Inadequate  Goal: Improved Oral Intake  Outcome: Progressing   Goal Outcome Evaluation:      Plan of Care Reviewed With: patient

## 2023-01-09 NOTE — PLAN OF CARE
D: Patient admitted 12/6/22 from outside hospital for management of newly diagnosed heart failure and high risk delivery.     I: Monitored and assessed patient status; nursing cares provided.     A:   Today's Highlights/Changes:    Mg 1.7, replaced with one time order for Magnesium 2 grams IV. Next Mg level to be checked in am.     Blood sugar checks today: Fasting am BS=86 (goal is 95) ; 1 hour post-lunch BS=92 (goal is <140).      Neuro: A&Ox4, pleasant, calm, cooperative  Cardiac: Sinus Rhythm with frequent PAC's and PVC's, no reported runs of NSVT over this shift.  Resp: on RA, lungs clear, denies shortness of breath  GI/:  3 gram Na Restricted Diet, good appetite. Adequate urine output--using hat in BR, euvolemic, on lasix 40 mg po daily  Skin: No concerns  Pain: Denies  Lytes: Mg 1.7, replaced  LDA's: Left DL PICC, HL'd   Activity: Up independently in room        Plan: Continue with current plan of care. Contact Cards 2 for questions/concerns related to cardiac status and OB/Gyn via [facility] pager number for pregnancy-related questions/concerns.      Angeli Eduardo RN  Cardiology

## 2023-01-09 NOTE — PROGRESS NOTES
[facility]   Cardiology II Service / Advanced Heart Failure  Daily Progress Note      Patient: Anjali Carmen  MRN: [id number]  Admission Date: 2022  Hospital Day # 34    Assessment and Plan: Anjali Carmen is a 32 year old female  at 29 weeks with no significant past medical history, who presented as a transfer from [facility] to [facility] on 2022 for further management of newly diagnosed, acute HFrEF 2/2 NICM (LVEF 20% now 30-35%) and high risk delivery.     [facility] available  at [phone]    Today's plan  - continue telemetry monitoring   - Replace electrolytes as detailed below    > Magnesium Sulfate 3gm given   > Continue Magnesium [id number] BID  - Fasting blood sugar every morning and 1 hour after each lunch  - Received Tdap 0.5 on 1/3/23    Acute on Chronic SCHF secondary to NICM, pregnancy related with questionable familial component. RV failure. Note family history of father with CM at age 30. TTE  with EF 20-25%, thinned out LV wall, and notable regional wall motion abnormalities. Coronary angiogram  with no significat lesions seen. EF is improved slightly on 23 ECHO to 30-35%.  Stage C, NYHA Class III  ACEi/ARB/ARNI: Contraindicated in pregnancy.   BB Metoprolol Tartrate 75 mg q6h. Nursing: hold if HR<50 or SBP<90 and call Cards2  Aldosterone antagonist contraindicated due to pregnancy  SCD prophylaxis GDMT  Fluid status euvolemic. Lasix 40 mg po daily   - Lovenox given high risk for LV thrombus in setting of antepartum, regional wall motion abnormalities, and low EF.   - Cardiac MRI and genetic testing postpartum recommended.      New Onset PAF. NSVT. Frequent PVC and PAC's.  [id number] score of 2 (sex, HF) - on AC as above for pregnancy and low EF.   [facility] [place], isolated episode, EKG unavailable for review. Currently in NSR at [facility] with frequent ectopy. Having 7-17 PVCs per minute, 3 episodes of NSVT and SVT  In the last 25 hours, 5-10 beats.  - Maintain mag >2.4 and K >4.0  - Metoprolol Tartrate 75 mg q6h. Nursing: hold if HR<50 or sBP<90 and call Cards2  - Lovenox as above     High risk pregnancy  Gestational age 28w1d. . Betamethasone (BMZ) given - at OSH for fetal lung maturity. OB US for fetal growth done on 22 with normal fetal findings and no abnormalities. growth US  reassuring for normal fetal growth. Will likely do elective  at GA 34 wks  - Appreciate MFM management, closely following with daily checks; can be reached via their pager above  - NST BID  - Magnesium for neuroprotection if moving towards delivery prior to 32 weeks with 6g bolus followed by 2g/hr maintenance until delivery  - S/p  section consent on admission  - per MFM, BP goal 130/80, MAP >65                 - If blood pressures >160/110 for at least 15 minutes, call MFM for guidance of management, although typically will initiate treatment with 5-10 mg of IV hydralazine (preferred from cardiac standpoint) or 20 mg of IV labetalol     Gestational DM.   QID blood glucose checks (fasting, and 1 hour after each meal)- doing only fasting and after lunch  per patient, will discuss with OB/Gyn                - goal blood glucose for AM fasting 95 mg/dL                - 1 hour postprandial check is under 140 mg/dL  - If after 2 weeks of monitoring, if >50% of blood glucose values are elevated, then we would typically initiate medication for treatment of gestational diabetes. Patient declining insulin, would be open to metformin.  - MFM following  -  monitoring to be determined by need for medications     Mild anemia, multifactorial  Due to pregnancy and HF. Hgb 10.2 on admission (MCV 79). Retic count 1.9%. Iron, ferritin, TIBC levels show AVINASH (say 6%). Hgh 9s-low 10s. IV iron 200 mg daily x5 (-). Hgb stable mid 10s.   - limit blood draws     FEN: 3 gram sodium diet   PROPHY:  Lovenox  LINES:   "PICC  DISPO:  Pending delivery   CODE STATUS:  Full Code   ================================================================    Interval History/ROS:   NAEON. Nursing notes reviewed. Patient sleeping, but woke up when asked to. Denies fever, shortness of breath, orthopnea, PND, chest pain or palpitations.      Medications: Reviewed in EPIC.     Physical Exam:   BP 92/49   Pulse 77   Temp 98.4  F (36.9  C) (Oral)   Resp 16   Ht 1.753 m (5' 9\")   Wt 134.8 kg (297 lb 1.6 oz)   SpO2 99%   BMI 43.87 kg/m    GENERAL: Appears alert and in good spirits  HEENT: Eye symmetrical and free of discharge bilaterally.  NECK: Supple and without lymphadenopathy.   CV: Irregular, controlled. S1S2 present without murmur, rub, or gallop.   RESPIRATORY: Respirations regular, even, and unlabored. Lungs CTA throughout.   GI: Soft, gravid, and  with normoactive bowel sounds present in all quadrants. No tenderness, rebound, guarding. No organomegaly.   EXTREMITIES: No peripheral edema. All extremities are warm and well perfused.  NEUROLOGIC: Alert and interacting appropriately. No focal deficits.   MUSCULOSKELETAL: No joint swelling or tenderness.   SKIN: No jaundice. No rashes or lesions.     Data:  CMP  Recent Labs   Lab 01/09/23  0904 01/09/23  0708 01/08/23  1514 01/08/23  0721 01/07/23  1337 01/07/23  0811 01/07/23  0801 01/06/23  1329 01/06/23  0656 01/05/23  1425 01/05/23  1113 01/04/23  0845 01/04/23  0658 01/03/23  0710 01/02/23  1352 01/02/23  1212   NA  --   --   --  134*  --   --   --   --  136  --   --   --  134*  --   --  134*   POTASSIUM  --   --   --  4.1  --   --  4.4  --  4.1  --  4.0  --  4.1 4.1  --  4.2   CHLORIDE  --   --   --  107  --   --   --   --  107  --   --   --  105  --   --  104   CO2  --   --   --  16*  --   --   --   --  17*  --   --   --  18*  --   --  17*   ANIONGAP  --   --   --  11  --   --   --   --  12  --   --   --  11  --   --  13   GLC 90  --  92 86 106*   < >  --    < > 98   < >  --    < > 93  " --    < > 108*   BUN  --   --   --  9.7  --   --   --   --  10.4  --   --   --  8.0  --   --  8.0   CR  --   --   --  0.56  --   --   --   --  0.54  --   --   --  0.57  --   --  0.62   GFRESTIMATED  --   --   --  >90  --   --   --   --  >90  --   --   --  >90  --   --  >90   AYDEN  --   --   --  8.9  --   --   --   --  9.0  --   --   --  9.0  --   --  9.7   MAG  --  1.6*  --  1.7  --   --  1.6*  --  1.7  --  1.6*  --  1.8 1.6*  --  1.8   PHOS  --   --   --   --   --   --   --   --   --   --  3.5  --  3.9 4.1  --  3.5   PROTTOTAL  --   --   --  6.5  --   --   --   --  6.4  --   --   --  6.6  --   --  7.2   ALBUMIN  --   --   --  3.0*  --   --   --   --  3.1*  --   --   --  3.0*  --   --  3.3*   BILITOTAL  --   --   --  0.3  --   --   --   --  0.2  --   --   --  0.2  --   --  0.3   ALKPHOS  --   --   --  85  --   --   --   --  88  --   --   --  88  --   --  97   AST  --   --   --  30  --   --   --   --  30  --   --   --  32  --   --  40*   ALT  --   --   --  19  --   --   --   --  21  --   --   --  23  --   --  32    < > = values in this interval not displayed.     CBC  Recent Labs   Lab 01/08/23  0721 01/06/23  0656 01/04/23  0658 01/02/23  1212   WBC 9.1 8.3 8.1 9.5   RBC 3.68* 3.59* 3.60* 3.88   HGB 9.6* 9.4* 9.3* 10.2*   HCT 31.2* 30.1* 30.0* 32.4*   MCV 85 84 83 84   MCH 26.1* 26.2* 25.8* 26.3*   MCHC 30.8* 31.2* 31.0* 31.5   RDW 18.3* 17.7* 17.6* 17.5*    190 200 224

## 2023-01-10 LAB
ALBUMIN SERPL BCG-MCNC: 3.3 G/DL (ref 3.5–5.2)
ALP SERPL-CCNC: 97 U/L (ref 35–104)
ALT SERPL W P-5'-P-CCNC: 21 U/L (ref 10–35)
ANION GAP SERPL CALCULATED.3IONS-SCNC: 12 MMOL/L (ref 7–15)
ANION GAP SERPL CALCULATED.3IONS-SCNC: 14 MMOL/L (ref 7–15)
AST SERPL W P-5'-P-CCNC: 32 U/L (ref 10–35)
BASE EXCESS BLDV CALC-SCNC: -3.6 MMOL/L (ref -7.7–1.9)
BASOPHILS # BLD AUTO: 0 10E3/UL (ref 0–0.2)
BASOPHILS NFR BLD AUTO: 0 %
BILIRUB SERPL-MCNC: 0.3 MG/DL
BUN SERPL-MCNC: 13.4 MG/DL (ref 6–20)
BUN SERPL-MCNC: 8.9 MG/DL (ref 6–20)
CALCIUM SERPL-MCNC: 8.9 MG/DL (ref 8.6–10)
CALCIUM SERPL-MCNC: 9.3 MG/DL (ref 8.6–10)
CHLORIDE SERPL-SCNC: 105 MMOL/L (ref 98–107)
CHLORIDE SERPL-SCNC: 106 MMOL/L (ref 98–107)
CREAT SERPL-MCNC: 0.58 MG/DL (ref 0.51–0.95)
CREAT SERPL-MCNC: 0.58 MG/DL (ref 0.51–0.95)
DEPRECATED HCO3 PLAS-SCNC: 16 MMOL/L (ref 22–29)
DEPRECATED HCO3 PLAS-SCNC: 16 MMOL/L (ref 22–29)
EOSINOPHIL # BLD AUTO: 0 10E3/UL (ref 0–0.7)
EOSINOPHIL NFR BLD AUTO: 0 %
ERYTHROCYTE [DISTWIDTH] IN BLOOD BY AUTOMATED COUNT: 18.6 % (ref 10–15)
GFR SERPL CREATININE-BSD FRML MDRD: >90 ML/MIN/1.73M2
GFR SERPL CREATININE-BSD FRML MDRD: >90 ML/MIN/1.73M2
GLUCOSE BLDC GLUCOMTR-MCNC: 115 MG/DL (ref 70–99)
GLUCOSE BLDC GLUCOMTR-MCNC: 92 MG/DL (ref 70–99)
GLUCOSE SERPL-MCNC: 87 MG/DL (ref 70–99)
GLUCOSE SERPL-MCNC: 95 MG/DL (ref 70–99)
HCO3 BLDV-SCNC: 21 MMOL/L (ref 21–28)
HCT VFR BLD AUTO: 32.2 % (ref 35–47)
HGB BLD-MCNC: 10.2 G/DL (ref 11.7–15.7)
IMM GRANULOCYTES # BLD: 0.1 10E3/UL
IMM GRANULOCYTES NFR BLD: 1 %
LYMPHOCYTES # BLD AUTO: 1.8 10E3/UL (ref 0.8–5.3)
LYMPHOCYTES NFR BLD AUTO: 20 %
MAGNESIUM SERPL-MCNC: 1.9 MG/DL (ref 1.7–2.3)
MAGNESIUM SERPL-MCNC: 2 MG/DL (ref 1.7–2.3)
MCH RBC QN AUTO: 26.8 PG (ref 26.5–33)
MCHC RBC AUTO-ENTMCNC: 31.7 G/DL (ref 31.5–36.5)
MCV RBC AUTO: 85 FL (ref 78–100)
MONOCYTES # BLD AUTO: 0.4 10E3/UL (ref 0–1.3)
MONOCYTES NFR BLD AUTO: 4 %
NEUTROPHILS # BLD AUTO: 6.9 10E3/UL (ref 1.6–8.3)
NEUTROPHILS NFR BLD AUTO: 75 %
NRBC # BLD AUTO: 0 10E3/UL
NRBC BLD AUTO-RTO: 0 /100
O2/TOTAL GAS SETTING VFR VENT: 21 %
PCO2 BLDV: 35 MM HG (ref 40–50)
PH BLDV: 7.39 [PH] (ref 7.32–7.43)
PLATELET # BLD AUTO: 226 10E3/UL (ref 150–450)
PO2 BLDV: 35 MM HG (ref 25–47)
POTASSIUM SERPL-SCNC: 4 MMOL/L (ref 3.4–5.3)
POTASSIUM SERPL-SCNC: 4.2 MMOL/L (ref 3.4–5.3)
PROT SERPL-MCNC: 7.1 G/DL (ref 6.4–8.3)
RBC # BLD AUTO: 3.81 10E6/UL (ref 3.8–5.2)
SODIUM SERPL-SCNC: 134 MMOL/L (ref 136–145)
SODIUM SERPL-SCNC: 135 MMOL/L (ref 136–145)
WBC # BLD AUTO: 9.2 10E3/UL (ref 4–11)

## 2023-01-10 PROCEDURE — 214N000001 HC R&B CCU UMMC

## 2023-01-10 PROCEDURE — 250N000011 HC RX IP 250 OP 636: Performed by: STUDENT IN AN ORGANIZED HEALTH CARE EDUCATION/TRAINING PROGRAM

## 2023-01-10 PROCEDURE — 83735 ASSAY OF MAGNESIUM: CPT

## 2023-01-10 PROCEDURE — 99232 SBSQ HOSP IP/OBS MODERATE 35: CPT | Mod: GC | Performed by: STUDENT IN AN ORGANIZED HEALTH CARE EDUCATION/TRAINING PROGRAM

## 2023-01-10 PROCEDURE — 250N000011 HC RX IP 250 OP 636: Performed by: INTERNAL MEDICINE

## 2023-01-10 PROCEDURE — 82310 ASSAY OF CALCIUM: CPT

## 2023-01-10 PROCEDURE — 250N000013 HC RX MED GY IP 250 OP 250 PS 637: Performed by: INTERNAL MEDICINE

## 2023-01-10 PROCEDURE — 258N000003 HC RX IP 258 OP 636: Performed by: STUDENT IN AN ORGANIZED HEALTH CARE EDUCATION/TRAINING PROGRAM

## 2023-01-10 PROCEDURE — 36592 COLLECT BLOOD FROM PICC: CPT

## 2023-01-10 PROCEDURE — 99232 SBSQ HOSP IP/OBS MODERATE 35: CPT | Mod: 25 | Performed by: STUDENT IN AN ORGANIZED HEALTH CARE EDUCATION/TRAINING PROGRAM

## 2023-01-10 PROCEDURE — 250N000013 HC RX MED GY IP 250 OP 250 PS 637

## 2023-01-10 PROCEDURE — 250N000013 HC RX MED GY IP 250 OP 250 PS 637: Performed by: NURSE PRACTITIONER

## 2023-01-10 PROCEDURE — 36415 COLL VENOUS BLD VENIPUNCTURE: CPT

## 2023-01-10 PROCEDURE — 85025 COMPLETE CBC W/AUTO DIFF WBC: CPT

## 2023-01-10 PROCEDURE — 59025 FETAL NON-STRESS TEST: CPT | Mod: 26 | Performed by: STUDENT IN AN ORGANIZED HEALTH CARE EDUCATION/TRAINING PROGRAM

## 2023-01-10 PROCEDURE — 82803 BLOOD GASES ANY COMBINATION: CPT

## 2023-01-10 PROCEDURE — 250N000011 HC RX IP 250 OP 636

## 2023-01-10 PROCEDURE — 250N000013 HC RX MED GY IP 250 OP 250 PS 637: Performed by: STUDENT IN AN ORGANIZED HEALTH CARE EDUCATION/TRAINING PROGRAM

## 2023-01-10 RX ORDER — MAGNESIUM SULFATE HEPTAHYDRATE 40 MG/ML
2 INJECTION, SOLUTION INTRAVENOUS ONCE
Status: COMPLETED | OUTPATIENT
Start: 2023-01-10 | End: 2023-01-10

## 2023-01-10 RX ADMIN — MAGNESIUM SULFATE IN WATER 2 G: 40 INJECTION, SOLUTION INTRAVENOUS at 09:09

## 2023-01-10 RX ADMIN — POTASSIUM CHLORIDE 20 MEQ: 40 SOLUTION ORAL at 14:36

## 2023-01-10 RX ADMIN — ACETAMINOPHEN 650 MG: 325 TABLET, FILM COATED ORAL at 12:20

## 2023-01-10 RX ADMIN — SODIUM CHLORIDE, PRESERVATIVE FREE 5 ML: 5 INJECTION INTRAVENOUS at 10:05

## 2023-01-10 RX ADMIN — POTASSIUM CHLORIDE 20 MEQ: 40 SOLUTION ORAL at 08:44

## 2023-01-10 RX ADMIN — ENOXAPARIN SODIUM 120 MG: 120 INJECTION SUBCUTANEOUS at 08:45

## 2023-01-10 RX ADMIN — METOPROLOL TARTRATE 75 MG: 25 TABLET, FILM COATED ORAL at 14:38

## 2023-01-10 RX ADMIN — METOPROLOL TARTRATE 75 MG: 25 TABLET, FILM COATED ORAL at 20:39

## 2023-01-10 RX ADMIN — SENNOSIDES 8.6 MG: 8.6 TABLET, COATED ORAL at 20:39

## 2023-01-10 RX ADMIN — METOPROLOL TARTRATE 75 MG: 25 TABLET, FILM COATED ORAL at 08:44

## 2023-01-10 RX ADMIN — METOPROLOL TARTRATE 75 MG: 25 TABLET, FILM COATED ORAL at 01:29

## 2023-01-10 RX ADMIN — SODIUM CHLORIDE, PRESERVATIVE FREE 5 ML: 5 INJECTION INTRAVENOUS at 01:14

## 2023-01-10 RX ADMIN — Medication 25 MG: at 09:10

## 2023-01-10 RX ADMIN — MAGNESIUM SULFATE IN WATER 2 G: 40 INJECTION, SOLUTION INTRAVENOUS at 03:21

## 2023-01-10 RX ADMIN — Medication 1000 MG: at 20:39

## 2023-01-10 RX ADMIN — MAGNESIUM SULFATE IN WATER 2 G: 40 INJECTION, SOLUTION INTRAVENOUS at 13:36

## 2023-01-10 RX ADMIN — PRENATAL VIT W/ FE FUMARATE-FA TAB 27-0.8 MG 1 TABLET: 27-0.8 TAB at 08:44

## 2023-01-10 RX ADMIN — ENOXAPARIN SODIUM 120 MG: 120 INJECTION SUBCUTANEOUS at 20:43

## 2023-01-10 RX ADMIN — FUROSEMIDE 40 MG: 40 TABLET ORAL at 08:45

## 2023-01-10 RX ADMIN — POTASSIUM CHLORIDE 20 MEQ: 40 SOLUTION ORAL at 20:39

## 2023-01-10 RX ADMIN — SODIUM CHLORIDE, PRESERVATIVE FREE 5 ML: 5 INJECTION INTRAVENOUS at 19:07

## 2023-01-10 RX ADMIN — Medication 1000 MG: at 08:45

## 2023-01-10 RX ADMIN — SODIUM CHLORIDE, POTASSIUM CHLORIDE, SODIUM LACTATE AND CALCIUM CHLORIDE 500 ML: 600; 310; 30; 20 INJECTION, SOLUTION INTRAVENOUS at 13:38

## 2023-01-10 ASSESSMENT — ACTIVITIES OF DAILY LIVING (ADL)
ADLS_ACUITY_SCORE: 20

## 2023-01-10 NOTE — PROGRESS NOTES
Brief Cardiology update note    Patient is feeling nauseous and vomited once earlier this afternoon. We will hold PO diuretics. Order IV bolus 500 ml and stop PO diuretics. Will also give additional magnesium 2 mg      Jorge Reyes Castro, MD, MS  Cardiovascular disease fellow

## 2023-01-10 NOTE — PLAN OF CARE
"BP 97/47 (BP Location: Right arm, Patient Position: Supine, Cuff Size: Adult Large)   Pulse 77   Temp 97.9  F (36.6  C) (Oral)   Resp 16   Ht 1.753 m (5' 9\")   Wt 134.8 kg (297 lb 1.6 oz)   SpO2 99%   BMI 43.87 kg/m      Goal Outcome Evaluation:    Plan of care reviewed with:patient    Overall patient progress:no change    Time: 0488-3191  Status:admitted on12/06/22 due to acute heart failure 2/2 NICM and high risk delivery. 29 weeks of pregnant.   Neuro/Pain:  A&Ox4, denied pain. Flat affect.   Activity: independent   Cardiac/vs: SR, occasional PVCs and PACs. Afebrile. SVT for 43 beats at 11:30 pm last night, symptomatic. Pt reported that he felt palpitation and was awaken from her sleep because of the feeling.   Respiratory: Room air sating > 95%, denied SOB or coughing.   GI/: last bm yesterday, active bowel sound. Voiding spontaneously without difficult.   Diet: 3g Na+, Denied nausea.   Skin: no skin deficit.   LDAs: PICC double lumen Hep locked.   Labs/Imaging: Mg2+ 1.9 this am. Replaced with onetime order.   Change this shift: SVT for 17 second or 43 beats at 11:30 pm last night. VBG, CMP lab work done. Mg2+ replaced with onetime order.   Plan: continue to monitor.        "

## 2023-01-10 NOTE — PROGRESS NOTES
[facility]   Cardiology II Service / Advanced Heart Failure  Daily Progress Note      Patient: Anjali Carmen  MRN: [id number]  Admission Date: 2022  Hospital Day # 35    Assessment and Plan: Anjali Carmen is a 32 year old female  at 29 weeks with no significant past medical history, who presented as a transfer from [facility] to [facility] on 2022 for further management of newly diagnosed, acute HFrEF 2/2 NICM (LVEF 20% now 30-35%) and high risk delivery.     MFM available  at [phone]    Today's plan  - continue telemetry monitoring   - Replace electrolytes as detailed below    > Magnesium Sulfate 2 mg ordered    > Continue Magnesium [id number] BID  - Fasting blood sugar every morning and 1 hour after each lunch  - Received Tdap 0.5 on 1/3/23    Acute on Chronic SCHF secondary to NICM, pregnancy related with questionable familial component. RV failure. Note family history of father with CM at age 30. TTE  with EF 20-25%, thinned out LV wall, and notable regional wall motion abnormalities. Coronary angiogram  with no significat lesions seen. EF is improved slightly on 23 ECHO to 30-35%.  Stage C, NYHA Class III  ACEi/ARB/ARNI: Contraindicated in pregnancy.   BB Metoprolol Tartrate 75 mg q6h. Nursing: hold if HR<50 or SBP<90 and call Cards2  Aldosterone antagonist contraindicated due to pregnancy  SCD prophylaxis GDMT  Fluid status euvolemic. Lasix 40 mg po daily   - Lovenox given high risk for LV thrombus in setting of antepartum, regional wall motion abnormalities, and low EF.   - Cardiac MRI and genetic testing postpartum recommended.      New Onset PAF. NSVT. Frequent PVC and PAC's.  [id number] score of 2 (sex, HF) - on AC as above for pregnancy and low EF.   [facility] [place], isolated episode, EKG unavailable for review. Currently in NSR at [facility] with frequent ectopy. Having 7-17 PVCs per minute, 3 episodes of NSVT and  SVT In the last 25 hours, 5-10 beats.  - Maintain mag >2.4 and K >4.0  - Metoprolol Tartrate 75 mg q6h. Nursing: hold if HR<50 or sBP<90 and call Cards2  - Lovenox as above     High risk pregnancy  Gestational age 28w1d. . Betamethasone (BMZ) given - at OSH for fetal lung maturity. OB US for fetal growth done on 22 with normal fetal findings and no abnormalities. growth US  reassuring for normal fetal growth. Will likely do elective  at GA 34 wks  - Appreciate MFM management, closely following with daily checks; can be reached via their pager above  - NST BID  - Magnesium for neuroprotection if moving towards delivery prior to 32 weeks with 6g bolus followed by 2g/hr maintenance until delivery  - S/p  section consent on admission  - per MFM, BP goal 130/80, MAP >65                 - If blood pressures >160/110 for at least 15 minutes, call MFM for guidance of management, although typically will initiate treatment with 5-10 mg of IV hydralazine (preferred from cardiac standpoint) or 20 mg of IV labetalol     Gestational DM.   QID blood glucose checks (fasting, and 1 hour after each meal)- doing only fasting and after lunch  per patient, will discuss with OB/Gyn                - goal blood glucose for AM fasting 95 mg/dL                - 1 hour postprandial check is under 140 mg/dL  - If after 2 weeks of monitoring, if >50% of blood glucose values are elevated, then we would typically initiate medication for treatment of gestational diabetes. Patient declining insulin, would be open to metformin.  - MFM following  -  monitoring to be determined by need for medications     Mild anemia, multifactorial  Due to pregnancy and HF. Hgb 10.2 on admission (MCV 79). Retic count 1.9%. Iron, ferritin, TIBC levels show AVINASH (say 6%). Hgh 9s-low 10s. IV iron 200 mg daily x5 (-). Hgb stable mid 10s.   - limit blood draws     FEN: 3 gram sodium diet   PROPHY:  Lovenox  LINES:   "PICC  DISPO:  Pending delivery   CODE STATUS:  Full Code   ================================================================    Interval History/ROS:   No acute events overnight.   Patient doing well this morning. No chest pain or SOB      Medications: Reviewed in EPIC.     Physical Exam:   BP 97/47 (BP Location: Right arm, Patient Position: Supine, Cuff Size: Adult Large)   Pulse 77   Temp 97.9  F (36.6  C) (Oral)   Resp 16   Ht 1.753 m (5' 9\")   Wt 134.8 kg (297 lb 1.6 oz)   SpO2 99%   BMI 43.87 kg/m    GENERAL: laying in bed. Looks according to her gestational age.   HEENT: Eye symmetrical and free of discharge bilaterally.  NECK: Supple and without lymphadenopathy.   CV: Irregular, controlled. S1S2 present without murmur, rub, or gallop.   RESPIRATORY: Respirations regular, even, and unlabored. Lungs CTA throughout.   GI: Soft, gravid, and  with normoactive bowel sounds present in all quadrants. No tenderness, rebound, guarding. No organomegaly.   EXTREMITIES: No peripheral edema. All extremities are warm and well perfused.  NEUROLOGIC: Alert and interacting appropriately. No focal deficits.   MUSCULOSKELETAL: No joint swelling or tenderness.   SKIN: No jaundice. No rashes or lesions.     Data:  CMP  Recent Labs   Lab 01/10/23  0121 01/09/23  1439 01/09/23  1158 01/09/23  0904 01/09/23  0708 01/08/23  1514 01/08/23  0721 01/07/23  0811 01/07/23  0801 01/06/23  1329 01/06/23  0656 01/05/23  1425 01/05/23  1113 01/04/23  0845 01/04/23  0658   *  --   --   --   --   --  134*  --   --   --  136  --   --   --  134*   POTASSIUM 4.0  --  3.7  --   --   --  4.1  --  4.4  --  4.1  --  4.0  --  4.1   CHLORIDE 106  --   --   --   --   --  107  --   --   --  107  --   --   --  105   CO2 16*  --   --   --   --   --  16*  --   --   --  17*  --   --   --  18*   ANIONGAP 12  --   --   --   --   --  11  --   --   --  12  --   --   --  11   GLC 87 124*  --  90  --  92 86   < >  --    < > 98   < >  --    < > 93   BUN " 13.4  --   --   --   --   --  9.7  --   --   --  10.4  --   --   --  8.0   CR 0.58  --   --   --   --   --  0.56  --   --   --  0.54  --   --   --  0.57   GFRESTIMATED >90  --   --   --   --   --  >90  --   --   --  >90  --   --   --  >90   AYDEN 8.9  --   --   --   --   --  8.9  --   --   --  9.0  --   --   --  9.0   MAG 1.9  --   --   --  1.6*  --  1.7  --  1.6*  --  1.7  --  1.6*  --  1.8   PHOS  --   --   --   --   --   --   --   --   --   --   --   --  3.5  --  3.9   PROTTOTAL  --   --   --   --   --   --  6.5  --   --   --  6.4  --   --   --  6.6   ALBUMIN  --   --   --   --   --   --  3.0*  --   --   --  3.1*  --   --   --  3.0*   BILITOTAL  --   --   --   --   --   --  0.3  --   --   --  0.2  --   --   --  0.2   ALKPHOS  --   --   --   --   --   --  85  --   --   --  88  --   --   --  88   AST  --   --   --   --   --   --  30  --   --   --  30  --   --   --  32   ALT  --   --   --   --   --   --  19  --   --   --  21  --   --   --  23    < > = values in this interval not displayed.     CBC  Recent Labs   Lab 01/08/23  0721 01/06/23  0656 01/04/23  0658   WBC 9.1 8.3 8.1   RBC 3.68* 3.59* 3.60*   HGB 9.6* 9.4* 9.3*   HCT 31.2* 30.1* 30.0*   MCV 85 84 83   MCH 26.1* 26.2* 25.8*   MCHC 30.8* 31.2* 31.0*   RDW 18.3* 17.7* 17.6*    190 200

## 2023-01-10 NOTE — PROGRESS NOTES
Maternal-Fetal Medicine Progress Note:    S: Patient overall doing well. She notes some nausea right now and received some B6 for this. She is not interested in receiving any other medications right now. She has had some intermittent abdominal cramping as well. It happens sometimes in the evening and last 1 minute. She states that it does not feel like contractions. Notes some hip pain with movement, which is limited her from wanting to move around. She also had some PVCs last night and so did not get much rest. She endorses FM. Denies VB, LOF, or contractions. Denies chest pain, shortness of breath, or current palpitations.     O:  Vitals:    01/10/23 0830 01/10/23 1145 01/10/23 1438 01/10/23 1448   BP: 92/53 (!) 88/46 98/59    BP Location: Right arm Right arm     Patient Position:       Cuff Size:       Pulse: 71 81 92    Resp: 16 20     Temp: 98.3  F (36.8  C) 98.1  F (36.7  C)  97.6  F (36.4  C)   TempSrc: Oral Oral  Oral   SpO2: 99% 98%     Weight:       Height:         Gen Appear: NAD  CV: RRR  Pulm: CTAB  Abdomen: gravid, soft, non-tender to palpation  Extrem: Trace bilateral LE edema, no calf tenderness    Fetal heart monitoring 1/10/23 0800   Baseline 135, moderate variability, positive accels, no decels  [place]: none    Echo 1/2/2023:  Interpretation Summary  Left ventricular function is decreased. The ejection fraction is 30-35%  (moderately reduced).  There is wall thinning and akinesis of the basal-mid inferior, basal-mid  inferoseptal and basal inferolateral segments.  Global right ventricular function is mildly to moderately reduced.  Mild to moderate tricuspid insufficiency is present.  Right ventricular systolic pressure is 45mmHg above the right atrial pressure.  Pulmonary hypertension is present.  IVC diameter <2.1 cm collapsing >50% with sniff suggests a normal RA pressure  of 3 mmHg.  No pericardial effusion is present.     This study was compared with the study from 12/6/2022. The left  ventricular  function has improved.  ______________________________________________________________________________  Left Ventricle  Mild left ventricular dilation is present. Mild concentric wall thickening  consistent with left ventricular hypertrophy is present. Left ventricular  function is decreased. The ejection fraction is 30-35% (moderately reduced).  There is wall thinning and akinesis of the basal-mid inferior, basal-mid  inferoseptal and basal inferolateral segments.     Right Ventricle  The right ventricle is normal size. Global right ventricular function is  mildly to moderately reduced.     Mitral Valve  The mitral valve is normal. Trace mitral insufficiency is present.     Aortic Valve  The valve leaflets are not well visualized. On Doppler interrogation, there is  no significant stenosis or regurgitation.     Tricuspid Valve  The tricuspid valve is normal. Mild to moderate tricuspid insufficiency is  present. Right ventricular systolic pressure is 45mmHg above the right atrial  pressure. Pulmonary hypertension is present.     Pulmonic Valve  The pulmonic valve is normal. Trace pulmonic insufficiency is present.     Vessels  The aorta root is normal. IVC diameter <2.1 cm collapsing >50% with sniff  suggests a normal RA pressure of 3 mmHg.     Pericardium  No pericardial effusion is present.     Compared to Previous Study  This study was compared with the study from 2022 . The left ventricular  function has improved.   _____________________________________________________________________________  MMode/2D Measurements & Calculations  IVSd: 1.2 cm  LVIDd: 6.2 cm  LVIDs: 5.5 cm  LVPWd: 1.1 cm  FS: 10.3 %  LV mass(C)d: 318.0 grams  LV mass(C)dI: 130.9 grams/m2     RWT: 0.36     Doppler Measurements & Calculations  PA acc time: 0.10 sec  TR max radha: 336.9 cm/sec  TR max P.4 mmHg    Treponema Antibody: non-reactive    Recent Labs   Lab 01/10/23  1422 01/10/23  1011 01/10/23  0834 01/10/23  0121  23  1439 23  0904   * 95 92 87 124* 90       A/P: 32 year old  at 29w1d admitted for acute HFrEF during pregnancy. Most recent echo on 23 showed EF 30-35%. She is currently on Lasix and metoprolol.    Acute decompensated HFrEF  - Patient is asymptomatic at this time and appears euvolemic on exam. Repeat echo  and EF improved at 30-35%  - She is currently on metoprolol 75 mg q 6 hrs and lasix 40 mg daily. No contraindications to increasing dose of metoprolol from a pregnancy standpoint, if needed from.   - Agree with Lovenox given risk of LV thrombus  - Appreciate cares per primary cardiology team    Gestational Diabetes  - Goal blood glucose for AM fasting is <95 mg/dL  - 1 hour postprandial check is <140 mg/dL and for 2 hour postprandial is <120 mg/d  - Overall glucose values have been at goal, though need to clarify if checking 1 hour or 2 hours post-prandial. Will continue to monitor.     Anemia  Likely related to pregnancy, heart failure, serial blood draws, but also component of iron deficiency anemia.  Last Hgb 9.4 on 23; ferritin 28 on 23  - s/p IV iron infusion   - Continue oral iron replacement    Routine prenatal care  - Non-stress test (NST) BID  - Repeat growth ultrasound (performed by [facility]) on   - Betamethasone (BMZ) given - at OSH for fetal lung maturity. Candidate for rescue BMZ.   - Magnesium for neuroprotection if moving towards delivery prior to 32 weeks with 6g bolus followed by 2g/hr maintenance until delivery  - Routine indications for emergent delivery including maternal decompensation or fetal compromise  - S/p  section consent on admission  - S/p third trimester RPR, which was normal.   - S/p Tdap on 1/3/23  - Plan IUD and depo-provera prior to discharge postpartum.  - Signed [place] paperwork provided on   - Encouraged use of abdominal binder to help with discomforts of pregnancy. Low suspicion for  labor at this time.  "Provided patient with phone number to L&D if she has concerns and/or if her team is unable reach us.    Seen and discussed with Dr. Glass.    Johnnie Garcia MD  Maternal-Fetal Medicine Fellow    ----------                                                                                                          Physician Attestation  I saw this patient with the resident and agree with the resident/fellow's findings and plan of care as documented in the note.       [place] findings:   Anjali Carmen is a 32 year old  female at 29w1d who is admitted with HFrEF during pregnancy. Currently receiving medical management with metoprolol and lasix. Most recent echocardiogram reassuring (EF 30-35%). Blood sugars appear within normal limits. Will confirm that PP are 1 hr not 2 hr. Pregnancy associated symptoms include general aches and pains, will plan to assist with abdominal binder.  BID NSTs have been reassuring.    BP 98/59   Pulse 92   Temp 97.6  F (36.4  C) (Oral)   Resp 20   Ht 1.753 m (5' 9\")   Wt 134.8 kg (297 lb 1.6 oz)   SpO2 98%   BMI 43.87 kg/m       I personally spent a total of 25 minutes, including both face-to-face and non-face-to-face on the date of the encounter, addressing the above diagnosis. Activities performed in this time include chart review, obtaining / reviewing history, performing a medically necessary evaluation, documentation and counseling/care coordination/ordering tests/ordering meds.    Erika Glass MD  Maternal Fetal Medicine         "

## 2023-01-10 NOTE — PROVIDER NOTIFICATION
01/09/23 1750   Uterine Activity Assessment   Method external tocotransducer   Contraction Intensity no contractions   Uterine Resting Tone soft by palpation   Fetal Assessment   Fetal HR Assessment Method external US   Fetal HR (beats/min) 145   Fetal HR Baseline normal range   Fetal HR Variability moderate (amplitude range 6 to 25 bpm)   Fetal HR Accelerations increase 15 bpm above baseline lasting 15 seconds   Fetal HR Decelerations intermittent;variable   RN Strip Review Continuous   NST Start Time 1720   NST Stop Time 1740   Non-stress Test Interpretation Appropriate for gestational age     Patient denies ROM or vaginal bleeding, states feels FM.

## 2023-01-10 NOTE — PROVIDER NOTIFICATION
Tele reported SVT of 150s HR at 2330 on 01/09/23 for 17 seconds or 43 beats. Pt also reported that she felt palpitation for sometime. VS stable. Vernon Dyer with Cards 2 overnight notified

## 2023-01-10 NOTE — PLAN OF CARE
Shift: [id number]        Team: CARDS 2  Neuro:  A&Ox4 denies numbness and tingling  Resp: >95% on room air, denies shortness of breath  Cardiac: Sinus rhythm with PVC's and PAC's, afebrile  GI/: Voiding adequate amounts, no BM this shift  Skin: WNL  Pain: PRN acetaminophen given for headache, pt reports relief    Activity: Independent in room  Labs: K 4.2 recheck in morning, Mag 2.0 replaced with 2x 2g infusions  LDA's: Left PICC double lumen, Red running LR at 100 mL/hr for 500 mL total, Purple saline locked   Diet: 3g Na no caffeine       Plan:  Continue plan of care and notify team with changes.

## 2023-01-11 LAB
ANION GAP SERPL CALCULATED.3IONS-SCNC: 12 MMOL/L (ref 7–15)
BUN SERPL-MCNC: 10 MG/DL (ref 6–20)
CALCIUM SERPL-MCNC: 8.8 MG/DL (ref 8.6–10)
CHLORIDE SERPL-SCNC: 107 MMOL/L (ref 98–107)
CREAT SERPL-MCNC: 0.57 MG/DL (ref 0.51–0.95)
DEPRECATED HCO3 PLAS-SCNC: 16 MMOL/L (ref 22–29)
GFR SERPL CREATININE-BSD FRML MDRD: >90 ML/MIN/1.73M2
GLUCOSE BLDC GLUCOMTR-MCNC: 114 MG/DL (ref 70–99)
GLUCOSE BLDC GLUCOMTR-MCNC: 85 MG/DL (ref 70–99)
GLUCOSE SERPL-MCNC: 84 MG/DL (ref 70–99)
HOLD SPECIMEN: NORMAL
HOLD SPECIMEN: NORMAL
MAGNESIUM SERPL-MCNC: 1.9 MG/DL (ref 1.7–2.3)
POTASSIUM SERPL-SCNC: 4.1 MMOL/L (ref 3.4–5.3)
SODIUM SERPL-SCNC: 135 MMOL/L (ref 136–145)

## 2023-01-11 PROCEDURE — 36592 COLLECT BLOOD FROM PICC: CPT

## 2023-01-11 PROCEDURE — 250N000011 HC RX IP 250 OP 636: Performed by: INTERNAL MEDICINE

## 2023-01-11 PROCEDURE — 250N000013 HC RX MED GY IP 250 OP 250 PS 637

## 2023-01-11 PROCEDURE — 214N000001 HC R&B CCU UMMC

## 2023-01-11 PROCEDURE — 99232 SBSQ HOSP IP/OBS MODERATE 35: CPT | Mod: GC | Performed by: STUDENT IN AN ORGANIZED HEALTH CARE EDUCATION/TRAINING PROGRAM

## 2023-01-11 PROCEDURE — 250N000011 HC RX IP 250 OP 636: Performed by: STUDENT IN AN ORGANIZED HEALTH CARE EDUCATION/TRAINING PROGRAM

## 2023-01-11 PROCEDURE — 999N000044 HC STATISTIC CVC DRESSING CHANGE

## 2023-01-11 PROCEDURE — 83735 ASSAY OF MAGNESIUM: CPT

## 2023-01-11 PROCEDURE — 250N000013 HC RX MED GY IP 250 OP 250 PS 637: Performed by: INTERNAL MEDICINE

## 2023-01-11 PROCEDURE — 250N000013 HC RX MED GY IP 250 OP 250 PS 637: Performed by: STUDENT IN AN ORGANIZED HEALTH CARE EDUCATION/TRAINING PROGRAM

## 2023-01-11 PROCEDURE — 258N000003 HC RX IP 258 OP 636: Performed by: STUDENT IN AN ORGANIZED HEALTH CARE EDUCATION/TRAINING PROGRAM

## 2023-01-11 PROCEDURE — 80048 BASIC METABOLIC PNL TOTAL CA: CPT

## 2023-01-11 PROCEDURE — 250N000013 HC RX MED GY IP 250 OP 250 PS 637: Performed by: NURSE PRACTITIONER

## 2023-01-11 RX ORDER — MAGNESIUM OXIDE 400 MG/1
400 TABLET ORAL EVERY 4 HOURS
Status: COMPLETED | OUTPATIENT
Start: 2023-01-11 | End: 2023-01-11

## 2023-01-11 RX ADMIN — METOPROLOL TARTRATE 75 MG: 25 TABLET, FILM COATED ORAL at 20:24

## 2023-01-11 RX ADMIN — Medication 1000 MG: at 20:23

## 2023-01-11 RX ADMIN — Medication 400 MG: at 09:36

## 2023-01-11 RX ADMIN — MAGNESIUM SULFATE HEPTAHYDRATE 3 G: 500 INJECTION, SOLUTION INTRAMUSCULAR; INTRAVENOUS at 09:33

## 2023-01-11 RX ADMIN — ACETAMINOPHEN 650 MG: 325 TABLET, FILM COATED ORAL at 00:06

## 2023-01-11 RX ADMIN — ACETAMINOPHEN 650 MG: 325 TABLET, FILM COATED ORAL at 11:16

## 2023-01-11 RX ADMIN — POTASSIUM CHLORIDE 20 MEQ: 40 SOLUTION ORAL at 20:24

## 2023-01-11 RX ADMIN — POTASSIUM CHLORIDE 20 MEQ: 40 SOLUTION ORAL at 13:24

## 2023-01-11 RX ADMIN — Medication 1000 MG: at 09:34

## 2023-01-11 RX ADMIN — ENOXAPARIN SODIUM 120 MG: 120 INJECTION SUBCUTANEOUS at 09:34

## 2023-01-11 RX ADMIN — METOPROLOL TARTRATE 75 MG: 25 TABLET, FILM COATED ORAL at 09:34

## 2023-01-11 RX ADMIN — Medication 400 MG: at 11:16

## 2023-01-11 RX ADMIN — SENNOSIDES 8.6 MG: 8.6 TABLET, COATED ORAL at 20:24

## 2023-01-11 RX ADMIN — ACETAMINOPHEN 650 MG: 325 TABLET, FILM COATED ORAL at 20:25

## 2023-01-11 RX ADMIN — PRENATAL VIT W/ FE FUMARATE-FA TAB 27-0.8 MG 1 TABLET: 27-0.8 TAB at 09:34

## 2023-01-11 RX ADMIN — SENNOSIDES 8.6 MG: 8.6 TABLET, COATED ORAL at 09:35

## 2023-01-11 RX ADMIN — METOPROLOL TARTRATE 75 MG: 25 TABLET, FILM COATED ORAL at 03:26

## 2023-01-11 RX ADMIN — ENOXAPARIN SODIUM 120 MG: 120 INJECTION SUBCUTANEOUS at 20:24

## 2023-01-11 RX ADMIN — Medication 25 MG: at 09:34

## 2023-01-11 RX ADMIN — POTASSIUM CHLORIDE 20 MEQ: 40 SOLUTION ORAL at 09:34

## 2023-01-11 RX ADMIN — METOPROLOL TARTRATE 75 MG: 25 TABLET, FILM COATED ORAL at 13:24

## 2023-01-11 RX ADMIN — SODIUM CHLORIDE, PRESERVATIVE FREE 5 ML: 5 INJECTION INTRAVENOUS at 05:24

## 2023-01-11 RX ADMIN — SODIUM CHLORIDE, PRESERVATIVE FREE 10 ML: 5 INJECTION INTRAVENOUS at 16:22

## 2023-01-11 ASSESSMENT — ACTIVITIES OF DAILY LIVING (ADL)
ADLS_ACUITY_SCORE: 20

## 2023-01-11 NOTE — PROGRESS NOTES
[facility]   Cardiology II Service / Advanced Heart Failure  Daily Progress Note      Patient: Anjali Carmen  MRN: [id number]  Admission Date: 2022  Hospital Day # 36    Assessment and Plan: Anjali Carmen is a 32 year old female  at 29 weeks with no significant past medical history, who presented as a transfer from [facility] to [facility] on 2022 for further management of newly diagnosed, acute HFrEF 2/2 NICM (LVEF 20% now 30-35%) and high risk delivery.     MFM available  at [phone]    Today's plan  - continue telemetry monitoring   - Replace electrolytes as detailed below    > Magnesium Sulfate 3 mg ordered    > Continue Magnesium [id number] BID  - Fasting blood sugar every morning and 1 hour after each lunch  - Received Tdap 0.5 on 1/3/23  - Will continue holding PO diuretics. At the moment she appears euvolemic but might need it in the upcoming days.     Acute on Chronic SCHF secondary to NICM, pregnancy related with questionable familial component. RV failure. Note family history of father with CM at age 30. TTE  with EF 20-25%, thinned out LV wall, and notable regional wall motion abnormalities. Coronary angiogram  with no significat lesions seen. EF is improved slightly on 23 ECHO to 30-35%.  Stage C, NYHA Class III  ACEi/ARB/ARNI: Contraindicated in pregnancy.   BB Metoprolol Tartrate 75 mg q6h. Nursing: hold if HR<50 or SBP<90 and call Cards2  Aldosterone antagonist contraindicated due to pregnancy  SCD prophylaxis GDMT  Fluid status euvolemic. Hold Lasix 40 mg po daily   - Lovenox given high risk for LV thrombus in setting of antepartum, regional wall motion abnormalities, and low EF.   - Cardiac MRI and genetic testing postpartum recommended.      New Onset PAF. NSVT. Frequent PVC and PAC's.  [id number] score of 2 (sex, HF) - on AC as above for pregnancy and low EF.   [facility] [place], isolated episode, EKG  unavailable for review. Currently in NSR at [facility] with frequent ectopy. Having 7-17 PVCs per minute, 3 episodes of NSVT and SVT In the last 25 hours, 5-10 beats.  - Maintain mag >2.4 and K >4.0  - Metoprolol Tartrate 75 mg q6h. Nursing: hold if HR<50 or sBP<90 and call Cards2  - Lovenox as above     High risk pregnancy  Gestational age 28w1d. . Betamethasone (BMZ) given - at OSH for fetal lung maturity. OB US for fetal growth done on 22 with normal fetal findings and no abnormalities. growth US  reassuring for normal fetal growth. Will likely do elective  at GA 34 wks  - Appreciate MFM management, closely following with daily checks; can be reached via their pager above  - NST BID  - Magnesium for neuroprotection if moving towards delivery prior to 32 weeks with 6g bolus followed by 2g/hr maintenance until delivery  - S/p  section consent on admission  - per MFM, BP goal 130/80, MAP >65                 - If blood pressures >160/110 for at least 15 minutes, call MFM for guidance of management, although typically will initiate treatment with 5-10 mg of IV hydralazine (preferred from cardiac standpoint) or 20 mg of IV labetalol     Gestational DM.   QID blood glucose checks (fasting, and 1 hour after each meal)- doing only fasting and after lunch  per patient, will discuss with OB/Gyn                - goal blood glucose for AM fasting 95 mg/dL                - 1 hour postprandial check is under 140 mg/dL  - If after 2 weeks of monitoring, if >50% of blood glucose values are elevated, then we would typically initiate medication for treatment of gestational diabetes. Patient declining insulin, would be open to metformin.  - MFM following  -  monitoring to be determined by need for medications     Mild anemia, multifactorial  Due to pregnancy and HF. Hgb 10.2 on admission (MCV 79). Retic count 1.9%. Iron, ferritin, TIBC levels show AVINASH (say 6%). Hgh 9s-low 10s. IV iron 200  "mg daily x5 (12/24-12/28). Hgb stable mid 10s.   - limit blood draws     FEN: 3 gram sodium diet   PROPHY:  Lovenox  LINES:  PICC  DISPO:  Pending delivery   CODE STATUS:  Full Code   ================================================================    Interval History/ROS:   Patient doing well this morning. She is able to feel PVC but says that are not getting worse. No chest pain.      Medications: Reviewed in EPIC.     Physical Exam:   BP 97/53 (BP Location: Right arm)   Pulse 80   Temp 97.6  F (36.4  C) (Oral)   Resp 18   Ht 1.753 m (5' 9\")   Wt 134.9 kg (297 lb 8 oz)   SpO2 97%   BMI 43.93 kg/m    GENERAL: laying in bed. Looks according to her gestational age.   HEENT: Eye symmetrical and free of discharge bilaterally.  NECK: Supple and without lymphadenopathy.   CV: Irregular, controlled. S1S2 present without murmur, rub, or gallop.   RESPIRATORY: Respirations regular, even, and unlabored. Lungs CTA throughout.   GI: Soft, gravid, and  with normoactive bowel sounds present in all quadrants. No tenderness, rebound, guarding. No organomegaly.   EXTREMITIES: No peripheral edema. All extremities are warm and well perfused.  NEUROLOGIC: Alert and interacting appropriately. No focal deficits.   MUSCULOSKELETAL: No joint swelling or tenderness.   SKIN: No jaundice. No rashes or lesions.     Data:  CMP  Recent Labs   Lab 01/11/23  0706 01/11/23  0529 01/10/23  1422 01/10/23  1303 01/10/23  1011 01/10/23  0834 01/10/23  0121 01/09/23  1439 01/09/23  1158 01/09/23  0904 01/09/23  0708 01/08/23  1514 01/08/23  0721 01/06/23  1329 01/06/23  0656 01/05/23  1425 01/05/23  1113   NA  --  135*  --   --  135*  --  134*  --   --   --   --   --  134*  --  136   < >  --    POTASSIUM  --  4.1  --   --  4.2  --  4.0  --  3.7  --   --   --  4.1   < > 4.1  --  4.0   CHLORIDE  --  107  --   --  105  --  106  --   --   --   --   --  107  --  107   < >  --    CO2  --  16*  --   --  16*  --  16*  --   --   --   --   --  16*  --  " 17*   < >  --    ANIONGAP  --  12  --   --  14  --  12  --   --   --   --   --  11  --  12   < >  --    GLC 85 84 115*  --  95   < > 87   < >  --    < >  --    < > 86   < > 98   < >  --    BUN  --  10.0  --   --  8.9  --  13.4  --   --   --   --   --  9.7  --  10.4   < >  --    CR  --  0.57  --   --  0.58  --  0.58  --   --   --   --   --  0.56  --  0.54   < >  --    GFRESTIMATED  --  >90  --   --  >90  --  >90  --   --   --   --   --  >90  --  >90   < >  --    AYDEN  --  8.8  --   --  9.3  --  8.9  --   --   --   --   --  8.9  --  9.0   < >  --    MAG  --  1.9  --  2.0  --   --  1.9  --   --   --  1.6*  --  1.7   < > 1.7  --  1.6*   PHOS  --   --   --   --   --   --   --   --   --   --   --   --   --   --   --   --  3.5   PROTTOTAL  --   --   --   --  7.1  --   --   --   --   --   --   --  6.5  --  6.4  --   --    ALBUMIN  --   --   --   --  3.3*  --   --   --   --   --   --   --  3.0*  --  3.1*  --   --    BILITOTAL  --   --   --   --  0.3  --   --   --   --   --   --   --  0.3  --  0.2  --   --    ALKPHOS  --   --   --   --  97  --   --   --   --   --   --   --  85  --  88  --   --    AST  --   --   --   --  32  --   --   --   --   --   --   --  30  --  30  --   --    ALT  --   --   --   --  21  --   --   --   --   --   --   --  19  --  21  --   --     < > = values in this interval not displayed.     CBC  Recent Labs   Lab 01/10/23  1011 01/08/23  0721 01/06/23  0656   WBC 9.2 9.1 8.3   RBC 3.81 3.68* 3.59*   HGB 10.2* 9.6* 9.4*   HCT 32.2* 31.2* 30.1*   MCV 85 85 84   MCH 26.8 26.1* 26.2*   MCHC 31.7 30.8* 31.2*   RDW 18.6* 18.3* 17.7*    202 190

## 2023-01-11 NOTE — PROGRESS NOTES
Shift: 700    32 year old female  at 29 weeks with no significant past medical history, who presented as a transfer from [facility] to [facility] on 2022 for further management of newly diagnosed, acute HFrEF 2/2 NICM (LVEF 20% now 30-35%) and high risk delivery.     VS: Temp: 97.8  F (36.6  C) Temp src: Oral BP: 105/87 Pulse: 81   Resp: 18 SpO2: 98 % O2 Device: None (Room air)       Pain: Denies pain.   Neuro: A&Ox4. Denies N&T. Able to make needs known.   Cardiac:   SR w/Frequent PAC/PVC. 8 beats of V-tach.   Respiratory: Lung sounds clear on RA. Denies SOB.   GI/Diet/Appetite: 3gm Na diet. Good appetite. LBM 1/10.   :  Good urine output.   LDA's: PICC LUE, heparin locked. Seen by Vascular concerning pain to PICC site d/t the securement device. Vascular removed the securement device and pain is gone.   Skin: Intact.   Activity: Up independently.   Tests/Procedures:   Pertinent Labs/Lab Collection: Mg 1.9 replacement given along with scheduled.      Plan: Patient will be staying until 35 weeks gestation and will deliver her baby  in patient. Patient will then stay in hospital for an additional 2 weeks after the birth for observation of cardiac health. [place] [facility] [place] of care and [place]. The father will be with the infant during [place] [facility]

## 2023-01-11 NOTE — PROGRESS NOTES
CLINICAL NUTRITION SERVICES - REASSESSMENT NOTE     Nutrition Prescription    RECOMMENDATIONS FOR MDs/PROVIDERS TO ORDER:  Consider ordering 200-300 mg DHA/day.    Malnutrition Status:    Patient does not meet two of the established criteria necessary for diagnosing malnutrition    Recommendations already ordered by Registered Dietitian (RD):  None additionally at this time.     Future/Additional Recommendations:  1. Continue current diet, as ordered. Rec continuing with no fluid restriction, as able. Small, frequent meals are recommended to help increase oral intake. Encourage high protein options.   2. Continue prenatal multivitamin with iron.   3. Continue scheduled bowel regimen but monitor need to adjust in the future. Also, monitor N/V control.   4. Monitor potential need for additional iron supplementation.   5. Hgb A1c of 5.8 on 12/6/2022. BG checks as per provider. Per provider note 12/20, ruled in for gestational diabetes.     EVALUATION OF THE PROGRESS TOWARD GOALS   Diet: 3 g Sodium+ no caffeine. Ordered to receive chocolate Ensure MAX Protein at 14:00 and HS.   Intake: Tolerating diet. Per % intake flowsheets, pt consistently consuming 100% with the exception of 75% twice (1/4 and 1/9). Good appetite overall. Per discussion with pt, her appetite is amount the same and she is eating similar amounts now compared to a week ago. [employer] (meal ordering system) indicates food/beverages sent 1/8-1/10 totaled 1657 kcals and 126 g protein daily on average. Estimated needs are 6983-6975 kcals/day (20 - 25 kcals/kg) (includes + 452 kcals/day for third trimester) and 115-140 grams protein/day (1.1 - 1.4 grams of pro/kg) (includes +25 g/ day additional protein for braden pregnancy). She does have some snacks in her room and fridge (family will be visiting next in February). States she is consistently consuming two oral supplements daily.      NEW FINDINGS   Rx: Note, received LR bolus 1/10. Last lasix dose so  "far was on 1/10.   GI: Having zero to two stools daily. On scheduled senna (held previously but has received the last three days). Most recent documented stool was on 1/10. Per provider note 1/10, \"She notes some nausea right now and received some B6 for this. She is not interested in receiving any other medications right now. She has had some intermittent abdominal cramping as well.\"  Weight History: 141.5 kg = 312# (11/30/2022), 132.9 kg (12/5/2022), 132.7 kg (12/6, admit), 133.4 kg (12/21), 134.3 kg (1/4/2023), 134.9 kg (1/11) - Limited wt hx in chart review. Per provider H& P, \"Additionally on ROS, the patient reports loss of weight despite being pregnant since July 2022; previously weighed 357 pounds- down to 301 lbs at the time of admission >> further down to 292 lbs after diuresis. Difficult to assess wt gain with pregnancy and wt loss due to diuresis.\"  Pt has gained 2.2 kg over the past approximate month. On 1/10, at 29w1d per OB note.    MALNUTRITION  % Intake: Decreased intake does not meet criteria  % Weight Loss: Difficult to assess with pregnancy and diuresis this admission.   Subcutaneous Fat Loss: None observed  Muscle Loss: None observed  Fluid Accumulation/Edema: Trace  Malnutrition Diagnosis: Patient does not meet two of the established criteria necessary for diagnosing malnutrition    Previous Goals   Patient to consume % of nutritionally adequate meal trays TID, or the equivalent with supplements/snacks.  Evaluation: Not consistently meeting.     Previous Nutrition Diagnosis  Inadequate oral intake related to diet restriction as evidenced by food/beverages sent 1/1-1/3 totaled 1884 kcals and 126 g protein daily on average while estimated needs are 1319-3230 kcals/day (20 - 25 kcals/kg) and 115-140 grams protein/day (1.1 - 1.4 grams of pro/kg).  Evaluation: Unresolved, updated below.     CURRENT NUTRITION DIAGNOSIS  Inadequate oral intake related to diet restriction as evidenced by " food/beverages sent 1/8-1/10 totaled 1657 kcals and 126 g protein daily on average while estimated needs are 6307-8912 kcals/day (20 - 25 kcals/kg) and 115-140 grams protein/day (1.1 - 1.4 grams of pro/kg).     INTERVENTIONS  Implementation  Modify composition of meals/snacks: Discussed various menu options and gave ideas for variety. Incorporate food in her room as needed.   Medical food supplement therapy, Nutrition education for nutrition relationship to health/disease: Discussed oral supplements. Pt would like to continue current oral supplements (prefers chocolate rather than vanilla). Encouraged oral intake adequacy and intake of two oral supplements daily.     Goals  Patient to consume % of nutritionally adequate meal trays TID, or the equivalent with supplements/snacks.    Monitoring/Evaluation  Progress toward goals will be monitored and evaluated per protocol.     Nutrition will continue to follow.     Penny Maloney, MS, RD, LD, [facility]   6C Pgr: [phone]

## 2023-01-11 NOTE — PLAN OF CARE
----- Message from Mehran Roldan MD sent at 7/24/2020  7:08 AM CDT -----  Please call patient with negative gonorrhea and chlamydia results   Goal Outcome Evaluation:      Plan of Care Reviewed With: patient    Overall Patient Progress: no changeOverall Patient Progress: no change    Outcome Evaluation: Encourage intake of meals and two oral supplements daily. Encouraged mixing/matching options from the menu (gave pt ideas) and intermittent incorporation of food in room

## 2023-01-11 NOTE — PLAN OF CARE
Shift: 1500- 1930    Neuro: A&Ox4. Calm, cooperative, pleasant. Spouse back at home in [place] but did facetime with ptnt multiple times.  Cardiac: Telemetry- NS with frequent PAC's and PVC's. VSS  Respiratory: RA, tolerating well.  GI/: Voiding spontaneously and w/o difficulty.  Diet/Appetite: Diet: Snacks/Supplements Adult: Ensure Max Protein (bariatric); Between Meals  Combination Diet 3 gm NA Diet; No Caffeine Diet  Snacks/Supplements Adult: Other; Please send chocolate-flavored oral supplements despite the no caffeine diet, ok per provider. Please do not send Ensure MAX protein MOCHA (which has 100 mg caffeine); With Meals      appetite good.  Activity: Up independent.  Pain: Denies.   Skin: No new deficits noted.  Lines: L Triple lumen PICC- hep locked    I/O this shift:  In: 740 [P.O.:240; I.V.:500]  Out: 500 [Urine:500]    P: Continue MFM twice daily. Encourage ambulation around the unit.           Goal Outcome Evaluation:      Plan of Care Reviewed With: patient    Overall Patient Progress: improvingOverall Patient Progress: improving    Outcome Evaluation: PO intake adequate. Ptnt reports no N/V this shift. MFM normal

## 2023-01-11 NOTE — PLAN OF CARE
D:  at 29 weeks with no significant past medical history, who presented as a transfer from [facility] to [facility] on 2022 for further management of newly diagnosed, acute HFrEF 2/2 NICM (LVEF 20% now 30-35%) and high risk delivery.    I: Monitored vitals and assessed pt status. Encouraged activity.     Changed:   Running:   PRN: tylenol x 1     A: A&Ox4. VSS on RA. Tele shows SR with frequent PVC's and PAC's- one 10 beat run of VT, VSS- team notified.. Afebrile. Urinating adequately. Moving independently. Fasting blood sugar every morning and 1 hour after each lunch.     P: Continue to monitor pt status and report changes to treatment team.     [phone]

## 2023-01-12 LAB
ALBUMIN SERPL BCG-MCNC: 3 G/DL (ref 3.5–5.2)
ALP SERPL-CCNC: 90 U/L (ref 35–104)
ALT SERPL W P-5'-P-CCNC: 18 U/L (ref 10–35)
ANION GAP SERPL CALCULATED.3IONS-SCNC: 13 MMOL/L (ref 7–15)
AST SERPL W P-5'-P-CCNC: 30 U/L (ref 10–35)
BASOPHILS # BLD AUTO: 0 10E3/UL (ref 0–0.2)
BASOPHILS NFR BLD AUTO: 0 %
BILIRUB SERPL-MCNC: <0.2 MG/DL
BUN SERPL-MCNC: 10.6 MG/DL (ref 6–20)
CALCIUM SERPL-MCNC: 9.4 MG/DL (ref 8.6–10)
CHLORIDE SERPL-SCNC: 106 MMOL/L (ref 98–107)
CREAT SERPL-MCNC: 0.58 MG/DL (ref 0.51–0.95)
DEPRECATED HCO3 PLAS-SCNC: 16 MMOL/L (ref 22–29)
EOSINOPHIL # BLD AUTO: 0 10E3/UL (ref 0–0.7)
EOSINOPHIL NFR BLD AUTO: 1 %
ERYTHROCYTE [DISTWIDTH] IN BLOOD BY AUTOMATED COUNT: 18.5 % (ref 10–15)
GFR SERPL CREATININE-BSD FRML MDRD: >90 ML/MIN/1.73M2
GLUCOSE BLDC GLUCOMTR-MCNC: 113 MG/DL (ref 70–99)
GLUCOSE BLDC GLUCOMTR-MCNC: 88 MG/DL (ref 70–99)
GLUCOSE SERPL-MCNC: 95 MG/DL (ref 70–99)
HCT VFR BLD AUTO: 30.3 % (ref 35–47)
HGB BLD-MCNC: 9.4 G/DL (ref 11.7–15.7)
IMM GRANULOCYTES # BLD: 0.1 10E3/UL
IMM GRANULOCYTES NFR BLD: 1 %
LYMPHOCYTES # BLD AUTO: 2.2 10E3/UL (ref 0.8–5.3)
LYMPHOCYTES NFR BLD AUTO: 26 %
MAGNESIUM SERPL-MCNC: 1.8 MG/DL (ref 1.7–2.3)
MCH RBC QN AUTO: 26.4 PG (ref 26.5–33)
MCHC RBC AUTO-ENTMCNC: 31 G/DL (ref 31.5–36.5)
MCV RBC AUTO: 85 FL (ref 78–100)
MONOCYTES # BLD AUTO: 0.4 10E3/UL (ref 0–1.3)
MONOCYTES NFR BLD AUTO: 5 %
NEUTROPHILS # BLD AUTO: 5.8 10E3/UL (ref 1.6–8.3)
NEUTROPHILS NFR BLD AUTO: 67 %
NRBC # BLD AUTO: 0 10E3/UL
NRBC BLD AUTO-RTO: 0 /100
PLATELET # BLD AUTO: 202 10E3/UL (ref 150–450)
POTASSIUM SERPL-SCNC: 4 MMOL/L (ref 3.4–5.3)
PROT SERPL-MCNC: 6.6 G/DL (ref 6.4–8.3)
RBC # BLD AUTO: 3.56 10E6/UL (ref 3.8–5.2)
SODIUM SERPL-SCNC: 135 MMOL/L (ref 136–145)
WBC # BLD AUTO: 8.4 10E3/UL (ref 4–11)

## 2023-01-12 PROCEDURE — 214N000001 HC R&B CCU UMMC

## 2023-01-12 PROCEDURE — 80053 COMPREHEN METABOLIC PANEL: CPT

## 2023-01-12 PROCEDURE — 250N000013 HC RX MED GY IP 250 OP 250 PS 637: Performed by: INTERNAL MEDICINE

## 2023-01-12 PROCEDURE — 250N000013 HC RX MED GY IP 250 OP 250 PS 637: Performed by: NURSE PRACTITIONER

## 2023-01-12 PROCEDURE — 99232 SBSQ HOSP IP/OBS MODERATE 35: CPT | Mod: GC | Performed by: STUDENT IN AN ORGANIZED HEALTH CARE EDUCATION/TRAINING PROGRAM

## 2023-01-12 PROCEDURE — 250N000011 HC RX IP 250 OP 636: Performed by: INTERNAL MEDICINE

## 2023-01-12 PROCEDURE — 85004 AUTOMATED DIFF WBC COUNT: CPT

## 2023-01-12 PROCEDURE — 250N000013 HC RX MED GY IP 250 OP 250 PS 637

## 2023-01-12 PROCEDURE — 250N000011 HC RX IP 250 OP 636: Performed by: STUDENT IN AN ORGANIZED HEALTH CARE EDUCATION/TRAINING PROGRAM

## 2023-01-12 PROCEDURE — 258N000003 HC RX IP 258 OP 636: Performed by: STUDENT IN AN ORGANIZED HEALTH CARE EDUCATION/TRAINING PROGRAM

## 2023-01-12 PROCEDURE — 250N000013 HC RX MED GY IP 250 OP 250 PS 637: Performed by: STUDENT IN AN ORGANIZED HEALTH CARE EDUCATION/TRAINING PROGRAM

## 2023-01-12 PROCEDURE — 83735 ASSAY OF MAGNESIUM: CPT

## 2023-01-12 PROCEDURE — 36592 COLLECT BLOOD FROM PICC: CPT

## 2023-01-12 RX ORDER — FUROSEMIDE 20 MG
20 TABLET ORAL DAILY
Status: DISCONTINUED | OUTPATIENT
Start: 2023-01-12 | End: 2023-01-17

## 2023-01-12 RX ORDER — MAGNESIUM OXIDE 400 MG/1
400 TABLET ORAL EVERY 4 HOURS
Status: DISCONTINUED | OUTPATIENT
Start: 2023-01-12 | End: 2023-01-12

## 2023-01-12 RX ORDER — MAGNESIUM SULFATE HEPTAHYDRATE 40 MG/ML
2 INJECTION, SOLUTION INTRAVENOUS ONCE
Status: COMPLETED | OUTPATIENT
Start: 2023-01-12 | End: 2023-01-12

## 2023-01-12 RX ADMIN — METOPROLOL TARTRATE 75 MG: 25 TABLET, FILM COATED ORAL at 14:18

## 2023-01-12 RX ADMIN — POTASSIUM CHLORIDE 20 MEQ: 40 SOLUTION ORAL at 14:19

## 2023-01-12 RX ADMIN — SENNOSIDES 8.6 MG: 8.6 TABLET, COATED ORAL at 20:48

## 2023-01-12 RX ADMIN — POTASSIUM CHLORIDE 20 MEQ: 40 SOLUTION ORAL at 20:48

## 2023-01-12 RX ADMIN — Medication 1000 MG: at 09:29

## 2023-01-12 RX ADMIN — METOPROLOL TARTRATE 75 MG: 25 TABLET, FILM COATED ORAL at 20:49

## 2023-01-12 RX ADMIN — METOPROLOL TARTRATE 75 MG: 25 TABLET, FILM COATED ORAL at 09:26

## 2023-01-12 RX ADMIN — METOPROLOL TARTRATE 75 MG: 25 TABLET, FILM COATED ORAL at 01:25

## 2023-01-12 RX ADMIN — Medication 1000 MG: at 20:48

## 2023-01-12 RX ADMIN — POTASSIUM CHLORIDE 20 MEQ: 40 SOLUTION ORAL at 09:26

## 2023-01-12 RX ADMIN — Medication 25 MG: at 09:27

## 2023-01-12 RX ADMIN — SODIUM CHLORIDE, PRESERVATIVE FREE 10 ML: 5 INJECTION INTRAVENOUS at 14:19

## 2023-01-12 RX ADMIN — MAGNESIUM SULFATE IN WATER 2 G: 40 INJECTION, SOLUTION INTRAVENOUS at 09:26

## 2023-01-12 RX ADMIN — ENOXAPARIN SODIUM 120 MG: 120 INJECTION SUBCUTANEOUS at 09:26

## 2023-01-12 RX ADMIN — PRENATAL VIT W/ FE FUMARATE-FA TAB 27-0.8 MG 1 TABLET: 27-0.8 TAB at 09:26

## 2023-01-12 RX ADMIN — ACETAMINOPHEN 650 MG: 325 TABLET, FILM COATED ORAL at 23:29

## 2023-01-12 RX ADMIN — FUROSEMIDE 20 MG: 20 TABLET ORAL at 12:21

## 2023-01-12 RX ADMIN — SENNOSIDES 8.6 MG: 8.6 TABLET, COATED ORAL at 09:26

## 2023-01-12 RX ADMIN — MAGNESIUM SULFATE HEPTAHYDRATE 3 G: 500 INJECTION, SOLUTION INTRAMUSCULAR; INTRAVENOUS at 12:21

## 2023-01-12 RX ADMIN — ENOXAPARIN SODIUM 120 MG: 120 INJECTION SUBCUTANEOUS at 20:48

## 2023-01-12 ASSESSMENT — ACTIVITIES OF DAILY LIVING (ADL)
ADLS_ACUITY_SCORE: 20

## 2023-01-12 NOTE — PROGRESS NOTES
Shift: 700 -     32 year old female  at 29 weeks with no significant past medical history, who presented as a transfer from [facility] to [facility] on 2022 for further management of newly diagnosed, acute HFrEF 2/2 NICM (LVEF 20% now 30-35%) and high risk delivery.     VS: Temp: 98.5  F (36.9  C) Temp src: Oral BP: (!) 83/47 Pulse: 78   Resp: 16 SpO2: 99 % O2 Device: None (Room air)       Pain: Denies pain.   Neuro: A&Ox4. Denies N&T. Able to make needs known.   Cardiac:   SR w/Frequent PAC/PVC. Occasional short runs of V-tach have been reported.    Respiratory: Lung sounds clear on RA. Denies SOB.   GI/Diet/Appetite: 3gm Na diet. Good appetite. LBM .   :  Good urine output.   LDA's: PICC LUE, heparin locked.   Skin: Intact.   Activity: Up independently.   Tests/Procedures:   Pertinent Labs/Lab Collection: Mg 1.8 replacement given along with scheduled.      Plan: Patient will be staying until 35 weeks gestation and will deliver her baby  in patient. Patient will then stay in hospital for an additional 2 weeks after the birth for observation of cardiac health. Baby will transfer to [facility] for 2 weeks of care and observation. The father will be with the infant during admission to [facility].

## 2023-01-12 NOTE — PROGRESS NOTES
[facility]   Cardiology II Service / Advanced Heart Failure  Daily Progress Note      Patient: Anjali Carmen  MRN: [id number]  Admission Date: 2022  Hospital Day # 37    Assessment and Plan: Anjali Carmen is a 32 year old female  at 29 weeks with no significant past medical history, who presented as a transfer from [facility] to [facility] on 2022 for further management of newly diagnosed, acute HFrEF 2/2 NICM (LVEF 20% now 30-35%) and high risk delivery.     MFM available  at [phone]    Today's plan    - continue telemetry monitoring   - Replace electrolytes as detailed below    > Magnesium Sulfate 3 mg ordered    > Continue Magnesium [id number] BID  - Fasting blood sugar every morning and 1 hour after each lunch  - Received Tdap 0.5 on 1/3/23  - Will restart PO diuretics 20 mg daily    Acute on Chronic SCHF secondary to NICM, pregnancy related with questionable familial component. RV failure. Note family history of father with CM at age 30. TTE  with EF 20-25%, thinned out LV wall, and notable regional wall motion abnormalities. Coronary angiogram  with no significat lesions seen. EF is improved slightly on 23 ECHO to 30-35%.  Stage C, NYHA Class III  ACEi/ARB/ARNI: Contraindicated in pregnancy.   BB Metoprolol Tartrate 75 mg q6h. Nursing: hold if HR<50 or SBP<90 and call Cards2  Aldosterone antagonist contraindicated due to pregnancy  SCD prophylaxis GDMT  Fluid status euvolemic. Hold Lasix 40 mg po daily   - Lovenox given high risk for LV thrombus in setting of antepartum, regional wall motion abnormalities, and low EF.   - Cardiac MRI and genetic testing postpartum recommended.      New Onset PAF. NSVT. Frequent PVC and PAC's.  [id number] score of 2 (sex, HF) - on AC as above for pregnancy and low EF.   [facility] [place], isolated episode, EKG unavailable for review. Currently in NSR at [facility] with frequent ectopy.  Having 7-17 PVCs per minute, 3 episodes of NSVT and SVT In the last 25 hours, 5-10 beats.  - Maintain mag >2.4 and K >4.0  - Metoprolol Tartrate 75 mg q6h. Nursing: hold if HR<50 or sBP<90 and call Cards2  - Lovenox as above     High risk pregnancy  Gestational age 28w1d. . Betamethasone (BMZ) given - at OSH for fetal lung maturity. OB US for fetal growth done on 22 with normal fetal findings and no abnormalities. growth US  reassuring for normal fetal growth. Will likely do elective  at GA 34 wks  - Appreciate MFM management, closely following with daily checks; can be reached via their pager above  - NST BID  - Magnesium for neuroprotection if moving towards delivery prior to 32 weeks with 6g bolus followed by 2g/hr maintenance until delivery  - S/p  section consent on admission  - per MFM, BP goal 130/80, MAP >65                 - If blood pressures >160/110 for at least 15 minutes, call MFM for guidance of management, although typically will initiate treatment with 5-10 mg of IV hydralazine (preferred from cardiac standpoint) or 20 mg of IV labetalol     Gestational DM.   QID blood glucose checks (fasting, and 1 hour after each meal)- doing only fasting and after lunch  per patient, will discuss with OB/Gyn                - goal blood glucose for AM fasting 95 mg/dL                - 1 hour postprandial check is under 140 mg/dL  - If after 2 weeks of monitoring, if >50% of blood glucose values are elevated, then we would typically initiate medication for treatment of gestational diabetes. Patient declining insulin, would be open to metformin.  - MFM following  -  monitoring to be determined by need for medications     Mild anemia, multifactorial  Due to pregnancy and HF. Hgb 10.2 on admission (MCV 79). Retic count 1.9%. Iron, ferritin, TIBC levels show AVINASH (say 6%). Hgh 9s-low 10s. IV iron 200 mg daily x5 (-). Hgb stable mid 10s.   - limit blood  "draws     FEN: 3 gram sodium diet   PROPHY:  Lovenox  LINES:  PICC  DISPO:  Pending delivery   CODE STATUS:  Full Code   ================================================================    Interval History/ROS:   Patient says doing Ok this morning. No chest pain or SOB. Continues to have PVCs but are at baseline.       Medications: Reviewed in EPIC.     Physical Exam:   BP 96/60 (BP Location: Right arm, Cuff Size: Adult Large)   Pulse 76   Temp 98.2  F (36.8  C) (Oral)   Resp 16   Ht 1.753 m (5' 9\")   Wt 134.9 kg (297 lb 8 oz)   SpO2 98%   BMI 43.93 kg/m    GENERAL: laying in bed. No in acute distress   HEENT: Eye symmetrical and free of discharge bilaterally.  NECK: Supple and without lymphadenopathy.   CV: Irregular, controlled. S1S2 present without murmur, rub, or gallop.   RESPIRATORY: Respirations regular, even, and unlabored. Lungs CTA throughout.   GI: Soft, gravid, and  with normoactive bowel sounds present in all quadrants. No tenderness, rebound, guarding. No organomegaly.   EXTREMITIES: No peripheral edema. All extremities are warm and well perfused.  NEUROLOGIC: Alert and interacting appropriately. No focal deficits.   MUSCULOSKELETAL: No joint swelling or tenderness.   SKIN: No jaundice. No rashes or lesions.     Data:  CMP  Recent Labs   Lab 01/12/23  0542 01/11/23  1337 01/11/23  0706 01/11/23  0529 01/10/23  1422 01/10/23  1303 01/10/23  1011 01/10/23  0834 01/10/23  0121 01/08/23  1514 01/08/23  0721 01/06/23  1329 01/06/23  0656 01/05/23  1425 01/05/23  1113   *  --   --  135*  --   --  135*  --  134*  --  134*  --  136   < >  --    POTASSIUM 4.0  --   --  4.1  --   --  4.2  --  4.0   < > 4.1   < > 4.1  --  4.0   CHLORIDE 106  --   --  107  --   --  105  --  106  --  107  --  107   < >  --    CO2 16*  --   --  16*  --   --  16*  --  16*  --  16*  --  17*   < >  --    ANIONGAP 13  --   --  12  --   --  14  --  12  --  11  --  12   < >  --    GLC 95 114* 85 84   < >  --  95   < > 87   < " > 86   < > 98   < >  --    BUN 10.6  --   --  10.0  --   --  8.9  --  13.4  --  9.7  --  10.4   < >  --    CR 0.58  --   --  0.57  --   --  0.58  --  0.58  --  0.56  --  0.54   < >  --    GFRESTIMATED >90  --   --  >90  --   --  >90  --  >90  --  >90  --  >90   < >  --    AYDEN 9.4  --   --  8.8  --   --  9.3  --  8.9  --  8.9  --  9.0   < >  --    MAG 1.8  --   --  1.9  --  2.0  --   --  1.9   < > 1.7   < > 1.7  --  1.6*   PHOS  --   --   --   --   --   --   --   --   --   --   --   --   --   --  3.5   PROTTOTAL 6.6  --   --   --   --   --  7.1  --   --   --  6.5  --  6.4  --   --    ALBUMIN 3.0*  --   --   --   --   --  3.3*  --   --   --  3.0*  --  3.1*  --   --    BILITOTAL <0.2  --   --   --   --   --  0.3  --   --   --  0.3  --  0.2  --   --    ALKPHOS 90  --   --   --   --   --  97  --   --   --  85  --  88  --   --    AST 30  --   --   --   --   --  32  --   --   --  30  --  30  --   --    ALT 18  --   --   --   --   --  21  --   --   --  19  --  21  --   --     < > = values in this interval not displayed.     CBC  Recent Labs   Lab 01/12/23  0542 01/10/23  1011 01/08/23  0721 01/06/23  0656   WBC 8.4 9.2 9.1 8.3   RBC 3.56* 3.81 3.68* 3.59*   HGB 9.4* 10.2* 9.6* 9.4*   HCT 30.3* 32.2* 31.2* 30.1*   MCV 85 85 85 84   MCH 26.4* 26.8 26.1* 26.2*   MCHC 31.0* 31.7 30.8* 31.2*   RDW 18.5* 18.6* 18.3* 17.7*    226 202 190

## 2023-01-13 LAB
ANION GAP SERPL CALCULATED.3IONS-SCNC: 15 MMOL/L (ref 7–15)
BUN SERPL-MCNC: 13.4 MG/DL (ref 6–20)
CALCIUM SERPL-MCNC: 9.5 MG/DL (ref 8.6–10)
CHLORIDE SERPL-SCNC: 106 MMOL/L (ref 98–107)
CREAT SERPL-MCNC: 0.6 MG/DL (ref 0.51–0.95)
DEPRECATED HCO3 PLAS-SCNC: 15 MMOL/L (ref 22–29)
GFR SERPL CREATININE-BSD FRML MDRD: >90 ML/MIN/1.73M2
GLUCOSE BLDC GLUCOMTR-MCNC: 172 MG/DL (ref 70–99)
GLUCOSE BLDC GLUCOMTR-MCNC: 89 MG/DL (ref 70–99)
GLUCOSE SERPL-MCNC: 87 MG/DL (ref 70–99)
LMWH PPP CHRO-ACNC: 0.88 IU/ML
MAGNESIUM SERPL-MCNC: 1.8 MG/DL (ref 1.7–2.3)
MAGNESIUM SERPL-MCNC: 1.9 MG/DL (ref 1.7–2.3)
POTASSIUM SERPL-SCNC: 3.9 MMOL/L (ref 3.4–5.3)
POTASSIUM SERPL-SCNC: 4.6 MMOL/L (ref 3.4–5.3)
SODIUM SERPL-SCNC: 136 MMOL/L (ref 136–145)

## 2023-01-13 PROCEDURE — 250N000013 HC RX MED GY IP 250 OP 250 PS 637: Performed by: STUDENT IN AN ORGANIZED HEALTH CARE EDUCATION/TRAINING PROGRAM

## 2023-01-13 PROCEDURE — 250N000013 HC RX MED GY IP 250 OP 250 PS 637

## 2023-01-13 PROCEDURE — 59025 FETAL NON-STRESS TEST: CPT | Mod: 26 | Performed by: STUDENT IN AN ORGANIZED HEALTH CARE EDUCATION/TRAINING PROGRAM

## 2023-01-13 PROCEDURE — 80048 BASIC METABOLIC PNL TOTAL CA: CPT

## 2023-01-13 PROCEDURE — 99233 SBSQ HOSP IP/OBS HIGH 50: CPT | Mod: GC | Performed by: INTERNAL MEDICINE

## 2023-01-13 PROCEDURE — 83735 ASSAY OF MAGNESIUM: CPT

## 2023-01-13 PROCEDURE — 84132 ASSAY OF SERUM POTASSIUM: CPT | Performed by: SURGERY

## 2023-01-13 PROCEDURE — 250N000013 HC RX MED GY IP 250 OP 250 PS 637: Performed by: INTERNAL MEDICINE

## 2023-01-13 PROCEDURE — 250N000011 HC RX IP 250 OP 636: Performed by: STUDENT IN AN ORGANIZED HEALTH CARE EDUCATION/TRAINING PROGRAM

## 2023-01-13 PROCEDURE — 99232 SBSQ HOSP IP/OBS MODERATE 35: CPT | Mod: 25 | Performed by: STUDENT IN AN ORGANIZED HEALTH CARE EDUCATION/TRAINING PROGRAM

## 2023-01-13 PROCEDURE — 36592 COLLECT BLOOD FROM PICC: CPT | Performed by: INTERNAL MEDICINE

## 2023-01-13 PROCEDURE — 214N000001 HC R&B CCU UMMC

## 2023-01-13 PROCEDURE — 250N000011 HC RX IP 250 OP 636: Performed by: INTERNAL MEDICINE

## 2023-01-13 PROCEDURE — 85520 HEPARIN ASSAY: CPT | Performed by: INTERNAL MEDICINE

## 2023-01-13 PROCEDURE — 36592 COLLECT BLOOD FROM PICC: CPT

## 2023-01-13 PROCEDURE — 250N000013 HC RX MED GY IP 250 OP 250 PS 637: Performed by: NURSE PRACTITIONER

## 2023-01-13 RX ORDER — MAGNESIUM SULFATE HEPTAHYDRATE 40 MG/ML
2 INJECTION, SOLUTION INTRAVENOUS ONCE
Status: COMPLETED | OUTPATIENT
Start: 2023-01-13 | End: 2023-01-13

## 2023-01-13 RX ORDER — POTASSIUM CHLORIDE 20MEQ/15ML
20 LIQUID (ML) ORAL ONCE
Status: COMPLETED | OUTPATIENT
Start: 2023-01-13 | End: 2023-01-13

## 2023-01-13 RX ORDER — POTASSIUM CHLORIDE 1.5 G/1.58G
20 POWDER, FOR SOLUTION ORAL ONCE
Status: DISCONTINUED | OUTPATIENT
Start: 2023-01-13 | End: 2023-01-13

## 2023-01-13 RX ADMIN — ACETAMINOPHEN 650 MG: 325 TABLET, FILM COATED ORAL at 12:48

## 2023-01-13 RX ADMIN — SODIUM CHLORIDE, PRESERVATIVE FREE 5 ML: 5 INJECTION INTRAVENOUS at 12:10

## 2023-01-13 RX ADMIN — MAGNESIUM SULFATE IN WATER 2 G: 40 INJECTION, SOLUTION INTRAVENOUS at 03:14

## 2023-01-13 RX ADMIN — FUROSEMIDE 20 MG: 20 TABLET ORAL at 08:21

## 2023-01-13 RX ADMIN — ENOXAPARIN SODIUM 120 MG: 120 INJECTION SUBCUTANEOUS at 19:47

## 2023-01-13 RX ADMIN — METOPROLOL TARTRATE 75 MG: 25 TABLET, FILM COATED ORAL at 03:13

## 2023-01-13 RX ADMIN — Medication 25 MG: at 06:22

## 2023-01-13 RX ADMIN — MAGNESIUM SULFATE IN WATER 2 G: 40 INJECTION, SOLUTION INTRAVENOUS at 11:01

## 2023-01-13 RX ADMIN — SENNOSIDES 8.6 MG: 8.6 TABLET, COATED ORAL at 19:48

## 2023-01-13 RX ADMIN — Medication 1000 MG: at 08:22

## 2023-01-13 RX ADMIN — Medication 1000 MG: at 19:47

## 2023-01-13 RX ADMIN — POTASSIUM CHLORIDE 20 MEQ: 40 SOLUTION ORAL at 19:47

## 2023-01-13 RX ADMIN — METOPROLOL TARTRATE 75 MG: 25 TABLET, FILM COATED ORAL at 13:57

## 2023-01-13 RX ADMIN — SODIUM CHLORIDE, PRESERVATIVE FREE 10 ML: 5 INJECTION INTRAVENOUS at 16:53

## 2023-01-13 RX ADMIN — METOPROLOL TARTRATE 75 MG: 25 TABLET, FILM COATED ORAL at 08:21

## 2023-01-13 RX ADMIN — PRENATAL VIT W/ FE FUMARATE-FA TAB 27-0.8 MG 1 TABLET: 27-0.8 TAB at 08:21

## 2023-01-13 RX ADMIN — ENOXAPARIN SODIUM 120 MG: 120 INJECTION SUBCUTANEOUS at 08:22

## 2023-01-13 RX ADMIN — SODIUM CHLORIDE, PRESERVATIVE FREE 5 ML: 5 INJECTION INTRAVENOUS at 09:46

## 2023-01-13 RX ADMIN — METOPROLOL TARTRATE 75 MG: 25 TABLET, FILM COATED ORAL at 19:48

## 2023-01-13 RX ADMIN — POTASSIUM CHLORIDE 20 MEQ: 40 SOLUTION ORAL at 13:57

## 2023-01-13 RX ADMIN — SENNOSIDES 8.6 MG: 8.6 TABLET, COATED ORAL at 08:21

## 2023-01-13 RX ADMIN — POTASSIUM CHLORIDE 20 MEQ: 40 SOLUTION ORAL at 08:22

## 2023-01-13 RX ADMIN — POTASSIUM CHLORIDE 20 MEQ: 1.5 SOLUTION ORAL at 03:11

## 2023-01-13 ASSESSMENT — ACTIVITIES OF DAILY LIVING (ADL)
ADLS_ACUITY_SCORE: 20

## 2023-01-13 NOTE — PROGRESS NOTES
Maternal-Fetal Medicine Progress Note    S:   Patient overall doing well. Has been finding it more difficult to get comfortable due to pregnancy. She states that it does not feel like contractions. Notes some hip pain with movement, which is limited her from wanting to move around. She continues to have intermittent PVCs that she feels, but this has been stable. She endorses FM. Denies VB, LOF, or contractions. Denies chest pain, shortness of breath, or current palpitations.     O:  Vitals:    01/13/23 0300 01/13/23 0313 01/13/23 0500 01/13/23 0745   BP: 97/50 101/56  99/62   BP Location: Right arm   Right arm   Cuff Size: Adult Large      Pulse: 83 87  78   Resp: 16   16   Temp: 98.3  F (36.8  C)   97.8  F (36.6  C)   TempSrc: Oral   Oral   SpO2: 99%   99%   Weight:   135.5 kg (298 lb 11.2 oz)    Height:         Gen Appear: NAD  CV: RRR  Pulm: CTAB  Abdomen: gravid, soft, non-tender to palpation  Extrem: Trace bilateral LE edema, no calf tenderness    Fetal heart monitoring 1/13/23 0800   Baseline 135, moderate variability, positive accels, no decels  [place]: none    Echo 1/2/2023:  Interpretation Summary  Left ventricular function is decreased. The ejection fraction is 30-35%  (moderately reduced).  There is wall thinning and akinesis of the basal-mid inferior, basal-mid  inferoseptal and basal inferolateral segments.  Global right ventricular function is mildly to moderately reduced.  Mild to moderate tricuspid insufficiency is present.  Right ventricular systolic pressure is 45mmHg above the right atrial pressure.  Pulmonary hypertension is present.  IVC diameter <2.1 cm collapsing >50% with sniff suggests a normal RA pressure  of 3 mmHg.  No pericardial effusion is present.     This study was compared with the study from 12/6/2022. The left ventricular  function has improved.  ______________________________________________________________________________  Left Ventricle  Mild left ventricular dilation is present.  Mild concentric wall thickening  consistent with left ventricular hypertrophy is present. Left ventricular  function is decreased. The ejection fraction is 30-35% (moderately reduced).  There is wall thinning and akinesis of the basal-mid inferior, basal-mid  inferoseptal and basal inferolateral segments.     Right Ventricle  The right ventricle is normal size. Global right ventricular function is  mildly to moderately reduced.     Mitral Valve  The mitral valve is normal. Trace mitral insufficiency is present.     Aortic Valve  The valve leaflets are not well visualized. On Doppler interrogation, there is  no significant stenosis or regurgitation.     Tricuspid Valve  The tricuspid valve is normal. Mild to moderate tricuspid insufficiency is  present. Right ventricular systolic pressure is 45mmHg above the right atrial  pressure. Pulmonary hypertension is present.     Pulmonic Valve  The pulmonic valve is normal. Trace pulmonic insufficiency is present.     Vessels  The aorta root is normal. IVC diameter <2.1 cm collapsing >50% with sniff  suggests a normal RA pressure of 3 mmHg.     Pericardium  No pericardial effusion is present.     Compared to Previous Study  This study was compared with the study from 2022 . The left ventricular  function has improved.   _____________________________________________________________________________  MMode/2D Measurements & Calculations  IVSd: 1.2 cm  LVIDd: 6.2 cm  LVIDs: 5.5 cm  LVPWd: 1.1 cm  FS: 10.3 %  LV mass(C)d: 318.0 grams  LV mass(C)dI: 130.9 grams/m2     RWT: 0.36     Doppler Measurements & Calculations  PA acc time: 0.10 sec  TR max radha: 336.9 cm/sec  TR max P.4 mmHg    Treponema Antibody: non-reactive    Recent Labs   Lab 23  0627 23  0101 23  1418 23  0817 23  0542 23  1337   GLC 89 87 113* 88 95 114*       A/P:   Anjali Carmen is a 32 year old  at 29w4d admitted for acute HFrEF during pregnancy. Most recent  echo on 23 showed EF 30-35%. She is currently on Lasix and metoprolol.    Acute decompensated HFrEF  - Patient is asymptomatic at this time and appears euvolemic on exam. Repeat echo  and EF improved at 30-35%  - She is currently on metoprolol 75 mg q 6 hrs.  Lasix currently held as she is euvolemic. No contraindications to increasing dose of metoprolol from a pregnancy standpoint, if needed from.   - Agree with Lovenox given risk of LV thrombus  - Appreciate cares per primary cardiology team    Gestational Diabetes  - Goal blood glucose for AM fasting is <95 mg/dL  - 1 hour postprandial check is <140 mg/dL and for 2 hour postprandial is <120 mg/d  - Overall glucose values have been at goal, though need to clarify if checking 1 hour or 2 hours post-prandial. Will continue to monitor.     Anemia  Likely related to pregnancy, heart failure, serial blood draws, but also component of iron deficiency anemia.  Last Hgb 9.4 on 23; ferritin 28 on 23  - s/p IV iron infusion   - Continue oral iron replacement    Routine prenatal care  - Non-stress test (NST) BID  - Repeat growth ultrasound (performed by [facility]) on   - Betamethasone (BMZ) given - at OSH for fetal lung maturity. Candidate for rescue BMZ.   - Magnesium for neuroprotection if moving towards delivery prior to 32 weeks with 6g bolus followed by 2g/hr maintenance until delivery  - Routine indications for emergent delivery including maternal decompensation or fetal compromise  - S/p  section consent on admission  - S/p third trimester RPR, which was normal.   - S/p Tdap on 1/3/23  - Plan IUD and depo-provera prior to discharge postpartum.  - Signed FMLA paperwork provided on   - Encouraged use of abdominal binder to help with discomforts of pregnancy. Low suspicion for  labor at this time. Provided patient with phone number to L&D if she has concerns and/or if her team is unable reach us.      I personally spent a total of  25 minutes, including both face-to-face and non-face-to-face on the date of the encounter, addressing the above diagnosis. Activities performed in this time include chart review, obtaining / reviewing history, performing a medically necessary evaluation, documentation and counseling/care coordination/ordering tests/ordering meds.    Erika Glass MD  Maternal Fetal Medicine

## 2023-01-13 NOTE — PROGRESS NOTES
[facility]   Cardiology II Service / Advanced Heart Failure  Daily Progress Note      Patient: Anjali Carmen  MRN: [id number]  Admission Date: 2022  Hospital Day # 38    Assessment and Plan: Anjali Carmen is a 32 year old female Gestational age 29w3d.  with no significant past medical history, who presented as a transfer from [facility] to [facility] on 2022 for further management of newly diagnosed, acute HFrEF 2/2 NICM (LVEF 20% now 30-35%) and high risk delivery.     MFM available  at [phone]    Today's plan  - continue telemetry monitoring   - Replace electrolytes as detailed below    > Magnesium Sulfate 2 mg ordered    > Continue Magnesium [id number] BID  - Fasting blood sugar every morning and 1 hour after each lunch  - Received Tdap 0.5 on 1/3/23  - Will restart PO diuretics 20 mg daily  - Will need cardiac MRI after delivery (no in an acute fashion)  - Will need genetic testing as outpatient     Acute on Chronic SCHF secondary to NICM, pregnancy related with questionable familial component. RV failure. Note family history of father with CM at age 30. TTE  with EF 20-25%, thinned out LV wall, and notable regional wall motion abnormalities. Coronary angiogram  with no significat lesions seen. EF is improved slightly on 23 ECHO to 30-35%.  Stage C, NYHA Class III  ACEi/ARB/ARNI: Contraindicated in pregnancy.   BB Metoprolol Tartrate 75 mg q6h. Nursing: hold if HR<50 or SBP<90 and call Cards2  Aldosterone antagonist contraindicated due to pregnancy  SCD prophylaxis GDMT  Fluid status euvolemic. Hold Lasix 40 mg po daily   - Lovenox given high risk for LV thrombus in setting of antepartum, regional wall motion abnormalities, and low EF.   - Cardiac MRI and genetic testing postpartum recommended.      New Onset PAF. NSVT. Frequent PVC and PAC's.  [id number] score of 2 (sex, HF) - on AC as above for pregnancy and low EF.   Diagnosed at the [facility]  clinic in [place], isolated episode, EKG unavailable for review. Currently in NSR at [facility] with frequent ectopy. Having 7-17 PVCs per minute, 3 episodes of NSVT and SVT In the last 25 hours, 5-10 beats.  - Maintain mag >2.4 and K >4.0  - Metoprolol Tartrate 75 mg q6h. Nursing: hold if HR<50 or sBP<90 and call Cards2  - Lovenox as above     High risk pregnancy  Gestational age 29w3d. . Betamethasone (BMZ) given - at OSH for fetal lung maturity. OB US for fetal growth done on 22 with normal fetal findings and no abnormalities. growth US  reassuring for normal fetal growth. Will likely do elective  at GA 34 wks  - Appreciate MFM management, closely following with daily checks; can be reached via their pager above  - NST BID  - Magnesium for neuroprotection if moving towards delivery prior to 32 weeks with 6g bolus followed by 2g/hr maintenance until delivery  - S/p  section consent on admission  - per MFM, BP goal 130/80, MAP >65                 - If blood pressures >160/110 for at least 15 minutes, call MFM for guidance of management, although typically will initiate treatment with 5-10 mg of IV hydralazine (preferred from cardiac standpoint) or 20 mg of IV labetalol     Gestational DM.   QID blood glucose checks (fasting, and 1 hour after each meal)- doing only fasting and after lunch  per patient, will discuss with OB/Gyn                - goal blood glucose for AM fasting 95 mg/dL                - 1 hour postprandial check is under 140 mg/dL  - If after 2 weeks of monitoring, if >50% of blood glucose values are elevated, then we would typically initiate medication for treatment of gestational diabetes. Patient declining insulin, would be open to metformin.  - MFM following  -  monitoring to be determined by need for medications     Mild anemia, multifactorial  Due to pregnancy and HF. Hgb 10.2 on admission (MCV 79). Retic count 1.9%. Iron, ferritin, TIBC levels show AVINASH  "(say 6%). Hgh 9s-low 10s. IV iron 200 mg daily x5 (12/24-12/28). Hgb stable mid 10s.   - limit blood draws     FEN: 3 gram sodium diet   PROPHY:  Lovenox  LINES:  PICC  DISPO:  Pending delivery   CODE STATUS:  Full Code   ================================================================    Interval History/ROS:   Patient is doing well. No chest pain or SOB.   Medications: Reviewed in EPIC.     Physical Exam:   BP 99/62 (BP Location: Right arm)   Pulse 78   Temp 97.8  F (36.6  C) (Oral)   Resp 16   Ht 1.753 m (5' 9\")   Wt 135.5 kg (298 lb 11.2 oz)   SpO2 99%   BMI 44.11 kg/m      GENERAL: laying in bed. No in acute distress   HEENT: Eye symmetrical and free of discharge bilaterally.  NECK: Supple and without lymphadenopathy.   CV: Irregular, controlled. S1S2 present without murmur, rub, or gallop.   RESPIRATORY: Respirations regular, even, and unlabored. Lungs CTA throughout.   GI: Soft, gravid, and  with normoactive bowel sounds present in all quadrants. No tenderness, rebound, guarding. No organomegaly.   EXTREMITIES: No peripheral edema. All extremities are warm and well perfused.  NEUROLOGIC: Alert and interacting appropriately. No focal deficits.   MUSCULOSKELETAL: No joint swelling or tenderness.   SKIN: No jaundice. No rashes or lesions.     Data:  CMP  Recent Labs   Lab 01/13/23  0950 01/13/23  0627 01/13/23  0101 01/12/23  1418 01/12/23  0817 01/12/23  0542 01/11/23  0706 01/11/23  0529 01/10/23  1303 01/10/23  1011 01/08/23  1514 01/08/23  0721   NA  --   --  136  --   --  135*  --  135*  --  135*   < > 134*   POTASSIUM 4.6  --  3.9  --   --  4.0  --  4.1  --  4.2   < > 4.1   CHLORIDE  --   --  106  --   --  106  --  107  --  105   < > 107   CO2  --   --  15*  --   --  16*  --  16*  --  16*   < > 16*   ANIONGAP  --   --  15  --   --  13  --  12  --  14   < > 11   GLC  --  89 87 113* 88 95   < > 84   < > 95   < > 86   BUN  --   --  13.4  --   --  10.6  --  10.0  --  8.9   < > 9.7   CR  --   --  0.60  " --   --  0.58  --  0.57  --  0.58   < > 0.56   GFRESTIMATED  --   --  >90  --   --  >90  --  >90  --  >90   < > >90   AYDEN  --   --  9.5  --   --  9.4  --  8.8  --  9.3   < > 8.9   MAG 1.9  --  1.8  --   --  1.8  --  1.9   < >  --    < > 1.7   PROTTOTAL  --   --   --   --   --  6.6  --   --   --  7.1  --  6.5   ALBUMIN  --   --   --   --   --  3.0*  --   --   --  3.3*  --  3.0*   BILITOTAL  --   --   --   --   --  <0.2  --   --   --  0.3  --  0.3   ALKPHOS  --   --   --   --   --  90  --   --   --  97  --  85   AST  --   --   --   --   --  30  --   --   --  32  --  30   ALT  --   --   --   --   --  18  --   --   --  21  --  19    < > = values in this interval not displayed.     CBC  Recent Labs   Lab 01/12/23  0542 01/10/23  1011 01/08/23  0721   WBC 8.4 9.2 9.1   RBC 3.56* 3.81 3.68*   HGB 9.4* 10.2* 9.6*   HCT 30.3* 32.2* 31.2*   MCV 85 85 85   MCH 26.4* 26.8 26.1*   MCHC 31.0* 31.7 30.8*   RDW 18.5* 18.6* 18.3*    226 202

## 2023-01-13 NOTE — PROGRESS NOTES
Neuro: A&Ox4.   Cardiac: Afebrile, VSS. SR w/freq PVC's and PAC's.    Respiratory: RA   GI/: Voiding spontaneously. No BM this shift.   Diet/appetite: Tolerating 3g sodium diet. Denies nausea. Checking fasting BG in morning, and 1hr post lunch check.   Activity: Up ad danis  Pain: c/o headache given prn tylenol with relief.   Skin: No new deficits noted.  Lines: DL PICC HL, PIV  Drains: none   Replacement: replaced IV mag, and scheduled magnesium and potassium dosing.

## 2023-01-13 NOTE — PHARMACY-ANTICOAGULATION SERVICE
Clinical Pharmacy Note- Enoxaparin Anti-Xa Evaluation for ADULT Patients    Date of Service 2023  Patient's  1990   Patient's age:  32 year old  Patient's Weight used for enoxaparin dosin.5 kg (298 lb 11.2 oz)  Patient's BMI: Body mass index is 44.11 kg/m .   Enoxaparin Indication: DVT/PE treatment (LV thrombus in the setting of cardiomyopathy and hypercoaguable state in pregnancy)  Goal LMWH anti-Xa level for ADULT patients: 0.5-1 IU/mL (1 mg/kg or 0.75 mg/kg BID dose)  Current Enoxaparin Regimen: 1 mg/kg subcutaneous Q 12 hours  - Service asked to not use the 0.75 mg/kg dosing strategy to start despite BMI >40    Creatinine for last 3 days:   Creatinine   Date Value Ref Range Status   2023 0.60 0.51 - 0.95 mg/dL Final   2023 0.58 0.51 - 0.95 mg/dL Final   2023 0.57 0.51 - 0.95 mg/dL Final      Current estimated CrCL:  Serum creatinine: 0.6 mg/dL 23 0101  Estimated creatinine clearance: 203.2 mL/min     Platelet count for last 3 days:   Platelet Count   Date Value Ref Range Status   2023 202 150 - 450 10e3/uL Final   01/10/2023 226 150 - 450 10e3/uL Final   2023 202 150 - 450 10e3/uL Final      Recent Enoxaparin anti-Xa Levels (past 3 days):   Recent Labs     23  1215   ALMWH 0.88       ASSESSMENT OF LEVELS AND CURRENT REGIMEN:   1) Enoxaparin anti-XA level was drawn ~4 hours post-dose at steady state.    2) Current LMWH anti-Xa peak level is: AT GOAL    3) Renal Function:  Scr changed > 50% in last 72 hours? No  Urine Output: Good    4) Platelet Counts have been assessed and are: Stable    RECOMMENDATIONS:    1) Enoxaparin Regimen/Dose Plan: NO change  2) Recheck Enoxaparin anti-Xa levels: Other within 7 days given changing weight and coaguability with pregnancy    The pharmacist will continue to monitor and follow enoxaparin LMWH anti-Xa levels as needed.      Please contact pharmacy if enoxaparin needs to be held for a procedure or if goal  levels change.    Mirta Tavarez, Pharm.D

## 2023-01-13 NOTE — PLAN OF CARE
"D: transfer from [facility] on 12/6 for further management of newly diagnosed HF and high risk delivery    I: Monitored vitals and assessed pt status.   Changed:   Running:   PRN:     A: A/o x4, sinus rhythm w/ frequent PVC/PAC's. 14 beat run of VT, provider aware. Magnesium and Potassium replaced. RA. Up ad danis to the bathroom. DL PICC saline locked. BS checks: fasting BS in the AM, 1 hour after lunch.     Vitals: /56   Pulse 87   Temp 98.3  F (36.8  C) (Oral)   Resp 16   Ht 1.753 m (5' 9\")   Wt 135.5 kg (298 lb 11.2 oz)   SpO2 99%   BMI 44.11 kg/m       P: Continue to monitor Pt status and report any significant changes to treatment team    "

## 2023-01-14 LAB
ALBUMIN SERPL BCG-MCNC: 2.9 G/DL (ref 3.5–5.2)
ALP SERPL-CCNC: 89 U/L (ref 35–104)
ALT SERPL W P-5'-P-CCNC: 19 U/L (ref 10–35)
ANION GAP SERPL CALCULATED.3IONS-SCNC: 12 MMOL/L (ref 7–15)
ANION GAP SERPL CALCULATED.3IONS-SCNC: 14 MMOL/L (ref 7–15)
AST SERPL W P-5'-P-CCNC: 28 U/L (ref 10–35)
BASOPHILS # BLD AUTO: 0 10E3/UL (ref 0–0.2)
BASOPHILS NFR BLD AUTO: 0 %
BILIRUB SERPL-MCNC: 0.2 MG/DL
BUN SERPL-MCNC: 11.8 MG/DL (ref 6–20)
BUN SERPL-MCNC: 12 MG/DL (ref 6–20)
CALCIUM SERPL-MCNC: 9.1 MG/DL (ref 8.6–10)
CALCIUM SERPL-MCNC: 9.2 MG/DL (ref 8.6–10)
CHLORIDE SERPL-SCNC: 105 MMOL/L (ref 98–107)
CHLORIDE SERPL-SCNC: 106 MMOL/L (ref 98–107)
CREAT SERPL-MCNC: 0.58 MG/DL (ref 0.51–0.95)
CREAT SERPL-MCNC: 0.73 MG/DL (ref 0.51–0.95)
CRP SERPL-MCNC: 19.8 MG/L
DEPRECATED HCO3 PLAS-SCNC: 15 MMOL/L (ref 22–29)
DEPRECATED HCO3 PLAS-SCNC: 16 MMOL/L (ref 22–29)
EOSINOPHIL # BLD AUTO: 0.1 10E3/UL (ref 0–0.7)
EOSINOPHIL NFR BLD AUTO: 1 %
ERYTHROCYTE [DISTWIDTH] IN BLOOD BY AUTOMATED COUNT: 19.1 % (ref 10–15)
GFR SERPL CREATININE-BSD FRML MDRD: >90 ML/MIN/1.73M2
GFR SERPL CREATININE-BSD FRML MDRD: >90 ML/MIN/1.73M2
GLUCOSE BLDC GLUCOMTR-MCNC: 102 MG/DL (ref 70–99)
GLUCOSE BLDC GLUCOMTR-MCNC: 125 MG/DL (ref 70–99)
GLUCOSE BLDC GLUCOMTR-MCNC: 85 MG/DL (ref 70–99)
GLUCOSE SERPL-MCNC: 130 MG/DL (ref 70–99)
GLUCOSE SERPL-MCNC: 82 MG/DL (ref 70–99)
HCT VFR BLD AUTO: 30.9 % (ref 35–47)
HGB BLD-MCNC: 9.3 G/DL (ref 11.7–15.7)
IMM GRANULOCYTES # BLD: 0.1 10E3/UL
IMM GRANULOCYTES NFR BLD: 1 %
LYMPHOCYTES # BLD AUTO: 2.2 10E3/UL (ref 0.8–5.3)
LYMPHOCYTES NFR BLD AUTO: 25 %
MAGNESIUM SERPL-MCNC: 1.4 MG/DL (ref 1.7–2.3)
MAGNESIUM SERPL-MCNC: 1.6 MG/DL (ref 1.7–2.3)
MAGNESIUM SERPL-MCNC: 1.7 MG/DL (ref 1.7–2.3)
MCH RBC QN AUTO: 26.3 PG (ref 26.5–33)
MCHC RBC AUTO-ENTMCNC: 30.1 G/DL (ref 31.5–36.5)
MCV RBC AUTO: 88 FL (ref 78–100)
MONOCYTES # BLD AUTO: 0.5 10E3/UL (ref 0–1.3)
MONOCYTES NFR BLD AUTO: 5 %
NEUTROPHILS # BLD AUTO: 5.9 10E3/UL (ref 1.6–8.3)
NEUTROPHILS NFR BLD AUTO: 68 %
NRBC # BLD AUTO: 0 10E3/UL
NRBC BLD AUTO-RTO: 0 /100
PLATELET # BLD AUTO: 229 10E3/UL (ref 150–450)
POTASSIUM SERPL-SCNC: 3.9 MMOL/L (ref 3.4–5.3)
POTASSIUM SERPL-SCNC: 4.1 MMOL/L (ref 3.4–5.3)
POTASSIUM SERPL-SCNC: 4.2 MMOL/L (ref 3.4–5.3)
PROT SERPL-MCNC: 6.5 G/DL (ref 6.4–8.3)
RBC # BLD AUTO: 3.53 10E6/UL (ref 3.8–5.2)
SODIUM SERPL-SCNC: 133 MMOL/L (ref 136–145)
SODIUM SERPL-SCNC: 135 MMOL/L (ref 136–145)
TSH SERPL DL<=0.005 MIU/L-ACNC: 1.78 UIU/ML (ref 0.3–4.2)
WBC # BLD AUTO: 8.7 10E3/UL (ref 4–11)

## 2023-01-14 PROCEDURE — 250N000013 HC RX MED GY IP 250 OP 250 PS 637: Performed by: STUDENT IN AN ORGANIZED HEALTH CARE EDUCATION/TRAINING PROGRAM

## 2023-01-14 PROCEDURE — 80053 COMPREHEN METABOLIC PANEL: CPT

## 2023-01-14 PROCEDURE — 84443 ASSAY THYROID STIM HORMONE: CPT | Performed by: NURSE PRACTITIONER

## 2023-01-14 PROCEDURE — 250N000013 HC RX MED GY IP 250 OP 250 PS 637

## 2023-01-14 PROCEDURE — 36415 COLL VENOUS BLD VENIPUNCTURE: CPT

## 2023-01-14 PROCEDURE — 36592 COLLECT BLOOD FROM PICC: CPT

## 2023-01-14 PROCEDURE — 85025 COMPLETE CBC W/AUTO DIFF WBC: CPT

## 2023-01-14 PROCEDURE — 214N000001 HC R&B CCU UMMC

## 2023-01-14 PROCEDURE — 250N000011 HC RX IP 250 OP 636: Performed by: INTERNAL MEDICINE

## 2023-01-14 PROCEDURE — 86140 C-REACTIVE PROTEIN: CPT | Performed by: NURSE PRACTITIONER

## 2023-01-14 PROCEDURE — 83735 ASSAY OF MAGNESIUM: CPT

## 2023-01-14 PROCEDURE — 84132 ASSAY OF SERUM POTASSIUM: CPT

## 2023-01-14 PROCEDURE — 999N000044 HC STATISTIC CVC DRESSING CHANGE

## 2023-01-14 PROCEDURE — 99232 SBSQ HOSP IP/OBS MODERATE 35: CPT | Performed by: NURSE PRACTITIONER

## 2023-01-14 PROCEDURE — 93005 ELECTROCARDIOGRAM TRACING: CPT

## 2023-01-14 PROCEDURE — 93010 ELECTROCARDIOGRAM REPORT: CPT | Performed by: INTERNAL MEDICINE

## 2023-01-14 PROCEDURE — 250N000013 HC RX MED GY IP 250 OP 250 PS 637: Performed by: NURSE PRACTITIONER

## 2023-01-14 PROCEDURE — 250N000011 HC RX IP 250 OP 636: Performed by: STUDENT IN AN ORGANIZED HEALTH CARE EDUCATION/TRAINING PROGRAM

## 2023-01-14 RX ORDER — MAGNESIUM SULFATE HEPTAHYDRATE 40 MG/ML
2 INJECTION, SOLUTION INTRAVENOUS ONCE
Status: COMPLETED | OUTPATIENT
Start: 2023-01-14 | End: 2023-01-14

## 2023-01-14 RX ORDER — POTASSIUM CHLORIDE 20MEQ/15ML
20 LIQUID (ML) ORAL ONCE
Status: COMPLETED | OUTPATIENT
Start: 2023-01-14 | End: 2023-01-14

## 2023-01-14 RX ORDER — METOPROLOL TARTRATE 100 MG
100 TABLET ORAL EVERY 6 HOURS SCHEDULED
Status: DISCONTINUED | OUTPATIENT
Start: 2023-01-14 | End: 2023-01-16

## 2023-01-14 RX ADMIN — POTASSIUM CHLORIDE 20 MEQ: 40 SOLUTION ORAL at 20:49

## 2023-01-14 RX ADMIN — Medication 1000 MG: at 20:52

## 2023-01-14 RX ADMIN — Medication 25 MG: at 06:45

## 2023-01-14 RX ADMIN — ENOXAPARIN SODIUM 120 MG: 120 INJECTION SUBCUTANEOUS at 20:53

## 2023-01-14 RX ADMIN — Medication 1000 MG: at 08:19

## 2023-01-14 RX ADMIN — FUROSEMIDE 20 MG: 20 TABLET ORAL at 08:18

## 2023-01-14 RX ADMIN — SODIUM CHLORIDE, PRESERVATIVE FREE 5 ML: 5 INJECTION INTRAVENOUS at 07:13

## 2023-01-14 RX ADMIN — ENOXAPARIN SODIUM 120 MG: 120 INJECTION SUBCUTANEOUS at 08:18

## 2023-01-14 RX ADMIN — PRENATAL VIT W/ FE FUMARATE-FA TAB 27-0.8 MG 1 TABLET: 27-0.8 TAB at 08:18

## 2023-01-14 RX ADMIN — POTASSIUM CHLORIDE 20 MEQ: 40 SOLUTION ORAL at 08:19

## 2023-01-14 RX ADMIN — METOPROLOL TARTRATE 75 MG: 25 TABLET, FILM COATED ORAL at 13:09

## 2023-01-14 RX ADMIN — SODIUM CHLORIDE, PRESERVATIVE FREE 5 ML: 5 INJECTION INTRAVENOUS at 16:07

## 2023-01-14 RX ADMIN — POTASSIUM CHLORIDE 20 MEQ: 40 SOLUTION ORAL at 13:09

## 2023-01-14 RX ADMIN — POTASSIUM CHLORIDE 20 MEQ: 20 SOLUTION ORAL at 20:49

## 2023-01-14 RX ADMIN — MAGNESIUM SULFATE IN WATER 2 G: 40 INJECTION, SOLUTION INTRAVENOUS at 20:45

## 2023-01-14 RX ADMIN — METOPROLOL TARTRATE 75 MG: 25 TABLET, FILM COATED ORAL at 08:18

## 2023-01-14 RX ADMIN — METOPROLOL TARTRATE 100 MG: 100 TABLET, FILM COATED ORAL at 20:51

## 2023-01-14 RX ADMIN — METOPROLOL TARTRATE 75 MG: 25 TABLET, FILM COATED ORAL at 03:08

## 2023-01-14 RX ADMIN — SENNOSIDES 8.6 MG: 8.6 TABLET, COATED ORAL at 08:18

## 2023-01-14 RX ADMIN — SENNOSIDES 8.6 MG: 8.6 TABLET, COATED ORAL at 20:52

## 2023-01-14 ASSESSMENT — ACTIVITIES OF DAILY LIVING (ADL)
ADLS_ACUITY_SCORE: 20

## 2023-01-14 NOTE — PROGRESS NOTES
at 29 weeks with no significant past medical history, who presented as a transfer from [facility] to [facility] on 2022 for further management of newly diagnosed, acute HFrEF 2/2 NICM (LVEF 20% now 30-35%) and high risk delivery.    Major shift updates:    Holding diuretic due to concerns for dehydration. SR w/ frequent ectopy w/out complaints of dizziness, chest pain, palpitations. RA, VSS, Up ad danis in room, voiding in hat for I/O's. Check BG tonight to monitor for hypoglycemia. Fetal monitoring per [facility] [place] [facility] staff BID. On mag and potassium replacement protocol. L DL PICC SL. 3g sodium diet.

## 2023-01-14 NOTE — PROGRESS NOTES
[facility]   Cardiology II Service / Advanced Heart Failure  Daily Progress Note      Patient: Anjali Carmen  MRN: [id number]  Admission Date: 2022  Hospital Day # 39    Assessment and Plan: Anjali Carmen is a 32 year old female  with no significant past medical history, who presented as a transfer from [facility] to [facility] on 2022 for further management of newly diagnosed, acute decompensated systolic heart failure (LVEF 20%) and high risk delivery.    [facility] available  at [phone]    Today's Plan:  -hold lasix  -consider further increasing metoprolol  -please check BS in evening, PM if patient reports sweats  -add on TSH  -monitor CBC, add on UA (low suspicion)  -goal K>4 Mg>2    # Acute on chronic systolic heart failure/HFrEF secondary to NICM (?familial)    # Moderately reduced RV function   # NSVT  # Frequent PVCs, PACs  Stage C. NYHA class difficult to discern. Concern for familial dilated cardiomyopathy given history of cardiomyopathy in father at 30 years of age. Last pregnancy in , timeline not c/w PPCM. TTE  with thinned out LV wall and notable regional wall motion abnormalities. Coronary angiogram  no significat lesions seen. No known history of pre-existing structural heart disease prior to this admission. GDMT and diuresis as outlined below. Traditional afterload reducing agents for poor LV function and low LVEF of 20-25% cannot be met due to need for fetoplacental circulation. Will target /80 and MAP > 65. TTE  LVEF 20-25%, RWM (details below), moderately reduced RV function, mild TR, no pericardial effusion. TTE 23 LV EF 30-35%, wall thinning and akinesis of the basal-mid inferior, basal-mid inferoseptal and basal inferolateral segments    -Fluid status: hold lasix for now - ?dehydrated today, feeling increased palpitations, night sweats  -ACEi/ARB/ARNI/afterload reduction: contraindicated in pregnancy  -BB:  increase metoprolol tartrate 75 mg q6hr, nursing: hold if HR<50 or sBP<90 and call Cards2  -Aldosterone antagonist: high adverse risk medication in pregnancy  -SGLT2i: deferred  -SCD prophylaxis: decision deferred during medication uptitration  -Anticoagulation: risk of LV thrombus in the setting of low EF, regional WMA, and hypercoaguable state of pregnancy (OB guidelines recommend therapeutic AC for EF<35%); changed from heparin gtt to Lovenox, LMWH goal 0.6-1, last 0.88  -Other: for further workup, plan for cardiac MRI in the postpartum setting +/- genetic testing/counseling referral    # New onset possible paroxysmal atrial fibrillation  # Frequent PVCs, NSVT, PACs  # Hypomagnesia  AF diagnosed at the [facility] in [place], isolated episode, EKG unavailable for review. Currently in NSR at [facility] with frequent ectopy. [id number] score of 2 (sex, HF) - on AC as above for pregnancy and low EF.   - telemetry, metoprolol as above  - Lovenox as above  - goal K>4 Mg>2, give IV Mg as able     # High risk pregnancy  Gestational age 29w5d. . Betamethasone (BMZ) given - at OSH for fetal lung maturity. OB US for fetal growth done on 22 with normal fetal findings and no abnormalities, growth US  reassuring for normal fetal growt  - Appreciate MFM management, closely following with daily checks; can be reached via their pager above  - NST BID  - Repeat growth ultrasound (performed by M) due   - Magnesium for neuroprotection if moving towards delivery prior to 32 weeks with 6g bolus followed by 2g/hr maintenance until delivery  - Routine indications for emergent delivery including maternal decompensation or fetal compromise  - S/p  section consent on admission  - At 28 weeks(~1/3/23): Tdap, repeat CBC, RPR, and type and screen  - per MFM, BP goal 130/80, MAP >65    - If blood pressures >160/110 for at least 15 minutes, call MFM for guidance of management, although typically will initiate  "treatment with 5-10 mg of IV hydralazine (preferred from cardiac standpoint) or 20 mg of IV labetalol    # Mild anemia, multifactorial  Due to pregnancy and HF. Hgb 10.2 on admission (MCV 79). Retic count 1.9%. Iron, ferritin, TIBC levels show AVINASH (say 6%). Hgh 9s-low 10s. IV iron 200 mg daily x5 (-). Hgb stable mid 10s.   - limit blood draws  - intermittent CBC    # Gestational Diabetes  - QID blood glucose checks (fasting, and 1 hour after each meal)   - goal blood glucose for AM fasting 95 mg/dL   - 1 hour postprandial check is under 140 mg/dL  - MFM following  -  monitoring to be determined by need for medications  - check BS in PM during periods of night sweats    # Nausea, improved  Due to pregnancy and likely taking PO medications on empty stomach  - vitB6 daily  - trigger avoidance, snack frequent snacks, alize  - encourage bland foods in AM prior to medications    FEN: 3g Na  PROPHY:  lovenox  LINES:  PICC  DISPO:  Pending delivery  CODE STATUS:  Full code    Geovanna Rubio, MONI, NP-C  Advanced Heart Failure/Cardiology II Service  Pager [phone] [facility] [phone]      Patient discussed with Dr. Cox.      30 minutes spent on the date of the encounter doing chart review, history and exam, documentation and further activities per the note    ================================================================    Subjective/24-Hr Events:   Last 24 hr care team notes reviewed. Frequent ectopy persists, more palpitations today. Having night sweats, no fevers. Denies any infectious symptoms, no urinary symptoms.     ROS:  4 point ROS including respiratory, CV, GI and  (other than that noted in the HPI) is negative.     Medications: Reviewed in EPIC.     Physical Exam:   BP 95/54 (BP Location: Right arm)   Pulse 64   Temp 98  F (36.7  C) (Oral)   Resp 16   Ht 1.753 m (5' 9\")   Wt 135.6 kg (298 lb 14.4 oz)   SpO2 98%   BMI 44.14 kg/m      GENERAL: Appears comfortable, in no distress.  HEENT: Eye " symmetrical, no discharge or icterus bilaterally. Mucous membranes moist and without lesions.  NECK: Supple, JVD not elevated but difficult to assess.   CV: RRR with ectopy, +S1S2,  murmur, rub, or gallop.   RESPIRATORY: Respirations regular, even, and unlabored. Lungs CTA throughout.    GI: Soft, gravid and non distended with normoactive bowel sounds present in all quadrants. No tenderness, rebound, guarding.   EXTREMITIES: No peripheral edema. 2+ bilateral pedal pulses.   NEUROLOGIC: Alert and oriented x 3. No focal deficits.   MUSCULOSKELETAL: No joint swelling or tenderness.   SKIN: No jaundice. No rashes or lesions.     Labs:  CMP  Recent Labs   Lab 01/14/23  0718 01/14/23  0645 01/13/23  1404 01/13/23  0950 01/13/23  0627 01/13/23  0101 01/12/23  0817 01/12/23  0542 01/11/23  0706 01/11/23  0529 01/10/23  1303 01/10/23  1011 01/08/23  1514 01/08/23  0721   *  --   --   --   --  136  --  135*  --  135*  --  135*   < > 134*   POTASSIUM 4.1  --   --  4.6  --  3.9  --  4.0  --  4.1  --  4.2   < > 4.1   CHLORIDE 106  --   --   --   --  106  --  106  --  107  --  105   < > 107   CO2 15*  --   --   --   --  15*  --  16*  --  16*  --  16*   < > 16*   ANIONGAP 12  --   --   --   --  15  --  13  --  12  --  14   < > 11   GLC 82 85 172*  --  89 87   < > 95   < > 84   < > 95   < > 86   BUN 11.8  --   --   --   --  13.4  --  10.6  --  10.0  --  8.9   < > 9.7   CR 0.58  --   --   --   --  0.60  --  0.58  --  0.57  --  0.58   < > 0.56   GFRESTIMATED >90  --   --   --   --  >90  --  >90  --  >90  --  >90   < > >90   AYDEN 9.1  --   --   --   --  9.5  --  9.4  --  8.8  --  9.3   < > 8.9   MAG 1.7  1.4*  --   --  1.9  --  1.8  --  1.8  --  1.9   < >  --    < > 1.7   PROTTOTAL 6.5  --   --   --   --   --   --  6.6  --   --   --  7.1  --  6.5   ALBUMIN 2.9*  --   --   --   --   --   --  3.0*  --   --   --  3.3*  --  3.0*   BILITOTAL 0.2  --   --   --   --   --   --  <0.2  --   --   --  0.3  --  0.3   ALKPHOS 89  --   --   --    --   --   --  90  --   --   --  97  --  85   AST 28  --   --   --   --   --   --  30  --   --   --  32  --  30   ALT 19  --   --   --   --   --   --  18  --   --   --  21  --  19    < > = values in this interval not displayed.       CBC  Recent Labs   Lab 01/14/23  0718 01/12/23  0542 01/10/23  1011 01/08/23  0721   WBC 8.7 8.4 9.2 9.1   RBC 3.53* 3.56* 3.81 3.68*   HGB 9.3* 9.4* 10.2* 9.6*   HCT 30.9* 30.3* 32.2* 31.2*   MCV 88 85 85 85   MCH 26.3* 26.4* 26.8 26.1*   MCHC 30.1* 31.0* 31.7 30.8*   RDW 19.1* 18.5* 18.6* 18.3*    202 226 202

## 2023-01-14 NOTE — PLAN OF CARE
D:  at 29 weeks with no significant past medical history, who presented as a transfer from [facility] to [facility] on 2022 for further management of newly diagnosed, acute HFrEF 2/2 NICM (LVEF 20% now 30-35%) and high risk delivery.     I: Monitored vitals and assessed pt status. Encouraged activity.      Changed:   Running:   -Double lumen PICC saline locked.   PRN:     A: A&Ox4. VSS on RA. Sinus rhythm on monitor. Occasional bursts of VT - longest this shift was 5 beats. Having up to 20 PVCs/min. 0-6 PVC couplets. Up to 16 PACs/min. Afebrile. Urinating adequately. Moving independently. Fasting blood sugar every morning and 1 hour after each lunch. Interruptions minimized through night. On 3 g Na no caffeine diet. On Mg and K replacement protocols.      P: Continue to monitor pt status and report changes to treatment team.

## 2023-01-15 LAB
GLUCOSE BLDC GLUCOMTR-MCNC: 106 MG/DL (ref 70–99)
GLUCOSE BLDC GLUCOMTR-MCNC: 86 MG/DL (ref 70–99)
GLUCOSE BLDC GLUCOMTR-MCNC: 93 MG/DL (ref 70–99)
HOLD SPECIMEN: NORMAL
MAGNESIUM SERPL-MCNC: 1.9 MG/DL (ref 1.7–2.3)
MAGNESIUM SERPL-MCNC: 2.2 MG/DL (ref 1.7–2.3)
POTASSIUM SERPL-SCNC: 3.9 MMOL/L (ref 3.4–5.3)

## 2023-01-15 PROCEDURE — 36415 COLL VENOUS BLD VENIPUNCTURE: CPT

## 2023-01-15 PROCEDURE — 250N000011 HC RX IP 250 OP 636: Performed by: INTERNAL MEDICINE

## 2023-01-15 PROCEDURE — 84132 ASSAY OF SERUM POTASSIUM: CPT | Performed by: INTERNAL MEDICINE

## 2023-01-15 PROCEDURE — 83735 ASSAY OF MAGNESIUM: CPT

## 2023-01-15 PROCEDURE — 83735 ASSAY OF MAGNESIUM: CPT | Performed by: INTERNAL MEDICINE

## 2023-01-15 PROCEDURE — 99232 SBSQ HOSP IP/OBS MODERATE 35: CPT | Performed by: NURSE PRACTITIONER

## 2023-01-15 PROCEDURE — 250N000013 HC RX MED GY IP 250 OP 250 PS 637

## 2023-01-15 PROCEDURE — 250N000013 HC RX MED GY IP 250 OP 250 PS 637: Performed by: NURSE PRACTITIONER

## 2023-01-15 PROCEDURE — 36415 COLL VENOUS BLD VENIPUNCTURE: CPT | Performed by: INTERNAL MEDICINE

## 2023-01-15 PROCEDURE — 214N000001 HC R&B CCU UMMC

## 2023-01-15 PROCEDURE — 250N000011 HC RX IP 250 OP 636: Performed by: STUDENT IN AN ORGANIZED HEALTH CARE EDUCATION/TRAINING PROGRAM

## 2023-01-15 PROCEDURE — 250N000013 HC RX MED GY IP 250 OP 250 PS 637: Performed by: INTERNAL MEDICINE

## 2023-01-15 PROCEDURE — 250N000013 HC RX MED GY IP 250 OP 250 PS 637: Performed by: STUDENT IN AN ORGANIZED HEALTH CARE EDUCATION/TRAINING PROGRAM

## 2023-01-15 RX ORDER — MAGNESIUM SULFATE HEPTAHYDRATE 40 MG/ML
2 INJECTION, SOLUTION INTRAVENOUS ONCE
Status: COMPLETED | OUTPATIENT
Start: 2023-01-15 | End: 2023-01-15

## 2023-01-15 RX ORDER — MAGNESIUM SULFATE HEPTAHYDRATE 40 MG/ML
2 INJECTION, SOLUTION INTRAVENOUS 2 TIMES DAILY
Status: DISCONTINUED | OUTPATIENT
Start: 2023-01-16 | End: 2023-01-31 | Stop reason: HOSPADM

## 2023-01-15 RX ADMIN — ENOXAPARIN SODIUM 120 MG: 120 INJECTION SUBCUTANEOUS at 19:39

## 2023-01-15 RX ADMIN — SENNOSIDES 8.6 MG: 8.6 TABLET, COATED ORAL at 08:08

## 2023-01-15 RX ADMIN — SODIUM CHLORIDE, PRESERVATIVE FREE 10 ML: 5 INJECTION INTRAVENOUS at 18:01

## 2023-01-15 RX ADMIN — POTASSIUM CHLORIDE 20 MEQ: 40 SOLUTION ORAL at 19:39

## 2023-01-15 RX ADMIN — METOPROLOL TARTRATE 100 MG: 100 TABLET, FILM COATED ORAL at 19:40

## 2023-01-15 RX ADMIN — ENOXAPARIN SODIUM 120 MG: 120 INJECTION SUBCUTANEOUS at 08:08

## 2023-01-15 RX ADMIN — SODIUM CHLORIDE, PRESERVATIVE FREE 5 ML: 5 INJECTION INTRAVENOUS at 01:01

## 2023-01-15 RX ADMIN — Medication 1000 MG: at 19:40

## 2023-01-15 RX ADMIN — METOPROLOL TARTRATE 100 MG: 100 TABLET, FILM COATED ORAL at 13:26

## 2023-01-15 RX ADMIN — MAGNESIUM SULFATE IN WATER 2 G: 40 INJECTION, SOLUTION INTRAVENOUS at 03:04

## 2023-01-15 RX ADMIN — PRENATAL VIT W/ FE FUMARATE-FA TAB 27-0.8 MG 1 TABLET: 27-0.8 TAB at 08:08

## 2023-01-15 RX ADMIN — METOPROLOL TARTRATE 100 MG: 100 TABLET, FILM COATED ORAL at 08:08

## 2023-01-15 RX ADMIN — SODIUM CHLORIDE, PRESERVATIVE FREE 5 ML: 5 INJECTION INTRAVENOUS at 06:41

## 2023-01-15 RX ADMIN — POTASSIUM CHLORIDE 20 MEQ: 40 SOLUTION ORAL at 13:26

## 2023-01-15 RX ADMIN — ACETAMINOPHEN 650 MG: 325 TABLET, FILM COATED ORAL at 19:39

## 2023-01-15 RX ADMIN — Medication 25 MG: at 05:56

## 2023-01-15 RX ADMIN — POTASSIUM CHLORIDE 20 MEQ: 40 SOLUTION ORAL at 08:08

## 2023-01-15 RX ADMIN — SENNOSIDES 8.6 MG: 8.6 TABLET, COATED ORAL at 19:39

## 2023-01-15 RX ADMIN — METOPROLOL TARTRATE 100 MG: 100 TABLET, FILM COATED ORAL at 03:05

## 2023-01-15 RX ADMIN — Medication 1000 MG: at 08:09

## 2023-01-15 ASSESSMENT — ACTIVITIES OF DAILY LIVING (ADL)
ADLS_ACUITY_SCORE: 20

## 2023-01-15 NOTE — PROGRESS NOTES
[facility]   Cardiology II Service / Advanced Heart Failure  Daily Progress Note      Patient: Anjali Carmen  MRN: [id number]  Admission Date: 2022  Hospital Day # 40    Assessment and Plan: Anjali Carmen is a 32 year old female  with no significant past medical history, who presented as a transfer from [facility] to [facility] on 2022 for further management of newly diagnosed, acute decompensated systolic heart failure (LVEF 20%) and high risk delivery.    [facility] available  at [phone]    Today's Plan:  -hold lasix  -start scheduled IV Mg 2 g bid  -please check BS in evening if patient reports diaphoresis  -goal K>4 Mg>2  -call Cards 2 if HR<50 or sBP <90    # Acute on chronic systolic heart failure/HFrEF secondary to NICM (?familial)    # Moderately reduced RV function   # NSVT  # Frequent PVCs, PACs  # Hypomagnesia  Stage C. NYHA class difficult to discern. Concern for familial dilated cardiomyopathy given history of cardiomyopathy in father at 30 years of age. Last pregnancy in , timeline not c/w PPCM. TTE  with thinned out LV wall and notable regional wall motion abnormalities. Coronary angiogram  no significat lesions seen. No known history of pre-existing structural heart disease prior to this admission. GDMT and diuresis as outlined below. Traditional afterload reducing agents for poor LV function and low LVEF of 20-25% cannot be met due to need for fetoplacental circulation. Will target /80 and MAP > 65. TTE  LVEF 20-25%, RWM (details below), moderately reduced RV function, mild TR, no pericardial effusion. TTE 23 LV EF 30-35%, wall thinning and akinesis of the basal-mid inferior, basal-mid inferoseptal and basal inferolateral segments    -Fluid status: hold lasix for now - ?dehydrated due to recent night sweats  -ACEi/ARB/ARNI/afterload reduction: contraindicated in pregnancy  -BB: metoprolol tartrate 100mg q6hr (increase   and tolerating), nursing: hold if HR<50 or sBP<90 and call Cards2  -Aldosterone antagonist: high adverse risk medication in pregnancy  -SGLT2i: deferred  -SCD prophylaxis: decision deferred during medication uptitration  -Anticoagulation: risk of LV thrombus in the setting of low EF, regional WMA, and hypercoaguable state of pregnancy (OB guidelines recommend therapeutic AC for EF<35%); changed from heparin gtt to Lovenox, LMWH goal 0.6-1, last 0.88  -for hypoMg - will start scheduled IV Mg 2 mg bid, she remains on high dose oral as well  -Other: for further workup, plan for cardiac MRI in the postpartum setting +/- genetic testing/counseling referral    # New onset possible paroxysmal atrial fibrillation  # Frequent PVCs, NSVT, PACs  # Hypomagnesia  AF diagnosed [facility] in [place], isolated episode, EKG unavailable for review. Currently in NSR at [facility] with frequent ectopy. [id number] score of 2 (sex, HF) - on AC as above for pregnancy and low EF.   - telemetry, metoprolol as above  - Lovenox as above  - goal K>4 Mg>2, give IV Mg as able     # High risk pregnancy  Gestational age 29w5d. . Betamethasone (BMZ) given - at OSH for fetal lung maturity. OB US for fetal growth done on 22 with normal fetal findings and no abnormalities, growth US  reassuring for normal fetal growt  - Appreciate [facility] management, closely following with daily checks; can be reached via their pager above  - NST BID  - Repeat growth ultrasound (performed by M) due   - Magnesium for neuroprotection if moving towards delivery prior to 32 weeks with 6g bolus followed by 2g/hr maintenance until delivery  - Routine indications for emergent delivery including maternal decompensation or fetal compromise  - S/p  section consent on admission  - At 28 weeks(~1/3/23): Tdap, repeat CBC, RPR, and type and screen  - per MFM, BP goal 130/80, MAP >65    - If blood pressures >160/110 for at least 15 minutes,  call MFM for guidance of management, although typically will initiate treatment with 5-10 mg of IV hydralazine (preferred from cardiac standpoint) or 20 mg of IV labetalol    # Night time sweating  Likely hormonal. WBC wnl, no fevers or chills per se. No infectious symptoms. Overnight, she felt ok.   - check UA  - TSH wnl  - check BS next time this occurs  - will d/w MFM if recurs    #  Mild anemia, multifactorial  Due to pregnancy and HF. Hgb 10.2 on admission (MCV 79). Retic count 1.9%. Iron, ferritin, TIBC levels show AVINASH (say 6%). Hgh 9s-low 10s. IV iron 200 mg daily x5 (-). Hgb stable mid 10s.   - limit blood draws  - intermittent CBC    # Gestational Diabetes  - QID blood glucose checks (fasting, and 1 hour after each meal)   - goal BS AM fasting 95 mg/dL   - 1 hour postprandial check is under 140 mg/dL  - MFM following  -  monitoring to be determined by need for medications    # Nausea, improved  Due to pregnancy and likely taking PO medications on empty stomach  - vitB6 daily  - trigger avoidance, snack frequent snacks, alize  - encourage bland foods in AM prior to medications    FEN: 3g Na  PROPHY:  lovenox  LINES:  PICC  DISPO:  Pending delivery  CODE STATUS:  Full code    Geovanna Rubio DNP, NP-C  Advanced Heart Failure/Cardiology II Service  Pager [phone] [facility] [id number]      Patient discussed with Dr. Cox.      30 minutes spent on the date of the encounter doing chart review, history and exam, documentation and further activities per the note    ================================================================    Subjective/24-Hr Events:   Last 24 hr care team notes reviewed. Still with frequent ectopy, no significant runs. Palpitations improved. BP and baseline HR tolerating higher metoprolol. Less sweating overnight.     ROS:  4 point ROS including respiratory, CV, GI and  (other than that noted in the HPI) is negative.     Medications: Reviewed in EPIC.     Physical Exam:   BP  "106/52 (BP Location: Right arm, Patient Position: Supine, Cuff Size: Adult Large)   Pulse 86   Temp 97.7  F (36.5  C) (Oral)   Resp 16   Ht 1.753 m (5' 9\")   Wt 135.7 kg (299 lb 3.2 oz)   SpO2 97%   BMI 44.18 kg/m      GENERAL: Appears comfortable, in no distress.  HEENT: Eye symmetrical, no discharge or icterus bilaterally. Mucous membranes moist and without lesions.  NECK: Supple, JVD not elevated but difficult to assess.   CV: RRR with ectopy, +S1S2,  murmur, rub, or gallop.   RESPIRATORY: Respirations regular, even, and unlabored. Lungs CTA throughout.    GI: Soft, gravid and non distended with normoactive bowel sounds present in all quadrants. No tenderness, rebound, guarding.   EXTREMITIES: No peripheral edema. 2+ bilateral pedal pulses.   NEUROLOGIC: Alert and oriented x 3. No focal deficits.   MUSCULOSKELETAL: No joint swelling or tenderness.   SKIN: No jaundice. No rashes or lesions.     Labs:  CMP  Recent Labs   Lab 01/15/23  0646 01/15/23  0558 01/15/23  0312 01/15/23  0105 01/14/23  2117 01/14/23  1925 01/14/23  1848 01/14/23  1407 01/14/23  0718 01/13/23  0627 01/13/23  0101 01/12/23  0817 01/12/23  0542 01/10/23  1303 01/10/23  1011   NA  --   --   --   --  135*  --   --   --  133*  --  136  --  135*   < > 135*   POTASSIUM 3.9  --   --   --  4.2  --  3.9  --  4.1   < > 3.9  --  4.0   < > 4.2   CHLORIDE  --   --   --   --  105  --   --   --  106  --  106  --  106   < > 105   CO2  --   --   --   --  16*  --   --   --  15*  --  15*  --  16*   < > 16*   ANIONGAP  --   --   --   --  14  --   --   --  12  --  15  --  13   < > 14   GLC  --  93 86  --  130* 125*  --    < > 82   < > 87   < > 95   < > 95   BUN  --   --   --   --  12.0  --   --   --  11.8  --  13.4  --  10.6   < > 8.9   CR  --   --   --   --  0.73  --   --   --  0.58  --  0.60  --  0.58   < > 0.58   GFRESTIMATED  --   --   --   --  >90  --   --   --  >90  --  >90  --  >90   < > >90   AYDEN  --   --   --   --  9.2  --   --   --  9.1  --  9.5  " --  9.4   < > 9.3   MAG 2.2  --   --  1.9  --   --  1.6*  --  1.7  1.4*   < > 1.8  --  1.8   < >  --    PROTTOTAL  --   --   --   --   --   --   --   --  6.5  --   --   --  6.6  --  7.1   ALBUMIN  --   --   --   --   --   --   --   --  2.9*  --   --   --  3.0*  --  3.3*   BILITOTAL  --   --   --   --   --   --   --   --  0.2  --   --   --  <0.2  --  0.3   ALKPHOS  --   --   --   --   --   --   --   --  89  --   --   --  90  --  97   AST  --   --   --   --   --   --   --   --  28  --   --   --  30  --  32   ALT  --   --   --   --   --   --   --   --  19  --   --   --  18  --  21    < > = values in this interval not displayed.       CBC  Recent Labs   Lab 01/14/23  0718 01/12/23  0542 01/10/23  1011   WBC 8.7 8.4 9.2   RBC 3.53* 3.56* 3.81   HGB 9.3* 9.4* 10.2*   HCT 30.9* 30.3* 32.2*   MCV 88 85 85   MCH 26.3* 26.4* 26.8   MCHC 30.1* 31.0* 31.7   RDW 19.1* 18.5* 18.6*    202 226

## 2023-01-15 NOTE — PLAN OF CARE
D:  at 29 weeks with no significant past medical history, who presented as a transfer from [facility] to [facility] on 2022 for further management of newly diagnosed, acute HFrEF 2/2 NICM (LVEF 20% now 30-35%) and high risk delivery.     I: Monitored vitals and assessed pt status. Encouraged activity.      Changed:   -Additional dose 20 mEq KCl given for potassium of 3.9  -Magnesium replaced x2.   -Started increased metoprolol tartrate dose of 100 mg q6h. Maintaining SPB > 100 and HR > 50 this shift.   Running:   -Double lumen PICC saline locked.        A: A&Ox4. VSS on RA. Sinus rhythm on monitor. Avg rate 70s. Occasional bursts of VT - longest this shift was 8 beats. Having up to 16 PVCs/min. 0-7 PVC couplets. Up to 20 PACs/min. Denies chest pain. States she occasionally does feel palpitations. Afebrile. Urinating adequately. Moving independently. Fasting blood sugar every morning and 1 hour after each lunch. Interruptions minimized through night as able. On 3 g Na no caffeine diet. On Mg and K replacement protocols.      P: Continue to monitor pt status and report changes to treatment team.

## 2023-01-15 NOTE — PROGRESS NOTES
Pt stated she could not sleep last night because she feared she would not wake up due to her metoprolol being increased so high.   -SR w/ frequent ectopy PVC/PAC's, no VT during the day shift. Replacing mag IV BID per scheduled med now.   -Up ad danis, AOx4, denies pain. Fetal monitoring BID  -monitor for night sweats overnight and notify provider + check a BG level.

## 2023-01-15 NOTE — PLAN OF CARE
Patient is alert and oriented x 4, sating well on room air. Patient is on telemetry monitoring exhibiting sinus rhythm with PVCs and PACs. Patient has had increased PVCs and PACs from having below 10 beats a minute to about 5 to 13 beats a minutes. Provider is notified and EKG ordered. Patient is voiding well, with a good appetite and on regular diet. Patient is independent in room, denies pain, nausea and vomiting.

## 2023-01-16 LAB
ATRIAL RATE - MUSE: 87 BPM
DIASTOLIC BLOOD PRESSURE - MUSE: NORMAL MMHG
GLUCOSE BLDC GLUCOMTR-MCNC: 101 MG/DL (ref 70–99)
GLUCOSE BLDC GLUCOMTR-MCNC: 231 MG/DL (ref 70–99)
INTERPRETATION ECG - MUSE: NORMAL
MAGNESIUM SERPL-MCNC: 2.2 MG/DL (ref 1.7–2.3)
P AXIS - MUSE: 45 DEGREES
POTASSIUM SERPL-SCNC: 4.3 MMOL/L (ref 3.4–5.3)
PR INTERVAL - MUSE: 138 MS
QRS DURATION - MUSE: 126 MS
QT - MUSE: 404 MS
QTC - MUSE: 486 MS
R AXIS - MUSE: -3 DEGREES
SYSTOLIC BLOOD PRESSURE - MUSE: NORMAL MMHG
T AXIS - MUSE: 18 DEGREES
VENTRICULAR RATE- MUSE: 87 BPM

## 2023-01-16 PROCEDURE — 250N000013 HC RX MED GY IP 250 OP 250 PS 637: Performed by: INTERNAL MEDICINE

## 2023-01-16 PROCEDURE — 99233 SBSQ HOSP IP/OBS HIGH 50: CPT | Mod: 25 | Performed by: INTERNAL MEDICINE

## 2023-01-16 PROCEDURE — 214N000001 HC R&B CCU UMMC

## 2023-01-16 PROCEDURE — 99232 SBSQ HOSP IP/OBS MODERATE 35: CPT | Performed by: NURSE PRACTITIONER

## 2023-01-16 PROCEDURE — 93010 ELECTROCARDIOGRAM REPORT: CPT | Performed by: INTERNAL MEDICINE

## 2023-01-16 PROCEDURE — 36592 COLLECT BLOOD FROM PICC: CPT | Performed by: NURSE PRACTITIONER

## 2023-01-16 PROCEDURE — 250N000011 HC RX IP 250 OP 636: Performed by: STUDENT IN AN ORGANIZED HEALTH CARE EDUCATION/TRAINING PROGRAM

## 2023-01-16 PROCEDURE — 250N000013 HC RX MED GY IP 250 OP 250 PS 637: Performed by: STUDENT IN AN ORGANIZED HEALTH CARE EDUCATION/TRAINING PROGRAM

## 2023-01-16 PROCEDURE — 250N000011 HC RX IP 250 OP 636: Performed by: INTERNAL MEDICINE

## 2023-01-16 PROCEDURE — 93005 ELECTROCARDIOGRAM TRACING: CPT

## 2023-01-16 PROCEDURE — 250N000013 HC RX MED GY IP 250 OP 250 PS 637: Performed by: NURSE PRACTITIONER

## 2023-01-16 PROCEDURE — 84132 ASSAY OF SERUM POTASSIUM: CPT | Performed by: NURSE PRACTITIONER

## 2023-01-16 PROCEDURE — 83735 ASSAY OF MAGNESIUM: CPT | Performed by: NURSE PRACTITIONER

## 2023-01-16 PROCEDURE — 250N000013 HC RX MED GY IP 250 OP 250 PS 637

## 2023-01-16 PROCEDURE — 250N000011 HC RX IP 250 OP 636: Performed by: NURSE PRACTITIONER

## 2023-01-16 RX ORDER — MAGNESIUM OXIDE 400 MG/1
400 TABLET ORAL 2 TIMES DAILY
Status: DISCONTINUED | OUTPATIENT
Start: 2023-01-16 | End: 2023-01-24

## 2023-01-16 RX ORDER — SOTALOL HYDROCHLORIDE 80 MG/1
80 TABLET ORAL EVERY 12 HOURS SCHEDULED
Status: DISCONTINUED | OUTPATIENT
Start: 2023-01-16 | End: 2023-01-26

## 2023-01-16 RX ORDER — METOPROLOL TARTRATE 100 MG
100 TABLET ORAL EVERY 6 HOURS SCHEDULED
Status: DISCONTINUED | OUTPATIENT
Start: 2023-01-16 | End: 2023-01-16

## 2023-01-16 RX ORDER — METOPROLOL TARTRATE 50 MG
50 TABLET ORAL EVERY 6 HOURS SCHEDULED
Status: DISCONTINUED | OUTPATIENT
Start: 2023-01-16 | End: 2023-01-19

## 2023-01-16 RX ADMIN — MAGNESIUM SULFATE HEPTAHYDRATE 2 G: 40 INJECTION, SOLUTION INTRAVENOUS at 09:15

## 2023-01-16 RX ADMIN — POTASSIUM CHLORIDE 20 MEQ: 40 SOLUTION ORAL at 13:55

## 2023-01-16 RX ADMIN — METOPROLOL TARTRATE 75 MG: 50 TABLET, FILM COATED ORAL at 09:16

## 2023-01-16 RX ADMIN — Medication 25 MG: at 06:27

## 2023-01-16 RX ADMIN — METOPROLOL TARTRATE 50 MG: 50 TABLET, FILM COATED ORAL at 20:41

## 2023-01-16 RX ADMIN — MAGNESIUM SULFATE HEPTAHYDRATE 2 G: 40 INJECTION, SOLUTION INTRAVENOUS at 20:42

## 2023-01-16 RX ADMIN — ENOXAPARIN SODIUM 120 MG: 120 INJECTION SUBCUTANEOUS at 09:16

## 2023-01-16 RX ADMIN — ENOXAPARIN SODIUM 120 MG: 120 INJECTION SUBCUTANEOUS at 20:42

## 2023-01-16 RX ADMIN — SOTALOL HYDROCHLORIDE 80 MG: 80 TABLET ORAL at 20:41

## 2023-01-16 RX ADMIN — SODIUM CHLORIDE, PRESERVATIVE FREE 10 ML: 5 INJECTION INTRAVENOUS at 16:54

## 2023-01-16 RX ADMIN — METOPROLOL TARTRATE 100 MG: 100 TABLET, FILM COATED ORAL at 03:10

## 2023-01-16 RX ADMIN — ACETAMINOPHEN 650 MG: 325 TABLET, FILM COATED ORAL at 19:03

## 2023-01-16 RX ADMIN — METOPROLOL TARTRATE 100 MG: 100 TABLET, FILM COATED ORAL at 13:55

## 2023-01-16 RX ADMIN — SENNOSIDES 8.6 MG: 8.6 TABLET, COATED ORAL at 20:42

## 2023-01-16 RX ADMIN — POTASSIUM CHLORIDE 20 MEQ: 40 SOLUTION ORAL at 09:16

## 2023-01-16 RX ADMIN — MAGNESIUM OXIDE TAB 400 MG (241.3 MG ELEMENTAL MG) 400 MG: 400 (241.3 MG) TAB at 20:42

## 2023-01-16 RX ADMIN — POTASSIUM CHLORIDE 20 MEQ: 40 SOLUTION ORAL at 20:42

## 2023-01-16 RX ADMIN — SENNOSIDES 8.6 MG: 8.6 TABLET, COATED ORAL at 09:16

## 2023-01-16 RX ADMIN — PRENATAL VIT W/ FE FUMARATE-FA TAB 27-0.8 MG 1 TABLET: 27-0.8 TAB at 09:16

## 2023-01-16 ASSESSMENT — ACTIVITIES OF DAILY LIVING (ADL)
ADLS_ACUITY_SCORE: 20

## 2023-01-16 NOTE — PROGRESS NOTES
[facility]   Cardiology II Service / Advanced Heart Failure  Daily Progress Note      Patient: Anjali Carmen  MRN: [id number]  Admission Date: 2022  Hospital Day # 41    Assessment and Plan: Anjali Carmen is a 32 year old female  with no significant past medical history, who presented as a transfer from [facility] to [facility] on 2022 for further management of newly diagnosed, acute decompensated systolic heart failure (LVEF 20%) and high risk delivery.    [facility] available  at [phone]    Today's Plan:  -goal K>4 Mg>2  -call Cards 2 if HR<50 or sBP <90  -EP consulted  Addendum: per EP, starting sotalol 80 mg bid tonight with ECG two hours post for QTc monitoring if QT>500ms then hold AM dose, decrease Lopressor to 50 mg q6hr     # Acute on chronic systolic heart failure/HFrEF secondary to NICM (?familial)    # Moderately reduced RV function   # NSVT  # Frequent PVCs, PACs  # Hypomagnesia  Stage C. NYHA class difficult to discern. Concern for familial dilated cardiomyopathy given history of cardiomyopathy in father at 30 years of age. Last pregnancy in , timeline not c/w PPCM. TTE  with thinned out LV wall and notable regional wall motion abnormalities. Coronary angiogram  no significat lesions seen. No known history of pre-existing structural heart disease prior to this admission. GDMT and diuresis as outlined below. Traditional afterload reducing agents for poor LV function and low LVEF of 20-25% cannot be met due to need for fetoplacental circulation. Will target /80 and MAP > 65. TTE  LVEF 20-25%, RWM (details below), moderately reduced RV function, mild TR, no pericardial effusion. TTE 23 LV EF 30-35%, wall thinning and akinesis of the basal-mid inferior, basal-mid inferoseptal and basal inferolateral segments    -Fluid status: hold lasix for now - ?dehydrated due to recent night sweats  -ACEi/ARB/ARNI/afterload reduction:  contraindicated in pregnancy  -BB: metoprolol tartrate 100 mg q6hr (increased  and tolerating), nursing: hold if HR<50 or sBP<90 and call Cards2  -Aldosterone antagonist: high adverse risk medication in pregnancy  -SGLT2i: deferred  -SCD prophylaxis: decision deferred during medication uptitration  -Anticoagulation: risk of LV thrombus in the setting of low EF, regional WMA, and hypercoaguable state of pregnancy (OB guidelines recommend AC for EF<35%); changed from heparin gtt to Lovenox, weekly LMWH level, goal 0.6-1, last 0.88  -for hypoMg - scheduled IV Mg 2 mg bid, she remains on high dose oral as well  -Other: for further workup, plan for cardiac MRI in the postpartum setting +/- genetic testing/counseling referral    # New onset possible paroxysmal atrial fibrillation  # Frequent PVCs, NSVT, PACs  # Hypomagnesia  AF diagnosed at the [facility] in [place], isolated episode, EKG unavailable for review. Currently in NSR at [facility] with frequent ectopy. [id number] score of 2 (sex, HF) - on AC as above for pregnancy and low EF.   - telemetry, metoprolol as above  - Lovenox as above  - goal K>4 Mg>2, give IV Mg as able     # High risk pregnancy  Gestational age 30w. . Betamethasone (BMZ) given - at OSH for fetal lung maturity. OB US for fetal growth done on 22 with normal fetal findings and no abnormalities, growth US  reassuring for normal fetal growt  - Appreciate [facility] management, closely following with daily checks; can be reached via their pager above  - NST BID  - Repeat growth ultrasound (performed by [facility]) due   - Magnesium for neuroprotection if moving towards delivery prior to 32 weeks with 6g bolus followed by 2g/hr maintenance until delivery  - Routine indications for emergent delivery including maternal decompensation or fetal compromise,  section consent on admission  - received Tdap 1/3  - per MFM, BP goal 130/80, MAP >65    - If BP>160/110 for at least 15 minutes,  call MFM for management, although typically will initiate treatment with 5-10 mg of IV hydralazine (preferred from cardiac standpoint) or 20 mg of IV labetalol    # Night time sweating  Likely hormonal. WBC wnl, no fevers or chills per se. No infectious symptoms. Overnight, she felt ok.   - TSH wnl  - check BS next time this occurs  - will d/w MFM if recurs    #  Mild anemia, multifactorial  Due to pregnancy and HF. Hgb 10.2 on admission (MCV 79). Retic count 1.9%. Iron, ferritin, TIBC levels show AVINASH (say 6%). Hgh 9s-low 10s. IV iron 200 mg daily x5 (-). Hgb stable mid 10s.   - limit blood draws  - intermittent CBC    # Gestational Diabetes  - QID blood glucose checks (fasting, and 1 hour after each meal)   - goal BS AM fasting 95 mg/dL   - 1 hour postprandial check is under 140 mg/dL  - MFM following  -  monitoring to be determined by need for medications    # Nausea, improved  Due to pregnancy and likely taking PO medications on empty stomach  - vitB6 daily  - trigger avoidance, snack frequent snacks, alize  - encourage bland foods in AM prior to medications    FEN: 3g Na no caffeine  PROPHY:  lovenox  LINES:  PICC  DISPO:  pending delivery  CODE STATUS:  Full code    Geovanna Rubio DNP, NP-C  Advanced Heart Failure/Cardiology II Service  Pager [phone] [facility] [id number]      Patient discussed with Dr. Cox.      30 minutes spent on the date of the encounter doing chart review, history and exam, documentation and further activities per the note    ================================================================    Subjective/24-Hr Events:   Last 24 hr care team notes reviewed. Still with frequent ectopy, 21 beats of SVT overnight. Didn't sleep well. BP stable.     ROS:  4 point ROS including respiratory, CV, GI and  (other than that noted in the HPI) is negative.     Medications: Reviewed in EPIC.     Physical Exam:   /57 (BP Location: Right arm)   Pulse 77   Temp 97.3  F (36.3  C)  "(Oral)   Resp 16   Ht 1.753 m (5' 9\")   Wt 135.4 kg (298 lb 8 oz)   SpO2 100%   BMI 44.08 kg/m      GENERAL: Appears comfortable, in no distress.  HEENT: Eye symmetrical, no discharge or icterus bilaterally. Mucous membranes moist and without lesions.  NECK: Supple, JVD not elevated but difficult to assess.   CV: RRR with ectopy, +S1S2,  murmur, rub, or gallop.   RESPIRATORY: Respirations regular, even, and unlabored. Lungs CTA throughout.    GI: Soft, gravid and non distended with normoactive bowel sounds present in all quadrants. No tenderness, rebound, guarding.   EXTREMITIES: No peripheral edema. 2+ bilateral pedal pulses.   NEUROLOGIC: Alert and oriented x 3. No focal deficits.   MUSCULOSKELETAL: No joint swelling or tenderness.   SKIN: No jaundice. No rashes or lesions.     Labs:  CMP  Recent Labs   Lab 01/16/23  1059 01/16/23  0629 01/15/23  1538 01/15/23  0646 01/15/23  0558 01/15/23  0312 01/15/23  0105 01/14/23  2117 01/14/23  1925 01/14/23  1848 01/14/23  1407 01/14/23  0718 01/13/23  0627 01/13/23  0101 01/12/23  0817 01/12/23  0542 01/10/23  1303 01/10/23  1011   NA  --   --   --   --   --   --   --  135*  --   --   --  133*  --  136  --  135*   < > 135*   POTASSIUM 4.3  --   --  3.9  --   --   --  4.2  --  3.9  --  4.1   < > 3.9  --  4.0   < > 4.2   CHLORIDE  --   --   --   --   --   --   --  105  --   --   --  106  --  106  --  106   < > 105   CO2  --   --   --   --   --   --   --  16*  --   --   --  15*  --  15*  --  16*   < > 16*   ANIONGAP  --   --   --   --   --   --   --  14  --   --   --  12  --  15  --  13   < > 14   GLC  --  101* 106*  --  93 86  --  130*   < >  --    < > 82   < > 87   < > 95   < > 95   BUN  --   --   --   --   --   --   --  12.0  --   --   --  11.8  --  13.4  --  10.6   < > 8.9   CR  --   --   --   --   --   --   --  0.73  --   --   --  0.58  --  0.60  --  0.58   < > 0.58   GFRESTIMATED  --   --   --   --   --   --   --  >90  --   --   --  >90  --  >90  --  >90   < > >90 "   AYDEN  --   --   --   --   --   --   --  9.2  --   --   --  9.1  --  9.5  --  9.4   < > 9.3   MAG 2.2  --   --  2.2  --   --  1.9  --   --  1.6*  --  1.7  1.4*   < > 1.8  --  1.8   < >  --    PROTTOTAL  --   --   --   --   --   --   --   --   --   --   --  6.5  --   --   --  6.6  --  7.1   ALBUMIN  --   --   --   --   --   --   --   --   --   --   --  2.9*  --   --   --  3.0*  --  3.3*   BILITOTAL  --   --   --   --   --   --   --   --   --   --   --  0.2  --   --   --  <0.2  --  0.3   ALKPHOS  --   --   --   --   --   --   --   --   --   --   --  89  --   --   --  90  --  97   AST  --   --   --   --   --   --   --   --   --   --   --  28  --   --   --  30  --  32   ALT  --   --   --   --   --   --   --   --   --   --   --  19  --   --   --  18  --  21    < > = values in this interval not displayed.       CBC  Recent Labs   Lab 01/14/23  0718 01/12/23  0542 01/10/23  1011   WBC 8.7 8.4 9.2   RBC 3.53* 3.56* 3.81   HGB 9.3* 9.4* 10.2*   HCT 30.9* 30.3* 32.2*   MCV 88 85 85   MCH 26.3* 26.4* 26.8   MCHC 30.1* 31.0* 31.7   RDW 19.1* 18.5* 18.6*    202 226

## 2023-01-16 NOTE — PLAN OF CARE
"D:  at 29 weeks with no significant past medical history, who presented as a transfer from [facility] to [facility] on 2022 for further management of newly diagnosed, acute HFrEF 2/2 NICM (LVEF 20% now 30-35%) and high risk delivery.     I: Monitored vitals and assessed pt status. Encouraged activity.        Running:   -Double lumen PICC saline locked.   Tylenol x1 for headache, resolved.       A: A&Ox4. VSS on RA. Sinus rhythm on monitor. Avg rate 70s. Occasional bursts of VT - longest this shift was 6 beats. Had a 21-beat run of SVT - cards resident notified. Having up to 18 PVCs/min. 0-7 PVC couplets. 0-11 PACs/min. Denies chest pain. States she occasionally does feel palpitations. Afebrile. Urinating adequately. Moving independently. Blood sugar checks every morning (fasting) and 1 hour after lunch. Interruptions minimized through night as able. On 3 g Na no caffeine diet. On Mg and K replacement protocols.      P: Continue to monitor pt status and report changes to treatment team.       Goal Outcome Evaluation: no change    /61 (BP Location: Right arm, Cuff Size: Adult Large)   Pulse 72   Temp 97.6  F (36.4  C) (Oral)   Resp 16   Ht 1.753 m (5' 9\")   Wt 135.7 kg (299 lb 3.2 oz)   SpO2 98%   BMI 44.18 kg/m      "

## 2023-01-16 NOTE — PLAN OF CARE
Neuro: A&Ox4. Calm and cooperative  Cardiac: Afebrile, VSS. SR in 70s, frequent PVCs and PACs. Report >10 beats VT to team    Respiratory: RA, denies SOB  GI/: Voiding spontaneously. Reports BM today. Did not want miralax, senna taken.   Diet/appetite: Tolerating 3gm Na diet, no caffeine. Fasting blood sugars and blood sugar 1hr after lunch. Check BS if diaphoretic. Denies nausea.  Activity: Up independently  Pain: Denies   Skin: No new deficits noted.  Lines: L PICCx2  Drains: n/a  Replacements: Mag and K protocols. Goal to keep K>4 and Mag >2. Scheduled IV Mag.      EP consulted. Metoprolol decreased to 50mg q6hrs to be held if HR <50 and SBP<95. Sotalol bid starting tonight with EKG 2hrs after dose. Lasix on hold. Maternal fetal monitoring bid. Continue to monitor pt status and report changes to Cards 2.    Problem: Plan of Care - These are the overarching goals to be used throughout the patient stay.    Goal: Plan of Care Review  Outcome: Progressing     Problem: Oral Intake Inadequate  Goal: Improved Oral Intake  Outcome: progressing    Problem: Dysrhythmia  Goal: Normalized Cardiac Rhythm  Outcome: Progressing

## 2023-01-16 NOTE — CONSULTS
Electrophysiology Consultation Note   EP Attending: Dr. Taylor.   Reason for consultation: Frequent arrhythmias .   Provider requesting consultation: Dr. Cox.  Date of Service: 1/16/2023      HPI:   Ms. Elizabeth Carmen is a 33 yo F with a pmhx of HFrEF (LVEF 30-35%) 2/2 NICM. Originally presented on 12/6 from an OSH for her newly decompensated HFrEF in the setting of a high risk OB delivery. EP is now re-consulted for recommendations of frequent arrhythmia on telemetry.     The patient presented from an OSH on 12/6. Was 24w1d at that time. Preceding symptoms of palpitations, SOB, fatigue as well as chest pain. At the OSH patient found to have severe LV dilation and decreased function (LVEF 20%). EKG with possible Afib (unable to view). Started on GDMT, diuresis, and AC. Transferred given high risk pregnancy.     On admission, found to have frequent ectopy on telemetry, including NSVT. EP was consulted at that time, though AAT options were limited due to her pregnancy. Sotalol was recommended as the safest, though was category B. Other options were less preferable. Ultimately, these were not started at the time, and her BB was gradually increased to reduce her frequency of events. Further hospital course included coronary angiogram without significant CAD (12/8). IV diuresed and eventually transitioned to PO on 12/18. Was initially on stable lopressor dosing, though given ongoing ectopy and associated with symptoms, ok per OB to increase lopressor to 75 q6h on 12/30. Repeat TTE on 1/3 with improvement of LVEF to 30-35%. OB followed during hospitalization, currently without evidence of fetal growth impairment of complications. Further lopressor increase on 1/14 to 100 mg q6h.     On encounter, the patient is in NAD. Endorses some symptoms of possible orthopnea when laying flat. No SOB at rest, can ambulate without difficulty. Denies leg swelling. Endorses palpitations during the events, last 10-20 seconds and  "resolve on on. Can happen at rest and exertion. No lightheadedness, dizziness, LOC, pre syncope, or chest pain during events. When discussing possible options for mgmt of these events, the patient stated that she \"[did] not want to try anything else during the pregnancy that could hurt the baby\".     Past Medical History:   NICM    Past Surgical History:   Past Surgical History:   Procedure Laterality Date     SECTION      CV CORONARY ANGIOGRAM N/A 2022    Procedure: Coronary Angiogram;  Surgeon: Ton Khanna MD;  Location:  [facility]    PICC DOUBLE LUMEN PLACEMENT Left 2022    Left Basilic, 50 cm, 2 cm external length     Allergies: Per MAR   No Known Allergies  Medications:   Per MAR current outpatient cardiovascular medications include:   Medications Prior to Admission   Medication Sig Dispense Refill Last Dose    Prenatal Vit-Fe Fumarate-FA (PRENATAL MULTIVITAMIN W/IRON) 27-0.8 MG tablet Take 1 tablet by mouth daily        No current outpatient medications on file.     Current Facility-Administered Medications   Medication Dose Route Frequency    enoxaparin ANTICOAGULANT  120 mg Subcutaneous Q12H    [Held by provider] furosemide  20 mg Oral Daily    heparin lock flush  5-20 mL Intracatheter Q24H    magnesium oxide  400 mg Oral BID    magnesium sulfate  2 g Intravenous BID    metoprolol tartrate  100 mg Oral Q6H MARISOL    polyethylene glycol  17 g Oral Daily    potassium chloride  20 mEq Oral TID    prenatal multivitamin w/iron  1 tablet Oral Daily    pyridOXINE  25 mg Oral Daily    sennosides  8.6 mg Oral BID    sodium chloride (PF)  10-40 mL Intracatheter Q8H    sodium chloride (PF)  3 mL Intracatheter Q8H     Family History:   No family history on file.  Social History:   Social History     Tobacco Use    Smoking status: Not on file    Smokeless tobacco: Not on file   Substance Use Topics    Alcohol use: Not on file     ROS:   A comprehensive 10 point ROS was negative other " "than as mentioned in HPI.    Physical Examination:   VITALS: /61   Pulse 76   Temp 97.8  F (36.6  C) (Oral)   Resp 16   Ht 1.753 m (5' 9\")   Wt 135.4 kg (298 lb 8 oz)   SpO2 98%   BMI 44.08 kg/m      GENERAL APPEARANCE: AxO, NAD   HEENT: NCAT, EOMI, MMM.   NECK: Supple. No JVD or bruit. Good carotid upstroke.   CHEST: CTAB   CARDIOVASCULAR: S1S2, Reg, No m/r/g.   ABDOMEN: BS+, soft, No pulsatile masses or bruits.   EXTREMITIES: No pedal edema. Distal pulses intact.   NEURO: Grossly nonfocal.   PSYCH: Normal affect.  SKIN: Warm and dry.     Data:   Labs:  [facility]  Recent Labs   Lab 01/16/23  1345 01/16/23  1059 01/16/23  0629 01/15/23  1538 01/15/23  0646 01/15/23  0558 01/15/23  0312 01/14/23  2117 01/14/23  1925 01/14/23  1848 01/14/23  1407 01/14/23  0718 01/13/23  0627 01/13/23  0101 01/12/23  0817 01/12/23  0542   NA  --   --   --   --   --   --   --  135*  --   --   --  133*  --  136  --  135*   POTASSIUM  --  4.3  --   --  3.9  --   --  4.2  --  3.9  --  4.1   < > 3.9  --  4.0   CHLORIDE  --   --   --   --   --   --   --  105  --   --   --  106  --  106  --  106   AYDEN  --   --   --   --   --   --   --  9.2  --   --   --  9.1  --  9.5  --  9.4   CO2  --   --   --   --   --   --   --  16*  --   --   --  15*  --  15*  --  16*   BUN  --   --   --   --   --   --   --  12.0  --   --   --  11.8  --  13.4  --  10.6   CR  --   --   --   --   --   --   --  0.73  --   --   --  0.58  --  0.60  --  0.58   *  --  101* 106*  --  93   < > 130*   < >  --    < > 82   < > 87   < > 95    < > = values in this interval not displayed.     CBC  Recent Labs   Lab 01/14/23  0718 01/12/23  0542 01/10/23  1011   WBC 8.7 8.4 9.2   RBC 3.53* 3.56* 3.81   HGB 9.3* 9.4* 10.2*   HCT 30.9* 30.3* 32.2*   MCV 88 85 85   MCH 26.3* 26.4* 26.8   MCHC 30.1* 31.0* 31.7   RDW 19.1* 18.5* 18.6*    202 226     INRNo lab results found in last 7 days.  No results found for: CKTOTAL, CKMB, TROPN  No results found for: CHOL, " CHOLHDLRATIO, HDL, LDL    EKG 1/14/23:   Sinus rhythm with PACs    Tele:   Frequent PACs and PVCs. NSVT of <20 beats. HR consistently above 70 bpm.    TTE 1/2/23:  Interpretation Summary  Left ventricular function is decreased. The ejection fraction is 30-35%  (moderately reduced).  There is wall thinning and akinesis of the basal-mid inferior, basal-mid  inferoseptal and basal inferolateral segments.  Global right ventricular function is mildly to moderately reduced.  Mild to moderate tricuspid insufficiency is present.  Right ventricular systolic pressure is 45mmHg above the right atrial pressure.  Pulmonary hypertension is present.  IVC diameter <2.1 cm collapsing >50% with sniff suggests a normal RA pressure  of 3 mmHg.  No pericardial effusion is present.     This study was compared with the study from 12/6/2022. The left ventricular  function has improved.    Assessment:   Ms. Elizabeth Carmen is a 31 yo F with a pmhx of HFrEF (LVEF 30-35%) 2/2 NICM. Originally presented on 12/6 from an OSH for her newly decompensated HFrEF in the setting of a high risk OB delivery. EP is now re-consulted for recommendations of frequent arrhythmia on telemetry.     EP re consulted given frequency of ectopy on telemetry. This has been a persistent and recurring issue during this hospitalization requiring escalating doses of beta blockers (currently lopressor 100 mg q6h). Tele has demonstrated frequent PACs and PVCs, with short runs of NSVT and SVT. EP previously suggested AATs if needed, though her pregnancy prohibits initiation of many of these. We discussed the risks and benefit of AAT with the patient extensively. We recommended sotalol initiation. Teratogenic risk include fetal growth reduction, though sotalol initiation would likely not increase this risk given that the patient is already on considerable doses of beta blocker. We discussed the risk of cardiac arrest without treatment. After discussing the options, the patient  elected for sotalol initiation. She will require frequent EKG monitoring in addition to her telemetry monitoring to assess he QTc. Sotalol initiation was discussed with primary and MFM who were in agreement with the plan.     EP Recommendations:  HFrEF 2/2 NICM (LVEF 30-35%), NYHA III  NSVT  PACs/PVCs  SVTs:  - Currently not on ACEI/ARB/ARNI and MRA due to pregnancy.   - Beta blocker: Lopressor 100 mg q6h  - SCD prophylaxis: Not currently indicated due to newly diagnosed HF/CM.  Will need to follow post partum after GDMT to assess if recovery or if LVEF remains suppressed after several months of treatment then may need to consider defibrillator.  - Fluid status: Euvolemic, lasix currently held.    - Ectopy: Patient with symptomatic palpitations correlated to runs of ectopy on tele. Per prior discussion in initial consult, AAT options are limited. Sotalol is category B and likely the safest option. Patient is currently not in cardiogenic shock. Mexiletine, procainamide, and flecainide are pregnancy category C and are not preferred. Amiodarone is category D and should be avoided. The patient prefers to avoid all AAT due to the possibility of fetal harm. Recommend starting sotalol 80 mg BID tonight, with EKG for Qtc monitoring 2 hours after every dose. Reduce lopressor to 50 mg q6h effective now. Maintain lytes and tele monitoring.   - Primary plans for CMR as outpatient following pregnancy to assess for further etiologies    The patient states understanding and is agreeable with plan.   Thank you for allowing us to participate in the care of this patient.     The patient was discussed w/ Dr. Taylor.  The above note reflects our joint plan.    Deuce Ly, PGY-4  Cardiovascular Disease Fellow    EP STAFF NOTE  Patient seen and examined and management plan personally reviewed with the patient. I agree with the note above by the CV/EP fellow.  Mayur Taylor MD [facility]  Cardiology - Electrophysiology  Total time spent  on patient visit, reviewing notes, imaging, labs, orders, and completing necessary documentation: 60 minutes.  >50% of visit spent on counseling patient and/or coordination of care.

## 2023-01-17 LAB
GLUCOSE BLDC GLUCOMTR-MCNC: 140 MG/DL (ref 70–99)
GLUCOSE BLDC GLUCOMTR-MCNC: 81 MG/DL (ref 70–99)
MAGNESIUM SERPL-MCNC: 1.7 MG/DL (ref 1.7–2.3)
POTASSIUM SERPL-SCNC: 3.9 MMOL/L (ref 3.4–5.3)

## 2023-01-17 PROCEDURE — 250N000011 HC RX IP 250 OP 636: Performed by: INTERNAL MEDICINE

## 2023-01-17 PROCEDURE — 250N000011 HC RX IP 250 OP 636: Performed by: NURSE PRACTITIONER

## 2023-01-17 PROCEDURE — 250N000013 HC RX MED GY IP 250 OP 250 PS 637: Performed by: NURSE PRACTITIONER

## 2023-01-17 PROCEDURE — 83735 ASSAY OF MAGNESIUM: CPT | Performed by: NURSE PRACTITIONER

## 2023-01-17 PROCEDURE — 93010 ELECTROCARDIOGRAM REPORT: CPT | Mod: 76 | Performed by: INTERNAL MEDICINE

## 2023-01-17 PROCEDURE — 93005 ELECTROCARDIOGRAM TRACING: CPT

## 2023-01-17 PROCEDURE — 99232 SBSQ HOSP IP/OBS MODERATE 35: CPT | Performed by: NURSE PRACTITIONER

## 2023-01-17 PROCEDURE — 250N000013 HC RX MED GY IP 250 OP 250 PS 637

## 2023-01-17 PROCEDURE — 250N000013 HC RX MED GY IP 250 OP 250 PS 637: Performed by: STUDENT IN AN ORGANIZED HEALTH CARE EDUCATION/TRAINING PROGRAM

## 2023-01-17 PROCEDURE — 36592 COLLECT BLOOD FROM PICC: CPT | Performed by: NURSE PRACTITIONER

## 2023-01-17 PROCEDURE — 214N000001 HC R&B CCU UMMC

## 2023-01-17 PROCEDURE — 250N000013 HC RX MED GY IP 250 OP 250 PS 637: Performed by: INTERNAL MEDICINE

## 2023-01-17 PROCEDURE — 250N000011 HC RX IP 250 OP 636: Performed by: STUDENT IN AN ORGANIZED HEALTH CARE EDUCATION/TRAINING PROGRAM

## 2023-01-17 PROCEDURE — 84132 ASSAY OF SERUM POTASSIUM: CPT | Performed by: NURSE PRACTITIONER

## 2023-01-17 RX ORDER — FUROSEMIDE 20 MG
20 TABLET ORAL DAILY
Status: DISCONTINUED | OUTPATIENT
Start: 2023-01-17 | End: 2023-01-20

## 2023-01-17 RX ORDER — MAGNESIUM SULFATE HEPTAHYDRATE 40 MG/ML
2 INJECTION, SOLUTION INTRAVENOUS ONCE
Status: COMPLETED | OUTPATIENT
Start: 2023-01-17 | End: 2023-01-17

## 2023-01-17 RX ADMIN — Medication 25 MG: at 06:14

## 2023-01-17 RX ADMIN — MAGNESIUM OXIDE TAB 400 MG (241.3 MG ELEMENTAL MG) 400 MG: 400 (241.3 MG) TAB at 19:36

## 2023-01-17 RX ADMIN — ENOXAPARIN SODIUM 120 MG: 120 INJECTION SUBCUTANEOUS at 19:35

## 2023-01-17 RX ADMIN — MAGNESIUM SULFATE HEPTAHYDRATE 2 G: 40 INJECTION, SOLUTION INTRAVENOUS at 08:16

## 2023-01-17 RX ADMIN — METOPROLOL TARTRATE 50 MG: 50 TABLET, FILM COATED ORAL at 19:35

## 2023-01-17 RX ADMIN — SODIUM CHLORIDE, PRESERVATIVE FREE 10 ML: 5 INJECTION INTRAVENOUS at 16:40

## 2023-01-17 RX ADMIN — SOTALOL HYDROCHLORIDE 80 MG: 80 TABLET ORAL at 08:17

## 2023-01-17 RX ADMIN — PRENATAL VIT W/ FE FUMARATE-FA TAB 27-0.8 MG 1 TABLET: 27-0.8 TAB at 08:17

## 2023-01-17 RX ADMIN — SOTALOL HYDROCHLORIDE 80 MG: 80 TABLET ORAL at 19:35

## 2023-01-17 RX ADMIN — MAGNESIUM SULFATE IN WATER 2 G: 40 INJECTION, SOLUTION INTRAVENOUS at 14:34

## 2023-01-17 RX ADMIN — ENOXAPARIN SODIUM 120 MG: 120 INJECTION SUBCUTANEOUS at 08:17

## 2023-01-17 RX ADMIN — SENNOSIDES 8.6 MG: 8.6 TABLET, COATED ORAL at 19:35

## 2023-01-17 RX ADMIN — METOPROLOL TARTRATE 50 MG: 50 TABLET, FILM COATED ORAL at 14:37

## 2023-01-17 RX ADMIN — POTASSIUM CHLORIDE 20 MEQ: 40 SOLUTION ORAL at 19:35

## 2023-01-17 RX ADMIN — POTASSIUM CHLORIDE 20 MEQ: 40 SOLUTION ORAL at 14:34

## 2023-01-17 RX ADMIN — MAGNESIUM OXIDE TAB 400 MG (241.3 MG ELEMENTAL MG) 400 MG: 400 (241.3 MG) TAB at 08:17

## 2023-01-17 RX ADMIN — METOPROLOL TARTRATE 50 MG: 50 TABLET, FILM COATED ORAL at 01:59

## 2023-01-17 RX ADMIN — METOPROLOL TARTRATE 50 MG: 50 TABLET, FILM COATED ORAL at 08:17

## 2023-01-17 RX ADMIN — MAGNESIUM SULFATE HEPTAHYDRATE 2 G: 40 INJECTION, SOLUTION INTRAVENOUS at 19:35

## 2023-01-17 RX ADMIN — POTASSIUM CHLORIDE 20 MEQ: 40 SOLUTION ORAL at 08:16

## 2023-01-17 RX ADMIN — SENNOSIDES 8.6 MG: 8.6 TABLET, COATED ORAL at 08:17

## 2023-01-17 RX ADMIN — ACETAMINOPHEN 650 MG: 325 TABLET, FILM COATED ORAL at 19:45

## 2023-01-17 RX ADMIN — FUROSEMIDE 20 MG: 20 TABLET ORAL at 16:40

## 2023-01-17 ASSESSMENT — ACTIVITIES OF DAILY LIVING (ADL)
ADLS_ACUITY_SCORE: 20

## 2023-01-17 NOTE — PROGRESS NOTES
[facility]   Cardiology II Service / Advanced Heart Failure  Daily Progress Note  Date of Service: 2023      Patient: Anjali Carmen  MRN: [id number]  Admission Date: 2022  Hospital Day # 42    Assessment and Plan: Anjali Carmen is a 32 year old female  with no significant past medical history, who presented as a transfer from [facility] to [facility] on 2022 for further management of newly diagnosed, acute decompensated systolic heart failure (LVEF 20%) and high risk delivery.    MFM available  at [phone]     Acute on Chronic SCHF secondary to NICM, pregnancy related with questionable familial component. RV failure. Note family history of father with CM at age 30. TTE  with EF 20-25%, thinned out LV wall, and notable regional wall motion abnormalities. Coronary angiogram  with no significat le-sions seen.  Stage C, NYHA Class III  ACEi/ARB/ARNI: Contraindicated in pregnancy.   BB Metoprolol Tartrate 60 mg q6h. Nursing: hold if HR<50 or SBP<90 and call Cards2  Aldosterone antagonist contraindicated due to pregnancy  SCD prophylaxis GDMT  Fluid status mild hypervolemia. Lasix 20 mg po daily resumed today  - Lovenox given high risk for LV thrombus in setting of antepartum, regional wall motion abnormalities, and low EF. weekly LMWH level, goal 0.6-1, last 0.88 23.  - Cardiac MRI and genetic testing postpartum recommended.      New Onset PAF. NSVT. Frequent PVC and PAC's. Hypomagnesemia. Diagnosed [facility] [place], isolated episode, EKG unavailable for review. Currently in NSR at [facility] with frequent ectopy. [id number] score of 2 (sex, HF) - on AC as above for pregnancy and low EF.   - Metoprolol Tartrate 50 mg q6h. Nursing: hold if HR<50 or sBP<90 and call Cards2  - Lovenox as above  - Appreciate EP consult. Sotalol 80 mg po BID, EKG this AM post administration 467 (455). EKG times 5 following Sotalol loading.   - Mg 1.7, protocol  and Mg 2g IV BID.      High risk pregnancy  Gestational age 30w1d. . Betamethasone (BMZ) given - at OSH for fetal lung maturity. OB US for fetal growth done on 22 with normal fetal findings and no abnormalities. growth US  reassuring for normal fetal growth  - Appreciate MFM management, closely following with daily checks; can be reached via their pager above  - NST BID  - Magnesium for neuroprotection if moving towards delivery prior to 32 weeks with 6g bolus followed by 2g/hr maintenance until delivery  - S/p  section consent on admission  - per MFM, BP goal 130/80, MAP >65                 - If blood pressures >160/110 for at least 15 minutes, call MFM for guidance of management, although typically will initiate treatment with 5-10 mg of IV hydralazine (preferred from cardiac standpoint) or 20 mg of IV labetalol     Gestational DM.   QID blood glucose checks (fasting, and 1 hour after each meal)                - goal blood glucose for AM fasting 95 mg/dL                - 1 hour postprandial check is under 140 mg/dL  - MFM following  -  monitoring to be determined by need for medications     Mild anemia, multifactorial  Due to pregnancy and HF. Hgb 10.2 on admission (MCV 79). Retic count 1.9%. Iron, ferritin, TIBC levels show AVINASH (say 6%). Hgh 9s-low 10s. IV iron 200 mg daily x5 (-). Hgb stable mid 10s.   - limit blood draws     FEN: 3 gram sodium diet   PROPHY:  Lovenox  LINES:  PICC  DISPO:  Pending delivery   CODE STATUS:  Full Code   ================================================================  Interval History/ROS: She complains of orthopnea and chest pressure while laying flat. She denies fever, chills, cough, vomiting, and diarrhea. She is tolerating oral intake.     Last 24 hr care team notes reviewed.   ROS:  4 point ROS including Respiratory, CV, GI and , other than that noted in the HPI, is negative.     Medications: Reviewed in EPIC.     Physical  "Exam:   /58   Pulse 81   Temp 98.1  F (36.7  C) (Oral)   Resp 18   Ht 1.753 m (5' 9\")   Wt 135.4 kg (298 lb 8 oz)   SpO2 99%   BMI 44.08 kg/m    GENERAL: Appears alert and oriented times three.   HEENT: Eye symmetrical and free of discharge bilaterally. Mucous membranes moist and without lesions.  NECK: Supple and without lymphadenopathy. JVD mid neck, skewed in setting of pregnancy.   CV: RRR, S1S2 present without murmur, rub, or gallop.   RESPIRATORY: Respirations regular, even, and unlabored. Lungs CTA throughout.   GI: Soft, gravid, and non distended with normoactive bowel sounds present in all quadrants. No tenderness, rebound, guarding. No organomegaly.   EXTREMITIES: Trace bilateral LE peripheral edema. 2+ bilateral pedal pulses.   NEUROLOGIC: Alert and orientated x 3. CN II-XII grossly intact. No focal deficits.   MUSCULOSKELETAL: No joint swelling or tenderness.   SKIN: No jaundice. No rashes or lesions.     Data:  CMPRecent Labs   Lab 01/17/23  1432 01/17/23  0644 01/17/23  0620 01/16/23  1345 01/16/23  1059 01/16/23  0629 01/15/23  1538 01/15/23  0646 01/15/23  0312 01/15/23  0105 01/14/23  2117 01/14/23  1407 01/14/23  0718 01/13/23  0627 01/13/23  0101 01/12/23  0817 01/12/23  0542   NA  --   --   --   --   --   --   --   --   --   --  135*  --  133*  --  136  --  135*   POTASSIUM  --  3.9  --   --  4.3  --   --  3.9  --   --  4.2   < > 4.1   < > 3.9  --  4.0   CHLORIDE  --   --   --   --   --   --   --   --   --   --  105  --  106  --  106  --  106   CO2  --   --   --   --   --   --   --   --   --   --  16*  --  15*  --  15*  --  16*   ANIONGAP  --   --   --   --   --   --   --   --   --   --  14  --  12  --  15  --  13   *  --  81 231*  --  101*   < >  --    < >  --  130*   < > 82   < > 87   < > 95   BUN  --   --   --   --   --   --   --   --   --   --  12.0  --  11.8  --  13.4  --  10.6   CR  --   --   --   --   --   --   --   --   --   --  0.73  --  0.58  --  0.60  --  0.58 "   GFRESTIMATED  --   --   --   --   --   --   --   --   --   --  >90  --  >90  --  >90  --  >90   AYDEN  --   --   --   --   --   --   --   --   --   --  9.2  --  9.1  --  9.5  --  9.4   MAG  --  1.7  --   --  2.2  --   --  2.2  --  1.9  --    < > 1.7  1.4*   < > 1.8  --  1.8   PROTTOTAL  --   --   --   --   --   --   --   --   --   --   --   --  6.5  --   --   --  6.6   ALBUMIN  --   --   --   --   --   --   --   --   --   --   --   --  2.9*  --   --   --  3.0*   BILITOTAL  --   --   --   --   --   --   --   --   --   --   --   --  0.2  --   --   --  <0.2   ALKPHOS  --   --   --   --   --   --   --   --   --   --   --   --  89  --   --   --  90   AST  --   --   --   --   --   --   --   --   --   --   --   --  28  --   --   --  30   ALT  --   --   --   --   --   --   --   --   --   --   --   --  19  --   --   --  18    < > = values in this interval not displayed.     CBC  Recent Labs   Lab 01/14/23  0718 01/12/23  0542   WBC 8.7 8.4   RBC 3.53* 3.56*   HGB 9.3* 9.4*   HCT 30.9* 30.3*   MCV 88 85   MCH 26.3* 26.4*   MCHC 30.1* 31.0*   RDW 19.1* 18.5*    202     Time/Communication  I personally spent a total of 30 minutes. Of that 15 minutes was counseling/coordination of patient's care. Plan of care discussed with patient. See my note above for details. Patient discussed with Dr. Cox.      Stacie Pisano [facility]  1/17/2023

## 2023-01-17 NOTE — PLAN OF CARE
Neuro: A&Ox4. Calm and cooperative  Cardiac: Afebrile, VSS. SR in 70s, frequent PVCs and PACs. Report >10 beats VT to team     Respiratory: RA, denies SOB  GI/: Voiding spontaneously. Reports BM today. Did not want miralax, senna taken.   Diet/appetite: Tolerating 3gm Na diet, no caffeine. Fasting blood sugars and blood sugar 1hr after lunch. Check BS if diaphoretic. Denies nausea.  Activity: Up independently  Pain: Denies, PRN tylenol available, sometimes has hip pain.   Skin: No new deficits noted.  Lines: L PICCx2  Drains: n/a  Replacements: Mag and K protocols. Goal to keep K>4 and Mag >2. Scheduled IV Mag and additional protocol dose given for 1.7 level.     Continue Sotalol bid with EKG 2hrs after dose. Lasix on hold. Maternal fetal monitoring bid. Continue to monitor pt status and report changes to Cards 2.    Problem: Dysrhythmia  Goal: Normalized Cardiac Rhythm  Outcome: Progressing    Problem: Heart Failure  Goal: Optimal Cardiac Output  Outcome: Progressing  Goal: Stable Heart Rate and Rhythm  Outcome: Progressing

## 2023-01-17 NOTE — PROGRESS NOTES
"Maternal-Fetal Medicine Progress Note    S:   Patient expressing concern about her own health today- states that over the last two days her chest has become heavier and it has started to limit how she sleeps more and how she has to lay down when she is getting fetal monitoring. This started around the time that the issue was raised to add sotolol for her intermittent SVT. She was worried about how this medication might affect her pregnancy but was told she needed to start it for her heart health.     Denies painful contractions, LOF, VB and reports good fetal movement. But has a bad feeling about how she is doing and \"I just feel heavy\".     Lasix was restarted today and she is still on lovenox as well.     O:  Vitals:    01/17/23 0757 01/17/23 0810 01/17/23 1133 01/17/23 1437   BP: (!) 83/43 113/71 101/62 109/58   BP Location: Right arm  Right arm    Cuff Size:       Pulse:  72 72 81   Resp: 16  18    Temp: 97.9  F (36.6  C)  98.1  F (36.7  C)    TempSrc: Oral  Oral    SpO2: 98%  99%    Weight:       Height:         Gen Appear: NAD  CV: RRR  Pulm: CTAB  Abdomen: gravid, soft, non-tender to palpation  Extrem: Trace bilateral LE edema, no calf tenderness    In normal sinus rhythm during OB visit today    Fetal heart monitoring 1/17/2023   Not yet done at time of visit    Echo 1/2/2023:  Interpretation Summary  Left ventricular function is decreased. The ejection fraction is 30-35%  (moderately reduced).  There is wall thinning and akinesis of the basal-mid inferior, basal-mid  inferoseptal and basal inferolateral segments.  Global right ventricular function is mildly to moderately reduced.  Mild to moderate tricuspid insufficiency is present.  Right ventricular systolic pressure is 45mmHg above the right atrial pressure.  Pulmonary hypertension is present.  IVC diameter <2.1 cm collapsing >50% with sniff suggests a normal RA pressure  of 3 mmHg.  No pericardial effusion is present.     This study was compared with " the study from 2022. The left ventricular  function has improved.  ______________________________________________________________________________  Left Ventricle  Mild left ventricular dilation is present. Mild concentric wall thickening  consistent with left ventricular hypertrophy is present. Left ventricular  function is decreased. The ejection fraction is 30-35% (moderately reduced).  There is wall thinning and akinesis of the basal-mid inferior, basal-mid  inferoseptal and basal inferolateral segments.     Right Ventricle  The right ventricle is normal size. Global right ventricular function is  mildly to moderately reduced.     Mitral Valve  The mitral valve is normal. Trace mitral insufficiency is present.     Aortic Valve  The valve leaflets are not well visualized. On Doppler interrogation, there is  no significant stenosis or regurgitation.     Tricuspid Valve  The tricuspid valve is normal. Mild to moderate tricuspid insufficiency is  present. Right ventricular systolic pressure is 45mmHg above the right atrial  pressure. Pulmonary hypertension is present.     Pulmonic Valve  The pulmonic valve is normal. Trace pulmonic insufficiency is present.     Vessels  The aorta root is normal. IVC diameter <2.1 cm collapsing >50% with sniff  suggests a normal RA pressure of 3 mmHg.     Pericardium  No pericardial effusion is present.     Compared to Previous Study  This study was compared with the study from 2022 . The left ventricular  function has improved.   _____________________________________________________________________________  MMode/2D Measurements & Calculations  IVSd: 1.2 cm  LVIDd: 6.2 cm  LVIDs: 5.5 cm  LVPWd: 1.1 cm  FS: 10.3 %  LV mass(C)d: 318.0 grams  LV mass(C)dI: 130.9 grams/m2     RWT: 0.36     Doppler Measurements & Calculations  PA acc time: 0.10 sec  TR max radha: 336.9 cm/sec  TR max P.4 mmHg    Treponema Antibody: non-reactive    Recent Labs   Lab 23  1439  23  0620 23  1345 23  0629 01/15/23  1538 01/15/23  0558   * 81 231* 101* 106* 93       A/P:   Anjali Carmen is a 32 year old  at 30w1d admitted for acute HFrEF during pregnancy. Most recent echo on 23 showed EF 30-35%. She is currently on Lasix and metoprolol.    She has new symptoms of chest heaviness which is worrying her.     Acute decompensated HFrEF  - Patient is now reporting 2 day history of feeling heavy which is different for her. Started around time discussion of sotolol.   -Repeat echo  and EF improved at 30-35%  - She is currently on metoprolol 50 mg q 6 hrs, decreased with addition of sotolol.  Lasix given today.  No contraindications to increasing dose of metoprolol from a pregnancy standpoint, if needed    - Agree with Lovenox given risk of LV thrombus  - Appreciate cares per primary cardiology team    Gestational Diabetes  - Goal blood glucose for AM fasting is <95 mg/dL  - 1 hour postprandial check is <140 mg/dL and for 2 hour postprandial is <120 mg/d  - Overall glucose values have been at goal most of the time but with elevation yesterday to 231 x 1.  Will continue to monitor. May need treated if 50% of values at any one time point are abnormal.     Anemia  Likely related to pregnancy, heart failure, serial blood draws, but also component of iron deficiency anemia.  Last Hgb 9.4 on 23; ferritin 28 on 23  - s/p IV iron infusion   - Continue oral iron replacement    Routine prenatal care  - Non-stress test (NST) BID  - Repeat growth ultrasound (will be performed by [facility]) on   - Betamethasone (BMZ) given - at OSH for fetal lung maturity. Candidate for rescue BMZ if early delivery anticipated especially < 34 weeks  - Magnesium for neuroprotection if moving towards delivery prior to 32 weeks with 6g bolus followed by 2g/hr maintenance until delivery  - Routine indications for emergent delivery including maternal decompensation or fetal  compromise - will be C/S due to need for controlled delivery with hospitalization at the [facility].   - S/p  section consent on admission  - S/p third trimester RPR, which was normal.   - S/p Tdap on 1/3/23  - Plan IUD and depo-provera prior to discharge postpartum.  - Signed FMLA paperwork provided on   - Encouraged use of abdominal binder to help with discomforts of pregnancy and patient states is helping with ambulating. Provided patient with phone number to L&D if she has concerns and/or if her team is unable reach us.      I personally spent a total of 30 minutes, including both face-to-face and non-face-to-face on the date of the encounter, addressing the above diagnosis. Activities performed in this time include chart review, obtaining / reviewing history, performing a medically necessary evaluation, documentation and counseling/care coordination/ordering tests/ordering meds. It also included shared decision making on delivery timing in the context of maternal disease and her new symptoms.     We will review her case at our conference tomorrow. The patient supports a plan to advocate for earlier delivery at 32 weeks if her symptoms do not appear to be improving in the next several days. We reviewed the risks and benefits of ongoing pregnancy for maternal and fetal/ health. She is understanding of the risks of  birth but is very worried about her own health as well with the onset of these new symptoms. I would support a planned delivery at 32 weeks for this patient.     Luis Mast MD  Maternal Fetal Medicine

## 2023-01-17 NOTE — PLAN OF CARE
Temp: 97.6  F (36.4  C) Temp src: Oral BP: 106/62 Pulse: 72   Resp: 18 SpO2: 98 % O2 Device: None (Room air)       Shift: 8410-6427  D: Patient admitted 12/6 from OSH for management of newly diagnosed acute decompensated systolic HF and high risk delivery.       I: Monitored vitals and assessed pt status.     A:  Neuro: A&O x4. Slept well between cares throughout the night. Up independent in room.  Cardiac: Tele in place, SR with frequent PVC's & PAC's. Afebrile. HR in 70's, dropped to 55 while sleeping. Pt reported an episode of chest pain that lasted around 3 mins per pt. VSS and currently was not experiencing any pain, explained to pt to let me know ASAP if she starts to experience chest pain again. +1 generalized edema.  Resp: On RA, lung sounds clear, denies SOB. No cough present  GI/: Adequate urine output. No BM during shift. 3g Na restricted diet. BS check - fasting AM (81 this AM) & 1 hour post-lunch.   Skin: No new deficits noted  LDA's: Left DL PICC   Pain: Reports hip discomfort, denied prn meds.      Plan: Continue to monitor and follow POC. Notify Cards 2 with any cardiac changes and OB/Gyn via [facility] pager [phone] with pregnancy changes.

## 2023-01-18 LAB
ALBUMIN SERPL BCG-MCNC: 3 G/DL (ref 3.5–5.2)
ALP SERPL-CCNC: 94 U/L (ref 35–104)
ALT SERPL W P-5'-P-CCNC: 17 U/L (ref 10–35)
ANION GAP SERPL CALCULATED.3IONS-SCNC: 12 MMOL/L (ref 7–15)
AST SERPL W P-5'-P-CCNC: 28 U/L (ref 10–35)
ATRIAL RATE - MUSE: 69 BPM
ATRIAL RATE - MUSE: 72 BPM
ATRIAL RATE - MUSE: 79 BPM
BILIRUB SERPL-MCNC: 0.3 MG/DL
BUN SERPL-MCNC: 11.2 MG/DL (ref 6–20)
CALCIUM SERPL-MCNC: 9.1 MG/DL (ref 8.6–10)
CHLORIDE SERPL-SCNC: 107 MMOL/L (ref 98–107)
CREAT SERPL-MCNC: 0.56 MG/DL (ref 0.51–0.95)
DEPRECATED HCO3 PLAS-SCNC: 16 MMOL/L (ref 22–29)
DIASTOLIC BLOOD PRESSURE - MUSE: NORMAL MMHG
GFR SERPL CREATININE-BSD FRML MDRD: >90 ML/MIN/1.73M2
GLUCOSE BLDC GLUCOMTR-MCNC: 115 MG/DL (ref 70–99)
GLUCOSE BLDC GLUCOMTR-MCNC: 82 MG/DL (ref 70–99)
GLUCOSE SERPL-MCNC: 79 MG/DL (ref 70–99)
HOLD SPECIMEN: NORMAL
INTERPRETATION ECG - MUSE: NORMAL
MAGNESIUM SERPL-MCNC: 1.6 MG/DL (ref 1.7–2.3)
P AXIS - MUSE: 36 DEGREES
P AXIS - MUSE: 37 DEGREES
P AXIS - MUSE: 61 DEGREES
POTASSIUM SERPL-SCNC: 3.9 MMOL/L (ref 3.4–5.3)
PR INTERVAL - MUSE: 152 MS
PR INTERVAL - MUSE: 158 MS
PR INTERVAL - MUSE: 166 MS
PROT SERPL-MCNC: 6.5 G/DL (ref 6.4–8.3)
QRS DURATION - MUSE: 122 MS
QRS DURATION - MUSE: 136 MS
QRS DURATION - MUSE: 90 MS
QT - MUSE: 384 MS
QT - MUSE: 416 MS
QT - MUSE: 436 MS
QTC - MUSE: 440 MS
QTC - MUSE: 455 MS
QTC - MUSE: 467 MS
R AXIS - MUSE: -3 DEGREES
R AXIS - MUSE: -6 DEGREES
R AXIS - MUSE: 9 DEGREES
SODIUM SERPL-SCNC: 135 MMOL/L (ref 136–145)
SYSTOLIC BLOOD PRESSURE - MUSE: NORMAL MMHG
T AXIS - MUSE: 11 DEGREES
T AXIS - MUSE: 22 DEGREES
T AXIS - MUSE: 31 DEGREES
VENTRICULAR RATE- MUSE: 69 BPM
VENTRICULAR RATE- MUSE: 72 BPM
VENTRICULAR RATE- MUSE: 79 BPM

## 2023-01-18 PROCEDURE — 250N000013 HC RX MED GY IP 250 OP 250 PS 637: Performed by: NURSE PRACTITIONER

## 2023-01-18 PROCEDURE — 250N000011 HC RX IP 250 OP 636: Performed by: INTERNAL MEDICINE

## 2023-01-18 PROCEDURE — 250N000011 HC RX IP 250 OP 636: Performed by: STUDENT IN AN ORGANIZED HEALTH CARE EDUCATION/TRAINING PROGRAM

## 2023-01-18 PROCEDURE — 99232 SBSQ HOSP IP/OBS MODERATE 35: CPT | Performed by: NURSE PRACTITIONER

## 2023-01-18 PROCEDURE — 214N000001 HC R&B CCU UMMC

## 2023-01-18 PROCEDURE — 90791 PSYCH DIAGNOSTIC EVALUATION: CPT | Performed by: PSYCHOLOGIST

## 2023-01-18 PROCEDURE — 250N000013 HC RX MED GY IP 250 OP 250 PS 637: Performed by: STUDENT IN AN ORGANIZED HEALTH CARE EDUCATION/TRAINING PROGRAM

## 2023-01-18 PROCEDURE — 250N000011 HC RX IP 250 OP 636: Performed by: NURSE PRACTITIONER

## 2023-01-18 PROCEDURE — 93005 ELECTROCARDIOGRAM TRACING: CPT

## 2023-01-18 PROCEDURE — 36592 COLLECT BLOOD FROM PICC: CPT | Performed by: NURSE PRACTITIONER

## 2023-01-18 PROCEDURE — 83735 ASSAY OF MAGNESIUM: CPT | Performed by: NURSE PRACTITIONER

## 2023-01-18 PROCEDURE — 80053 COMPREHEN METABOLIC PANEL: CPT | Performed by: NURSE PRACTITIONER

## 2023-01-18 PROCEDURE — 250N000013 HC RX MED GY IP 250 OP 250 PS 637

## 2023-01-18 PROCEDURE — 93010 ELECTROCARDIOGRAM REPORT: CPT | Mod: 76 | Performed by: INTERNAL MEDICINE

## 2023-01-18 RX ORDER — MAGNESIUM SULFATE HEPTAHYDRATE 40 MG/ML
2 INJECTION, SOLUTION INTRAVENOUS ONCE
Status: COMPLETED | OUTPATIENT
Start: 2023-01-18 | End: 2023-01-18

## 2023-01-18 RX ADMIN — MAGNESIUM OXIDE TAB 400 MG (241.3 MG ELEMENTAL MG) 400 MG: 400 (241.3 MG) TAB at 20:14

## 2023-01-18 RX ADMIN — Medication 25 MG: at 06:08

## 2023-01-18 RX ADMIN — METOPROLOL TARTRATE 50 MG: 50 TABLET, FILM COATED ORAL at 08:50

## 2023-01-18 RX ADMIN — SOTALOL HYDROCHLORIDE 80 MG: 80 TABLET ORAL at 20:11

## 2023-01-18 RX ADMIN — POTASSIUM CHLORIDE 20 MEQ: 40 SOLUTION ORAL at 13:49

## 2023-01-18 RX ADMIN — MAGNESIUM SULFATE HEPTAHYDRATE 2 G: 40 INJECTION, SOLUTION INTRAVENOUS at 12:14

## 2023-01-18 RX ADMIN — METOPROLOL TARTRATE 50 MG: 50 TABLET, FILM COATED ORAL at 20:11

## 2023-01-18 RX ADMIN — POTASSIUM CHLORIDE 20 MEQ: 40 SOLUTION ORAL at 20:14

## 2023-01-18 RX ADMIN — METOPROLOL TARTRATE 50 MG: 50 TABLET, FILM COATED ORAL at 13:53

## 2023-01-18 RX ADMIN — POTASSIUM CHLORIDE 20 MEQ: 40 SOLUTION ORAL at 08:42

## 2023-01-18 RX ADMIN — METOPROLOL TARTRATE 50 MG: 50 TABLET, FILM COATED ORAL at 03:46

## 2023-01-18 RX ADMIN — SENNOSIDES 8.6 MG: 8.6 TABLET, COATED ORAL at 08:43

## 2023-01-18 RX ADMIN — PRENATAL VIT W/ FE FUMARATE-FA TAB 27-0.8 MG 1 TABLET: 27-0.8 TAB at 08:43

## 2023-01-18 RX ADMIN — FUROSEMIDE 20 MG: 20 TABLET ORAL at 08:42

## 2023-01-18 RX ADMIN — SOTALOL HYDROCHLORIDE 80 MG: 80 TABLET ORAL at 08:42

## 2023-01-18 RX ADMIN — MAGNESIUM SULFATE HEPTAHYDRATE 2 G: 40 INJECTION, SOLUTION INTRAVENOUS at 19:22

## 2023-01-18 RX ADMIN — SODIUM CHLORIDE, PRESERVATIVE FREE 10 ML: 5 INJECTION INTRAVENOUS at 15:52

## 2023-01-18 RX ADMIN — SENNOSIDES 8.6 MG: 8.6 TABLET, COATED ORAL at 20:11

## 2023-01-18 RX ADMIN — MAGNESIUM OXIDE TAB 400 MG (241.3 MG ELEMENTAL MG) 400 MG: 400 (241.3 MG) TAB at 08:42

## 2023-01-18 RX ADMIN — ENOXAPARIN SODIUM 120 MG: 120 INJECTION SUBCUTANEOUS at 20:12

## 2023-01-18 RX ADMIN — MAGNESIUM SULFATE HEPTAHYDRATE 2 G: 40 INJECTION, SOLUTION INTRAVENOUS at 08:57

## 2023-01-18 RX ADMIN — ENOXAPARIN SODIUM 120 MG: 120 INJECTION SUBCUTANEOUS at 08:41

## 2023-01-18 ASSESSMENT — ACTIVITIES OF DAILY LIVING (ADL)
ADLS_ACUITY_SCORE: 20

## 2023-01-18 NOTE — PLAN OF CARE
Goal Outcome Evaluation:      Plan of Care Reviewed With: patient    Overall Patient Progress: no changeOverall Patient Progress: no change    Outcome Evaluation: Encourage intake of meals and continue two oral supplements daily. Encouraged mixing/matching options from the menu and intermittent incorporation of food in room. Rec adequate protein/kcals.

## 2023-01-18 NOTE — PLAN OF CARE
Goal Outcome Evaluation:  Pt states is comfortable, denies bleeding, leaking or feeling pain. Baby active. NST AGA, no ctx's noted on strip. Pt states have C/S scheduled for 2/1 at 0730. Need C/S consent to be signed.

## 2023-01-18 NOTE — PROGRESS NOTES
"CLINICAL NUTRITION SERVICES - REASSESSMENT NOTE     Nutrition Prescription    RECOMMENDATIONS FOR MDs/PROVIDERS TO ORDER:  Consider ordering 200-300 mg DHA/day.    Malnutrition Status:    Patient does not meet two of the established criteria necessary for diagnosing malnutrition    Recommendations already ordered by Registered Dietitian (RD):  None additionally at this time.    Future/Additional Recommendations:  1. Rec to continue current diet, as ordered. Continue with no fluid restriction, as able. Small, frequent meals are recommended to help increase oral intake. Encourage high protein options.   2. Continue prenatal multivitamin with iron.   3. Continue to replace Mg++.  4. Continue scheduled bowel regimen but monitor need to adjust in the future. Also, monitor N/V control.   5. Monitor potential need for additional iron supplementation.   6. Hgb A1c of 5.8 on 12/6/2022. BG checks as per provider. Per provider note 12/20, ruled in for gestational diabetes.     EVALUATION OF THE PROGRESS TOWARD GOALS   Diet: 3 g Sodium, no caffeine. Ordered to receive chocolate Ensure MAX Protein at 14:00 and HS.   Intake: Tolerating diet. Per % intake flowsheets, pt consuming % with a good appetite 1/12, 100% with a good appetite 1/13-1/14, % with a good appetite 1/15, 100% on 1/16, and % with a good appetite 1/17. Per RN 1/17, \"Intermittent nausea w/ meals.\" [employer] (meal ordering system) indicates food/beverages sent 1/13-1/17 totaled 1948 kcals and 126 g protein daily on average. Pt is ordering two to three meals daily via room service. Estimated needs are 4539-7771 kcals/day (20 - 25 kcals/kg) (includes + 452 kcals/day for third trimester) vs [employer] equation estimate of 2890+ kcals/day and 115-140 grams protein/day (1.1 - 1.4 grams of pro/kg) (includes +25 g/ day additional protein for braden pregnancy). She does have some snacks in her room and fridge (family will be visiting next " "in February). Per pt, she has been consuming two oral supplements daily, eating three meals daily, and not consuming snacks typically. Pt states she is eating about the same amount as a week ago.      NEW FINDINGS   GI: Scheduled to receive miralax, being held. On scheduled senna, receiving. Having zero to one stool daily. Last stool was on 1/16.   Weight History: 141.5 kg = 312# (11/30/2022), 132.9 kg (12/5/2022), 132.7 kg (12/6, admit), 133.4 kg (12/21), 134.3 kg (1/4/2023), 134.9 kg (1/11), 134.9 kg (1/18) - Limited wt hx in chart review. Per provider H & P, \"Additionally on ROS, the patient reports loss of weight despite being pregnant since July 2022; previously weighed 357 pounds- down to 301 lbs at the time of admission >> further down to 292 lbs after diuresis. Difficult to assess wt gain with pregnancy and wt loss due to diuresis.\" Pt has gained 1.3 kg over the past approximate month. On 1/17, pt at 30w1d per OB note. Per provider 1/17, mild hypervolemia. On lasix.     MALNUTRITION  % Intake: Decreased intake does not meet criteria  % Weight Loss: Difficult to assess with pregnancy and diuresis this admission.   Subcutaneous Fat Loss: None observed, but difficult to assess  Muscle Loss: None observed, but difficult to assess  Fluid Accumulation/Edema: Does not meet criteria  Malnutrition Diagnosis: Patient does not meet two of the established criteria necessary for diagnosing malnutrition    Previous Goals   Patient to consume % of nutritionally adequate meal trays TID, or the equivalent with supplements/snacks.  Evaluation: Meeting at times.     Previous Nutrition Diagnosis  Inadequate oral intake related to diet restriction as evidenced by food/beverages sent 1/8-1/10 totaled 1657 kcals and 126 g protein daily on average while estimated needs are 7479-6204 kcals/day (20 - 25 kcals/kg) and 115-140 grams protein/day (1.1 - 1.4 grams of pro/kg).   Evaluation: Unresolved, but improvement " noted.    CURRENT NUTRITION DIAGNOSIS  Inadequate oral intake related to diet restriction and intermittent nausea as evidenced by pt consuming 50-75% of meals, at times.       INTERVENTIONS  Implementation  Modify composition of meals/snacks: Discussed menu options to incorporate variety.   Medical food supplement therapy, Nutrition education for nutrition relationship to health/disease: Discussed oral supplements and rec she continue to consume two daily. Encouraged oral intake adequacy and rationale. Also, rec incorporate some outside food in her room/fridge for additional kcals.     Goals  Patient to consume % of nutritionally adequate meal trays TID, or the equivalent with supplements/snacks.    Monitoring/Evaluation  Progress toward goals will be monitored and evaluated per protocol.     Nutrition will continue to follow.      Penny Maloney, MS, RD, LD, [facility]   6C Pgr: [phone]

## 2023-01-18 NOTE — CONSULTS
"  Health Psychology                        [facility], 3rd Floor  [address] [place] [address]    Confidential Summary of Inpatient Health Psychology Consultation*    Date of Service:  23    Referring Provider:  Dr. Harrington    Reason for Consultation:  Emotional support in the context of prolonged hospitalization and complex pregnancy    Sources of Information:  Information was obtained from a clinical interview with the patient and review of medical record.      History of Present Illness:  Anjali Carmen is a 32 year old female \" with no significant past medical history, who presented as a transfer from [facility] to [facility] on 2022 for further management of newly diagnosed, acute decompensated systolic heart failure (LVEF 20%) and high risk delivery.\"    The patient reported emotional stress in the context of separation from home and family with newly diagnosed heart failure and high risk delivery.  She discussed stressors related to medical experiences, uncertainty regarding delivery, and known separation from her  due to need to recover on the [place] to monitor her heart functioning and her newborns need for NICU care given early delivery.  She stated that her mood is not significantly down, depressed or hopeless most of the time although she does acknowledge stress related to the complex and challenging high risk pregnancy.  She states she nahomy by focusing on her reconnecting with her children, emotional support from her , and playing video games in the hospital the past time.      Medical History:  See below lists for past medical history, past surgical history, and current medications    No past medical history on file.    Past Surgical History:   Procedure Laterality Date      SECTION       CV CORONARY ANGIOGRAM N/A 2022    Procedure: Coronary Angiogram;  Surgeon: Ton Khanna MD;  Location: [facility]" CARDIAC CATH LAB     PICC DOUBLE LUMEN PLACEMENT Left 12/06/2022    Left Basilic, 50 cm, 2 cm external length       Current Facility-Administered Medications   Medication     acetaminophen (TYLENOL) tablet 650 mg     alum & mag hydroxide-simethicone (MAALOX) suspension 30 mL     enoxaparin ANTICOAGULANT (LOVENOX) injection 120 mg     furosemide (LASIX) tablet 20 mg     heparin lock flush 10 UNIT/ML injection 5-20 mL     heparin lock flush 10 UNIT/ML injection 5-20 mL     magnesium oxide (MAG-OX) tablet 400 mg     magnesium sulfate 2 g in water intermittent infusion     medication instruction     metoprolol tartrate (LOPRESSOR) tablet 50 mg     naloxone (NARCAN) injection 0.2 mg    Or     naloxone (NARCAN) injection 0.4 mg    Or     naloxone (NARCAN) injection 0.2 mg    Or     naloxone (NARCAN) injection 0.4 mg     Patient is already receiving anticoagulation with heparin, enoxaparin (LOVENOX), warfarin (COUMADIN)  or other anticoagulant medication     polyethylene glycol (MIRALAX) Packet 17 g     potassium chloride (KAYCIEL) solution 20 mEq     prenatal multivitamin w/iron per tablet 1 tablet     pyridOXINE (VITAMIN B6) tablet 25 mg     sennosides (SENOKOT) tablet 8.6 mg     sodium chloride (PF) 0.9% PF flush 10-20 mL     sodium chloride (PF) 0.9% PF flush 10-40 mL     sodium chloride (PF) 0.9% PF flush 3 mL     sodium chloride (PF) 0.9% PF flush 3 mL     sotalol (BETAPACE) tablet 80 mg         Psychiatric History:  She reports no previous treatment or history related to psychiatric illness.    Social History:  She currently works in [employer] and is on unpaid medical leave.  Her mother is helping taking care of her children while she is admitted in [place].  She reports good support from her .    Mental Status/Interview:  Appearance/Behavior/Orientation: Alert and oriented to person, place, time, and situation. No evidence of psychomotor agitation.    Cooperation/Reliability:  Patient appeared to  honestly respond to questions about psychosocial functioning and is deemed a reliable historian.   Cognition/Memory/Judgment:  Not formally assessed, yet no difficulties apparent upon interview.   Speech/Language:  Speech was clear, logical and coherent, of normal rate, rhythm and volume.   Thought Content/Form: Appropriate to interview and situation.  Mood/Affect:  Mood mildly dysphoric; affect was mood congruent.    Suicide/Assault:  Patient reports no suicidal or assaultive ideation, plan, or intent.     Impression/Plan:  Mrs. Elizabteh Carmen endorses stress and emotional challenge in the context of complex and challenging high risk pregnancy and delivery with separation from family.  She endorses challenges pertaining to sadness and fear related to the medical situation and acknowledges she has good support from family, but the separation leaves her with less support than she would likely have if they were local.  Ongoing monitoring of mood and provision of emotional support is indicated given challenging situation with reduced access to family.    Plan is for health psychology to follow approx once per week during her admission. Please page if follow-up is needed in between sessions.       Diagnosis:  Adjustment disorder with anxiety and depressed         Namita Dunham, PhD,   Clinical Health Psychologist      *In accordance with the Rules of the [place] [employer], it is noted that psychological descriptions and scientific procedures underlying psychological evaluations have limitations.  Absolute predictions cannot be made based on information in this report.    This note was completed using Dragon voice recognition software.  Although reviewed after completion, some word and grammatical errors may occur.

## 2023-01-18 NOTE — PLAN OF CARE
"D: pt admitted on 12/6 from OSH for further management of newly diagnosed, acute decompensated systolic heart failure (LVEF 20%) and high risk delivery.  No significant prior medical history.     I: Monitored vitals and assessed pt status.   Changes during this shift:     Running:   PRN Med:      Vitals:  Blood pressure 100/62, pulse 64, temperature 98.1  F (36.7  C), temperature source Oral, resp. rate 18, height 1.753 m (5' 9\"), weight 134.9 kg (297 lb 8 oz), SpO2 98 %.    A:   Neuro: Ax4 Denies Headache, dizziness,  Lightheadedness, numbness and tingling. calls appropriately   Cardiac: SR with occasional PVCs, HR 60s  Afebrile, VSS.  Denies chest pain, but feels \"pressure\", team aware. EKG to be obtained 2 hours after administration of Sotalol.   Respiratory: sating >95 ON RA. denies SOB. LS clear bilaterally.   Diet/appetite: 3gNa Diet. No caffeine. Good appetite.   Endocrine: BS check QID, fasting and 1 hour after each meal. AM fasting BG goal 95mg/dL, 1 hr postprandial under 140 mg/dL.  GI/:  1 BM this shift. Denies abdominal pain. Good urine output.   Activity:  Moves independently  Pain: Denies  Skin: L DL PICC SL.  Plan: Fetal monitoring BID. Continue to monitor and follow plan of care. Notify team of changes in patient status.                       "

## 2023-01-18 NOTE — CARE CONFERENCE
Multi-disciplinary care conference  Plan of Care for Anjali Young, MRN [id number],  1990     Situation: Anjali Carmen is a 32 year old female  at 30 weeks 2 days with no significant past medical history, transfer from [facility] to [facility] on 2022 for further management of newly diagnosed, acute HFrEF 2/2 NICM (LVEF 20% now 30-35%) and high risk delivery    Back Ground:      HPI:     OBSTETRIC HISTORY:  Pregnancy Complications: - see notes   Acute decompensated HFrEF  - Changes in condition - Patient is now reporting 2 day history of feeling heavy which is different for her. Started around time discussion of sotolol.   - Repeat echo  and EF improved at 30-35%  - She is currently on metoprolol 60 mg q 6 hrs, decreased with addition of sotolol.  Lasix given today.  No contraindications to increasing dose of metoprolol from a pregnancy standpoint, if needed    - Agree with Lovenox given risk of LV thrombus  - Appreciate cares per primary cardiology team     Gestational Diabetes  - Goal blood glucose for AM fasting is <95 mg/dL  - 1 hour postprandial check is <140 mg/dL and for 2 hour postprandial is <120 mg/d  - Overall glucose values have been at goal, with one exception.  Continue to monitor and begin treatment as needed.     Anemia  Likely related to pregnancy, heart failure, serial blood draws, but also component of iron deficiency anemia.  Last Hgb 9.4 on 23; ferritin 28 on 23  - s/p IV iron infusion   - Continue oral iron replacement     Routine prenatal care  - Routine indications for emergent delivery including maternal decompensation or fetal compromise  - S/p  section consent on admission  - S/p third trimester RPR, which was normal.   - S/p Tdap on 1/3/23  - Plan IUD and depo-provera prior to discharge postpartum.  - Signed FMLA paperwork provided on   - Encouraged use of abdominal binder to help  with discomforts of pregnancy. Low suspicion for  labor at this time. Provided patient with phone number to L&D if she has concerns and/or if her team is unable reach us.  2) Fetal wellbeing  - Non-stress test (NST) BID  - Repeat growth ultrasound (performed by MFM) on   - Betamethasone (BMZ) given - at OSH for fetal lung maturity. Candidate for rescue BMZ.   - Magnesium for neuroprotection if moving towards delivery prior to 32 weeks with 6g bolus followed by 2g/hr maintenance until delivery  -  NICU consult planned prior to delivery    Medical History:  Echo 2023:  Interpretation Summary  Left ventricular function is decreased. The ejection fraction is 30-35% (moderately reduced).  There is wall thinning and akinesis of the basal-mid inferior, basal-mid inferoseptal and basal inferolateral segments.  Global right ventricular function is mildly to moderately reduced.  Mild to moderate tricuspid insufficiency is present.  Right ventricular systolic pressure is 45mmHg above the right atrial pressure.  Pulmonary hypertension is present.  IVC diameter <2.1 cm collapsing >50% with sniff suggests a normal RA pressure of 3 mmHg.  No pericardial effusion is present.     This study was compared with the study from 2022. The left ventricular function has improved.  Surgical History:   Social History:  Lives with  and three children     Assessment and Plan:   Cardiology plan:  Discussion for VTE prophylaxis and management around time of delivery in collaboration with OB and Anesthesia  See cardiology notes for ongoing plan, agree with delivery plan for 32 weeks    Obstetric Plan/Surgery Plan:    Please notify MFM/OB team if there is any change in clinical status if any evidence of decompensation or need for pressors. MFM/OB team will continue to see her twice weekly or as needed.     Delivery planning and considerations    Plan for  delivery on  OR  @ 8446 at 32  weeks gestation.  Room TBD to accommodate possible ECMO and fluoroscopy needs as well as NICU team members    Stop Lovenox 24 hours prior to surgery ()    ECMO team to be alerted by Cardiology team. 4E CVICU alerted.    Prep bilateral groin area and provide exposure should ECMO be needed     Neonatology team will be present for delivery    Oxytocin, Hemabate, Misoprostol and Tranexamic acid are available in the OR during the case. Methergine not advised. Plan to administer Tranexamic acid prophylactically just prior to starting the procedure.    PRBCs 2 units in a cooler at the start of the case     Recovery in PACU and then transfer to CV ICU 4E (Cardiology aware)    Plan Mirena IUD placement in OR ([facility] aware)    Anesthesia Plan:     Discussed optimal plan to avoid GETA and utilize neuraxial anesthesia    Epidural catheter placement on day of surgery in the OR to slowly titrate up the medication    Arterial line and PAC cath placement prior to surgery    TAP block will be performed    Nursing Plan Staffing plan for 6C and ICU RN Care:     L & D RN will perform fetal monitoring as ordered.  At this time, twice daily non-stress tests but could change to continuous fetal monitoring if clinical situation changes (changes impacted fetal oxygenation)  o Fetal monitor is to remain in her room or in very close proximity - save all paper strips and send to HIMS for scanning into the medical record  o  instrument set, gloves, betadine swabs and scalpels are in the patient room in case of sudden cardiac arrest and need for emergent delivery of the fetus     6C RN to assess for signs of labor or other complications - See Pregnancy Care Plan in her medical record  o This includes asking if the she feels the baby move, if she is feeling her abdomen tighten or lower back pain that comes and goes, fluid leaking from her vaginal or any vaginal bleeding/change in discharge.  o If any of these are noted,  call the OB Team  o If any decompensation in patient status is noted, call the OB Team ASAP (MFM on call)    Plans for day of surgery:    Floor and Preop RNs will perform the usual preop admission procedures    Ensure  instrument set and supplies and fetal monitor etc., are transferred to the OR      An L &D RN will be present in 3C OR Control Desk approximately one hour prior to surgery time on the [place] to perform the usual maternal and fetal assessments (EFM/toco monitoring) prior to c/s, be present during the surgery and is responsible for delivery documentation and  identification.  The L&D RN can log into [employer] in [employer] to access [place] documentation and will call the L &D unit to report when the baby delivers. (EFM paper strip will need to be retained and sent to HIMS for scanning into the EMR).    The L & D RN will bring over additional supplies: baby bands, plastic clear drape, a few purple oral syringes and a few bottles to collect colostrum, and cord blood and cord gas collection kits. L & D RN may bring donor milk over as baby will be  and at risk for low glucose - please verify with NICU before proceeding to do so.    (See CS on [place] checklist).     The fetal monitor is stored in the patient room. The Giraffe warmer is located in the OR 24 storeroom and is stocked with  resuscitation supplies and 2 Bakri balloons. Check tanks on infant warmer and replace if needed.    The L & D RN will support care in the operating room but is the not the primary circulating RN     L &D RN will verify that an infusion pump, oxytocin, Hemabate and tranexamic acid are available in the OR during the case. (NO Methergine is advised)    NICU team will set up the room before the case begins and step out for anesthesia induction.    Her support person is allowed in the OR.  If a support person is present for the birth, L&D RN responsible for assisting support person out of the room in case of  an emergency, when NICU exits with the baby or conversion to general anesthesia.     The L & D RN will support care in the operating room but is the not the primary circulating RN     Support person(s) will need private transportation or take the Patient/Family shuttle to proceed to [facility] to be with baby in the NICU. Support person(s) will be considered a visitor to the baby in the NICU.      L & D RN responsible for collecting cord blood and cord segment for gases and collaborate with OR RN to have it sent to [place] lab.    OB RN is responsible for QBL - ask for gram scale if it is not in the OR at the start of the case    L &D RN will perform collaborative care with the PACU RN and is responsible for fundal checks and vaginal bleeding checks. L & D RN will stay with patient throughout surgery and PACU, help with transfer to next unit and provide nursing education for fundal check and vaginal bleeding assessment to the handoff RN.      L & D RN will support manual expression and set up of breast pump.  Please call [place] [employer]/ [employer] to have breast pump and kit will be delivered to her room the evening prior to surgery.      The L & D RN is responsible for completing the Delivery Summary with the exception of the  Apgar score and resuscitation notes.      Routine postpartum and post-surgical care for post c/s with close cardiac observation. Patient will be cared for on the CVICU 4E. Plan to discharge from 4E (no transfer to [facility] is planned)    To facilitate bonding with her baby, utilize IPad on the unit and patient s Ipad # in Epic patient storyboard to connect with Care Space.     [facility] nurse will be available for a postpartum checks daily and to assist with ongoing lactation support.    Please discuss use of Donor Milk and document results of conversation on sticky note in baby s chart.    MFM team to place lactation consult order      [place] Plan:    Infant feeding plan -  breastfeeding. Breast pump and supplies will be delivered to her room on 6th floor prior to day of surgery.     The NICU team should contact the OR Control Desk or the L &D RN to verify that the surgery is on schedule (or has been delayed) prior to leaving the [facility] and arrive at least 30 minutes prior to delivery (or as negotiated)    Plan for delayed cord clamping at delivery     NICU HUC to arrange for NICU transport to [place] via Ambulance and bring their transporter, delivery supplies and medications. NICU team responsible for  stabilization and transfer.    Team: NNP, NICU RT, NICU RN, and [address] Paramedics.    NICU to set up room ahead of time per direction of the OR Team.  OR team will call NICU for delivery when needed.    Recommend that extra team members and NICU wait outside of the surgical suite until the anesthesia induction is complete.    Support person(s) will be here for the birth and would use private transportation or Patient/Family shuttle between [facility] and [facility].     Plan for her support person(s) to be on the [facility] with the baby in the NICU as a visitor.     NICU will transport the baby back to the [facility], but timing of transfer and to which unit depends on baby s disposition:   o If the baby is not well-appearing after birth, NICU will stabilize and transport back to the NICU immediately after birth.   o If the baby is well-appearing, the team can collaborate with the surgical and anesthesia teams to allow mom to see baby and provide skin to skin prior to transport.      OB RN responsible for collecting cord blood and cord segment for gases (if collected) and will send specimen to [place] lab.    Social Work:   Continue to support family in the NICU and through postpartum course.   NICU SW will collaborate with  ICU SW team    Blood Bank Plan:     Periop lab staff carry Ascom [phone].    The [facility] (cell saver provider) [phone]  is not alerted to her case but are immediately available if needed.          Contact Phone #s and Pagers - place these numbers at the nurse s desk, sticky notes and on white board as needed    Maternal Fetal Medicine/OB Team  Maternal Fetal Medicine: Jordyn Bills MD cell [phone]  [facility] Fellow: Johnnie Garcia MD  Resident pager [phone]  [facility] Patient Care Coordinator line: [phone]    ICU Team Nursing Leadership   NM 6C Divina Ventura [phone]  NM 4E Catalina Alonsoers [phone]  [facility] Frieda Duffy [phone]    Cardiology   Angeli Anderson MD - [email or web address]    Anesthesia/Surgery Main Pager - [phone]   Adebayo [email or web address]  [email or web address] [email or web address]   Lois [email or web address] Cell [phone]  MD [email or web address]  Colt del [email or web address]   [email or web address] [email or web address]  Dr. Chandana [email or web address]    Periop Team:  [place] Operating Room Team:  [place] OR (control desk) [phone]   Anesthesia [facility] [phone]  Carolyn Hubbard OB/GYN Lead [phone]   Jennifer Clements, PCS and Interim Mgr [phone]  Dandre Mast, Supervisor [phone]  Mary Weaver, Mgr [phone]  Amber Zhang, NM, Periop [phone]  Darlin Schneider Chief CRNA, [facility] Office [phone]   Jimena Burden CRNA MS, [email or web address] phone [phone], Pager: [phone]  Kassie Moraes, Patient Care Supervisor, Pre and Post [place] [phone]  OR Pharmacist Jaqueline Rogers [phone]  Martin Germain, Director Nursing Care Services  Sam De La Garza, [phone], [facility] [phone]    Blood Bank/Transfusion Medicine:  Blood Bank, [place] [phone]; [facility] [phone]   Chika Rivera [phone]  Maria Luz Delacruz Acute Lab Supervisor [phone]  Janey Haas Blood Bank Manager [phone]     Labor and Delivery Unit   Charge Nurse Ascom [phone] (to call from another campus [phone] and  enter the Ascom #)   [facility]   Charge Nurse [place] [address] [phone] (to call from another campus [phone] and enter the Ascom #)  Angelialbino Hirsch, NM Labor and Delivery and Pregnancy Special Care Unit, [phone], cell [phone]  Magalys Ramos, PCS, [facility] PCS, [phone], cell [phone]  Janel Belcher or Toña De Souza, IBCLC, [phone] for lactation support  Julisa Beckett, [place] [facility], [phone], cell [phone]  Mary Goldsmith, PCS, [facility] [phone], cell [phone]  Falguni Gonzalez, [facility], Birthplace [phone], cell [phone]  Yvette Adame,  Director [phone] cell    NICU Team  Sofia Magallon MD Neonatology pgr [phone]  Marlee Sanchez, Lead NNP, [phone]   Jeanne Kahn, [facility] [phone]  Negrita Gudino, [place] NICU 11th floor [phone]  Elaine Monge NM, Med/Surg NICU  Linnette Alejandra, NM, NICU Small Baby Unit [phone]  Lactation if  in NICU - [phone]    Support Services  Inés Matthew, [facility] Social Work [facility] ([phone], pager [phone])  Ketty Jacobs, [facility] Social Work [facility] ([phone], pager [phone])  Sofia Fernandez, [facility] Social Work [facility] ([phone], pager)  Jimena Bravo, [facility] Social Work [facility] ([phone], pager [phone])  Chaplain Blu (pager [phone])  Hiro Elliott, Child and Family Life  for help with Care Space if needed  Gabby Longbehn, Director Adult Care ANS Team   Lynne Short, Manager of Patient Flow PPM team   Hiro Duffy, [facility]

## 2023-01-18 NOTE — PROGRESS NOTES
Care Management Initial Consult    General Information  Assessment completed with: Patient, Patient  Type of CM/SW Visit: Initial Assessment    Primary Care Provider verified and updated as needed: Yes   Readmission within the last 30 days: no previous admission in last 30 days      Reason for Consult:  (Antepartum assessment, NICU assessment, Housing/lodging)  Advance Care Planning:    Pt does not have a health care directive       Communication Assessment  Patient's communication style: spoken language (English or Bilingual)    Hearing Difficulty or Deaf: no   Wear Glasses or Blind: yes    Cognitive  Cognitive/Neuro/Behavioral: WDL  Level of Consciousness: alert  Arousal Level: opens eyes spontaneously  Orientation: oriented x 4  Mood/Behavior: calm, cooperative  Best Language: 0 - No aphasia  Speech: clear, spontaneous, logical    Living Environment:   People in home:  (pt,  and 3 minor age son's)   (Mukund), sons (Qamar age 13, Karissa age 10 and Neptali age 5)  Current living Arrangements: house      Able to return to prior arrangements: yes       Family/Social Support:  Care provided by: self  Provides care for:  (Minor age children)  Marital Status:   Parent(s)  Mukund       Description of Support System: Supportive, Involved    Support Assessment: Adequate family and caregiver support    Current Resources:   Patient receiving home care services: No     Community Resources: None  Equipment currently used at home: none  Supplies currently used at home: None    Employment/Financial:  Employment Status:  (Pt works full time)     Employment/ Comments: Pt did not serve in the .  Pt's  served in the   Financial Concerns:     Referral to Financial Worker: No       Lifestyle & Psychosocial Needs:  Social Determinants of Health     Tobacco Use: Not on file   Alcohol Use: Not on file   Financial Resource Strain: Not on file   Food Insecurity: Not on file    Transportation Needs: Not on file   Physical Activity: Not on file   Stress: Not on file   Social Connections: Not on file   Intimate Partner Violence: Not on file   Depression: Not on file   Housing Stability: Not on file       Functional Status:  Prior to admission patient needed assistance:   Dependent ADLs:: Independent  Dependent IADLs:: Independent       Mental Health Status:  Mental Health Status: No Current Concerns       Chemical Dependency Status:  Chemical Dependency Status: No Current Concerns             Values/Beliefs:  Spiritual, Cultural Beliefs, Orthodoxy Practices, Values that affect care: no               Additional Information:  SW received 2023 MD orders (from Dr. Erika Glass) to see pt for antepartum assessment, NICU admission and housing/lodging needs.      SW met with pt.  Pt states that she lives with her  (Mukund) and 3 sons (Qamar age 13, Karissa age 10 and Neptali age 5) in a rambler style home.  The family is renting the home.  There is 1 step to enter the home.  Pt's bed/bath (tub/shower combo) are on main floor. The home does not have laundry facility's.  Pt states that they launder at a local laundromat.  Pt states that prior to current hospitalization she was indep with all mobility (no assistive device, no falls in the past year) and adl's.  Pt states that she and her  share responsibility (50/50) for completion of housekeeping, laundry, shopping and cooking tasks.  Pt drives.  Pt has a [employer] shower nozzle.  Pt does not own add'l DME.  Pt was not utilizing any community resources.  Pt does not have a  or SW in the community.  Pt states that she was only taking pre-soto vitamins prior to hospitalization, she was not on any prescription medications.  Pt states that she and her  manage household finances together.  Pt states that she does not have a Druze or spiritual practice that is important to her. Pt is a member of the [place] out  [facility].  Pt did not serve in the .  Pt states that her  served in the .  Pt states that prior to hospitalization she was working full time as a .  Pt states that her employer does not provide sick of vacation time. Pt states that her employer does not offer short or long term disability benefits.  Pt states that her  works 2 part time jobs and they are currently relying on his income to support the family.  Pt states that her  cannot afford hotel or gas expenses to visit pt at [facility].  Pt includes the following persons as her support people:  -  of 2 years (Mukund)  - Parents (Molina - Father, Venkat - Mother).    Pt states that she is aware that she is having a baby boy.  Pt states that she has a car seat, diapers, clothing and blankets for the baby.  Pt states that her brother in law will be providing a crib.  Pt states that it is anticipated that she will be induced in 2 weeks on the [place] and the baby will be brought over the the [facility] NICU.  Pt voices fear for her health and is hopeful to survive without complications.  Pt is hopeful that like all women giving birth that her  will be allowed to stay with her (in pt's room) for at least the first few days beginning on the day of delivery for support, encouragement and love.  Pt would like to breast feed if there are not contraindications to this.  If pt is able to breast feed pt will need arrangements for a breast pump, milk storage and delivery of breast milk to the [facility].  Pt will also need arrangements to be able to see her baby and this likely can be accomplished through use of the IPAD.      Upon discharge from acute hospitalization, it is anticipated that pt will be able to return to home.    SW will continue to follow for discharge planning.    SAILAJA Wallace  Social Work, 6A  Phone:  [phone]  Pager:  [phone]  1/18/2023        Suma Meza  BSW

## 2023-01-18 NOTE — PLAN OF CARE
"I/A: A/Ox4. VSS on RA. SR w/ PVCs/PACs. Tylenol x1 for hip pain. L DL PICC. Fasting BG in am and BG 1 hr after lunch. Intermittent nausea w/ meals. Tolerating 3gNa diet, no caffeine. Restarted lasix. LBM 1/17. Up ad danis.     P: Fetal/ uterine monitoring BID. Obtain EKG 2 hrs after Soltalol doses. Mag infusions BID, monitor lytes. Check BG if pt reports \"night sweats.\"     Hours of care: 4814-2680  "

## 2023-01-18 NOTE — PROGRESS NOTES
[facility]   Cardiology II Service / Advanced Heart Failure  Daily Progress Note  Date of Service: 2023      Patient: Anjali Carmen  MRN: [id number]  Admission Date: 2022  Hospital Day # 43    Assessment and Plan: Anjali Carmen is a 32 year old female  with no significant past medical history, who presented as a transfer from [facility] to [facility] on 2022 for further management of newly diagnosed, acute decompensated systolic heart failure (LVEF 20%) and high risk delivery.     MFM available  at [phone]     Acute on Chronic SCHF secondary to NICM, pregnancy related with questionable familial component. RV failure. Note family history of father with CM at age 30. TTE  with EF 20-25%, thinned out LV wall, and notable regional wall motion abnormalities. Coronary angiogram  with no significat le-sions seen.  Stage C, NYHA Class III  ACEi/ARB/ARNI: Contraindicated in pregnancy.   BB Metoprolol Tartrate 60 mg q6h. Nursing: hold if HR<50 or SBP<90 and call Cards2  Aldosterone antagonist contraindicated due to pregnancy  SCD prophylaxis GDMT  Fluid status mild hypervolemia. Lasix 20 mg po daily   - Lovenox given high risk for LV thrombus in setting of antepartum, regional wall motion abnormalities, and low EF. weekly LMWH level, goal 0.6-1, last 0.88 23.  - Cardiac MRI and genetic testing postpartum recommended.      New Onset PAF. NSVT. Frequent PVC and PAC's. Hypomagnesemia. [facility] [place], isolated episode, EKG unavailable for review. Currently in NSR at [facility] with frequent ectopy. [id number] score of 2 (sex, HF) - on AC as above for pregnancy and low EF.   - Metoprolol Tartrate 50 mg q6h. Nursing: hold if HR<50 or sBP<90 and call Cards2  - Lovenox as above  - Appreciate EP consult. Sotalol 80 mg po BID, EKG this AM post administration 453. EKG times 5 following Sotalol loading.   - Mg 1.6, protocol and Mg 2g IV BID.       High risk pregnancy  Gestational age 30w2d. . Betamethasone (BMZ) given - at OSH for fetal lung maturity. OB US for fetal growth done on 22 with normal fetal findings and no abnormalities. Anticipated delivery date 23 per MFM.   - Appreciate MFM management, closely following with daily checks; can be reached via their pager above  - NST BID  - Magnesium for neuroprotection if moving towards delivery prior to 32 weeks with 6g bolus followed by 2g/hr maintenance until delivery  - S/p  section consent on admission  - per MFM, BP goal 130/80, MAP >65                 - If blood pressures >160/110 for at least 15 minutes, call MFM for guidance of management, although typically will initiate treatment with 5-10 mg of IV hydralazine (preferred from cardiac standpoint) or 20 mg of IV labetalol     Gestational DM.   QID blood glucose checks (fasting, and 1 hour after each meal)                - goal blood glucose for AM fasting 95 mg/dL                - 1 hour postprandial check is under 140 mg/dL  - MFM following  -  monitoring to be determined by need for medications     Mild anemia, multifactorial  Due to pregnancy and HF. Hgb 10.2 on admission (MCV 79). Retic count 1.9%. Iron, ferritin, TIBC levels show AVINASH (say 6%). Hgh 9s-low 10s. IV iron 200 mg daily x5 (-). Hgb stable mid 10s.   - limit blood draws     FEN: 3 gram sodium diet   PROPHY:  Lovenox  LINES:  PICC  DISPO:  Pending delivery   CODE STATUS:  Full Code   ================================================================    Interval History/ROS: She complains of fatigue this AM, but attributes this to difficulty sleeping at night. She notes resolution of chest pressure and SOB while lying flat. She denies palpitations, nausea, vomiting, and diarrhea. She is tolerating oral intake.     Last 24 hr care team notes reviewed.   ROS:  4 point ROS including Respiratory, CV, GI and , other than that noted in the HPI,  "is negative.     Medications: Reviewed in EPIC.     Physical Exam:   /62 (BP Location: Right arm)   Pulse 64   Temp 98.1  F (36.7  C) (Oral)   Resp 18   Ht 1.753 m (5' 9\")   Wt 134.9 kg (297 lb 8 oz)   SpO2 98%   BMI 43.93 kg/m    GENERAL: Appears alert and oriented times three.   HEENT: Eye symmetrical and free of discharge bilaterally. Mucous membranes moist and without lesions.  NECK: Supple and without lymphadenopathy. JVD lower 1/3 of neck at 30 degrees.   CV: RRR, S1S2 present without murmur, rub, or gallop.   RESPIRATORY: Respirations regular, even, and unlabored. Lungs CTA throughout.   GI: Soft, gravid, and non distended with normoactive bowel sounds present in all quadrants. No tenderness, rebound, guarding. No organomegaly.   EXTREMITIES: Trace bilateral LE peripheral edema. 2+ bilateral pedal pulses.   NEUROLOGIC: Alert and orientated x 3. CN II-XII grossly intact. No focal deficits.   MUSCULOSKELETAL: No joint swelling or tenderness.   SKIN: No jaundice. No rashes or lesions.     Data:  CMPRecent Labs   Lab 01/18/23  0730 01/18/23  0612 01/17/23  1432 01/17/23  0644 01/17/23  0620 01/16/23  1345 01/16/23  1059 01/15/23  1538 01/15/23  0646 01/15/23  0105 01/14/23  2117 01/14/23  1407 01/14/23  0718 01/13/23  0627 01/13/23  0101 01/12/23  0817 01/12/23  0542   *  --   --   --   --   --   --   --   --   --  135*  --  133*  --  136  --  135*   POTASSIUM 3.9  --   --  3.9  --   --  4.3  --  3.9  --  4.2   < > 4.1   < > 3.9  --  4.0   CHLORIDE 107  --   --   --   --   --   --   --   --   --  105  --  106  --  106  --  106   CO2 16*  --   --   --   --   --   --   --   --   --  16*  --  15*  --  15*  --  16*   ANIONGAP 12  --   --   --   --   --   --   --   --   --  14  --  12  --  15  --  13   GLC 79 82 140*  --  81   < >  --    < >  --    < > 130*   < > 82   < > 87   < > 95   BUN 11.2  --   --   --   --   --   --   --   --   --  12.0  --  11.8  --  13.4  --  10.6   CR 0.56  --   --   --   " --   --   --   --   --   --  0.73  --  0.58  --  0.60  --  0.58   GFRESTIMATED >90  --   --   --   --   --   --   --   --   --  >90  --  >90  --  >90  --  >90   AYDEN 9.1  --   --   --   --   --   --   --   --   --  9.2  --  9.1  --  9.5  --  9.4   MAG 1.6*  --   --  1.7  --   --  2.2  --  2.2   < >  --    < > 1.7  1.4*   < > 1.8  --  1.8   PROTTOTAL 6.5  --   --   --   --   --   --   --   --   --   --   --  6.5  --   --   --  6.6   ALBUMIN 3.0*  --   --   --   --   --   --   --   --   --   --   --  2.9*  --   --   --  3.0*   BILITOTAL 0.3  --   --   --   --   --   --   --   --   --   --   --  0.2  --   --   --  <0.2   ALKPHOS 94  --   --   --   --   --   --   --   --   --   --   --  89  --   --   --  90   AST 28  --   --   --   --   --   --   --   --   --   --   --  28  --   --   --  30   ALT 17  --   --   --   --   --   --   --   --   --   --   --  19  --   --   --  18    < > = values in this interval not displayed.     CBC  Recent Labs   Lab 01/14/23  0718 01/12/23  0542   WBC 8.7 8.4   RBC 3.53* 3.56*   HGB 9.3* 9.4*   HCT 30.9* 30.3*   MCV 88 85   MCH 26.3* 26.4*   MCHC 30.1* 31.0*   RDW 19.1* 18.5*    202     Time/Communication  I personally spent a total of 30 minutes. Of that 15 minutes was counseling/coordination of patient's care. Plan of care discussed with patient. See my note above for details. Patient discussed with Dr. Cox.      Stacie Pisano GOYO  1/18/2023

## 2023-01-18 NOTE — PLAN OF CARE
"D: transfer from [place] for newly diagnosed HF and high risk delivery (12/6)    I: Monitored vitals and assessed pt status.   Changed:   Running:   PRN:     A: A/o x4, VSS. Sinus w/ PVC/PAC's. RA. Voiding adequately, BM 1/17. DL PICC saline locked. 3g Na diet, no caffeine. Tylenol for hip pain. Up ad danis. Nurses to order EKG 2 hours after Sotalol given.    Vitals: /59 (BP Location: Right arm, Cuff Size: Adult Large)   Pulse 75   Temp 97.4  F (36.3  C) (Axillary)   Resp 18   Ht 1.753 m (5' 9\")   Wt 134.9 kg (297 lb 8 oz)   SpO2 99%   BMI 43.93 kg/m       P: Continue to monitor Pt status and report any significant changes to treatment team    "

## 2023-01-19 ENCOUNTER — APPOINTMENT (OUTPATIENT)
Dept: ULTRASOUND IMAGING | Facility: CLINIC | Age: 33
End: 2023-01-19
Attending: INTERNAL MEDICINE
Payer: COMMERCIAL

## 2023-01-19 LAB
ALBUMIN SERPL BCG-MCNC: 3.1 G/DL (ref 3.5–5.2)
ALP SERPL-CCNC: 95 U/L (ref 35–104)
ALT SERPL W P-5'-P-CCNC: 18 U/L (ref 10–35)
ANION GAP SERPL CALCULATED.3IONS-SCNC: 16 MMOL/L (ref 7–15)
AST SERPL W P-5'-P-CCNC: 30 U/L (ref 10–35)
ATRIAL RATE - MUSE: 61 BPM
ATRIAL RATE - MUSE: 67 BPM
BASOPHILS # BLD AUTO: 0 10E3/UL (ref 0–0.2)
BASOPHILS NFR BLD AUTO: 0 %
BILIRUB SERPL-MCNC: 0.2 MG/DL
BUN SERPL-MCNC: 12.1 MG/DL (ref 6–20)
CALCIUM SERPL-MCNC: 9.1 MG/DL (ref 8.6–10)
CHLORIDE SERPL-SCNC: 107 MMOL/L (ref 98–107)
CREAT SERPL-MCNC: 0.59 MG/DL (ref 0.51–0.95)
DEPRECATED HCO3 PLAS-SCNC: 13 MMOL/L (ref 22–29)
DIASTOLIC BLOOD PRESSURE - MUSE: NORMAL MMHG
DIASTOLIC BLOOD PRESSURE - MUSE: NORMAL MMHG
EOSINOPHIL # BLD AUTO: 0.1 10E3/UL (ref 0–0.7)
EOSINOPHIL NFR BLD AUTO: 1 %
ERYTHROCYTE [DISTWIDTH] IN BLOOD BY AUTOMATED COUNT: 18.6 % (ref 10–15)
GFR SERPL CREATININE-BSD FRML MDRD: >90 ML/MIN/1.73M2
GLUCOSE BLDC GLUCOMTR-MCNC: 149 MG/DL (ref 70–99)
GLUCOSE BLDC GLUCOMTR-MCNC: 89 MG/DL (ref 70–99)
GLUCOSE SERPL-MCNC: 79 MG/DL (ref 70–99)
HCT VFR BLD AUTO: 31 % (ref 35–47)
HGB BLD-MCNC: 9.7 G/DL (ref 11.7–15.7)
IMM GRANULOCYTES # BLD: 0.1 10E3/UL
IMM GRANULOCYTES NFR BLD: 1 %
INTERPRETATION ECG - MUSE: NORMAL
INTERPRETATION ECG - MUSE: NORMAL
LYMPHOCYTES # BLD AUTO: 2.4 10E3/UL (ref 0.8–5.3)
LYMPHOCYTES NFR BLD AUTO: 25 %
MAGNESIUM SERPL-MCNC: 1.5 MG/DL (ref 1.7–2.3)
MAGNESIUM SERPL-MCNC: 2.6 MG/DL (ref 1.7–2.3)
MCH RBC QN AUTO: 26.7 PG (ref 26.5–33)
MCHC RBC AUTO-ENTMCNC: 31.3 G/DL (ref 31.5–36.5)
MCV RBC AUTO: 85 FL (ref 78–100)
MONOCYTES # BLD AUTO: 0.5 10E3/UL (ref 0–1.3)
MONOCYTES NFR BLD AUTO: 6 %
NEUTROPHILS # BLD AUTO: 6.4 10E3/UL (ref 1.6–8.3)
NEUTROPHILS NFR BLD AUTO: 67 %
NRBC # BLD AUTO: 0 10E3/UL
NRBC BLD AUTO-RTO: 0 /100
P AXIS - MUSE: 32 DEGREES
P AXIS - MUSE: 45 DEGREES
PLATELET # BLD AUTO: 226 10E3/UL (ref 150–450)
POTASSIUM SERPL-SCNC: 3.9 MMOL/L (ref 3.4–5.3)
POTASSIUM SERPL-SCNC: 4 MMOL/L (ref 3.4–5.3)
PR INTERVAL - MUSE: 136 MS
PR INTERVAL - MUSE: 158 MS
PROT SERPL-MCNC: 6.6 G/DL (ref 6.4–8.3)
QRS DURATION - MUSE: 124 MS
QRS DURATION - MUSE: 136 MS
QT - MUSE: 450 MS
QT - MUSE: 450 MS
QTC - MUSE: 453 MS
QTC - MUSE: 475 MS
R AXIS - MUSE: -5 DEGREES
R AXIS - MUSE: -6 DEGREES
RBC # BLD AUTO: 3.63 10E6/UL (ref 3.8–5.2)
SODIUM SERPL-SCNC: 136 MMOL/L (ref 136–145)
SYSTOLIC BLOOD PRESSURE - MUSE: NORMAL MMHG
SYSTOLIC BLOOD PRESSURE - MUSE: NORMAL MMHG
T AXIS - MUSE: 11 DEGREES
T AXIS - MUSE: 6 DEGREES
VENTRICULAR RATE- MUSE: 61 BPM
VENTRICULAR RATE- MUSE: 67 BPM
WBC # BLD AUTO: 9.5 10E3/UL (ref 4–11)

## 2023-01-19 PROCEDURE — 250N000011 HC RX IP 250 OP 636: Performed by: STUDENT IN AN ORGANIZED HEALTH CARE EDUCATION/TRAINING PROGRAM

## 2023-01-19 PROCEDURE — 36592 COLLECT BLOOD FROM PICC: CPT | Performed by: NURSE PRACTITIONER

## 2023-01-19 PROCEDURE — 83735 ASSAY OF MAGNESIUM: CPT | Performed by: INTERNAL MEDICINE

## 2023-01-19 PROCEDURE — 250N000011 HC RX IP 250 OP 636: Performed by: INTERNAL MEDICINE

## 2023-01-19 PROCEDURE — 93010 ELECTROCARDIOGRAM REPORT: CPT | Performed by: INTERNAL MEDICINE

## 2023-01-19 PROCEDURE — 250N000011 HC RX IP 250 OP 636: Performed by: NURSE PRACTITIONER

## 2023-01-19 PROCEDURE — 250N000013 HC RX MED GY IP 250 OP 250 PS 637: Performed by: INTERNAL MEDICINE

## 2023-01-19 PROCEDURE — 36415 COLL VENOUS BLD VENIPUNCTURE: CPT | Performed by: INTERNAL MEDICINE

## 2023-01-19 PROCEDURE — 214N000001 HC R&B CCU UMMC

## 2023-01-19 PROCEDURE — 76819 FETAL BIOPHYS PROFIL W/O NST: CPT

## 2023-01-19 PROCEDURE — 84132 ASSAY OF SERUM POTASSIUM: CPT | Performed by: NURSE PRACTITIONER

## 2023-01-19 PROCEDURE — 250N000013 HC RX MED GY IP 250 OP 250 PS 637: Performed by: NURSE PRACTITIONER

## 2023-01-19 PROCEDURE — 83735 ASSAY OF MAGNESIUM: CPT | Performed by: NURSE PRACTITIONER

## 2023-01-19 PROCEDURE — 85025 COMPLETE CBC W/AUTO DIFF WBC: CPT | Performed by: NURSE PRACTITIONER

## 2023-01-19 PROCEDURE — 250N000013 HC RX MED GY IP 250 OP 250 PS 637: Performed by: STUDENT IN AN ORGANIZED HEALTH CARE EDUCATION/TRAINING PROGRAM

## 2023-01-19 PROCEDURE — 76816 OB US FOLLOW-UP PER FETUS: CPT | Mod: 26 | Performed by: OBSTETRICS & GYNECOLOGY

## 2023-01-19 PROCEDURE — 76819 FETAL BIOPHYS PROFIL W/O NST: CPT | Mod: 26 | Performed by: OBSTETRICS & GYNECOLOGY

## 2023-01-19 PROCEDURE — 99232 SBSQ HOSP IP/OBS MODERATE 35: CPT | Performed by: NURSE PRACTITIONER

## 2023-01-19 PROCEDURE — 93005 ELECTROCARDIOGRAM TRACING: CPT

## 2023-01-19 PROCEDURE — 250N000013 HC RX MED GY IP 250 OP 250 PS 637

## 2023-01-19 RX ORDER — MAGNESIUM SULFATE HEPTAHYDRATE 40 MG/ML
4 INJECTION, SOLUTION INTRAVENOUS ONCE
Status: COMPLETED | OUTPATIENT
Start: 2023-01-19 | End: 2023-01-19

## 2023-01-19 RX ADMIN — SOTALOL HYDROCHLORIDE 80 MG: 80 TABLET ORAL at 19:55

## 2023-01-19 RX ADMIN — POTASSIUM CHLORIDE 20 MEQ: 40 SOLUTION ORAL at 19:55

## 2023-01-19 RX ADMIN — MAGNESIUM SULFATE HEPTAHYDRATE 4 G: 40 INJECTION, SOLUTION INTRAVENOUS at 08:39

## 2023-01-19 RX ADMIN — PRENATAL VIT W/ FE FUMARATE-FA TAB 27-0.8 MG 1 TABLET: 27-0.8 TAB at 07:49

## 2023-01-19 RX ADMIN — POTASSIUM CHLORIDE 20 MEQ: 40 SOLUTION ORAL at 13:58

## 2023-01-19 RX ADMIN — FUROSEMIDE 20 MG: 20 TABLET ORAL at 07:49

## 2023-01-19 RX ADMIN — Medication 37.5 MG: at 13:58

## 2023-01-19 RX ADMIN — MAGNESIUM SULFATE HEPTAHYDRATE 2 G: 40 INJECTION, SOLUTION INTRAVENOUS at 11:42

## 2023-01-19 RX ADMIN — METOPROLOL TARTRATE 50 MG: 50 TABLET, FILM COATED ORAL at 01:10

## 2023-01-19 RX ADMIN — ENOXAPARIN SODIUM 120 MG: 120 INJECTION SUBCUTANEOUS at 19:57

## 2023-01-19 RX ADMIN — Medication 37.5 MG: at 19:54

## 2023-01-19 RX ADMIN — MAGNESIUM OXIDE TAB 400 MG (241.3 MG ELEMENTAL MG) 400 MG: 400 (241.3 MG) TAB at 19:54

## 2023-01-19 RX ADMIN — ACETAMINOPHEN 650 MG: 325 TABLET, FILM COATED ORAL at 13:57

## 2023-01-19 RX ADMIN — SENNOSIDES 8.6 MG: 8.6 TABLET, COATED ORAL at 07:49

## 2023-01-19 RX ADMIN — ENOXAPARIN SODIUM 120 MG: 120 INJECTION SUBCUTANEOUS at 07:49

## 2023-01-19 RX ADMIN — METOPROLOL TARTRATE 50 MG: 50 TABLET, FILM COATED ORAL at 07:49

## 2023-01-19 RX ADMIN — MAGNESIUM SULFATE HEPTAHYDRATE 2 G: 40 INJECTION, SOLUTION INTRAVENOUS at 20:02

## 2023-01-19 RX ADMIN — MAGNESIUM OXIDE TAB 400 MG (241.3 MG ELEMENTAL MG) 400 MG: 400 (241.3 MG) TAB at 07:50

## 2023-01-19 RX ADMIN — SOTALOL HYDROCHLORIDE 80 MG: 80 TABLET ORAL at 07:49

## 2023-01-19 RX ADMIN — Medication 25 MG: at 05:44

## 2023-01-19 RX ADMIN — POTASSIUM CHLORIDE 20 MEQ: 40 SOLUTION ORAL at 07:48

## 2023-01-19 RX ADMIN — SODIUM CHLORIDE, PRESERVATIVE FREE 10 ML: 5 INJECTION INTRAVENOUS at 16:12

## 2023-01-19 RX ADMIN — SODIUM CHLORIDE, PRESERVATIVE FREE 5 ML: 5 INJECTION INTRAVENOUS at 05:11

## 2023-01-19 RX ADMIN — SENNOSIDES 8.6 MG: 8.6 TABLET, COATED ORAL at 19:54

## 2023-01-19 ASSESSMENT — ACTIVITIES OF DAILY LIVING (ADL)
ADLS_ACUITY_SCORE: 20

## 2023-01-19 NOTE — PLAN OF CARE
Temp: 98.2  F (36.8  C) Temp src: Oral BP: 103/61 Pulse: 68   Resp: 16 SpO2: 99 % O2 Device: None (Room air)       Diagnosis: newly diagnosed acute decompensated systolic heart failure (LVEF 20%), high risk delivery  No significant past medical history    Neuro: A&Ox 4, c/o headache she felt was d/t to some nausea this morning, requested water & applesauce, declined other interventions.  Cardiac: Tele in place, SR w/PACs.   Respiratory: O2 sating 99% on RA.  Vitals: VSS, Afebrile.  GI/: intermittent nausea at bedtime & in AM, pt requested snacks for invention, voiding well with overnight output of 1100 mL.  Skin: Left double lumen PICC in place, purple lumen heparin locked, red lumen SL.   Mobility: Up independently to bathroom.  Rest: Slept ok overnight.    P: Continue to monitor and follow POC. Planned  scheduled for . Notify CARDS 2 with changes.

## 2023-01-19 NOTE — PROGRESS NOTES
Please see full imaging report from [employer] program under imaging tab.    Inpatient OB US -   Normal fetal growth 3lb 15 oz.   Cephalic lie with anterior placenta  Normal but limited anatomy  Biophysical profile reassuring at 8/8    Called patient and notified of normal US including good fetal growth.     Luis Mast MD  Maternal Fetal Medicine

## 2023-01-19 NOTE — PROGRESS NOTES
[facility]   Cardiology II Service / Advanced Heart Failure  Daily Progress Note  Date of Service: 2023      Patient: Anjali Carmen  MRN: [id number]  Admission Date: 2022  Hospital Day # 44    Assessment and Plan: Anjali Carmen is a 32 year old female  with no significant past medical history, who presented as a transfer from [facility] to [facility] on 2022 for further management of newly diagnosed, acute decompensated systolic heart failure (LVEF 20%) and high risk delivery.     MFM available  at [phone]     Acute on Chronic SCHF secondary to NICM, pregnancy related with questionable familial component. RV failure. Note family history of father with CM at age 30. TTE  with EF 20-25%, thinned out LV wall, and notable regional wall motion abnormalities. Coronary angiogram  with no significat le-sions seen.  Stage C, NYHA Class III  ACEi/ARB/ARNI: Contraindicated in pregnancy.   BB Metoprolol Tartrate 37.5 mg q6h. Nursing: hold if HR<50 or SBP<90 and call Cards2  Aldosterone antagonist contraindicated due to pregnancy  SCD prophylaxis GDMT  Fluid status Near Euvolemic. Lasix 20 mg po daily   - Lovenox given high risk for LV thrombus in setting of antepartum, regional wall motion abnormalities, and low EF. Weekly LMWH level, goal 0.6-1, last 0.88 23.  - Cardiac MRI and genetic testing postpartum recommended.      New Onset PAF. NSVT. Frequent PVC and PAC's. Hypomagnesemia. [facility] in [place], isolated episode, EKG unavailable for review. Currently in NSR at [facility] with frequent ectopy. [id number] score of 2 (sex, HF) - on AC as above for pregnancy and low EF.   - Metoprolol Tartrate decreased to 37.5 mg q6h, discussed with EP. Nursing: hold if HR<50 or sBP<90 and call Cards2  - Lovenox as above  - Appreciate EP consult. Sotalol 80 mg po BID, EKG this AM post administration 453. EKG times 5 following Sotalol loading.   -  Mg 1.5, protocol and Mg 2g IV BID.      High risk pregnancy  Gestational age [id number]. . Betamethasone (BMZ) given - at OSH for fetal lung maturity. OB US for fetal growth done on 22 with normal fetal findings and no abnormalities. Anticipated delivery date 23 per MFM.   - Appreciate MFM management, closely following with daily checks; can be reached via their pager above  - NST BID  - Magnesium for neuroprotection if moving towards delivery prior to 32 weeks with 6g bolus followed by 2g/hr maintenance until delivery  - S/p  section consent on admission  - per MFM, BP goal 130/80, MAP >65                 - If blood pressures >160/110 for at least 15 minutes, call MFM for guidance of management, although typically will initiate treatment with 5-10 mg of IV hydralazine (preferred from cardiac standpoint) or 20 mg of IV labetalol     Gestational DM.   QID blood glucose checks (fasting, and 1 hour after each meal)                - goal blood glucose for AM fasting 95 mg/dL                - 1 hour postprandial check is under 140 mg/dL  - MFM following  -  monitoring to be determined by need for medications     Mild anemia, multifactorial  Due to pregnancy and HF. Hgb 10.2 on admission (MCV 79). Retic count 1.9%. Iron, ferritin, TIBC levels show AVINASH (say 6%). Hgh 9s-low 10s. IV iron 200 mg daily x5 (-). Hgb stable mid 10s.   - limit blood draws     FEN: 3 gram sodium diet   PROPHY:  Lovenox  LINES:  PICC  DISPO:  Pending delivery   CODE STATUS:  Full Code   ================================================================  Interval History/ROS: She complains of persistent fatigue. She denies fever, chills, chest pain, palpitations, cough, nausea, vomiting, diarrhea, and LE edema. She is tolerating oral intake and is ambulating in her room.     Last 24 hr care team notes reviewed.   ROS:  4 point ROS including Respiratory, CV, GI and , other than that noted in the HPI, is  "negative.     Medications: Reviewed in EPIC.     Physical Exam:   /61 (BP Location: Right arm)   Pulse 68   Temp 98.1  F (36.7  C) (Oral)   Resp 18   Ht 1.753 m (5' 9\")   Wt 135.3 kg (298 lb 3.2 oz)   SpO2 98%   BMI 44.04 kg/m    GENERAL: Appears alert and oriented times three.   HEENT: Eye symmetrical and free of discharge bilaterally. Mucous membranes moist and without lesions.  NECK: Supple and without lymphadenopathy. JVD lower 1/3 of neck upright.   CV: RRR, S1S2 present without murmur, rub, or gallop.   RESPIRATORY: Respirations regular, even, and unlabored. Lungs CTA throughout.   GI: Soft, gravid, and non distended with normoactive bowel sounds present in all quadrants. No tenderness, rebound, guarding. No organomegaly.   EXTREMITIES: No peripheral edema. 2+ bilateral pedal pulses.   NEUROLOGIC: Alert and orientated x 3. CN II-XII grossly intact. No focal deficits.   MUSCULOSKELETAL: No joint swelling or tenderness.   SKIN: No jaundice. No rashes or lesions.     Data:  CMPRecent Labs   Lab 01/19/23  0543 01/19/23  0517 01/18/23  1443 01/18/23  0730 01/17/23  1432 01/17/23  0644 01/16/23  1345 01/16/23  1059 01/15/23  0105 01/14/23  2117 01/14/23  1407 01/14/23  0718   NA  --  136  --  135*  --   --   --   --   --  135*  --  133*   POTASSIUM  --  4.0  3.9  --  3.9  --  3.9  --  4.3   < > 4.2   < > 4.1   CHLORIDE  --  107  --  107  --   --   --   --   --  105  --  106   CO2  --  13*  --  16*  --   --   --   --   --  16*  --  15*   ANIONGAP  --  16*  --  12  --   --   --   --   --  14  --  12   GLC 89 79 115* 79   < >  --    < >  --    < > 130*   < > 82   BUN  --  12.1  --  11.2  --   --   --   --   --  12.0  --  11.8   CR  --  0.59  --  0.56  --   --   --   --   --  0.73  --  0.58   GFRESTIMATED  --  >90  --  >90  --   --   --   --   --  >90  --  >90   AYDEN  --  9.1  --  9.1  --   --   --   --   --  9.2  --  9.1   MAG  --  1.5*  --  1.6*  --  1.7  --  2.2   < >  --    < > 1.7  1.4*   PROTTOTAL  " --  6.6  --  6.5  --   --   --   --   --   --   --  6.5   ALBUMIN  --  3.1*  --  3.0*  --   --   --   --   --   --   --  2.9*   BILITOTAL  --  0.2  --  0.3  --   --   --   --   --   --   --  0.2   ALKPHOS  --  95  --  94  --   --   --   --   --   --   --  89   AST  --  30  --  28  --   --   --   --   --   --   --  28   ALT  --  18  --  17  --   --   --   --   --   --   --  19    < > = values in this interval not displayed.     CBC  Recent Labs   Lab 01/19/23  0517 01/14/23  0718   WBC 9.5 8.7   RBC 3.63* 3.53*   HGB 9.7* 9.3*   HCT 31.0* 30.9*   MCV 85 88   MCH 26.7 26.3*   MCHC 31.3* 30.1*   RDW 18.6* 19.1*    229     Time/Communication  I personally spent a total of 35 minutes. Of that 20 minutes was counseling/coordination of patient's care. Plan of care discussed with patient. See my note above for details. Patient discussed with Dr. Cox.      Stacie Pisano [facility]  1/19/2023

## 2023-01-19 NOTE — PLAN OF CARE
"D: pt admitted on 12/6 from OSH for further management of newly diagnosed, acute decompensated systolic heart failure (LVEF 20%) and high risk delivery.  No significant prior medical history.     I: Monitored vitals and assessed pt status.   Changes during this shift:      Running:   PRN Med: tylenol     Vitals: Blood pressure 100/58, pulse 66, temperature 97.6  F (36.4  C), temperature source Oral, resp. rate 18, height 1.753 m (5' 9\"), weight 135.3 kg (298 lb 3.2 oz), SpO2 98 %.     A:   Neuro: Ax4 Denies dizziness,  Lightheadedness, numbness and tingling. Acknowledged headache this afternoon managed with PRN tylenol. Calls appropriately   Cardiac: SR with occasional PVCs and PACs, HR 60s Afebrile, VSS.  Denies chest pain, but feels \"pressure\", team aware.   Respiratory: sating >95 ON RA. denies SOB. LS clear bilaterally.   Diet/appetite: 3gNa Diet. No caffeine. Good appetite.   Endocrine: BS check QID, fasting and 1 hour after each meal. AM fasting BG goal 95mg/dL, 1 hr postprandial under 140 mg/dL.  GI/:  1 BM this shift. Denies abdominal pain. Good urine output. Intermittent nausea, patient given ginger ale and chamomile tea as intervention.  Activity:  Moves independently  Pain: Headache managed with PRN tylenol and encouraged to drink water.  Skin: L DL PICC SL.  Plan: Fetal monitoring BID. Continue to monitor and follow plan of care. Notify team of changes in patient status.     "

## 2023-01-19 NOTE — PLAN OF CARE
"I/A: A/Ox4, gravid. VSS on RA. SR on tele. Denied pain. L DL PICC. Fasting BG in am and BG 1 hr after lunch. Intermittent nausea w/ meals. Tolerating 3gNa diet, no caffeine. Voiding well w/ lasix. LBM 1/18. Up ad danis.     P: Fetal monitoring BID. Obtain EKG 2 hrs after Soltalol doses. Mag infusions BID, monitor lytes. Check BG if pt reports \"night sweats.\" Plan for C/S in OR 2/1 at 0730 at 32 weeks gestation.    Hours of care: [id number]  "

## 2023-01-20 LAB
ALBUMIN SERPL BCG-MCNC: 3 G/DL (ref 3.5–5.2)
ALP SERPL-CCNC: 105 U/L (ref 35–104)
ALT SERPL W P-5'-P-CCNC: 15 U/L (ref 10–35)
ANION GAP SERPL CALCULATED.3IONS-SCNC: 12 MMOL/L (ref 7–15)
AST SERPL W P-5'-P-CCNC: 30 U/L (ref 10–35)
ATRIAL RATE - MUSE: 67 BPM
BILIRUB SERPL-MCNC: 0.2 MG/DL
BUN SERPL-MCNC: 10.1 MG/DL (ref 6–20)
CALCIUM SERPL-MCNC: 8.9 MG/DL (ref 8.6–10)
CHLORIDE SERPL-SCNC: 108 MMOL/L (ref 98–107)
CREAT SERPL-MCNC: 0.59 MG/DL (ref 0.51–0.95)
DEPRECATED HCO3 PLAS-SCNC: 15 MMOL/L (ref 22–29)
DIASTOLIC BLOOD PRESSURE - MUSE: NORMAL MMHG
GFR SERPL CREATININE-BSD FRML MDRD: >90 ML/MIN/1.73M2
GLUCOSE BLDC GLUCOMTR-MCNC: 128 MG/DL (ref 70–99)
GLUCOSE BLDC GLUCOMTR-MCNC: 90 MG/DL (ref 70–99)
GLUCOSE SERPL-MCNC: 77 MG/DL (ref 70–99)
HOLD SPECIMEN: NORMAL
INTERPRETATION ECG - MUSE: NORMAL
LMWH PPP CHRO-ACNC: 0.76 IU/ML
MAGNESIUM SERPL-MCNC: 1.9 MG/DL (ref 1.7–2.3)
P AXIS - MUSE: 33 DEGREES
POTASSIUM SERPL-SCNC: 4.3 MMOL/L (ref 3.4–5.3)
PR INTERVAL - MUSE: 156 MS
PROT SERPL-MCNC: 6.6 G/DL (ref 6.4–8.3)
QRS DURATION - MUSE: 124 MS
QT - MUSE: 434 MS
QTC - MUSE: 458 MS
R AXIS - MUSE: -8 DEGREES
SODIUM SERPL-SCNC: 135 MMOL/L (ref 136–145)
SYSTOLIC BLOOD PRESSURE - MUSE: NORMAL MMHG
T AXIS - MUSE: 1 DEGREES
VENTRICULAR RATE- MUSE: 67 BPM

## 2023-01-20 PROCEDURE — 250N000013 HC RX MED GY IP 250 OP 250 PS 637: Performed by: NURSE PRACTITIONER

## 2023-01-20 PROCEDURE — 250N000011 HC RX IP 250 OP 636: Performed by: INTERNAL MEDICINE

## 2023-01-20 PROCEDURE — 250N000013 HC RX MED GY IP 250 OP 250 PS 637

## 2023-01-20 PROCEDURE — 59025 FETAL NON-STRESS TEST: CPT | Mod: 26 | Performed by: OBSTETRICS & GYNECOLOGY

## 2023-01-20 PROCEDURE — 36415 COLL VENOUS BLD VENIPUNCTURE: CPT | Performed by: INTERNAL MEDICINE

## 2023-01-20 PROCEDURE — 214N000001 HC R&B CCU UMMC

## 2023-01-20 PROCEDURE — 85520 HEPARIN ASSAY: CPT | Performed by: INTERNAL MEDICINE

## 2023-01-20 PROCEDURE — 99232 SBSQ HOSP IP/OBS MODERATE 35: CPT | Performed by: NURSE PRACTITIONER

## 2023-01-20 PROCEDURE — 250N000013 HC RX MED GY IP 250 OP 250 PS 637: Performed by: STUDENT IN AN ORGANIZED HEALTH CARE EDUCATION/TRAINING PROGRAM

## 2023-01-20 PROCEDURE — 80053 COMPREHEN METABOLIC PANEL: CPT | Performed by: NURSE PRACTITIONER

## 2023-01-20 PROCEDURE — 99232 SBSQ HOSP IP/OBS MODERATE 35: CPT | Mod: 25 | Performed by: OBSTETRICS & GYNECOLOGY

## 2023-01-20 PROCEDURE — 250N000011 HC RX IP 250 OP 636: Performed by: STUDENT IN AN ORGANIZED HEALTH CARE EDUCATION/TRAINING PROGRAM

## 2023-01-20 PROCEDURE — 83735 ASSAY OF MAGNESIUM: CPT | Performed by: NURSE PRACTITIONER

## 2023-01-20 PROCEDURE — 250N000011 HC RX IP 250 OP 636: Performed by: NURSE PRACTITIONER

## 2023-01-20 PROCEDURE — 36592 COLLECT BLOOD FROM PICC: CPT | Performed by: NURSE PRACTITIONER

## 2023-01-20 RX ORDER — MAGNESIUM SULFATE HEPTAHYDRATE 40 MG/ML
2 INJECTION, SOLUTION INTRAVENOUS ONCE
Status: COMPLETED | OUTPATIENT
Start: 2023-01-20 | End: 2023-01-20

## 2023-01-20 RX ORDER — BETAMETHASONE SODIUM PHOSPHATE AND BETAMETHASONE ACETATE 3; 3 MG/ML; MG/ML
12 INJECTION, SUSPENSION INTRA-ARTICULAR; INTRALESIONAL; INTRAMUSCULAR; SOFT TISSUE EVERY 24 HOURS
Status: DISCONTINUED | OUTPATIENT
Start: 2023-01-29 | End: 2023-01-20

## 2023-01-20 RX ORDER — BETAMETHASONE SODIUM PHOSPHATE AND BETAMETHASONE ACETATE 3; 3 MG/ML; MG/ML
12 INJECTION, SUSPENSION INTRA-ARTICULAR; INTRALESIONAL; INTRAMUSCULAR; SOFT TISSUE EVERY 24 HOURS
Status: DISCONTINUED | OUTPATIENT
Start: 2023-01-29 | End: 2023-01-21

## 2023-01-20 RX ADMIN — MAGNESIUM OXIDE TAB 400 MG (241.3 MG ELEMENTAL MG) 400 MG: 400 (241.3 MG) TAB at 20:05

## 2023-01-20 RX ADMIN — POTASSIUM CHLORIDE 20 MEQ: 40 SOLUTION ORAL at 20:05

## 2023-01-20 RX ADMIN — MAGNESIUM SULFATE HEPTAHYDRATE 2 G: 40 INJECTION, SOLUTION INTRAVENOUS at 08:07

## 2023-01-20 RX ADMIN — SENNOSIDES 8.6 MG: 8.6 TABLET, COATED ORAL at 08:13

## 2023-01-20 RX ADMIN — PRENATAL VIT W/ FE FUMARATE-FA TAB 27-0.8 MG 1 TABLET: 27-0.8 TAB at 08:07

## 2023-01-20 RX ADMIN — POTASSIUM CHLORIDE 20 MEQ: 40 SOLUTION ORAL at 13:30

## 2023-01-20 RX ADMIN — SOTALOL HYDROCHLORIDE 80 MG: 80 TABLET ORAL at 08:08

## 2023-01-20 RX ADMIN — MAGNESIUM OXIDE TAB 400 MG (241.3 MG ELEMENTAL MG) 400 MG: 400 (241.3 MG) TAB at 08:12

## 2023-01-20 RX ADMIN — ENOXAPARIN SODIUM 120 MG: 120 INJECTION SUBCUTANEOUS at 20:05

## 2023-01-20 RX ADMIN — Medication 37.5 MG: at 13:30

## 2023-01-20 RX ADMIN — MAGNESIUM SULFATE HEPTAHYDRATE 2 G: 40 INJECTION, SOLUTION INTRAVENOUS at 20:07

## 2023-01-20 RX ADMIN — SOTALOL HYDROCHLORIDE 80 MG: 80 TABLET ORAL at 20:05

## 2023-01-20 RX ADMIN — SODIUM CHLORIDE, PRESERVATIVE FREE 5 ML: 5 INJECTION INTRAVENOUS at 15:28

## 2023-01-20 RX ADMIN — Medication 37.5 MG: at 20:05

## 2023-01-20 RX ADMIN — MAGNESIUM SULFATE IN WATER 2 G: 40 INJECTION, SOLUTION INTRAVENOUS at 11:15

## 2023-01-20 RX ADMIN — SENNOSIDES 8.6 MG: 8.6 TABLET, COATED ORAL at 20:06

## 2023-01-20 RX ADMIN — POTASSIUM CHLORIDE 20 MEQ: 40 SOLUTION ORAL at 08:08

## 2023-01-20 RX ADMIN — Medication 37.5 MG: at 09:14

## 2023-01-20 RX ADMIN — Medication 37.5 MG: at 01:12

## 2023-01-20 RX ADMIN — Medication 25 MG: at 06:03

## 2023-01-20 RX ADMIN — SODIUM CHLORIDE, PRESERVATIVE FREE 5 ML: 5 INJECTION INTRAVENOUS at 13:48

## 2023-01-20 RX ADMIN — FUROSEMIDE 20 MG: 20 TABLET ORAL at 08:08

## 2023-01-20 RX ADMIN — SODIUM CHLORIDE, PRESERVATIVE FREE 5 ML: 5 INJECTION INTRAVENOUS at 06:18

## 2023-01-20 RX ADMIN — ENOXAPARIN SODIUM 120 MG: 120 INJECTION SUBCUTANEOUS at 08:07

## 2023-01-20 ASSESSMENT — ACTIVITIES OF DAILY LIVING (ADL)
ADLS_ACUITY_SCORE: 20

## 2023-01-20 NOTE — PROGRESS NOTES
[facility]   Cardiology II Service / Advanced Heart Failure  Daily Progress Note  Date of Service: 2023      Patient: Anjali Carmen  MRN: [id number]  Admission Date: 2022  Hospital Day # 45    Assessment and Plan: Anjali Carmen is a 32 year old female  with no significant past medical history, who presented as a transfer from [facility] to [facility] on 2022 for further management of newly diagnosed, acute decompensated systolic heart failure (LVEF 20%) and high risk delivery.     MFM available  at [phone]    Plan today:   - Discontinue Lasix.      Acute on Chronic SCHF secondary to NICM, pregnancy related with questionable familial component. RV failure. Note family history of father with CM at age 30. TTE  with EF 20-25%, thinned out LV wall, and notable regional wall motion abnormalities. Coronary angiogram  with no significat le-sions seen.  Stage C, NYHA Class III  ACEi/ARB/ARNI: Contraindicated in pregnancy.   BB Metoprolol Tartrate 37.5 mg q6h. Nursing: hold if HR<50 or SBP<90 and call Cards2  Aldosterone antagonist contraindicated due to pregnancy  SCD prophylaxis GDMT  Fluid status: Mild hypovolemia. Discontinue Lasix.   - Lovenox given high risk for LV thrombus in setting of antepartum, regional wall motion abnormalities, and low EF. Weekly LMWH level, goal 0.6-1, last 0.88 23.  - Cardiac MRI and genetic testing postpartum recommended.      New Onset PAF. NSVT. Frequent PVC and PAC's. Hypomagnesemia. [facility] [place], isolated episode, EKG unavailable for review. Currently in NSR at [facility] with frequent ectopy. [id number] score of 2 (sex, HF) - on AC as above for pregnancy and low EF.   - Metoprolol Tartrate 37.5 mg q6h, discussed with EP. Nursing: hold if HR<50 or sBP<90 and call Cards2. Defer further titration given burst of VT this AM.   - Lovenox as above  - Appreciate EP consult. Sotalol 80 mg po BID,  EKG this AM post administration 453. EKG times 5 following Sotalol loading.   - Mg per protocol.      High risk pregnancy  Gestational age [id number]. . Betamethasone (BMZ) given - at OSH for fetal lung maturity. OB US for fetal growth done on 22 with normal fetal findings and no abnormalities. Anticipated delivery date 23 per MFM.   - Appreciate MFM management, closely following with daily checks; can be reached via their pager above  - NST BID  - Magnesium for neuroprotection if moving towards delivery prior to 32 weeks with 6g bolus followed by 2g/hr maintenance until delivery  - S/p  section consent on admission  - per MFM, BP goal 130/80, MAP >65                 - If blood pressures >160/110 for at least 15 minutes, call MFM for guidance of management, although typically will initiate treatment with 5-10 mg of IV hydralazine (preferred from cardiac standpoint) or 20 mg of IV labetalol     Gestational DM.   QID blood glucose checks (fasting, and 1 hour after each meal)                - goal blood glucose for AM fasting 95 mg/dL                - 1 hour postprandial check is under 140 mg/dL  - MFM following  -  monitoring to be determined by need for medications     Mild anemia, multifactorial  Due to pregnancy and HF. Hgb 10.2 on admission (MCV 79). Retic count 1.9%. Iron, ferritin, TIBC levels show AVINASH (say 6%). Hgh 9s-low 10s. IV iron 200 mg daily x5 (-). Hgb stable mid 10s.   - limit blood draws     FEN: 3 gram sodium diet   PROPHY:  Lovenox  LINES:  PICC  DISPO:  Pending delivery   CODE STATUS:  Full Code   ================================================================    Interval History/ROS: She complains of palpitations this AM with chest pain that coincided with arrhythmia. She denies fever, chills, SOB, nausea, vomiting, diarrhea, and LE edema. She is tolerating oral intake and resting comfortably in bed.      Last 24 hr care team notes reviewed.   ROS:  4  "point ROS including Respiratory, CV, GI and , other than that noted in the HPI, is negative.     Medications: Reviewed in EPIC.     Physical Exam:   BP 99/61 (BP Location: Right arm)   Pulse 71   Temp 98.3  F (36.8  C) (Oral)   Resp 20   Ht 1.753 m (5' 9\")   Wt 135.7 kg (299 lb 1.6 oz)   SpO2 98%   BMI 44.17 kg/m    GENERAL: Appears alert and oriented times three.   HEENT: Eye symmetrical and free of discharge bilaterally. Mucous membranes moist and without lesions.  NECK: Supple and without lymphadenopathy. JVD lower 1/3 of neck upright.   CV: RRR, S1S2 present without murmur, rub, or gallop.   RESPIRATORY: Respirations regular, even, and unlabored. Lungs CTA throughout.   GI: Soft, gravid, and non distended with normoactive bowel sounds present in all quadrants. No tenderness, rebound, guarding. No organomegaly.   EXTREMITIES: No peripheral edema. 2+ bilateral pedal pulses.   NEUROLOGIC: Alert and orientated x 3. CN II-XII grossly intact. No focal deficits.   MUSCULOSKELETAL: No joint swelling or tenderness.   SKIN: No jaundice. No rashes or lesions.     Data:  CMPRecent Labs   Lab 01/20/23  0623 01/20/23  0608 01/19/23  1535 01/19/23  1348 01/19/23  0543 01/19/23  0517 01/18/23  1443 01/18/23  0730 01/17/23  1432 01/17/23  0644 01/15/23  0105 01/14/23  2117 01/14/23  1407 01/14/23  0718   *  --   --   --   --  136  --  135*  --   --   --  135*  --  133*   POTASSIUM 4.3  --   --   --   --  4.0  3.9  --  3.9  --  3.9   < > 4.2   < > 4.1   CHLORIDE 108*  --   --   --   --  107  --  107  --   --   --  105  --  106   CO2 15*  --   --   --   --  13*  --  16*  --   --   --  16*  --  15*   ANIONGAP 12  --   --   --   --  16*  --  12  --   --   --  14  --  12   GLC 77 90 149*  --  89 79   < > 79   < >  --    < > 130*   < > 82   BUN 10.1  --   --   --   --  12.1  --  11.2  --   --   --  12.0  --  11.8   CR 0.59  --   --   --   --  0.59  --  0.56  --   --   --  0.73  --  0.58   GFRESTIMATED >90  --   --   -- "   --  >90  --  >90  --   --   --  >90  --  >90   AYDEN 8.9  --   --   --   --  9.1  --  9.1  --   --   --  9.2  --  9.1   MAG 1.9  --   --  2.6*  --  1.5*  --  1.6*  --  1.7   < >  --    < > 1.7  1.4*   PROTTOTAL 6.6  --   --   --   --  6.6  --  6.5  --   --   --   --   --  6.5   ALBUMIN 3.0*  --   --   --   --  3.1*  --  3.0*  --   --   --   --   --  2.9*   BILITOTAL 0.2  --   --   --   --  0.2  --  0.3  --   --   --   --   --  0.2   ALKPHOS 105*  --   --   --   --  95  --  94  --   --   --   --   --  89   AST 30  --   --   --   --  30  --  28  --   --   --   --   --  28   ALT 15  --   --   --   --  18  --  17  --   --   --   --   --  19    < > = values in this interval not displayed.     CBC  Recent Labs   Lab 01/19/23  0517 01/14/23  0718   WBC 9.5 8.7   RBC 3.63* 3.53*   HGB 9.7* 9.3*   HCT 31.0* 30.9*   MCV 85 88   MCH 26.7 26.3*   MCHC 31.3* 30.1*   RDW 18.6* 19.1*    229     Time/Communication  I personally spent a total of 35 minutes. Of that 20 minutes was counseling/coordination of patient's care. Plan of care discussed with patient. See my note above for details. Patient discussed with Dr. Jones.      Stacie Pisano [facility]  1/20/2023

## 2023-01-20 NOTE — PHARMACY-ANTICOAGULATION SERVICE
Clinical Pharmacy Note- Enoxaparin Anti-Xa Evaluation for ADULT Patients    Date of Service 2023  Patient's  1990   Patient's age:  32 year old  Patient's Weight used for enoxaparin dosin.7 kg (299 lb 1.6 oz)  Patient's BMI: Body mass index is 44.17 kg/m .   Enoxaparin Indication: DVT/PE treatment (LV thrombus in the setting of cardiomyopathy and hypercoaguable state in pregnancy)  Goal LMWH anti-Xa level for ADULT patients: 0.5-1 IU/mL   Current Enoxaparin Regimen: 120 mg subcutaneous every 12 hours    Creatinine for last 3 days:   Creatinine   Date Value Ref Range Status   2023 0.59 0.51 - 0.95 mg/dL Final   2023 0.59 0.51 - 0.95 mg/dL Final   2023 0.56 0.51 - 0.95 mg/dL Final      Current estimated CrCL:  Serum creatinine: 0.59 mg/dL 23 0623  Estimated creatinine clearance: 206.9 mL/min     Platelet count for last 3 days:   Platelet Count   Date Value Ref Range Status   2023 226 150 - 450 10e3/uL Final   2023 229 150 - 450 10e3/uL Final   2023 202 150 - 450 10e3/uL Final      Recent Enoxaparin anti-Xa Levels (past 3 days):   Recent Labs     23  1351   ALMWH 0.76   ]    ASSESSMENT OF LEVELS AND CURRENT REGIMEN:   1) Enoxaparin anti-XA level was drawn 5.73 hours post dose at steady state.    2) Current LMWH anti-Xa peak level is: AT GOAL    3) Renal Function:  Scr changed > 50% in last 72 hours? No  Urine Output: Good    4) Platelet Counts have been assessed and are: Stable    RECOMMENDATIONS:    1) Enoxaparin Regimen/Dose Plan: NO change  2) Recheck Enoxaparin anti-Xa levels: One week from today (on 2023)    The pharmacist will continue to monitor and follow enoxaparin LMWH anti-Xa levels as needed.      Please contact pharmacy if enoxaparin needs to be held for a procedure or if goal levels change.    Sofia Coker, Pharm.D., BCPS

## 2023-01-20 NOTE — PROGRESS NOTES
Maternal-Fetal Medicine Progress Note    S:   Anjali is feeling okay overall. She continues to note heaviness, but it is no worse than it was earlier in the week. She had a longer run of VT this morning, which scared her, but she is feeling better now. She is still on board regarding delivery on 2/1. Has questions about NICU consultation and social work assistance to help arrange for her  to be present prior to and during the delivery.    O:  Vitals:    01/20/23 0903 01/20/23 0914 01/20/23 1205 01/20/23 1330   BP: 93/56  99/61 103/67   BP Location: Right arm  Right arm    Cuff Size: Adult Regular      Pulse: 67 73 71 69   Resp: 16  20    Temp:   98.3  F (36.8  C)    TempSrc:   Oral    SpO2: 96%  98%    Weight:       Height:         Gen Appear: NAD  CV: RRR  Pulm: CTAB  Abdomen: gravid, soft, non-tender to palpation  Extrem: Trace bilateral LE edema, no calf tenderness    Fetal heart monitoring 1/17/2023   Baseline 130/moderate variability/+accels/no decels  [place] none    Echo 1/2/2023:  Interpretation Summary  Left ventricular function is decreased. The ejection fraction is 30-35%  (moderately reduced).  There is wall thinning and akinesis of the basal-mid inferior, basal-mid  inferoseptal and basal inferolateral segments.  Global right ventricular function is mildly to moderately reduced.  Mild to moderate tricuspid insufficiency is present.  Right ventricular systolic pressure is 45mmHg above the right atrial pressure.  Pulmonary hypertension is present.  IVC diameter <2.1 cm collapsing >50% with sniff suggests a normal RA pressure  of 3 mmHg.  No pericardial effusion is present.     This study was compared with the study from 12/6/2022. The left ventricular  function has improved.  ______________________________________________________________________________  Left Ventricle  Mild left ventricular dilation is present. Mild concentric wall thickening  consistent with left ventricular hypertrophy is present.  Left ventricular  function is decreased. The ejection fraction is 30-35% (moderately reduced).  There is wall thinning and akinesis of the basal-mid inferior, basal-mid  inferoseptal and basal inferolateral segments.     Right Ventricle  The right ventricle is normal size. Global right ventricular function is  mildly to moderately reduced.     Mitral Valve  The mitral valve is normal. Trace mitral insufficiency is present.     Aortic Valve  The valve leaflets are not well visualized. On Doppler interrogation, there is  no significant stenosis or regurgitation.     Tricuspid Valve  The tricuspid valve is normal. Mild to moderate tricuspid insufficiency is  present. Right ventricular systolic pressure is 45mmHg above the right atrial  pressure. Pulmonary hypertension is present.     Pulmonic Valve  The pulmonic valve is normal. Trace pulmonic insufficiency is present.     Vessels  The aorta root is normal. IVC diameter <2.1 cm collapsing >50% with sniff  suggests a normal RA pressure of 3 mmHg.     Pericardium  No pericardial effusion is present.     Compared to Previous Study  This study was compared with the study from 2022 . The left ventricular  function has improved.   _____________________________________________________________________________  MMode/2D Measurements & Calculations  IVSd: 1.2 cm  LVIDd: 6.2 cm  LVIDs: 5.5 cm  LVPWd: 1.1 cm  FS: 10.3 %  LV mass(C)d: 318.0 grams  LV mass(C)dI: 130.9 grams/m2     RWT: 0.36     Doppler Measurements & Calculations  PA acc time: 0.10 sec  TR max radha: 336.9 cm/sec  TR max P.4 mmHg    Treponema Antibody: non-reactive    Recent Labs   Lab 23  0623 23  0608 23  1535 23  0543 23  0517 23  1443   GLC 77 90 149* 89 79 115*       A/P:   Anjali Carmen is a 32 year old  at 30w1d admitted for acute HFrEF during pregnancy. Most recent echo on 1/2/23 showed EF 30-35%. She is currently on metoprolol and sotalol.     Acute  decompensated HFrEF  Arrhythmia with PAF, NSVT, PVCs, PACs  - Patient is now reporting 2 day history of feeling heavy which is different for her. Started around time discussion of sotalol. She also had a longer run of VT this morning, though didn't receive her metoprolol in the morning and receive Lasix. She is feeling better after that episode this morning.   - Repeat echo  and EF improved at 30-35%  - She is currently on metoprolol 37.5 mg q 6 hrs, decreased with addition of sotalol 80 mg q 12 hrs.  Lasix held. No contraindications to increasing dose of metoprolol from a pregnancy standpoint, if needed    - Agree with Lovenox given risk of LV thrombus. Need to hold Lovenox 24 hours prior to scheduled  section on  at 0730.   - Appreciate cares per primary cardiology team    Gestational Diabetes  - Goal blood glucose for AM fasting is <95 mg/dL  - 1 hour postprandial check is <140 mg/dL and for 2 hour postprandial is <120 mg/d  - Overall glucose values have been at goal most of the time but continues to have intermittent elevations.   - May require insulin PRN after rescue BMZ and patient would be agreeable to this only if needed.    Anemia  Likely related to pregnancy, heart failure, serial blood draws, but also component of iron deficiency anemia.  Last Hgb 9.4 on 23; ferritin 28 on 23.  - s/p IV iron infusion   - Continue oral iron replacement    Routine prenatal care  - Non-stress test (NST) BID  - Repeat growth ultrasound (will be performed by [facility]) on   - Betamethasone (BMZ) given - at OSH for fetal lung maturity. Discussed risks/benefits of rescue betamethasone given planned delivery at 32w2d. Discussed decreased risk of  RDS, however, also discussed risk of hyperglycemia then leading to  hypoglycemia. After discussion, patient desires rescue BMZ, which has been ordered for - at 0900. Patient agreeable to insulin only if hyperglycemia is noted.   -  Routine indications for emergent delivery including maternal decompensation or fetal compromise - will be C/S due to need for controlled delivery with hospitalization at the [facility].   - S/p  section consent on admission. Will have to repeat consent form prior to delivery and also include post-placental IUD insertion as well.   - S/p third trimester RPR, which was normal.   - S/p Tdap on 1/3/23  - Plan IUD and depo-provera prior to discharge postpartum.  - Signed FMLA paperwork provided on   - Encouraged use of abdominal binder to help with discomforts of pregnancy and patient states is helping with ambulating. Previously povided patient with phone number to L&D if she has concerns and/or if her team is unable reach us.     Seen and discussed with Dr. Donovan.    Johnnie Garcia MD  Maternal-Fetal Medicine Fellow      [facility] Attending Attestation  I saw this patient with the resident and agree with the resident's findings and plan of care as documented in the resident's note. I personally reviewed her vital signs, medications, labs, pertinent imaging, and fetal monitoring. In summary, Ms. Elizabeth Carmen is a 32 year old  at 30w4d admitted in the setting of newly diagnosed acute heart failure with reduced ejection fraction (HFrEF). On echo her LVEF was 20-25% with severe diffuse hypokinesis and areas of wall thinning/akinesis. She was also noted to have mild right ventricular dilation and moderately reduced right ventricular function. No evidence of pulmonary hypertension or evidence of cardiac ischemia on cardiac catheterization . Repeat echocardiogram on  demonstrated slight improvement in her EF to 30-35%. She has been admitted to the cardiology service for medical optimization and cardiac monitoring given intermittent ventricular arrhythmias. Her medical history is also complicated by A1GDM and obesity (BMI 43).       From a cardiac standpoint she is currently hemodynamically stable.  "Goal BP <130/80 mmHg and MAP > 65. Goal is to maintain euvolemia, she has lasix available PRN. She is also receiving beta blockade with Metoprolol 37.5mg Q 6 hrs and now Sotalol 80mg BID. She continues to have frequent PACs/PVC and had a run of ventricular tachycardia this morning. Is on telemetry given frequent ectopy. Currently episodes are self limiting = mostly in NSR. Afterload reduction held given relative hypotension however can be used as indicated/tolerated.      Additionally, given EF 20-25% and increased risk of thrombosis in this setting she is currently therapeutically anticoagulated on LMWH. Discussed with primary team our recommendation to continue therapeutic anticoagulation if EF < 35% through pregnancy and until 6 weeks postpartum.      From a pregnancy standpoint - prenatal care is up to date. She received BMZ -. Given her delivery is planned  will plan for repeat course of BMZ -. Given her GDM Anjali is amenable to insulin only if needed.  Repeat assessment of fetal growth on  demonstrated an EFW 1799g (77th%), normal fluid, BPP 8/8. Continue twice daily NSTs and MFM rounding 2x weekly or more frequently as needed. Continue fasting and 1 hour postprandial glucose checks given A1GDM. Antepartum anemia is multifactorial s/p 5 doses of IV iron.      She is scheduled for a repeat  on  on the [place] under regional (slow dosed epidural) anesthesia with a post-placental IUD. Please see care coordination note from care conference on  for full details regarding delivery plan.     Will require prolonged post-operative admission (1-2 weeks pending clinical status). Will also require very close postpartum/cardiology follow-up through the first year postpartum.     /61 (BP Location: Right arm)   Pulse 71   Temp 97.8  F (36.6  C) (Oral)   Resp 18   Ht 1.753 m (5' 9\")   Wt 135.7 kg (299 lb 1.6 oz)   SpO2 98%   BMI 44.17 kg/m        FHT: Baseline 130s, " moderate variability, +accels, no decels  TOCO: None  NST interpretation for today: reactive, reassuring and appropriate for GA    Time Spent on this Encounter   I spent a total of 25 minutes face-to-face or coordinating care of Ms. Elizabeth Carmen. Over 50% of my time on the unit was spent counseling the patient and /or coordinating care regarding diagnosis, diagnostic results, prognosis and risks and benefits of treatment options.     Date of service (when I saw the patient): 1/20/2023     Rox Donovan MD  , OB/GYN  Maternal-Fetal Medicine  [phone] (Pager)

## 2023-01-20 NOTE — PLAN OF CARE
D: Patient presented as a transfer from [facility] to [facility] on 2022 for further management of newly diagnosed, acute decompensated systolic heart failure (LVEF 20%) and high risk delivery. Hx. with no significant past medical history.  I/A: A&Ox4. VSSA on RA. SR 65-70s w/occ-freq PACs and PVCs. Denies pain. Reports baby is moving wnl today. Denies contractions and back pain. Denies vaginal discharge/fluid/bleeding. Mg 2.6 last afternoon, no recheck and ok to give 8p Mg 2g IV, recheck scheduled for AM. Adequate uop. Up ad danis. Appeared to rest comfortably overnight. BG before bfast (~0600) 90.  P: Continue to monitor and notify Cards 2 with questions/concerns. [facility]  pager: [phone].

## 2023-01-20 NOTE — PROGRESS NOTES
32 year old female  with no significant past medical history, who presented as a transfer from [facility] to [facility] on 2022 for further management of newly diagnosed, acute decompensated systolic heart failure (LVEF 20%) and high risk delivery.       Neuro: A&Ox4. Flat affect. Health psych following.   Cardiac: Afebrile, SR w/PVC's. Did have 56 beat polymorphic VT run this morning @ 0856; NP saw pt bedside, vitals were stable, did complain of chest pain during episode. replacing mag per protocol, giving her metoprolol and sotalol. Continues in SR.    Respiratory: RA  GI/: Voiding spontaneously. No BM this shift.   Diet/appetite: Tolerating 3g sodium, no caffeine diet. Denies nausea   Activity: Up ad danis    Pain: Denies   Skin: No new deficits noted.  Lines: L DL PICC SL   Drains:none  Replacement: mag per protocol and scheduled.

## 2023-01-21 ENCOUNTER — APPOINTMENT (OUTPATIENT)
Dept: CARDIOLOGY | Facility: CLINIC | Age: 33
End: 2023-01-21
Attending: PHYSICIAN ASSISTANT
Payer: COMMERCIAL

## 2023-01-21 LAB
ANION GAP SERPL CALCULATED.3IONS-SCNC: 13 MMOL/L (ref 7–15)
ANION GAP SERPL CALCULATED.3IONS-SCNC: 14 MMOL/L (ref 7–15)
BUN SERPL-MCNC: 10.5 MG/DL (ref 6–20)
BUN SERPL-MCNC: 10.7 MG/DL (ref 6–20)
CALCIUM SERPL-MCNC: 8.8 MG/DL (ref 8.6–10)
CALCIUM SERPL-MCNC: 9 MG/DL (ref 8.6–10)
CHLORIDE SERPL-SCNC: 104 MMOL/L (ref 98–107)
CHLORIDE SERPL-SCNC: 107 MMOL/L (ref 98–107)
CREAT SERPL-MCNC: 0.57 MG/DL (ref 0.51–0.95)
CREAT SERPL-MCNC: 0.72 MG/DL (ref 0.51–0.95)
DEPRECATED HCO3 PLAS-SCNC: 15 MMOL/L (ref 22–29)
DEPRECATED HCO3 PLAS-SCNC: 15 MMOL/L (ref 22–29)
GFR SERPL CREATININE-BSD FRML MDRD: >90 ML/MIN/1.73M2
GFR SERPL CREATININE-BSD FRML MDRD: >90 ML/MIN/1.73M2
GLUCOSE BLDC GLUCOMTR-MCNC: 130 MG/DL (ref 70–99)
GLUCOSE BLDC GLUCOMTR-MCNC: 134 MG/DL (ref 70–99)
GLUCOSE BLDC GLUCOMTR-MCNC: 194 MG/DL (ref 70–99)
GLUCOSE BLDC GLUCOMTR-MCNC: 201 MG/DL (ref 70–99)
GLUCOSE BLDC GLUCOMTR-MCNC: 81 MG/DL (ref 70–99)
GLUCOSE SERPL-MCNC: 161 MG/DL (ref 70–99)
GLUCOSE SERPL-MCNC: 79 MG/DL (ref 70–99)
LVEF ECHO: NORMAL
MAGNESIUM SERPL-MCNC: 1.9 MG/DL (ref 1.7–2.3)
MAGNESIUM SERPL-MCNC: 2.2 MG/DL (ref 1.7–2.3)
POTASSIUM SERPL-SCNC: 4 MMOL/L (ref 3.4–5.3)
POTASSIUM SERPL-SCNC: 4 MMOL/L (ref 3.4–5.3)
POTASSIUM SERPL-SCNC: 4.6 MMOL/L (ref 3.4–5.3)
SODIUM SERPL-SCNC: 133 MMOL/L (ref 136–145)
SODIUM SERPL-SCNC: 135 MMOL/L (ref 136–145)

## 2023-01-21 PROCEDURE — 250N000011 HC RX IP 250 OP 636: Performed by: INTERNAL MEDICINE

## 2023-01-21 PROCEDURE — 250N000013 HC RX MED GY IP 250 OP 250 PS 637: Performed by: NURSE PRACTITIONER

## 2023-01-21 PROCEDURE — 93321 DOPPLER ECHO F-UP/LMTD STD: CPT

## 2023-01-21 PROCEDURE — 250N000013 HC RX MED GY IP 250 OP 250 PS 637: Performed by: STUDENT IN AN ORGANIZED HEALTH CARE EDUCATION/TRAINING PROGRAM

## 2023-01-21 PROCEDURE — 250N000013 HC RX MED GY IP 250 OP 250 PS 637

## 2023-01-21 PROCEDURE — 250N000011 HC RX IP 250 OP 636: Performed by: NURSE PRACTITIONER

## 2023-01-21 PROCEDURE — 250N000011 HC RX IP 250 OP 636: Performed by: PHYSICIAN ASSISTANT

## 2023-01-21 PROCEDURE — 250N000013 HC RX MED GY IP 250 OP 250 PS 637: Performed by: PHYSICIAN ASSISTANT

## 2023-01-21 PROCEDURE — 99233 SBSQ HOSP IP/OBS HIGH 50: CPT | Mod: 25 | Performed by: OBSTETRICS & GYNECOLOGY

## 2023-01-21 PROCEDURE — 250N000011 HC RX IP 250 OP 636: Performed by: STUDENT IN AN ORGANIZED HEALTH CARE EDUCATION/TRAINING PROGRAM

## 2023-01-21 PROCEDURE — 200N000002 HC R&B ICU UMMC

## 2023-01-21 PROCEDURE — 93321 DOPPLER ECHO F-UP/LMTD STD: CPT | Mod: 26 | Performed by: INTERNAL MEDICINE

## 2023-01-21 PROCEDURE — 36415 COLL VENOUS BLD VENIPUNCTURE: CPT | Performed by: NURSE PRACTITIONER

## 2023-01-21 PROCEDURE — 93325 DOPPLER ECHO COLOR FLOW MAPG: CPT

## 2023-01-21 PROCEDURE — 93325 DOPPLER ECHO COLOR FLOW MAPG: CPT | Mod: 26 | Performed by: INTERNAL MEDICINE

## 2023-01-21 PROCEDURE — 99233 SBSQ HOSP IP/OBS HIGH 50: CPT | Mod: 25 | Performed by: INTERNAL MEDICINE

## 2023-01-21 PROCEDURE — 59025 FETAL NON-STRESS TEST: CPT | Mod: 26 | Performed by: OBSTETRICS & GYNECOLOGY

## 2023-01-21 PROCEDURE — 83735 ASSAY OF MAGNESIUM: CPT | Performed by: NURSE PRACTITIONER

## 2023-01-21 PROCEDURE — 83735 ASSAY OF MAGNESIUM: CPT | Performed by: INTERNAL MEDICINE

## 2023-01-21 PROCEDURE — 93010 ELECTROCARDIOGRAM REPORT: CPT | Mod: 76 | Performed by: INTERNAL MEDICINE

## 2023-01-21 PROCEDURE — 80048 BASIC METABOLIC PNL TOTAL CA: CPT | Performed by: PHYSICIAN ASSISTANT

## 2023-01-21 PROCEDURE — 93308 TTE F-UP OR LMTD: CPT | Mod: 26 | Performed by: INTERNAL MEDICINE

## 2023-01-21 PROCEDURE — 36592 COLLECT BLOOD FROM PICC: CPT | Performed by: PHYSICIAN ASSISTANT

## 2023-01-21 PROCEDURE — 99233 SBSQ HOSP IP/OBS HIGH 50: CPT | Performed by: PHYSICIAN ASSISTANT

## 2023-01-21 PROCEDURE — 93005 ELECTROCARDIOGRAM TRACING: CPT

## 2023-01-21 PROCEDURE — 999N000044 HC STATISTIC CVC DRESSING CHANGE

## 2023-01-21 PROCEDURE — 99418 PROLNG IP/OBS E/M EA 15 MIN: CPT | Performed by: INTERNAL MEDICINE

## 2023-01-21 RX ORDER — BETAMETHASONE SODIUM PHOSPHATE AND BETAMETHASONE ACETATE 3; 3 MG/ML; MG/ML
12 INJECTION, SUSPENSION INTRA-ARTICULAR; INTRALESIONAL; INTRAMUSCULAR; SOFT TISSUE EVERY 24 HOURS
Status: COMPLETED | OUTPATIENT
Start: 2023-01-21 | End: 2023-01-22

## 2023-01-21 RX ORDER — DEXTROSE MONOHYDRATE 25 G/50ML
25-50 INJECTION, SOLUTION INTRAVENOUS
Status: DISCONTINUED | OUTPATIENT
Start: 2023-01-21 | End: 2023-01-31 | Stop reason: HOSPADM

## 2023-01-21 RX ORDER — NICOTINE POLACRILEX 4 MG
15-30 LOZENGE BUCCAL
Status: DISCONTINUED | OUTPATIENT
Start: 2023-01-21 | End: 2023-01-31 | Stop reason: HOSPADM

## 2023-01-21 RX ORDER — METOPROLOL TARTRATE 50 MG
50 TABLET ORAL EVERY 6 HOURS SCHEDULED
Status: DISCONTINUED | OUTPATIENT
Start: 2023-01-21 | End: 2023-01-24

## 2023-01-21 RX ORDER — MAGNESIUM SULFATE HEPTAHYDRATE 40 MG/ML
4 INJECTION, SOLUTION INTRAVENOUS ONCE
Status: COMPLETED | OUTPATIENT
Start: 2023-01-21 | End: 2023-01-21

## 2023-01-21 RX ORDER — FUROSEMIDE 10 MG/ML
10 INJECTION INTRAMUSCULAR; INTRAVENOUS ONCE
Status: COMPLETED | OUTPATIENT
Start: 2023-01-21 | End: 2023-01-21

## 2023-01-21 RX ORDER — FUROSEMIDE 10 MG/ML
20 INJECTION INTRAMUSCULAR; INTRAVENOUS ONCE
Status: COMPLETED | OUTPATIENT
Start: 2023-01-21 | End: 2023-01-21

## 2023-01-21 RX ADMIN — BETAMETHASONE SODIUM PHOSPHATE AND BETAMETHASONE ACETATE 12 MG: 3; 3 INJECTION, SUSPENSION INTRA-ARTICULAR; INTRALESIONAL; INTRAMUSCULAR at 11:26

## 2023-01-21 RX ADMIN — MAGNESIUM SULFATE HEPTAHYDRATE 4 G: 40 INJECTION, SOLUTION INTRAVENOUS at 09:34

## 2023-01-21 RX ADMIN — MAGNESIUM SULFATE HEPTAHYDRATE 2 G: 40 INJECTION, SOLUTION INTRAVENOUS at 19:45

## 2023-01-21 RX ADMIN — FUROSEMIDE 10 MG: 10 INJECTION, SOLUTION INTRAVENOUS at 18:43

## 2023-01-21 RX ADMIN — METOPROLOL TARTRATE 50 MG: 50 TABLET, FILM COATED ORAL at 08:22

## 2023-01-21 RX ADMIN — FUROSEMIDE 20 MG: 10 INJECTION, SOLUTION INTRAVENOUS at 10:08

## 2023-01-21 RX ADMIN — METOPROLOL TARTRATE 50 MG: 50 TABLET, FILM COATED ORAL at 14:27

## 2023-01-21 RX ADMIN — PRENATAL VIT W/ FE FUMARATE-FA TAB 27-0.8 MG 1 TABLET: 27-0.8 TAB at 08:02

## 2023-01-21 RX ADMIN — SOTALOL HYDROCHLORIDE 80 MG: 80 TABLET ORAL at 19:45

## 2023-01-21 RX ADMIN — MAGNESIUM SULFATE HEPTAHYDRATE 2 G: 40 INJECTION, SOLUTION INTRAVENOUS at 08:02

## 2023-01-21 RX ADMIN — SENNOSIDES 8.6 MG: 8.6 TABLET, COATED ORAL at 19:45

## 2023-01-21 RX ADMIN — POTASSIUM CHLORIDE 20 MEQ: 40 SOLUTION ORAL at 19:49

## 2023-01-21 RX ADMIN — SENNOSIDES 8.6 MG: 8.6 TABLET, COATED ORAL at 08:02

## 2023-01-21 RX ADMIN — SOTALOL HYDROCHLORIDE 80 MG: 80 TABLET ORAL at 08:02

## 2023-01-21 RX ADMIN — MAGNESIUM OXIDE TAB 400 MG (241.3 MG ELEMENTAL MG) 400 MG: 400 (241.3 MG) TAB at 19:45

## 2023-01-21 RX ADMIN — MAGNESIUM OXIDE TAB 400 MG (241.3 MG ELEMENTAL MG) 400 MG: 400 (241.3 MG) TAB at 08:02

## 2023-01-21 RX ADMIN — ENOXAPARIN SODIUM 120 MG: 120 INJECTION SUBCUTANEOUS at 08:02

## 2023-01-21 RX ADMIN — Medication 25 MG: at 05:41

## 2023-01-21 RX ADMIN — METOPROLOL TARTRATE 50 MG: 50 TABLET, FILM COATED ORAL at 21:22

## 2023-01-21 RX ADMIN — POTASSIUM CHLORIDE 20 MEQ: 40 SOLUTION ORAL at 14:27

## 2023-01-21 RX ADMIN — POTASSIUM CHLORIDE 20 MEQ: 40 SOLUTION ORAL at 08:02

## 2023-01-21 RX ADMIN — Medication 37.5 MG: at 03:23

## 2023-01-21 ASSESSMENT — ACTIVITIES OF DAILY LIVING (ADL)
ADLS_ACUITY_SCORE: 20
ADLS_ACUITY_SCORE: 22
ADLS_ACUITY_SCORE: 20

## 2023-01-21 NOTE — PROGRESS NOTES
-EP consulted for arrhythmia management.   -Increased metoprolol to 50mg Q6H  -ECHO ordered LVEF 30-35% as of   -Added on 1x dose lasix 20mg IV  -Mag 1.9 this am; continuing 2mg IV BID, plus PO mag-ox 400mg, 4mg IV x1 for replacement protocol today.   -started IM steroid for fetal wellbeing and possibility of moving up  date. Monitoring BG's every hour after meals, and fasting BG in am.

## 2023-01-21 NOTE — PROGRESS NOTES
Called patient to discuss plan for this morning    Per patient primary team, the patient is having increasing episodes of Vtach. Patient states that she is not feeling well. Notes a sense of chest heaviness during her Vtach and palpitations. Also endorses intermittent sharp pain in her chest during these episodes. She denies any lightheadedness or dizziness in bed but is continuing to experience orthopnea. I discussed with the patient that given her worsening symptoms, we would recommend initiating a rescue course of betamethasone at this time for fetal lung maturity. The patient is amenable to this plan. The M team will round on the patient in person this afternoon. Discussed plan with the primary team. Given the patient's GDM, we would recommend ongoing BG management with an insulin drip during her betamethasone window. The patient has been hesitant to receive insulin in the past and is hesitant to undergo frequent blood sugar checks. We will continue to discuss with the primary team how best to manage her blood sugars if the patient is declining insulin drip.    Judah Copeland MD  OBGYN PGY-3  January 21, 2023 11:20 AM

## 2023-01-21 NOTE — PLAN OF CARE
Goal Outcome Evaluation:      Plan of Care Reviewed With: patient    Overall Patient Progress: no changeOverall Patient Progress: no change    Outcome Evaluation: continue electrolyte supplementation, monitor heart rhythm    Neuro: A&Ox4  Cardiac/Telemetry: SR, PVCs and PACs frequent. C/o CP for 5-10 seconds while RN was at bedside. Declined CP for remainder of shift. Had 46 beat run of VT around 0420, pt sleeping and asymptomatic. MD made aware.  Respiratory: RA  GI/: voiding with good UOP, LBM 1/20  Endocrine: fasting BG in AM and after lunch  Diet/Appetite: tolerating current diet, no N/V  Skin: WDL  LDA: L PICC saline locked  Activity: up ad danis  Pain: no c/o pain     Plan:  Cont POC

## 2023-01-21 NOTE — PROGRESS NOTES
[facility]   Cardiology II Service / Advanced Heart Failure  Daily Progress Note      Patient: Anjali Carmen  MRN: [id number]  Admission Date: 2022  Hospital Day # 46    Assessment and Plan: Anjali Carmen is a 32 year old female  with no significant past medical history, who presented as a transfer from [facility] to [facility] on 2022 for further management of newly diagnosed, acute decompensated systolic heart failure (LVEF 20%) and high risk delivery.     MFM available  at [phone]    Plan today:   - Lasix 20 mg IV x1 and then trend response  - Limited ECHO For LV function and IVC exam this AM  - EP for increased VT- consult question: assess for opportunities to further optimize her from the arrythmia stand point  - Increase lopressor to 50 mg q6h   - Discussing delivery date with MFM in the setting of increased arrhythmia  - Starting course of betamethasone for fetal lung development     Acute on Chronic SCHF secondary to NICM, pregnancy related with questionable familial component. RV failure. Note family history of father with CM at age 30. TTE  with EF 20-25%, thinned out LV wall, and notable regional wall motion abnormalities. Coronary angiogram  with no significat le-sions seen.  Stage C, NYHA Class III  ACEi/ARB/ARNI: Contraindicated in pregnancy.   BB Metoprolol Tartrate increase to 50 mg q6h. Nursing: hold if HR<50 or SBP<90 and call Cards2  Aldosterone antagonist contraindicated due to pregnancy  SCD prophylaxis GDMT  Fluid status: Mild hypervolemia- lasix 20 mg IV x1, then trend response  - Lovenox given high risk for LV thrombus in setting of antepartum, regional wall motion abnormalities, and low EF. Weekly LMWH level, goal 0.6-1, last 0.88 23.  - Cardiac MRI and genetic testing postpartum recommended.   - Will discuss lifevest/ICD plan for post delivery     New Onset PAF. NSVT. Frequent PVC and PAC's. Hypomagnesemia. [facility] [place], isolated episode, EKG unavailable for review. Currently in NSR at [facility] with frequent ectopy. [id number] score of 2 (sex, HF) - on AC as above for pregnancy and low EF.   - Appreciate EP consult.  - Sotalol 80 mg po BID, EKG this AM post administration 466. EKG times 5 following Sotalol loading.   - Metoprolol Tartrate 50 mg q6h, discussed with EP. Nursing: hold if HR<50 or sBP<95 and call Cards2.   - Lovenox as above  - Mg per protocol- favor IV over PO replacement     High risk pregnancy  Gestational age [id number]. . Betamethasone (BMZ) given - at OSH for fetal lung maturity. OB US for fetal growth done on 22 with normal fetal findings and no abnormalities. Anticipated delivery date 23 per MFM.   - Appreciate [facility] management, closely following with daily checks; can be reached via their pager above  - NST BID  - Magnesium for neuroprotection if moving towards delivery prior to 32 weeks with 6g bolus followed by 2g/hr maintenance until delivery  - S/p  section consent on admission  - per MFM, BP goal 130/80, MAP >65                 - If blood pressures >160/110 for at least 15 minutes, call MFM for guidance of management, although typically will initiate treatment with 5-10 mg of IV hydralazine (preferred from cardiac standpoint) or 20 mg of IV labetalol     Gestational DM.   QID blood glucose checks (fasting, and 1 hour after each meal)- increase frequency of checks given start of steroids                - goal blood glucose for AM fasting 95 mg/dL                - 1 hour postprandial check is under 140 mg/dL  - MFM following  -  monitoring to be determined by need for medications     Mild anemia, multifactorial  Due to pregnancy and HF. Hgb 10.2 on admission (MCV 79). Retic count 1.9%. Iron, ferritin, TIBC levels show AVINASH (say 6%). Hgh 9s-low 10s. IV iron 200 mg daily x5 (-). Hgb stable mid 10s.   - limit blood draws     FEN: 3 gram  "sodium diet   PROPHY:  Lovenox  LINES:  PICC  DISPO:  Pending delivery   CODE STATUS:  Full Code   ================================================================    Interval History/ROS: She complains of palpitations this AM with shortness of breath that coincided with arrhythmia. Gets a bit of dizziness with this as well. Her breathing feels okay laying still, but has more PENA this week when walking in the halls. No Le edema. She does have orthopnea which has been true for a few weeks but worse recently. She denies fever, chills, vomiting, diarrhea. She is still feeling normal fetal movement.    Last 24 hr care team notes reviewed.   ROS:  4 point ROS including Respiratory, CV, GI and , other than that noted in the HPI, is negative.     Medications: Reviewed in EPIC.     Physical Exam:   /63 (BP Location: Right arm, Cuff Size: Adult Large)   Pulse 80   Temp 98  F (36.7  C) (Oral)   Resp 16   Ht 1.753 m (5' 9\")   Wt 136 kg (299 lb 12.8 oz)   SpO2 97%   BMI 44.27 kg/m    GENERAL: Appears alert and interacting appropriately.   HEENT: Eye symmetrical and free of discharge bilaterally.   NECK: Supple and without lymphadenopathy. JVD middle of neck upright.   CV: RRR, S1S2 present without murmur, rub, or gallop.   RESPIRATORY: Respirations regular, even, and unlabored. Lungs CTA throughout.   GI: Soft, gravid, and non distended with normoactive bowel sounds present in all quadrants. No tenderness, rebound, guarding. No organomegaly.   EXTREMITIES: No peripheral edema. All extremities are warm and well perfused.  NEUROLOGIC: Alert and interacting appropriately. No focal deficits.   MUSCULOSKELETAL: No joint swelling or tenderness.   SKIN: No jaundice. No rashes or lesions.     Data:  CMP  Recent Labs   Lab 01/21/23  0736 01/21/23  0543 01/20/23  1527 01/20/23  0623 01/19/23  1535 01/19/23  1348 01/19/23  0543 01/19/23  0517 01/18/23  1443 01/18/23  0730   LUCI 135*  --   --  135*  --   --   --  136  --  " 135*   POTASSIUM 4.0  4.0  --   --  4.3  --   --   --  4.0  3.9  --  3.9   CHLORIDE 107  --   --  108*  --   --   --  107  --  107   CO2 15*  --   --  15*  --   --   --  13*  --  16*   ANIONGAP 13  --   --  12  --   --   --  16*  --  12   GLC 79 81 128* 77   < >  --    < > 79   < > 79   BUN 10.7  --   --  10.1  --   --   --  12.1  --  11.2   CR 0.57  --   --  0.59  --   --   --  0.59  --  0.56   GFRESTIMATED >90  --   --  >90  --   --   --  >90  --  >90   AYDEN 8.8  --   --  8.9  --   --   --  9.1  --  9.1   MAG 1.9  --   --  1.9  --  2.6*  --  1.5*  --  1.6*   PROTTOTAL  --   --   --  6.6  --   --   --  6.6  --  6.5   ALBUMIN  --   --   --  3.0*  --   --   --  3.1*  --  3.0*   BILITOTAL  --   --   --  0.2  --   --   --  0.2  --  0.3   ALKPHOS  --   --   --  105*  --   --   --  95  --  94   AST  --   --   --  30  --   --   --  30  --  28   ALT  --   --   --  15  --   --   --  18  --  17    < > = values in this interval not displayed.     CBC  Recent Labs   Lab 01/19/23  0517   WBC 9.5   RBC 3.63*   HGB 9.7*   HCT 31.0*   MCV 85   MCH 26.7   MCHC 31.3*   RDW 18.6*        Time/Communication  I personally spent a total of 65 minutes. Of that 35 minutes was counseling/coordination of patient's care. Plan of care discussed with patient. See my note above for details. Patient discussed with Dr. Jones.      Nita Pedraza PA-C  [facility]

## 2023-01-22 ENCOUNTER — APPOINTMENT (OUTPATIENT)
Dept: GENERAL RADIOLOGY | Facility: CLINIC | Age: 33
End: 2023-01-22
Attending: INTERNAL MEDICINE
Payer: COMMERCIAL

## 2023-01-22 ENCOUNTER — ANESTHESIA EVENT (OUTPATIENT)
Dept: SURGERY | Facility: CLINIC | Age: 33
End: 2023-01-22
Payer: COMMERCIAL

## 2023-01-22 LAB
ABO/RH(D): NORMAL
ALBUMIN SERPL BCG-MCNC: 3.4 G/DL (ref 3.5–5.2)
ALP SERPL-CCNC: 112 U/L (ref 35–104)
ALT SERPL W P-5'-P-CCNC: 19 U/L (ref 10–35)
ANION GAP SERPL CALCULATED.3IONS-SCNC: 16 MMOL/L (ref 7–15)
ANTIBODY SCREEN: NEGATIVE
APTT PPP: 31 SECONDS (ref 22–38)
AST SERPL W P-5'-P-CCNC: 30 U/L (ref 10–35)
BASE EXCESS BLDV CALC-SCNC: -4.9 MMOL/L (ref -7.7–1.9)
BASE EXCESS BLDV CALC-SCNC: -7.1 MMOL/L (ref -7.7–1.9)
BILIRUB SERPL-MCNC: 0.3 MG/DL
BLD PROD TYP BPU: NORMAL
BLD PROD TYP BPU: NORMAL
BLOOD COMPONENT TYPE: NORMAL
BLOOD COMPONENT TYPE: NORMAL
BUN SERPL-MCNC: 11.4 MG/DL (ref 6–20)
CALCIUM SERPL-MCNC: 9.4 MG/DL (ref 8.6–10)
CHLORIDE SERPL-SCNC: 102 MMOL/L (ref 98–107)
CODING SYSTEM: NORMAL
CODING SYSTEM: NORMAL
CREAT SERPL-MCNC: 0.62 MG/DL (ref 0.51–0.95)
CROSSMATCH: NORMAL
CROSSMATCH: NORMAL
DEPRECATED HCO3 PLAS-SCNC: 14 MMOL/L (ref 22–29)
ERYTHROCYTE [DISTWIDTH] IN BLOOD BY AUTOMATED COUNT: 18.5 % (ref 10–15)
FIBRINOGEN PPP-MCNC: 740 MG/DL (ref 170–490)
GFR SERPL CREATININE-BSD FRML MDRD: >90 ML/MIN/1.73M2
GLUCOSE BLDC GLUCOMTR-MCNC: 122 MG/DL (ref 70–99)
GLUCOSE BLDC GLUCOMTR-MCNC: 136 MG/DL (ref 70–99)
GLUCOSE BLDC GLUCOMTR-MCNC: 153 MG/DL (ref 70–99)
GLUCOSE BLDC GLUCOMTR-MCNC: 173 MG/DL (ref 70–99)
GLUCOSE BLDC GLUCOMTR-MCNC: 213 MG/DL (ref 70–99)
GLUCOSE SERPL-MCNC: 142 MG/DL (ref 70–99)
HCO3 BLDV-SCNC: 18 MMOL/L (ref 21–28)
HCO3 BLDV-SCNC: 20 MMOL/L (ref 21–28)
HCT VFR BLD AUTO: 31.7 % (ref 35–47)
HGB BLD-MCNC: 10.1 G/DL (ref 11.7–15.7)
INR PPP: 1.21 (ref 0.85–1.15)
ISSUE DATE AND TIME: NORMAL
ISSUE DATE AND TIME: NORMAL
MAGNESIUM SERPL-MCNC: 2 MG/DL (ref 1.7–2.3)
MCH RBC QN AUTO: 26.7 PG (ref 26.5–33)
MCHC RBC AUTO-ENTMCNC: 31.9 G/DL (ref 31.5–36.5)
MCV RBC AUTO: 84 FL (ref 78–100)
O2/TOTAL GAS SETTING VFR VENT: 21 %
O2/TOTAL GAS SETTING VFR VENT: 21 %
OXYHGB MFR BLDV: 67 % (ref 70–75)
OXYHGB MFR BLDV: 70 % (ref 70–75)
PCO2 BLDV: 33 MM HG (ref 40–50)
PCO2 BLDV: 33 MM HG (ref 40–50)
PH BLDV: 7.35 [PH] (ref 7.32–7.43)
PH BLDV: 7.38 [PH] (ref 7.32–7.43)
PLATELET # BLD AUTO: 226 10E3/UL (ref 150–450)
PO2 BLDV: 40 MM HG (ref 25–47)
PO2 BLDV: 42 MM HG (ref 25–47)
POTASSIUM SERPL-SCNC: 4 MMOL/L (ref 3.4–5.3)
PROT SERPL-MCNC: 7.3 G/DL (ref 6.4–8.3)
RBC # BLD AUTO: 3.78 10E6/UL (ref 3.8–5.2)
SODIUM SERPL-SCNC: 132 MMOL/L (ref 136–145)
SPECIMEN EXPIRATION DATE: NORMAL
UNIT ABO/RH: NORMAL
UNIT ABO/RH: NORMAL
UNIT NUMBER: NORMAL
UNIT NUMBER: NORMAL
UNIT STATUS: NORMAL
UNIT STATUS: NORMAL
UNIT TYPE ISBT: 5100
UNIT TYPE ISBT: 5100
WBC # BLD AUTO: 14.1 10E3/UL (ref 4–11)

## 2023-01-22 PROCEDURE — 999N000065 XR CHEST PORT 1 VIEW

## 2023-01-22 PROCEDURE — 85610 PROTHROMBIN TIME: CPT | Performed by: STUDENT IN AN ORGANIZED HEALTH CARE EDUCATION/TRAINING PROGRAM

## 2023-01-22 PROCEDURE — 85027 COMPLETE CBC AUTOMATED: CPT | Performed by: STUDENT IN AN ORGANIZED HEALTH CARE EDUCATION/TRAINING PROGRAM

## 2023-01-22 PROCEDURE — 200N000002 HC R&B ICU UMMC

## 2023-01-22 PROCEDURE — 250N000011 HC RX IP 250 OP 636: Performed by: INTERNAL MEDICINE

## 2023-01-22 PROCEDURE — 80053 COMPREHEN METABOLIC PANEL: CPT | Performed by: NURSE PRACTITIONER

## 2023-01-22 PROCEDURE — 250N000013 HC RX MED GY IP 250 OP 250 PS 637: Performed by: PHYSICIAN ASSISTANT

## 2023-01-22 PROCEDURE — 250N000013 HC RX MED GY IP 250 OP 250 PS 637: Performed by: NURSE PRACTITIONER

## 2023-01-22 PROCEDURE — 250N000011 HC RX IP 250 OP 636: Performed by: STUDENT IN AN ORGANIZED HEALTH CARE EDUCATION/TRAINING PROGRAM

## 2023-01-22 PROCEDURE — 99233 SBSQ HOSP IP/OBS HIGH 50: CPT | Mod: 25 | Performed by: OBSTETRICS & GYNECOLOGY

## 2023-01-22 PROCEDURE — 86901 BLOOD TYPING SEROLOGIC RH(D): CPT | Performed by: STUDENT IN AN ORGANIZED HEALTH CARE EDUCATION/TRAINING PROGRAM

## 2023-01-22 PROCEDURE — 250N000011 HC RX IP 250 OP 636: Performed by: NURSE PRACTITIONER

## 2023-01-22 PROCEDURE — 83735 ASSAY OF MAGNESIUM: CPT | Performed by: NURSE PRACTITIONER

## 2023-01-22 PROCEDURE — 86850 RBC ANTIBODY SCREEN: CPT | Performed by: STUDENT IN AN ORGANIZED HEALTH CARE EDUCATION/TRAINING PROGRAM

## 2023-01-22 PROCEDURE — 59025 FETAL NON-STRESS TEST: CPT | Mod: 26 | Performed by: OBSTETRICS & GYNECOLOGY

## 2023-01-22 PROCEDURE — 93005 ELECTROCARDIOGRAM TRACING: CPT

## 2023-01-22 PROCEDURE — 82805 BLOOD GASES W/O2 SATURATION: CPT | Performed by: STUDENT IN AN ORGANIZED HEALTH CARE EDUCATION/TRAINING PROGRAM

## 2023-01-22 PROCEDURE — 250N000013 HC RX MED GY IP 250 OP 250 PS 637: Performed by: INTERNAL MEDICINE

## 2023-01-22 PROCEDURE — 250N000013 HC RX MED GY IP 250 OP 250 PS 637: Performed by: STUDENT IN AN ORGANIZED HEALTH CARE EDUCATION/TRAINING PROGRAM

## 2023-01-22 PROCEDURE — 250N000013 HC RX MED GY IP 250 OP 250 PS 637

## 2023-01-22 PROCEDURE — 85384 FIBRINOGEN ACTIVITY: CPT | Performed by: STUDENT IN AN ORGANIZED HEALTH CARE EDUCATION/TRAINING PROGRAM

## 2023-01-22 PROCEDURE — 86923 COMPATIBILITY TEST ELECTRIC: CPT | Performed by: STUDENT IN AN ORGANIZED HEALTH CARE EDUCATION/TRAINING PROGRAM

## 2023-01-22 PROCEDURE — 250N000012 HC RX MED GY IP 250 OP 636 PS 637: Performed by: PHYSICIAN ASSISTANT

## 2023-01-22 PROCEDURE — 85730 THROMBOPLASTIN TIME PARTIAL: CPT | Performed by: STUDENT IN AN ORGANIZED HEALTH CARE EDUCATION/TRAINING PROGRAM

## 2023-01-22 PROCEDURE — 71045 X-RAY EXAM CHEST 1 VIEW: CPT | Mod: 26 | Performed by: RADIOLOGY

## 2023-01-22 PROCEDURE — 93010 ELECTROCARDIOGRAM REPORT: CPT | Mod: 59 | Performed by: INTERNAL MEDICINE

## 2023-01-22 PROCEDURE — 82805 BLOOD GASES W/O2 SATURATION: CPT | Performed by: INTERNAL MEDICINE

## 2023-01-22 PROCEDURE — 99418 PROLNG IP/OBS E/M EA 15 MIN: CPT | Performed by: INTERNAL MEDICINE

## 2023-01-22 PROCEDURE — 99233 SBSQ HOSP IP/OBS HIGH 50: CPT | Mod: GC | Performed by: INTERNAL MEDICINE

## 2023-01-22 RX ORDER — FENTANYL CITRATE 50 UG/ML
50 INJECTION, SOLUTION INTRAMUSCULAR; INTRAVENOUS ONCE
Status: COMPLETED | OUTPATIENT
Start: 2023-01-22 | End: 2023-01-22

## 2023-01-22 RX ORDER — LIDOCAINE HYDROCHLORIDE 10 MG/ML
INJECTION, SOLUTION EPIDURAL; INFILTRATION; INTRACAUDAL; PERINEURAL
Status: DISCONTINUED
Start: 2023-01-22 | End: 2023-01-22 | Stop reason: HOSPADM

## 2023-01-22 RX ORDER — MAGNESIUM SULFATE HEPTAHYDRATE 40 MG/ML
2 INJECTION, SOLUTION INTRAVENOUS ONCE
Status: COMPLETED | OUTPATIENT
Start: 2023-01-22 | End: 2023-01-22

## 2023-01-22 RX ADMIN — METOPROLOL TARTRATE 50 MG: 50 TABLET, FILM COATED ORAL at 16:08

## 2023-01-22 RX ADMIN — SENNOSIDES 8.6 MG: 8.6 TABLET, COATED ORAL at 21:11

## 2023-01-22 RX ADMIN — INSULIN ASPART 1 UNITS: 100 INJECTION, SOLUTION INTRAVENOUS; SUBCUTANEOUS at 17:28

## 2023-01-22 RX ADMIN — METOPROLOL TARTRATE 50 MG: 50 TABLET, FILM COATED ORAL at 03:07

## 2023-01-22 RX ADMIN — PRENATAL VIT W/ FE FUMARATE-FA TAB 27-0.8 MG 1 TABLET: 27-0.8 TAB at 08:59

## 2023-01-22 RX ADMIN — POTASSIUM CHLORIDE 20 MEQ: 40 SOLUTION ORAL at 08:58

## 2023-01-22 RX ADMIN — POTASSIUM CHLORIDE 20 MEQ: 40 SOLUTION ORAL at 21:10

## 2023-01-22 RX ADMIN — MAGNESIUM OXIDE TAB 400 MG (241.3 MG ELEMENTAL MG) 400 MG: 400 (241.3 MG) TAB at 21:11

## 2023-01-22 RX ADMIN — MAGNESIUM SULFATE HEPTAHYDRATE 2 G: 40 INJECTION, SOLUTION INTRAVENOUS at 09:00

## 2023-01-22 RX ADMIN — POTASSIUM CHLORIDE 20 MEQ: 40 SOLUTION ORAL at 16:08

## 2023-01-22 RX ADMIN — MAGNESIUM SULFATE HEPTAHYDRATE 2 G: 40 INJECTION, SOLUTION INTRAVENOUS at 21:11

## 2023-01-22 RX ADMIN — SOTALOL HYDROCHLORIDE 80 MG: 80 TABLET ORAL at 08:59

## 2023-01-22 RX ADMIN — BETAMETHASONE SODIUM PHOSPHATE AND BETAMETHASONE ACETATE 12 MG: 3; 3 INJECTION, SUSPENSION INTRA-ARTICULAR; INTRALESIONAL; INTRAMUSCULAR at 11:56

## 2023-01-22 RX ADMIN — Medication 25 MG: at 06:23

## 2023-01-22 RX ADMIN — METOPROLOL TARTRATE 50 MG: 50 TABLET, FILM COATED ORAL at 21:11

## 2023-01-22 RX ADMIN — METOPROLOL TARTRATE 50 MG: 50 TABLET, FILM COATED ORAL at 08:59

## 2023-01-22 RX ADMIN — FENTANYL CITRATE 50 MCG: 50 INJECTION, SOLUTION INTRAMUSCULAR; INTRAVENOUS at 17:51

## 2023-01-22 RX ADMIN — MAGNESIUM OXIDE TAB 400 MG (241.3 MG ELEMENTAL MG) 400 MG: 400 (241.3 MG) TAB at 08:59

## 2023-01-22 RX ADMIN — MAGNESIUM SULFATE IN WATER 2 G: 40 INJECTION, SOLUTION INTRAVENOUS at 04:53

## 2023-01-22 RX ADMIN — SOTALOL HYDROCHLORIDE 80 MG: 80 TABLET ORAL at 21:18

## 2023-01-22 RX ADMIN — ACETAMINOPHEN 650 MG: 325 TABLET, FILM COATED ORAL at 16:08

## 2023-01-22 ASSESSMENT — ACTIVITIES OF DAILY LIVING (ADL)
ADLS_ACUITY_SCORE: 22

## 2023-01-22 NOTE — PROGRESS NOTES
[facility]    Cardiology Progress Note- Cardiology        Date of Admission:  2022     Assessment and Plan:   Anjali Carmen is a 32 year old female  with no significant past medical history, who presented as a transfer from [facility] to [facility] on 2022 for further management of newly diagnosed, acute decompensated systolic heart failure (LVEF 20%) and high risk delivery.     MFM available  at [phone]     Plan today:   - 20 IV furosemide  - Bedside swan placement, trend CVP, goal 8-10  - Per MFM, plan for delivery    - NPO at MN, heparin held     Acute on Chronic SCHF secondary to NICM, pregnancy related with questionable familial component. RV failure.   Note family history of father with CM at age 30. TTE  with EF 20-25%, thinned out LV wall, and notable regional wall motion abnormalities. Coronary angiogram  with no significat le-sions seen.  Stage C, NYHA Class III  ACEi/ARB/ARNI: Contraindicated in pregnancy.   BB Metoprolol Tartrate increase to 50 mg q6h. Nursing: hold if HR<50 or SBP<90 and call Cards2  Aldosterone antagonist contraindicated due to pregnancy  SCD prophylaxis GDMT  Fluid status: Mild hypervolemia- lasix 20 mg x1 with CVP  - Lovenox given high risk for LV thrombus in setting of antepartum, regional wall motion abnormalities, and low EF. Weekly LMWH level, goal 0.6-1, last 0.88 23.   > Anticoagulation held for spinal catheter  and   - Cardiac MRI and genetic testing postpartum recommended  - Will discuss lifevest/ICD plan for post delivery    PA catheter placed at bedside:  CVP 9  PA 52/32/38  PCWP 24 (v-wave to 36)  CO/CI: 8.68/3.37  PVR 1.61    Overall, patient appears near-optimized from a pre-load perspective as CVP goal is 8-10. She has pulmonary hypertension however this is due to an elevated left-sided filling pressure (PCWP 24) in setting of heart failure  rather than an elevated pulmonary vascular resistance as evidenced by PVR 1.61.   She is optimized for delivery from a heart failure perspective. If she deteriorates due to heart failure, additional support options would include an intra aortic balloon pump as inotropes would be contraindicated in the setting of her VT.      New Onset PAF. NSVT. Frequent PVC and PAC's. Hypomagnesemia. [facility] in [place], isolated episode, EKG unavailable for review. Currently in NSR at [facility] with frequent ectopy. [id number] score of 2 (sex, HF) - on AC as above for pregnancy and low EF.   - Appreciate EP consult.  - Sotalol 80 mg po BID, EKG this AM post administration 466. EKG times 5 following Sotalol loading.   - Metoprolol Tartrate 50 mg q6h, discussed with EP. Nursing: hold if HR<50 or sBP<95 and call Cards2.   - Lovenox as above (held)  - Mg per protocol- favor IV over PO replacement     High risk pregnancy  Gestational age [id number]. . Betamethasone (BMZ) given - at OSH for fetal lung maturity. OB US for fetal growth done on 22 with normal fetal findings and no abnormalities. Anticipated delivery date moved up to 23 per [facility].   - Appreciate [facility] management, closely following with daily checks; can be reached via their pager above  - NST BID  - Magnesium for neuroprotection if moving towards delivery prior to 32 weeks with 6g bolus followed by 2g/hr maintenance until delivery  - S/p  section consent on admission  - per MFM, BP goal 130/80, MAP >65                 - If blood pressures >160/110 for at least 15 minutes, call MFM for guidance of management, although typically will initiate treatment with 5-10 mg of IV hydralazine (preferred from cardiac standpoint) or 20 mg of IV labetalol     Gestational DM.   QID blood glucose checks (fasting, and 1 hour after each meal)- increase frequency of checks given start of steroids                - goal blood glucose for AM fasting 95  mg/dL                - 1 hour postprandial check is under 140 mg/dL  - MFM following  -  monitoring to be determined by need for medications     Mild anemia, multifactorial  Due to pregnancy and HF. Hgb 10.2 on admission (MCV 79). Retic count 1.9%. Iron, ferritin, TIBC levels show AVINASH (say 6%). Hgh 9s-low 10s. IV iron 200 mg daily x5 (-). Hgb stable mid 10s.   - limit blood draws     FEN: 3 gram sodium diet   PROPHY:  Lovenox  LINES:  PICC, RIJ PA  DISPO:  Pending delivery   CODE STATUS:  Full Code         Clinically Significant Risk Factors              # Hypoalbuminemia: Lowest albumin = 2.9 g/dL at 2023  7:18 AM, will monitor as appropriate                   This patient's care was discussed with Dr Monika Jones, attending cardiologist, who agrees with the assessment and plan unless otherwise indicated.    Elijah Carvajal MD [facility], PGY-4  Fellow, Cardiovascular Disease  ______________________________________________________________________    Interval History   No events overnight, no additional VT. Doing okay, shortness of breath somewhat better.     Physical Exam   Vital Signs: Temp: 98.1  F (36.7  C) Temp src: Oral BP: 108/67 Pulse: 70   Resp: 16 SpO2: 98 % O2 Device: None (Room air)    Weight: 298 lbs 0 oz  GENERAL: Appears alert and interacting appropriately.   HEENT: Eye symmetrical and free of discharge bilaterally.   NECK: Supple and without lymphadenopathy. JVD lower third of neck upright.   CV: RRR, S1S2 present without murmur, rub, or gallop.   RESPIRATORY: Respirations regular, even, and unlabored. Lungs CTA throughout.   GI: Soft, gravid, and non distended with normoactive bowel sounds present in all quadrants. No tenderness, rebound, guarding. No organomegaly.   EXTREMITIES: No peripheral edema. All extremities are warm and well perfused.  NEUROLOGIC: Alert and interacting appropriately. No focal deficits.   MUSCULOSKELETAL: No joint swelling or tenderness.   SKIN: No  jaundice. No rashes or lesions.         Data   I personally reviewed all laboratory and imaging data from the last 24 hours in addition to all available cardiology data.

## 2023-01-22 NOTE — PROVIDER NOTIFICATION
23 1330   Provider Notification   Provider Name/Title Dr. Lawrence ([employer])   Method of Notification Phone   Request Evaluate - Remote   Notification Reason Status Update     Dr. Lawrence phoned to give an update on the plan of care for this patient. Plan for delivery by  tomorrow morning (23). Dr. Lawrence spoke with patient on telephone as well at this time to explain the plan. Patient verbalizes understanding and agreement with plan.

## 2023-01-22 NOTE — PROGRESS NOTES
Maternal-Fetal Medicine Progress Note    S:   Anjali has been feeling palpitations and shortness of breath. She continues to feel very heavy. No obstetric complaints. She would like to know when we are planning to proceed with delivery and when her  should arrive. She would like a tubal ligation at the time of her  section.    O:  Vitals:    23 0758 23 0816 23 1140 23 1524   BP: 101/49 111/63 96/61 97/57   BP Location: Right arm Right arm Right arm Right arm   Patient Position: Supine      Cuff Size: Adult Large Adult Large Adult Large    Pulse: 70 80 69 77   Resp: 16   16   Temp: 98  F (36.7  C)  98.1  F (36.7  C) 97.5  F (36.4  C)   TempSrc: Oral  Oral Oral   SpO2: 97%  97% 98%   Weight:       Height:         Gen Appear: NAD  CV: Regular rate, intermittent irregular rhythm, no murmurs  Pulm: CTAB  Abdomen: gravid, soft, non-tender to palpation  Extrem: Trace bilateral LE edema, no calf tenderness    Fetal heart monitoring 2023   Baseline 130/moderate variability/+accels/no decels  [place] none    Echo 2023:  Ischemic CM. Left ventricular function is decreased. The ejection fraction is  30-35% (moderately reduced).  Global right ventricular function is mildly reduced.  Mild pulmonary hypertension is present.  IVC diameter >2.1 cm collapsing <50% with sniff suggests a high RA pressure  estimated at 15 mmHg or greater.  No pericardial effusion is present.  No change from recent study, except CVP is higher today.  ______________________________________________________________________________  Left Ventricle  Left ventricular wall thickness is normal. Moderate left ventricular dilation  is present. Left ventricular function is decreased. The ejection fraction is  30-35% (moderately reduced). Mild diffuse hypokinesis is present. Inferior  wall akinesis is present. Inferolateral (posterior) wall akinesis is present.     Right Ventricle  Mild right ventricular dilation is  present. Global right ventricular function  is mildly reduced.     Mitral Valve  The mitral valve is normal. Trace mitral insufficiency is present.     Aortic Valve  Aortic valve is normal in structure and function.     Tricuspid Valve  Mild to moderate tricuspid insufficiency is present. The right ventricular  systolic pressure is approximated at 38.3 mmHg plus the right atrial pressure.  Mild pulmonary hypertension is present.     Pulmonic Valve  The pulmonic valve is normal.     Vessels  IVC diameter >2.1 cm collapsing <50% with sniff suggests a high RA pressure  estimated at 15 mmHg or greater.     Pericardium  No pericardial effusion is present.    Echo 1/2/2023:  Interpretation Summary  Left ventricular function is decreased. The ejection fraction is 30-35%  (moderately reduced).  There is wall thinning and akinesis of the basal-mid inferior, basal-mid  inferoseptal and basal inferolateral segments.  Global right ventricular function is mildly to moderately reduced.  Mild to moderate tricuspid insufficiency is present.  Right ventricular systolic pressure is 45mmHg above the right atrial pressure.  Pulmonary hypertension is present.  IVC diameter <2.1 cm collapsing >50% with sniff suggests a normal RA pressure  of 3 mmHg.  No pericardial effusion is present.     This study was compared with the study from 12/6/2022. The left ventricular  function has improved.  ______________________________________________________________________________  Left Ventricle  Mild left ventricular dilation is present. Mild concentric wall thickening  consistent with left ventricular hypertrophy is present. Left ventricular  function is decreased. The ejection fraction is 30-35% (moderately reduced).  There is wall thinning and akinesis of the basal-mid inferior, basal-mid  inferoseptal and basal inferolateral segments.     Right Ventricle  The right ventricle is normal size. Global right ventricular function is  mildly to  moderately reduced.     Mitral Valve  The mitral valve is normal. Trace mitral insufficiency is present.     Aortic Valve  The valve leaflets are not well visualized. On Doppler interrogation, there is  no significant stenosis or regurgitation.     Tricuspid Valve  The tricuspid valve is normal. Mild to moderate tricuspid insufficiency is  present. Right ventricular systolic pressure is 45mmHg above the right atrial  pressure. Pulmonary hypertension is present.     Pulmonic Valve  The pulmonic valve is normal. Trace pulmonic insufficiency is present.     Vessels  The aorta root is normal. IVC diameter <2.1 cm collapsing >50% with sniff  suggests a normal RA pressure of 3 mmHg.     Pericardium  No pericardial effusion is present.     Compared to Previous Study  This study was compared with the study from 2022 . The left ventricular  function has improved.   _____________________________________________________________________________  MMode/2D Measurements & Calculations  IVSd: 1.2 cm  LVIDd: 6.2 cm  LVIDs: 5.5 cm  LVPWd: 1.1 cm  FS: 10.3 %  LV mass(C)d: 318.0 grams  LV mass(C)dI: 130.9 grams/m2     RWT: 0.36     Doppler Measurements & Calculations  PA acc time: 0.10 sec  TR max radha: 336.9 cm/sec  TR max P.4 mmHg    Treponema Antibody: non-reactive    Recent Labs   Lab 23  1758 23  1545 23  1412 23  0736 23  0543 23  1527   * 161* 134* 79 81 128*       A/P:   Anjali Carmen is a 32 year old  at 30w5d admitted for acute HFrEF during pregnancy. Most recent echo on 23 showed EF 30-35%. She is currently on metoprolol and sotalol, though has been having more frequent runs of VT and continues to feel heavy. Given this change in her clinical status, plan for [place] tomorrow per cardiology to assess pressures. Depending on the results, likely delivery in the next several days rather than as previously scheduled on . Will discuss optimal timing from a  maternal standpoint with cardiology.    Acute decompensated HFrEF  Arrhythmia with PAF, NSVT, PVCs, PACs  - Anjali has been having more frequent/longer runs of VT and she continues to feel very heavy.   - Plan per cardiology for RHC tomorrow to assess her pressures and better assess her overall status. Anticipate that delivery will be pushed up and occur over the next several days, though this is certainly dependent upon th results of the RHC and any further changes in her clinical status.   - EP has been consulted to help optimize her from an arrhythmia standpoint.  - Repeat limited echo 1/21 30-35%  - She is currently on metoprolol 50 mg q 6 hrs (from 37.5mg q 6hrs) as well as addition of sotalol 80 mg q 12 hrs.  Lasix x1 given today. No contraindications to increasing dose of metoprolol from a pregnancy standpoint, if needed    - Lovenox being held in the setting of the plan for RHC and likely delivery with need for neuraxial anesthesia in the next few days.  - Appreciate cares per primary cardiology team.    Gestational Diabetes  - Goal blood glucose for AM fasting is <95 mg/dL  - 1 hour postprandial check is <140 mg/dL and for 2 hour postprandial is <120 mg/d  - Overall glucose values have been at goal most of the time but continues to have intermittent elevations.   - May require insulin PRN after rescue BMZ and patient would be agreeable to this only if needed.    Anemia  Likely related to pregnancy, heart failure, serial blood draws, but also component of iron deficiency anemia.  Last Hgb 9.4 on 1/6/23; ferritin 28 on 12/6/23.  - s/p IV iron infusion   - Continue oral iron replacement    Routine prenatal care  - Will plan to increase fetal monitoring to continuous monitoring given change in Anjali's clinical status.  - Growth US 1/19: 1799 g (77%), anterior placenta, cephalic  - Betamethasone (BMZ) given 11/30-12/1 at OSH for fetal lung maturity. Given change in status and potential for delivery in the next  few days, will administer rescue BMZ today and tomorrow. Discussed checking accuchecks pre- and post-meal with sliding scale. Anjali may always decline insulin if she does not feel comfortable with this despite counseling of risks/benefits.   - S/p third trimester RPR, which was normal.   - S/p Tdap on 1/3/23  - GBS positive.     Delivery planning  - Patient was consented again for  section and blood transfusion as it had been over 30 days since this has been signed.  - Risks of surgery were reviewed including but not limited to bleeding, infection, risk of damage to surrounding structures, including but not limited to, uterus, fallopian tubes, ovaries, bowel, bladder, nerves, vessels, and fetal injury.  We also reviewed the risk of venous thromboembolism and the use of sequential compression stockings to mitigate this risk.  - We discussed the risks of blood transfusion including but not limited to, transfusion reactions, anaphylaxis, and transmission of communicable diseases.  The patient demonstrates understanding and written and verbal consent was obtained.  - In anticipation of her surgery, recommend crossing her for at least 2 units of packed cells.    Contraception, desire for permanent sterilization  The patient also expressed desire to proceed with permanent sterilization.  This had previously been discussed with Anjali, however, given her change in clinical status, she would like to proceed with a tubal ligation.  We reviewed the permanent nature of this procedure as well as the risk of regret.  We also reviewed that there are reversible contraceptive agents available if she is unsure of her decision.  We also discussed that the preferred mode to complete this procedure in the setting of a  section is via a bilateral salpingectomy to reduce the risk of ovarian cancer developing in the far future.  After this counseling, Anjali expressed desire to proceed with bilateral salpingectomy and  this was added to her consent form.  Additionally federal tubal papers were signed and placed in the patient's paper chart.  We will plan to review her desire for bilateral salpingectomy on the day of the surgery.  All questions were answered to her satisfaction.      Seen and discussed with Dr. Donovan.    Johnnie Garcia MD  Maternal-Fetal Medicine Fellow      [facility] Attending Attestation  I saw this patient with the resident and agree with the fellow's findings and plan of care as documented in their note. I personally reviewed her vital signs, medications, labs, pertinent imaging, and fetal monitoring. In summary, Ms. Ms. Elizabeth Carmen is a 32 year old  at 30w5d admitted in the setting of newly diagnosed acute heart failure with reduced ejection fraction (HFrEF). On echo her LVEF was 20-25% with severe diffuse hypokinesis and areas of wall thinning/akinesis. She was also noted to have mild right ventricular dilation and moderately reduced right ventricular function. No evidence of pulmonary hypertension or evidence of cardiac ischemia on cardiac catheterization . Repeat echocardiogram on  demonstrated slight improvement in her EF to 30-35%. She has been admitted to the cardiology service for medical optimization and cardiac monitoring given intermittent ventricular arrhythmias. Her medical history is also complicated by A1GDM and obesity (BMI 43).       From a cardiac standpoint she is currently hemodynamically stable however she has been experiencing new episodes of nonsustained, symptomatic ventricular tachycardia. Goal BP <130/80 mmHg and MAP > 65. Goal is to maintain euvolemia, she has lasix available PRN and required a dose today. She is also receiving beta blockade with Metoprolol 37.5mg Q 6 hrs. Due to her 45 second run of ventricular tachycardia today this was increased to 50mg q 6 hours. EP was reconsulted and are considering increasing her Sotalol dose (currently on 80mg BID). From a pregnancy  standpoint if another antiarrhythmic would be preferred/more efficacious benefit in her case certainly outweigh risk. MFM is happy to discuss safety profile of any medications considered to help improve maternal status. Afterload reduction held given relative hypotension however can be used as indicated/tolerated.     Repeat echo today demonstrated an EF of 30-35% and mildly reduced RV function. Typical recommendation to continue therapeutic anticoagulation if EF < 35% through pregnancy and until 6 weeks postpartum however given clinical changes and concern with needing to proceed with delivery in the near future will plan to hold Lovenox now and will restart postpartum.       From a pregnancy standpoint - prenatal care is up to date. She received BMZ - and is receiving a rescue course of BMZ now. Given her GDM Anjali is amenable to insulin only if needed, sliding scale ordered.  Repeat assessment of fetal growth on  demonstrated an EFW 1799g (77th%), normal fluid. Antepartum anemia is multifactorial s/p 5 doses of IV iron, will T&C for 2 prior to surgery.     Give acuity change Anjali is being transferred to the CVICU for invasive hemodynamic monitoring. We will plan to see her daily and start continuous fetal monitoring. Delivery timing to be determined tomorrow following assessment of her hemodynamic status following [place] placement. Plan is for repeat  and bilateral salpingectomy on the [place] under regional (slow dosed epidural) anesthesia. Please see care coordination note from care conference on  for full details regarding delivery plan. Anesthesia (both OB and Cardiac teams) updated regarding possible change in delivery planning.     Will require prolonged post-operative admission (1-2 weeks pending clinical status). Will also require very close postpartum/cardiology follow-up through the first year postpartum.     /63 (BP Location: Right arm)   Pulse 85   Temp 98.2  F  "(36.8  C) (Oral)   Resp 16   Ht 1.753 m (5' 9\")   Wt 136 kg (299 lb 12.8 oz)   SpO2 96%   BMI 44.27 kg/m        FHT: Baseline 130s, moderate variability, +accels, no decels  TOCO: None  NST interpretation for today: reactive, reassuring and appropriate for GA    Time Spent on this Encounter   I spent a total of 60 minutes face-to-face or coordinating care of Ms. Elizabeth Carmen. Over 50% of my time on the unit was spent counseling the patient and /or coordinating care regarding diagnosis, diagnostic results, prognosis and risks and benefits of treatment options.     Date of service (when I saw the patient): 1/21/2023     Rox Donovan MD  , OB/GYN  Maternal-Fetal Medicine  [phone] (Pager)             "

## 2023-01-22 NOTE — PROGRESS NOTES
D/I/A:  Transferred to:  4C  Via: wheelchair on monitor.  Reason for transfer: closer monitoring and [place] val[place] cath placement  Family: Aware of transfer  Belongings: Sent with pt  Chart: Sent with pt  Medications: Meds from bin sent with pt  Report called to:  Laureen on 4C  VSS, afebrile.  SR with no report of VT this evening up to this point.  A+O x4 and able to make needs known.  Denies pain or sob.  Pleasant and cooperative.  MFM being conducted by L&D RN at bedside.  No reports of labor/fetal distress; pt reports sensation of fetal movement; emergency instrumentation at bedside.  Generalized edema noted; voiding in fair amounts without difficulty.  P: Care to continue on 4C.

## 2023-01-22 NOTE — PROVIDER NOTIFICATION
01/22/23 0820   Provider Notification   Provider Name/Title Dr. Donovan ([facility]) Dr. Lawrence ([facility] Fellow)   Method of Notification At Bedside   Request Evaluate in Person   Notification Reason Status Update     Providers here for morning rounds, EFM strip review, and to discuss current plan with patient. Plan for today is to have a [place] placement to help assess cardiac function. Patient is anxiously waiting for this. Patient spouse is on the way to be with her from [facility]. It has been confirmed that he can stay at bedside with her which added some relief. Continuous fetal monitoring. EFM normal baseline with moderate variability. Accelerations present. EFM strip can be described as category 1.

## 2023-01-22 NOTE — PROGRESS NOTES
"Maternal-Fetal Medicine Progress Note  30w6d    S:   Anjali continues to note significant chest heaviness and overall \"heaviness.\"  Also with associated shortness of breath and palpitations.  She states these symptoms are stable from yesterday evening however are interfering with her sleep.  Only 4 beats of VT overnight.  Remains on room air.  Her partner is planning to leave this AM to drive 5 hours to the [place].  She is wondering at what time the [place]-Juan David catheter placement will occur.    O:  Vitals:    01/22/23 0700 01/22/23 0800 01/22/23 0900 01/22/23 1000   BP: 100/58 104/61 108/61 130/53   BP Location:  Right arm     Pulse: 72 78 81 73   Resp:  20 20 18   Temp:  97.9  F (36.6  C)     TempSrc:  Oral     SpO2: 98% 100% 98% 98%   Weight:       Height:         General:  Alert, oriented  CV: Regular rate, intermittent irregular rhythm, no murmurs  Pulm: Clear to auscultation bilateral fields  Abdomen: gravid, soft, non-tender to palpation  Extrem: Trace bilateral LE edema, no calf tenderness    Fetal heart monitoring 1/22/2023:  Baseline 125/moderate variability/+accels/no decels  [place] flat    I/O 24 hours:  Uop 3150 + 2x unmeasured, 1450 since 0000  Intake 1700      Echo 1/21/2023:  Ischemic CM. Left ventricular function is decreased. The ejection fraction is  30-35% (moderately reduced).  Global right ventricular function is mildly reduced.  Mild pulmonary hypertension is present.  IVC diameter >2.1 cm collapsing <50% with sniff suggests a high RA pressure  estimated at 15 mmHg or greater.  No pericardial effusion is present.  No change from recent study, except CVP is higher today.  ______________________________________________________________________________  Left Ventricle  Left ventricular wall thickness is normal. Moderate left ventricular dilation  is present. Left ventricular function is decreased. The ejection fraction is  30-35% (moderately reduced). Mild diffuse hypokinesis is present. " Inferior  wall akinesis is present. Inferolateral (posterior) wall akinesis is present.     Right Ventricle  Mild right ventricular dilation is present. Global right ventricular function  is mildly reduced.     Mitral Valve  The mitral valve is normal. Trace mitral insufficiency is present.     Aortic Valve  Aortic valve is normal in structure and function.     Tricuspid Valve  Mild to moderate tricuspid insufficiency is present. The right ventricular  systolic pressure is approximated at 38.3 mmHg plus the right atrial pressure.  Mild pulmonary hypertension is present.     Pulmonic Valve  The pulmonic valve is normal.     Vessels  IVC diameter >2.1 cm collapsing <50% with sniff suggests a high RA pressure  estimated at 15 mmHg or greater.     Pericardium  No pericardial effusion is present.    Echo 1/2/2023:  Interpretation Summary  Left ventricular function is decreased. The ejection fraction is 30-35%  (moderately reduced).  There is wall thinning and akinesis of the basal-mid inferior, basal-mid  inferoseptal and basal inferolateral segments.  Global right ventricular function is mildly to moderately reduced.  Mild to moderate tricuspid insufficiency is present.  Right ventricular systolic pressure is 45mmHg above the right atrial pressure.  Pulmonary hypertension is present.  IVC diameter <2.1 cm collapsing >50% with sniff suggests a normal RA pressure  of 3 mmHg.  No pericardial effusion is present.     This study was compared with the study from 12/6/2022. The left ventricular  function has improved.  ______________________________________________________________________________  Left Ventricle  Mild left ventricular dilation is present. Mild concentric wall thickening  consistent with left ventricular hypertrophy is present. Left ventricular  function is decreased. The ejection fraction is 30-35% (moderately reduced).  There is wall thinning and akinesis of the basal-mid inferior, basal-mid  inferoseptal and  basal inferolateral segments.     Right Ventricle  The right ventricle is normal size. Global right ventricular function is  mildly to moderately reduced.     Mitral Valve  The mitral valve is normal. Trace mitral insufficiency is present.     Aortic Valve  The valve leaflets are not well visualized. On Doppler interrogation, there is  no significant stenosis or regurgitation.     Tricuspid Valve  The tricuspid valve is normal. Mild to moderate tricuspid insufficiency is  present. Right ventricular systolic pressure is 45mmHg above the right atrial  pressure. Pulmonary hypertension is present.     Pulmonic Valve  The pulmonic valve is normal. Trace pulmonic insufficiency is present.     Vessels  The aorta root is normal. IVC diameter <2.1 cm collapsing >50% with sniff  suggests a normal RA pressure of 3 mmHg.     Pericardium  No pericardial effusion is present.     Compared to Previous Study  This study was compared with the study from 2022 . The left ventricular  function has improved.   _____________________________________________________________________________  MMode/2D Measurements & Calculations  IVSd: 1.2 cm  LVIDd: 6.2 cm  LVIDs: 5.5 cm  LVPWd: 1.1 cm  FS: 10.3 %  LV mass(C)d: 318.0 grams  LV mass(C)dI: 130.9 grams/m2     RWT: 0.36     Doppler Measurements & Calculations  PA acc time: 0.10 sec  TR max radha: 336.9 cm/sec  TR max P.4 mmHg    Treponema Antibody: non-reactive    Recent Labs   Lab 23  0855 23  0629 23  0314 23  0309 23  2058 23   * 122* 136* 142* 201* 194*       A/P:   Anjali Carmen is a 32 year old  at 30w5d admitted for acute HFrEF during pregnancy. Most recent echo on 23 showed EF 30-35%. She is currently on metoprolol and sotalol, though has been having more frequent runs of VT and has had gradual worsening of symptoms. Plan is for [place]-Juan David catheter.  Delivery timing will depend on results however anticipate likely  1/23/23 rather than as previously scheduled on 2/1/23. Will continue multi-disciplinary discussion and work on delivery planning.    Acute decompensated HFrEF  Arrhythmia with PAF, NSVT, PVCs, PACs  - Intermittent runs of VT increasing in frequency, EP consulted  - Symptoms as per HPI  - Given Lasix 20 mg IV 1/21/23 at 1000 and 10 mg IV 1/21/23 at 1845   - On Metoprolol 50 mg q6 hours and Sotalol 80 mg q12 hours. No contraindications to increasing dose of metoprolol from a pregnancy standpoint, if needed    - Lovenox currently being held  - Plan per cardiology for [place]-Juan David catheter today   - Repeat limited echo 1/21/23 EF 30-35% as above  - Appreciate cares per primary cardiology team.    Gestational Diabetes  - Goal blood glucose for AM fasting is <95 mg/dL  - 1 hour postprandial check is <140 mg/dL and for 2 hour postprandial is <120 mg/d  - Glucose values uptrending, this AM not true fasting however reading 122  - Sliding scale Insulin, received 1 unit Novolog yesterday at 2126    Anemia  Likely related to pregnancy, heart failure, serial blood draws, but also component of iron deficiency anemia.  Last Hgb 9.7 on 1/19/23; ferritin 28 on 12/6/23.  - s/p IV iron infusion   - Continue oral iron replacement  - CBC pending this AM    Routine prenatal care  - Will plan to increase fetal monitoring to continuous monitoring given change in Anjali's clinical status.  - Growth US 1/19/23: 1799 g (77%), anterior placenta, cephalic  - Betamethasone (BMZ) given 11/30-12/1 at OSH for fetal lung maturity. Given change in status and potential for delivery in the next few days, rescue 1/21/23 and 1/22/23 at 1130.  Discussed checking accuchecks pre- and post-meal with sliding scale Insulin. Anjali may always decline insulin if she does not feel comfortable with this despite counseling of risks/benefits.   - S/p third trimester RPR, which was normal.   - S/p Tdap on 1/3/23  - GBS positive.   - cEFM, tracing reactive and reassuring  overnight     Delivery planning  - Patient consented for  section, bilateral tubal ligation, and blood transfusion  - Risks of surgery were previously reviewed  - In anticipation of her surgery, recommend crossing her for at least 2 units of packed cells.    Contraception, desire for permanent sterilization    Dispo:  Continue ICU management, [place]-Juan David catheter placement today, cEFM, delivery coordination for likely 23    Seen and discussed with Dr. Donovan.    Padmini Lawrence MD   Maternal-Fetal Medicine Fellow, PGY6  2023 7:32 AM      ADDENDUM:  Case added on as add on for first start 23 AM.  NICU and anesthesia notified.  Will call OR Charge in AM to get added on to board.  Alerted to needing room for possible ECMO.  NPO @ midnight.  Patient updated on plan of care.      MFM Attending Attestation  I saw this patient with the resident and agree with the resident's findings and plan of care as documented in the resident's note. I personally reviewed her vital signs, medications, labs, pertinent imaging, and fetal monitoring. In summary, Ms. Elizabeth Carmen is a 32 year old  at 30w6d admitted in the setting of newly diagnosed acute heart failure with reduced ejection fraction (HFrEF). On echo her LVEF was 20-25% with severe diffuse hypokinesis and areas of wall thinning/akinesis. She was also noted to have mild right ventricular dilation and moderately reduced right ventricular function. No evidence of pulmonary hypertension or evidence of cardiac ischemia on initial cardiac catheterization . Repeat echocardiogram on  demonstrated slight improvement in her EF to 30-35%. She has been admitted to the cardiology service for medical optimization and cardiac monitoring given intermittent ventricular arrhythmias. Her medical history is also complicated by A1GDM and obesity (BMI 43).       From a cardiac standpoint she is currently hemodynamically stable however she has been experiencing  new episodes of nonsustained, symptomatic ventricular tachycardia. Goal BP <130/80 mmHg and MAP > 65. Goal is to maintain euvolemia, she has lasix available PRN. She was diuresed yesterday with improvement in her volume status. She is also receiving beta blockade with Metoprolol 50mg Q 6 hrs. EP was reconsulted and opted to continue her Sotalol dose at 80mg BID). From a pregnancy standpoint if another antiarrhythmic would be preferred/more efficacious benefit in her case certainly outweigh risk. MFM is happy to discuss safety profile of any medications considered to help improve maternal status. Afterload reduction held given relative hypotension however can be used as indicated/tolerated.      Repeat echo yesterday demonstrated an EF of 30-35% and mildly reduced RV function. She also had another short run of Vtach overnight. She was moved to the cardiac ICU with a plan for [place] Juan David catheter placement today. Barring any significant and unexpected findings given she is medically optimized with concern for ongoing ventricular arrhythmia will plan to proceed with delivery tomorrow.       Recommend to continue therapeutic anticoagulation if EF < 35% through pregnancy and until 6 weeks postpartum, Lovenox held now in anticipation of delivery, will plan to restart postpartum when appropriate.       From a pregnancy standpoint - prenatal care is up to date. She received BMZ 11/30-12/1 and is received a rescue course 1/21-1/22. Will plan to initiate Magnesium for neuroprotection in AM when a time for her case is confirmed. Will need to coordinate fluid status with Cardiology as she may need additional diuresis due to the excess volume. Given her GDM Anjali is amenable to insulin only if needed, sliding scale ordered.  Repeat assessment of fetal growth on 1/19 demonstrated an EFW 1799g (77th%), normal fluid. Antepartum anemia is multifactorial s/p 5 doses of IV iron, will T&C for 2 prior to surgery.      Give acuity change  "Anjali is in the CVICU for invasive hemodynamic monitoring and is on continuous fetal monitoring. Delivery via repeat  and bilateral salpingectomy on the [place] under regional (slow dosed epidural) anesthesia planned for tomorrow. Likely first start but definitive time and OR room per [place] charge cannot be determined until tomorrow morning at 6AM. Please see care coordination note from today - Updated from the  conference for full details regarding delivery plan. Anesthesia (both OB and Cardiac teams) updated regarding change in delivery planning.     Will require prolonged post-operative admission (1-2 weeks pending clinical status). Will also require very close postpartum/cardiology follow-up through the first year postpartum.    .  /65   Pulse 59   Temp 98.1  F (36.7  C) (Oral)   Resp 18   Ht 1.753 m (5' 9\")   Wt 135.2 kg (298 lb)   SpO2 99%   BMI 44.01 kg/m        FHT: Baseline 120, moderate variability, +accels, no decels  TOCO: None  NST interpretation for today: reactive, reassuring and appropriate for GA    Time Spent on this Encounter   I spent a total of 60 minutes face-to-face or coordinating care of Ms. Elizabeth Carmen. Over 50% of my time on the unit was spent counseling the patient and /or coordinating care regarding diagnosis, diagnostic results, prognosis and risks and benefits of treatment options.     Date of service (when I saw the patient): 2023     Rox Donovan MD  , OB/GYN  Maternal-Fetal Medicine  [phone] (Pager)         "

## 2023-01-22 NOTE — PROVIDER NOTIFICATION
01/22/23 1350   Provider Notification   Provider Name/Title Dr. Jones & resident doctors   Method of Notification At Bedside   Request Evaluate in Person   Notification Reason Other (Comment)     Time out completed at this time for placement of [place]-Juan David catheter.

## 2023-01-22 NOTE — CARE CONFERENCE
Multi-disciplinary care conference 2023  Plan of Care for Anjali Young, MRN [id number],  1990     Situation: Anjali Carmen is a 32 year old female  at 30 weeks 6 days with no significant past medical history, transfer from [facility] to [facility] on 2022 for further management of newly diagnosed, acute HFrEF 2/2 NICM (LVEF 20% now 30-35%) and high risk delivery.    Background:      OBSTETRIC HISTORY:  Pregnancy Complications:  see notes   Acute decompensated HFrEF  - Changes in condition - Patient is now reporting worsening symptoms of chest heaviness and increased frequency of episodes of VT which is different for her.  - Repeat echo performed 23 (see notes)  - She is currently on metoprolol 50 mg q6 hrs, sotalol 80 mg q12 hours.  Given IV Lasix x 2 doses on 23.  No contraindications to alternative antiarrhythmics or increasing dose of metoprolol from a pregnancy standpoint, if needed    - Will hold lovenox in anticipation of delivery  - Appreciate cares per primary cardiology team  - [place] catheter placement today; will discuss plan for delivery with cardiology team after [place] however anticipate delivery 23; placed as add on case in main OR and NICU and anesthesia notified     Gestational Diabetes  - Goal blood glucose for AM fasting is <95 mg/dL  - 1 hour postprandial check is <140 mg/dL and for 2 hour postprandial is <120 mg/d  - Increasing glucose with rescue BMTZ; sliding scale ordered per primary team     Anemia  Likely related to pregnancy, heart failure, serial blood draws, but also component of iron deficiency anemia.  Last Hgb 9.7 on 23; ferritin 28 on 23  - s/p IV iron infusion   - Continue oral iron replacement     Routine prenatal care  - Routine indications for emergent delivery including maternal decompensation or fetal compromise  - S/p  section and bilateral tubal ligation repeat consent  23  - S/p third trimester RPR, which was normal.   - S/p Tdap on 1/3/23  - Plan bilateral tubal ligation  - Signed FMLA paperwork provided on     Fetal wellbeing  - cEFM  - Repeat growth ultrasound (performed by [facility]) on   - Betamethasone (BMZ) given - at OSH for fetal lung maturity. Rescue BMTZ given 23 and 23  - Magnesium for neuroprotection if moving toward delivery; will discuss volume and timing with cardiology  -  NICU team consulted 22    Medical History:  Echo 2023:  Ischemic CM. Left ventricular function is decreased. The ejection fraction is  30-35% (moderately reduced).  Global right ventricular function is mildly reduced.  Mild pulmonary hypertension is present.  IVC diameter >2.1 cm collapsing <50% with sniff suggests a high RA pressure  estimated at 15 mmHg or greater.  No pericardial effusion is present.  No change from recent study, except CVP is higher today.    Echo 2023:  Interpretation Summary  Left ventricular function is decreased. The ejection fraction is 30-35% (moderately reduced).  There is wall thinning and akinesis of the basal-mid inferior, basal-mid inferoseptal and basal inferolateral segments.  Global right ventricular function is mildly to moderately reduced.  Mild to moderate tricuspid insufficiency is present.  Right ventricular systolic pressure is 45mmHg above the right atrial pressure.  Pulmonary hypertension is present.  IVC diameter <2.1 cm collapsing >50% with sniff suggests a normal RA pressure of 3 mmHg.  No pericardial effusion is present.     This study was compared with the study from 2022. The left ventricular function has improved.    Surgical History:  Prior  section x 1    Social History:  Lives with  and three children     Assessment and Plan:   Cardiology plan:  See notes for ongoing plan and medication dosages, anticipate delivery 23.  Will hold anticoagulation at this time.    Obstetric  Plan/Surgery Plan:    Please notify MFM/OB team if there is any change in clinical status if any evidence of decompensation.    Delivery planning and considerations    Plan for  delivery on  OR  @ 0730 at 31 weeks gestation (currently listed as add on, will need to confirm 23 at 0615 with main OR charge Stevejuan).  Room TBD to accommodate possible ECMO and fluoroscopy needs as well as NICU team members    NICU and anesthesia updated 23    NPO @ midnight, lovenox held, will discuss timing of magnesium bolus with cardiology, will need type and cross x 2 units    ECMO team to be alerted by Cardiology team.    Prep bilateral groin area and provide exposure should ECMO be needed     Neonatology team will be present for delivery    Oxytocin, Hemabate, Misoprostol and Tranexamic acid are available in the OR during the case. Methergine not advised. Plan to administer Tranexamic acid prophylactically just prior to starting the procedure.    PRBCs 2 units in a cooler at the start of the case     Recovery in PACU and then transfer to CV ICU 4E (Cardiology aware)    Plan for bilateral tubal ligation    Anesthesia Plan:     Discussed optimal plan to avoid GETA and utilize neuraxial anesthesia    Epidural catheter placement on day of surgery in the OR to slowly titrate up the medication    Arterial line and PAC cath placement prior to surgery    TAP block will be performed    Nursing Plan Staffing plan for ICU RN Care:     L & D RN will perform fetal monitoring as ordered.  Currently on cEFM  o Fetal monitor is to remain in her room or in very close proximity - save all paper strips and send to HIMS for scanning into the medical record  o  instrument set, gloves, betadine swabs and scalpels are in the patient room in case of sudden cardiac arrest and need for emergent delivery of the fetus     Bedside RN to assess for signs of labor or other complications - See Pregnancy Care Plan in her  medical record  o This includes asking if the she feels the baby move, if she is feeling her abdomen tighten or lower back pain that comes and goes, fluid leaking from her vaginal or any vaginal bleeding/change in discharge.  o If any of these are noted, call the OB Team  o If any decompensation in patient status is noted, call the OB Team ASAP (MFM on call)    Plans for day of surgery:    Floor and Preop RNs will perform the usual preop admission procedures    Ensure  instrument set and supplies and fetal monitor etc., are transferred to the OR      An L &D RN will be present in 3C OR Control Desk approximately one hour prior to surgery time on the [place] to perform the usual maternal and fetal assessments (EFM/toco monitoring) prior to c/s, be present during the surgery and is responsible for delivery documentation and  identification.  The L&D RN can log into [employer] in [place] to access [place] documentation and will call the L &D unit to report when the baby delivers. (EFM paper strip will need to be retained and [facility] into the [facility]MR).    The L & D RN will bring over additional supplies: baby bands, plastic clear drape, a few purple oral syringes and a few bottles to collect colostrum, and cord blood and cord gas collection kits. L & D RN may bring donor milk over as baby will be  and at risk for low glucose - please verify with NICU before proceeding to do so.    (See CS on [place] checklist).     The fetal monitor is stored in the patient room. The Gir[place] warmer is located in the OR 24 storeroom and is stocked with  resuscitation supplies and 2 Bakri balloons. Check tanks on infant warmer and replace if needed.    The L & D RN will support care in the operating room but is the not the primary circulating RN     L &D RN will verify that an infusion pump, oxytocin, Hemabate and tranexamic acid are available in the OR during the case. (NO Methergine is  advised)    NICU team will set up the room before the case begins and step out for anesthesia induction.    Her support person is allowed in the OR.  If a support person is present for the birth, L&D RN responsible for assisting support person out of the room in case of an emergency, when NICU exits with the baby or conversion to general anesthesia.     The L & D RN will support care in the operating room but is the not the primary circulating RN     Support person(s) will need private transportation or take the Patient/Family shuttle to proceed to [facility] to be with baby in the NICU. Support person(s) will be considered a visitor to the baby in the NICU.      L & D RN responsible for collecting cord blood and cord segment for gases and collaborate with OR RN to have it sent to [place] lab.    OB RN is responsible for QBL - ask for gram scale if it is not in the OR at the start of the case    L &D RN will perform collaborative care with the PACU RN and is responsible for fundal checks and vaginal bleeding checks. L & D RN will stay with patient throughout surgery and PACU, help with transfer to next unit and provide nursing education for fundal check and vaginal bleeding assessment to the handoff RN.      L & D RN will support manual expression and set up of breast pump.  Please call [place] [employer] to have breast pump and kit will be delivered to her room the evening prior to surgery.      The L & D RN is responsible for completing the Delivery Summary with the exception of the  Apgar score and resuscitation notes.      Routine postpartum and post-surgical care for post c/s with close cardiac observation. Patient will be cared for on the CVICU 4E. Plan to discharge from 4E (no transfer to [facility] is planned)    To facilitate bonding with her baby, utilize IPad on the unit and patient s Ipad # in Epic patient storyboard to connect with Care Space.     [facility] nurse will be available for a  postpartum checks daily and to assist with ongoing lactation support.    Please discuss use of Donor Milk and document results of conversation on sticky note in baby s chart.    MFM team to place lactation consult order       Plan:    Infant feeding plan - breastfeeding. Breast pump and supplies will be delivered to her room on 6th floor prior to day of surgery.     The NICU team should contact the OR Control Desk or the L &D RN to verify that the surgery is on schedule (or has been delayed) prior to leaving the [facility] and arrive at least 30 minutes prior to delivery (or as negotiated)    Plan for delayed cord clamping at delivery     NICU HUC to arrange for NICU transport to [place] via Ambulance and bring their transporter, delivery supplies and medications. NICU team responsible for  stabilization and transfer.    Team: NNP, NICU RT, NICU RN, and [address] Paramedics.    NICU to set up room ahead of time per direction of the OR Team.  OR team will call NICU for delivery when needed.    Recommend that extra team members and NICU wait outside of the surgical suite until the anesthesia induction is complete.    Support person(s) will be here for the birth and would use private transportation or Patient/Family shuttle between [facility] and [facility].     Plan for her support person(s) to be on the [facility] with the baby in the NICU as a visitor.     NICU will transport the baby back to the [facility], but timing of transfer and to which unit depends on baby s disposition:   o If the baby is not well-appearing after birth, NICU will stabilize and transport back to the NICU immediately after birth.   o If the baby is well-appearing, the team can collaborate with the surgical and anesthesia teams to allow mom to see baby and provide skin to skin prior to transport.      OB RN responsible for collecting cord blood and cord segment for gases (if collected) and will send specimen to [place]  lab.    Social Work:   Continue to support family in the NICU and through postpartum course.   NICU SW will collaborate with EB ICU SW team    Blood Bank Plan:     Periop lab staff carry Ascom [phone].    The [facility] (cell saver provider) [phone] is not alerted to her case but are immediately available if needed.          Contact Phone #s and Pagers - place these numbers at the nurse s desk, sticky notes and on white board as needed    Maternal Fetal Medicine/OB Team  Maternal Fetal Medicine: Yany Power MD  MFM Fellow: Johnnie Garcia MD  Resident pager [phone]    ICU Team Nursing Leadership   NM 6C Divina Ventura [phone]  NM 4E Catalina Rubin [phone]  [facility] Frieda Duffy [phone]    Cardiology   Angeli Anderson MD - [email or web address]    Anesthesia/Surgery Main Pager - [phone]   Adebayo [email or web address]  Tavares [email or web address]   Lois [email or web address] Cell [phone]  MD [email or web address]  Colt [email or web address]   Namita [email or web address]  Dr. Chandana [email or web address]    Periop Team:  [place] Operating Room Team:  [facility] OR (control desk) [phone]   Anesthesia [facility] [phone]  Carolyn Hubbard OB/GYN Lead [phone]   Jennifer Clements, PCS and Interim Mgr [phone]  Dandre Mast, Supervisor [phone]  Mary Weaver, Mgr [phone]  Amber Zhang NM, Periop [phone]  Darlin Schneider Chief CRNA, [facility] Office [phone]   Jimena Burden CRNA MS, [email or web address] phone [phone], Pager: [phone]  Kassie oMraes, Patient Care Supervisor, Pre and Post [place] [phone]  OR Pharmacist Jaqueline Rogers [phone]  Martin Germain, Director Nursing Care Services  Sam De La Garza, [phone], [facility] [phone]    Blood Bank/Transfusion Medicine:  Blood Bank, [place] [phone]; [facility] [phone]   Chika Rivera [phone]  Maria Luz  [place] Acute Lab Supervisor [phone]  Janey [place] Blood Bank Manager [phone]     Labor and Delivery Unit   Charge Nurse Ascom [phone] (to call from another campus [phone] and enter the Ascom #)   Family Care Center   Charge Nurse Ascom [phone] (to call from another campus [phone] and enter the Ascom #)  Angeli Hirsch, NM Labor and Delivery and Pregnancy Special Care Unit, [phone], cell [phone]  Magalys Ramos, PCS, [facility] PCS, [phone], cell [phone]  Janel Belcher or Toña De Souza, IBCLC, [phone] for lactation support  Julisa Beckett, [place] [facility], [phone], cell [phone]  Mary Goldsmith, PCS, [facility] [phone], cell [phone]  Falguni Gonzalez, [facility], Birthplace [phone], cell [phone]  Yvette Adame,  Director [phone] cell    NICU Team  Sofia Magallon MD Neonatology pgr [phone]  Marlee Sanchez, Lead NNP, [phone]   Jeanne Kahn, [facility] [phone]  Negrita Gudino, [place] NICU 11th floor [phone]  Elaine Monge NM, Med/Surg NICU  Linnette Alejandra NM, NICU Small Baby Unit [phone]  Lactation if  in NICU - [phone]    Support Services  Inés Matthew, [facility] Social Work [facility] ([phone], pager [phone])  Ketty Jacobs, [facility] Social Work [facility] ([phone], pager [phone])  Sofia Fernandez, [facility] Social Work [facility] ([phone], pager)  Jimena Bravo, [facility] Social Work [facility] ([phone], pager [phone])  Chaplain Blu (pager [phone])  Hiro Elliott, Child and Family Life  for help with Care Space if needed  Gabby Longbehn, Director Adult Care ANS Team   Lynne Short, Manager of Patient Flow PPM team   Hiro Duffy, [facility]

## 2023-01-22 NOTE — PROGRESS NOTES
Admitted/transferred from:   Reason for admission/transfer: Close monitoring and swan placement   Patient status upon admission/transfer: AOx4. VSS on RA. L&D nurse with pt.   Interventions:   Plan: Preemption placement in morning.   2 RN skin assessment: completed by EARLE Bear and EARLE Beltre  Result of skin assessment and interventions/actions: Bruising to LUE  Height, weight, drug calc weight: Done  Patient belongings (see Flowsheet - Adult Profile for details): Remain with pt. Clothing, cell phone, Ipad, blankets, stuffed animal, wedding ring, and personal care items.

## 2023-01-22 NOTE — PLAN OF CARE
ICU End of Shift Summary. See flowsheets for vital signs and detailed assessment.    Changes this shift: Uneventful shift. Neuro status intact. VSS on RA. SR with frequent PVCs, HR 70-80s. Voiding adequately. Continuous fetal monitoring by L&D RN. Magnesium replaced per protocol.     Plan: Continue with plan of care. [place] placement today.

## 2023-01-22 NOTE — PROCEDURES
Inpatient Procedure Note    Procedure: Ultrasound guided central access, PA catheter placement, central line placement  Indication: Hemodynamic monitoring, central access  Diagnosis: Acute Decompensated Heart Failure    After informing the benefits and risks, an informed consent was obtained. A time out and site verification were done prior to initiation of the procedure. The neck was prepped and draped in the usual sterile fashion and infiltrated with lidocaine under ultrasound guidance. A 9 Fr sheath was placed in the right internal jugular vein using modified Seldinger technique ultrasound guidance. A balloon-tipped [place] catheter was advanced into the right atrium, right ventricle, pulmonary artery and pulmonary capillary wedge position with guidance of pressure waveform. The balloon was deflated and the [place] catheter was left in the pulmonary artery. RA/PA/PCW pressures were obtained. [place] locked at 58 cm. At time of procedure completion, all the ports aspirated and flushed properly. The patient tolerated the procedure well without any immediate complications.     Post-procedure x-ray shows the tip of the catheter within the PA.    Complications: None   Blood loss: Minimal blood loss    BSA 2.57 m2  HR 72 bpm  /58/73 mmHg  RA 9 PA 52/32/38 PCW 24 mmHg with V wave to 36 mmHg  Darren CO 8.68, CI 3.37   Hgb 10.1g/dl PVR 1.61 TPR 7.48      Dr. Monika Jones was present for garcia portions of the procedure.

## 2023-01-22 NOTE — PROGRESS NOTES
Fetal assessment stable, category I tracing overnight-see flowsheet. Denies LOF, vaginal bleeding, and cramping/ctx. Continue plan of care in ICU-4C. Patient is consented for c/s and tubal, printed on paper in chart. Emergency c/s kit in patient room. Offers no obstetrical complaints at this time.

## 2023-01-22 NOTE — PLAN OF CARE
Data: . Maternal status see ICU note and H&P. Fetal assessment is stable, displays signs of fetal oxygenation. See flowsheet.   Action: Continue with plan of care in ICU 4C. Anjali Carmen is consented for C/S and tubal ligation, these documents are printed on paper and are in her chart. Emergency c/s equipment is ready in the room.   Response: Patient coping.   Yanna Dubon RN on 1/21/2023 at 10:10 PM

## 2023-01-23 ENCOUNTER — ANESTHESIA (OUTPATIENT)
Dept: SURGERY | Facility: CLINIC | Age: 33
End: 2023-01-23
Payer: COMMERCIAL

## 2023-01-23 LAB
ALBUMIN SERPL BCG-MCNC: 3.3 G/DL (ref 3.5–5.2)
ALP SERPL-CCNC: 95 U/L (ref 35–104)
ALT SERPL W P-5'-P-CCNC: 22 U/L (ref 10–35)
ANION GAP SERPL CALCULATED.3IONS-SCNC: 15 MMOL/L (ref 7–15)
AST SERPL W P-5'-P-CCNC: 28 U/L (ref 10–35)
BASE EXCESS BLDA CALC-SCNC: -7.4 MMOL/L (ref -9.6–2)
BASE EXCESS BLDV CALC-SCNC: -3.6 MMOL/L (ref -8.1–1.9)
BASE EXCESS BLDV CALC-SCNC: -4.2 MMOL/L (ref -7.7–1.9)
BASE EXCESS BLDV CALC-SCNC: -4.3 MMOL/L (ref -7.7–1.9)
BASE EXCESS BLDV CALC-SCNC: -5.4 MMOL/L (ref -8.1–1.9)
BASE EXCESS BLDV CALC-SCNC: -5.7 MMOL/L (ref -7.7–1.9)
BASE EXCESS BLDV CALC-SCNC: -5.9 MMOL/L (ref -7.7–1.9)
BASOPHILS # BLD AUTO: 0 10E3/UL (ref 0–0.2)
BASOPHILS NFR BLD AUTO: 0 %
BILIRUB SERPL-MCNC: 0.2 MG/DL
BUN SERPL-MCNC: 10.2 MG/DL (ref 6–20)
CA-I BLD-MCNC: 4.7 MG/DL (ref 4.4–5.2)
CA-I BLD-MCNC: 4.8 MG/DL (ref 4.4–5.2)
CA-I BLD-MCNC: 5 MG/DL (ref 4.4–5.2)
CALCIUM SERPL-MCNC: 8.7 MG/DL (ref 8.6–10)
CHLORIDE SERPL-SCNC: 108 MMOL/L (ref 98–107)
CLOT INIT KAOL IND TO POST HEP NEUT TRTO: 1.1 {RATIO}
CLOT INIT KAOLIN IND BLD US: 139 SEC (ref 113–166)
CLOT INIT KAOLIN IND P HEP NEUT BLD US: 128 SEC (ref 103–153)
CLOT STIFF PLT CONT BLD CALC: 43.3 HPA (ref 11.9–29.8)
CLOT STIFF TF IND P HEP NEUT BLD US: 54 HPA (ref 13–33.2)
CLOT STIFF TF IND+IIB-IIIA INH P HEP NEU: 10.7 HPA (ref 1–3.7)
CREAT SERPL-MCNC: 0.52 MG/DL (ref 0.51–0.95)
DEPRECATED HCO3 PLAS-SCNC: 14 MMOL/L (ref 22–29)
EOSINOPHIL # BLD AUTO: 0 10E3/UL (ref 0–0.7)
EOSINOPHIL NFR BLD AUTO: 0 %
ERYTHROCYTE [DISTWIDTH] IN BLOOD BY AUTOMATED COUNT: 18.4 % (ref 10–15)
GFR SERPL CREATININE-BSD FRML MDRD: >90 ML/MIN/1.73M2
GLUCOSE BLD-MCNC: 120 MG/DL (ref 70–99)
GLUCOSE BLD-MCNC: 155 MG/DL (ref 70–99)
GLUCOSE BLD-MCNC: 167 MG/DL (ref 70–99)
GLUCOSE BLDC GLUCOMTR-MCNC: 111 MG/DL (ref 70–99)
GLUCOSE BLDC GLUCOMTR-MCNC: 140 MG/DL (ref 70–99)
GLUCOSE BLDC GLUCOMTR-MCNC: 146 MG/DL (ref 70–99)
GLUCOSE BLDC GLUCOMTR-MCNC: 167 MG/DL (ref 70–99)
GLUCOSE BLDC GLUCOMTR-MCNC: 170 MG/DL (ref 70–99)
GLUCOSE BLDC GLUCOMTR-MCNC: 218 MG/DL (ref 70–99)
GLUCOSE SERPL-MCNC: 145 MG/DL (ref 70–99)
HCO3 BLDA-SCNC: 18 MMOL/L (ref 21–28)
HCO3 BLDV-SCNC: 19 MMOL/L (ref 21–28)
HCO3 BLDV-SCNC: 19 MMOL/L (ref 21–28)
HCO3 BLDV-SCNC: 20 MMOL/L (ref 21–28)
HCO3 BLDV-SCNC: 21 MMOL/L (ref 21–28)
HCO3 BLDV-SCNC: 21 MMOL/L (ref 21–28)
HCO3 BLDV-SCNC: 22 MMOL/L (ref 21–28)
HCT VFR BLD AUTO: 32.1 % (ref 35–47)
HGB BLD-MCNC: 10.1 G/DL (ref 11.7–15.7)
HGB BLD-MCNC: 11.1 G/DL (ref 11.7–15.7)
HGB BLD-MCNC: 11.2 G/DL (ref 11.7–15.7)
HGB BLD-MCNC: 11.4 G/DL (ref 11.7–15.7)
IMM GRANULOCYTES # BLD: 0.2 10E3/UL
IMM GRANULOCYTES NFR BLD: 2 %
LACTATE BLD-SCNC: 1.3 MMOL/L
LACTATE BLD-SCNC: 1.8 MMOL/L
LACTATE BLD-SCNC: 2 MMOL/L
LYMPHOCYTES # BLD AUTO: 1.3 10E3/UL (ref 0.8–5.3)
LYMPHOCYTES NFR BLD AUTO: 10 %
MAGNESIUM SERPL-MCNC: 1.7 MG/DL (ref 1.7–2.3)
MAGNESIUM SERPL-MCNC: 1.7 MG/DL (ref 1.7–2.3)
MCH RBC QN AUTO: 26.5 PG (ref 26.5–33)
MCHC RBC AUTO-ENTMCNC: 31.5 G/DL (ref 31.5–36.5)
MCV RBC AUTO: 84 FL (ref 78–100)
MONOCYTES # BLD AUTO: 0.7 10E3/UL (ref 0–1.3)
MONOCYTES NFR BLD AUTO: 5 %
NEUTROPHILS # BLD AUTO: 10.7 10E3/UL (ref 1.6–8.3)
NEUTROPHILS NFR BLD AUTO: 83 %
NRBC # BLD AUTO: 0 10E3/UL
NRBC BLD AUTO-RTO: 0 /100
O2/TOTAL GAS SETTING VFR VENT: 100 %
O2/TOTAL GAS SETTING VFR VENT: 100 %
O2/TOTAL GAS SETTING VFR VENT: 21 %
OXYHGB MFR BLDV: 50 % (ref 70–75)
OXYHGB MFR BLDV: 56 % (ref 70–75)
OXYHGB MFR BLDV: 60 % (ref 70–75)
OXYHGB MFR BLDV: 72 % (ref 70–75)
PCO2 BLDA: 32 MM HG (ref 35–45)
PCO2 BLDV: 35 MM HG (ref 40–50)
PCO2 BLDV: 36 MM HG (ref 40–50)
PCO2 BLDV: 36 MM HG (ref 40–50)
PCO2 BLDV: 38 MM HG (ref 40–50)
PCO2 BLDV: 40 MM HG (ref 40–50)
PCO2 BLDV: 44 MM HG (ref 40–50)
PH BLDA: 7.34 [PH] (ref 7.35–7.45)
PH BLDV: 7.31 [PH] (ref 7.32–7.43)
PH BLDV: 7.33 [PH] (ref 7.32–7.43)
PH BLDV: 7.33 [PH] (ref 7.32–7.43)
PH BLDV: 7.34 [PH] (ref 7.32–7.43)
PH BLDV: 7.35 [PH] (ref 7.32–7.43)
PH BLDV: 7.39 [PH] (ref 7.32–7.43)
PLATELET # BLD AUTO: 235 10E3/UL (ref 150–450)
PO2 BLDA: 114 MM HG (ref 80–105)
PO2 BLDV: 32 MM HG (ref 25–47)
PO2 BLDV: 33 MM HG (ref 25–47)
PO2 BLDV: 34 MM HG (ref 25–47)
PO2 BLDV: 34 MM HG (ref 25–47)
PO2 BLDV: 36 MM HG (ref 25–47)
PO2 BLDV: 44 MM HG (ref 25–47)
POTASSIUM BLD-SCNC: 4.2 MMOL/L (ref 3.5–5)
POTASSIUM BLD-SCNC: 4.5 MMOL/L (ref 3.5–5)
POTASSIUM BLD-SCNC: 4.7 MMOL/L (ref 3.5–5)
POTASSIUM SERPL-SCNC: 4.4 MMOL/L (ref 3.4–5.3)
POTASSIUM SERPL-SCNC: 4.5 MMOL/L (ref 3.4–5.3)
PROT SERPL-MCNC: 6.6 G/DL (ref 6.4–8.3)
RBC # BLD AUTO: 3.81 10E6/UL (ref 3.8–5.2)
SODIUM BLD-SCNC: 138 MMOL/L (ref 133–144)
SODIUM BLD-SCNC: 138 MMOL/L (ref 133–144)
SODIUM BLD-SCNC: 139 MMOL/L (ref 133–144)
SODIUM SERPL-SCNC: 137 MMOL/L (ref 136–145)
WBC # BLD AUTO: 12.9 10E3/UL (ref 4–11)

## 2023-01-23 PROCEDURE — 58611 LIGATE OVIDUCT(S) ADD-ON: CPT | Mod: GC | Performed by: OBSTETRICS & GYNECOLOGY

## 2023-01-23 PROCEDURE — C9290 INJ, BUPIVACAINE LIPOSOME: HCPCS | Performed by: STUDENT IN AN ORGANIZED HEALTH CARE EDUCATION/TRAINING PROGRAM

## 2023-01-23 PROCEDURE — 250N000011 HC RX IP 250 OP 636: Performed by: STUDENT IN AN ORGANIZED HEALTH CARE EDUCATION/TRAINING PROGRAM

## 2023-01-23 PROCEDURE — 250N000013 HC RX MED GY IP 250 OP 250 PS 637: Performed by: PHYSICIAN ASSISTANT

## 2023-01-23 PROCEDURE — 250N000013 HC RX MED GY IP 250 OP 250 PS 637: Performed by: STUDENT IN AN ORGANIZED HEALTH CARE EDUCATION/TRAINING PROGRAM

## 2023-01-23 PROCEDURE — P9016 RBC LEUKOCYTES REDUCED: HCPCS | Performed by: STUDENT IN AN ORGANIZED HEALTH CARE EDUCATION/TRAINING PROGRAM

## 2023-01-23 PROCEDURE — 258N000003 HC RX IP 258 OP 636: Performed by: STUDENT IN AN ORGANIZED HEALTH CARE EDUCATION/TRAINING PROGRAM

## 2023-01-23 PROCEDURE — 250N000011 HC RX IP 250 OP 636: Performed by: NURSE PRACTITIONER

## 2023-01-23 PROCEDURE — 82803 BLOOD GASES ANY COMBINATION: CPT

## 2023-01-23 PROCEDURE — 250N000011 HC RX IP 250 OP 636: Performed by: INTERNAL MEDICINE

## 2023-01-23 PROCEDURE — 200N000002 HC R&B ICU UMMC

## 2023-01-23 PROCEDURE — 88302 TISSUE EXAM BY PATHOLOGIST: CPT | Mod: 26 | Performed by: PATHOLOGY

## 2023-01-23 PROCEDURE — 82330 ASSAY OF CALCIUM: CPT

## 2023-01-23 PROCEDURE — 80053 COMPREHEN METABOLIC PANEL: CPT | Performed by: STUDENT IN AN ORGANIZED HEALTH CARE EDUCATION/TRAINING PROGRAM

## 2023-01-23 PROCEDURE — 272N000001 HC OR GENERAL SUPPLY STERILE: Performed by: OBSTETRICS & GYNECOLOGY

## 2023-01-23 PROCEDURE — 82805 BLOOD GASES W/O2 SATURATION: CPT | Performed by: STUDENT IN AN ORGANIZED HEALTH CARE EDUCATION/TRAINING PROGRAM

## 2023-01-23 PROCEDURE — 83735 ASSAY OF MAGNESIUM: CPT | Performed by: INTERNAL MEDICINE

## 2023-01-23 PROCEDURE — 84132 ASSAY OF SERUM POTASSIUM: CPT | Performed by: NURSE PRACTITIONER

## 2023-01-23 PROCEDURE — 250N000009 HC RX 250: Performed by: STUDENT IN AN ORGANIZED HEALTH CARE EDUCATION/TRAINING PROGRAM

## 2023-01-23 PROCEDURE — 370N000017 HC ANESTHESIA TECHNICAL FEE, PER MIN: Performed by: OBSTETRICS & GYNECOLOGY

## 2023-01-23 PROCEDURE — 88307 TISSUE EXAM BY PATHOLOGIST: CPT | Mod: 26 | Performed by: PATHOLOGY

## 2023-01-23 PROCEDURE — 250N000013 HC RX MED GY IP 250 OP 250 PS 637: Performed by: NURSE PRACTITIONER

## 2023-01-23 PROCEDURE — 0UB70ZZ EXCISION OF BILATERAL FALLOPIAN TUBES, OPEN APPROACH: ICD-10-PCS | Performed by: OBSTETRICS & GYNECOLOGY

## 2023-01-23 PROCEDURE — 85396 CLOTTING ASSAY WHOLE BLOOD: CPT

## 2023-01-23 PROCEDURE — 88307 TISSUE EXAM BY PATHOLOGIST: CPT | Mod: TC | Performed by: OBSTETRICS & GYNECOLOGY

## 2023-01-23 PROCEDURE — 250N000013 HC RX MED GY IP 250 OP 250 PS 637

## 2023-01-23 PROCEDURE — 99232 SBSQ HOSP IP/OBS MODERATE 35: CPT | Mod: GC | Performed by: OBSTETRICS & GYNECOLOGY

## 2023-01-23 PROCEDURE — 59514 CESAREAN DELIVERY ONLY: CPT | Mod: GC | Performed by: OBSTETRICS & GYNECOLOGY

## 2023-01-23 PROCEDURE — 85025 COMPLETE CBC W/AUTO DIFF WBC: CPT | Performed by: NURSE PRACTITIONER

## 2023-01-23 PROCEDURE — 250N000011 HC RX IP 250 OP 636: Performed by: ANESTHESIOLOGY

## 2023-01-23 PROCEDURE — 250N000013 HC RX MED GY IP 250 OP 250 PS 637: Performed by: INTERNAL MEDICINE

## 2023-01-23 PROCEDURE — 360N000076 HC SURGERY LEVEL 3, PER MIN: Performed by: OBSTETRICS & GYNECOLOGY

## 2023-01-23 PROCEDURE — 99233 SBSQ HOSP IP/OBS HIGH 50: CPT | Mod: GC | Performed by: INTERNAL MEDICINE

## 2023-01-23 PROCEDURE — 410N000003 HC PER-PERFUSION 1ST 30 MIN: Performed by: OBSTETRICS & GYNECOLOGY

## 2023-01-23 RX ORDER — OXYTOCIN/0.9 % SODIUM CHLORIDE 30/500 ML
340 PLASTIC BAG, INJECTION (ML) INTRAVENOUS CONTINUOUS PRN
Status: COMPLETED | OUTPATIENT
Start: 2023-01-23 | End: 2023-01-23

## 2023-01-23 RX ORDER — MORPHINE SULFATE 2 MG/ML
2 INJECTION, SOLUTION INTRAMUSCULAR; INTRAVENOUS ONCE
Status: DISCONTINUED | OUTPATIENT
Start: 2023-01-23 | End: 2023-01-23

## 2023-01-23 RX ORDER — SODIUM CHLORIDE, SODIUM LACTATE, POTASSIUM CHLORIDE, CALCIUM CHLORIDE 600; 310; 30; 20 MG/100ML; MG/100ML; MG/100ML; MG/100ML
INJECTION, SOLUTION INTRAVENOUS CONTINUOUS PRN
Status: DISCONTINUED | OUTPATIENT
Start: 2023-01-23 | End: 2023-01-23

## 2023-01-23 RX ORDER — ONDANSETRON 2 MG/ML
INJECTION INTRAMUSCULAR; INTRAVENOUS PRN
Status: DISCONTINUED | OUTPATIENT
Start: 2023-01-23 | End: 2023-01-23

## 2023-01-23 RX ORDER — METHYLERGONOVINE MALEATE 0.2 MG/ML
200 INJECTION INTRAVENOUS
Status: DISCONTINUED | OUTPATIENT
Start: 2023-01-23 | End: 2023-01-23

## 2023-01-23 RX ORDER — LIDOCAINE HYDROCHLORIDE 20 MG/ML
INJECTION, SOLUTION INFILTRATION; PERINEURAL
Status: COMPLETED | OUTPATIENT
Start: 2023-01-23 | End: 2023-01-23

## 2023-01-23 RX ORDER — NALOXONE HYDROCHLORIDE 0.4 MG/ML
0.2 INJECTION, SOLUTION INTRAMUSCULAR; INTRAVENOUS; SUBCUTANEOUS
Status: DISCONTINUED | OUTPATIENT
Start: 2023-01-23 | End: 2023-01-24 | Stop reason: HOSPADM

## 2023-01-23 RX ORDER — CEFAZOLIN SODIUM 1 G/3ML
INJECTION, POWDER, FOR SOLUTION INTRAMUSCULAR; INTRAVENOUS PRN
Status: DISCONTINUED | OUTPATIENT
Start: 2023-01-23 | End: 2023-01-23

## 2023-01-23 RX ORDER — MISOPROSTOL 200 UG/1
800 TABLET ORAL
Status: CANCELLED | OUTPATIENT
Start: 2023-01-23

## 2023-01-23 RX ORDER — BUPIVACAINE HYDROCHLORIDE 2.5 MG/ML
INJECTION, SOLUTION EPIDURAL; INFILTRATION; INTRACAUDAL
Status: COMPLETED | OUTPATIENT
Start: 2023-01-23 | End: 2023-01-23

## 2023-01-23 RX ORDER — ONDANSETRON 4 MG/1
4 TABLET, ORALLY DISINTEGRATING ORAL EVERY 6 HOURS PRN
Status: CANCELLED | OUTPATIENT
Start: 2023-01-23

## 2023-01-23 RX ORDER — OXYTOCIN 10 [USP'U]/ML
10 INJECTION, SOLUTION INTRAMUSCULAR; INTRAVENOUS
Status: DISCONTINUED | OUTPATIENT
Start: 2023-01-23 | End: 2023-01-24 | Stop reason: HOSPADM

## 2023-01-23 RX ORDER — ACETAMINOPHEN 325 MG/1
975 TABLET ORAL EVERY 6 HOURS
Status: CANCELLED | OUTPATIENT
Start: 2023-01-23

## 2023-01-23 RX ORDER — ONDANSETRON 2 MG/ML
4 INJECTION INTRAMUSCULAR; INTRAVENOUS EVERY 6 HOURS PRN
Status: CANCELLED | OUTPATIENT
Start: 2023-01-23

## 2023-01-23 RX ORDER — AMOXICILLIN 250 MG
2 CAPSULE ORAL 2 TIMES DAILY
Status: CANCELLED | OUTPATIENT
Start: 2023-01-23

## 2023-01-23 RX ORDER — TRANEXAMIC ACID 10 MG/ML
1 INJECTION, SOLUTION INTRAVENOUS EVERY 30 MIN PRN
Status: CANCELLED | OUTPATIENT
Start: 2023-01-23

## 2023-01-23 RX ORDER — NALOXONE HYDROCHLORIDE 0.4 MG/ML
0.4 INJECTION, SOLUTION INTRAMUSCULAR; INTRAVENOUS; SUBCUTANEOUS
Status: DISCONTINUED | OUTPATIENT
Start: 2023-01-23 | End: 2023-01-24 | Stop reason: HOSPADM

## 2023-01-23 RX ORDER — MORPHINE SULFATE 0.5 MG/ML
2 INJECTION, SOLUTION EPIDURAL; INTRATHECAL; INTRAVENOUS ONCE
Status: COMPLETED | OUTPATIENT
Start: 2023-01-23 | End: 2023-01-23

## 2023-01-23 RX ORDER — BISACODYL 10 MG
10 SUPPOSITORY, RECTAL RECTAL DAILY PRN
Status: CANCELLED | OUTPATIENT
Start: 2023-01-25

## 2023-01-23 RX ORDER — NOREPINEPHRINE BITARTRATE 0.06 MG/ML
.01-.6 INJECTION, SOLUTION INTRAVENOUS CONTINUOUS
Status: DISCONTINUED | OUTPATIENT
Start: 2023-01-23 | End: 2023-01-24 | Stop reason: HOSPADM

## 2023-01-23 RX ORDER — NOREPINEPHRINE BITARTRATE 0.06 MG/ML
.01-.6 INJECTION, SOLUTION INTRAVENOUS CONTINUOUS
Status: DISCONTINUED | OUTPATIENT
Start: 2023-01-23 | End: 2023-01-23

## 2023-01-23 RX ORDER — CITRIC ACID/SODIUM CITRATE 334-500MG
30 SOLUTION, ORAL ORAL
Status: DISCONTINUED | OUTPATIENT
Start: 2023-01-23 | End: 2023-01-24

## 2023-01-23 RX ORDER — MISOPROSTOL 200 UG/1
800 TABLET ORAL
Status: DISCONTINUED | OUTPATIENT
Start: 2023-01-23 | End: 2023-01-24 | Stop reason: HOSPADM

## 2023-01-23 RX ORDER — FUROSEMIDE 10 MG/ML
20 INJECTION INTRAMUSCULAR; INTRAVENOUS ONCE
Status: COMPLETED | OUTPATIENT
Start: 2023-01-23 | End: 2023-01-23

## 2023-01-23 RX ORDER — FENTANYL CITRATE 50 UG/ML
25-50 INJECTION, SOLUTION INTRAMUSCULAR; INTRAVENOUS
Status: DISCONTINUED | OUTPATIENT
Start: 2023-01-23 | End: 2023-01-24 | Stop reason: HOSPADM

## 2023-01-23 RX ORDER — POLYETHYLENE GLYCOL 3350 17 G/17G
17 POWDER, FOR SOLUTION ORAL DAILY PRN
Status: DISCONTINUED | OUTPATIENT
Start: 2023-01-23 | End: 2023-01-31 | Stop reason: HOSPADM

## 2023-01-23 RX ORDER — DEXTROSE, SODIUM CHLORIDE, SODIUM LACTATE, POTASSIUM CHLORIDE, AND CALCIUM CHLORIDE 5; .6; .31; .03; .02 G/100ML; G/100ML; G/100ML; G/100ML; G/100ML
INJECTION, SOLUTION INTRAVENOUS CONTINUOUS
Status: CANCELLED | OUTPATIENT
Start: 2023-01-23

## 2023-01-23 RX ORDER — ACETAMINOPHEN 325 MG/1
975 TABLET ORAL ONCE
Status: COMPLETED | OUTPATIENT
Start: 2023-01-23 | End: 2023-01-23

## 2023-01-23 RX ORDER — CEFAZOLIN SODIUM/WATER 3 G/30 ML
3 SYRINGE (ML) INTRAVENOUS
Status: DISCONTINUED | OUTPATIENT
Start: 2023-01-23 | End: 2023-01-24

## 2023-01-23 RX ORDER — MISOPROSTOL 200 UG/1
400 TABLET ORAL
Status: DISCONTINUED | OUTPATIENT
Start: 2023-01-23 | End: 2023-01-24 | Stop reason: HOSPADM

## 2023-01-23 RX ORDER — METHYLERGONOVINE MALEATE 0.2 MG/ML
200 INJECTION INTRAVENOUS
Status: CANCELLED | OUTPATIENT
Start: 2023-01-23

## 2023-01-23 RX ORDER — SODIUM CHLORIDE, SODIUM LACTATE, POTASSIUM CHLORIDE, CALCIUM CHLORIDE 600; 310; 30; 20 MG/100ML; MG/100ML; MG/100ML; MG/100ML
INJECTION, SOLUTION INTRAVENOUS CONTINUOUS
Status: DISCONTINUED | OUTPATIENT
Start: 2023-01-23 | End: 2023-01-23

## 2023-01-23 RX ORDER — CEFAZOLIN SODIUM/WATER 3 G/30 ML
3 SYRINGE (ML) INTRAVENOUS SEE ADMIN INSTRUCTIONS
Status: DISCONTINUED | OUTPATIENT
Start: 2023-01-23 | End: 2023-01-24

## 2023-01-23 RX ORDER — CARBOPROST TROMETHAMINE 250 UG/ML
250 INJECTION, SOLUTION INTRAMUSCULAR
Status: CANCELLED | OUTPATIENT
Start: 2023-01-23

## 2023-01-23 RX ORDER — FLUMAZENIL 0.1 MG/ML
0.2 INJECTION, SOLUTION INTRAVENOUS
Status: DISCONTINUED | OUTPATIENT
Start: 2023-01-23 | End: 2023-01-24 | Stop reason: HOSPADM

## 2023-01-23 RX ORDER — CARBOPROST TROMETHAMINE 250 UG/ML
250 INJECTION, SOLUTION INTRAMUSCULAR
Status: DISCONTINUED | OUTPATIENT
Start: 2023-01-23 | End: 2023-01-24 | Stop reason: HOSPADM

## 2023-01-23 RX ORDER — MISOPROSTOL 200 UG/1
400 TABLET ORAL
Status: CANCELLED | OUTPATIENT
Start: 2023-01-23

## 2023-01-23 RX ORDER — TRANEXAMIC ACID 10 MG/ML
1 INJECTION, SOLUTION INTRAVENOUS ONCE
Status: DISCONTINUED | OUTPATIENT
Start: 2023-01-23 | End: 2023-01-24

## 2023-01-23 RX ORDER — OXYTOCIN/0.9 % SODIUM CHLORIDE 30/500 ML
100-340 PLASTIC BAG, INJECTION (ML) INTRAVENOUS CONTINUOUS PRN
Status: CANCELLED | OUTPATIENT
Start: 2023-01-23

## 2023-01-23 RX ORDER — TRANEXAMIC ACID 10 MG/ML
1 INJECTION, SOLUTION INTRAVENOUS EVERY 30 MIN PRN
Status: DISCONTINUED | OUTPATIENT
Start: 2023-01-23 | End: 2023-01-24 | Stop reason: HOSPADM

## 2023-01-23 RX ORDER — LIDOCAINE 40 MG/G
CREAM TOPICAL
Status: DISCONTINUED | OUTPATIENT
Start: 2023-01-23 | End: 2023-01-24 | Stop reason: HOSPADM

## 2023-01-23 RX ORDER — OXYTOCIN 10 [USP'U]/ML
10 INJECTION, SOLUTION INTRAMUSCULAR; INTRAVENOUS
Status: CANCELLED | OUTPATIENT
Start: 2023-01-23

## 2023-01-23 RX ORDER — OXYTOCIN/0.9 % SODIUM CHLORIDE 30/500 ML
340 PLASTIC BAG, INJECTION (ML) INTRAVENOUS CONTINUOUS PRN
Status: CANCELLED | OUTPATIENT
Start: 2023-01-23

## 2023-01-23 RX ORDER — SODIUM CHLORIDE, SODIUM GLUCONATE, SODIUM ACETATE, POTASSIUM CHLORIDE AND MAGNESIUM CHLORIDE 526; 502; 368; 37; 30 MG/100ML; MG/100ML; MG/100ML; MG/100ML; MG/100ML
INJECTION, SOLUTION INTRAVENOUS CONTINUOUS PRN
Status: DISCONTINUED | OUTPATIENT
Start: 2023-01-23 | End: 2023-01-23

## 2023-01-23 RX ORDER — VASOPRESSIN IN 0.9 % NACL 2 UNIT/2ML
SYRINGE (ML) INTRAVENOUS PRN
Status: DISCONTINUED | OUTPATIENT
Start: 2023-01-23 | End: 2023-01-23

## 2023-01-23 RX ORDER — LIDOCAINE 40 MG/G
CREAM TOPICAL
Status: CANCELLED | OUTPATIENT
Start: 2023-01-23

## 2023-01-23 RX ORDER — AMOXICILLIN 250 MG
1 CAPSULE ORAL 2 TIMES DAILY
Status: CANCELLED | OUTPATIENT
Start: 2023-01-23

## 2023-01-23 RX ORDER — MAGNESIUM SULFATE HEPTAHYDRATE 40 MG/ML
2 INJECTION, SOLUTION INTRAVENOUS ONCE
Status: COMPLETED | OUTPATIENT
Start: 2023-01-23 | End: 2023-01-23

## 2023-01-23 RX ADMIN — LIDOCAINE HYDROCHLORIDE 3 ML: 20 INJECTION, SOLUTION INFILTRATION; PERINEURAL at 09:50

## 2023-01-23 RX ADMIN — SODIUM CHLORIDE, POTASSIUM CHLORIDE, SODIUM LACTATE AND CALCIUM CHLORIDE: 600; 310; 30; 20 INJECTION, SOLUTION INTRAVENOUS at 08:56

## 2023-01-23 RX ADMIN — Medication 1 UNITS: at 11:00

## 2023-01-23 RX ADMIN — LIDOCAINE HYDROCHLORIDE 3 ML: 20 INJECTION, SOLUTION INFILTRATION; PERINEURAL at 10:01

## 2023-01-23 RX ADMIN — LIDOCAINE HYDROCHLORIDE 3 ML: 20 INJECTION, SOLUTION INFILTRATION; PERINEURAL at 10:07

## 2023-01-23 RX ADMIN — ACETAMINOPHEN 650 MG: 325 TABLET, FILM COATED ORAL at 04:43

## 2023-01-23 RX ADMIN — TRANEXAMIC ACID 1 G: 1 INJECTION, SOLUTION INTRAVENOUS at 10:43

## 2023-01-23 RX ADMIN — ONDANSETRON 4 MG: 2 INJECTION INTRAMUSCULAR; INTRAVENOUS at 10:58

## 2023-01-23 RX ADMIN — LIDOCAINE HYDROCHLORIDE 5 ML: 20 INJECTION, SOLUTION INFILTRATION; PERINEURAL at 09:53

## 2023-01-23 RX ADMIN — LIDOCAINE HYDROCHLORIDE 2 ML: 20 INJECTION, SOLUTION INFILTRATION; PERINEURAL at 09:48

## 2023-01-23 RX ADMIN — Medication 0.5 UNITS: at 10:54

## 2023-01-23 RX ADMIN — LIDOCAINE HYDROCHLORIDE 2 ML: 20 INJECTION, SOLUTION INFILTRATION; PERINEURAL at 10:12

## 2023-01-23 RX ADMIN — LIDOCAINE HYDROCHLORIDE 2 ML: 20 INJECTION, SOLUTION INFILTRATION; PERINEURAL at 09:51

## 2023-01-23 RX ADMIN — ACETAMINOPHEN 650 MG: 325 TABLET, FILM COATED ORAL at 20:12

## 2023-01-23 RX ADMIN — LIDOCAINE HYDROCHLORIDE 2 ML: 20 INJECTION, SOLUTION INFILTRATION; PERINEURAL at 09:08

## 2023-01-23 RX ADMIN — METOPROLOL TARTRATE 50 MG: 50 TABLET, FILM COATED ORAL at 04:15

## 2023-01-23 RX ADMIN — BUPIVACAINE 20 ML: 13.3 INJECTION, SUSPENSION, LIPOSOMAL INFILTRATION at 12:03

## 2023-01-23 RX ADMIN — LIDOCAINE HYDROCHLORIDE 2 ML: 20 INJECTION, SOLUTION INFILTRATION; PERINEURAL at 09:02

## 2023-01-23 RX ADMIN — METOPROLOL TARTRATE 50 MG: 50 TABLET, FILM COATED ORAL at 15:50

## 2023-01-23 RX ADMIN — OXYCODONE HYDROCHLORIDE 2.5 MG: 5 TABLET ORAL at 13:28

## 2023-01-23 RX ADMIN — LIDOCAINE HYDROCHLORIDE 2 ML: 20 INJECTION, SOLUTION INFILTRATION; PERINEURAL at 09:14

## 2023-01-23 RX ADMIN — LIDOCAINE HYDROCHLORIDE 2 ML: 20 INJECTION, SOLUTION INFILTRATION; PERINEURAL at 09:06

## 2023-01-23 RX ADMIN — LIDOCAINE HYDROCHLORIDE 3 ML: 20 INJECTION, SOLUTION INFILTRATION; PERINEURAL at 10:03

## 2023-01-23 RX ADMIN — BUPIVACAINE HYDROCHLORIDE 20 ML: 2.5 INJECTION, SOLUTION EPIDURAL; INFILTRATION; INTRACAUDAL; PERINEURAL at 12:03

## 2023-01-23 RX ADMIN — MAGNESIUM SULFATE HEPTAHYDRATE 2 G: 40 INJECTION, SOLUTION INTRAVENOUS at 20:12

## 2023-01-23 RX ADMIN — CEFAZOLIN 2 G: 1 INJECTION, POWDER, FOR SOLUTION INTRAMUSCULAR; INTRAVENOUS at 10:29

## 2023-01-23 RX ADMIN — Medication 25 MG: at 06:53

## 2023-01-23 RX ADMIN — POLYETHYLENE GLYCOL 3350 17 G: 17 POWDER, FOR SOLUTION ORAL at 08:26

## 2023-01-23 RX ADMIN — MORPHINE SULFATE 2 MG: 1 INJECTION EPIDURAL; INTRATHECAL; INTRAVENOUS at 10:52

## 2023-01-23 RX ADMIN — LIDOCAINE HYDROCHLORIDE 3 ML: 20 INJECTION, SOLUTION INFILTRATION; PERINEURAL at 08:34

## 2023-01-23 RX ADMIN — NOREPINEPHRINE BITARTRATE 0.03 MCG/KG/MIN: 0.06 INJECTION, SOLUTION INTRAVENOUS at 17:13

## 2023-01-23 RX ADMIN — MAGNESIUM SULFATE HEPTAHYDRATE 2 G: 40 INJECTION, SOLUTION INTRAVENOUS at 08:10

## 2023-01-23 RX ADMIN — LIDOCAINE HYDROCHLORIDE 2 ML: 20 INJECTION, SOLUTION INFILTRATION; PERINEURAL at 10:36

## 2023-01-23 RX ADMIN — POLYETHYLENE GLYCOL 3350 17 G: 17 POWDER, FOR SOLUTION ORAL at 21:18

## 2023-01-23 RX ADMIN — Medication 300 ML/HR: at 10:43

## 2023-01-23 RX ADMIN — MAGNESIUM OXIDE TAB 400 MG (241.3 MG ELEMENTAL MG) 400 MG: 400 (241.3 MG) TAB at 20:12

## 2023-01-23 RX ADMIN — LIDOCAINE HYDROCHLORIDE 3 ML: 20 INJECTION, SOLUTION INFILTRATION; PERINEURAL at 09:58

## 2023-01-23 RX ADMIN — MAGNESIUM SULFATE IN WATER 2 G: 40 INJECTION, SOLUTION INTRAVENOUS at 06:53

## 2023-01-23 RX ADMIN — SODIUM CHLORIDE, SODIUM GLUCONATE, SODIUM ACETATE, POTASSIUM CHLORIDE AND MAGNESIUM CHLORIDE: 526; 502; 368; 37; 30 INJECTION, SOLUTION INTRAVENOUS at 08:24

## 2023-01-23 RX ADMIN — POTASSIUM CHLORIDE 20 MEQ: 40 SOLUTION ORAL at 15:55

## 2023-01-23 RX ADMIN — SENNOSIDES 8.6 MG: 8.6 TABLET, COATED ORAL at 21:27

## 2023-01-23 RX ADMIN — INSULIN ASPART 1 UNITS: 100 INJECTION, SOLUTION INTRAVENOUS; SUBCUTANEOUS at 12:57

## 2023-01-23 RX ADMIN — LIDOCAINE HYDROCHLORIDE 2 ML: 20 INJECTION, SOLUTION INFILTRATION; PERINEURAL at 09:10

## 2023-01-23 RX ADMIN — LIDOCAINE HYDROCHLORIDE 3 ML: 20 INJECTION, SOLUTION INFILTRATION; PERINEURAL at 09:39

## 2023-01-23 RX ADMIN — LIDOCAINE HYDROCHLORIDE 3 ML: 20 INJECTION, SOLUTION INFILTRATION; PERINEURAL at 09:56

## 2023-01-23 RX ADMIN — FUROSEMIDE 20 MG: 10 INJECTION, SOLUTION INTRAVENOUS at 20:11

## 2023-01-23 RX ADMIN — SOTALOL HYDROCHLORIDE 80 MG: 80 TABLET ORAL at 08:01

## 2023-01-23 RX ADMIN — LIDOCAINE HYDROCHLORIDE 3 ML: 20 INJECTION, SOLUTION INFILTRATION; PERINEURAL at 10:52

## 2023-01-23 RX ADMIN — OXYCODONE HYDROCHLORIDE 2.5 MG: 5 TABLET ORAL at 20:12

## 2023-01-23 RX ADMIN — POTASSIUM CHLORIDE 20 MEQ: 40 SOLUTION ORAL at 20:12

## 2023-01-23 RX ADMIN — Medication 2 UNITS/HR: at 08:56

## 2023-01-23 RX ADMIN — SOTALOL HYDROCHLORIDE 80 MG: 80 TABLET ORAL at 20:16

## 2023-01-23 RX ADMIN — NOREPINEPHRINE BITARTRATE 0.02 MCG/KG/MIN: 0.06 INJECTION, SOLUTION INTRAVENOUS at 09:06

## 2023-01-23 RX ADMIN — ACETAMINOPHEN 975 MG: 325 TABLET ORAL at 13:02

## 2023-01-23 RX ADMIN — FUROSEMIDE 20 MG: 10 INJECTION, SOLUTION INTRAVENOUS at 06:54

## 2023-01-23 RX ADMIN — INSULIN ASPART 1 UNITS: 100 INJECTION, SOLUTION INTRAVENOUS; SUBCUTANEOUS at 18:20

## 2023-01-23 ASSESSMENT — ACTIVITIES OF DAILY LIVING (ADL)
ADLS_ACUITY_SCORE: 24
ADLS_ACUITY_SCORE: 25
ADLS_ACUITY_SCORE: 25
ADLS_ACUITY_SCORE: 24
ADLS_ACUITY_SCORE: 25

## 2023-01-23 NOTE — PLAN OF CARE
Anjali feeling well after delivery.  Obstetrically stable- fundus firm and lochia scant.  Encouraged Anjali initiate breast pump and plan is after family leaves. Mukund at  bedside and supportive with cares. Report to Pam DAILY RN

## 2023-01-23 NOTE — PROGRESS NOTES
[facility]    Cardiology Progress Note- Cardiology        Date of Admission:  2022     Assessment and Plan:   Anjali Carmen is a 32 year old female  with no significant past medical history, who presented as a transfer from [facility] to [facility] on 2022 for further management of newly diagnosed, acute decompensated systolic heart failure (LVEF 20%) and high risk delivery. Undergoing  today.      MFM available  at [phone]     Plan today:   - OR with MFM for high risk delivery  - 20 IV furosemide this AM for CVP 13  - NPO since MN, heparin held     Acute on Chronic Heart Failure with Reduced Ejection Fraction 2/2  NICM, pregnancy related with questionable familial component  RV failure  Note family history of father with CM at age 30. TTE  with EF 20-25%, thinned out LV wall, and notable regional wall motion abnormalities. Coronary angiogram  with no significat lesions seen.  Stage C, NYHA Class III    PA catheter placed at bedside   Overall, patient appears near-optimized from a pre-load perspective as CVP goal is 8-10. She has pulmonary hypertension however this is due to an elevated left-sided filling pressure (PCWP 24) in setting of heart failure rather than an elevated pulmonary vascular resistance as evidenced by PVR 1.61.   She is optimized for delivery from a heart failure perspective. If she deteriorates due to heart failure, additional support options would include an intra aortic balloon pump as inotropes would be contraindicated in the setting of her VT.     ACEi/ARB/ARNI: Contraindicated in pregnancy.   BB Metoprolol Tartrate increase to 50 mg q6h. Nursing: hold if HR<50 or SBP<90 and call Cards2  Aldosterone antagonist contraindicated due to pregnancy  SCD prophylaxis GDMT  Fluid status: Mild hypervolemia- lasix 20 mg x1 with CVP 13 this AM  - Lovenox given high risk for LV thrombus in  setting of antepartum, regional wall motion abnormalities, and low EF. Weekly LMWH level, goal 0.6-1, last 0.88 23.   > Anticoagulation held for spinal catheter and     - Cardiac MRI and genetic testing postpartum recommended  - Will discuss lifevest/ICD plan for post delivery         PAF, new onset   NSVT and frequent PVC and PAC's  Hypomagnesemia  [facility] [place], isolated episode, EKG unavailable for review. Currently in NSR at [facility] with frequent ectopy. [id number] score of 2 (sex, HF) - on AC as above for pregnancy and low EF.   - Appreciate EP consult.  - Sotalol 80 mg po BID, EKG this AM post administration 466. EKG times 5 following Sotalol loading.   - Metoprolol Tartrate 50 mg q6h, discussed with EP. Nursing: hold if HR<50 or sBP<95 and call Cards2.   - Lovenox as above (held)  - Mg per protocol- favor IV over PO replacement     High risk pregnancy  Gestational age [id number]. . Betamethasone (BMZ) given - at OSH for fetal lung maturity. OB US for fetal growth done on 22 with normal fetal findings and no abnormalities. Anticipated delivery date moved up to 23 per MFM.   - Appreciate MFM management, closely following with daily checks; can be reached via their pager above  - NST BID  - S/p  section consent on admission  - per MFM, BP goal 130/80, MAP >65   - Delivery today in OR,       Gestational DM.   QID blood glucose checks (fasting, and 1 hour after each meal)- increase frequency of checks given start of steroids                - goal blood glucose for AM fasting 95 mg/dL                - 1 hour postprandial check is under 140 mg/dL  - MFM following  -  monitoring to be determined by need for medications     Mild anemia, multifactorial  Due to pregnancy and HF. Hgb 10.2 on admission (MCV 79). Retic count 1.9%. Iron, ferritin, TIBC levels show AVINASH (say 6%). Hgh 9s-low 10s. IV iron 200 mg daily x5 (-). Hgb  stable mid 10s.   - limit blood draws     FEN: NPO  PROPHY:  Held for procedure  LINES:  PICC, RIJ PA  DISPO:  Pending delivery   CODE STATUS:  Full Code         Clinically Significant Risk Factors              # Hypoalbuminemia: Lowest albumin = 2.9 g/dL at 1/14/2023  7:18 AM, will monitor as appropriate                   This patient's care was discussed with Dr Monika Jones, attending cardiologist, who agrees with the assessment and plan unless otherwise indicated.    Elijah Carvajal MD [facility], PGY-4  Fellow, Cardiovascular Disease  ______________________________________________________________________    Interval History   No events overnight. Pain in right neck from PA catheter insertion has improved. Otherwise no new complaints. Delivery planned for this morning.     Physical Exam   Vital Signs: Temp: 97.6  F (36.4  C) Temp src: Oral BP: 92/46 Pulse: 71   Resp: 22 SpO2: 99 % O2 Device: None (Room air)    Weight: 298 lbs 0 oz  GENERAL: Appears alert and interacting appropriately.   HEENT: Eye symmetrical and free of discharge bilaterally.   NECK: Supple and without lymphadenopathy. JVD middle of neck upright.   CV: RRR, S1S2 present without murmur, rub, or gallop.   RESPIRATORY: Respirations regular, even, and unlabored. Lungs CTA throughout.   GI: Soft, gravid, and non distended with normoactive bowel sounds present in all quadrants. No tenderness, rebound, guarding. No organomegaly.   EXTREMITIES: No peripheral edema. All extremities are warm and well perfused.  NEUROLOGIC: Alert and interacting appropriately. No focal deficits.   MUSCULOSKELETAL: No joint swelling or tenderness.   SKIN: No jaundice. No rashes or lesions.         Data   I personally reviewed all laboratory and imaging data from the last 24 hours in addition to all available cardiology data.

## 2023-01-23 NOTE — PLAN OF CARE
Transfer to OR & C/S Delivery Note  Patient to OR at 0830 via bed with anesthesia.   FHR monitoring in OR during epidural bolus.  -145.  Delivered viable Male via  section by Dr. Power.   placed on sterile warmer by Dr. Power. NICU present and assumed care see delivery note. Mukund  present for delivery.

## 2023-01-23 NOTE — OP NOTE
[facility]  Operative Note     Date of Surgery: 2023    Patient: Anjali Carmen  MRN: [id number]  : 1990    Surgeon:  Yany Power MD    Assistants:  Jerald Carpenter MD PGY-4    Johnnie Garcia MD [facility] Fellow    Pre-operative Diagnosis:    - , IUP at 31w0d  - Worsening cardiac status   - Acute on chronic heart failure with reduced ejection fraction  - Non-ischemic cardiomyopathy   - Desire for sterilization   - A1 gestational diabetes mellitus  - Class III obesity    Post-operative Diagnosis:   - Same as above, now  s/p delivery by procedure below  - Liveborn male infant     Procedure:  Repeat low-transverse  section with single layer uterine closure via Pfannenstiel incision  Bilateral salpingectomy     Anesthesia: Epidural     QBL:  808 mL  IVF:  500 mL crystalloid  UOP:  200 mL clear urine at the end of the case    Drains: Bhardwaj Catheter     Specimens:   ID Type Source Tests Collected by Time Destination   1 : Placenta and cord Placenta Placenta SURGICAL PATHOLOGY EXAM Yany Power MD 2023 10:54 AM    2 : Fallopian tubes, bilateral Tissue Fallopian Tube, Bilateral SURGICAL PATHOLOGY EXAM Yany Power MD 2023 10:58 AM      Complications: None apparent     Indications:   Anjali Carmen is a 32 year old  at 31w0d initially admitted as a transfer of care from [facility] on 2022 with acute decompensated heart failure. The patient was stabilized, but developed acutely worsening cardiac status with increasing symptoms of chest heaviness, shortness of breath, and palpitations as well as episodes of non-sustained ventricular tachycardia. The risks, benefits, and alternatives of  section were discussed with the patient. She desired to proceed. Consent form signed. The patient additionally expressed desire for surgical sterilization for which bilateral salpingectomy was recommended.     Findings:   - Mild  recto-fascial adhesions, no intraabdominal adhesions   - Clear amniotic fluid  - Liveborn male infant in cephalic presentation at 1041 on 1/23/2023. Apgars 7 at 1 minute & 9 at 5 minutes. Weight 1860g.  - Normal uterus. Normal fallopian tubes and ovaries bilaterally. Hemostatic surgical pedicles following bilateral salpingectomy.      Procedure Details:   The patient was brought to the OR, where adequate epidural anesthesia was gradually administered with SCDs in place for DVT prophylaxis. Magnesium sulfate was administered for fetal neuroprotection and continued through delivery of the infant. She was placed in the dorsal supine position with a slight leftward tilt. Ancef was administered for perioperative antimicrobial prophylaxis. She was prepped and draped in the usual sterile fashion. A surgical time out was performed.     A Pfannenstiel skin incision was made with the scalpel through the patient's previous scar and carried down to the underlying fascia with sharp dissection. The fascia was incised in the midline, and the incision was extended bilaterally with Jacobson scissors. The superior aspect of the fascia was grasped with the Kocher clamps, elevated, and dissected off of the underlying rectus muscles with sharp and blunt dissection. The inferior aspect of the fascia was similarly dissected off of the underlying rectus muscles. The rectus muscles were  in the midline. The peritoneum was entered bluntly and the opening extended with digital pressure. The bladder blade was placed.    A transverse hysterotomy was made with the scalpel in the lower uterine segment, and the incision was extended with digital pressure. The vertex was elevated to the level of the hysterotomy and  the infant was delivered atraumatically. The shoulders delivered easily.  No nuchal cord was noted. The cord was doubly clamped and cut after 60 seconds, and the infant was handed off to the awaiting NICU staff. A segment of cord  was cut and cord gases obtained. The placenta was delivered with gentle traction on the umbilical cord and uterine massage.     The uterus was exteriorized and cleared of all clots and debris. Uterine tone was noted to be adequate with pitocin given through the running IV and uterine massage.  The hysterotomy was closed in running locked fashion with 0 Vicryl suture. The hysterotomy was noted to be hemostatic.     The right fallopian tube as grasped with Mobile clamps and elevated. The Ligasure device was used to serially cauterize and transected the underlying mesosalpinx until the cornua was reached. The right fallopian tube was divided at the cornua using the Ligasure. This process was then repeated in identical fashion to excised the left fallopian tube. The surgical mesosalpinx pedicles were examined and areas of oozing controlled with electrocautery with excellent hemostasis noted.     The uterus was returned to the abdomen. The pericolic gutters were cleared of all clots and debris. The hysterotomy was reexamined and noted to be hemostatic. The fascia and rectus muscles were examined and hemostatic.     The fascia was closed in simple running fashion with 0 looped PDS suture from each apex to meet in the midline. The subcutaneous tissue was irrigated and areas of oozing were controlled with electrocautery. The subcutaneous tissue was greater than 2 cm in thickness, and was therefore closed with 0 Vicryl suture in two layers. The skin was closed in running subcuticular fashion with 4-0 Monocryl suture. The incision was covered with a sterile dressing.    All sponge, needle, and instrument counts were correct x2. The patient tolerated the procedure well, and was transferred to recovery in stable condition. Dr. Power was present and scrubbed for the procedure.     Jerald Carpenter MD  Ob/Gyn Resident, PGY-4  01/23/23 11:46 AM

## 2023-01-23 NOTE — ANESTHESIA PROCEDURE NOTES
"Epidural catheter Procedure Note    Pre-Procedure   Staff -        Anesthesiologist:  Lea Byrne MD       Resident/Fellow: Joshua Burroughs MD       Performed By: resident       Location: OR       Procedure Start/Stop Times: 1/23/2023 9:28 AM and 1/23/2023 9:38 AM       Pre-Anesthestic Checklist: patient identified, IV checked, risks and benefits discussed, informed consent, monitors and equipment checked, pre-op evaluation, at physician/surgeon's request and post-op pain management  Timeout:       Correct Patient: Yes        Correct Procedure: Yes        Correct Site: Yes        Correct Position: Yes   Procedure Documentation  Procedure: epidural catheter       Patient Position: sitting       Patient Prep/Sterile Barriers: sterile gloves, mask, patient draped       Skin prep: Chloraprep       Local skin infiltrated with 3 mL of 1% lidocaine.  (midline approach).       Technique: LORT saline        Needle Type: Touhy needle       Needle Gauge: 17.        Needle Length (Inches): 3.5        Catheter: 19 G.          Catheter threaded easily.             # of attempts: 1 and  # of redirects:  0    Assessment/Narrative         Paresthesias: No.       Test dose of 3 mL at 09:37 CST.         Test dose negative, 3 minutes after injection, for signs of intravascular, subdural, or intrathecal injection.Test dose names: lidocaine 0.5% with 1:200,000 epinephrine.       Insertion/Infusion Method: LORT saline       Aspiration negative for Heme or CSF via Epidural Catheter.    Medication(s) Administered   Medication Administration Time: 1/23/2023 9:28 AM      FOR [facility] ([facility]) ONLY:   Pain Team Contact information: please page the Pain Team Via Re[employer]. Search \"Pain\". During daytime hours, please page the attending first. At night please page the resident first.    "

## 2023-01-23 NOTE — PROGRESS NOTES
Care Management Follow Up    Length of Stay (days): 48    Expected Discharge Date: TBD     Concerns to be Addressed:  Resources, support   Patient plan of care discussed at interdisciplinary rounds: Yes    Anticipated Discharge Disposition: TBD     Private pay costs discussed: insurance costs out of pocket expenses    Additional Information:  SW was asked to connect w/ NICU since pt had  this AM and baby will be in NICU. Pt's spouse Mukund has questions about accommodations and lodging. SW connected w/ NICU SW Sofia Fernandez who plans to connect w/ Mukund to go over NICU related topics and lodging. SW talked w/ charge RN who confirmed that Mukund will be able to stay the night w/ pt in ICU for a few nights until longer term lodging is figured out.     SW met w/ pt and Mukund in room to introduce role and assess current needs/questions. Pt has leave paperwork from employer that needs completing. Pt e-mailed documents to SW to print. SW printed documents and gave [place] paperwork to primary team to complete. SW also provided pt and Mukund w/ [employer] and [employer] gift cards in addition to meal tickets. Pt and Mukund expressed appreciation for assistance.    SW will continue to follow for support, resources, and discharge planning    NASIM Llamas, CASIMIRO  4A/4C   Ph: [phone]  Pager: [phone]

## 2023-01-23 NOTE — ANESTHESIA CARE TRANSFER NOTE
Patient: Anjali Carmen    Procedure: Procedure(s):  REPEAT  SECTION  BILATERAL TUBAL LIGATION, extracorporeal membrane oxygenation (ECMO) cannulation on standby       Diagnosis: Heart failure (H) [I50.9]  Diagnosis Additional Information: No value filed.    Anesthesia Type:   Epidural     Note:    Oropharynx: oropharynx clear of all foreign objects  Level of Consciousness: awake  Oxygen Supplementation: face mask    Independent Airway: airway patency satisfactory and stable  Dentition: dentition unchanged  Vital Signs Stable: post-procedure vital signs reviewed and stable  Report to RN Given: handoff report given  Patient transferred to: ICU    ICU Handoff: Call for PAUSE to initiate/utilize ICU HANDOFF, Identified Patient, Identified Responsible Provider, Reviewed the Pertinent Medical History, Discussed Surgical Course, Reviewed Intra-OP Anesthesia Management and Issues during Anesthesia, Set Expectations for Post Procedure Period and Allowed Opportunity for Questions and Acknowledgement of Understanding      Vitals:  Vitals Value Taken Time   BP 96/54 23 1405   Temp 36.7  C (98.1  F) 23 1300   Pulse 65 23 1410   Resp 21 23 1410   SpO2 99 % 23 1410   Vitals shown include unvalidated device data.    Electronically Signed By: Joshua Burroughs MD  2023  2:11 PM

## 2023-01-23 NOTE — PROGRESS NOTES
ICU End of Shift Summary. See flowsheets for vital signs and detailed assessment.    Changes this shift: Patient remains alert, oriented and following commands. PRN Tylenol x1 for c/o pain to [place] site. SB-SR 50s-70s with frequent PVCs and PACs. Afebrile. Normoensive. 2200 JUNIOR shows CO 7.8, CI 3.  RA. NPO @ 0000 for  in AM. Good UOP. No BM. Mag replaced per protocol. Spouse remains at bedside overnight. Continuous fetal monitoring by L&D nurse.     Plan: Plan for  in OR in AM. Continue to monitor.

## 2023-01-23 NOTE — BRIEF OP NOTE
[facility]    Brief Operative Note    Pre-operative diagnosis: Heart failure (H) [I50.9], worsening maternal cardiac status, pregnancy at 31w0d, satisfied parity  Post-operative diagnosis Same as pre-operative diagnosis    Procedure: Procedure(s):  REPEAT  SECTION  BILATERAL TUBAL LIGATION, extracorporeal membrane oxygenation (ECMO) cannulation on standby  Surgeon: Surgeon(s) and Role:     * Yany Power MD - Primary     * Jerald Carpenter MD - Resident - Assisting     * Johnnie Garcia MD - Fellow - Assisting  Anesthesia: General   Estimated Blood Loss: 808 mL     mL    UOP: 200 mL    Drains:  Bhardwaj, epidural  Specimens:   ID Type Source Tests Collected by Time Destination   1 : Placenta and cord Placenta Placenta SURGICAL PATHOLOGY EXAM Yany Power MD 2023 10:54 AM    2 : Fallopian tubes, bilateral Tissue Fallopian Tube, Bilateral SURGICAL PATHOLOGY EXAM Yany Power MD 2023 10:58 AM      Findings:   Single liveborn male delivered out of cephalic presentation, apgars and weight pending.  Complications: None.  Implants: * No implants in log *     Johnnie Garcia MD  Maternal-Fetal Medicine Fellow

## 2023-01-23 NOTE — ANESTHESIA PROCEDURE NOTES
Pt is aware of normal results TAP Procedure Note    Pre-Procedure   Staff -        Anesthesiologist:  Adebayo Rubio MD       Resident/Fellow: Joshua Burroughs MD       Performed By: resident       Location: OR       Pre-Anesthestic Checklist: patient identified, IV checked, site marked, risks and benefits discussed, informed consent, monitors and equipment checked, pre-op evaluation, at physician/surgeon's request and post-op pain management  Timeout:       Correct Patient: Yes        Correct Procedure: Yes        Correct Site: Yes        Correct Position: Yes        Correct Laterality: Yes        Site Marked: Yes  Procedure Documentation  Procedure: TAP       Diagnosis: POST OPERATIVE PAIN       Laterality: bilateral       Patient Position: supine       Patient Prep/Sterile Barriers: sterile gloves, mask       Skin prep: Chloraprep       Needle Type: short bevel       Needle Gauge: 21.        Needle Length (millimeters): 110        Ultrasound guided       1. Ultrasound was used to identify targeted nerve, plexus, vascular marker, or fascial plane and place a needle adjacent to it in real-time.       2. Ultrasound was used to visualize the spread of anesthetic in close proximity to the above referenced structure.       3. A permanent image is entered into the patient's record.       4. The visualized anatomic structures appeared normal.       5. There were no apparent abnormal pathologic findings.    Assessment/Narrative         The placement was negative for: blood aspirated, painful injection and site bleeding       Paresthesias: No.       Bolus given via needle. no blood aspirated via catheter.        Secured via.        Insertion/Infusion Method: Single Shot       Complications: none       Injection made incrementally with aspirations every 5 mL.    Medication(s) Administered   Bupivacaine 0.25% PF (Infiltration) - Infiltration   20 mL - 1/23/2023 12:03:00 PM  Bupivacaine liposome (Exparel) 1.3% LA inj susp (Infiltration) -  "Infiltration   20 mL - 1/23/2023 12:03:00 PM    FOR [facility] ([facility]) ONLY:   Pain Team Contact information: please page the Pain Team Via S[employer]. Search \"Pain\". During daytime hours, please page the attending first. At night please page the resident first.    "

## 2023-01-23 NOTE — PLAN OF CARE
Patient on unit 4C (MICU) while receiving postpartum care. Family is at the bedside, and infant is in NICU - Social Work is helping family with resources/information regarding visiting and staying on campus. Received report from Elaine CHANCE RN and met with the patient and spouse Mukund. Unit and room orientation not required. Call light within arms reach; no concerns present at this time. Continue with plan of care.

## 2023-01-23 NOTE — PLAN OF CARE
Assumed OB care at 2300. Category 1 FHR, toco quiet. Patient denies leakage of fluid, vaginal bleeding, cramping or contractions. Anticipate delivery via  section later today. Questions encouraged and answered. Breast pump supplies at bedside, awaiting pump from [facility]. Support person at bedside Report given to L&D nurse, EARLE Henry.

## 2023-01-23 NOTE — ANESTHESIA PREPROCEDURE EVALUATION
Anesthesia Pre-Procedure Evaluation    Patient: Anjali Carmen   MRN: [id number] : 1990        Procedure : Procedure(s):  REPEAT  SECTION  LAPAROTOMY, WITH BILATERAL TUBAL LIGATION, POSSIBLE extracorporeal membrane oxygenation (ECMO) cannulation          No past medical history on file.   Past Surgical History:   Procedure Laterality Date      SECTION       CV CORONARY ANGIOGRAM N/A 2022    Procedure: Coronary Angiogram;  Surgeon: Ton Khanna MD;  Location:  HEART CARDIAC CATH LAB     PICC DOUBLE LUMEN PLACEMENT Left 2022    Left Basilic, 50 cm, 2 cm external length      No Known Allergies   Social History     Tobacco Use     Smoking status: Not on file     Smokeless tobacco: Not on file   Substance Use Topics     Alcohol use: Not on file      Wt Readings from Last 1 Encounters:   23 135.2 kg (298 lb)              OUTSIDE LABS:  CBC:   Lab Results   Component Value Date    WBC 14.1 (H) 2023    WBC 9.5 2023    HGB 10.1 (L) 2023    HGB 9.7 (L) 2023    HCT 31.7 (L) 2023    HCT 31.0 (L) 2023     2023     2023     BMP:   Lab Results   Component Value Date     (L) 2023     (L) 2023    POTASSIUM 4.0 2023    POTASSIUM 4.6 2023    CHLORIDE 102 2023    CHLORIDE 104 2023    CO2 14 (L) 2023    CO2 15 (L) 2023    BUN 11.4 2023    BUN 10.5 2023    CR 0.62 2023    CR 0.72 2023     (H) 2023     (H) 2023     COAGS:   Lab Results   Component Value Date    PTT 31 2023    INR 1.21 (H) 2023    FIBR 740 (H) 2023     POC: No results found for: BGM, HCG, HCGS  HEPATIC:   Lab Results   Component Value Date    ALBUMIN 3.4 (L) 2023    PROTTOTAL 7.3 2023    ALT 19 2023    AST 30 2023    ALKPHOS 112 (H) 2023    BILITOTAL 0.3 2023     OTHER:   Lab Results   Component Value  Date    LACT 1.2 12/06/2022    A1C 5.8 (H) 12/06/2022    AYDEN 9.4 01/22/2023    PHOS 3.5 01/05/2023    MAG 2.0 01/22/2023    TSH 1.78 01/14/2023    CRP 19.80 (H) 01/14/2023       Anesthesia Plan    ASA Status:  4      Anesthesia Type: Epidural.         Techniques and Equipment:     - Lines/Monitors: 2nd IV, Arterial Line, CVL in situ, PAC     - Blood: Blood in Room, PRBC (4 units in the room)     Consents            Postoperative Care            Comments:    Other Comments: Pre-epidural arterial line. Has PAC in place from Cardiology. Plan for dural puncture epidural with slow titration of epidural anesthesia balanced with pressor infusion vasopressin vs norepinephrine, will have epinephrine in line as needed for inotropic support which may be needed with increased volume after delivery from autotransfusion. Nitric oxide in the room.     Epi, NE, Vaso, gtt in line,  TXA 1G bolus available.             Adebayo Rubio MD

## 2023-01-23 NOTE — PROGRESS NOTES
Patient had 18 beats of vtach at 13:35. MD notified. MAP remains above 65 with SBP 88-92. 1600 dose of metoprolol given. HR ranging between 52-80 with frequent PVCs and PACs. QRS 0.08, QT 0.41, AZ 0.16.

## 2023-01-23 NOTE — ANESTHESIA PROCEDURE NOTES
Arterial Line Procedure Note    Pre-Procedure   Staff -        Anesthesiologist:  Adebayo Rubio MD       Resident/Fellow: Joshua Burroughs MD       Performed By: resident       Location: OR       Pre-Anesthestic Checklist: patient identified, IV checked, risks and benefits discussed, informed consent, monitors and equipment checked and pre-op evaluation  Timeout:       Correct Patient: Yes        Correct Procedure: Yes        Correct Site: Yes        Correct Position: Yes   Line Placement:   This line was placed Pre Induction starting at 1/23/2023 8:34 AM and ending at 1/23/2023 8:44 AM  Procedure   Procedure: arterial line and new line       Laterality: left       Insertion Site: radial.  Sterile Prep        Standard elements of sterile barrier followed       Skin prep: Chloraprep  Insertion/Injection        Technique: ultrasound guided and Seldinger Technique        1. Ultrasound was used to evaluate the access site.       2. Artery evaluated via ultrasound for patency/adequacy.       3. Using real-time ultrasound the needle/catheter was observed entering the artery/vein.       5. The visualized structures were anatomically normal.       6. There were no apparent abnormal pathologic findings.       Catheter Type/Size: 20 G, 12 cm  Narrative         Secured by: suture       Tegaderm dressing used.       Complications: None apparent,        Arterial waveform: Yes        IBP within 10% of NIBP: Yes

## 2023-01-23 NOTE — ANESTHESIA PROCEDURE NOTES
"Epidural catheter Procedure Note    Pre-Procedure   Staff -        Anesthesiologist:  Lea Byrne MD       Resident/Fellow: Joshua Burroughs MD       Performed By: resident       Location: OR       Procedure Start/Stop Times: 1/23/2023 8:47 AM       Pre-Anesthestic Checklist: patient identified, IV checked, risks and benefits discussed, informed consent, monitors and equipment checked, pre-op evaluation, at physician/surgeon's request and post-op pain management  Timeout:       Correct Patient: Yes        Correct Procedure: Yes        Correct Site: Yes        Correct Position: Yes   Procedure Documentation  Procedure: epidural catheter       Patient Position: sitting       Patient Prep/Sterile Barriers: sterile gloves, mask, patient draped       Skin prep: Chloraprep       Local skin infiltrated with 3 mL of 1% lidocaine.  (midline approach).       Technique: LORT saline        JAYNE at 8.5 cm.       Needle Type: Xanofi needle       Needle Gauge: 17.        Needle Length (Inches): 3.5           Catheter threaded easily.         4 cm epidural space.         Threaded 12.5 cm at skin.         # of attempts: 1 and  # of redirects:  0    Assessment/Narrative         Paresthesias: No.       Test dose of 3 mL lidocaine 1.5% w/ 1:200,000 epinephrine at.         Test dose negative, 3 minutes after injection, for signs of intravascular, subdural, or intrathecal injection.       Insertion/Infusion Method: LORT saline       Aspiration negative for Heme or CSF via Epidural Catheter.    Medication(s) Administered   Medication Administration Time: 1/23/2023 8:47 AM      FOR [facility] ([facility]) ONLY:   Pain Team Contact information: please page the Pain Team Via [employer]. Search \"Pain\". During daytime hours, please page the attending first. At night please page the resident first.    "

## 2023-01-24 ENCOUNTER — MEDICAL CORRESPONDENCE (OUTPATIENT)
Dept: HEALTH INFORMATION MANAGEMENT | Facility: CLINIC | Age: 33
End: 2023-01-24
Payer: COMMERCIAL

## 2023-01-24 LAB
ALBUMIN SERPL BCG-MCNC: 3.2 G/DL (ref 3.5–5.2)
ALP SERPL-CCNC: 91 U/L (ref 35–104)
ALT SERPL W P-5'-P-CCNC: 27 U/L (ref 10–35)
ANION GAP SERPL CALCULATED.3IONS-SCNC: 13 MMOL/L (ref 7–15)
AST SERPL W P-5'-P-CCNC: 32 U/L (ref 10–35)
BASE EXCESS BLDV CALC-SCNC: -0.1 MMOL/L (ref -7.7–1.9)
BASE EXCESS BLDV CALC-SCNC: -0.4 MMOL/L (ref -7.7–1.9)
BASE EXCESS BLDV CALC-SCNC: 0.5 MMOL/L (ref -7.7–1.9)
BASE EXCESS BLDV CALC-SCNC: 2 MMOL/L (ref -7.7–1.9)
BILIRUB SERPL-MCNC: 0.2 MG/DL
BUN SERPL-MCNC: 12.7 MG/DL (ref 6–20)
CALCIUM SERPL-MCNC: 9 MG/DL (ref 8.6–10)
CHLORIDE SERPL-SCNC: 105 MMOL/L (ref 98–107)
CREAT SERPL-MCNC: 0.64 MG/DL (ref 0.51–0.95)
DEPRECATED HCO3 PLAS-SCNC: 20 MMOL/L (ref 22–29)
ERYTHROCYTE [DISTWIDTH] IN BLOOD BY AUTOMATED COUNT: 18.6 % (ref 10–15)
GFR SERPL CREATININE-BSD FRML MDRD: >90 ML/MIN/1.73M2
GLUCOSE BLDC GLUCOMTR-MCNC: 123 MG/DL (ref 70–99)
GLUCOSE BLDC GLUCOMTR-MCNC: 84 MG/DL (ref 70–99)
GLUCOSE BLDC GLUCOMTR-MCNC: 97 MG/DL (ref 70–99)
GLUCOSE BLDC GLUCOMTR-MCNC: 99 MG/DL (ref 70–99)
GLUCOSE BLDC GLUCOMTR-MCNC: 99 MG/DL (ref 70–99)
GLUCOSE SERPL-MCNC: 117 MG/DL (ref 70–99)
HCO3 BLDV-SCNC: 25 MMOL/L (ref 21–28)
HCO3 BLDV-SCNC: 25 MMOL/L (ref 21–28)
HCO3 BLDV-SCNC: 26 MMOL/L (ref 21–28)
HCO3 BLDV-SCNC: 27 MMOL/L (ref 21–28)
HCT VFR BLD AUTO: 31.5 % (ref 35–47)
HGB BLD-MCNC: 9.9 G/DL (ref 11.7–15.7)
MAGNESIUM SERPL-MCNC: 1.5 MG/DL (ref 1.7–2.3)
MAGNESIUM SERPL-MCNC: 2.4 MG/DL (ref 1.7–2.3)
MCH RBC QN AUTO: 26.5 PG (ref 26.5–33)
MCHC RBC AUTO-ENTMCNC: 31.4 G/DL (ref 31.5–36.5)
MCV RBC AUTO: 84 FL (ref 78–100)
O2/TOTAL GAS SETTING VFR VENT: 21 %
OXYHGB MFR BLDV: 52 % (ref 70–75)
OXYHGB MFR BLDV: 56 % (ref 70–75)
OXYHGB MFR BLDV: 57 % (ref 70–75)
OXYHGB MFR BLDV: 60 % (ref 70–75)
PCO2 BLDV: 40 MM HG (ref 40–50)
PCO2 BLDV: 43 MM HG (ref 40–50)
PCO2 BLDV: 44 MM HG (ref 40–50)
PCO2 BLDV: 47 MM HG (ref 40–50)
PH BLDV: 7.35 [PH] (ref 7.32–7.43)
PH BLDV: 7.36 [PH] (ref 7.32–7.43)
PH BLDV: 7.41 [PH] (ref 7.32–7.43)
PH BLDV: 7.41 [PH] (ref 7.32–7.43)
PLATELET # BLD AUTO: 196 10E3/UL (ref 150–450)
PO2 BLDV: 33 MM HG (ref 25–47)
PO2 BLDV: 34 MM HG (ref 25–47)
PO2 BLDV: 34 MM HG (ref 25–47)
PO2 BLDV: 35 MM HG (ref 25–47)
POTASSIUM SERPL-SCNC: 4.2 MMOL/L (ref 3.4–5.3)
POTASSIUM SERPL-SCNC: 4.5 MMOL/L (ref 3.4–5.3)
PROT SERPL-MCNC: 6.4 G/DL (ref 6.4–8.3)
RBC # BLD AUTO: 3.74 10E6/UL (ref 3.8–5.2)
SODIUM SERPL-SCNC: 138 MMOL/L (ref 136–145)
WBC # BLD AUTO: 12.1 10E3/UL (ref 4–11)

## 2023-01-24 PROCEDURE — 82805 BLOOD GASES W/O2 SATURATION: CPT | Performed by: STUDENT IN AN ORGANIZED HEALTH CARE EDUCATION/TRAINING PROGRAM

## 2023-01-24 PROCEDURE — 99233 SBSQ HOSP IP/OBS HIGH 50: CPT | Mod: 25 | Performed by: INTERNAL MEDICINE

## 2023-01-24 PROCEDURE — 83735 ASSAY OF MAGNESIUM: CPT | Performed by: INTERNAL MEDICINE

## 2023-01-24 PROCEDURE — 250N000011 HC RX IP 250 OP 636

## 2023-01-24 PROCEDURE — 250N000013 HC RX MED GY IP 250 OP 250 PS 637: Performed by: NURSE PRACTITIONER

## 2023-01-24 PROCEDURE — 250N000013 HC RX MED GY IP 250 OP 250 PS 637: Performed by: STUDENT IN AN ORGANIZED HEALTH CARE EDUCATION/TRAINING PROGRAM

## 2023-01-24 PROCEDURE — 84132 ASSAY OF SERUM POTASSIUM: CPT | Performed by: STUDENT IN AN ORGANIZED HEALTH CARE EDUCATION/TRAINING PROGRAM

## 2023-01-24 PROCEDURE — 250N000011 HC RX IP 250 OP 636: Performed by: STUDENT IN AN ORGANIZED HEALTH CARE EDUCATION/TRAINING PROGRAM

## 2023-01-24 PROCEDURE — 250N000011 HC RX IP 250 OP 636: Performed by: INTERNAL MEDICINE

## 2023-01-24 PROCEDURE — 250N000013 HC RX MED GY IP 250 OP 250 PS 637

## 2023-01-24 PROCEDURE — 200N000002 HC R&B ICU UMMC

## 2023-01-24 PROCEDURE — 85027 COMPLETE CBC AUTOMATED: CPT | Performed by: STUDENT IN AN ORGANIZED HEALTH CARE EDUCATION/TRAINING PROGRAM

## 2023-01-24 PROCEDURE — 250N000013 HC RX MED GY IP 250 OP 250 PS 637: Performed by: INTERNAL MEDICINE

## 2023-01-24 PROCEDURE — 250N000011 HC RX IP 250 OP 636: Performed by: NURSE PRACTITIONER

## 2023-01-24 PROCEDURE — 84132 ASSAY OF SERUM POTASSIUM: CPT | Performed by: NURSE PRACTITIONER

## 2023-01-24 RX ORDER — FUROSEMIDE 10 MG/ML
20 INJECTION INTRAMUSCULAR; INTRAVENOUS ONCE
Status: COMPLETED | OUTPATIENT
Start: 2023-01-24 | End: 2023-01-24

## 2023-01-24 RX ORDER — ENALAPRIL MALEATE 2.5 MG/1
2.5 TABLET ORAL DAILY
Status: DISCONTINUED | OUTPATIENT
Start: 2023-01-24 | End: 2023-01-31 | Stop reason: HOSPADM

## 2023-01-24 RX ORDER — MAGNESIUM OXIDE 400 MG/1
800 TABLET ORAL 2 TIMES DAILY
Status: DISCONTINUED | OUTPATIENT
Start: 2023-01-24 | End: 2023-01-31 | Stop reason: HOSPADM

## 2023-01-24 RX ORDER — MAGNESIUM SULFATE HEPTAHYDRATE 40 MG/ML
4 INJECTION, SOLUTION INTRAVENOUS ONCE
Status: COMPLETED | OUTPATIENT
Start: 2023-01-24 | End: 2023-01-24

## 2023-01-24 RX ORDER — OXYCODONE HYDROCHLORIDE 5 MG/1
5 TABLET ORAL EVERY 4 HOURS PRN
Status: DISCONTINUED | OUTPATIENT
Start: 2023-01-24 | End: 2023-01-27

## 2023-01-24 RX ORDER — FUROSEMIDE 10 MG/ML
40 INJECTION INTRAMUSCULAR; INTRAVENOUS 2 TIMES DAILY
Status: DISCONTINUED | OUTPATIENT
Start: 2023-01-24 | End: 2023-01-25

## 2023-01-24 RX ORDER — ACETAMINOPHEN 325 MG/1
975 TABLET ORAL EVERY 6 HOURS
Status: DISCONTINUED | OUTPATIENT
Start: 2023-01-24 | End: 2023-01-31 | Stop reason: HOSPADM

## 2023-01-24 RX ORDER — ENOXAPARIN SODIUM 150 MG/ML
120 INJECTION SUBCUTANEOUS EVERY 12 HOURS
Status: DISCONTINUED | OUTPATIENT
Start: 2023-01-24 | End: 2023-01-28

## 2023-01-24 RX ADMIN — PRENATAL VIT W/ FE FUMARATE-FA TAB 27-0.8 MG 1 TABLET: 27-0.8 TAB at 09:21

## 2023-01-24 RX ADMIN — MAGNESIUM SULFATE IN WATER 4 G: 40 INJECTION, SOLUTION INTRAVENOUS at 06:29

## 2023-01-24 RX ADMIN — OXYCODONE HYDROCHLORIDE 5 MG: 5 TABLET ORAL at 21:52

## 2023-01-24 RX ADMIN — OXYCODONE HYDROCHLORIDE 2.5 MG: 5 TABLET ORAL at 09:33

## 2023-01-24 RX ADMIN — FUROSEMIDE 40 MG: 10 INJECTION, SOLUTION INTRAVENOUS at 13:12

## 2023-01-24 RX ADMIN — MAGNESIUM OXIDE TAB 400 MG (241.3 MG ELEMENTAL MG) 400 MG: 400 (241.3 MG) TAB at 09:20

## 2023-01-24 RX ADMIN — FUROSEMIDE 20 MG: 10 INJECTION, SOLUTION INTRAVENOUS at 01:25

## 2023-01-24 RX ADMIN — Medication 25 MG: at 06:26

## 2023-01-24 RX ADMIN — ENOXAPARIN SODIUM 120 MG: 120 INJECTION SUBCUTANEOUS at 20:52

## 2023-01-24 RX ADMIN — POLYETHYLENE GLYCOL 3350 17 G: 17 POWDER, FOR SOLUTION ORAL at 15:49

## 2023-01-24 RX ADMIN — SOTALOL HYDROCHLORIDE 80 MG: 80 TABLET ORAL at 20:51

## 2023-01-24 RX ADMIN — ENALAPRIL MALEATE 2.5 MG: 2.5 TABLET ORAL at 20:51

## 2023-01-24 RX ADMIN — OXYCODONE HYDROCHLORIDE 2.5 MG: 5 TABLET ORAL at 06:26

## 2023-01-24 RX ADMIN — OXYCODONE HYDROCHLORIDE 5 MG: 5 TABLET ORAL at 10:28

## 2023-01-24 RX ADMIN — ACETAMINOPHEN 975 MG: 325 TABLET, FILM COATED ORAL at 15:44

## 2023-01-24 RX ADMIN — SOTALOL HYDROCHLORIDE 80 MG: 80 TABLET ORAL at 09:21

## 2023-01-24 RX ADMIN — MAGNESIUM SULFATE HEPTAHYDRATE 2 G: 40 INJECTION, SOLUTION INTRAVENOUS at 20:52

## 2023-01-24 RX ADMIN — POTASSIUM CHLORIDE 20 MEQ: 40 SOLUTION ORAL at 13:12

## 2023-01-24 RX ADMIN — OXYCODONE HYDROCHLORIDE 5 MG: 5 TABLET ORAL at 15:44

## 2023-01-24 RX ADMIN — POTASSIUM CHLORIDE 20 MEQ: 40 SOLUTION ORAL at 09:21

## 2023-01-24 RX ADMIN — MAGNESIUM SULFATE HEPTAHYDRATE 2 G: 40 INJECTION, SOLUTION INTRAVENOUS at 09:18

## 2023-01-24 RX ADMIN — ACETAMINOPHEN 975 MG: 325 TABLET, FILM COATED ORAL at 20:51

## 2023-01-24 RX ADMIN — ACETAMINOPHEN 650 MG: 325 TABLET, FILM COATED ORAL at 06:26

## 2023-01-24 RX ADMIN — MAGNESIUM OXIDE TAB 400 MG (241.3 MG ELEMENTAL MG) 800 MG: 400 (241.3 MG) TAB at 13:13

## 2023-01-24 RX ADMIN — SENNOSIDES 8.6 MG: 8.6 TABLET, COATED ORAL at 09:20

## 2023-01-24 RX ADMIN — MAGNESIUM OXIDE TAB 400 MG (241.3 MG ELEMENTAL MG) 800 MG: 400 (241.3 MG) TAB at 20:52

## 2023-01-24 RX ADMIN — POTASSIUM CHLORIDE 20 MEQ: 40 SOLUTION ORAL at 20:52

## 2023-01-24 RX ADMIN — POLYETHYLENE GLYCOL 3350 17 G: 17 POWDER, FOR SOLUTION ORAL at 09:21

## 2023-01-24 ASSESSMENT — ACTIVITIES OF DAILY LIVING (ADL)
ADLS_ACUITY_SCORE: 26
ADLS_ACUITY_SCORE: 25
ADLS_ACUITY_SCORE: 28
ADLS_ACUITY_SCORE: 28
ADLS_ACUITY_SCORE: 25
ADLS_ACUITY_SCORE: 28
ADLS_ACUITY_SCORE: 25
ADLS_ACUITY_SCORE: 28

## 2023-01-24 NOTE — LACTATION NOTE
"This note was copied from a baby's chart.  Anjali's RN Laxmi in the ICU called; mom declining to pump for now due to discomfort with line in her neck, but wants to start \"in a few days\".  Mom's team wanting clarity on feeding plan to know which medications to prescribe.  Mom currently sleeping; Laxmi will connect with me later this afternoon to see whether mom has a plan yet and whether it's a good time to talk.     Tanya Gutierrez, RNC, IBCLC    "

## 2023-01-24 NOTE — PLAN OF CARE
ICU End of Shift Summary. See flowsheets for vital signs and detailed assessment.    Changes this shift: Pt more bradycardic overnight, HR mostly mid 40s-upper 50s. Pt had 37 beat run VT at approximately 2245. Pt denied dizziness or SOB after episode. Cards crosscover notified. K checked and stable. Scheduled metoprolol doses held overnight. Per cards crosscover, ok to given 20:00 dose of sotalol. CO 4.4, 5.3, CI 1.7, 2.1. CVP 13 and 11. One-time lasix doses given x2 with good response. Pt on and off low-dose levo overnight for MAP goal 65. L&D RN overnight for fundus checks. Scant bleeding on abdominal incision dressing, minimal bleeding per vagina. Oxy and tylenol given for abdominal incision pain with good effect. SO, Mukund at bedside throughout night.    Plan: Continue to monitor hemodynamics, wean Levo as able. Monitor for bleeding, recheck lytes as needed. Continue plan of care.     Goal Outcome Evaluation:       Overall Patient Progress: no changeOverall Patient Progress: no change

## 2023-01-24 NOTE — ANESTHESIA POSTPROCEDURE EVALUATION
Patient: Anjali Carmen    Procedure: Procedure(s):  REPEAT  SECTION  BILATERAL TUBAL LIGATION, extracorporeal membrane oxygenation (ECMO) cannulation on standby       Anesthesia Type:  Epidural    Note:  Disposition: ICU            ICU Sign Out: Anesthesiologist/ICU physician sign out WAS performed   Postop Pain Control: Uneventful            Sign Out: Well controlled pain   PONV: No   Neuro/Psych: Uneventful            Sign Out: Acceptable/Baseline neuro status   Airway/Respiratory: Uneventful            Sign Out: Acceptable/Baseline resp. status   CV/Hemodynamics: Uneventful            Sign Out: Acceptable CV status; No obvious hypovolemia; No obvious fluid overload   Other NRE: NONE   DID A NON-ROUTINE EVENT OCCUR? No    Event details/Postop Comments:  Transported on 1unit vaso gtt, turned off upon arrival to room with MAP 85. Hemodynamically stable throughout.            Last vitals:  Vitals:    23 1130 23 1145 23 1200   BP: 98/54 100/60 101/57   Pulse: 51 (!) 48 57   Resp: 15 16 19   Temp:   36.6  C (97.9  F)   SpO2: 97% 98% 98%       Electronically Signed By: Adebayo Rubio MD  2023  12:20 PM

## 2023-01-24 NOTE — PROGRESS NOTES
ICU End of Shift Summary. See flowsheets for vital signs and detailed assessment.    Changes this shift: Patient is A &O x4, PRN tylenol and oxycodone to manage pain. Up with stand-by assist. Patient is on room air, dyspnea on exertion is improving. Lung sounds clear.  Levophed at 0.03 to maintain MAP>65. HR 50s-70s, with frequent PACs and PVCs, one run of vtach 18 beats. Afebrile. [place] locked at 58.  Ficks Q6hr, see flowsheets. 2gNa diet and 2L fluid restriction. Good appetite. No BM, miralax given. Bowel sounds audible.  Bhardwaj to remain in place until POD 1. Abdominal incision dressing remains CDI; otherwise skin intact.  at bedside, supportive and attentive to patient. Video stream available for patient to view baby in NICU.     Plan: Manage pain. Wean Levophed. Ficks Q6hr.  Remain on tele monitoring. Encourage rest. L&D nurse to assist with monitoring fundus and lochia, also with breast pumping.

## 2023-01-24 NOTE — LACTATION NOTE
NICU LACTATION TIP SHEET     ?   Thank you so much for supporting mom's lactation while she is  from her NICU baby!? Please reach out to us in our office at [phone] or [phone] [phone] with questions.         Never throw mom s milk away.  We will assess her medications daily and develop a plan for safe use of her milk.       NICU lactation will speak with mom either over the phone or after transfer back to [facility] for lactation assessment and teaching (depending on her condition).   We will assist mom with home pump at discharge time, so make sure she (or a family member) stops here before heading home after discharge from your unit.   ?     Please order a Symphony breast pump, breast pumping kit, belly band (to make a hands-free pumping bra) ?and mini fridge from [facility] to keep in mom's room.? You will also need a wash basin, a bottle brush, dish soap, and microwave sanitizing bag.  Pumping should be comfortable; there are 2 sizes of flanges, fit should not be tight.  [facility] has more flange sizes as needed.   ?     Let NICU know if you need breast milk labels, bottles, microwave sanitizing bags; they can be sent over.       Goal is to?express within 1 hour of delivery to assure best long-term supply.? Assist mom in double pumping and hand expressing every 2-3 hours daytime, 3-4 hours nighttime, to total 8 times in 24 hours.? If mom looks at pictures or videos of her baby while pumping, that will help.  It is normal to get small amounts or nothing the first couple of days of pumping.        If there is concern for COVID-19: mom will wash hands and breasts before pumping, wear a mask, and avoid coughing or sneezing on the breast pump kit or storage bottles.        Label each bottle with name sticker, date and time.? If only getting drops of colostrum, collect with q-tips?and place in labeled collecting bottle.       Send milk via  or family member to [facility] as often as possible.       After each pumping  session, take parts apart, scrub with hot soapy water in a washing bucket, rinse, lay out to air dry until next pumping session.? Do not leave pump parts to soak or place parts directly in sink.? Sanitize once every 24 hours in microwave sanitizing bag.  Please see CDC printable tip sheet:     https://www.cdc.gov/healthywater/pdf/hygiene/breast-pump-fact-sheet-p.pdf     ?   Videos on hand expression:     https://[email or web address]/professional-education/breastfeeding/hand-expressing-milk.html     [email or web address]?      https://www.unicef.org.uk/babyfriendly/baby-friendly-resources/breastfeeding-resources/hand-expression-video/?      https://globalhealthmedia.org/portfolio-items/how-to-express-breastmilk/     ?   Video on hands-on double pumping (can make a hands-free pumping bra from an abdominal binder):     https://med.[place].edu/newborns/professional-education/breastfeeding/maximizing-milk-production.html     ?     ?         ?

## 2023-01-24 NOTE — PROGRESS NOTES
Maternal-Fetal Medicine Progress Note    S: Anjali overall is doing okay. Her pain was better controlled last night, though it is severe at this time. She fell a little in bed and so is having pain from that. She denies any lightheadedness, dizziness, chest pain, or shortness of breath. Had episode of Vtach this morning. She has been eating and drinking without nausea or emesis. Vaginal bleeding is scant and lighter than a period. Baby boy is doing well and is in the NICU. She is planning to breastfeed, but is waiting until lines are removed from her neck.    O:  Vitals:    23 1145 23 1200 23 1215 23 1315   BP: 100/60 101/57 95/61 97/57   BP Location:  Right arm     Cuff Size:  Adult Large     Pulse: (!) 48 57 58 64   Resp: 16 19 21 20   Temp:  97.9  F (36.6  C)     TempSrc:  Oral     SpO2: 98% 98% 99% 97%   Weight:       Height:         General:  Alert, oriented  CV: Regular rate  Pulm: Clear to auscultation bilateral fields  Abdomen: Non-distended, island dressing in place with minimal/dried blood strike through, +BS, soft, tender to deep palpation, no rebound or guarding, fundus not able to be palpated  Extrem: Trace bilateral LE edema, no calf tenderness      Intake/Output Summary (Last 24 hours) at 2023 1445  Last data filed at 2023 1400  Gross per 24 hour   Intake 1984.25 ml   Output 4065 ml   Net -2080.75 ml       A/P:   Anjali Carmen is a 32 year old  who is POD 1 s/p repeat  section and bilateral tubal ligation at 30w6d for worsening cardiac status in the setting of cardiomyopathy in pregnancy. She is being closely monitored in the CVICU in the postpartum period given the risks of arrhythmia and decompensation related to postpartum fluid shifts.     Post-operative care  - Recommend scheduled Tylenol in addition to oxycodone 5-10mg q 4-6 hrs PRN for pain. Discussed use of NSAIDS with Dr. Carvajal, though this is not recommended in the setting of  cardiomyopathy.   - Continue abdominal binder PRN for pain  - Hgb 9.9 from 10.1; appropriate drop after  section  - Volitional voids  - Bowel regimen: stool softeners  - Rh positive. Rhogam not indicated.   - Feeding: Plans to pump; continue prenatal vitamins.  - Contraception: s/p bilateral salpingectomy    Gestational Diabetes  - From obstetric standpoint, accuchecks no longer needed postpartum.  - Recommend 6 week 2 hour glucose tolerance test to ensure resolution of diabetes.  - Increased risk for overt diabetes outside of pregnancy    Acute decompensated HFrEF  Arrhythmia with PAF, NSVT, PVCs, PACs  - On Sotalol 80 mg q12 hours. Metoprolol held due to bradycardia.   - Plan for ACE-inhibitor, no contraindications to breastfeeding with enalapril.   - Plan for cardiac MRI, genetic testing at some point postpartum for risk stratification  - Intermittent runs of VT increasing in frequency, EP consulted to discuss life vest and/or ICD.   - Repeat limited echo 23 EF 30-35% as above  - VTE prophylaxis: Can resume therapeutic Lovenox 12 hrs after  section and 24 hours after epidural removal.   - Appreciate cares per primary cardiology team.    Seen and discussed with Dr. Power.    M Team will continue to follow.    Johnnie Garcia MD  Maternal-Fetal Medicine Fellow

## 2023-01-24 NOTE — PROGRESS NOTES
[facility]    Cardiology Progress Note- Cardiology        Date of Admission:  2022     Assessment and Plan:   Anjali Carmen is a 32 year old female  with no significant past medical history, who presented as a transfer from [facility] to [facility] on 2022 for further management of newly diagnosed, acute decompensated systolic heart failure (LVEF 20%) and high risk delivery. Undergoing  today.      MFM available  at [phone]     Plan today:   - Furosemide 40mg IV x1, goal CVP 8, repeat as needed  - Start ACE inhibitor, Enalapril vs Captopril pending breast feeding plan  - Aggressive electrolyte replacement in s/o VT, increase scheduled Magnesium to 800mg PO BID  - Discuss ICD/lifevest timing with EP  - Discuss AC timing with MFM     Acute on Chronic Heart Failure with Reduced Ejection Fraction 2/2  NICM, pregnancy related with questionable familial component  RV failure  Note family history of father with CM at age 30. TTE  with EF 20-25%, thinned out LV wall, and notable regional wall motion abnormalities. Coronary angiogram  with no significat lesions seen.  Stage C, NYHA Class III    PA catheter placed at bedside    Overall, patient appears slightly decompensated from pre-load perspective this AM as CVP 13 with goal of 8. She has pulmonary hypertension however this is due to an elevated left-sided filling pressure (PCWP ~25) in setting of heart failure rather than an elevated pulmonary vascular resistance as evidenced by PVR < 2.     Volume: Furosemide 40mg IV PRN for goal CVP 8   GDMT:   - ACEi/ARB/ARNI: Start Enalapril (if breastfeeding) or Captopril (if not breastfeeding) this PM if BP allows  - BB: Metoprolol Tartrate held in s/o bradycardia   - MRA: Start pending ACE inhibitor tolerance   Therapies:  - SCD prophylaxis: discussing ICD vs life vest timing with EP  - Recommend therapeutic LMWH  given high risk for LV thrombus in setting of antepartum, regional wall motion abnormalities, and low EF. Discuss timing with MFM  Additional Management:   - Cardiac MRI and genetic testing postpartum recommended       PAF, new onset   NSVT and frequent PVC and PAC's  Hypomagnesemia  [facility] in [place], isolated episode, EKG unavailable for review. Currently in NSR at [facility] with frequent ectopy. [id number] score of 2 (sex, HF) - on AC as above for pregnancy and low EF.   - Appreciate EP consult  - Sotalol 80 mg po BID (will clarify long-term plan when patient decides on breast feeding)  - Metoprolol Tartrate: Held in setting of bradycardia and hypotension   - Lovenox as above (held)  - Mg per protocol- favor IV over PO replacement     High risk pregnancy  Delivered 23. MFM following. Appreciate assistance.      Gestational DM.   QID blood glucose checks (fasting, and 1 hour after each meal)- increase frequency of checks given start of steroids                - goal blood glucose for AM fasting 95 mg/dL                - 1 hour postprandial check is under 140 mg/dL  - MFM following  - Ongoing  monitoring to be determined by need for medications     Mild anemia, multifactorial  Due to pregnancy and HF. Hgb 10.2 on admission (MCV 79). Retic count 1.9%. Iron, ferritin, TIBC levels show AVINASH (say 6%). Hgh 9s-low 10s. IV iron 200 mg daily x5 (-). Hgb stable mid 10s.   - limit blood draws     Severe Obesity  BMI 43    FEN: Regular  PROPHY:  Held post-procedure   LINES:  PICC, RIJ PA  DISPO:  Pending HF stabilization and long-term planning  CODE STATUS:  Full Code         Clinically Significant Risk Factors            # Hypomagnesemia: Lowest Mg = 1.5 mg/dL in last 2 days, will replace as needed   # Hypoalbuminemia: Lowest albumin = 2.9 g/dL at 2023  7:18 AM, will monitor as appropriate           # Severe Obesity: Estimated body mass index is 43.53 kg/m  as calculated from the  "following:    Height as of this encounter: 1.753 m (5' 9\").    Weight as of this encounter: 133.7 kg (294 lb 12.1 oz).          This patient's care was discussed with Dr Monika Jones, attending cardiologist, who agrees with the assessment and plan unless otherwise indicated.    Elijah Carvajal MD [facility], PGY-4  Fellow, Cardiovascular Disease  ______________________________________________________________________    Interval History   38 beats of symptomatic VT overnight. Okay this morning, ongoing pain somewhat controlled with oxycodone.     Physical Exam   Vital Signs: Temp: 97.9  F (36.6  C) Temp src: Oral BP: 101/57 Pulse: 57   Resp: 19 SpO2: 98 % O2 Device: None (Room air)    Weight: 294 lbs 12.08 oz  GENERAL: Appears alert and interacting appropriately.   HEENT: Eyes symmetrical and free of discharge bilaterally.   NECK: Supple and without lymphadenopathy. JVD middle of neck at 30 degrees.   CV: RRR, S1S2 present without murmur, rub, or gallop.   RESPIRATORY: Respirations regular, even, and unlabored. Lungs CTA throughout.   GI: Soft, mild tenderness at  site, No rebound or guarding. No organomegaly.   EXTREMITIES: No peripheral edema. All extremities are warm and well perfused.  NEUROLOGIC: Alert and interacting appropriately. No focal deficits.   MUSCULOSKELETAL: No joint swelling or tenderness.   SKIN: No jaundice. No rashes or lesions.         Data   I personally reviewed all laboratory and imaging data from the last 24 hours in addition to all available cardiology data.         "

## 2023-01-24 NOTE — PLAN OF CARE
Pt Postpartum check is WNL. Bleeding is scant and no clots. Checked done every 4 hours. VS and Meds managed by ICU RN. Refused to pump so she can rest and sleep. Requested to do it later. Bhardwaj is draining well. Endorsed to Andreea current RN about the last assessment and to continue to monitor bleeding one more after 4 hours then every 8 hours.

## 2023-01-24 NOTE — PLAN OF CARE
Goal Outcome Evaluation: [id number]       Postpartum assessment WDL. She is shifting her weight and up with assistance, carrero is in place and output WNL (see Order and Flowsheet); pain managed by 4C nurse. Incision is covered and CARLITA - scant drainage visible on dressing. Uterus firm and midline. Scant lochia rubra. No breast or nipple pain; began pumping this evening. Support person, spouse Mukund, present and attentive at bedside; patient watching  in NICU via camera/iPad. Education provided on postpartum assessments and pumping techniques/routines. Continue with plan of care.

## 2023-01-25 LAB
ANION GAP SERPL CALCULATED.3IONS-SCNC: 18 MMOL/L (ref 7–15)
ATRIAL RATE - MUSE: 70 BPM
ATRIAL RATE - MUSE: 78 BPM
ATRIAL RATE - MUSE: 80 BPM
BASE EXCESS BLDV CALC-SCNC: 1.4 MMOL/L (ref -7.7–1.9)
BASE EXCESS BLDV CALC-SCNC: 2 MMOL/L (ref -7.7–1.9)
BUN SERPL-MCNC: 21.8 MG/DL (ref 6–20)
CALCIUM SERPL-MCNC: 9.1 MG/DL (ref 8.6–10)
CHLORIDE SERPL-SCNC: 101 MMOL/L (ref 98–107)
CREAT SERPL-MCNC: 0.73 MG/DL (ref 0.51–0.95)
DEPRECATED HCO3 PLAS-SCNC: 18 MMOL/L (ref 22–29)
DIASTOLIC BLOOD PRESSURE - MUSE: NORMAL MMHG
ERYTHROCYTE [DISTWIDTH] IN BLOOD BY AUTOMATED COUNT: 18.4 % (ref 10–15)
GFR SERPL CREATININE-BSD FRML MDRD: >90 ML/MIN/1.73M2
GLUCOSE BLDC GLUCOMTR-MCNC: 101 MG/DL (ref 70–99)
GLUCOSE BLDC GLUCOMTR-MCNC: 102 MG/DL (ref 70–99)
GLUCOSE BLDC GLUCOMTR-MCNC: 73 MG/DL (ref 70–99)
GLUCOSE BLDC GLUCOMTR-MCNC: 84 MG/DL (ref 70–99)
GLUCOSE BLDC GLUCOMTR-MCNC: 85 MG/DL (ref 70–99)
GLUCOSE SERPL-MCNC: 114 MG/DL (ref 70–99)
HCO3 BLDV-SCNC: 26 MMOL/L (ref 21–28)
HCO3 BLDV-SCNC: 27 MMOL/L (ref 21–28)
HCT VFR BLD AUTO: 34.7 % (ref 35–47)
HGB BLD-MCNC: 10.9 G/DL (ref 11.7–15.7)
INTERPRETATION ECG - MUSE: NORMAL
MAGNESIUM SERPL-MCNC: 1.8 MG/DL (ref 1.7–2.3)
MAGNESIUM SERPL-MCNC: 2 MG/DL (ref 1.7–2.3)
MCH RBC QN AUTO: 26.7 PG (ref 26.5–33)
MCHC RBC AUTO-ENTMCNC: 31.4 G/DL (ref 31.5–36.5)
MCV RBC AUTO: 85 FL (ref 78–100)
O2/TOTAL GAS SETTING VFR VENT: 21 %
O2/TOTAL GAS SETTING VFR VENT: 21 %
OXYHGB MFR BLDV: 60 % (ref 70–75)
OXYHGB MFR BLDV: 65 % (ref 70–75)
P AXIS - MUSE: 32 DEGREES
P AXIS - MUSE: 43 DEGREES
P AXIS - MUSE: 44 DEGREES
PCO2 BLDV: 42 MM HG (ref 40–50)
PCO2 BLDV: 43 MM HG (ref 40–50)
PH BLDV: 7.4 [PH] (ref 7.32–7.43)
PH BLDV: 7.41 [PH] (ref 7.32–7.43)
PLATELET # BLD AUTO: 218 10E3/UL (ref 150–450)
PO2 BLDV: 35 MM HG (ref 25–47)
PO2 BLDV: 38 MM HG (ref 25–47)
POTASSIUM SERPL-SCNC: 3.4 MMOL/L (ref 3.4–5.3)
POTASSIUM SERPL-SCNC: 4.2 MMOL/L (ref 3.4–5.3)
PR INTERVAL - MUSE: 130 MS
PR INTERVAL - MUSE: 166 MS
PR INTERVAL - MUSE: 188 MS
QRS DURATION - MUSE: 124 MS
QRS DURATION - MUSE: 132 MS
QRS DURATION - MUSE: 90 MS
QT - MUSE: 370 MS
QT - MUSE: 406 MS
QT - MUSE: 432 MS
QTC - MUSE: 426 MS
QTC - MUSE: 462 MS
QTC - MUSE: 466 MS
R AXIS - MUSE: -7 DEGREES
R AXIS - MUSE: 2 DEGREES
R AXIS - MUSE: 4 DEGREES
RBC # BLD AUTO: 4.09 10E6/UL (ref 3.8–5.2)
SODIUM SERPL-SCNC: 137 MMOL/L (ref 136–145)
SYSTOLIC BLOOD PRESSURE - MUSE: NORMAL MMHG
T AXIS - MUSE: -1 DEGREES
T AXIS - MUSE: -24 DEGREES
T AXIS - MUSE: 25 DEGREES
VENTRICULAR RATE- MUSE: 70 BPM
VENTRICULAR RATE- MUSE: 78 BPM
VENTRICULAR RATE- MUSE: 80 BPM
WBC # BLD AUTO: 13.7 10E3/UL (ref 4–11)

## 2023-01-25 PROCEDURE — 84132 ASSAY OF SERUM POTASSIUM: CPT | Performed by: NURSE PRACTITIONER

## 2023-01-25 PROCEDURE — 250N000013 HC RX MED GY IP 250 OP 250 PS 637: Performed by: STUDENT IN AN ORGANIZED HEALTH CARE EDUCATION/TRAINING PROGRAM

## 2023-01-25 PROCEDURE — 250N000013 HC RX MED GY IP 250 OP 250 PS 637

## 2023-01-25 PROCEDURE — 82805 BLOOD GASES W/O2 SATURATION: CPT | Performed by: STUDENT IN AN ORGANIZED HEALTH CARE EDUCATION/TRAINING PROGRAM

## 2023-01-25 PROCEDURE — 250N000011 HC RX IP 250 OP 636: Performed by: STUDENT IN AN ORGANIZED HEALTH CARE EDUCATION/TRAINING PROGRAM

## 2023-01-25 PROCEDURE — 250N000013 HC RX MED GY IP 250 OP 250 PS 637: Performed by: INTERNAL MEDICINE

## 2023-01-25 PROCEDURE — 84132 ASSAY OF SERUM POTASSIUM: CPT

## 2023-01-25 PROCEDURE — 250N000013 HC RX MED GY IP 250 OP 250 PS 637: Performed by: NURSE PRACTITIONER

## 2023-01-25 PROCEDURE — 84132 ASSAY OF SERUM POTASSIUM: CPT | Performed by: INTERNAL MEDICINE

## 2023-01-25 PROCEDURE — 250N000011 HC RX IP 250 OP 636: Performed by: NURSE PRACTITIONER

## 2023-01-25 PROCEDURE — 120N000002 HC R&B MED SURG/OB UMMC

## 2023-01-25 PROCEDURE — 83735 ASSAY OF MAGNESIUM: CPT

## 2023-01-25 PROCEDURE — 85027 COMPLETE CBC AUTOMATED: CPT

## 2023-01-25 PROCEDURE — 83735 ASSAY OF MAGNESIUM: CPT | Performed by: INTERNAL MEDICINE

## 2023-01-25 PROCEDURE — 99233 SBSQ HOSP IP/OBS HIGH 50: CPT | Mod: GC | Performed by: INTERNAL MEDICINE

## 2023-01-25 PROCEDURE — 250N000011 HC RX IP 250 OP 636: Performed by: INTERNAL MEDICINE

## 2023-01-25 RX ORDER — FUROSEMIDE 10 MG/ML
20 INJECTION INTRAMUSCULAR; INTRAVENOUS ONCE
Status: COMPLETED | OUTPATIENT
Start: 2023-01-25 | End: 2023-01-25

## 2023-01-25 RX ORDER — POTASSIUM CHLORIDE 20MEQ/15ML
40 LIQUID (ML) ORAL ONCE
Status: COMPLETED | OUTPATIENT
Start: 2023-01-25 | End: 2023-01-25

## 2023-01-25 RX ORDER — MAGNESIUM SULFATE HEPTAHYDRATE 40 MG/ML
2 INJECTION, SOLUTION INTRAVENOUS ONCE
Status: COMPLETED | OUTPATIENT
Start: 2023-01-25 | End: 2023-01-25

## 2023-01-25 RX ORDER — FUROSEMIDE 10 MG/ML
40 INJECTION INTRAMUSCULAR; INTRAVENOUS ONCE
Status: COMPLETED | OUTPATIENT
Start: 2023-01-25 | End: 2023-01-25

## 2023-01-25 RX ADMIN — SENNOSIDES 8.6 MG: 8.6 TABLET, COATED ORAL at 20:55

## 2023-01-25 RX ADMIN — OXYCODONE HYDROCHLORIDE 5 MG: 5 TABLET ORAL at 04:38

## 2023-01-25 RX ADMIN — POTASSIUM CHLORIDE 20 MEQ: 40 SOLUTION ORAL at 20:56

## 2023-01-25 RX ADMIN — POTASSIUM CHLORIDE 40 MEQ: 40 SOLUTION ORAL at 18:17

## 2023-01-25 RX ADMIN — FUROSEMIDE 20 MG: 10 INJECTION, SOLUTION INTRAVENOUS at 18:16

## 2023-01-25 RX ADMIN — OXYCODONE HYDROCHLORIDE 5 MG: 5 TABLET ORAL at 18:17

## 2023-01-25 RX ADMIN — FUROSEMIDE 40 MG: 10 INJECTION, SOLUTION INTRAVENOUS at 13:47

## 2023-01-25 RX ADMIN — ENOXAPARIN SODIUM 120 MG: 120 INJECTION SUBCUTANEOUS at 20:55

## 2023-01-25 RX ADMIN — ENOXAPARIN SODIUM 120 MG: 120 INJECTION SUBCUTANEOUS at 09:23

## 2023-01-25 RX ADMIN — ACETAMINOPHEN 975 MG: 325 TABLET, FILM COATED ORAL at 09:22

## 2023-01-25 RX ADMIN — MAGNESIUM OXIDE TAB 400 MG (241.3 MG ELEMENTAL MG) 800 MG: 400 (241.3 MG) TAB at 20:57

## 2023-01-25 RX ADMIN — MAGNESIUM SULFATE HEPTAHYDRATE 2 G: 40 INJECTION, SOLUTION INTRAVENOUS at 20:57

## 2023-01-25 RX ADMIN — ENALAPRIL MALEATE 2.5 MG: 2.5 TABLET ORAL at 09:23

## 2023-01-25 RX ADMIN — OXYCODONE HYDROCHLORIDE 5 MG: 5 TABLET ORAL at 23:39

## 2023-01-25 RX ADMIN — PRENATAL VIT W/ FE FUMARATE-FA TAB 27-0.8 MG 1 TABLET: 27-0.8 TAB at 09:22

## 2023-01-25 RX ADMIN — ACETAMINOPHEN 975 MG: 325 TABLET, FILM COATED ORAL at 20:56

## 2023-01-25 RX ADMIN — FUROSEMIDE 20 MG: 10 INJECTION, SOLUTION INTRAVENOUS at 21:57

## 2023-01-25 RX ADMIN — POLYETHYLENE GLYCOL 3350 17 G: 17 POWDER, FOR SOLUTION ORAL at 09:28

## 2023-01-25 RX ADMIN — ACETAMINOPHEN 975 MG: 325 TABLET, FILM COATED ORAL at 04:38

## 2023-01-25 RX ADMIN — POTASSIUM CHLORIDE 20 MEQ: 40 SOLUTION ORAL at 14:13

## 2023-01-25 RX ADMIN — SENNOSIDES 8.6 MG: 8.6 TABLET, COATED ORAL at 09:30

## 2023-01-25 RX ADMIN — MAGNESIUM SULFATE IN WATER 2 G: 40 INJECTION, SOLUTION INTRAVENOUS at 18:16

## 2023-01-25 RX ADMIN — SOTALOL HYDROCHLORIDE 80 MG: 80 TABLET ORAL at 09:22

## 2023-01-25 RX ADMIN — MAGNESIUM OXIDE TAB 400 MG (241.3 MG ELEMENTAL MG) 800 MG: 400 (241.3 MG) TAB at 09:24

## 2023-01-25 RX ADMIN — POTASSIUM CHLORIDE 20 MEQ: 40 SOLUTION ORAL at 09:27

## 2023-01-25 RX ADMIN — SOTALOL HYDROCHLORIDE 80 MG: 80 TABLET ORAL at 20:55

## 2023-01-25 RX ADMIN — POLYETHYLENE GLYCOL 3350 17 G: 17 POWDER, FOR SOLUTION ORAL at 04:39

## 2023-01-25 RX ADMIN — MAGNESIUM SULFATE HEPTAHYDRATE 2 G: 40 INJECTION, SOLUTION INTRAVENOUS at 09:24

## 2023-01-25 RX ADMIN — ACETAMINOPHEN 975 MG: 325 TABLET, FILM COATED ORAL at 14:12

## 2023-01-25 ASSESSMENT — ACTIVITIES OF DAILY LIVING (ADL)
ADLS_ACUITY_SCORE: 26
ADLS_ACUITY_SCORE: 24
ADLS_ACUITY_SCORE: 26
ADLS_ACUITY_SCORE: 24
ADLS_ACUITY_SCORE: 26
ADLS_ACUITY_SCORE: 24

## 2023-01-25 NOTE — PROGRESS NOTES
Maternal-Fetal Medicine Progress Note    S: Anjali is feeling pretty sleepy this morning from the medications that she is taking. Her pain is better controlled than yesterday. She has been walking in her bed and was able to have a bowel movement this morning, which she is happy about. Vaginal bleeding is scant. She wants to breastfeed her baby since this is her last child, but also wants medications that will get her better. She has questions about frequency of pumping.     O:  Vitals:    23 1100 23 1200 23 1300 23 1400   BP: (!) 87/52 92/50 99/52 (!) 86/53   BP Location:  Right arm     Cuff Size:  Adult Large     Pulse: 52 50 60 58   Resp: 16 13 24 20   Temp:  99  F (37.2  C)     TempSrc:  Oral     SpO2: 99% 100% 100% 97%   Weight:       Height:         General:  Alert, oriented  CV: Regular rate  Pulm: Clear to auscultation bilateral fields  Abdomen: Non-distended, incision c/d/i, +BS, soft, appropriately tender to deep palpation, no rebound or guarding, fundus not able to be palpated  Extrem: Trace bilateral LE edema, no calf tenderness      Intake/Output Summary (Last 24 hours) at 2023 1445  Last data filed at 2023 1400  Gross per 24 hour   Intake 1984.25 ml   Output 4065 ml   Net -2080.75 ml       A/P:   Anjali Carmen is a 32 year old  who is POD 2 s/p repeat  section and bilateral tubal ligation at 30w6d for worsening cardiac status in the setting of cardiomyopathy in pregnancy. She is being closely monitored in the CVICU in the postpartum period given the risks of arrhythmia and decompensation related to postpartum fluid shifts. She is doing well from an obstetric standpoint. She continue to have runs of Vtach, last this morning.     Post-operative care  - Continue scheduled Tylenol in addition to oxycodone 5-10mg q 4-6 hrs PRN for pain. Discussed use of NSAIDS with Dr. Carvajal, though this is not recommended in the setting of cardiomyopathy.   - Continue  abdominal binder PRN for pain  - Hgb 9.9 from 10.1; appropriate drop after  section  - Volitional voids  - Bowel regimen: stool softeners  - Rh positive. Rhogam not indicated.   - Feeding: Plans to pump; continue prenatal vitamins.  - Contraception: s/p bilateral salpingectomy    Gestational Diabetes  - From obstetric standpoint, accuchecks no longer needed postpartum.  - Recommend 6 week 2 hour glucose tolerance test to ensure resolution of diabetes.  - Increased risk for overt diabetes outside of pregnancy    Acute decompensated HFrEF  Arrhythmia with PAF, NSVT, PVCs, PACs  - On Sotalol 80 mg q12 hours. Metoprolol held due to bradycardia.   - On enalapril 2.5mg daily; no contraindications to breastfeeding from obstetric standpoint.  - Plan for cardiac MRI, genetic testing at some point postpartum for risk stratification  - Intermittent runs of VT increasing in frequency, EP consulted to discuss life vest and/or ICD.   - Repeat limited echo 23 EF 30-35% as above  - VTE prophylaxis: On therapeutic Lovenox  - Appreciate cares per primary cardiology team.    Seen and discussed with Dr. Power.    MFM Team will continue to follow.    Johnnie Garcia MD  Maternal-Fetal Medicine Fellow

## 2023-01-25 NOTE — CONSULTS
Care Management Follow Up    Length of Stay (days): 50    Expected Discharge Date:       Concerns to be Addressed:  Accommodations, postpartum support, maternal health  Patient plan of care discussed at interdisciplinary rounds: Yes    Anticipated Discharge Disposition: Home    Referrals Placed by CM/SW:  [facility]   Private pay costs discussed: insurance costs out of pocket expenses    Additional Information:  SW acknowledges consult. SW has been in contact w/ NICU SW who has been talking with pt and family regarding maternal health and resources available. NICU SW referred pt and family to [facility] and pt was accepted. NICU and OB/Gyn are heavily involved in this case. WING has met w/ pt and  regarding accommodations and getting paperwork completed for pt's employer and also for admission at [facility].     WING will continue to follow for support and discharge planning    NASIM Llamas, ANIKASW  4A/4C   Ph: [phone]  Pager: [phone]

## 2023-01-25 NOTE — PROGRESS NOTES
[facility]    Cardiology Progress Note- Cardiology        Date of Admission:  2022     Assessment and Plan:   Anjali Carmen is a 32 year old female  with no significant past medical history, who presented as a transfer from [facility] to [facility] on 2022 for further management of newly diagnosed, acute decompensated systolic heart failure (LVEF 20%) and high risk delivery. Undergoing  today.      [facility] available  at [phone]     Plan today:   -Furosemide 40mg IV x1, re-evaluate this PM   -Remove [place] today       Acute on Chronic Heart Failure with Reduced Ejection Fraction 2/2  NICM, pregnancy related with questionable familial component  RV failure  Note family history of father with CM at age 30. TTE  with EF 20-25%, thinned out LV wall, and notable regional wall motion abnormalities. Coronary angiogram  with no significat lesions seen.  Stage C, NYHA Class III    PA catheter placed at bedside    Overall, patient appears slightly decompensated from pre-load perspective this AM as CVP 13 with goal of 8. She has pulmonary hypertension however this is due to an elevated left-sided filling pressure (PCWP ~25) in setting of heart failure rather than an elevated pulmonary vascular resistance as evidenced by PVR < 2.     Volume: Furosemide 40mg IV x 1 today and re-evaluate this PM  GDMT:   - ACEi/ARB/ARNI: Enalapril 2.5 mg daily  - BB: Metoprolol Tartrate held in s/o bradycardia   - MRA: None  Therapies:  - SCD prophylaxis: discussing ICD vs life vest timing with EP  - Recommend therapeutic LMWH given high risk for LV thrombus in setting of antepartum, regional wall motion abnormalities, and low EF: Enoxaparin 120 mg BID   Additional Management:   - Cardiac MRI and genetic testing postpartum recommended    PAF, new onset   NSVT and frequent PVC and PAC's  Hypomagnesemia  [facility]  [place], isolated episode, EKG unavailable for review. Currently in NSR at [facility] with frequent ectopy. [id number] score of 2 (sex, HF) - on AC as above for pregnancy and low EF.   - Appreciate EP consult  - Sotalol 80 mg po BID (will clarify long-term plan when patient decides on breast feeding)  - Metoprolol Tartrate: Held in setting of bradycardia and hypotension   - Lovenox as above   - Mg per protocol- favor IV over PO replacement     High risk pregnancy  Delivered 23. MFM following. Appreciate assistance.      Gestational DM.   QID blood glucose checks (fasting, and 1 hour after each meal)- increase frequency of checks given start of steroids                - goal blood glucose for AM fasting 95 mg/dL                - 1 hour postprandial check is under 140 mg/dL  - MFM following  - Ongoing  monitoring to be determined by need for medications     Mild anemia, multifactorial  Due to pregnancy and HF. Hgb 10.2 on admission (MCV 79). Retic count 1.9%. Iron, ferritin, TIBC levels show AVINASH (say 6%). Hgh 9s-low 10s. IV iron 200 mg daily x5 (-). Hgb stable mid 10s.   - limit blood draws     Severe Obesity  BMI 43    FEN: Regular  PROPHY:  Levonox  LINES:  PICC, Remove [place] today   DISPO:  Pending HF stabilization and long-term planning  CODE STATUS:  Full Code         Clinically Significant Risk Factors            # Hypomagnesemia: Lowest Mg = 1.5 mg/dL in last 2 days, will replace as needed   # Hypoalbuminemia: Lowest albumin = 2.9 g/dL at 2023  7:18 AM, will monitor as appropriate           # Severe Obesity: Estimated body mass index is 43.07 kg/m  as calculated from the following:    Height as of this encounter: 1.753 m (5' 9\").    Weight as of this encounter: 132.3 kg (291 lb 10.7 oz).          This patient's care was discussed with Dr Monika Jones, attending cardiologist, who agrees with the assessment and plan unless otherwise indicated.    Vernon Allred " MD [facility]  Internal Medicine, PGY2   ______________________________________________________________________    Interval History   36 beats of symptomatic VT overnight. Nurses notes reviewed.Today patient reports incisional pain. Denies chest pain, shortness of breath, fever, nausea, or vomiting.     Physical Exam   Vital Signs: Temp: 99  F (37.2  C) Temp src: Oral BP: (!) 86/53 Pulse: 58   Resp: 20 SpO2: 97 % O2 Device: None (Room air)    Weight: 291 lbs 10.7 oz  GENERAL: Appears alert and interacting appropriately.   HEENT: Eyes symmetrical and free of discharge bilaterally.   NECK: Supple and without lymphadenopathy. JVD ~10 cm.  CV: RRR, S1S2 present without murmur, rub, or gallop.   RESPIRATORY: Respirations regular, even, and unlabored. Lungs CTA throughout.   GI: Soft, mild tenderness at  site, No rebound or guarding. No organomegaly.   EXTREMITIES: No peripheral edema. All extremities are warm and well perfused.  NEUROLOGIC: Alert and interacting appropriately. No focal deficits.   MUSCULOSKELETAL: No joint swelling or tenderness.   SKIN: No jaundice. No rashes or lesions.         Data   I personally reviewed all laboratory and imaging data from the last 24 hours in addition to all available cardiology data.

## 2023-01-25 NOTE — PLAN OF CARE
"ICU End of Shift Summary. See flowsheets for vital signs and detailed assessment.    Changes this shift: Alert and oriented x4. Stand by assist. Pain controlled with scheduled tylenol and prn oxycodone. Remains bradycardic with frequent pacs and pvcs. One run of v-tach, 15 beats at 1315. Afebrile. [place] locked at 58. Ficks q6-see flowsheets. Room air, denies shortness of breath. Bhardwaj out. No BM but passing gas, PRN miralax given. 2g sodium diet, 2L FR. Skin intact. Scant lochia, no clots. Patient using mechanical breast pump every 3-4 hours, no breast milk yet.  supportive at bedside.     Pt did have an increase in ectopy aroun 1845 with associated panic feeling and fluttering in her chest, stated, \"I just don't feel good.\" Remained normotensive. Cards 2 made aware.     Plan:  Monitor hemodynamics and manage pain. Support lactation.      Goal Outcome Evaluation:      Plan of Care Reviewed With: patient, spouse    Overall Patient Progress: improvingOverall Patient Progress: improving    Outcome Evaluation: Remains off of levophed, CO/CI and SvO2 improving      "

## 2023-01-25 NOTE — PROGRESS NOTES
CLINICAL NUTRITION SERVICES - REASSESSMENT NOTE     Nutrition Prescription    RECOMMENDATIONS FOR MDs/PROVIDERS TO ORDER:  None at this time     Malnutrition Status:    Patient does not meet two of the established criteria necessary for diagnosing malnutrition    Recommendations already ordered by Registered Dietitian (RD):  -Recommend continuing prenatal vitamin with iron x ~3 months postpartum (or as long as pt is breastfeeding) to ensure adequate iron levels   -Continue Ensure BID    Future/Additional Recommendations:  -Continue to monitor PO intake/adequacy      EVALUATION OF THE PROGRESS TOWARD GOALS   Diet: 2 g Sodium    Intake: Pt with % PO intake in the past two days. Pt reports that her appetite is slowly improving after her . Pt is liking Ensure and trying to drink them twice a day.      NEW FINDINGS   Pt delivered baby on 23. She is being monitored in ICU during the postpartum period given risks of arrhythmia and decompensation related to postpartum fluid shifts. Pt's son is in the NICU and doing well.     GI: LBM      Labs: Reviewed     Medications: Reviewed     Weights: Down 2.6 kg (1.9%) x 1 week.   23 0400 132.3 kg (291 lb 10.7 oz)   23 0600 133.7 kg (294 lb 12.1 oz)   23 2049 135.2 kg (298 lb)   23 0321 136 kg (299 lb 12.8 oz)   23 0606 135.7 kg (299 lb 1.6 oz)   23 0553 135.3 kg (298 lb 3.2 oz)   23 0600 134.9 kg (297 lb 8 oz)     UPDATED NUTRITION NEEDS  Dosing Weight: 82 kg (adjusted, based on lowest wt so far this admission of 130.6 kg on )   Estimated Energy Needs: 3395-4778 kcals/day (20 - 25 kcals/kg) (includes + 330 kcals/day for lactation)  Estimated Protein Needs: 105-130 grams protein/day (1.1 - 1.4 grams of pro/kg) (includes +15 g/ day additional protein for lactation)     MALNUTRITION  % Intake: No decreased intake noted  % Weight Loss: Weight loss does not meet criteria  Subcutaneous Fat Loss: None observed  Muscle  Loss: None observed  Fluid Accumulation/Edema: Does not meet criteria (trace)  Malnutrition Diagnosis: Patient does not meet two of the established criteria necessary for diagnosing malnutrition    Previous Goals   Patient to consume % of nutritionally adequate meal trays TID, or the equivalent with supplements/snacks.  Evaluation: Met    Previous Nutrition Diagnosis  Inadequate oral intake related to diet restriction and intermittent nausea as evidenced by pt consuming 50-75% of meals, at times.  Evaluation: Improving    CURRENT NUTRITION DIAGNOSIS  Increased nutrient needs related to hypercatabolism as evidenced by lactation.     INTERVENTIONS  Implementation  -Medical food supplement therapy  -Provided pt with Breastfeeding Nutrition Therapy handout and discussed nutrition needs after pregnancy with plans to breastfeed     Goals  Patient to consume % of nutritionally adequate meal trays TID, or the equivalent with supplements/snacks.    Monitoring/Evaluation  Progress toward goals will be monitored and evaluated per protocol.    Anjali Sheth, MS, RD, LD  [facility] RD pager: [phone]  Ascom: [phone]  Weekend/Holiday RD pager: [phone]

## 2023-01-25 NOTE — PLAN OF CARE
"Goal Outcome Evaluation:  ICU End of Shift Summary. See flowsheets for vital signs and detailed assessment.    Changes this shift: patient has slept well since 2300.  Continues to have incisional pain and was given Oxycodone x2 overnight.  Difficult to check fundus tonight because of severe pain that it causes, and with obesity.  Top of uterus does feel firm, approx 1-2 fingers below umbilicus. Up in chair for approximately 1 hour this evening.  Following Sotalol and Enelapril, PVC's and PAC's have decreased. Cross cover (Dr Haider) contacted in regards to SBP and MAP goals.  Currently, order for SBP to be >85< 180.  Will follow this and Cross cover Doctor will pass on to team in morning. Also followed up on whether labs to be drawn in a.m.- none ordered.   One episode of V Tach, 36 beat, at 0510.  BP was taken and WNL, patient without altered mentation, \"just a little dizzy\".  Returned to NSR.  Cards II cross cover notified of this episode.   Patient is concerned that she hasn't had BM since Sunday.  Given extra dose of Murelax this morning.     has been in room all night and very supportive.  Patient and him have been able to video monitor son in NICU.      Plan:  Follow up on SBP vs MAP goals.  Continue to monitor for arhythmias.  Mag of 2.0 this a.m. and on scheduled magnesium; will follow up regards to replacement.                      "

## 2023-01-25 NOTE — PLAN OF CARE
Goal Outcome Evaluation:      Plan of Care Reviewed With: patient    Overall Patient Progress: improvingOverall Patient Progress: improving    Outcome Evaluation: Will continue to monitor PO intake/adequacy

## 2023-01-25 NOTE — CONSULTS
"  Health Psychology                        [facility], 3rd Floor  [address] [place] [address]    Inpatient Health Psychology Consultation    Anjali Carmen is a 32 year old female \" with no significant past medical history, who presented as a transfer from [facility] to [facility] on 2022 for further management of newly diagnosed, acute decompensated systolic heart failure (LVEF 20%) and high risk delivery.\"  Health psychology is following to monitor emotional wellness in the context of this complex and prolonged hospitalization.  Since her last visit she had her  son by  who is admitted at the [facility].  She was seen today in conjunction with her  who is either staying with her or their .  Today she states that with the presence of her  her emotional needs are well managed and there is no need for follow-up today.  Briefly discussed the circumstances surrounding her delivery and offered congratulations around her new baby.  Plan is for health psychology to follow during her admission for monitoring and emotional support needs.    Namita Dunham, PhD,   Clinical Health Psychologist    "

## 2023-01-26 LAB
ALBUMIN SERPL BCG-MCNC: 3.4 G/DL (ref 3.5–5.2)
ALP SERPL-CCNC: 99 U/L (ref 35–104)
ALT SERPL W P-5'-P-CCNC: 32 U/L (ref 10–35)
ANION GAP SERPL CALCULATED.3IONS-SCNC: 17 MMOL/L (ref 7–15)
AST SERPL W P-5'-P-CCNC: 41 U/L (ref 10–35)
ATRIAL RATE - MUSE: 81 BPM
BASOPHILS # BLD AUTO: 0 10E3/UL (ref 0–0.2)
BASOPHILS NFR BLD AUTO: 0 %
BILIRUB SERPL-MCNC: 0.3 MG/DL
BUN SERPL-MCNC: 21.1 MG/DL (ref 6–20)
CALCIUM SERPL-MCNC: 9.6 MG/DL (ref 8.6–10)
CHLORIDE SERPL-SCNC: 101 MMOL/L (ref 98–107)
CREAT SERPL-MCNC: 0.74 MG/DL (ref 0.51–0.95)
DEPRECATED HCO3 PLAS-SCNC: 19 MMOL/L (ref 22–29)
DIASTOLIC BLOOD PRESSURE - MUSE: NORMAL MMHG
EOSINOPHIL # BLD AUTO: 0.1 10E3/UL (ref 0–0.7)
EOSINOPHIL NFR BLD AUTO: 1 %
ERYTHROCYTE [DISTWIDTH] IN BLOOD BY AUTOMATED COUNT: 18.5 % (ref 10–15)
GFR SERPL CREATININE-BSD FRML MDRD: >90 ML/MIN/1.73M2
GLUCOSE BLDC GLUCOMTR-MCNC: 121 MG/DL (ref 70–99)
GLUCOSE BLDC GLUCOMTR-MCNC: 71 MG/DL (ref 70–99)
GLUCOSE SERPL-MCNC: 88 MG/DL (ref 70–99)
HCT VFR BLD AUTO: 38.3 % (ref 35–47)
HGB BLD-MCNC: 11.9 G/DL (ref 11.7–15.7)
IMM GRANULOCYTES # BLD: 0.2 10E3/UL
IMM GRANULOCYTES NFR BLD: 1 %
INTERPRETATION ECG - MUSE: NORMAL
LYMPHOCYTES # BLD AUTO: 3.4 10E3/UL (ref 0.8–5.3)
LYMPHOCYTES NFR BLD AUTO: 21 %
MAGNESIUM SERPL-MCNC: 2.4 MG/DL (ref 1.7–2.3)
MCH RBC QN AUTO: 26.5 PG (ref 26.5–33)
MCHC RBC AUTO-ENTMCNC: 31.1 G/DL (ref 31.5–36.5)
MCV RBC AUTO: 85 FL (ref 78–100)
MONOCYTES # BLD AUTO: 0.8 10E3/UL (ref 0–1.3)
MONOCYTES NFR BLD AUTO: 5 %
NEUTROPHILS # BLD AUTO: 11.4 10E3/UL (ref 1.6–8.3)
NEUTROPHILS NFR BLD AUTO: 72 %
NRBC # BLD AUTO: 0 10E3/UL
NRBC BLD AUTO-RTO: 0 /100
P AXIS - MUSE: 35 DEGREES
PLATELET # BLD AUTO: 244 10E3/UL (ref 150–450)
POTASSIUM SERPL-SCNC: 4 MMOL/L (ref 3.4–5.3)
POTASSIUM SERPL-SCNC: 4.2 MMOL/L (ref 3.4–5.3)
PR INTERVAL - MUSE: 180 MS
PROT SERPL-MCNC: 7.2 G/DL (ref 6.4–8.3)
QRS DURATION - MUSE: 94 MS
QT - MUSE: 378 MS
QTC - MUSE: 439 MS
R AXIS - MUSE: -7 DEGREES
RBC # BLD AUTO: 4.49 10E6/UL (ref 3.8–5.2)
SODIUM SERPL-SCNC: 137 MMOL/L (ref 136–145)
SYSTOLIC BLOOD PRESSURE - MUSE: NORMAL MMHG
T AXIS - MUSE: 42 DEGREES
VENTRICULAR RATE- MUSE: 81 BPM
WBC # BLD AUTO: 15.8 10E3/UL (ref 4–11)

## 2023-01-26 PROCEDURE — 87086 URINE CULTURE/COLONY COUNT: CPT

## 2023-01-26 PROCEDURE — 250N000011 HC RX IP 250 OP 636: Performed by: NURSE PRACTITIONER

## 2023-01-26 PROCEDURE — 99233 SBSQ HOSP IP/OBS HIGH 50: CPT | Mod: 25 | Performed by: NURSE PRACTITIONER

## 2023-01-26 PROCEDURE — 85025 COMPLETE CBC W/AUTO DIFF WBC: CPT | Performed by: NURSE PRACTITIONER

## 2023-01-26 PROCEDURE — 93005 ELECTROCARDIOGRAM TRACING: CPT

## 2023-01-26 PROCEDURE — 83735 ASSAY OF MAGNESIUM: CPT | Performed by: INTERNAL MEDICINE

## 2023-01-26 PROCEDURE — 93010 ELECTROCARDIOGRAM REPORT: CPT | Performed by: INTERNAL MEDICINE

## 2023-01-26 PROCEDURE — 250N000013 HC RX MED GY IP 250 OP 250 PS 637

## 2023-01-26 PROCEDURE — 36415 COLL VENOUS BLD VENIPUNCTURE: CPT | Performed by: INTERNAL MEDICINE

## 2023-01-26 PROCEDURE — 258N000003 HC RX IP 258 OP 636: Performed by: STUDENT IN AN ORGANIZED HEALTH CARE EDUCATION/TRAINING PROGRAM

## 2023-01-26 PROCEDURE — 250N000013 HC RX MED GY IP 250 OP 250 PS 637: Performed by: STUDENT IN AN ORGANIZED HEALTH CARE EDUCATION/TRAINING PROGRAM

## 2023-01-26 PROCEDURE — 120N000002 HC R&B MED SURG/OB UMMC

## 2023-01-26 PROCEDURE — 250N000013 HC RX MED GY IP 250 OP 250 PS 637: Performed by: NURSE PRACTITIONER

## 2023-01-26 PROCEDURE — 250N000011 HC RX IP 250 OP 636: Performed by: STUDENT IN AN ORGANIZED HEALTH CARE EDUCATION/TRAINING PROGRAM

## 2023-01-26 PROCEDURE — 87040 BLOOD CULTURE FOR BACTERIA: CPT | Performed by: INTERNAL MEDICINE

## 2023-01-26 PROCEDURE — 99233 SBSQ HOSP IP/OBS HIGH 50: CPT | Mod: GC | Performed by: INTERNAL MEDICINE

## 2023-01-26 PROCEDURE — 80053 COMPREHEN METABOLIC PANEL: CPT | Performed by: NURSE PRACTITIONER

## 2023-01-26 RX ORDER — SPIRONOLACTONE 25 MG
12.5 TABLET ORAL DAILY
Status: DISCONTINUED | OUTPATIENT
Start: 2023-01-26 | End: 2023-01-31 | Stop reason: HOSPADM

## 2023-01-26 RX ORDER — FUROSEMIDE 20 MG
20 TABLET ORAL
Status: DISCONTINUED | OUTPATIENT
Start: 2023-01-26 | End: 2023-01-31 | Stop reason: HOSPADM

## 2023-01-26 RX ORDER — FUROSEMIDE 20 MG
20 TABLET ORAL
Status: DISCONTINUED | OUTPATIENT
Start: 2023-01-26 | End: 2023-01-26

## 2023-01-26 RX ADMIN — MAGNESIUM OXIDE TAB 400 MG (241.3 MG ELEMENTAL MG) 800 MG: 400 (241.3 MG) TAB at 08:06

## 2023-01-26 RX ADMIN — OXYCODONE HYDROCHLORIDE 5 MG: 5 TABLET ORAL at 08:20

## 2023-01-26 RX ADMIN — ACETAMINOPHEN 975 MG: 325 TABLET, FILM COATED ORAL at 16:42

## 2023-01-26 RX ADMIN — SOTALOL HYDROCHLORIDE 80 MG: 80 TABLET ORAL at 08:04

## 2023-01-26 RX ADMIN — ENALAPRIL MALEATE 2.5 MG: 2.5 TABLET ORAL at 08:07

## 2023-01-26 RX ADMIN — FUROSEMIDE 20 MG: 20 TABLET ORAL at 13:50

## 2023-01-26 RX ADMIN — MAGNESIUM OXIDE TAB 400 MG (241.3 MG ELEMENTAL MG) 800 MG: 400 (241.3 MG) TAB at 20:33

## 2023-01-26 RX ADMIN — PRENATAL VIT W/ FE FUMARATE-FA TAB 27-0.8 MG 1 TABLET: 27-0.8 TAB at 08:06

## 2023-01-26 RX ADMIN — ENOXAPARIN SODIUM 120 MG: 120 INJECTION SUBCUTANEOUS at 20:32

## 2023-01-26 RX ADMIN — ACETAMINOPHEN 975 MG: 325 TABLET, FILM COATED ORAL at 08:19

## 2023-01-26 RX ADMIN — ENOXAPARIN SODIUM 120 MG: 120 INJECTION SUBCUTANEOUS at 08:05

## 2023-01-26 RX ADMIN — SODIUM CHLORIDE, POTASSIUM CHLORIDE, SODIUM LACTATE AND CALCIUM CHLORIDE 250 ML: 600; 310; 30; 20 INJECTION, SOLUTION INTRAVENOUS at 04:13

## 2023-01-26 RX ADMIN — ACETAMINOPHEN 975 MG: 325 TABLET, FILM COATED ORAL at 20:32

## 2023-01-26 RX ADMIN — POTASSIUM CHLORIDE 20 MEQ: 40 SOLUTION ORAL at 13:50

## 2023-01-26 RX ADMIN — MAGNESIUM SULFATE HEPTAHYDRATE 2 G: 40 INJECTION, SOLUTION INTRAVENOUS at 08:06

## 2023-01-26 RX ADMIN — SENNOSIDES 8.6 MG: 8.6 TABLET, COATED ORAL at 08:19

## 2023-01-26 RX ADMIN — OXYCODONE HYDROCHLORIDE 5 MG: 5 TABLET ORAL at 16:43

## 2023-01-26 RX ADMIN — SENNOSIDES 8.6 MG: 8.6 TABLET, COATED ORAL at 20:33

## 2023-01-26 RX ADMIN — POLYETHYLENE GLYCOL 3350 17 G: 17 POWDER, FOR SOLUTION ORAL at 08:05

## 2023-01-26 RX ADMIN — POTASSIUM CHLORIDE 20 MEQ: 40 SOLUTION ORAL at 20:33

## 2023-01-26 RX ADMIN — POTASSIUM CHLORIDE 20 MEQ: 40 SOLUTION ORAL at 08:06

## 2023-01-26 RX ADMIN — ACETAMINOPHEN 975 MG: 325 TABLET, FILM COATED ORAL at 03:30

## 2023-01-26 RX ADMIN — SPIRONOLACTONE 12.5 MG: 25 TABLET, FILM COATED ORAL at 13:50

## 2023-01-26 ASSESSMENT — ACTIVITIES OF DAILY LIVING (ADL)
ADLS_ACUITY_SCORE: 24

## 2023-01-26 NOTE — PLAN OF CARE
ICU End of Shift Summary. See flowsheets for vital signs and detailed assessment.    Changes this shift: [place] removed this morning tolerated well. No known VT runs noted runs of 's-130's. Furosamide 40 mg and 20 mg given (60 mg total) replacing Potassium and  magnesium.  One loose watery BM and borderline hypoglycemic.    Plan:  Notify the team of any VT runs, HR less than 40 or any changes in status. Transfer orders in w/ telemetry in place.  Continue w/ goals of care  Goal Outcome Evaluation: Ongoing progressing.      Problem: Heart Failure  Goal: Optimal Coping  Intervention: Support Psychosocial Response  Recent Flowsheet Documentation  Taken 1/25/2023 1600 by Jose Cruz Bird, RN  Family/Support System Care:   presence promoted   involvement promoted  Taken 1/25/2023 1200 by Jose Cruz Bird, RN  Family/Support System Care:   presence promoted   involvement promoted  Taken 1/25/2023 0800 by Jose Cruz Bird, RN  Family/Support System Care:   presence promoted   involvement promoted

## 2023-01-26 NOTE — PLAN OF CARE
ICU End of Shift Summary. See flowsheets for vital signs and detailed assessment.    Changes this shift: Alert & oriented, SBA, prn oxycodone given for incisional pain, afebrile, MAP>65, tele Sinus alen-Sinus R, HR 43-70's, occasional PAC/PVC's, CVP Q6 check, CVP of  15, 20 mg IV Lasix given, CVP 6 hours post IV Lasix was 6, Lactated Ringer 250 ml given, responded well to lasix, on room air, lung sound clear/diminished, had 2 bowel movement,    Plan: Continue to monitor CVP Q6 hour, CVP goal 8-10.    Goal Outcome Evaluation:      Plan of Care Reviewed With: patient, spouse    Overall Patient Progress: no changeOverall Patient Progress: no change

## 2023-01-26 NOTE — CONSULTS
Discharge Pharmacy Test Claim    Per chart review, patient is uninsured. Cash prices for 30 days of  SGLT2 inhibitors listed below.    Test Claim Price Free Trial Voucher   farxiga 723.50 Yes, 30 days   jardiance 731.11 Yes, 14 days     Resources  [facility] pharmacy can apply free trial vouchers for either SGLT2i. Farxiga offers a 30-day free trial voucher and jardiance offers a 14-day free trial voucher.      Voucher Link   Farxiga https://www.Fingooroo/   Jardiance [email or web address].iGistics/jardianceeportal/home.html?src=FHSMN#     Patient may call or visit the 's patient assistance programs links listed below to inquire about free drug eligibility.     Farxiga    [place] Program Ph: [phone]  Site: https://www.[employer] (EBTH)/   Application https://www.[employer] ([employer])/content/dam/website-services/us/342-zzwy-eqk/pdf/non_speciality.pdf       [facility]    [employer] Cares [facility] Ph: [phone]   Site: https://www.[employer].us/our-responsibility/patient-assistance-program   Application https://www.[employer].us/sites/us/files/files/_cares_pap_application.pdf         Amber Lizama  [facility] Liaison  Ph: [phone] Pager: [phone]   Securely message with the Vocera Web Console (learn more here)

## 2023-01-26 NOTE — PROGRESS NOTES
Electrophysiology Consult Service  Follow up Note   EP Attending:    Date of Service: 2023     S: She had some worsening cardiac status and underwent  delivery at 31 weeks on 23. Patient still deciding about breastfeeding. She continues to have some NSVT and PVCs post partum. We are continuing to follow for ongoing management.    HPI:   Ms. Elizabeth Carmen is a 32 year old female who has no significant past medical history.   She presented to [facility] as a transfer from [facility] for further management of newly diagnosed systolic heart failure in pregnancy.  She initially presented to [facility] clinic on 2022 reporting palpitations, shortness of breath, and tiredness that had been ongoing since 2022.  She also reported that she was having some intermittent chest pain radiating to the left arm that started 1 day prior.  An ECG that was done at that clinic was reported to show AF however unavailable for our review.  She was transferred to [facility] and admitted on 2022.  She was found to be hypervolemic troponin 0.77 -> 0.6,  and preserved endorgan function with normal SCR and LFTs.  An echo showed moderate to severe LV dilation LVIDD 65 mm, severely reduced LV function LVEF 20%, mildly dilated RV, mild MR, and mild TR.  She underwent diuresis was started on metoprolol 25 mg twice daily.  On telemetry she was noted to have multiple runs of NSVT.  Due to ongoing concerns for possible decompensation and high risk for delivery, she was transferred to [facility] on 2022.  Here, she reports that she was in her usual state of health until 2022 when she started noticing fatigue, palpitations, PENA.  She also reports having a sensation of skipped beats as well as short bursts of racing heartbeat 2-3 times per day for the last 6 months.  She had progression of her symptoms with onset of PND since 10/2022.  She says she is a  and has  "3 other young children at home and had normally been active without issues.  She has 3 other pregnancies the first 2 with spontaneous vaginal delivery in the last with  due to cord being wrapped around baby's body.  This is now her fourth pregnancy with estimated delivery of 3/27/2023.  She has no known prior cardiac history.  Her father  around age 30 secondary to \"enlarged heart\".  Patient believes that she was told his cardiomyopathy was infectious in etiology.  No other known history of SCD or centimeters in other family members.  An echo here shows LVEF 20-25%, severe diffuse hypokinesis, wall thinning and akinesis of the basal-mid inferior, basal-mid inferior septal, and basal inferior lateral segments,  akinesis of apical septum, mild RV dilation, moderately reduced RV function, and mild TI. She had more NSVT runs up to 20 seconds with some SOB, chest tightness, and dizziness. She was started on Sotalol and her metoprolol dosing was decreased.   O:   Vitals: /53   Pulse (!) 47   Temp 98.3  F (36.8  C) (Axillary)   Resp 13   Ht 1.753 m (5' 9\")   Wt 132.3 kg (291 lb 10.7 oz)   SpO2 100%   Breastfeeding Unknown   BMI 43.07 kg/m    Gen:  AxO, NAD   Lungs:  CTAB   CV:  S1S2,Reg  Abd:  incision, appropriately tender, CDI, Soft   Ext: trace pedal edema    Data:  Labs:  BMP  Recent Labs   Lab 23  0830 23  0404 23  2334 23  2139 23  1825 23  1622 23  0502 23  0456 23  0938 23  0421 23  1257 23  1123 23  0810 23  0416   NA  --  137  --   --   --  137  --   --   --  138  --  138   < > 137   POTASSIUM  --  4.2 4.0  --   --  3.4  --  4.2  --  4.2   < > 4.5   < > 4.5  4.4   CHLORIDE  --  101  --   --   --  101  --   --   --  105  --   --   --  108*   AYDEN  --  9.6  --   --   --  9.1  --   --   --  9.0  --   --   --  8.7   CO2  --  19*  --   --   --  18*  --   --   --  20*  --   --   --  14*   BUN  --  " "21.1*  --   --   --  21.8*  --   --   --  12.7  --   --   --  10.2   CR  --  0.74  --   --   --  0.73  --   --   --  0.64  --   --   --  0.52   GLC 71 88  --  102* 101* 114*   < >  --    < > 123*  117*   < > 155*   < > 145*    < > = values in this interval not displayed.     CBC  Recent Labs   Lab 01/26/23  0404 01/25/23  0846 01/24/23  0421 01/23/23  1123 01/23/23  0913 01/23/23  0416   WBC 15.8* 13.7* 12.1*  --   --  12.9*   RBC 4.49 4.09 3.74*  --   --  3.81   HGB 11.9 10.9* 9.9* 11.1*   < > 10.1*   HCT 38.3 34.7* 31.5*  --   --  32.1*   MCV 85 85 84  --   --  84   MCH 26.5 26.7 26.5  --   --  26.5   MCHC 31.1* 31.4* 31.4*  --   --  31.5   RDW 18.5* 18.4* 18.6*  --   --  18.4*    218 196  --   --  235    < > = values in this interval not displayed.     INR  Recent Labs   Lab 01/22/23  1208   INR 1.21*         Meds per EPIC EMR:  Current Facility-Administered Medications   Medication    acetaminophen (TYLENOL) tablet 975 mg    alum & mag hydroxide-simethicone (MAALOX) suspension 30 mL    bupivacaine liposome (EXPAREL) LA inj was given in the infiltration site to produce post-op analgesia. Duration of action is up to 72 hours. Other \"estela\" meds should not be given for 96 hours except for lidocaine 4% patch. This is for INFORMATION ONLY.    glucose gel 15-30 g    Or    dextrose 50 % injection 25-50 mL    Or    glucagon injection 1 mg    enalapril (VASOTEC) tablet 2.5 mg    enoxaparin ANTICOAGULANT (LOVENOX) injection 120 mg    furosemide (LASIX) tablet 20 mg    heparin lock flush 10 UNIT/ML injection 5-20 mL    heparin lock flush 10 UNIT/ML injection 5-20 mL    magnesium oxide (MAG-OX) tablet 800 mg    magnesium sulfate 2 g in water intermittent infusion    medication instruction    naloxone (NARCAN) injection 0.2 mg    Or    naloxone (NARCAN) injection 0.4 mg    Or    naloxone (NARCAN) injection 0.2 mg    Or    naloxone (NARCAN) injection 0.4 mg    oxyCODONE (ROXICODONE) tablet 5 mg    Patient is already " receiving anticoagulation with heparin, enoxaparin (LOVENOX), warfarin (COUMADIN)  or other anticoagulant medication    polyethylene glycol (MIRALAX) Packet 17 g    polyethylene glycol (MIRALAX) Packet 17 g    potassium chloride (KAYCIEL) solution 20 mEq    prenatal multivitamin w/iron per tablet 1 tablet    sennosides (SENOKOT) tablet 8.6 mg    sodium chloride (PF) 0.9% PF flush 10-20 mL    sodium chloride (PF) 0.9% PF flush 10-40 mL    sodium chloride (PF) 0.9% PF flush 3 mL    sodium chloride (PF) 0.9% PF flush 3 mL    sotalol (BETAPACE) tablet 80 mg    spironolactone (ALDACTONE) half-tab 12.5 mg     ECG:    EK2022: NSR with sinus arrhythmia     Rhythm strips:                  2022 Echo:  Left Ventricle: Severely reduced left ventricular systolic function. The EF is visually estimated to be 20%. Global hypokinesis of the left ventricular wall segments. Grade II LVDD present. LVIDD 65 mm.   Right Ventricle: The right ventricle is mildly dilated. Normal RV systolic function.   Mitral Valve: There is mild regurgitation.  Tricuspid Valve: There is mild regurgitation.  No pericardial effusion.   22 Echo:   Interpretation Summary  Left ventricular function is decreased. The ejection fraction is 20-25%  (severely reduced). Severe diffuse hypokinesis is present. There is wall  thinning and akinesis of the basal-mid inferior, basal-mid inferoseptal and  basal inferolateral segments. There is akinesis of the apical septum. This is  compatible with ischemic cardiomyopathy.  Mild right ventricular dilation is present. Global right ventricular function  is moderately reduced.  Mild tricuspid insufficiency is present.  Pulmonary artery systolic pressure is normal.  IVC diameter and respiratory changes fall into an intermediate range  suggesting an RA pressure of 8 mmHg.  No pericardial effusion is present.    23 Echo:  Interpretation Summary  Ischemic CM. Left ventricular function is decreased. The  ejection fraction is  30-35% (moderately reduced).  Global right ventricular function is mildly reduced.  Mild pulmonary hypertension is present.  IVC diameter >2.1 cm collapsing <50% with sniff suggests a high RA pressure  estimated at 15 mmHg or greater.  No pericardial effusion is present.  No change from recent study, except CVP is higher today.    A:   Ms. Elizabeth Carmen is a 32 year old female who has no significant past medical history.   She presented to [facility] as a transfer from [facility] for further management of newly diagnosed systolic heart failure in pregnancy.  She initially presented to [facility] clinic on 11/30/2022 reporting palpitations, shortness of breath, and tiredness that had been ongoing since 7/2022.  She also reported that she was having some intermittent chest pain radiating to the left arm that started 1 day prior.  An ECG that was done at that clinic was reported to show AF however unavailable for our review.  She was transferred to [facility] and admitted on 11/30/2022.  She was found to be hypervolemic troponin 0.77 -> 0.6,  and preserved endorgan function with normal SCR and LFTs.  An echo showed moderate to severe LV dilation LVIDD 65 mm, severely reduced LV function LVEF 20%, mildly dilated RV, mild MR, and mild TR.  She underwent diuresis was started on metoprolol 25 mg twice daily.  On telemetry she was noted to have multiple runs of NSVT.  Due to ongoing concerns for possible decompensation and high risk for delivery, she was transferred to [facility] on 12/6/2022.  Here, she reports that she was in her usual state of health until 7/2022 when she started noticing fatigue, palpitations, PENA.  She also reports having a sensation of skipped beats as well as short bursts of racing heartbeat 2-3 times per day for the last 6 months.  She had progression of her symptoms with onset of PND since 10/2022.  She says she is a  and has 3 other  "young children at home and had normally been active without issues.  She has 3 other pregnancies the first 2 with spontaneous vaginal delivery in the last with  due to cord being wrapped around baby's body.  This is now her fourth pregnancy with estimated delivery of 3/27/2023.  She has no known prior cardiac history.  Her father  around age 30 secondary to \"enlarged heart\".  Patient believes that she was told his cardiomyopathy was infectious in etiology.  No other known history of SCD or centimeters in other family members.  An echo here shows LVEF 20-25%, severe diffuse hypokinesis, wall thinning and akinesis of the basal-mid inferior, basal-mid inferior septal, and basal inferior lateral segments,  akinesis of apical septum, mild RV dilation, moderately reduced RV function, and mild TI. She had more NSVT runs up to 20 seconds with some SOB, chest tightness, and dizziness. She was started on Sotalol and her metoprolol dosing was decreased. She had some worsening cardiac status and underwent  delivery at 31 weeks on 23. Patient still deciding about breastfeeding. She continues to have some NSVT and PVCs post partum. We are continuing to follow for ongoing management.      EP recommendations:   NICM LVEF 30-35%, NYHA III  Runs of NSVT and multi-focal PVCs:  1. ACEi/ARB: On Enalapirl.  2. BB: Can resume BB of choice by HF team (stopping Sotalol as below).   3. Aldosterone antagonist: Continue Spironolactone.   4. SCD prophylaxis: Not currently indicated due to newly diagnosed HF/CM.  Will need to follow post partum after GDMT to assess if recovery or if LVEF remains suppressed after several months of treatment would do defibrillator. Recommend LifeVest for discharge as bridge to decision.   5. Fluid status: Managed by HF team.   6. NSVT/PVCs: She has been on Sotalol but this can cross breat milk to baby. This has also not made a big impact on her NSVT/PVC burden. Limited AAT options given " pregnancy and then lactation.  To allow room for resuming BB for HF treatment, we would stop Sotalol now.   7. Etiology: Given LV dilation and family history high likelihood for genetic CM. Recommend CMR for further evaluation and consideration of genetic testing. One echo does mention possible ischemic etiology, thus do need to keep hypercoagulable state and embolic MI on differential but less likely.     The patient states understanding and is agreeable with plan.   Thank you much for allowing us to participate in the care of this pleasant patient.   This patient was discussed with  and the note above reflects our joint assessment and plan.   NAHID Abernathy CNP  Electrophysiology Consult Service  Pager: [phone]    EP STAFF NOTE  I have seen and examined the patient as part of a shared visit with ANTHONY Abernahty NP.  I agree with the note above. I reviewed today's vital signs and medications. I have reviewed and discussed with the advanced practice provider their physical examination, assessment, and plan   Briefly, patient delivered safely, still has some runs of NSVT, long discussion with HF team  My key history/exam findings are: RRR.   The key management decisions made by me: switch sotalol to BB better suited for HF, lifevest, follow-up closely as outpatient.  Total time spent on patient visit, reviewing notes, imaging, labs, orders, and completing necessary documentation: 60 minutes.  >50% of visit spent on counseling patient and/or coordination of care.     Mayur Taylor MD [facility]  Cardiology - Electrophysiology

## 2023-01-26 NOTE — PROGRESS NOTES
[facility]    Cardiology Progress Note- Cardiology        Date of Admission:  2022     Assessment and Plan:   Anjali Carmen is a 32 year old female  with no significant past medical history, who presented as a transfer from [facility] to [facility] on 2022 for further management of newly diagnosed, acute decompensated systolic heart failure (LVEF 20%) and high risk delivery. Undergoing  today.      [facility] available  at [phone]     Plan today:   -Leukocytosis: Blood and urine cultures order  -Start Lasix 20 mg BID  -Start Spironolactone 12.5 mg daily   -Cardiac MRI today    Acute on Chronic Heart Failure with Reduced Ejection Fraction 2/2  NICM, pregnancy related with questionable familial component  RV failure  Note family history of father with CM at age 30. TTE  with EF 20-25%, thinned out LV wall, and notable regional wall motion abnormalities. Coronary angiogram  with no significat lesions seen.  Stage C, NYHA Class III    Volume: PRN   GDMT:   - ACEi/ARB/ARNI: Enalapril 2.5 mg daily  - BB: Metoprolol Tartrate held in s/o bradycardia   - MRA: Start Spironolactone 12.5 mg daily   - Diuretics: Start Furosemide 20 mg BID   Therapies:  - SCD prophylaxis: discussing ICD vs life vest timing with EP  - Recommend therapeutic LMWH given high risk for LV thrombus in setting of antepartum, regional wall motion abnormalities, and low EF: Enoxaparin 120 mg BID   Additional Management:   - Cardiac MRI and genetic testing postpartum order today     PAF, new onset   NSVT and frequent PVC and PAC's  Hypomagnesemia  Diagnosed at the [facility] in [place], isolated episode, EKG unavailable for review. Currently in NSR at [facility] with frequent ectopy. [id number] score of 2 (sex, HF) - on AC as above for pregnancy and low EF.   - Appreciate EP consult  - Sotalol 80 mg po BID (will clarify long-term plan when patient decides on  "breast feeding)  - Metoprolol Tartrate: Held in setting of bradycardia and hypotension   - Lovenox as above   - Mg per protocol- favor IV over PO replacement     Leukocytosis   Patient with progressive leukocytosis in the last 3 days (12.1-> 13.7->15.8). Patient reported mild incisional site pain. Denies nausea, vomiting, fever, diarrheas, chest pain or shortness of breath. Will monitor for signs of infection or worsening pain of incisional site.   -CBC daily   -Urine cultures  -Blood cultures x 2     High risk pregnancy  Delivered 23. MFM following. Appreciate assistance.      Gestational DM.   QID blood glucose checks (fasting, and 1 hour after each meal)- increase frequency of checks given start of steroids                - goal blood glucose for AM fasting 95 mg/dL                - 1 hour postprandial check is under 140 mg/dL  - MFM following  - Ongoing  monitoring to be determined by need for medications     Mild anemia, multifactorial  Due to pregnancy and HF. Hgb 10.2 on admission (MCV 79). Retic count 1.9%. Iron, ferritin, TIBC levels show AVINASH (say 6%). Hgh 9s-low 10s. IV iron 200 mg daily x5 (-). Hgb stable mid 10s.   - limit blood draws     Severe Obesity  BMI 43    FEN: Regular  PROPHY:  Levonox  LINES:  PICC  DISPO:  Pending HF stabilization and long-term planning  CODE STATUS:  Full Code         Clinically Significant Risk Factors              # Hypoalbuminemia: Lowest albumin = 2.9 g/dL at 2023  7:18 AM, will monitor as appropriate           # Severe Obesity: Estimated body mass index is 43.07 kg/m  as calculated from the following:    Height as of this encounter: 1.753 m (5' 9\").    Weight as of this encounter: 132.3 kg (291 lb 10.7 oz).          This patient's care was discussed with Dr Monika Jones, attending cardiologist, who agrees with the assessment and plan unless otherwise indicated.    Vernon Dyer MD  Internal Medicine, PGY2 "   ______________________________________________________________________    Interval History   Nurses notes reviewed. Patient with ocassional PAC/PVCs. Patient had CVP of 15, 20 mg IV lasix given, CVP 6 hours post IV lasix was 6 and  ml given. Today patient reports episodes of palpitations. Denies chest pain, abdominal pain, nausea, vomiting or lightheadedness.     Physical Exam   Vital Signs: Temp: 97.7  F (36.5  C) Temp src: Oral BP: 97/53 Pulse: 60   Resp: 23 SpO2: 96 % O2 Device: None (Room air)    Weight: 291 lbs 10.7 oz  GENERAL: Appears alert and interacting appropriately.   HEENT: Eyes symmetrical and free of discharge bilaterally.   NECK: Supple and without lymphadenopathy. JVD ~6-8cm.  CV: RRR, S1S2 present without murmur, rub, or gallop.   RESPIRATORY: Respirations regular, even, and unlabored. Lungs CTA throughout.   GI: Soft, mild tenderness at  site, No rebound or guarding. No organomegaly.   EXTREMITIES: No peripheral edema. All extremities are warm and well perfused.  NEUROLOGIC: Alert and interacting appropriately. No focal deficits. Moving 4 extremities.   MUSCULOSKELETAL: No joint swelling or tenderness.   SKIN: No jaundice. No rashes or lesions.         Data   I personally reviewed all laboratory and imaging data from the last 24 hours in addition to all available cardiology data.

## 2023-01-26 NOTE — PROGRESS NOTES
Maternal-Fetal Medicine Progress Note    S: Doing well at this time. Feeling like pain is well controlled. Tolerating regular diet without n/v. Passing flatus and had BP. Ambulating without dizziness or difficulty. Able to void.     O:  Vitals:    23 0500 23 0700 23 0800 23 0820   BP: 97/53 94/60 101/53    BP Location:       Pulse: 60 (!) 47 54 (!) 47   Resp:  13 18 13   Temp:    98.3  F (36.8  C)   TempSrc:    Axillary   SpO2: 96% 97% 97% 100%   Weight:       Height:         General:  Alert, oriented  Abdomen: Non-distended, incision c/d/i, +BS, soft, appropriately tender to deep palpation, no rebound or guarding, fundus not able to be palpated  Extrem: Trace bilateral LE edema, no calf tenderness      Intake/Output Summary (Last 24 hours) at 2023 1241  Last data filed at 2023 0900  Gross per 24 hour   Intake 825 ml   Output 2175 ml   Net -1350 ml         A/P:   Anjali Carmen is a 32 year old  who is POD 3 s/p repeat  section and bilateral tubal ligation at 30w6d for worsening cardiac status in the setting of cardiomyopathy in pregnancy. She is being closely monitored in the CVICU in the postpartum period given the risks of arrhythmia and decompensation related to postpartum fluid shifts. She is doing well from an obstetric and postop standpoint    Post-operative care  - Continue scheduled Tylenol in addition to oxycodone 5-10mg q 4-6 hrs PRN for pain. Discussed use of NSAIDS with Dr. Carvajal, though this is not recommended in the setting of cardiomyopathy.   - Continue abdominal binder PRN for pain  - Hgb 9.9 from 10.1; appropriate drop after  section  - Volitional voids  - Bowel regimen: stool softeners  - Rh positive. Rhogam not indicated.   - Feeding: Plans to pump; continue prenatal vitamins.  - Contraception: s/p bilateral salpingectomy    Gestational Diabetes  - From obstetric standpoint, accuchecks no longer needed postpartum.  - Recommend 6  week 2 hour glucose tolerance test to ensure resolution of diabetes.  - Increased risk for overt diabetes outside of pregnancy    Acute decompensated HFrEF  Arrhythmia with PAF, NSVT, PVCs, PACs  - On Sotalol 80 mg q12 hours. Metoprolol held due to bradycardia.   - On enalapril 2.5mg daily; no contraindications to breastfeeding from obstetric standpoint.  - Plan for cardiac MRI, genetic testing at some point postpartum for risk stratification  - Intermittent runs of VT increasing in frequency, EP consulted to discuss life vest and/or ICD.   - Repeat limited echo 1/21/23 EF 30-35% as above  - VTE prophylaxis: On therapeutic Lovenox  - Appreciate cares per primary cardiology team.    Seen and discussed with Dr. Power.    M Team will continue to follow.    Inés Warner MD  ObGyn Resident, PGY-3  01/26/2023

## 2023-01-27 LAB
ATRIAL RATE - MUSE: 81 BPM
BACTERIA UR CULT: NORMAL
DIASTOLIC BLOOD PRESSURE - MUSE: NORMAL MMHG
ERYTHROCYTE [DISTWIDTH] IN BLOOD BY AUTOMATED COUNT: 18.6 % (ref 10–15)
GLUCOSE BLDC GLUCOMTR-MCNC: 71 MG/DL (ref 70–99)
HCT VFR BLD AUTO: 36.1 % (ref 35–47)
HGB BLD-MCNC: 11 G/DL (ref 11.7–15.7)
INTERPRETATION ECG - MUSE: NORMAL
MAGNESIUM SERPL-MCNC: 1.9 MG/DL (ref 1.7–2.3)
MCH RBC QN AUTO: 26.2 PG (ref 26.5–33)
MCHC RBC AUTO-ENTMCNC: 30.5 G/DL (ref 31.5–36.5)
MCV RBC AUTO: 86 FL (ref 78–100)
P AXIS - MUSE: 35 DEGREES
PLATELET # BLD AUTO: 223 10E3/UL (ref 150–450)
POTASSIUM SERPL-SCNC: 4.3 MMOL/L (ref 3.4–5.3)
PR INTERVAL - MUSE: 180 MS
QRS DURATION - MUSE: 140 MS
QT - MUSE: 378 MS
QTC - MUSE: 439 MS
R AXIS - MUSE: -7 DEGREES
RBC # BLD AUTO: 4.2 10E6/UL (ref 3.8–5.2)
SYSTOLIC BLOOD PRESSURE - MUSE: NORMAL MMHG
T AXIS - MUSE: 42 DEGREES
VENTRICULAR RATE- MUSE: 81 BPM
WBC # BLD AUTO: 11.9 10E3/UL (ref 4–11)

## 2023-01-27 PROCEDURE — 250N000013 HC RX MED GY IP 250 OP 250 PS 637: Performed by: NURSE PRACTITIONER

## 2023-01-27 PROCEDURE — 250N000013 HC RX MED GY IP 250 OP 250 PS 637: Performed by: STUDENT IN AN ORGANIZED HEALTH CARE EDUCATION/TRAINING PROGRAM

## 2023-01-27 PROCEDURE — 250N000013 HC RX MED GY IP 250 OP 250 PS 637

## 2023-01-27 PROCEDURE — 250N000011 HC RX IP 250 OP 636: Performed by: NURSE PRACTITIONER

## 2023-01-27 PROCEDURE — 250N000011 HC RX IP 250 OP 636: Performed by: STUDENT IN AN ORGANIZED HEALTH CARE EDUCATION/TRAINING PROGRAM

## 2023-01-27 PROCEDURE — 120N000002 HC R&B MED SURG/OB UMMC

## 2023-01-27 PROCEDURE — 99233 SBSQ HOSP IP/OBS HIGH 50: CPT | Mod: GC | Performed by: INTERNAL MEDICINE

## 2023-01-27 PROCEDURE — 83735 ASSAY OF MAGNESIUM: CPT | Performed by: NURSE PRACTITIONER

## 2023-01-27 PROCEDURE — 85027 COMPLETE CBC AUTOMATED: CPT

## 2023-01-27 PROCEDURE — 84132 ASSAY OF SERUM POTASSIUM: CPT | Performed by: NURSE PRACTITIONER

## 2023-01-27 RX ORDER — IBUPROFEN 200 MG
400 TABLET ORAL EVERY 6 HOURS PRN
Status: DISCONTINUED | OUTPATIENT
Start: 2023-01-27 | End: 2023-01-31 | Stop reason: HOSPADM

## 2023-01-27 RX ORDER — METOPROLOL TARTRATE 25 MG/1
25 TABLET, FILM COATED ORAL EVERY 6 HOURS
Status: DISCONTINUED | OUTPATIENT
Start: 2023-01-27 | End: 2023-01-31 | Stop reason: HOSPADM

## 2023-01-27 RX ORDER — OXYCODONE HYDROCHLORIDE 5 MG/1
5 TABLET ORAL EVERY 4 HOURS PRN
Status: DISCONTINUED | OUTPATIENT
Start: 2023-01-27 | End: 2023-01-28

## 2023-01-27 RX ADMIN — SENNOSIDES 8.6 MG: 8.6 TABLET, COATED ORAL at 08:32

## 2023-01-27 RX ADMIN — SPIRONOLACTONE 12.5 MG: 25 TABLET, FILM COATED ORAL at 08:45

## 2023-01-27 RX ADMIN — OXYCODONE HYDROCHLORIDE 5 MG: 5 TABLET ORAL at 20:00

## 2023-01-27 RX ADMIN — ENOXAPARIN SODIUM 120 MG: 120 INJECTION SUBCUTANEOUS at 20:21

## 2023-01-27 RX ADMIN — ENOXAPARIN SODIUM 120 MG: 120 INJECTION SUBCUTANEOUS at 08:31

## 2023-01-27 RX ADMIN — ACETAMINOPHEN 975 MG: 325 TABLET, FILM COATED ORAL at 08:29

## 2023-01-27 RX ADMIN — SENNOSIDES 8.6 MG: 8.6 TABLET, COATED ORAL at 20:18

## 2023-01-27 RX ADMIN — POTASSIUM CHLORIDE 20 MEQ: 40 SOLUTION ORAL at 14:39

## 2023-01-27 RX ADMIN — FUROSEMIDE 20 MG: 20 TABLET ORAL at 08:29

## 2023-01-27 RX ADMIN — METOPROLOL TARTRATE 25 MG: 25 TABLET, FILM COATED ORAL at 20:17

## 2023-01-27 RX ADMIN — IBUPROFEN 400 MG: 400 TABLET ORAL at 23:49

## 2023-01-27 RX ADMIN — POTASSIUM CHLORIDE 20 MEQ: 40 SOLUTION ORAL at 08:32

## 2023-01-27 RX ADMIN — OXYCODONE HYDROCHLORIDE 5 MG: 5 TABLET ORAL at 00:15

## 2023-01-27 RX ADMIN — POTASSIUM CHLORIDE 40 MEQ: 40 SOLUTION ORAL at 20:18

## 2023-01-27 RX ADMIN — POLYETHYLENE GLYCOL 3350 17 G: 17 POWDER, FOR SOLUTION ORAL at 08:33

## 2023-01-27 RX ADMIN — ACETAMINOPHEN 975 MG: 325 TABLET, FILM COATED ORAL at 18:03

## 2023-01-27 RX ADMIN — MAGNESIUM OXIDE TAB 400 MG (241.3 MG ELEMENTAL MG) 800 MG: 400 (241.3 MG) TAB at 08:30

## 2023-01-27 RX ADMIN — ACETAMINOPHEN 975 MG: 325 TABLET, FILM COATED ORAL at 03:23

## 2023-01-27 RX ADMIN — MAGNESIUM OXIDE TAB 400 MG (241.3 MG ELEMENTAL MG) 800 MG: 400 (241.3 MG) TAB at 20:16

## 2023-01-27 RX ADMIN — METOPROLOL TARTRATE 25 MG: 25 TABLET, FILM COATED ORAL at 14:38

## 2023-01-27 RX ADMIN — MAGNESIUM SULFATE HEPTAHYDRATE 2 G: 40 INJECTION, SOLUTION INTRAVENOUS at 08:52

## 2023-01-27 RX ADMIN — PRENATAL VIT W/ FE FUMARATE-FA TAB 27-0.8 MG 1 TABLET: 27-0.8 TAB at 08:29

## 2023-01-27 RX ADMIN — FUROSEMIDE 20 MG: 20 TABLET ORAL at 16:02

## 2023-01-27 RX ADMIN — MAGNESIUM SULFATE HEPTAHYDRATE 2 G: 40 INJECTION, SOLUTION INTRAVENOUS at 20:16

## 2023-01-27 RX ADMIN — ENALAPRIL MALEATE 2.5 MG: 2.5 TABLET ORAL at 08:26

## 2023-01-27 ASSESSMENT — ACTIVITIES OF DAILY LIVING (ADL)
ADLS_ACUITY_SCORE: 24

## 2023-01-27 NOTE — CONSULTS
Discharge Pharmacy Test Claim    Patient is uninsured. Cash price for 30 days of DOACs listed below.    Test Claim Price Free Trial Voucher   eliquis 717.76 Yes   xarelto 694.61 Yes     Resources  [facility] can apply a 30-day free trial voucher for eliquis or xarelto available.   Voucher Link   Eliquis [email or web address]   Xarelto [email or web address]       Both eliquis and xarelto have income-based patient assistance programs that ships free drug directly to pt's home if eligible. Eligibilty information and applications can be found below.    Eliquis    [employer] Patient Assistance Fund 3-244-813-5530   Application [email or web address]       Xarelto    JPA [phone]   Application [email or web address]/resources/jjpaf-application.[email or web address]     For comparison, 14 syringes of enoxaparin without insurance is $293.04 and 30 tablets of jantoven/warfarin is $16.     Amber Lizama  [facility] Pharmacy Liaison  Ph: [phone] Pager: [phone]   Securely message with the Vocera Web Console (learn more here)

## 2023-01-27 NOTE — PROGRESS NOTES
Maternal-Fetal Medicine Progress Note    S: Anjali is feeling well this morning. Notes pain continues to improve. She has been able to move around without difficulty. No CP or SOB, does get some palpitations when lying in bed. Passing gas and having BM. Voiding.     O:  Vitals:    23 0800 23 0900 23 0915 23 1000   BP: 128/66 125/58  (!) 146/75   BP Location:       Pulse: 60 73 54 67   Resp: 18 23 17 (!) 32   Temp:       TempSrc:       SpO2: 97% 99% 96% 98%   Weight:       Height:         General:  Alert, oriented  Abdomen: Non-distended, incision c/d/i, +BS, soft, appropriately tender to deep palpation, no rebound or guarding, fundus not able to be palpated  Extrem: Trace bilateral LE edema, no calf tenderness      Intake/Output Summary (Last 24 hours) at 2023 1237  Last data filed at 2023 1200  Gross per 24 hour   Intake 974 ml   Output 1400 ml   Net -426 ml         A/P:   Anjali Carmen is a 32 year old  who is POD 4 s/p repeat  section and bilateral tubal ligation at 30w6d for worsening cardiac status in the setting of cardiomyopathy in pregnancy. She is being closely monitored in the CVICU in the postpartum period given the risks of arrhythmia and decompensation related to postpartum fluid shifts. She is doing well from an obstetric and postop standpoint    Post-operative care  - Continue scheduled Tylenol in addition to oxycodone 5-10mg q 4-6 hrs PRN for pain. Discussed use of NSAIDS with Dr. Carvajal, though this is not recommended in the setting of cardiomyopathy.   - Continue abdominal binder PRN for pain  - Hgb 9.9 from 10.1; appropriate drop after  section  - Volitional voids  - Bowel regimen: stool softeners  - Rh positive. Rhogam not indicated.   - Feeding: Plans to pump; continue prenatal vitamins. She has additional questions and concerns about breastfeeding. Placed lactation consult and left message with them, consult may be virtual.  -  Contraception: s/p bilateral salpingectomy    Gestational Diabetes  - From obstetric standpoint, accuchecks no longer needed postpartum.  - Recommend 6 week 2 hour glucose tolerance test to ensure resolution of diabetes.  - Increased risk for overt diabetes outside of pregnancy    Acute decompensated HFrEF  Arrhythmia with PAF, NSVT, PVCs, PACs  - On metoprolol 25 mg q6h  - On enalapril 2.5mg daily; no contraindications to breastfeeding from obstetric standpoint.  - Plan for cardiac MRI, genetic testing at some point postpartum for risk stratification  - Intermittent runs of VT increasing in frequency, EP consulted to discuss life vest and/or ICD.   - Repeat limited echo 1/21/23 EF 30-35% as above  - VTE prophylaxis: On therapeutic Lovenox  - Appreciate cares per primary cardiology team.    Seen and discussed with Dr. Mast.    [facility] Team will continue to follow peripherally, will not plan to see over the weekend unless issues arise.     Inés Warner MD  ObGyn Resident, PGY-3  01/27/2023    Physician Attestation   I saw this patient with the resident and agree with the resident/fellow's findings and plan of care as documented in the note.      Key findings: Meeting all goals for postoperative OB care. Incision healing well. Had bilateral salpingectomy for contraception. No concerns from OB standpoint at this time but remains hospitalized due to cardiac ongoing challenges. Please contact our team over the weekend if there are any concerns pager [phone].     MANAGEMENT DISCUSSED with the following over the past 24 hours: postoperative OB care and follow up. Lactation advice.      I have personally reviewed the following data over the past 24 hrs:    11.9 (H)  \   11.0 (L)   / 223     N/A N/A N/A /  71   4.3 N/A N/A \         Luis Mast MD  Date of Service (when I saw the patient): 01/27/23

## 2023-01-27 NOTE — PLAN OF CARE
ICU End of Shift Summary. See flowsheets for vital signs and detailed assessment.    Changes this shift: prn oxycodone given for incisional pain, Vital sign stable, sinus R with less PAC/PVC's, no V.Tach run noted,CVP 9, per MD no need to trend CVP anymore since patient has a transfer order to , had 800 ml urine output this shift and 1 bowel movement.    Plan: Transfer to 6 C when bed is available.    Problem: Plan of Care - These are the overarching goals to be used throughout the patient stay.    Goal: Plan of Care Review  Description: The Plan of Care Review/Shift note should be completed every shift.  The Outcome Evaluation is a brief statement about your assessment that the patient is improving, declining, or no change.  This information will be displayed automatically on your shift note.  Outcome: Progressing  Flowsheets (Taken 1/27/2023 0656)  Plan of Care Reviewed With: patient  Overall Patient Progress: improving   Goal Outcome Evaluation:      Plan of Care Reviewed With: patient    Overall Patient Progress: improvingOverall Patient Progress: improving

## 2023-01-27 NOTE — PLAN OF CARE
ICU End of Shift Summary. See flowsheets for vital signs and detailed assessment.    Changes this shift: Neuro intact; hemodynamically stable on new medications, sotalol dc'd, EP consult complete and patient's chest pain resolving- 12 lead obtained; PAC/PVC frequency improved with no Vtach runs; tolerating room air and 2gm Na diet with 2L FR    Plan: Continue to monitor/follow plan of care      Goal Outcome Evaluation:      Plan of Care Reviewed With: patient, spouse    Overall Patient Progress: no changeOverall Patient Progress: no change    Outcome Evaluation: Chest pain resolved, up ambulating in room; VSS; EP consult complete

## 2023-01-27 NOTE — PROGRESS NOTES
[facility]    Cardiology Progress Note- Cardiology        Date of Admission:  2022     Assessment and Plan:   Anjali Carmen is a 32 year old female  with no significant past medical history, who presented as a transfer from [facility] to [facility] on 2022 for further management of newly diagnosed, acute decompensated systolic heart failure (LVEF 20%) and high risk delivery. Undergoing  today.      [facility] available  at [phone]     Plan today:   -Start Metoprolol 25 mg Q6H today   -Will discuss today with ObGyn about anticoagulation therapy.   -Will define possible day of cardiac MRI    Acute on Chronic Heart Failure with Reduced Ejection Fraction 2/2  NICM, pregnancy related with questionable familial component  RV failure  Note family history of father with CM at age 30. TTE  with EF 20-25%, thinned out LV wall, and notable regional wall motion abnormalities. Coronary angiogram  with no significat lesions seen.  Stage C, NYHA Class III    Volume: PRN   GDMT:   - ACEi/ARB/ARNI: Enalapril 2.5 mg daily  - BB: Start Metoprolol 25 mg Q6H  - MRA: Continue Spironolactone 12.5 mg daily   - Diuretics: Continue  Furosemide 20 mg BID   Therapies:  - SCD prophylaxis: LifeVest for discharge   - Recommend therapeutic LMWH given high risk for LV thrombus in setting of antepartum, regional wall motion abnormalities, and low EF: Enoxaparin 120 mg BID   Additional Management:   - Cardiac MRI and genetic testing postpartum order    PAF, new onset   NSVT and frequent PVC and PAC's  Hypomagnesemia  [facility] in [place], isolated episode, EKG unavailable for review. Currently in NSR at [facility] with frequent ectopy. [id number] score of 2 (sex, HF) - on AC as above for pregnancy and low EF.   - Appreciate EP consult  - Discontinued Sotalol 80 mg po BID   - Start Metoprolol 25 mg Q6H  - Lovenox as above   - Mg per  "protocol- favor IV over PO replacement     Leukocytosis, improving  Patient had leukocytosis that is improving  (12.1-> 13.7->15.8 -> 11.9). Patient reported mild incisional site pain. Denies nausea, vomiting, fever, diarrheas, chest pain or shortness of breath. Will monitor for signs of infection or worsening pain of incisional site.   -CBC daily   -Urine cultures: Mixture of urogenital ky  -Blood cultures x 2: NGTD    High risk pregnancy  Delivered 01/23/23. MFM following. Appreciate assistance.      Gestational DM.   - MFM following  - From obstetric standpoint, accuchecks no longer needed postpartum.  - Recommend 6 week 2 hour glucose tolerance test to ensure resolution of diabetes.  - Increased risk for overt diabetes outside of pregnancy     Mild anemia, multifactorial  Due to pregnancy and HF. Hgb 10.2 on admission (MCV 79). Retic count 1.9%. Iron, ferritin, TIBC levels show AVINASH (say 6%). Hgh 9s-low 10s. IV iron 200 mg daily x5 (12/24-12/28). Hgb stable mid 10s.   - limit blood draws     Severe Obesity  BMI 43    FEN: Regular  PROPHY:  Levonox  LINES:  PICC  DISPO:  Pending HF stabilization and long-term planning  CODE STATUS:  Full Code         Clinically Significant Risk Factors              # Hypoalbuminemia: Lowest albumin = 2.9 g/dL at 1/14/2023  7:18 AM, will monitor as appropriate           # Severe Obesity: Estimated body mass index is 43.07 kg/m  as calculated from the following:    Height as of this encounter: 1.753 m (5' 9\").    Weight as of this encounter: 132.3 kg (291 lb 10.7 oz).          This patient's care was discussed with Trinidad Stroud, attending cardiologist, who agrees with the assessment and plan unless otherwise indicated.    Vernon Dyer MD  Internal Medicine, PGY2   ______________________________________________________________________    Interval History   Nurses notes reviewed. No acute events overnight. Patient denies chest pain, abdominal pain, shortness " of breath, nausea or vomiting.     Physical Exam   Vital Signs: Temp: 98  F (36.7  C) Temp src: Axillary BP: 93/42 Pulse: 68   Resp: 12 SpO2: 99 % O2 Device: None (Room air)    Weight: 291 lbs 10.7 oz  GENERAL: Appears alert and interacting appropriately.   HEENT: Eyes symmetrical and free of discharge bilaterally.   NECK: Supple and without lymphadenopathy. JVD ~6 cm.  CV: RRR, S1S2 present without murmur, rub, or gallop.   RESPIRATORY: Respirations regular, even, and unlabored. Lungs CTA throughout.   GI: Soft, mild tenderness at  site, No rebound or guarding. No organomegaly.   EXTREMITIES: No peripheral edema. All extremities are warm and well perfused.  NEUROLOGIC: Alert and interacting appropriately. No focal deficits. Moving 4 extremities.   MUSCULOSKELETAL: No joint swelling or tenderness.   SKIN: No jaundice. No rashes or lesions.         Data   I personally reviewed all laboratory and imaging data from the last 24 hours in addition to all available cardiology data.

## 2023-01-28 LAB
ALBUMIN SERPL BCG-MCNC: 2.9 G/DL (ref 3.5–5.2)
ALP SERPL-CCNC: 77 U/L (ref 35–104)
ALT SERPL W P-5'-P-CCNC: 21 U/L (ref 10–35)
ANION GAP SERPL CALCULATED.3IONS-SCNC: 10 MMOL/L (ref 7–15)
AST SERPL W P-5'-P-CCNC: 30 U/L (ref 10–35)
BILIRUB SERPL-MCNC: 0.3 MG/DL
BUN SERPL-MCNC: 17.4 MG/DL (ref 6–20)
CALCIUM SERPL-MCNC: 9.1 MG/DL (ref 8.6–10)
CHLORIDE SERPL-SCNC: 106 MMOL/L (ref 98–107)
CREAT SERPL-MCNC: 0.65 MG/DL (ref 0.51–0.95)
DEPRECATED HCO3 PLAS-SCNC: 22 MMOL/L (ref 22–29)
GFR SERPL CREATININE-BSD FRML MDRD: >90 ML/MIN/1.73M2
GLUCOSE BLDC GLUCOMTR-MCNC: 131 MG/DL (ref 70–99)
GLUCOSE SERPL-MCNC: 80 MG/DL (ref 70–99)
MAGNESIUM SERPL-MCNC: 1.7 MG/DL (ref 1.7–2.3)
POTASSIUM SERPL-SCNC: 4.4 MMOL/L (ref 3.4–5.3)
PROT SERPL-MCNC: 5.9 G/DL (ref 6.4–8.3)
SARS-COV-2 RNA RESP QL NAA+PROBE: NEGATIVE
SODIUM SERPL-SCNC: 138 MMOL/L (ref 136–145)

## 2023-01-28 PROCEDURE — 250N000011 HC RX IP 250 OP 636: Performed by: STUDENT IN AN ORGANIZED HEALTH CARE EDUCATION/TRAINING PROGRAM

## 2023-01-28 PROCEDURE — 250N000011 HC RX IP 250 OP 636: Performed by: NURSE PRACTITIONER

## 2023-01-28 PROCEDURE — 250N000013 HC RX MED GY IP 250 OP 250 PS 637: Performed by: STUDENT IN AN ORGANIZED HEALTH CARE EDUCATION/TRAINING PROGRAM

## 2023-01-28 PROCEDURE — 80053 COMPREHEN METABOLIC PANEL: CPT | Performed by: NURSE PRACTITIONER

## 2023-01-28 PROCEDURE — 250N000013 HC RX MED GY IP 250 OP 250 PS 637

## 2023-01-28 PROCEDURE — 99233 SBSQ HOSP IP/OBS HIGH 50: CPT | Mod: GC | Performed by: INTERNAL MEDICINE

## 2023-01-28 PROCEDURE — 83735 ASSAY OF MAGNESIUM: CPT | Performed by: NURSE PRACTITIONER

## 2023-01-28 PROCEDURE — U0003 INFECTIOUS AGENT DETECTION BY NUCLEIC ACID (DNA OR RNA); SEVERE ACUTE RESPIRATORY SYNDROME CORONAVIRUS 2 (SARS-COV-2) (CORONAVIRUS DISEASE [COVID-19]), AMPLIFIED PROBE TECHNIQUE, MAKING USE OF HIGH THROUGHPUT TECHNOLOGIES AS DESCRIBED BY CMS-2020-01-R: HCPCS

## 2023-01-28 PROCEDURE — 214N000001 HC R&B CCU UMMC

## 2023-01-28 PROCEDURE — 250N000013 HC RX MED GY IP 250 OP 250 PS 637: Performed by: NURSE PRACTITIONER

## 2023-01-28 RX ORDER — HYDROCORTISONE 25 MG/G
CREAM TOPICAL 3 TIMES DAILY PRN
Status: DISCONTINUED | OUTPATIENT
Start: 2023-01-28 | End: 2023-01-31 | Stop reason: HOSPADM

## 2023-01-28 RX ORDER — IBUPROFEN 200 MG
800 TABLET ORAL EVERY 6 HOURS
Status: DISCONTINUED | OUTPATIENT
Start: 2023-01-28 | End: 2023-01-28

## 2023-01-28 RX ORDER — BENZOCAINE/MENTHOL 6 MG-10 MG
LOZENGE MUCOUS MEMBRANE 2 TIMES DAILY PRN
Status: DISCONTINUED | OUTPATIENT
Start: 2023-01-28 | End: 2023-01-31 | Stop reason: HOSPADM

## 2023-01-28 RX ORDER — METOCLOPRAMIDE 10 MG/1
10 TABLET ORAL EVERY 6 HOURS PRN
Status: DISCONTINUED | OUTPATIENT
Start: 2023-01-28 | End: 2023-01-31 | Stop reason: HOSPADM

## 2023-01-28 RX ORDER — OXYCODONE HYDROCHLORIDE 5 MG/1
5 TABLET ORAL EVERY 4 HOURS PRN
Status: DISCONTINUED | OUTPATIENT
Start: 2023-01-28 | End: 2023-01-31 | Stop reason: HOSPADM

## 2023-01-28 RX ORDER — PROCHLORPERAZINE 25 MG
25 SUPPOSITORY, RECTAL RECTAL EVERY 12 HOURS PRN
Status: DISCONTINUED | OUTPATIENT
Start: 2023-01-28 | End: 2023-01-31 | Stop reason: HOSPADM

## 2023-01-28 RX ORDER — METOCLOPRAMIDE HYDROCHLORIDE 5 MG/ML
10 INJECTION INTRAMUSCULAR; INTRAVENOUS EVERY 6 HOURS PRN
Status: DISCONTINUED | OUTPATIENT
Start: 2023-01-28 | End: 2023-01-31 | Stop reason: HOSPADM

## 2023-01-28 RX ORDER — MODIFIED LANOLIN
OINTMENT (GRAM) TOPICAL
Status: DISCONTINUED | OUTPATIENT
Start: 2023-01-28 | End: 2023-01-31 | Stop reason: HOSPADM

## 2023-01-28 RX ORDER — SIMETHICONE 80 MG
80 TABLET,CHEWABLE ORAL 4 TIMES DAILY PRN
Status: DISCONTINUED | OUTPATIENT
Start: 2023-01-28 | End: 2023-01-31 | Stop reason: HOSPADM

## 2023-01-28 RX ORDER — KETOROLAC TROMETHAMINE 30 MG/ML
30 INJECTION, SOLUTION INTRAMUSCULAR; INTRAVENOUS EVERY 6 HOURS
Status: DISCONTINUED | OUTPATIENT
Start: 2023-01-28 | End: 2023-01-28

## 2023-01-28 RX ORDER — PROCHLORPERAZINE MALEATE 5 MG
10 TABLET ORAL EVERY 6 HOURS PRN
Status: DISCONTINUED | OUTPATIENT
Start: 2023-01-28 | End: 2023-01-31 | Stop reason: HOSPADM

## 2023-01-28 RX ADMIN — METOPROLOL TARTRATE 25 MG: 25 TABLET, FILM COATED ORAL at 20:41

## 2023-01-28 RX ADMIN — IBUPROFEN 400 MG: 400 TABLET ORAL at 17:02

## 2023-01-28 RX ADMIN — FUROSEMIDE 20 MG: 20 TABLET ORAL at 09:22

## 2023-01-28 RX ADMIN — HYDROCORTISONE: 1 CREAM TOPICAL at 23:07

## 2023-01-28 RX ADMIN — ALTEPLASE 1 MG: 2.2 INJECTION, POWDER, LYOPHILIZED, FOR SOLUTION INTRAVENOUS at 05:01

## 2023-01-28 RX ADMIN — SENNOSIDES 8.6 MG: 8.6 TABLET, COATED ORAL at 20:42

## 2023-01-28 RX ADMIN — METOPROLOL TARTRATE 25 MG: 25 TABLET, FILM COATED ORAL at 14:51

## 2023-01-28 RX ADMIN — PRENATAL VIT W/ FE FUMARATE-FA TAB 27-0.8 MG 1 TABLET: 27-0.8 TAB at 09:23

## 2023-01-28 RX ADMIN — MAGNESIUM SULFATE HEPTAHYDRATE 2 G: 40 INJECTION, SOLUTION INTRAVENOUS at 21:10

## 2023-01-28 RX ADMIN — IBUPROFEN 400 MG: 400 TABLET ORAL at 22:33

## 2023-01-28 RX ADMIN — SPIRONOLACTONE 12.5 MG: 25 TABLET, FILM COATED ORAL at 09:20

## 2023-01-28 RX ADMIN — IBUPROFEN 400 MG: 400 TABLET ORAL at 06:44

## 2023-01-28 RX ADMIN — ACETAMINOPHEN 975 MG: 325 TABLET, FILM COATED ORAL at 10:59

## 2023-01-28 RX ADMIN — POTASSIUM CHLORIDE 20 MEQ: 40 SOLUTION ORAL at 09:20

## 2023-01-28 RX ADMIN — ENOXAPARIN SODIUM 120 MG: 120 INJECTION SUBCUTANEOUS at 09:23

## 2023-01-28 RX ADMIN — MAGNESIUM OXIDE TAB 400 MG (241.3 MG ELEMENTAL MG) 800 MG: 400 (241.3 MG) TAB at 09:21

## 2023-01-28 RX ADMIN — POTASSIUM CHLORIDE 20 MEQ: 40 SOLUTION ORAL at 20:41

## 2023-01-28 RX ADMIN — ACETAMINOPHEN 975 MG: 325 TABLET, FILM COATED ORAL at 23:02

## 2023-01-28 RX ADMIN — POLYETHYLENE GLYCOL 3350 17 G: 17 POWDER, FOR SOLUTION ORAL at 09:24

## 2023-01-28 RX ADMIN — MAGNESIUM OXIDE TAB 400 MG (241.3 MG ELEMENTAL MG) 800 MG: 400 (241.3 MG) TAB at 20:42

## 2023-01-28 RX ADMIN — MAGNESIUM SULFATE HEPTAHYDRATE 2 G: 40 INJECTION, SOLUTION INTRAVENOUS at 09:24

## 2023-01-28 RX ADMIN — METOPROLOL TARTRATE 25 MG: 25 TABLET, FILM COATED ORAL at 09:21

## 2023-01-28 RX ADMIN — ENALAPRIL MALEATE 2.5 MG: 2.5 TABLET ORAL at 09:22

## 2023-01-28 RX ADMIN — SODIUM CHLORIDE, PRESERVATIVE FREE 10 ML: 5 INJECTION INTRAVENOUS at 22:27

## 2023-01-28 RX ADMIN — POTASSIUM CHLORIDE 20 MEQ: 40 SOLUTION ORAL at 14:38

## 2023-01-28 RX ADMIN — SENNOSIDES 8.6 MG: 8.6 TABLET, COATED ORAL at 09:21

## 2023-01-28 RX ADMIN — FUROSEMIDE 20 MG: 20 TABLET ORAL at 17:01

## 2023-01-28 ASSESSMENT — ACTIVITIES OF DAILY LIVING (ADL)
ADLS_ACUITY_SCORE: 22
ADLS_ACUITY_SCORE: 24
ADLS_ACUITY_SCORE: 22
ADLS_ACUITY_SCORE: 24
ADLS_ACUITY_SCORE: 22
ADLS_ACUITY_SCORE: 22
ADLS_ACUITY_SCORE: 24
ADLS_ACUITY_SCORE: 24
ADLS_ACUITY_SCORE: 22
ADLS_ACUITY_SCORE: 24

## 2023-01-28 NOTE — PROGRESS NOTES
Weekend On Call Care Management Follow Up    Length of Stay (days): 53    Expected Discharge Date:  1/30/23     Concerns to be Addressed:  Life Vest     Patient plan of care discussed at interdisciplinary rounds: Yes    Anticipated Discharge Disposition: Home     Anticipated Discharge Services: None   Anticipated Discharge DME:  Life Vest    Referrals Placed by CM/SW:  Life vest referral sent to [facility]    Additional Information:  Card 2 team reported, pt is medically ready for discharge and need Life Vest arranged to go home with.    RNCC visited pt and discussed about Life vest.  Pt is with no insurance.  RNCC informed pt that we can send the referral to [facility] and see if they provide financial assistance.  Pt agreed.    RNCC talked to [facility] liaison, Sofia # [phone] and the general referral line # [phone].  They said they do offer financial assistance but they need to review the medical referral first.  Then pt financial assistance department need to assess pt financial need.  Pt will need to complete financial assistance form. The Financial assistance department is closed on the weekend.   Sofia aggred to send the financial assistance form for the pt to start completing.  RNCC faxed face sheet, H&P, progress and EP note to [facility] fax number # [phone].  The above info have been shared with pt, cards 2 team and bedside RN.  RNCC will cont to follow up.    Amadeo Pérez RN, PHN, BSN  4A and 4E/ ICU  Care Coordinator  Phone: [phone]  Pager: [phone]    To contact the weekend RNCC  [place] (0800 - 1630) Saturday and Sunday   Units: 4A, 4C, 4E, 5A and 5B- Pager 1: [phone]   Units: 6A, 6B, 6C, 6D- Pager 2: [phone]   Units: 7A, 7B, 7C, 7D, and 5C-Pager 3: [phone]

## 2023-01-28 NOTE — LACTATION NOTE
Per report, Anjali has resumed pumping.  Medication review:    Patient Active Problem List   Diagnosis     Acute decompensated heart failure (H)       Current Facility-Administered Medications   Medication     acetaminophen (TYLENOL) tablet 975 mg     alum & mag hydroxide-simethicone (MAALOX) suspension 30 mL     glucose gel 15-30 g    Or     dextrose 50 % injection 25-50 mL    Or     glucagon injection 1 mg     enalapril (VASOTEC) tablet 2.5 mg     enoxaparin ANTICOAGULANT (LOVENOX) injection 120 mg     furosemide (LASIX) tablet 20 mg     heparin lock flush 10 UNIT/ML injection 5-20 mL     heparin lock flush 10 UNIT/ML injection 5-20 mL     ibuprofen (ADVIL/MOTRIN) tablet 400 mg     magnesium oxide (MAG-OX) tablet 800 mg     magnesium sulfate 2 g in water intermittent infusion     medication instruction     metoprolol tartrate (LOPRESSOR) tablet 25 mg     naloxone (NARCAN) injection 0.2 mg    Or     naloxone (NARCAN) injection 0.4 mg    Or     naloxone (NARCAN) injection 0.2 mg    Or     naloxone (NARCAN) injection 0.4 mg     oxyCODONE (ROXICODONE) tablet 5 mg     Patient is already receiving anticoagulation with heparin, enoxaparin (LOVENOX), warfarin (COUMADIN)  or other anticoagulant medication     polyethylene glycol (MIRALAX) Packet 17 g     polyethylene glycol (MIRALAX) Packet 17 g     potassium chloride (KAYCIEL) solution 20 mEq     prenatal multivitamin w/iron per tablet 1 tablet     sennosides (SENOKOT) tablet 8.6 mg     sodium chloride (PF) 0.9% PF flush 10-20 mL     sodium chloride (PF) 0.9% PF flush 10-40 mL     sodium chloride (PF) 0.9% PF flush 3 mL     sodium chloride (PF) 0.9% PF flush 3 mL     spironolactone (ALDACTONE) half-tab 12.5 mg     Significant medications from a lactation standpoint, per Millard:  Enalapril: L2, concerns of nephrotoxicity with  infants, recommend dilution with PDHM until 35 weeks CGA  Lasix and spironolactone: L2, theoretic concern of decreased maternal milk supply (as  "with all diuretics)  Metoprolol: L2, \"milk levels probably too low to be clinically relevant\", RID 0.5%  Oxycodone: recommend staying under 30 mg/day if not diluting with PDHM, current doses minimal (yesterday 10mg, thus far today none)    Per NICU attending Dr. Talavera, OK to use maternal milk 100%.    D:  I called Anjali; Maged is her 4th baby.  She nursed her others for 12 months each.  She has already started to pump. The NICU lactation folder and NICU teaching book have already been sent over to her.  I:  I reviewed and emailed her introductory materials and went over pumping guidelines.  I reviewed use of the pump.  I sent her a hands-free pumping bra.  We talked about medications during breastfeeding.  I explained when to move to Maintain setting.  I encouraged her to call with any changes in medications, especially when discharged.  She verbalized understanding via teachback.  I advised her to call her insurance company about pump coverage.    A:  Mom has information she needs to initiate her supply; will need close follow up of maternal medications, help with a breast pump at discharge time, and a hands-on admission when able.   P:  Will continue to provide lactation support.    Tanya Gutierrez, RNC, IBCLC    "

## 2023-01-28 NOTE — PROGRESS NOTES
Called Cards II team to see if patient requires cardiac monitoring while ambulating in hallway, as we are promoting frequent ambulation of 3-4 times/day. Cards II team unsure if pt requires cardiac monitoring while ambulating. Will get clarification during rounds. In the meantime, pt will be ambulating with RN on cardiac monitor.

## 2023-01-28 NOTE — PROGRESS NOTES
[facility]    Cardiology Progress Note- Cardiology        Date of Admission:  2022     Assessment and Plan:   Anjali Carmen is a 32 year old female  with no significant past medical history, who presented as a transfer from [facility] to [facility] on 2022 for further management of newly diagnosed, acute decompensated systolic heart failure (LVEF 20%) and high risk delivery. Undergoing  today.      [facility] available  at [phone]     Plan today:   -Paperwork filled for LifeVest before discharge.   -Levonox discontinue today     Acute on Chronic Heart Failure with Reduced Ejection Fraction 2/2  NICM, pregnancy related with questionable familial component  RV failure  Note family history of father with CM at age 30. TTE  with EF 20-25%, thinned out LV wall, and notable regional wall motion abnormalities. Coronary angiogram  with no significat lesions seen.  Stage C, NYHA Class III    Volume: PRN   GDMT:   - ACEi/ARB/ARNI: Enalapril 2.5 mg daily  - BB:Continue Metoprolol 25 mg Q6H  - MRA: Continue Spironolactone 12.5 mg daily   - Diuretics: Continue  Furosemide 20 mg BID   Therapies:  - SCD prophylaxis: LifeVest for discharge (paperwork filled)  -Levonox discontinue today   Additional Management:   - Cardiac MRI and genetic testing likely as outpatient     PAF, new onset   NSVT and frequent PVC and PAC's  Hypomagnesemia  [facility] in [place], isolated episode, EKG unavailable for review. Currently in NSR at [facility] with frequent ectopy. [id number] score of 2 (sex, HF)    - Appreciate EP consult  - Discontinued Sotalol 80 mg po BID   - Continue Metoprolol 25 mg Q6H  - Mg per protocol- favor IV over PO replacement     Leukocytosis, improving  Patient had leukocytosis that is improving  (12.1-> 13.7->15.8 -> 11.9).Denies nausea, vomiting, fever, diarrheas, chest pain or shortness of breath.    -Urine  "cultures: Mixture of urogenital ky  -Blood cultures x 2: NGTD    High risk pregnancy  Delivered 01/23/23. MFM following. Appreciate assistance.      Gestational DM.   - MFM following  - From obstetric standpoint, accuchecks no longer needed postpartum.  - Recommend 6 week 2 hour glucose tolerance test to ensure resolution of diabetes.  - Increased risk for overt diabetes outside of pregnancy     Mild anemia, multifactorial  Due to pregnancy and HF. Hgb 10.2 on admission (MCV 79). Retic count 1.9%. Iron, ferritin, TIBC levels show AVINASH (say 6%). Hgh 9s-low 10s. IV iron 200 mg daily x5 (12/24-12/28). Hgb stable mid 10s.   - limit blood draws     Severe Obesity  BMI 43    FEN: Regular  PROPHY:  Levonox discontinue today  LINES:  PICC  DISPO:  Pending LifeVest approval  CODE STATUS:  Full Code         Clinically Significant Risk Factors              # Hypoalbuminemia: Lowest albumin = 2.9 g/dL at 1/28/2023  4:03 AM, will monitor as appropriate           # Severe Obesity: Estimated body mass index is 43.53 kg/m  as calculated from the following:    Height as of this encounter: 1.753 m (5' 9\").    Weight as of this encounter: 133.7 kg (294 lb 12.8 oz).          This patient's care was discussed with Trinidad Stroud, attending cardiologist, who agrees with the assessment and plan unless otherwise indicated.    Vernon Dyer MD  Internal Medicine, PGY2   ______________________________________________________________________    Interval History   Nurses notes reviewed. No acute events overnight. Patient denies palpitations,  chest pain, abdominal pain, shortness of breath, nausea or vomiting.     Physical Exam   Vital Signs: Temp: 96.9  F (36.1  C) Temp src: Axillary BP: 95/50 Pulse: 64   Resp: 20 SpO2: 97 % O2 Device: None (Room air)    Weight: 294 lbs 12.8 oz  GENERAL: Appears alert and interacting appropriately.   HEENT: Eyes symmetrical and free of discharge bilaterally.   NECK: Supple and without " lymphadenopathy. No JVD.  CV: RRR, S1S2 present without murmur, rub, or gallop.   RESPIRATORY: Respirations regular, even, and unlabored. Lungs CTA throughout.   GI: Soft, No rebound or guarding. No organomegaly.   EXTREMITIES: Mild peripheral edema. All extremities are warm and well perfused.  NEUROLOGIC: Alert and interacting appropriately. No focal deficits. Moving 4 extremities.   SKIN: No jaundice. No rashes or lesions.         Data   I personally reviewed all laboratory and imaging data from the last 24 hours in addition to all available cardiology data.

## 2023-01-28 NOTE — PLAN OF CARE
ICU End of Shift Summary. See flowsheets for vital signs and detailed assessment.    Changes this shift: Pt is up and ambulating to BR independently. VS stable. Pain managed with scheduled Tylenol and prn oxycodone. Pt is pumping, but not breast feeding. Pt is aware that if she changes her mind, to let her nurse or MD know (there needs to be a med change for baby to receive pt's breast milk). 2g Na diet, good appetite. Fluid restriction of 2 liters/day. Pt is currently net even for the day. Pt has continuous Ipad on to see her baby (who is on hybris). Pt's  at bedside and has been attentive to pt.      Plan:  Pt has orders to transfer to , pending room assignment. Cardiac MRI to be done when MRI can do it. MRI Checklist Sheet sent down to MRI.       Goal Outcome Evaluation:    Problem: Plan of Care - These are the overarching goals to be used throughout the patient stay.    Goal: Absence of Hospital-Acquired Illness or Injury  Intervention: Identify and Manage Fall Risk  Recent Flowsheet Documentation  Taken 1/27/2023 1600 by Viridiana Quesada RN  Safety Promotion/Fall Prevention:   room near nurse's station   nonskid shoes/slippers when out of bed   clutter free environment maintained  Taken 1/27/2023 1200 by Viridiana Quesada RN  Safety Promotion/Fall Prevention:   room near nurse's station   nonskid shoes/slippers when out of bed   clutter free environment maintained  Taken 1/27/2023 0800 by Viridiana Quesada RN  Safety Promotion/Fall Prevention:   room near nurse's station   nonskid shoes/slippers when out of bed   clutter free environment maintained  Intervention: Prevent Skin Injury  Recent Flowsheet Documentation  Taken 1/27/2023 0800 by Viridiana Quesada RN  Body Position: position changed independently  Intervention: Prevent and Manage VTE (Venous Thromboembolism) Risk  Recent Flowsheet Documentation  Taken 1/27/2023 1600 by Viridiana Quesada RN  VTE Prevention/Management: patient  refused intervention  Taken 1/27/2023 1200 by Viridiana Quesada RN  VTE Prevention/Management: patient refused intervention  Taken 1/27/2023 0800 by Viridiana Quesada RN  VTE Prevention/Management: patient refused intervention  Goal: Optimal Comfort and Wellbeing  Intervention: Monitor Pain and Promote Comfort  Recent Flowsheet Documentation  Taken 1/27/2023 1745 by Viridiana Quesada RN  Pain Management Interventions:   medication (see MAR)   care clustered   emotional support   pillow support provided   quiet environment facilitated   splinting  Intervention: Provide Person-Centered Care  Recent Flowsheet Documentation  Taken 1/27/2023 1600 by Viridiana Quesada RN  Trust Relationship/Rapport:   care explained   choices provided   questions encouraged   reassurance provided  Taken 1/27/2023 1200 by Viridiana Quesada RN  Trust Relationship/Rapport:   care explained   choices provided   questions encouraged   reassurance provided  Taken 1/27/2023 0800 by Viridiana Quesada RN  Trust Relationship/Rapport:   care explained   choices provided   questions encouraged   reassurance provided

## 2023-01-29 LAB
CREAT SERPL-MCNC: 0.68 MG/DL (ref 0.51–0.95)
GFR SERPL CREATININE-BSD FRML MDRD: >90 ML/MIN/1.73M2
HOLD SPECIMEN: NORMAL
MAGNESIUM SERPL-MCNC: 1.7 MG/DL (ref 1.7–2.3)
POTASSIUM SERPL-SCNC: 4.1 MMOL/L (ref 3.4–5.3)

## 2023-01-29 PROCEDURE — 250N000011 HC RX IP 250 OP 636: Performed by: STUDENT IN AN ORGANIZED HEALTH CARE EDUCATION/TRAINING PROGRAM

## 2023-01-29 PROCEDURE — 36592 COLLECT BLOOD FROM PICC: CPT

## 2023-01-29 PROCEDURE — 83735 ASSAY OF MAGNESIUM: CPT | Performed by: STUDENT IN AN ORGANIZED HEALTH CARE EDUCATION/TRAINING PROGRAM

## 2023-01-29 PROCEDURE — 84132 ASSAY OF SERUM POTASSIUM: CPT | Performed by: STUDENT IN AN ORGANIZED HEALTH CARE EDUCATION/TRAINING PROGRAM

## 2023-01-29 PROCEDURE — 250N000013 HC RX MED GY IP 250 OP 250 PS 637: Performed by: STUDENT IN AN ORGANIZED HEALTH CARE EDUCATION/TRAINING PROGRAM

## 2023-01-29 PROCEDURE — 250N000013 HC RX MED GY IP 250 OP 250 PS 637

## 2023-01-29 PROCEDURE — 999N000044 HC STATISTIC CVC DRESSING CHANGE

## 2023-01-29 PROCEDURE — 250N000011 HC RX IP 250 OP 636

## 2023-01-29 PROCEDURE — 36592 COLLECT BLOOD FROM PICC: CPT | Performed by: STUDENT IN AN ORGANIZED HEALTH CARE EDUCATION/TRAINING PROGRAM

## 2023-01-29 PROCEDURE — 99233 SBSQ HOSP IP/OBS HIGH 50: CPT | Mod: GC | Performed by: INTERNAL MEDICINE

## 2023-01-29 PROCEDURE — 82565 ASSAY OF CREATININE: CPT

## 2023-01-29 PROCEDURE — 214N000001 HC R&B CCU UMMC

## 2023-01-29 RX ORDER — ENOXAPARIN SODIUM 100 MG/ML
40 INJECTION SUBCUTANEOUS EVERY 12 HOURS
Status: DISCONTINUED | OUTPATIENT
Start: 2023-01-29 | End: 2023-01-31 | Stop reason: HOSPADM

## 2023-01-29 RX ADMIN — ACETAMINOPHEN 975 MG: 325 TABLET, FILM COATED ORAL at 22:12

## 2023-01-29 RX ADMIN — METOPROLOL TARTRATE 25 MG: 25 TABLET, FILM COATED ORAL at 22:12

## 2023-01-29 RX ADMIN — ACETAMINOPHEN 975 MG: 325 TABLET, FILM COATED ORAL at 03:23

## 2023-01-29 RX ADMIN — FUROSEMIDE 20 MG: 20 TABLET ORAL at 16:27

## 2023-01-29 RX ADMIN — POLYETHYLENE GLYCOL 3350 17 G: 17 POWDER, FOR SOLUTION ORAL at 08:39

## 2023-01-29 RX ADMIN — SENNOSIDES 8.6 MG: 8.6 TABLET, COATED ORAL at 08:39

## 2023-01-29 RX ADMIN — IBUPROFEN 400 MG: 400 TABLET ORAL at 09:52

## 2023-01-29 RX ADMIN — SODIUM CHLORIDE, PRESERVATIVE FREE 10 ML: 5 INJECTION INTRAVENOUS at 22:13

## 2023-01-29 RX ADMIN — MAGNESIUM OXIDE TAB 400 MG (241.3 MG ELEMENTAL MG) 800 MG: 400 (241.3 MG) TAB at 08:39

## 2023-01-29 RX ADMIN — ENOXAPARIN SODIUM 40 MG: 40 INJECTION SUBCUTANEOUS at 16:27

## 2023-01-29 RX ADMIN — MAGNESIUM SULFATE HEPTAHYDRATE 2 G: 40 INJECTION, SOLUTION INTRAVENOUS at 08:40

## 2023-01-29 RX ADMIN — METOPROLOL TARTRATE 25 MG: 25 TABLET, FILM COATED ORAL at 16:26

## 2023-01-29 RX ADMIN — MAGNESIUM SULFATE HEPTAHYDRATE 2 G: 40 INJECTION, SOLUTION INTRAVENOUS at 19:41

## 2023-01-29 RX ADMIN — POTASSIUM CHLORIDE 20 MEQ: 40 SOLUTION ORAL at 08:39

## 2023-01-29 RX ADMIN — ACETAMINOPHEN 975 MG: 325 TABLET, FILM COATED ORAL at 16:25

## 2023-01-29 RX ADMIN — POTASSIUM CHLORIDE 20 MEQ: 40 SOLUTION ORAL at 16:27

## 2023-01-29 RX ADMIN — PRENATAL VIT W/ FE FUMARATE-FA TAB 27-0.8 MG 1 TABLET: 27-0.8 TAB at 08:39

## 2023-01-29 RX ADMIN — METOPROLOL TARTRATE 25 MG: 25 TABLET, FILM COATED ORAL at 03:23

## 2023-01-29 RX ADMIN — SODIUM CHLORIDE, PRESERVATIVE FREE 5 ML: 5 INJECTION INTRAVENOUS at 07:53

## 2023-01-29 RX ADMIN — METOPROLOL TARTRATE 25 MG: 25 TABLET, FILM COATED ORAL at 11:17

## 2023-01-29 RX ADMIN — POTASSIUM CHLORIDE 20 MEQ: 40 SOLUTION ORAL at 19:41

## 2023-01-29 RX ADMIN — MAGNESIUM OXIDE TAB 400 MG (241.3 MG ELEMENTAL MG) 800 MG: 400 (241.3 MG) TAB at 19:41

## 2023-01-29 RX ADMIN — ENALAPRIL MALEATE 2.5 MG: 2.5 TABLET ORAL at 08:39

## 2023-01-29 RX ADMIN — SPIRONOLACTONE 12.5 MG: 25 TABLET, FILM COATED ORAL at 08:39

## 2023-01-29 RX ADMIN — FUROSEMIDE 20 MG: 20 TABLET ORAL at 08:39

## 2023-01-29 ASSESSMENT — ACTIVITIES OF DAILY LIVING (ADL)
ADLS_ACUITY_SCORE: 22

## 2023-01-29 NOTE — PLAN OF CARE
Anjali Carmen is a 32 year old female  with no significant past medical history, who presented as a transfer from [facility] to [facility] on 2022 for further management of newly diagnosed, acute decompensated systolic heart failure (LVEF 20%) and high risk delivery.  Accepted in transfer from .  Neuro: A&O x 4.  Resp: Sats 90s.  CV: SR 60s, VSS.  GI/: s/p pregnancy. Abdomen red and itchy Hydrocortisone ordered.  Mobility: Weak but moves independently in the room.  Pain: Ibuprofen for pain.  Plan: Pt here to get lifevest on Monday. Her baby is in the NICU on the [facility]. She's from [place].

## 2023-01-29 NOTE — PROGRESS NOTES
D: Pt transfered from [facility] to [facility] on 12/6/2022 for further management of newly diagnosed, acute decompensated systolic heart failure (LVEF 20%) and high risk delivery.      I/A:   Neuro: A&O x4.   Cardiac: SB-SR, HR 50-80s.   Respiratory: Sats >90% on RA, denied SOB.   GI/: Adequate UOP. Good appetite.   Skin/drains: Abdominal incision SHARON and approximated.  IV/Drips: 2 PICC LUE.   PRNs: Ibuprofen x1 for abdominal incisional pain which was effective along with scheduled Tylenol.  Activity: Up ad danis, steady gait. Ambulated around unit often.        P: Lifevest at discharge. Notifying Cards 2 team of changes.

## 2023-01-29 NOTE — PROGRESS NOTES
Care Management Follow Up    Length of Stay (days): 54    Expected Discharge Date:       Concerns to be Addressed:       Patient plan of care discussed at interdisciplinary rounds: Yes    Anticipated Discharge Disposition: Home     Anticipated Discharge Services:  N/A  Anticipated Discharge DME:  Life Vest     Education Provided on the Discharge Plan:  Yes   Patient/Family in Agreement with the Plan:  Yes     Private pay costs discussed: Not applicable    Additional Information:  32 year old female  with no significant past medical history, who presented as a transfer from [facility] to [facility] on 2022 for further management of newly diagnosed, acute decompensated systolic heart failure (LVEF 20%) and high risk delivery,  .    RNCC met with Pt at the bedside and provided the financial assistance application for the [employer] Life Vest. Pt stated she would complete with her . RNCC encouraged her to complete asap. Pt also confirmed she does not have insurance. She thought maybe she had BCBS but her HR contact has not been responsive or confirmed this for the Pt. She stated she believes at this point she does not have insurance and understands what it means to be a self pay patient.     Pt confirmed her  is staying at the [facility] and she plans to go there after discharge while her baby remain on [facility] in the NICU.     [employer] Life Vest  oSfia barbour # [phone]  fax number # [phone]  General referral line # [phone]  ** order faxed on     Care management will continue to follow and support safe discharge planning as needed.     Marta Chavez, RN  RNCC [place]

## 2023-01-29 NOTE — PROGRESS NOTES
[facility]    Cardiology Progress Note- Cardiology        Date of Admission:  2022     Assessment and Plan:   Anjali Carmen is a 32 year old female  with no significant past medical history, who presented as a transfer from [facility] to [facility] on 2022 for further management of newly diagnosed, acute decompensated systolic heart failure (LVEF 20%) and high risk delivery.     [facility] available  at [phone]     Plan today:   -Start Levonox 40 mg BID for DVT ppx     Acute on Chronic Heart Failure with Reduced Ejection Fraction 2/2  NICM, pregnancy related with questionable familial component  RV failure  Note family history of father with CM at age 30. TTE  with EF 20-25%, thinned out LV wall, and notable regional wall motion abnormalities. Coronary angiogram  with no significat lesions seen.  Stage C, NYHA Class III    Volume: Euvolemic  GDMT:   - ACEi/ARB/ARNI: Enalapril 2.5 mg daily  - BB:Continue Metoprolol 25 mg Q6H  - MRA: Continue Spironolactone 12.5 mg daily   - Diuretics: Continue  Furosemide 20 mg BID   Therapies:  - SCD prophylaxis: LifeVest for discharge   Additional Management:   - Cardiac MRI and genetic testing likely as outpatient     PAF, new onset   NSVT and frequent PVC and PAC's  Hypomagnesemia  [facility] [place], isolated episode, EKG unavailable for review. Currently in NSR at [facility] with frequent ectopy. [id number] score of 2 (sex, HF)    - Appreciate EP consult  - Discontinued Sotalol 80 mg po BID   - Continue Metoprolol 25 mg Q6H  - Mg per protocol- favor IV over PO replacement     Leukocytosis, improved  Patient had leukocytosis that is improving  (12.1-> 13.7->15.8 -> 11.9).Denies nausea, vomiting, fever, diarrheas, chest pain or shortness of breath.    -Urine cultures: Mixture of urogenital ky  -Blood cultures x 2: NGTD    High risk pregnancy  Delivered 23. [facility]  "following. Appreciate assistance.      Gestational DM.   - MFM following  - From obstetric standpoint, accuchecks no longer needed postpartum.  - Recommend 6 week 2 hour glucose tolerance test to ensure resolution of diabetes.  - Increased risk for overt diabetes outside of pregnancy     Mild anemia, multifactorial  Due to pregnancy and HF. Hgb 10.2 on admission (MCV 79). Retic count 1.9%. Iron, ferritin, TIBC levels show AVINASH (say 6%). Hgh 9s-low 10s. IV iron 200 mg daily x5 (12/24-12/28). Hgb stable mid 10s.   - limit blood draws     Severe Obesity  BMI 43    FEN: Regular  PROPHY:  Levonox for DVT ppx   LINES:  PICC  DISPO:  Pending LifeVest approval  CODE STATUS:  Full Code         Clinically Significant Risk Factors              # Hypoalbuminemia: Lowest albumin = 2.9 g/dL at 1/28/2023  4:03 AM, will monitor as appropriate           # Severe Obesity: Estimated body mass index is 43.53 kg/m  as calculated from the following:    Height as of this encounter: 1.753 m (5' 9\").    Weight as of this encounter: 133.7 kg (294 lb 12.8 oz).          This patient's care was discussed with Trinidad Stroud, attending cardiologist, who agrees with the assessment and plan unless otherwise indicated.    Vernon Dyer MD  Internal Medicine, PGY2   ______________________________________________________________________    Interval History   Nurses notes reviewed. No acute events overnight. Patient denies palpitations,  chest pain, abdominal pain, shortness of breath, nausea or vomiting.     Physical Exam   Vital Signs: Temp: 98.1  F (36.7  C) Temp src: Oral BP: 105/57 Pulse: 80   Resp: 18 SpO2: 98 % O2 Device: None (Room air)    Weight: 294 lbs 12.8 oz  GENERAL: Appears alert and interacting appropriately.   HEENT: Eyes symmetrical and free of discharge bilaterally.   NECK: Supple and without lymphadenopathy. No JVD.  CV: RRR, S1S2 present without murmur, rub, or gallop.   RESPIRATORY: Respirations regular, even, " and unlabored. Lungs CTA throughout.   GI: Soft, No rebound or guarding. No organomegaly.   EXTREMITIES: Mild peripheral edema. All extremities are warm and well perfused.  NEUROLOGIC: Alert and interacting appropriately. No focal deficits. Moving 4 extremities.   SKIN: No jaundice. No rashes or lesions.         Data   I personally reviewed all laboratory and imaging data from the last 24 hours in addition to all available cardiology data.

## 2023-01-29 NOTE — PLAN OF CARE
Temp: 98.1  F (36.7  C) Temp src: Oral BP: 102/66 Pulse: 69   Resp: 20 SpO2: 98 % O2 Device: None (Room air)       D: Transferred to [facility] on 22 for management of newly diagnosed acute decompensated systolic heart failure (LVEF 20%) and high risk delivery. S/p  23    I/A: Anjali is A&O x4. Tele in place, SR. VSS on RA. PICC in place, hep locked. Lower abd incision WDL. Pumping in room. Up independently. Appeared to sleep well overnight    P: Continue to monitor and follow POC. Plan to discharge with LifeVest. Notify Cards 2 with changes      Goal Outcome Evaluation:    Plan of Care Reviewed With: patient  Overall Patient Progress: improving

## 2023-01-29 NOTE — PROGRESS NOTES
TC from primary cardiac team today as patient is cleared for discharge from cardiac standpoint today per their call and inquiring if she should be transferred to [facility]. Patient is postop day 6 from primary LTCS and incision exam on 1/27 was normal and patient has met all post-op goals for discharge from a CS perspective. No indication for transfer to OB unit/service.     We have left a message with our clinic to call her Monday to see where she wants to do her 1-2 week postoperative visit (our clinic or somewhere more local to where she lives) and we can coordinate follow up.     Patient had tubal ligation so no contraceptive discussion is needed at this time as well.     If she has any urgent postop pregnancy issues, she can call :  Weekdays 8-4 pm [facility] clinic [phone]  After office hours, weekends Labor and delivery [phone] ask to talk to charge nurse  Emergency issue - present to ED    Luis Mast MD

## 2023-01-29 NOTE — PLAN OF CARE
ICU End of Shift Summary. See flowsheets for vital signs and detailed assessment.    Changes this shift: Pt is improving and is more independent today. Abdominal surgical site pain managed with ibuprofen and tylenol. Pt has orders ok to ambulate in hallway without monitor. Pt ambulated x 2 around the 4th floor today. Pt is still pumping and has stated that she intends to breastfeed her baby. The lactation specialist on [facility] told pt that  can bring any expressed milk over to [facility] for baby.  Pt still has orders to transfer to , pending room availability. Cards Team said that pt could go home now, but still waiting for Life Vest liaison/coordinator, which will probably be Monday. See Care Coordinator note.    Plan:  Plan is to discharge to home once pt receives Life Vest. Pt pumping and saving milk for baby and will begin breastfeeding once pt is home.      Goal Outcome Evaluation:    Problem: Plan of Care - These are the overarching goals to be used throughout the patient stay.    Goal: Absence of Hospital-Acquired Illness or Injury  Intervention: Identify and Manage Fall Risk  Recent Flowsheet Documentation  Taken 1/28/2023 1600 by Viridiana Quesada RN  Safety Promotion/Fall Prevention:   room near nurse's station   nonskid shoes/slippers when out of bed   clutter free environment maintained  Taken 1/28/2023 1200 by Viridiana Quesada RN  Safety Promotion/Fall Prevention:   room near nurse's station   nonskid shoes/slippers when out of bed   clutter free environment maintained  Taken 1/28/2023 0900 by Viridiana Quesada RN  Safety Promotion/Fall Prevention:   room near nurse's station   nonskid shoes/slippers when out of bed   clutter free environment maintained  Intervention: Prevent Skin Injury  Recent Flowsheet Documentation  Taken 1/28/2023 1600 by Viridiana Quesada RN  Body Position: position changed independently  Taken 1/28/2023 1400 by Viridiana Quesada RN  Body Position: position  changed independently  Taken 1/28/2023 1200 by Viridiana Quesada RN  Body Position: position changed independently  Taken 1/28/2023 1100 by Viridiana Quesada RN  Body Position: position changed independently  Taken 1/28/2023 1000 by Viridiana Quesada RN  Body Position: position changed independently  Taken 1/28/2023 0900 by Viridiana Quesada RN  Body Position: position changed independently  Intervention: Prevent and Manage VTE (Venous Thromboembolism) Risk  Recent Flowsheet Documentation  Taken 1/28/2023 1600 by Viridiana Quesada RN  VTE Prevention/Management: SCDs (sequential compression devices) off  Taken 1/28/2023 1200 by Viridiana Quesada RN  VTE Prevention/Management: SCDs (sequential compression devices) off  Taken 1/28/2023 0900 by Viridiana Quesada RN  VTE Prevention/Management: SCDs (sequential compression devices) off  Goal: Optimal Comfort and Wellbeing  Intervention: Provide Person-Centered Care  Recent Flowsheet Documentation  Taken 1/28/2023 1600 by Viridiana Quesada RN  Trust Relationship/Rapport:   care explained   choices provided   questions encouraged   reassurance provided  Taken 1/28/2023 1200 by Viridiana Quesada RN  Trust Relationship/Rapport:   care explained   choices provided   questions encouraged   reassurance provided  Taken 1/28/2023 0900 by Viridiana Quesada RN  Trust Relationship/Rapport:   care explained   choices provided   questions encouraged   reassurance provided

## 2023-01-29 NOTE — PROGRESS NOTES
Transferred to: (place) at (time) 6C  Status at time of transfer: Able tp make needs known, using call light appropriately. VSS. Independently performing ADLs, ambulating independently in room, steady gait. Denies SOB, PENA, dizziness, chest pain, palpitations, or nausea. Ipad at bedside of .  Belongings: Ipad, fridge, and all other personal belongings sent with patient.  Carrero removed? (if no, why?): No carrero.   Chart and medications: Sent with patient at time of transfer to [facility]  Family notified:  notified by patient at time of transfer.

## 2023-01-30 ENCOUNTER — PRE VISIT (OUTPATIENT)
Dept: MATERNAL FETAL MEDICINE | Facility: CLINIC | Age: 33
End: 2023-01-30
Payer: COMMERCIAL

## 2023-01-30 DIAGNOSIS — I50.9 ACUTE DECOMPENSATED HEART FAILURE (H): Primary | ICD-10-CM

## 2023-01-30 LAB
BASOPHILS # BLD AUTO: 0 10E3/UL (ref 0–0.2)
BASOPHILS NFR BLD AUTO: 0 %
EOSINOPHIL # BLD AUTO: 0.2 10E3/UL (ref 0–0.7)
EOSINOPHIL NFR BLD AUTO: 2 %
ERYTHROCYTE [DISTWIDTH] IN BLOOD BY AUTOMATED COUNT: 18.7 % (ref 10–15)
HCT VFR BLD AUTO: 34.8 % (ref 35–47)
HGB BLD-MCNC: 10.5 G/DL (ref 11.7–15.7)
IMM GRANULOCYTES # BLD: 0 10E3/UL
IMM GRANULOCYTES NFR BLD: 1 %
LYMPHOCYTES # BLD AUTO: 2.9 10E3/UL (ref 0.8–5.3)
LYMPHOCYTES NFR BLD AUTO: 34 %
MAGNESIUM SERPL-MCNC: 1.7 MG/DL (ref 1.7–2.3)
MCH RBC QN AUTO: 26.1 PG (ref 26.5–33)
MCHC RBC AUTO-ENTMCNC: 30.2 G/DL (ref 31.5–36.5)
MCV RBC AUTO: 87 FL (ref 78–100)
MONOCYTES # BLD AUTO: 0.5 10E3/UL (ref 0–1.3)
MONOCYTES NFR BLD AUTO: 6 %
NEUTROPHILS # BLD AUTO: 4.8 10E3/UL (ref 1.6–8.3)
NEUTROPHILS NFR BLD AUTO: 57 %
NRBC # BLD AUTO: 0 10E3/UL
NRBC BLD AUTO-RTO: 0 /100
PATH REPORT.COMMENTS IMP SPEC: NORMAL
PATH REPORT.COMMENTS IMP SPEC: NORMAL
PATH REPORT.FINAL DX SPEC: NORMAL
PATH REPORT.GROSS SPEC: NORMAL
PATH REPORT.MICROSCOPIC SPEC OTHER STN: NORMAL
PATH REPORT.RELEVANT HX SPEC: NORMAL
PHOTO IMAGE: NORMAL
PLATELET # BLD AUTO: 246 10E3/UL (ref 150–450)
POTASSIUM SERPL-SCNC: 4 MMOL/L (ref 3.4–5.3)
RBC # BLD AUTO: 4.02 10E6/UL (ref 3.8–5.2)
WBC # BLD AUTO: 8.4 10E3/UL (ref 4–11)

## 2023-01-30 PROCEDURE — 214N000001 HC R&B CCU UMMC

## 2023-01-30 PROCEDURE — 250N000013 HC RX MED GY IP 250 OP 250 PS 637: Performed by: STUDENT IN AN ORGANIZED HEALTH CARE EDUCATION/TRAINING PROGRAM

## 2023-01-30 PROCEDURE — 250N000013 HC RX MED GY IP 250 OP 250 PS 637

## 2023-01-30 PROCEDURE — 84132 ASSAY OF SERUM POTASSIUM: CPT | Performed by: STUDENT IN AN ORGANIZED HEALTH CARE EDUCATION/TRAINING PROGRAM

## 2023-01-30 PROCEDURE — 250N000011 HC RX IP 250 OP 636

## 2023-01-30 PROCEDURE — 85025 COMPLETE CBC W/AUTO DIFF WBC: CPT | Performed by: STUDENT IN AN ORGANIZED HEALTH CARE EDUCATION/TRAINING PROGRAM

## 2023-01-30 PROCEDURE — 36592 COLLECT BLOOD FROM PICC: CPT | Performed by: STUDENT IN AN ORGANIZED HEALTH CARE EDUCATION/TRAINING PROGRAM

## 2023-01-30 PROCEDURE — 250N000011 HC RX IP 250 OP 636: Performed by: STUDENT IN AN ORGANIZED HEALTH CARE EDUCATION/TRAINING PROGRAM

## 2023-01-30 PROCEDURE — 83735 ASSAY OF MAGNESIUM: CPT | Performed by: STUDENT IN AN ORGANIZED HEALTH CARE EDUCATION/TRAINING PROGRAM

## 2023-01-30 PROCEDURE — 99232 SBSQ HOSP IP/OBS MODERATE 35: CPT | Mod: GC | Performed by: INTERNAL MEDICINE

## 2023-01-30 RX ORDER — METOPROLOL TARTRATE 25 MG/1
25 TABLET, FILM COATED ORAL
Status: ON HOLD | COMMUNITY
Start: 2022-12-06 | End: 2023-01-30

## 2023-01-30 RX ORDER — SPIRONOLACTONE 25 MG/1
12.5 TABLET ORAL DAILY
Qty: 45 TABLET | Refills: 0 | Status: SHIPPED | OUTPATIENT
Start: 2023-01-31 | End: 2023-05-01

## 2023-01-30 RX ORDER — POTASSIUM CHLORIDE 1500 MG/1
20-40 TABLET, EXTENDED RELEASE ORAL
Status: ON HOLD | COMMUNITY
Start: 2022-12-05 | End: 2023-01-30

## 2023-01-30 RX ORDER — ENALAPRIL MALEATE 2.5 MG/1
2.5 TABLET ORAL DAILY
Qty: 90 TABLET | Refills: 0 | Status: SHIPPED | OUTPATIENT
Start: 2023-01-31 | End: 2023-05-01

## 2023-01-30 RX ORDER — METOPROLOL SUCCINATE 25 MG/1
50 TABLET, EXTENDED RELEASE ORAL DAILY
Qty: 180 TABLET | Refills: 0 | Status: SHIPPED | OUTPATIENT
Start: 2023-01-30 | End: 2023-04-30

## 2023-01-30 RX ORDER — FUROSEMIDE 20 MG
20 TABLET ORAL DAILY
Qty: 90 TABLET | Refills: 0 | Status: SHIPPED | OUTPATIENT
Start: 2023-01-30 | End: 2023-04-30

## 2023-01-30 RX ADMIN — METOPROLOL TARTRATE 25 MG: 25 TABLET, FILM COATED ORAL at 16:35

## 2023-01-30 RX ADMIN — METOPROLOL TARTRATE 25 MG: 25 TABLET, FILM COATED ORAL at 04:48

## 2023-01-30 RX ADMIN — IBUPROFEN 400 MG: 400 TABLET ORAL at 08:41

## 2023-01-30 RX ADMIN — ACETAMINOPHEN 975 MG: 325 TABLET, FILM COATED ORAL at 16:35

## 2023-01-30 RX ADMIN — MAGNESIUM OXIDE TAB 400 MG (241.3 MG ELEMENTAL MG) 800 MG: 400 (241.3 MG) TAB at 08:41

## 2023-01-30 RX ADMIN — MAGNESIUM SULFATE HEPTAHYDRATE 2 G: 40 INJECTION, SOLUTION INTRAVENOUS at 20:31

## 2023-01-30 RX ADMIN — POTASSIUM CHLORIDE 20 MEQ: 40 SOLUTION ORAL at 15:00

## 2023-01-30 RX ADMIN — POTASSIUM CHLORIDE 20 MEQ: 40 SOLUTION ORAL at 08:41

## 2023-01-30 RX ADMIN — ACETAMINOPHEN 975 MG: 325 TABLET, FILM COATED ORAL at 10:40

## 2023-01-30 RX ADMIN — SODIUM CHLORIDE, PRESERVATIVE FREE 10 ML: 5 INJECTION INTRAVENOUS at 16:36

## 2023-01-30 RX ADMIN — SENNOSIDES 8.6 MG: 8.6 TABLET, COATED ORAL at 20:29

## 2023-01-30 RX ADMIN — SODIUM CHLORIDE, PRESERVATIVE FREE 5 ML: 5 INJECTION INTRAVENOUS at 05:15

## 2023-01-30 RX ADMIN — ENOXAPARIN SODIUM 40 MG: 40 INJECTION SUBCUTANEOUS at 04:47

## 2023-01-30 RX ADMIN — OXYCODONE HYDROCHLORIDE 5 MG: 5 TABLET ORAL at 20:29

## 2023-01-30 RX ADMIN — METOPROLOL TARTRATE 25 MG: 25 TABLET, FILM COATED ORAL at 21:38

## 2023-01-30 RX ADMIN — METOPROLOL TARTRATE 25 MG: 25 TABLET, FILM COATED ORAL at 10:45

## 2023-01-30 RX ADMIN — IBUPROFEN 400 MG: 400 TABLET ORAL at 15:05

## 2023-01-30 RX ADMIN — MAGNESIUM OXIDE TAB 400 MG (241.3 MG ELEMENTAL MG) 800 MG: 400 (241.3 MG) TAB at 20:30

## 2023-01-30 RX ADMIN — MAGNESIUM SULFATE HEPTAHYDRATE 2 G: 40 INJECTION, SOLUTION INTRAVENOUS at 08:41

## 2023-01-30 RX ADMIN — FUROSEMIDE 20 MG: 20 TABLET ORAL at 08:41

## 2023-01-30 RX ADMIN — ENOXAPARIN SODIUM 40 MG: 40 INJECTION SUBCUTANEOUS at 16:41

## 2023-01-30 RX ADMIN — SPIRONOLACTONE 12.5 MG: 25 TABLET, FILM COATED ORAL at 08:41

## 2023-01-30 RX ADMIN — ACETAMINOPHEN 975 MG: 325 TABLET, FILM COATED ORAL at 04:47

## 2023-01-30 RX ADMIN — FUROSEMIDE 20 MG: 20 TABLET ORAL at 16:36

## 2023-01-30 RX ADMIN — PRENATAL VIT W/ FE FUMARATE-FA TAB 27-0.8 MG 1 TABLET: 27-0.8 TAB at 08:41

## 2023-01-30 RX ADMIN — POTASSIUM CHLORIDE 20 MEQ: 40 SOLUTION ORAL at 20:31

## 2023-01-30 RX ADMIN — ENALAPRIL MALEATE 2.5 MG: 2.5 TABLET ORAL at 08:41

## 2023-01-30 ASSESSMENT — ACTIVITIES OF DAILY LIVING (ADL)
ADLS_ACUITY_SCORE: 22
ADLS_ACUITY_SCORE: 20
ADLS_ACUITY_SCORE: 22
ADLS_ACUITY_SCORE: 22
ADLS_ACUITY_SCORE: 20
ADLS_ACUITY_SCORE: 20
ADLS_ACUITY_SCORE: 22
ADLS_ACUITY_SCORE: 22

## 2023-01-30 NOTE — CONSULTS
32 year old female presenting with systolic heart failure. CORE consult requested.    Anjali was provided with a CHF book.  I reviewed the importance of daily weights, 2 gm sodium diet, 2L fluid restriction and compliance with medications upon hospital discharge.  CORE contact phone numbers provided and patient is encouraged to call with any questions or concerns, including any weight gain or loss of 2 or more pounds in 24 hours or 5 or more pounds in 1 week.      Appointment made in Heart Failure clinic on 2/10/23 at 10:00.  I will follow-up with the patient at that time, sooner if needed.  Thank you for the consult.    Yazmin Todd RN BSN  Cardiology Nurse Coordinator - Heart Failure Clinic  [facility]  [phone] option 1 to schedule an appointment or leave a message for your care team

## 2023-01-30 NOTE — PROGRESS NOTES
Care Management Follow Up    Length of Stay (days): 55    Expected Discharge Date: 01/30/2023     Concerns to be Addressed: financial resources      Patient plan of care discussed at interdisciplinary rounds: Yes    Anticipated Discharge Disposition: [facility]     Additional Information:  WING received a call this AM from Penny at [facility]. She told the SW that with the pt being in the hospital for 30+ days, the pt should apply for long term Medicaid with [place] (application is through Department of Social [employer]). WING met with the pt at bedside and gave her the printed out application.     WING called Penny at 2:03pm and per Penny, pt can fax the application to her local office (in [place], [place]) once complete. WING called the [facility] at 2:10pm and per them, pt can use their program office fax to send her application in. WING met with the pt at bedside and told her of the above mentioned info + gave her a Word doc with the [facility] office phone and fax.     ___________________    NASIM Nesbitt, LGSW  6C   [facility]  Phone: [phone]  Pager: [phone]

## 2023-01-30 NOTE — PLAN OF CARE
Neuro: A&Ox4. Pleasant and cooperative. Significant other at the bedside.  Cardiac: Afebrile, VSS. SR 60s   Respiratory: RA, denies SOB  GI/: Voiding spontaneously. Reports last BM   Diet/appetite: Tolerating 2gm Na diet. Denies nausea.  Activity: Up independently  Pain: reporting some incisional pain, scheduled tylenol and PRN ibuprofen effective, abdominal binder in place also helpful  Skin: low abdominal incision  Lines: L PICCx2  Drains: n/a  Replacements: K protocol, scheduled mag infusion and PO mag.      Dr. Gallo with pathology was contacted this shift, awaiting call back about pt's placenta as she will bring it home. CM arranging for Lifevest rep to fit patient prior to discharge to [facility] locally.    Problem: Dysrhythmia  Goal: Normalized Cardiac Rhythm  Outcome: Progressing    Problem: Heart Failure  Goal: Optimal Cardiac Output  Outcome: Progressing  Goal: Stable Heart Rate and Rhythm  Outcome: Progressing     Problem: Postpartum ( Delivery)  Goal: Optimal Pain Control and Function  Outcome: Progressing

## 2023-01-30 NOTE — PROGRESS NOTES
"Cardiology Progress Note       Changes Today:   Called and spoke with cMRI, plan for outpatient cMRI on Wednesday 2/1. Patient to arrive at noon.  Awaiting LifeVest ICD   Metoprolol switched to succinate  Medications sent to pharmacy    Physical Exam   Temp: 98  F (36.7  C) Temp src: Oral BP: 114/63 Pulse: 67   Resp: 16 SpO2: 98 % O2 Device: None (Room air)      Vital Signs with Ranges  Temp:  [97.8  F (36.6  C)-98  F (36.7  C)] 98  F (36.7  C)  Pulse:  [62-84] 67  Resp:  [16] 16  BP: ()/(49-63) 114/63  SpO2:  [96 %-99 %] 98 %    Intake/Output    Intake/Output Summary (Last 24 hours) at 1/30/2023 1410  Last data filed at 1/30/2023 0900  Gross per 24 hour   Intake 660 ml   Output --   Net 660 ml       Weight  Vitals:    01/21/23 0321 01/21/23 2049 01/24/23 0600 01/25/23 0400   Weight: 136 kg (299 lb 12.8 oz) 135.2 kg (298 lb) 133.7 kg (294 lb 12.1 oz) 132.3 kg (291 lb 10.7 oz)    01/28/23 0400   Weight: 133.7 kg (294 lb 12.8 oz)       Resp: 16        - MEDICATION INSTRUCTIONS -       - MEDICATION INSTRUCTIONS -           acetaminophen  975 mg Oral Q6H     enalapril  2.5 mg Oral Daily     enoxaparin ANTICOAGULANT  40 mg Subcutaneous Q12H     furosemide  20 mg Oral BID     heparin lock flush  5-20 mL Intracatheter Q24H     magnesium oxide  800 mg Oral BID     magnesium sulfate  2 g Intravenous BID     metoprolol tartrate  25 mg Oral Q6H     polyethylene glycol  17 g Oral Daily     potassium chloride  20 mEq Oral TID     prenatal multivitamin w/iron  1 tablet Oral Daily     sennosides  8.6 mg Oral BID     sodium chloride (PF)  10-40 mL Intracatheter Q8H     sodium chloride (PF)  3 mL Intracatheter Q8H     spironolactone  12.5 mg Oral Daily        , Blood pressure 114/63, pulse 67, temperature 98  F (36.7  C), temperature source Oral, resp. rate 16, height 1.753 m (5' 9\"), weight 133.7 kg (294 lb 12.8 oz), SpO2 98 %, unknown if currently breastfeeding.  Constitutional: awake, alert, cooperative, no apparent " distress, and appears stated age  Eyes: sclera clear, conjunctiva normal  Respiratory: No increased work of breathing, good air exchange, no wheezing  Cardiovascular: Normal apical impulse, regular rate and rhythm  GI: soft, non-distended, non-tender, no masses palpated  Skin: no bruising or bleeding  Neurologic: Awake, alert, oriented to name, place and time.  Cranial nerves II-XII are grossly intact.       Data   Recent Labs   Lab Test 01/30/23  0520 01/29/23  1548 01/29/23  0758 01/28/23  0403 01/27/23 2009 01/26/23  0830 01/26/23 0404   NA  --   --   --  138  --   --  137   POTASSIUM 4.0  --  4.1 4.4  --    < > 4.2   CHLORIDE  --   --   --  106  --   --  101   CO2  --   --   --  22  --   --  19*   ANIONGAP  --   --   --  10  --   --  17*   GLC  --   --   --  80 131*   < > 88   BUN  --   --   --  17.4  --   --  21.1*   CR  --  0.68  --  0.65  --   --  0.74   AYDEN  --   --   --  9.1  --   --  9.6    < > = values in this interval not displayed.       Lab Results   Component Value Date    WBC 8.4 01/30/2023     Lab Results   Component Value Date    RBC 4.02 01/30/2023     Lab Results   Component Value Date    HGB 10.5 01/30/2023     Lab Results   Component Value Date    HCT 34.8 01/30/2023     No components found for: MCT  Lab Results   Component Value Date    MCV 87 01/30/2023     Lab Results   Component Value Date    MCH 26.1 01/30/2023     Lab Results   Component Value Date    MCHC 30.2 01/30/2023     Lab Results   Component Value Date    RDW 18.7 01/30/2023     Lab Results   Component Value Date     01/30/2023        Liver Function Studies -   Recent Labs   Lab Test 01/28/23 0403   PROTTOTAL 5.9*   ALBUMIN 2.9*   BILITOTAL 0.3   ALKPHOS 77   AST 30   ALT 21       Venous Blood Gas  Recent Labs   Lab 01/25/23  0938 01/25/23  0456 01/24/23  2239 01/24/23  1814   PHV 7.41 7.40 7.41 7.41   PCO2V 42 43 43 40   PO2V 35 38 34 35   HCO3V 27 26 27 25   DEEP 2.0* 1.4 2.0* 0.5   O2PER 21 21 21 21       Arterial  Blood Gas  Recent Labs   Lab 23  0938 23  0456 23  2239 23  1814   O2PER 21 21 21 21          No results found for this or any previous visit (from the past 24 hour(s)).    Blood culture:  Results for orders placed or performed during the hospital encounter of 22   Blood Culture Hand, Right    Specimen: Hand, Right; Blood   Result Value Ref Range    Culture No growth after 4 days    Blood Culture Arm, Right    Specimen: Arm, Right; Blood   Result Value Ref Range    Culture No growth after 4 days       Urine culture:  Results for orders placed or performed during the hospital encounter of 22   Urine Culture    Specimen: Urine, Midstream   Result Value Ref Range    Culture 10,000-50,000 CFU/mL Mixture of urogenital ky        Assessment & Plan    Anjali Carmen is a 32 year old female  with no significant past medical history, who presented as a transfer from [facility] to [facility] on 2022 for further management of newly diagnosed, acute decompensated systolic heart failure (LVEF 20%) and high risk delivery.      MFM available  at [phone]     Acute on Chronic Heart Failure with Reduced Ejection Fraction 2/2  NICM, pregnancy related with questionable familial component  RV failure  Note family history of father with CM at age 30. TTE  with EF 20-25%, thinned out LV wall, and notable regional wall motion abnormalities. Coronary angiogram  with no significat lesions seen.  Stage C, NYHA Class III     Volume: Euvolemic  GDMT:   - ACEi/ARB/ARNI: Enalapril 2.5 mg daily  - BB:Continue Metoprolol Succinate 50mg daily   - MRA: Continue Spironolactone 12.5 mg daily   - Diuretics: Continue  Furosemide 20 mg daily   Therapies:  - SCD prophylaxis: LifeVest for discharge   Additional Management:   - Cardiac MRI and genetic testing likely as outpatient   Called and spoke with cMRI, plan for outpatient cMRI on . Patient to arrive at  noon.     PAF, new onset   NSVT and frequent PVC and PAC's  Hypomagnesemia  [facility] [place], isolated episode, EKG unavailable for review. Currently in NSR at [facility] with frequent ectopy. [id number] score of 2 (sex, HF)    - Discontinued Sotalol 80 mg po BID   - Continue Metoprolol 25 mg Q6H  - Mg per protocol- favor IV over PO replacement  -LifeVest Ordered, pending setup     Leukocytosis, improved  Patient had leukocytosis that is improving  (12.1-> 13.7->15.8 -> 11.9).Denies nausea, vomiting, fever, diarrheas, chest pain or shortness of breath.    -Urine cultures: Mixture of urogenital ky  -Blood cultures x 2: NGTD     High risk pregnancy  Delivered 01/23/23.     Gestational DM.   - MFM following  - From obstetric standpoint, accuchecks no longer needed postpartum.  - Recommend 6 week 2 hour glucose tolerance test to ensure resolution of diabetes.  - Increased risk for overt diabetes outside of pregnancy     Mild anemia, multifactorial  Due to pregnancy and HF. Hgb 10.2 on admission (MCV 79). Retic count 1.9%. Iron, ferritin, TIBC levels show AVINASH (say 6%). Hgh 9s-low 10s. IV iron 200 mg daily x5 (12/24-12/28). Hgb stable mid 10s.   - limit blood draws     Severe Obesity  BMI 43     FEN: Regular  PROPHY:  Levonox for DVT ppx   LINES:  PICC  DISPO:  Pending LifeVest approval  CODE STATUS:  Full Code       Choco Calhoun MD  Cardiology Fellow  [phone]

## 2023-01-30 NOTE — PLAN OF CARE
D: Patient presented as a transfer from  to [facility] on 2022 for further management of newly diagnosed, acute decompensated systolic heart failure.  . Hx. with no significant past medical history.  I/A: A&Ox4. VSSA on RA. SR 55-70s w/occ PACs. Denies pain. Adequate uop (per pt/not measuring). Up ad danis. Appeared to rest comfortably overnight.  P: Continue to monitor and notify Cards 2 with questions/concerns.

## 2023-01-30 NOTE — PROGRESS NOTES
Care Management Discharge Note    Discharge Date: 01/30/2023     Discharge Disposition: [facility]    Discharge DME: [employer]    Discharge Transportation: Family or friend    Education Provided on the Discharge Plan: yes   Persons Notified of Discharge Plans: Pt, MD  Patient/Family in Agreement with the Plan: yes    Additional Information:  Per MD, pt medically ready for discharge once lifevest arranged.     This writer met with pt who had completed [employer] patient assistance program application and provided most recent pay stub. Pt also provided contact information for Trinidad with [facility] (Ph: [phone], Fax: [phone]) as they can assist with costs for lifevest. This writer emailed PAP form and pay stub to [employer] [employer]. Also left voicemail for Sofia to update that pt is ready once lifevest can be arranged.    [facility]  Sofia, territory rep: [phone]  Fax number: [phone]  General referral phone line: [phone]    1506: This writer called Sofia to follow up. They have all documentation they need. They are just experience a technology issue within their system so unable to process new referrals/program vests. Pt is on their priority list once their system is back up and running. Per Sofia, they have a fitter on standby if the system comes back up. She will call this writer if before 1630 and the 6C  after 1630.    CC will continue to monitor patient's medical condition and progress towards discharge.  Sharifa James RN BSN  6C Unit Care Coordinator  Phone number: [phone]  Pager: [phone]

## 2023-01-31 VITALS
OXYGEN SATURATION: 97 % | DIASTOLIC BLOOD PRESSURE: 60 MMHG | HEIGHT: 69 IN | SYSTOLIC BLOOD PRESSURE: 105 MMHG | HEART RATE: 66 BPM | BODY MASS INDEX: 43.4 KG/M2 | RESPIRATION RATE: 16 BRPM | TEMPERATURE: 98.1 F | WEIGHT: 293 LBS

## 2023-01-31 DIAGNOSIS — Z98.891 STATUS POST CESAREAN DELIVERY: Primary | ICD-10-CM

## 2023-01-31 LAB
BACTERIA BLD CULT: NO GROWTH
BACTERIA BLD CULT: NO GROWTH
MAGNESIUM SERPL-MCNC: 1.7 MG/DL (ref 1.7–2.3)
POTASSIUM SERPL-SCNC: 4.2 MMOL/L (ref 3.4–5.3)

## 2023-01-31 PROCEDURE — 84132 ASSAY OF SERUM POTASSIUM: CPT | Performed by: STUDENT IN AN ORGANIZED HEALTH CARE EDUCATION/TRAINING PROGRAM

## 2023-01-31 PROCEDURE — 250N000013 HC RX MED GY IP 250 OP 250 PS 637

## 2023-01-31 PROCEDURE — 250N000013 HC RX MED GY IP 250 OP 250 PS 637: Performed by: STUDENT IN AN ORGANIZED HEALTH CARE EDUCATION/TRAINING PROGRAM

## 2023-01-31 PROCEDURE — 250N000011 HC RX IP 250 OP 636

## 2023-01-31 PROCEDURE — 83735 ASSAY OF MAGNESIUM: CPT | Performed by: STUDENT IN AN ORGANIZED HEALTH CARE EDUCATION/TRAINING PROGRAM

## 2023-01-31 PROCEDURE — 250N000011 HC RX IP 250 OP 636: Performed by: STUDENT IN AN ORGANIZED HEALTH CARE EDUCATION/TRAINING PROGRAM

## 2023-01-31 PROCEDURE — 36592 COLLECT BLOOD FROM PICC: CPT | Performed by: STUDENT IN AN ORGANIZED HEALTH CARE EDUCATION/TRAINING PROGRAM

## 2023-01-31 PROCEDURE — 99239 HOSP IP/OBS DSCHRG MGMT >30: CPT | Mod: GC | Performed by: INTERNAL MEDICINE

## 2023-01-31 RX ORDER — ACETAMINOPHEN 325 MG/1
650 TABLET ORAL EVERY 6 HOURS PRN
Qty: 60 TABLET | Refills: 3 | Status: SHIPPED | OUTPATIENT
Start: 2023-01-31

## 2023-01-31 RX ADMIN — PRENATAL VIT W/ FE FUMARATE-FA TAB 27-0.8 MG 1 TABLET: 27-0.8 TAB at 08:08

## 2023-01-31 RX ADMIN — MAGNESIUM SULFATE HEPTAHYDRATE 2 G: 40 INJECTION, SOLUTION INTRAVENOUS at 08:17

## 2023-01-31 RX ADMIN — FUROSEMIDE 20 MG: 20 TABLET ORAL at 08:09

## 2023-01-31 RX ADMIN — SPIRONOLACTONE 12.5 MG: 25 TABLET, FILM COATED ORAL at 08:09

## 2023-01-31 RX ADMIN — ACETAMINOPHEN 975 MG: 325 TABLET, FILM COATED ORAL at 04:50

## 2023-01-31 RX ADMIN — ENOXAPARIN SODIUM 40 MG: 40 INJECTION SUBCUTANEOUS at 04:50

## 2023-01-31 RX ADMIN — ACETAMINOPHEN 975 MG: 325 TABLET, FILM COATED ORAL at 10:26

## 2023-01-31 RX ADMIN — POTASSIUM CHLORIDE 20 MEQ: 40 SOLUTION ORAL at 08:08

## 2023-01-31 RX ADMIN — ENALAPRIL MALEATE 2.5 MG: 2.5 TABLET ORAL at 08:08

## 2023-01-31 RX ADMIN — METOPROLOL TARTRATE 25 MG: 25 TABLET, FILM COATED ORAL at 04:50

## 2023-01-31 RX ADMIN — MAGNESIUM OXIDE TAB 400 MG (241.3 MG ELEMENTAL MG) 800 MG: 400 (241.3 MG) TAB at 08:17

## 2023-01-31 ASSESSMENT — ACTIVITIES OF DAILY LIVING (ADL)
ADLS_ACUITY_SCORE: 20

## 2023-01-31 NOTE — CARE PLAN
"S/p  23. Newly diagnosed acute decompensated systolic heart failure.     Neuro: A&Ox4. Calm and cooperative.   Cardiac:  VSS. SR 60-70s.     Respiratory: Room air, denies SOB  GI/: Voiding, reports BM today  Diet/appetite: 2gm sodium diet. Good appetite.   Activity: Independent  Pain: Incisional pain relieved by PRN oxycodone and scheduled Tylenol  Skin:  incision CDI. Wearing abdominal binder for support most of shift.   Lines: PICC heplocked  Drains: none  Labs: Reviewed. No replacements this shift.     Discharge to [facility] when [facility] can be placed. Per [employer] rep update this evening, they are having technology outages which prevented her from getting a vest today. She is \"top of their priority list\" when their issue is resolved. Plan to continue to monitor patient. Notify Cards 2 with changes or concerns.     "

## 2023-01-31 NOTE — PLAN OF CARE
DX: acute decompensated heart failure   PMH:     with no significant past medical history, who presented as a transfer from [facility] to [facility] on 2022 for further management of newly diagnosed, acute decompensated systolic heart failure (LVEF 20%) and high risk delivery.     Code Status: full  Team: cards 2    Cardiac: SR w/ frequent PACs, soft Bps   Respiratory: clear lung sounds satting mid to high 90s on RA  Neuro: AxO x4  Pain: complains of abdominal pain, relieved with tylenol    GI/: urinates frequently and has regular Bms   Diet: 2g Na   Skin:  incision WDL  Activity: independent     Gtts/fluid: n/a     Plan: discharge after life vest placed

## 2023-01-31 NOTE — PROGRESS NOTES
Care Management Discharge Note    Discharge Date: 1/31/2023     Discharge Disposition: [facility]    Discharge DME: [employer]      Discharge Transportation: Family or friend     Additional Information:  Received update from [employer] [employer] Rep that they able to process this order and a fitter will be at the hospital in approximately one hour. Updated pt, RN and MD.     Gamal [facility] rep: [phone]  Fax number: [phone]  General referral phone line: [phone]    1210: Received confirmation from [employer] fitter that pt's lifevest has been fit and pt is ready from their perspective.     CC will continue to monitor patient's medical condition and progress towards discharge.  Sharifa James RN BSN  6C Unit Care Coordinator  Phone number: [phone]  Pager: [phone]

## 2023-01-31 NOTE — LACTATION NOTE
Current Facility-Administered Medications   Medication     acetaminophen (TYLENOL) tablet 975 mg     alum & mag hydroxide-simethicone (MAALOX) suspension 30 mL     glucose gel 15-30 g    Or     dextrose 50 % injection 25-50 mL    Or     glucagon injection 1 mg     enalapril (VASOTEC) tablet 2.5 mg     enoxaparin ANTICOAGULANT (LOVENOX) injection 40 mg     furosemide (LASIX) tablet 20 mg     heparin lock flush 10 UNIT/ML injection 5-20 mL     heparin lock flush 10 UNIT/ML injection 5-20 mL     hydrocortisone (CORTAID) 1 % cream     hydrocortisone (Perianal) (ANUSOL-HC) 2.5 % cream     ibuprofen (ADVIL/MOTRIN) tablet 400 mg     lanolin cream     magnesium oxide (MAG-OX) tablet 800 mg     magnesium sulfate 2 g in water intermittent infusion     medication instruction     metoclopramide (REGLAN) injection 10 mg    Or     metoclopramide (REGLAN) tablet 10 mg     metoprolol tartrate (LOPRESSOR) tablet 25 mg     naloxone (NARCAN) injection 0.2 mg    Or     naloxone (NARCAN) injection 0.4 mg    Or     naloxone (NARCAN) injection 0.2 mg    Or     naloxone (NARCAN) injection 0.4 mg     oxyCODONE (ROXICODONE) tablet 5 mg     Patient is already receiving anticoagulation with heparin, enoxaparin (LOVENOX), warfarin (COUMADIN)  or other anticoagulant medication     polyethylene glycol (MIRALAX) Packet 17 g     polyethylene glycol (MIRALAX) Packet 17 g     potassium chloride (KAYCIEL) solution 20 mEq     prenatal multivitamin w/iron per tablet 1 tablet     prochlorperazine (COMPAZINE) injection 10 mg    Or     prochlorperazine (COMPAZINE) tablet 10 mg    Or     prochlorperazine (COMPAZINE) suppository 25 mg     sennosides (SENOKOT) tablet 8.6 mg     simethicone (MYLICON) chewable tablet 80 mg     sodium chloride (PF) 0.9% PF flush 10-20 mL     sodium chloride (PF) 0.9% PF flush 10-40 mL     sodium chloride (PF) 0.9% PF flush 3 mL     sodium chloride (PF) 0.9% PF flush 3 mL     spironolactone (ALDACTONE) half-tab 12.5 mg      Current Outpatient Medications   Medication     enalapril (VASOTEC) 2.5 MG tablet     furosemide (LASIX) 20 MG tablet     metoprolol succinate ER (TOPROL XL) 25 MG 24 hr tablet     spironolactone (ALDACTONE) 25 MG tablet     D:  I met with Anjali; she was holding Maged.  I:  We talked about the best pump to her to use in her situation and her insurance coverage; she declined calling insurance company and is hopeful it will be covered.  I dispensed a Symphony and instructed on its use (will need to clarify if she already has a purchase pump from insurance).  I moved her to Maintain setting and got her a kit for the bedside.  She just got 60 ml pumping at bedside.  Discharge meds are unchanged from prior review (except lower dose lasix and metoprolol).  A:  Supply coming in nicely; mom has equipment she needs to maintain her supply and is finally reunited with her baby.  P:  Will continue to provide lactation support.    Tanya Gutierrez, RNC, IBCLC

## 2023-01-31 NOTE — DISCHARGE SUMMARY
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 Rogers with labs on Feb 10, 2023 to evaluate   medication change, to evaluate treatment change, and for hospital follow-   up.     PLEASE PRESENT TO SECOND FLOOR OF [facility]   ON Wedensday 2/1/2023 promptly at noon for your scheduled cardiac MRI     Appointments on [place] and/or [facility] (with [facility] or [facility]   provider or service). Call [phone] if you haven't heard regarding   these appointments within 7 days of discharge.            OB Service  left a message with clinic to call her Monday 1/30 to see where she wants to do her 1-2 week postoperative visit (our clinic or somewhere more local to where she lives) and they will coordinate follow up.     For any urgent postop pregnancy issues, she can call     Weekdays 8-4 pm [facility] clinic [phone]  After office hours, weekends Labor and delivery [phone] ask to talk to charge nurse  Emergency issue - present to ED    Unresulted Labs Ordered in the Past 30 Days of this Admission     Date and Time Order Name Status Description    1/22/2023 11:48 AM Prepare red blood cells (unit) Preliminary     1/22/2023 11:48 AM Prepare red blood cells (unit) Preliminary       These results will be followed up by Dr Rogers and the HF team in conjunction with  "CORE clinic     Discharge Disposition   Discharged to home  Condition at discharge: Stable    Hospital Course   32 year old female \" with no significant past medical history, who presented as a transfer from [facility] to [facility] on 2022 for further management of newly diagnosed, acute decompensated systolic heart failure (LVEF 20%) and high risk delivery. She initially presented to the [facility] on 2022 with complaints of palpitations, shortness of breath and tiredness since 2022, as well as chest pain radiating to the left arm of one day's duration. EKG at the clinic reportedly showed possible atrial fibrillation (EKG unavailable for review). Therefore, she was transfered from [place] to [facility] on 2022 for further evaluation. TTE on 2022 showed moderate to severe LV dilation (LVIDd 65 mm), severely reduced LV function,  LVEF 20%; mildly dilated RV with normal RV size, mild MR & TR. She was diuresed with IV lasix for pulmonary edema (40 mg BID f/b 80 mg BID for 3 days), initiated on metoprolol tartrate 25 mg BID and heparin gtt for Afib/cardiomyopathy. On telemetry, she was noted to have multiple runs of NSVT (strips below). The patient was also given betamethasone on  and  for fetal lung maturation. Due to concern for possible decompensation and high risk delivery, she was transferred to [facility] on 2022 for further management.  She was then closely monitored here until her delivery on 2023 when she  had her  son by  who is admitted at the [facility].  Postdelivery, she remained in the ICU all her guideline directed therapy was optimized in a safe manner given that she plans on breast-feeding and her volume status was also optimized prior to discharge.  Given her frequent NSVT, PVCs, she was discharged with a LifeVest and follow-up with our advanced heart failure team, the core clinic, " and MFM while she remains in the [place] for the next month with her baby who was admitted to the NICU in the [facility].    Consultations This Hospital Stay   MATERNAL FETAL MEDICINE/PERINATOLOGY IP CONSULT  PHARMACY IP CONSULT  PHARMACY IP CONSULT  CARDIOLOGY ELECTROPHYSIOLOGY (EP) IP CONSULT  CARDIOLOGY ELECTROPHYSIOLOGY (EP) IP CONSULT  VASCULAR ACCESS FOR PICC PLACEMENT ADULT IP CONSULT  VASCULAR ACCESS FOR PICC PLACEMENT ADULT IP CONSULT  PHARMACY IP CONSULT  PHARMACY IP CONSULT  CARE MANAGEMENT / SOCIAL WORK IP CONSULT  NURSING TO CONSULT FOR VASCULAR ACCESS CARE IP CONSULT  NURSING TO CONSULT FOR VASCULAR ACCESS CARE IP CONSULT  SOCIAL WORK IP CONSULT  PSYCHOLOGY ADULT IP CONSULT  CARDIOLOGY ELECTROPHYSIOLOGY (EP) IP CONSULT  NURSING TO CONSULT FOR VASCULAR ACCESS CARE IP CONSULT  NURSE PRACT  IP CONSULT  CARDIOLOGY ELECTROPHYSIOLOGY (EP) IP CONSULT  NURSING TO CONSULT FOR VASCULAR ACCESS CARE IP CONSULT  CARE MANAGEMENT / SOCIAL WORK IP CONSULT  LACTATION IP CONSULT  PHARMACY LIAISON FOR MEDICATION COVERAGE CONSULT  PHARMACY LIAISON FOR MEDICATION COVERAGE CONSULT  LACTATION IP CONSULT  CORE CLINIC EVALUATION IP CONSULT  SMOKING CESSATION PROGRAM IP CONSULT  CARE MANAGEMENT / SOCIAL WORK IP CONSULT  LACTATION IP CONSULT    Code Status   Full Code    Time Spent on this Encounter   I, Choco Calhoun MD, personally saw the patient today and spent greater than 30 minutes discharging this patient.         Choco Calhoun MD  [facility]  ______________________________________________________________________    Physical Exam   Vital Signs: Temp: 98.1  F (36.7  C) Temp src: Oral BP: 105/60 Pulse: 66   Resp: 16 SpO2: 97 % O2 Device: None (Room air)    Weight: 298 lbs 0 oz    Constitutional: awake, alert, cooperative, no apparent distress, and appears stated age  Eyes: sclera clear, conjunctiva normal  Respiratory: No increased work of breathing, good air exchange,  no wheezing  Cardiovascular: Normal apical impulse, regular rate and rhythm  GI: soft, non-distended, non-tender, no masses palpated  Skin: no bruising or bleeding  Neurologic: Awake, alert, oriented to name, place and time.  Cranial nerves II-XII are grossly intact.      Primary Care Physician   Loly Trevino    Discharge Orders      Follow-Up with Cardiology NOHEMY      Medication Instructions - Anticoagulants    Do NOT stop your aspirin or platelet inhibitor unless directed by your Cardiologist.  These medications help to prevent platelets in your blood from sticking together and forming a clot.  Examples of these medications are:  Ticagrelor (Brilinta), Clopidigrel (Plavix), Prasugrel (Effient)     When to call - Contact the Heart Clinic    You may experience symptoms that require follow-up before your scheduled appointment. Contact the Heart Clinic if you develop: Fever over 100.4o Fahrenheit, that lasts more than one day; Redness, heat, or pus at the puncture site; Change in color or temperature in your hand or arm.     When to call - Reasons to Call 911    If your wrist puncture site starts bleeding after discharge, sit down and apply firm pressure with your thumb against the puncture site and fingers against the back of the wrist for 10 minutes. If the bleeding stops, continue to rest, keeping your wrist still for 2 hours. Notify your doctor as soon as possible.  IF BLEEDING DOES NOT STOP OR THERE IS A LARGER AMOUNT OF BLEEDING OR SPURTING CALL 9-1-1 immediately.DO NOT drive yourself to the hospital.     Precautions - Lifting    DO NOT lift more than 5 pounds with affected arm for 48 hours     Precautions - Household Activities    Avoid excessive bending or movement of your wrist for 72 hours.  Do not subject hand/arm to any forceful movements for 24 hours, such as supporting weight when rising from a chair or bed.     Remove the band-aid on the puncture site after 24 hours and leave open to air. If minor  oozing, you may apply a band-aid and remove after 12 hours.     Precautions - Active Sports Activities    DO NOT engage in vigorous exercise using your affected arm for 3 days after discharge.  This includes golf, tennis or swimming.     Precautions - Operating yard equipment or vehicles    Do not operate a chainsaw, lawnmower, motorcycle, or all-terrain vehicle for 48 hours after the procedure.     Precautions - Elective Dental Work    NO elective dental work for 6 weeks after receiving a stent.     Comfort and Pain Management - Bruising after Surgery    Expect mild tingling of hand and tenderness at the wrist puncture site for up to 3 days. You may take Tylenol or a pain medicine recommended by your doctor.     Activity - Cardiac Rehab    You are encouraged to enroll in an Outpatient Cardiac Rehab program after discharge from the hospital.  Our Cardiac Rehab staff may visit briefly with you while you're in the hospital.  If they miss you, someone will contact you after you are home.     Return to Driving    Driving is NOT permitted for 24 hours after surgery     Return to work    You may return to work after 72 hours if you are feeling well and your job does not involve heavy lifting.     Shower / Bathing    You may shower on the day after your procedure.  DO NOT soak of wrist with the puncture site in water for 3 days to prevent infection. DO NOT take a tub bath or wash dishes for 3 days after the procedure     Dressing Removal    Remove the band-aid on the puncture site after 24 hours and leave open to air. If minor oozing, you may apply a band-aid and remove after 12 hours     Adult [facility] Follow-up and recommended labs and tests    Follow up with Dr. Rogers with labs on Feb 10, 2023 to evaluate medication change, to evaluate treatment change, and for hospital follow- up.     PLEASE PRESENT TO SECOND FLOOR OF [facility] ON Wedensday 2/1/2023 promptly at noon for your scheduled  cardiac MRI     Appointments on [place] and/or [facility] (with [facility] or [facility] provider or service). Call [phone] if you haven't heard regarding these appointments within 7 days of discharge.     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     I, the undersigned, certify that the above prescribed supplies are medically necessary for this patient and is both reasonable and necessary in reference to accepted standards of medical and necessary in reference to accepted standards of medical practice in the treatment of this patient's condition and is not prescribed as a convenience.     Diet    Follow this diet upon discharge: Orders Placed This Encounter           2 Gram Sodium Diet       Significant Results and Procedures   Results for orders placed or performed during the hospital encounter of 12/06/22   XR Chest Port 1 View    Narrative    EXAM: XR CHEST PORT 1 VIEW  12/6/2022 1:45 AM     HISTORY:  Concern for pulmonary edema       COMPARISON:  None    TECHNIQUE: Portable AP upright view of the chest    FINDINGS:     The trachea is midline. The cardiomediastinal silhouette is enlarged..  The pulmonary vasculature is prominent. No appreciable pneumothorax or  pleural effusion. Mild hazy bibasilar opacities. No focal airspace  opacity. No acute osseous abnormality.      Impression    IMPRESSION: Cardiomegaly with engorged pulmonary vasculature and hazy  bibasilar opacities likely represent pulmonary edema.    I have personally reviewed the examination and initial interpretation  and I agree with the findings.    HERLINDA MICHEL MD         SYSTEM ID:  [id number]   Maternal Fetal US Comprehensive Single    Narrative             Comprehensive  ---------------------------------------------------------------------------------------------------------  Pat. Name: KELLEY LOUIS       Study Date:  2022 10:30am  Pat. NO:  [id number]        Referring  MD: OSMAR ESTRADA  Site:  [facility]       Sonographer: Marlee Goddard RDMS  :  1990        Age:   32  ---------------------------------------------------------------------------------------------------------    INDICATION  ---------------------------------------------------------------------------------------------------------  Inpatient - Maternal Heart failure      METHOD  ---------------------------------------------------------------------------------------------------------  [facility] ANTEPARTUM inpatient exam. [place]. Transabdominal ultrasound examination. View: Suboptimal view: limited by maternal body habitus.      PREGNANCY  ---------------------------------------------------------------------------------------------------------  Jaquez pregnancy. Number of fetuses: 1      DATING  ---------------------------------------------------------------------------------------------------------                                           Date                                Details                                                                                      Gest. age                      CAROL ANN  Prior assessment                                                      GA: 24 w + 3 d                                                                           24 w + 3 d                     3/27/2023  U/S                                   2022                         based upon AC, BPD, Femur, HC                                                24 w + 6 d                     3/24/2023  Assigned dating                  Dating performed on 2022, based on the prior assessment                                            24 w + 3 d                     3/27/2023      GENERAL  EVALUATION  ---------------------------------------------------------------------------------------------------------  Cardiac activity present.  bpm.  Fetal movements present.  Presentation breech.  Placenta Anterior, No Previa, > 2 cm from internal os.  Umbilical cord 3 vessel cord.  Amniotic fluid Amount of AF: normal.      FETAL BIOMETRY  ---------------------------------------------------------------------------------------------------------  Main Fetal Biometry:  BPD                                        60.1                    mm                         24w 4d                Hadlock  OFD                                        78.9                    mm                         24w 0d                Nicolaides  HC                                          219.3                  mm                          24w 0d                Hadlock  AC                                          212.1                  mm                          25w 5d        80%        Hadlock  Femur                                      45.5                   mm                          25w 0d                Hadlock  Fetal Weight Calculation:  EFW                                       787                     g                                     76%        Hadlock  EFW (lb,oz)                             1 lb 12                 oz  EFW by                                        Hadlock (BPD-HC-AC-FL)      FETAL ANATOMY  ---------------------------------------------------------------------------------------------------------  The following structures appear normal:  Head / Neck                         Cranium. Head size. Head shape. Lateral ventricles. Midline falx. Cavum septi pellucidi. Cerebellum. Cisterna magna. Parenchyma. Thalami.                                             Vermis.                                             Neck. Nuchal fold.  Face                                   Lips. Profile. Nose. Maxilla. Mandible.  Orbits.  Heart / Thorax                      RVOT view. LVOT view. Situs. 3-vessel view. 3-vessel-trachea view. Cardiac position. Cardiac size. Cardiac rhythm.                                             Right lung. Left lung. Diaphragm.  Abdomen                             Abdominal wall. Cord insertion. Stomach. Bladder. Liver. Bowel.  Spine                                  Cervical spine. Thoracic spine. Lumbar spine. Sacral spine.  Extremities / Skeleton          Right hand. Left arm. Right leg. Left leg. Left foot.    The following structures could not be adequately visualized:  Head / Neck                         Choroid plexus.  Face                                   Lens.  Heart / Thorax                      4-chamber view. Aortic arch view.  Abdomen                             Right kidney.  Extremities / Skeleton          Right arm. Left hand. Right foot.    The following structures could not be visualized:  Heart / Thorax                      Bicaval view. Ductal arch view. Superior vena cava. Inferior vena cava.  Abdomen                             Genitals.    Gender: male.      MATERNAL STRUCTURES  ---------------------------------------------------------------------------------------------------------  Cervix                                  Visualized                                             Appearance: Appears Closed                                             Approach - Transabdominal: Cervical length 46.7 mm  Right Ovary                          Not visualized  Left Ovary                            Not visualized      RECOMMENDATION  ---------------------------------------------------------------------------------------------------------  Thank-you for referring your patient for a comprehensive ultrasound. She is currently hospitalized for non-ischemic cardiomyopathy predating pregnancy.    Findings will be reviewed with the patient later today by the OB consult team.    Follow-up is  recommended in three weeks to reassess anatomy that was suboptimally seen today. She is getting NST twice daily - we may consider decreasing once daily  if her status stabilizes.    Return to primary provider for continued prenatal care.    If you have questions regarding today's evaluation or if we can be of further service, please contact the [facility].        Impression    IMPRESSION  ---------------------------------------------------------------------------------------------------------  1. Jaquez intrauterine pregnancy at 24 weeks 3 days gestational age here for evaluation of fetal anatomy.  2. No fetal anomalies commonly detected by ultrasound or soft markers of aneuploidy were identified in the detailed fetal anatomic survey within the limits of prenatal  ultrasound, however some views were suboptimal, as described above.  3. Growth parameters and estimated fetal weight were consistent with established dates.  4. The amniotic fluid volume appeared normal.  5. On transabdominal imaging the cervix appears long and closed.       Maternal Fetal US Comprehensive Single F/U    Narrative            Comp Follow Up  ---------------------------------------------------------------------------------------------------------  Pat. Name: KELLEY ROYAL       Study Date:  2022 7:15am  Pat. NO:  [id number]        Referring  MD: OSMAR ESTRADA  Site:  [facility]       Sonographer: Shanti De La Garza RDMS   :  1990        Age:   32  ---------------------------------------------------------------------------------------------------------    INDICATION  ---------------------------------------------------------------------------------------------------------  Maternal Heart failure.      METHOD  ---------------------------------------------------------------------------------------------------------  Transabdominal ultrasound examination. [facility] ANTEPARTUM inpatient exam. View:  Sufficient      PREGNANCY  ---------------------------------------------------------------------------------------------------------  Jaquez pregnancy. Number of fetuses: 1      DATING  ---------------------------------------------------------------------------------------------------------                                           Date                                Details                                                                                      Gest. age                      CAROL ANN  Prior assessment                                                      GA: 24 w + 3 d                                                                           27 w + 3 d                     3/27/2023  U/S                                   12/29/2022                       based upon AC, BPD, Femur, HC                                                29 w + 0 d                     3/16/2023  Assigned dating                  Dating performed on 12/8/2022, based on the prior assessment                                            27 w + 3 d                     3/27/2023      GENERAL EVALUATION  ---------------------------------------------------------------------------------------------------------  Cardiac activity present.  bpm.  Fetal movements present.  Presentation cephalic.  Placenta Anterior, No Previa, > 2 cm from internal os.  Umbilical cord 3 vessel cord.  Amniotic fluid normal MVP, MVP 6.3 cm.      FETAL BIOMETRY  ---------------------------------------------------------------------------------------------------------  Main Fetal Biometry:  BPD                                        71.2                    mm                         28w 4d                Hadlock  OFD                                        102.4                  mm                         30w 1d                 Nicolaides  HC                                          278.4                  mm                          30w 3d                 Hadlock  Cerebellum tr                            30.9                   mm                          27w 0d                Nicolaides  AC                                          231.5                  mm                          27w 4d        44%        Hadlock  Femur                                      55.3                   mm                          29w 1d                Hadlock  Fetal Weight Calculation:  EFW                                       1,224                  g                                     76%        Hadlock  EFW (lb,oz)                             2 lb 11                 oz  EFW by                                        Hadlock (BPD---FL)  Head / Face / Neck Biometry:                                             5.1                     mm  CM                                          4.6                     mm      FETAL ANATOMY  ---------------------------------------------------------------------------------------------------------  The following structures appear normal:  Head / Neck                         Cranium. Head size. Head shape. Lateral ventricles. Midline falx. Cavum septi pellucidi. Cerebellum. Cisterna magna. Thalami.  Face                                   Lips. Profile. Nose. Orbits. Lens.  Heart / Thorax                      4-chamber view. RVOT view. LVOT view. Aortic arch view. Bicaval view. Superior vena cava. Inferior vena cava. 3-vessel-trachea view.                                             Diaphragm.  Abdomen                             Stomach. Kidneys. Bladder. Genitals.  Spine                                  Cervical spine. Thoracic spine. Lumbar spine. Sacral spine.  Extremities / Skeleton          Right arm. Right upper arm. Right forearm. Left hand. Right foot.    The following structures could not be adequately visualized:  Heart / Thorax                      Ductal arch view.    Gender: male.      MATERNAL  STRUCTURES  ---------------------------------------------------------------------------------------------------------  Cervix                                  Suboptimal  Right Ovary                          Not examined  Left Ovary                            Not examined      RECOMMENDATION  ---------------------------------------------------------------------------------------------------------  Thank-you for referring your hospitalized patient to assess fetal anatomy and growth.    The patient was escorted back to her hospital room at the end of the ultrasound. I discussed the findings on today's ultrasound with the patient. Please see EPIC for further  documentation regarding plan of care.    If you have questions regarding today's evaluation or if we can be of further service, please contact the [facility].    **Fetal anomalies may be present but not detected**        Impression    IMPRESSION  ---------------------------------------------------------------------------------------------------------  1. Jaquez intrauterine pregnancy at 27w3d gestational age here for completion of fetal anatomy.  2. The remaining fetal anatomic survey was completed, no fetal anomalies commonly detected by ultrasound were identified within the limits of prenatal ultrasound.  3. Growth parameters and estimated fetal weight were consistent with established dates.  4. The amniotic fluid volume appeared normal.       Maternal Fetal US Comprehensive Single F/U    Narrative            Comp Follow Up  ---------------------------------------------------------------------------------------------------------  Pat. Name: KELLEY ROYAL       Study Date:  01/19/2023 7:23am  Pat. NO:  [id number]        Referring  MD: DANIELLE HARPER  Site:  [facility]       Sonographer: Shanti De La Garza RDMS    :  1990        Age:   32  ---------------------------------------------------------------------------------------------------------    INDICATION  ---------------------------------------------------------------------------------------------------------  Maternal Heart failure.  Inpatient care - [facility]      METHOD  ---------------------------------------------------------------------------------------------------------  [facility] ANTEPARTUM inpatient exam. Transabdominal ultrasound examination. View: Suboptimal view: limited by maternal body habitus.      PREGNANCY  ---------------------------------------------------------------------------------------------------------  Jaquez pregnancy. Number of fetuses: 1      DATING  ---------------------------------------------------------------------------------------------------------                                           Date                                Details                                                                                      Gest. age                      CAROL ANN  Prior assessment                                                      GA: 24 w + 3 d                                                                           30 w + 3 d                     3/27/2023  U/S                                   2023                         based upon AC, BPD, Femur, HC                                                32 w + 3 d                     3/13/2023  Assigned dating                  Dating performed on 2022, based on the prior assessment                                            30 w + 3 d                     3/27/2023      GENERAL EVALUATION  ---------------------------------------------------------------------------------------------------------  Cardiac activity present.  bpm.  Fetal movements present.  Presentation cephalic.  Placenta Anterior, No Previa, > 2 cm from internal os.  Umbilical cord 3 vessel  cord.  Amniotic fluid normal MVP, MVP 6.3 cm.      FETAL BIOMETRY  ---------------------------------------------------------------------------------------------------------  Main Fetal Biometry:  BPD                                        82.5                    mm                         33w 1d                Hadlock  OFD                                        105.4                  mm                         31w 1d                 Nicolaides  HC                                          304.8                  mm                          33w 6d                Hadlock  Cerebellum tr                            43.2                   mm                          37w 1d                Nicolaides  AC                                          266.9                  mm                          30w 6d        57%        Hadlock  Femur                                      61.2                   mm                          31w 5d                Hadlock  Fetal Weight Calculation:  EFW                                       1,799                  g                                     77%        Hadlock  EFW (lb,oz)                             3 lb 15                oz  EFW by                                        Hadlock (BPD-HC-AC-FL)  Head / Face / Neck Biometry:                                             7.1                     mm  CM                                          4.4                     mm      FETAL ANATOMY  ---------------------------------------------------------------------------------------------------------  The following structures appear normal:  Head / Neck                         Cranium. Head size. Head shape. Lateral ventricles. Midline falx. Cerebellum. Cisterna magna. Thalami.  Face                                   Lips. Profile. Nose.  Heart / Thorax                      RVOT view. LVOT view.                                             Diaphragm.  Abdomen                             Stomach.  Kidneys. Bladder.    The following structures could not be adequately visualized:  Heart / Thorax                      Ductal arch view.    The following structures were documented previously:  Head / Neck                         Cavum septi pellucidi.  Heart / Thorax                      4-chamber view. 3-vessel-trachea view.  Spine                                  Cervical spine. Thoracic spine. Lumbar spine. Sacral spine.    Gender: male.      BIOPHYSICAL PROFILE  ---------------------------------------------------------------------------------------------------------  2: Fetal breathing movements  2: Gross body movements  2: Fetal tone  2: Amniotic fluid volume  8/8 Biophysical profile score  Interpretation: normal      MATERNAL STRUCTURES  ---------------------------------------------------------------------------------------------------------  Cervix                                  Suboptimal  Right Ovary                          Not examined  Left Ovary                            Not examined      RECOMMENDATION  ---------------------------------------------------------------------------------------------------------  Continue with weekly BPPs until delivery.        Impression    IMPRESSION  ---------------------------------------------------------------------------------------------------------  1. Jaquez intrauterine pregnancy at 30w3d gestational age here for assessment of fetal growth.  2. None of the anomalies commonly detected by ultrasound were evident in the limited fetal anatomic survey as described above, anatomy limited by gestational age and  fetal lie.  3. Growth parameters and estimated fetal weight were consistent with established dates. EFW 3lb 15 oz.  4. The amniotic fluid volume appeared normal.  5. BPP is reassuring at 8/8  6. Cephalic lie with anterior placenta.       XR Chest Port 1 View    Narrative    EXAM: XR CHEST PORT 1 VIEW  1/22/2023 3:35 PM      HISTORY: ACRADIO  REPOSITION    COMPARISON: X-ray 12/6/2022    FINDINGS: Single view of the chest. Right IJ Homestead-Juan David catheter with  tip overlying the right main pulmonary artery. Left PICC with tip  overlying the high right atrium. No pneumothorax. Small bilateral  pleural effusions. Cardiomegaly. Bilateral right greater than left  mixed interstitial and airspace opacities. Visualized upper abdomen  bones are grossly unremarkable.      Impression    IMPRESSION:    1. Right IJ Homestead-Juan David catheter with tip overlying the right main  pulmonary artery.  2. Left PICC tip overlying the high right atrium.  3. Bilateral right greater than left mixed interstitial and airspace  opacities with small bilateral pleural effusions, likely pulmonary  edema.    I have personally reviewed the examination and initial interpretation  and I agree with the findings.    SISI HUBBARD MD         SYSTEM ID:  [id number]   XR Chest Port 1 View    Narrative    EXAM: XR CHEST PORT 1 VIEW  1/22/2023 3:35 PM      HISTORY: SWAN REPOSITION    COMPARISON: Same day radiograph    FINDINGS: Single view of the chest. Left PICC with tip over the high  right atrium. Homestead-Juan David catheter with tip over the interlobar artery.  No pneumothorax. Bilateral pleural effusions. Cardiomegaly. Bilateral  mixed interstitial and airspace opacities. Visualized upper abdomen  and bones are grossly unremarkable.       Impression    IMPRESSION:  1. Right IJ Homestead-Juan David catheter with tip overlying the interlobar  artery. Recommend retraction  2. Left PICC tip overlying the high right atrium.  3. Bilateral right greater than left mixed interstitial and airspace  opacities with small bilateral pleural effusions, likely pulmonary  edema.    I have personally reviewed the examination and initial interpretation  and I agree with the findings.    SISI HUBBARD MD         SYSTEM ID:  [id number]   XR Chest Port 1 View    Narrative    EXAM: XR CHEST PORT 1 VIEW  1/22/2023 3:36 PM      HISTORY: swan      COMPARISON: X-ray 2023    FINDINGS: Single view of the chest. Right-sided Goldonna-Juan David catheter  with tip over the distal right main pulmonary artery/proximal  interlobar artery. No pneumothorax. Small bilateral pleural effusions.  Mild cardiomegaly. Bilateral mixed interstitial and airspace  opacities, right greater than left with perihilar and bibasilar  predominance. Visualized upper abdomen and bones are grossly  unremarkable.      Impression    IMPRESSION:    1. Goldonna-Juan David catheter projects over the distal right main pulmonary  artery/proximal interlobar artery.  2. Bilateral Interstitial and airspace opacities, right greater than  left with perihilar and bibasilar predominance, with small bilateral  pleural effusions, likely pulmonary edema.    I have personally reviewed the examination and initial interpretation  and I agree with the findings.    SISI HUBBARD MD         SYSTEM ID:  [id number]   Echo Complete     Value    LVEF  20-25% (severely reduced)    Narrative    [id number]  [id number]  [id number]  [id number]^MATTHIEU^ANNY     [facility],[place]  Echocardiography Laboratory  [address] [place] [id number]     Name: KELLEY ROYAL  MRN: [id number]  : 1990  Study Date: 2022 11:21 AM  Age: 32 yrs  Gender: Female  Patient Location: [facility]  Reason For Study: Heart Failure  Ordering Physician: ANNY SOMMERS  Referring Physician: OSMAR ESTRADA  Performed By: Hallie Rogers     BSA: 2.4 m2  Height: 69 in  Weight: 292 lb  BP: 102/53 mmHg  ______________________________________________________________________________  Procedure  Complete Portable Echo Adult. Contrast Optison. Optison (NDC #0496-5620-73)  given intravenously. Patient was given 5 ml mixture of 3 ml Optison and 6 ml  saline. 4 ml wasted.  ______________________________________________________________________________  Interpretation Summary  Left ventricular function is decreased. The ejection  fraction is 20-25%  (severely reduced). Severe diffuse hypokinesis is present. There is wall  thinning and akinesis of the basal-mid inferior, basal-mid inferoseptal and  basal inferolateral segments. There is akinesis of the apical septum. This is  compatible with ischemic cardiomyopathy.  Mild right ventricular dilation is present. Global right ventricular function  is moderately reduced.  Mild tricuspid insufficiency is present.  Pulmonary artery systolic pressure is normal.  IVC diameter and respiratory changes fall into an intermediate range  suggesting an RA pressure of 8 mmHg.  No pericardial effusion is present.     ______________________________________________________________________________  Left Ventricle  Severe left ventricular dilation is present. Left ventricular function is  decreased. The ejection fraction is 20-25% (severely reduced). Severe diffuse  hypokinesis is present. There is wall thinning and akinesis of the basal-mid  inferior, basal-mid inferoseptal and basal inferolateral segments. There is  akinesis of the apical septum. There is no apical thrombus.     Right Ventricle  Mild right ventricular dilation is present. Global right ventricular function  is moderately reduced.     Atria  Both atria appear normal.     Mitral Valve  Trace mitral insufficiency is present.     Aortic Valve  On Doppler interrogation, there is no significant stenosis or regurgitation.     Tricuspid Valve  Mild tricuspid insufficiency is present. The right ventricular systolic  pressure is approximated at 15.5 mmHg plus the right atrial pressure.  Pulmonary artery systolic pressure is normal.     Vessels  IVC diameter and respiratory changes fall into an intermediate range  suggesting an RA pressure of 8 mmHg.     Pericardium  No pericardial effusion is present.     ______________________________________________________________________________  MMode/2D Measurements & Calculations  IVSd: 1.1 cm  LVIDd: 6.5  cm  LVIDs: 6.0 cm  LVPWd: 1.0 cm  FS: 7.2 %  LV mass(C)d: 297.6 grams  LV mass(C)dI: 122.7 grams/m2  Ao root diam: 3.1 cm  asc Aorta Diam: 2.8 cm     LVOT diam: 2.3 cm  LVOT area: 4.2 cm2  LA Volume (BP): 65.1 ml  LA Volume Index (BP): 26.8 ml/m2  RWT: 0.31     Doppler Measurements & Calculations  PA acc time: 0.13 sec  TR max radha: 196.8 cm/sec  TR max PG: 15.5 mmHg     ______________________________________________________________________________  Report approved by: Devonte Ricks 2022 01:12 PM         Echo Limited     Value    LVEF  30-35% (moderately reduced)    [facility]    [id number]  [id number]  [id number]  [id number]^THOMAS^PAOLO^MAUREEN     [facility],[place]  Echocardiography Laboratory  [address] [place] [id number]     Name: KELLEY ROYAL  MRN: [id number]  : 1990  Study Date: 2023 02:20 PM  Age: 32 yrs  Gender: Female  Patient Location: [facility]  Reason For Study: Heart Failure  Ordering Physician: PAOLO GUZMAN  Referring Physician: OSMAR ESTRADA  Performed By: uGera Morrissey     BSA: 2.4 m2  Height: 69 in  Weight: 293 lb  HR: 85  BP: 104/60 mmHg  ______________________________________________________________________________  ______________________________________________________________________________  Interpretation Summary  Left ventricular function is decreased. The ejection fraction is 30-35%  (moderately reduced).  There is wall thinning and akinesis of the basal-mid inferior, basal-mid  inferoseptal and basal inferolateral segments.  Global right ventricular function is mildly to moderately reduced.  Mild to moderate tricuspid insufficiency is present.  Right ventricular systolic pressure is 45mmHg above the right atrial pressure.  Pulmonary hypertension is present.  IVC diameter <2.1 cm collapsing >50% with sniff suggests a normal RA pressure  of 3 mmHg.  No pericardial effusion is present.     This study was compared with the study  from 2022. The left ventricular  function has improved.  ______________________________________________________________________________  Left Ventricle  Mild left ventricular dilation is present. Mild concentric wall thickening  consistent with left ventricular hypertrophy is present. Left ventricular  function is decreased. The ejection fraction is 30-35% (moderately reduced).  There is wall thinning and akinesis of the basal-mid inferior, basal-mid  inferoseptal and basal inferolateral segments.     Right Ventricle  The right ventricle is normal size. Global right ventricular function is  mildly to moderately reduced.     Mitral Valve  The mitral valve is normal. Trace mitral insufficiency is present.     Aortic Valve  The valve leaflets are not well visualized. On Doppler interrogation, there is  no significant stenosis or regurgitation.     Tricuspid Valve  The tricuspid valve is normal. Mild to moderate tricuspid insufficiency is  present. Right ventricular systolic pressure is 45mmHg above the right atrial  pressure. Pulmonary hypertension is present.     Pulmonic Valve  The pulmonic valve is normal. Trace pulmonic insufficiency is present.     Vessels  The aorta root is normal. IVC diameter <2.1 cm collapsing >50% with sniff  suggests a normal RA pressure of 3 mmHg.     Pericardium  No pericardial effusion is present.     Compared to Previous Study  This study was compared with the study from 2022 . The left ventricular  function has improved.     ______________________________________________________________________________  MMode/2D Measurements & Calculations  IVSd: 1.2 cm  LVIDd: 6.2 cm  LVIDs: 5.5 cm  LVPWd: 1.1 cm  FS: 10.3 %  LV mass(C)d: 318.0 grams  LV mass(C)dI: 130.9 grams/m2     RWT: 0.36     Doppler Measurements & Calculations  PA acc time: 0.10 sec  TR max radha: 336.9 cm/sec  TR max P.4 mmHg     ______________________________________________________________________________  Report  approved by: MD Az Gillespie 2023 03:13 PM         Echocardiogram Limited     Value    LVEF  30-35% (moderately reduced)    [facility]    [id number]  [id number]  [id number]  [id number]^SARITA^STONEY     [facility],[place]  Echocardiography Laboratory  [address] [place] [address]     Name: KELLEY ROYAL  MRN: [id number]  : 1990  Study Date: 2023 09:49 AM  Age: 32 yrs  Gender: Female  [place] [facility]  Reason For Study: Heart Failure  Ordering Physician: STONEY STEIN  Referring Physician: OSMAR ESTRADA  Performed By: Namita Boland     BSA: 2.5 m2  Height: 69 in  Weight: 299 lb  ______________________________________________________________________________  Procedure  Limited Portable Echo Adult.  ______________________________________________________________________________  Interpretation Summary  Ischemic CM. Left ventricular function is decreased. The ejection fraction is  30-35% (moderately reduced).  Global right ventricular function is mildly reduced.  Mild pulmonary hypertension is present.  IVC diameter >2.1 cm collapsing <50% with sniff suggests a high RA pressure  estimated at 15 mmHg or greater.  No pericardial effusion is present.  No change from recent study, except CVP is higher today.  ______________________________________________________________________________  Left Ventricle  Left ventricular wall thickness is normal. Moderate left ventricular dilation  is present. Left ventricular function is decreased. The ejection fraction is  30-35% (moderately reduced). Mild diffuse hypokinesis is present. Inferior  wall akinesis is present. Inferolateral (posterior) wall akinesis is present.     Right Ventricle  Mild right ventricular dilation is present. Global right ventricular function  is mildly reduced.     Mitral Valve  The mitral valve is normal. Trace mitral insufficiency is present.     Aortic Valve  Aortic  valve is normal in structure and function.     Tricuspid Valve  Mild to moderate tricuspid insufficiency is present. The right ventricular  systolic pressure is approximated at 38.3 mmHg plus the right atrial pressure.  Mild pulmonary hypertension is present.     Pulmonic Valve  The pulmonic valve is normal.     Vessels  IVC diameter >2.1 cm collapsing <50% with sniff suggests a high RA pressure  estimated at 15 mmHg or greater.     Pericardium  No pericardial effusion is present.     ______________________________________________________________________________  MMode/2D Measurements & Calculations  IVSd: 1.0 cm  LVIDd: 6.3 cm  LVIDs: 5.5 cm  LVPWd: 1.1 cm  FS: 13.7 %  LV mass(C)d: 292.3 grams  LV mass(C)dI: 119.3 grams/m2  RWT: 0.34     Doppler Measurements & Calculations  TR max radha: 309.3 cm/sec  TR max P.3 mmHg     ______________________________________________________________________________  Report approved by: Devonte Fernandez 2023 01:42 PM         Cardiac Catheterization    Narrative    Normal coronary arteries.       Discharge Medications   Current Discharge Medication List      START taking these medications    Details   enalapril (VASOTEC) 2.5 MG tablet Take 1 tablet (2.5 mg) by mouth daily for 90 days  Qty: 90 tablet, Refills: 0    Associated Diagnoses: Acute decompensated heart failure (H)      furosemide (LASIX) 20 MG tablet Take 1 tablet (20 mg) by mouth daily for 90 days  Qty: 90 tablet, Refills: 0    Associated Diagnoses: Acute decompensated heart failure (H)      metoprolol succinate ER (TOPROL XL) 25 MG 24 hr tablet Take 2 tablets (50 mg) by mouth daily for 90 days  Qty: 180 tablet, Refills: 0    Associated Diagnoses: Acute decompensated heart failure (H)      spironolactone (ALDACTONE) 25 MG tablet Take 0.5 tablets (12.5 mg) by mouth daily for 90 days  Qty: 45 tablet, Refills: 0    Associated Diagnoses: Acute decompensated heart failure (H)         STOP taking these medications        metoprolol tartrate (LOPRESSOR) 25 MG tablet Comments:   Reason for Stopping:         potassium chloride ER (KLOR-CON M) 20 MEQ CR tablet Comments:   Reason for Stopping:         Prenatal Vit-Fe Fumarate-FA (PRENATAL MULTIVITAMIN W/IRON) 27-0.8 MG tablet Comments:   Reason for Stopping:             Allergies   No Known Allergies

## 2023-01-31 NOTE — PROGRESS NOTES
DISCHARGE   Discharged to: Home  Via: Automobile  Accompanied by: Family  Discharge Instructions: diet, activity, medications, follow up appointments, when to call the MD, and what to watchout for (i.e. s/s of infection, increasing SOB, palpitations, chest pain,)  Prescriptions: To be filled by  pharmacy per pt's request; medication list reviewed & sent with pt  Follow Up Appointments: arranged; information given  Belongings: All sent with pt  IV: out  Telemetry: off  Pt exhibits understanding of above discharge instructions; all questions answered.  Discharge Paperwork: faxed

## 2023-01-31 NOTE — PLAN OF CARE
Hours of Care:  1900 - 0700    Temp:  [97.7  F (36.5  C)-98.6  F (37  C)] 98.6  F (37  C)  Pulse:  [62-70] 68  Resp:  [16] 16  BP: ()/(47-63) 102/60  SpO2:  [97 %-100 %] 97 %     D: Anjali Carmen is a 32 year old female  with no significant past medical history, who presented as a transfer from [facility] to [facility] on 2022 for further management of newly diagnosed, acute decompensated systolic heart failure (LVEF 20%) and high risk delivery.     I: Monitored vitals and assessed pt status.     Tele: NSR  O2: Room air  Mobility: Independent    A: Neuro: A/O x4.  Call light appropriate.  Able to make needs known.  Respiratory:  On room air.  Lung sounds clear.  Denies shortness of breath at rest.  Cardiac: VSS.  NSR.  Occasional PAC.  Mild DEMARCUS.  Receives enoxaparin Q12H.  No s/s of bleeding.  GI: Last BM .  No report of nausea or vomiting.  : Urinating adequate amounts of clear, yellow urine per pt report.  Not collecting urinary output  Skin:  See PCS for assessment and treatment of wounds and surgical incisions.  LDA:  Double lumen PICC LUE.  All lumens heparin locked per documentation.  Electrolytes: RN managed K.  Lab pending.  Pain: Denies  Isolation:  Standard Precautions  Tests/procedures: None performed overnight.  Diet: 2 g Na.  Sleep:  No sleep disturbances noted or reported.  Social:  iPad in room to visualize infant in the NICU.  Utilizing breast pump.  Storing breast milk in fridge in room.    P: Continue to monitor Pt status and report changes to Cards 2.  Plan to discharge to [facility] when [facility] is able to be placed.

## 2023-01-31 NOTE — DISCHARGE INSTRUCTIONS
PLEASE PRESENT TO SECOND FLOOR OF [facility]   ON Wedensday 2/1/2023 promptly at noon for your scheduled cardiac MRI   Call cMRI at [phone] if you have any questions or concerns about your appointment    Follow up with Dr. Rogers with labs on Feb 10, 2023 to evaluate   medication change, to evaluate treatment change, and for hospital follow-   up.   Appointment made in Heart Failure clinic on 2/10/23 at 10:00.    Heart Failure Clinic [facility]  [phone] option 1 to schedule an appointment or leave a message for your care team      For any urgent postop pregnancy issues, please call     Weekdays 8-4 pm [facility] clinic [phone]  After office hours, weekends Labor and delivery [phone] ask to talk to charge nurse  Emergency issue - present to ED    jake Maxwell rep: [phone]  Fax number: [phone]  General referral phone line: [phone]

## 2023-02-01 ENCOUNTER — HOSPITAL ENCOUNTER (OUTPATIENT)
Dept: MRI IMAGING | Facility: CLINIC | Age: 33
Discharge: HOME | End: 2023-02-01
Payer: COMMERCIAL

## 2023-02-01 DIAGNOSIS — I50.9 ACUTE DECOMPENSATED HEART FAILURE (H): ICD-10-CM

## 2023-02-01 DIAGNOSIS — I50.9 ACUTE DECOMPENSATED HEART FAILURE (H): Primary | ICD-10-CM

## 2023-02-01 PROCEDURE — 75561 CARDIAC MRI FOR MORPH W/DYE: CPT | Mod: 26 | Performed by: INTERNAL MEDICINE

## 2023-02-01 PROCEDURE — 255N000002 HC RX 255 OP 636: Performed by: STUDENT IN AN ORGANIZED HEALTH CARE EDUCATION/TRAINING PROGRAM

## 2023-02-01 PROCEDURE — 75561 CARDIAC MRI FOR MORPH W/DYE: CPT

## 2023-02-01 PROCEDURE — A9585 GADOBUTROL INJECTION: HCPCS | Performed by: STUDENT IN AN ORGANIZED HEALTH CARE EDUCATION/TRAINING PROGRAM

## 2023-02-01 RX ORDER — GADOBUTROL 604.72 MG/ML
10 INJECTION INTRAVENOUS ONCE
Status: COMPLETED | OUTPATIENT
Start: 2023-02-01 | End: 2023-02-01

## 2023-02-01 RX ADMIN — GADOBUTROL 15 ML: 604.72 INJECTION INTRAVENOUS at 14:24

## 2023-02-02 ENCOUNTER — PATIENT OUTREACH (OUTPATIENT)
Dept: CARE COORDINATION | Facility: CLINIC | Age: 33
End: 2023-02-02
Payer: COMMERCIAL

## 2023-02-02 ENCOUNTER — TELEPHONE (OUTPATIENT)
Dept: MATERNAL FETAL MEDICINE | Facility: CLINIC | Age: 33
End: 2023-02-02
Payer: COMMERCIAL

## 2023-02-02 DIAGNOSIS — I42.8 OTHER CARDIOMYOPATHY (H): Primary | ICD-10-CM

## 2023-02-02 DIAGNOSIS — Z98.891 POSTCESAREAN SECTION: ICD-10-CM

## 2023-02-02 RX ORDER — ENOXAPARIN SODIUM 150 MG/ML
120 INJECTION SUBCUTANEOUS EVERY 12 HOURS
Qty: 67.2 ML | Refills: 0 | Status: SHIPPED | OUTPATIENT
Start: 2023-02-02 | End: 2023-03-16

## 2023-02-02 NOTE — PROGRESS NOTES
Rx for Lovenox 120mg BID to check coverage.     Will continue to reach out to patient to discuss recommendation for Lovenox for 6 weeks postpartum.    Johnnie Garcia MD  Maternal-Fetal Medicine Fellow

## 2023-02-02 NOTE — PROGRESS NOTES
[facility] Care Resource Center Contact  [facility]/Voicemail     Clinical Data: Transitional Care Management Outreach     Outreach attempted x 2.  First attempt was made on 2/1/2023 and RN left message on patient's voicemail, providing [facility]'s 24/7 scheduling and nurse triage phone number [phone] ([phone]) for questions/concerns.     Second attempt was made on 2/2/2023.  Received message that call could not be completed at this time.  RN attempted x2 and received same message.     Plan:  [facility] will do no further outreaches at this time.       Mercy Avelar, EARLE  Connected Care Resource [place], [facility]    *Connected Care Resource Team does NOT follow patient ongoing. Referrals are identified based on internal discharge reports and the outreach is to ensure patient has an understanding of their discharge instructions.

## 2023-02-03 ENCOUNTER — TELEPHONE (OUTPATIENT)
Dept: MATERNAL FETAL MEDICINE | Facility: CLINIC | Age: 33
End: 2023-02-03
Payer: COMMERCIAL

## 2023-02-03 NOTE — TELEPHONE ENCOUNTER
2/3/2023    Called patient to discuss anticoagulation recommendation, but there was no answer and unable to leave voicemail. Called NICU to see  If Anjali was in to see her baby yet today though she has not yet arrived. The nurse will notify me when she arrives to discuss this recommendation with her.    Johnnie Garcia MD  Maternal-Fetal Medicine Fellow

## 2023-02-06 ENCOUNTER — DOCUMENTATION ONLY (OUTPATIENT)
Dept: LAB | Facility: CLINIC | Age: 33
End: 2023-02-06
Payer: COMMERCIAL

## 2023-02-06 NOTE — PROGRESS NOTES
Hello,   In order to offer our patients the best possible experience, the lab schedule is reviewed to ensure patients have lab orders in Epic prior to arriving at the lab.    Please have one of your team members place a lab order in Epic for this patient prior to the lab appointment date.     Thank you for your attention,   [facility] [phone]

## 2023-02-08 ENCOUNTER — LACTATION ENCOUNTER (OUTPATIENT)
Age: 33
End: 2023-02-08

## 2023-02-08 DIAGNOSIS — I50.9 ACUTE DECOMPENSATED HEART FAILURE (H): Primary | ICD-10-CM

## 2023-02-12 ENCOUNTER — HEALTH MAINTENANCE LETTER (OUTPATIENT)
Age: 33
End: 2023-02-12

## 2023-02-14 ENCOUNTER — APPOINTMENT (OUTPATIENT)
Dept: GENERAL RADIOLOGY | Facility: CLINIC | Age: 33
End: 2023-02-14
Attending: EMERGENCY MEDICINE
Payer: COMMERCIAL

## 2023-02-14 ENCOUNTER — HOSPITAL ENCOUNTER (EMERGENCY)
Facility: CLINIC | Age: 33
Discharge: HOME | End: 2023-02-14
Attending: EMERGENCY MEDICINE | Admitting: EMERGENCY MEDICINE
Payer: COMMERCIAL

## 2023-02-14 ENCOUNTER — APPOINTMENT (OUTPATIENT)
Dept: CARDIOLOGY | Facility: CLINIC | Age: 33
End: 2023-02-14
Attending: EMERGENCY MEDICINE
Payer: COMMERCIAL

## 2023-02-14 ENCOUNTER — TELEPHONE (OUTPATIENT)
Dept: MATERNAL FETAL MEDICINE | Facility: CLINIC | Age: 33
End: 2023-02-14
Payer: COMMERCIAL

## 2023-02-14 VITALS
TEMPERATURE: 98.8 F | RESPIRATION RATE: 18 BRPM | HEIGHT: 69 IN | HEART RATE: 72 BPM | OXYGEN SATURATION: 97 % | WEIGHT: 289 LBS | DIASTOLIC BLOOD PRESSURE: 50 MMHG | BODY MASS INDEX: 42.8 KG/M2 | SYSTOLIC BLOOD PRESSURE: 119 MMHG

## 2023-02-14 DIAGNOSIS — R00.2 PALPITATIONS: ICD-10-CM

## 2023-02-14 LAB
ALBUMIN SERPL BCG-MCNC: 4.1 G/DL (ref 3.5–5.2)
ALP SERPL-CCNC: 112 U/L (ref 35–104)
ALT SERPL W P-5'-P-CCNC: 28 U/L (ref 10–35)
ANION GAP SERPL CALCULATED.3IONS-SCNC: 15 MMOL/L (ref 7–15)
AST SERPL W P-5'-P-CCNC: 50 U/L (ref 10–35)
ATRIAL RATE - MUSE: 71 BPM
BASOPHILS # BLD AUTO: 0 10E3/UL (ref 0–0.2)
BASOPHILS NFR BLD AUTO: 0 %
BILIRUB SERPL-MCNC: 0.3 MG/DL
BUN SERPL-MCNC: 10.8 MG/DL (ref 6–20)
CALCIUM SERPL-MCNC: 10 MG/DL (ref 8.6–10)
CHLORIDE SERPL-SCNC: 100 MMOL/L (ref 98–107)
CREAT SERPL-MCNC: 0.82 MG/DL (ref 0.51–0.95)
DEPRECATED HCO3 PLAS-SCNC: 24 MMOL/L (ref 22–29)
DIASTOLIC BLOOD PRESSURE - MUSE: NORMAL MMHG
EOSINOPHIL # BLD AUTO: 0.1 10E3/UL (ref 0–0.7)
EOSINOPHIL NFR BLD AUTO: 1 %
ERYTHROCYTE [DISTWIDTH] IN BLOOD BY AUTOMATED COUNT: 16.6 % (ref 10–15)
GFR SERPL CREATININE-BSD FRML MDRD: >90 ML/MIN/1.73M2
GLUCOSE SERPL-MCNC: 107 MG/DL (ref 70–99)
HCT VFR BLD AUTO: 40.4 % (ref 35–47)
HGB BLD-MCNC: 12.9 G/DL (ref 11.7–15.7)
HOLD SPECIMEN: NORMAL
HOLD SPECIMEN: NORMAL
IMM GRANULOCYTES # BLD: 0 10E3/UL
IMM GRANULOCYTES NFR BLD: 0 %
INTERPRETATION ECG - MUSE: NORMAL
LVEF ECHO: NORMAL
LYMPHOCYTES # BLD AUTO: 3 10E3/UL (ref 0.8–5.3)
LYMPHOCYTES NFR BLD AUTO: 33 %
MAGNESIUM SERPL-MCNC: 1.9 MG/DL (ref 1.7–2.3)
MCH RBC QN AUTO: 26.4 PG (ref 26.5–33)
MCHC RBC AUTO-ENTMCNC: 31.9 G/DL (ref 31.5–36.5)
MCV RBC AUTO: 83 FL (ref 78–100)
MONOCYTES # BLD AUTO: 0.5 10E3/UL (ref 0–1.3)
MONOCYTES NFR BLD AUTO: 6 %
NEUTROPHILS # BLD AUTO: 5.3 10E3/UL (ref 1.6–8.3)
NEUTROPHILS NFR BLD AUTO: 60 %
NRBC # BLD AUTO: 0 10E3/UL
NRBC BLD AUTO-RTO: 0 /100
NT-PROBNP SERPL-MCNC: 1535 PG/ML (ref 0–450)
P AXIS - MUSE: 35 DEGREES
PLATELET # BLD AUTO: 311 10E3/UL (ref 150–450)
POTASSIUM SERPL-SCNC: 4.2 MMOL/L (ref 3.4–5.3)
PR INTERVAL - MUSE: 154 MS
PROT SERPL-MCNC: 8 G/DL (ref 6.4–8.3)
QRS DURATION - MUSE: 138 MS
QT - MUSE: 436 MS
QTC - MUSE: 473 MS
R AXIS - MUSE: -10 DEGREES
RBC # BLD AUTO: 4.89 10E6/UL (ref 3.8–5.2)
SODIUM SERPL-SCNC: 139 MMOL/L (ref 136–145)
SYSTOLIC BLOOD PRESSURE - MUSE: NORMAL MMHG
T AXIS - MUSE: 2 DEGREES
TROPONIN T SERPL HS-MCNC: 39 NG/L
TROPONIN T SERPL HS-MCNC: 41 NG/L
TSH SERPL DL<=0.005 MIU/L-ACNC: 1.85 UIU/ML (ref 0.3–4.2)
VENTRICULAR RATE- MUSE: 71 BPM
WBC # BLD AUTO: 9 10E3/UL (ref 4–11)

## 2023-02-14 PROCEDURE — 255N000002 HC RX 255 OP 636: Performed by: INTERNAL MEDICINE

## 2023-02-14 PROCEDURE — 71046 X-RAY EXAM CHEST 2 VIEWS: CPT

## 2023-02-14 PROCEDURE — 93306 TTE W/DOPPLER COMPLETE: CPT | Mod: 26 | Performed by: INTERNAL MEDICINE

## 2023-02-14 PROCEDURE — 84443 ASSAY THYROID STIM HORMONE: CPT | Performed by: EMERGENCY MEDICINE

## 2023-02-14 PROCEDURE — 83735 ASSAY OF MAGNESIUM: CPT | Performed by: EMERGENCY MEDICINE

## 2023-02-14 PROCEDURE — 84484 ASSAY OF TROPONIN QUANT: CPT | Mod: 91 | Performed by: EMERGENCY MEDICINE

## 2023-02-14 PROCEDURE — 93010 ELECTROCARDIOGRAM REPORT: CPT | Performed by: EMERGENCY MEDICINE

## 2023-02-14 PROCEDURE — 36415 COLL VENOUS BLD VENIPUNCTURE: CPT | Performed by: EMERGENCY MEDICINE

## 2023-02-14 PROCEDURE — 99285 EMERGENCY DEPT VISIT HI MDM: CPT | Mod: 25 | Performed by: EMERGENCY MEDICINE

## 2023-02-14 PROCEDURE — 80053 COMPREHEN METABOLIC PANEL: CPT | Performed by: EMERGENCY MEDICINE

## 2023-02-14 PROCEDURE — 999N000208 ECHOCARDIOGRAM COMPLETE

## 2023-02-14 PROCEDURE — 83880 ASSAY OF NATRIURETIC PEPTIDE: CPT | Performed by: EMERGENCY MEDICINE

## 2023-02-14 PROCEDURE — 93005 ELECTROCARDIOGRAM TRACING: CPT | Performed by: EMERGENCY MEDICINE

## 2023-02-14 PROCEDURE — 84484 ASSAY OF TROPONIN QUANT: CPT | Performed by: EMERGENCY MEDICINE

## 2023-02-14 PROCEDURE — 85025 COMPLETE CBC W/AUTO DIFF WBC: CPT | Performed by: EMERGENCY MEDICINE

## 2023-02-14 PROCEDURE — 99283 EMERGENCY DEPT VISIT LOW MDM: CPT | Mod: 25 | Performed by: EMERGENCY MEDICINE

## 2023-02-14 RX ADMIN — HUMAN ALBUMIN MICROSPHERES AND PERFLUTREN 5 ML: 10; .22 INJECTION, SOLUTION INTRAVENOUS at 16:59

## 2023-02-14 ASSESSMENT — ACTIVITIES OF DAILY LIVING (ADL): ADLS_ACUITY_SCORE: 35

## 2023-02-14 NOTE — TELEPHONE ENCOUNTER
Spoke with pt, offered her to come to [facility] for a follow up OB appt. PT accepts but states she needs a referral from her primary provider in [place] or she will be charged. Pt will call her primary and have them send a referral. Pt scheduled for 2/20/23 @ 3pm, location of appointment confirmed.     Anjali also states she did her her lovenox shipped to the [facility] but she has not started it yet, she plans to go there tomorrow to pick it up.    Anjali has [facility] phone number, instructed to call with any other questions.PT VU.  Laure Stevens RN

## 2023-02-14 NOTE — ED PROVIDER NOTES
[facility] [place] [facility] ([facility])   ED PROVIDER NOTE  2023 ED 1  History     Chief Complaint   Patient presents with     Palpitations     The history is provided by the patient and medical records.     Anjali Carmen is a 32 year old postpartum female ( 2023) with history of dilated cardiomyopathy with acute heart failure and severe reduced EF at 20% who presents for evaluation of episode of sharp left-sided chest pain, lightheadedness, irregular heartbeats while she was visiting her baby in the NICU today.      Of note, patient has a very complex history culminating over the past couple of weeks.  She had presented to [facility] on 2022 with concerns of palpitations and shortness of breath that she had been having since in 2022.  At that time she was found to have atrial fibrillation, was admitted to [facility] from -2022.  [facility] Echocardiogram completed showing critical findings of dilated cardiomyopathy and acute heart failure severely reduced LV function at 20% with dilated LV.  She was diuresed on IV Lasix, treated with metoprolol and heparin.  She was transferred to [facility] for quaternary specialized care.     Patient was admitted under [facility] from -2023 for close monitoring and possible decompensation. On monitoring she was noted to have multiple episodes of NSVT.  She had  on 2023, her baby is still in the NICU at this time.  After delivery she remained in the ICU, was fitted for an external pacemaker (ZOLL LifeVest) and then discharged.  She was in staying at the [facility] while her baby is in the NICU.     Patient states that she has been at her usual state of health since discharge, was visiting her baby up in the NICU today when she developed sudden onset of sharp left chest pain, lightheadedness, mild shortness of breath and irregular  "heartbeat.  This chest pain lasted 6-7 minutes before subsiding just before evaluation.  She states that her heart was beating irregularly but no heart racing.  She notes having had similar sensation in the past just before she was diagnosed with this heart failure. She still feels lightheaded at this time. No nausea or vomiting.      Of note, she is is supposed to be on Lovenox anticoagulation but there were delays from pharmacy to send it to her.  She has been without anticoagulation since her discharge from the hospital 2 weeks ago.  She received her shipment of Lovenox, just has not taken it yet. She is on Lasix diuretic.  Patient has been wearing her ZOLL LifeVest and keeping it charged, didn't get any device alarms overnight.  On arrival here EKG was performed and she had to take the battery out of the LifeVest device to keep the LifeVest from alarming when she took the vest off to do the EKG she did take the battery out to prevent it from alarming when she took the vest off to do EKG. No calf swelling, no hematuria, dysuria, decreased urination, change in urine color. She has an appointment tomorrow with Dr. Rogers Cardiology         MRI Cardiac w/contrast 02/01/23      CONCLUSIONS:   1. Moderately dilated and moderately reduced left ventricular function, LVEF 36%   2. Moderately dilated and mildly reduced right ventricular function, RVEF 48%.   3. Extensive myocardial fibrosis in the pattern of subendocardial and transmural hyperenhancement in the   left ventricle raising the possibility of genetic cardiomyopathy given the absence of obstructive coronary   disease.                Physical Exam   BP: 125/63  Pulse: 74  Temp: 98.8  F (37.1  C)  Resp: 18  Height: 175.3 cm (5' 9\")  Weight: 131.1 kg (289 lb)  SpO2: 99 %      Physical Exam  Vitals and nursing note reviewed.   Constitutional:       General: She is not in acute distress.     Appearance: She is not diaphoretic.   HENT:      Head: Atraumatic.      " Mouth/Throat:      Pharynx: No oropharyngeal exudate.   Eyes:      General: No scleral icterus.     Pupils: Pupils are equal, round, and reactive to light.   Cardiovascular:      Rate and Rhythm: Normal rate and regular rhythm.      Heart sounds: Normal heart sounds.   Pulmonary:      Effort: No respiratory distress.      Breath sounds: Normal breath sounds.   Abdominal:      General: Bowel sounds are normal.      Palpations: Abdomen is soft.      Tenderness: There is no abdominal tenderness.   Musculoskeletal:         General: No tenderness.   Skin:     General: Skin is warm.      Findings: No rash.         ED Course        Procedures            EKG Interpretation:      Interpreted by Mansoor Hawthorne MD  Time reviewed: per Epic  Symptoms at time of EKG: none   Rhythm: normal sinus   Rate: normal  Axis: normal  Ectopy: +PVC   Conduction: normal  ST Segments/ T Waves: No ST-T wave changes  Q Waves: none  Comparison to prior: Unchanged    Clinical Impression: normal EKG           Results for orders placed or performed during the hospital encounter of 02/14/23 (from the past 24 hour(s))   [facility] Draw    Narrative    The following orders were created for panel order [facility] Draw.  Procedure                               Abnormality         Status                     ---------                               -----------         ------                     Extra Blue Top Tube[[id number]]                                                         Extra Red Top Tube[[id number]]                                                          Extra Green Top (Lithium...[[id number]]                                                 Extra Purple Top Tube[[id number]]                                                         Please view results for these tests on the individual orders.   CBC with platelets differential    Narrative    The following orders were created for panel order CBC with platelets differential.  Procedure                                Abnormality         Status                     ---------                               -----------         ------                     CBC with platelets and d...[[id number]]                                                   Please view results for these tests on the individual orders.     Medications - No data to display          Medical Decision Making  The patient presented with a problem that is a stable chronic illness.    The patient's evaluation involved:  review of external note(s) from 1 sources (see separate area of note for details)  ordering and/or review of 3+ test(s) in this encounter (see separate area of note for details)    The patient's management involved only low risk treatment.     Assessments & Plan (with Medical Decision Making)     32 year old postpartum female ( 2023) with history of dilated cardiomyopathy with acute heart failure and severe reduced EF at 20% who presents for evaluation of episode of sharp left-sided chest pain, lightheadedness, irregular heartbeats while she was visiting her baby in the NICU today.  Vital signs in triage stable and afebrile including normal pulse ox at 97% on room air.  EKG obtained which revealed normal sinus rhythm with PVCs but no acute ischemic changes.  IV established, labs drawn sent reviewed document epic with a BNP of 1500 and high-sensitivity troponin of 39 and a delta troponin of 41.  Complete echocardiogram obtained here in the emergency department which reveals an improving EF at 40 to 45%.  Patient has a cardiology appointment the following day and has been asymptomatic here in the emergency department and requesting discharge home, she can visit with her baby in the NICU.  Patient and her  expressed verbal understanding of anticipatory guidance return precautions to the emergency department.    I have reviewed the nursing notes.    I have reviewed the findings, diagnosis, plan and need for follow up with the  patient.    New Prescriptions    No medications on file       Final diagnoses:   Palpitations       Natalia GRAY, am serving as a trained medical scribe to document services personally performed by Mansoor Hawthorne MD based on the provider's statements to me on February 14, 2023.  This document has been checked and approved by the attending provider.    Mansoor GRAY MD, was physically present and have reviewed and verified the accuracy of this note documented by Natalia Mitchell, medical scribe.      Mansoor Hawthorne MD       2/14/2023   [facility] EMERGENCY DEPARTMENT     Mansoor Hawthorne MD  02/18/23 0139

## 2023-02-14 NOTE — ED TRIAGE NOTES
Pt reports had defib implanted on Jan 31, was visiting NICU today, started to feel palpitations, light headnessess, SOB and chest pain     Triage Assessment     Row Name 02/14/23 1523       Triage Assessment (Adult)    Airway WDL WDL       Respiratory WDL    Respiratory WDL X  SOB with activity       Cardiac WDL    Cardiac WDL X;chest pain  palpitations       Chest Pain Assessment    Precipitating Factors activity    Chest Pain Intervention 12-lead ECG obtained       Cognitive/Neuro/Behavioral WDL    Cognitive/Neuro/Behavioral WDL WDL

## 2023-02-15 ENCOUNTER — LAB (OUTPATIENT)
Dept: LAB | Facility: CLINIC | Age: 33
End: 2023-02-15
Payer: COMMERCIAL

## 2023-02-15 ENCOUNTER — OFFICE VISIT (OUTPATIENT)
Dept: CARDIOLOGY | Facility: CLINIC | Age: 33
End: 2023-02-15
Attending: STUDENT IN AN ORGANIZED HEALTH CARE EDUCATION/TRAINING PROGRAM
Payer: COMMERCIAL

## 2023-02-15 VITALS
WEIGHT: 292.1 LBS | HEART RATE: 75 BPM | BODY MASS INDEX: 44.27 KG/M2 | SYSTOLIC BLOOD PRESSURE: 104 MMHG | HEIGHT: 68 IN | DIASTOLIC BLOOD PRESSURE: 68 MMHG | OXYGEN SATURATION: 99 %

## 2023-02-15 DIAGNOSIS — Z79.01 ON BRIDGING TREATMENT WITH LOVENOX: Primary | ICD-10-CM

## 2023-02-15 DIAGNOSIS — I42.8 NONISCHEMIC CARDIOMYOPATHY (H): ICD-10-CM

## 2023-02-15 DIAGNOSIS — I49.3 PVC'S (PREMATURE VENTRICULAR CONTRACTIONS): ICD-10-CM

## 2023-02-15 DIAGNOSIS — E66.01 MORBID OBESITY (H): ICD-10-CM

## 2023-02-15 DIAGNOSIS — I50.9 ACUTE DECOMPENSATED HEART FAILURE (H): ICD-10-CM

## 2023-02-15 DIAGNOSIS — I50.21 ACUTE HFREF (HEART FAILURE WITH REDUCED EJECTION FRACTION) (H): ICD-10-CM

## 2023-02-15 LAB
ANION GAP SERPL CALCULATED.3IONS-SCNC: 10 MMOL/L (ref 7–15)
BUN SERPL-MCNC: 17.4 MG/DL (ref 6–20)
CALCIUM SERPL-MCNC: 9.7 MG/DL (ref 8.6–10)
CHLORIDE SERPL-SCNC: 104 MMOL/L (ref 98–107)
CREAT SERPL-MCNC: 0.87 MG/DL (ref 0.51–0.95)
DEPRECATED HCO3 PLAS-SCNC: 25 MMOL/L (ref 22–29)
GFR SERPL CREATININE-BSD FRML MDRD: >90 ML/MIN/1.73M2
GLUCOSE SERPL-MCNC: 102 MG/DL (ref 70–99)
POTASSIUM SERPL-SCNC: 4 MMOL/L (ref 3.4–5.3)
SODIUM SERPL-SCNC: 139 MMOL/L (ref 136–145)

## 2023-02-15 PROCEDURE — G0463 HOSPITAL OUTPT CLINIC VISIT: HCPCS | Performed by: STUDENT IN AN ORGANIZED HEALTH CARE EDUCATION/TRAINING PROGRAM

## 2023-02-15 PROCEDURE — G0463 HOSPITAL OUTPT CLINIC VISIT: HCPCS

## 2023-02-15 PROCEDURE — 36415 COLL VENOUS BLD VENIPUNCTURE: CPT | Performed by: PATHOLOGY

## 2023-02-15 PROCEDURE — 99215 OFFICE O/P EST HI 40 MIN: CPT | Performed by: STUDENT IN AN ORGANIZED HEALTH CARE EDUCATION/TRAINING PROGRAM

## 2023-02-15 PROCEDURE — 80048 BASIC METABOLIC PNL TOTAL CA: CPT | Performed by: PATHOLOGY

## 2023-02-15 RX ORDER — DEXAMETHASONE SODIUM PHOSPHATE 4 MG/ML
1 INJECTION, SOLUTION INTRAMUSCULAR; INTRAVENOUS ONCE
Qty: 1 EACH | Refills: 1 | Status: SHIPPED | OUTPATIENT
Start: 2023-02-15 | End: 2023-02-15

## 2023-02-15 ASSESSMENT — PAIN SCALES - GENERAL: PAINLEVEL: MODERATE PAIN (5)

## 2023-02-15 NOTE — LETTER
2/15/2023      RE: Anjali Carmen  [address]  [facility] [address]       Dear Colleague,    Thank you for the opportunity to participate in the care of your patient, Anjali Carmen, at the [facility] [place] at [facility]. Please see a copy of my visit note below.    Advanced Heart Failure/Transplant Clinic Note    HPI  Dear colleagues,     I had the pleasure of seeing Ms. Anjali Carmen in the Cardiology clinic.  As you know, Ms. Anjali Carmen is a pleasant 32 year old female  with no significant past medical history who presented as a transfer from [place], [place] to [facility] on 2022 for further management of newly diagnosed acute decompensated systolic heart failure (LVEF 20%) and high risk delivery. She initially presented to the [facility] on [facility]22 with complaints of palpitations, shortness of breath and tiredness since 2022, as well as chest pain radiating to the left arm of one day's duration. EKG at the clinic reportedly showed possible atrial fibrillation (EKG unavailable for review). Therefore, she was transfered from [place] to [facility] on 2022 for further evaluation. TTE on 2022 showed moderate to severe LV dilation (LVIDd 65 mm), severely reduced LV function,  LVEF 20%; mildly dilated RV with normal RV size, mild MR & TR. She was diuresed with IV lasix for pulmonary edema (40 mg BID f/b 80 mg BID for 3 days), initiated on metoprolol tartrate 25 mg BID and heparin gtt for Afib/cardiomyopathy. On telemetry, she was noted to have multiple runs of NSVT. The patient was also given betamethasone on  and 22 for fetal lung maturation. Due to concern for possible decompensation and high risk delivery, she was transferred to [facility] on 2022 for further management.  She was then closely monitored here until her delivery on  2023 when she had her  son by  who is admitted at the [facility]. Postdelivery, she did well and all her GDMT was optimized in a safe manner given that she plans on breastfeeding and her volume status was also optimized prior to discharge. Given her frequent NSVT and PVCs, she was discharged with a LifeVest and follow-up with our advanced heart failure team and MFM while she remains in the [facility] until her baby is safe to discharge from the hospital.    Since discharge, patient reports she has overall felt well. She did have an episode of palpitations with associated shortness of breath. The episode lasted for 25-30 mins. She went to [employer] ED, where an echo showed improving LVEF to 40-45%, but otherwise EKG unremarkable and since she was wearing a LifeVest with no alarms during this incident and everything otherwise looked stable. She had mildly elevated hs-trop to 39 and NT-pro BNP elevated to 1535. She was discharged home.    She reports they have been having difficulty obtaining her lovenox since discharge and only received it yesterday, so when she got home from the ED yesterday, she took her first dose and reports it felt better. She reports there are some days recently that she can walk around for half a day without feeling short of breath, but other days feels short of breath when walking 1 block. She denies presyncope, syncope, chest pain, nausea, vomiting, orthopnea, PND, LE edema, or weakness. She reports still having pain at her  site, but tolerable except when moving quickly from laying to sitting. She reports compliance with all her medications and is wearing her LifeVest at all times except when showering. Her baby is doing well, but remains in the NICU for ongoing management at this time and unclear how much longer he will be hospitalized for.     PAST MEDICAL HISTORY:  Patient Active Problem List   Diagnosis     Acute decompensated heart failure (H)  "  NICM  NSVT. Frequent PVCs  Obesity    FAMILY HISTORY:  1. Father  at 30 years of age secondary to 'enlarged heart'.  Patient believes his cardiomyopathy was infectious in etiology.  No other history of sudden cardiac death or cardiomyopathy in any other members of the family.  2. Grandma: DM.  3.   SOCIAL HISTORY:    Lives at home in [place], [place] with her spouse who is a ; and now has 4 children.    Parents in their 60s, functionally independent.  Multiple siblings, good social support network.     No history of alcohol, tobacco, marijuana or recreational drug use.     ALLERGIES:  No Known Allergies    CURRENT MEDICATIONS:  Current Outpatient Medications   Medication Sig Dispense Refill     enalapril (VASOTEC) 2.5 MG tablet Take 1 tablet (2.5 mg) by mouth daily for 90 days 90 tablet 0     enoxaparin ANTICOAGULANT (LOVENOX) 120 MG/0.8ML syringe Inject 0.8 mLs (120 mg) Subcutaneous every 12 hours for 42 days 67.2 mL 0     furosemide (LASIX) 20 MG tablet Take 1 tablet (20 mg) by mouth daily for 90 days 90 tablet 0     metoprolol succinate ER (TOPROL XL) 25 MG 24 hr tablet Take 2 tablets (50 mg) by mouth daily for 90 days 180 tablet 0     spironolactone (ALDACTONE) 25 MG tablet Take 0.5 tablets (12.5 mg) by mouth daily for 90 days 45 tablet 0     acetaminophen (TYLENOL) 325 MG tablet Take 2 tablets (650 mg) by mouth every 6 hours as needed for mild pain 60 tablet 3       ROS:   A complete 12-point ROS was negative except as above.    EXAM:  /68 (BP Location: Right arm, Patient Position: Chair, Cuff Size: Adult Large)   Pulse 75   Ht 1.73 m (5' 8.11\")   Wt 132.5 kg (292 lb 1.6 oz)   SpO2 99%   BMI 44.27 kg/m    General: appears comfortable, alert and interactive, in no acute distress  Head: normocephalic, atraumatic  Eyes: anicteric sclera, EOMI  Mouth: wearing mask per COVID-19 protocol  Neck: supple, no cervical adenopathy  CV: regular rate and rhythm, S1/S2, no murmur, gallop, rub, " estimated JVP ~6 cm  Resp: clear to auscultation bilaterally, no rales or wheezing  GI: soft, nontender, nondistended, +BS  Extremities: warm, no peripheral edema, 2+ bilateral radial pulses  Neurological: normal speech and affect, no gross motor deficits  Psych: normal mood and affect  Derm: no rashes or lesions on exposed surfaces    Weight  Wt Readings from Last 10 Encounters:   02/15/23 132.5 kg (292 lb 1.6 oz)   02/14/23 131.1 kg (289 lb)   01/31/23 135.2 kg (298 lb)       I personally reviewed recent labs and data as below and discussed the results with the patient in clinic today.  Labs:  CBC RESULTS:  Lab Results   Component Value Date    WBC 9.0 02/14/2023    RBC 4.89 02/14/2023    HGB 12.9 02/14/2023    HCT 40.4 02/14/2023    MCV 83 02/14/2023    MCH 26.4 (L) 02/14/2023    MCHC 31.9 02/14/2023    RDW 16.6 (H) 02/14/2023     02/14/2023       CMP RESULTS:  Lab Results   Component Value Date     02/15/2023    POTASSIUM 4.0 02/15/2023    POTASSIUM 4.5 01/23/2023    CHLORIDE 104 02/15/2023    CO2 25 02/15/2023    ANIONGAP 10 02/15/2023     (H) 02/15/2023     (H) 01/27/2023    BUN 17.4 02/15/2023    CR 0.87 02/15/2023    GFRESTIMATED >90 02/15/2023    AYDEN 9.7 02/15/2023    BILITOTAL 0.3 02/14/2023    ALBUMIN 4.1 02/14/2023    ALKPHOS 112 (H) 02/14/2023    ALT 28 02/14/2023    AST 50 (H) 02/14/2023      Testing/Procedures:  I personally visualized and interpreted:  Echocardiogram 12/6/22  Interpretation Summary  Left ventricular function is decreased. The ejection fraction is 20-25%  (severely reduced). Severe diffuse hypokinesis is present. There is wall  thinning and akinesis of the basal-mid inferior, basal-mid inferoseptal and  basal inferolateral segments. There is akinesis of the apical septum. This is  compatible with ischemic cardiomyopathy.  Mild right ventricular dilation is present. Global right ventricular function  is moderately reduced.  Mild tricuspid insufficiency is  present.  Pulmonary artery systolic pressure is normal.  IVC diameter and respiratory changes fall into an intermediate range  suggesting an RA pressure of 8 mmHg.  No pericardial effusion is present.    Coronary Angiogram 12/7/22  Conclusion  Normal coronary arteries.    Echo 1/21/23  Interpretation Summary  Ischemic CM. Left ventricular function is decreased. The ejection fraction is  30-35% (moderately reduced).  Global right ventricular function is mildly reduced.  Mild pulmonary hypertension is present.  IVC diameter >2.1 cm collapsing <50% with sniff suggests a high RA pressure  estimated at 15 mmHg or greater.  No pericardial effusion is present.    EKG 1/26/23 shows sinus rhythm with PVCs, PACs, and LVH    cMRI 2/1/23  Clinical history: 32 year old woman with new onset non ischemic cardiomyopathy during recent pregnancy.  Comparison CMR: none  1. The left ventricle is moderately dilated with moderate diffuse hypokinesis. The left ventricle is  hypertrabeculated. The global systolic function is moderately reduced. The LVEF is 36%.  2. The right ventricle is moderately dilated. The global systolic function is normal. The RVEF is 48%.   3. Both atria are normal in size.  4. There is mild tricuspid, mitral and pulmonic regurgitation. The aortic valve is tricuspid.     5. There is extensive late gadolinium enhancement in the pattern of subendocardial hyperenhancement in the  basal-mid inferior segments with extension into the adjacent inferoseptal and inferolateral segments. There  is transmural hyperenhancement in the mid-distal anterior, basal-distal lateral  segments.  Given the  increased burden of fibrosis in the absence of obstructive coronary disease, genetic cardiomyopathy is a  possibility.    T2 mapping - there is no significant myocardial edema (mean T2 value 48-50 ms).  T1 mapping - Native T1 is 1118 ms and the ECV is mildly elevated at 31%  6. There is no pericardial effusion.  7. There is no  intracardiac thrombus.  CONCLUSIONS:  1. Moderately dilated and moderately reduced left ventricular function, LVEF 36%  2. Moderately dilated and mildly reduced right ventricular function, RVEF 48%.  3. Extensive myocardial fibrosis in the pattern of subendocardial and transmural hyperenhancement in the  left ventricle raising the possibility of genetic cardiomyopathy given the absence of obstructive coronary  disease.     TTE 23  Interpretation Summary  Left ventricular function is decreased. The ejection fraction is 40-45%  (mildly reduced).  Global right ventricular function is normal.  Mild tricuspid insufficiency is present.  Pulmonary artery systolic pressure is normal.  The inferior vena cava was normal in size with preserved respiratory  variability.  No pericardial effusion is present.    ECG 23 shows sinus rhythm with PVCs and PACs, LVH, unchanged compared to recent priors    Outside results of note:  Outside records from [facility] prolonged hospital course were obtained, and relevant results/notes have been incorporated into HPI.    Assessment and Plan:     In summary, 32 year old female  with no significant past medical history who presented as a transfer from [place], [place] to [facility] on 2022 for further management of newly diagnosed acute decompensated systolic heart failure (LVEF 20%) with prolonged hospitalization and now s/p high-risk  on 23 who did well post-op and is here for hospital follow-up. Patient remains local while her child remains admitted to [facility].    Chronic systolic heart failure/HFrEF (EF previously 20-25% now 40-45%) secondary to nonischemic cardiomyopathy  NYHA Symptom Class II/III  Stage C  ACE-I/ARB/ARNi: Continue enalapril 2.5 mg daily  BB: continue metoprolol XL 50 mg daily  Aldosterone antagonist: continue spironolactone 12.5 mg daily  SGLT2i: deferred given no significant data in breastfeeding  SCD prophylaxis: wearing  LifeVest, will reassess function in 6 weeks and if LVEF remains >35% will stop and not require an ICD  %BiV pacing: N/A  Fluid status: euvolemic on lasix 20 mg daily  Cardiac Rehab: deferred given recent  and unclear for now how long she will be in the area  Sleep Apnea Evaluation: Not indicated  Remote PA Pressure Monitoring (CardioMems) Deferred while medical therapy is optimized   - Contraception plan: s/p tubal ligation  - Anticoagulation: currently using lovenox injections BID, will continue until repeat LVEF assessment in 6 weeks, if function remains >40% will stop    Frequent NSVT, PVCs  - Wearing LifeVest currently, but will reassess LVEF in 6 weeks and if LVEF >35% will discontinue vs recommend ICD  - Continue metoprolol as above    Obesity  Body mass index is 44.27 kg/m .   - Discussed importance of heart healthy diet and regular exercise    To Do:  - No change to medications today  - Continue LifeVest for now  - Follow up in 2 weeks here if still in [facility] at that time, otherwise will coordinate follow up in [place], [place]  - Plan to repeat TTE in 6 weeks    The patient states understanding and is agreeable with plan.   Feel free to contact myself regarding questions or concerns. It was a pleasure to see this patient today.    I spent 60 minutes in care of the patient today including obtaining recent medical history, personally reviewing recent cardiac testing and/or lab results, today's examination, discussion of testing results and care recommendations with patient.    Angeli Rogers MD   of Medicine, [facility]  Advanced Heart Failure and Transplant Cardiology

## 2023-02-15 NOTE — NURSING NOTE
Diet: Patient instructed regarding a heart failure healthy diet, including discussion of reduced fat and 2000 mg daily sodium restriction, daily weights, medication purpose and compliance, fluid restrictions and resources for patient and family to access for assistance with heart failure management.       Labs: Patient was given results of the laboratory testing obtained today and patient was instructed about when to return for the next laboratory testing.     Med Reconcile: Reviewed and verified all current medications with the patient. The updated medication list was printed and given to the patient. No changes. *sharps container ordered    Return Appointment: Patient given instructions regarding scheduling next clinic visit. RTC in 2 weeks.     Patient stated she understood all health information given and agreed to call with further questions or concerns.     Harmony Casarez RN

## 2023-02-15 NOTE — PATIENT INSTRUCTIONS
Cardiology Providers you saw during your visit:  Dr. Brown     Medication changes:  1- No medication changes    *Sharps container ordered for you        Follow up:  1- Follow up with Dr Brown in 2 weeks       Please call if you have:  1. Weight gain of more than 2 pounds in a day or 5 pounds in a week  2. Increased shortness of breath, swelling or bloating  3. Dizziness, lightheadedness   4. Any questions or concerns.      Heart Failure Support Group  Virtual meetings will continue in 2023 Please reach out if you would like to attend and we can get you the information you need to log in.     2023 dates for Support Group  Monday, March 6th , 1-2pm (Topic: Medication Review)  Monday, April 3rd, 1-2pm  Monday, May 1st, 1-2pm  Monday, June 5th, 1-2pm  Monday, July 3rd, 1-2pm  Monday, August 7th, 1-2pm  Monday, September 11th, 1-2pm  Monday, October 2nd, 1-2pm  Monday, November 6th, 1-2pm  Monday, December 4th, 1-2pm    Follow the American Heart Association Diet and Lifestyle recommendations:  Limit saturated fat, trans fat, sodium, red meat, sweets and sugar-sweetened beverages. If you choose to eat red meat, compare labels and select the leanest cuts available.  Aim for at least 150 minutes of moderate physical activity or 75 minutes of vigorous physical activity - or an equal combination of both - each week.     During business hours: [phone], press option # 1 to schedule an appointment or send a message to your care team     After hours, weekends or holidays: On Call Cardiologist- [phone]   option #4 and ask to speak to the on-call Cardiologist. Inform them you are a CORE/heart failure patient at the [place].     Harmony Casarez RN BSN CHFN  Cardiology Nurse Care Coordinator (Heart Failure / C.O.R.E.)

## 2023-02-15 NOTE — NURSING NOTE
Chief Complaint   Patient presents with     New Patient     Advanced Heart Failure- NEW HF: 32 year old female presents with HFrEF, ef 36% for hospital follow up and to establish outpatient care with labs prior       Vitals were taken and medications reconciled.    Jermain Leyva, EMT  7:53 AM

## 2023-02-15 NOTE — PROGRESS NOTES
Advanced Heart Failure/Transplant Clinic Note    HPI  Dear colleagues,     I had the pleasure of seeing Ms. Anjali Carmen in the Cardiology clinic.  As you know, Ms. Anjali Carmen is a pleasant 32 year old female  with no significant past medical history who presented as a transfer from [place], [place] to [facility] on 2022 for further management of newly diagnosed acute decompensated systolic heart failure (LVEF 20%) and high risk delivery. She initially presented to the [facility] on 2022 with complaints of palpitations, shortness of breath and tiredness since 2022, as well as chest pain radiating to the left arm of one day's duration. EKG at the clinic reportedly showed possible atrial fibrillation (EKG unavailable for review). Therefore, she was transfered from [place] to [facility] on 2022 for further evaluation. TTE on 2022 showed moderate to severe LV dilation (LVIDd 65 mm), severely reduced LV function,  LVEF 20%; mildly dilated RV with normal RV size, mild MR & TR. She was diuresed with IV lasix for pulmonary edema (40 mg BID f/b 80 mg BID for 3 days), initiated on metoprolol tartrate 25 mg BID and heparin gtt for Afib/cardiomyopathy. On telemetry, she was noted to have multiple runs of NSVT. The patient was also given betamethasone on  and 22 for fetal lung maturation. Due to concern for possible decompensation and high risk delivery, she was transferred to [facility] on 2022 for further management.  She was then closely monitored here until her delivery on 2023 when she had her  son by  who is admitted at the [facility]. Postdelivery, she did well and all her GDMT was optimized in a safe manner given that she plans on breastfeeding and her volume status was also optimized prior to discharge. Given her frequent NSVT and PVCs, she was discharged with a LifeVest and  follow-up with our advanced heart failure team and MFM while she remains in the [facility] until her baby is safe to discharge from the hospital.    Since discharge, patient reports she has overall felt well. She did have an episode of palpitations with associated shortness of breath. The episode lasted for 25-30 mins. She went to [place] ED, where an echo showed improving LVEF to 40-45%, but otherwise EKG unremarkable and since she was wearing a LifeVest with no alarms during this incident and everything otherwise looked stable. She had mildly elevated hs-trop to 39 and NT-pro BNP elevated to 1535. She was discharged home.    She reports they have been having difficulty obtaining her lovenox since discharge and only received it yesterday, so when she got home from the ED yesterday, she took her first dose and reports it felt better. She reports there are some days recently that she can walk around for half a day without feeling short of breath, but other days feels short of breath when walking 1 block. She denies presyncope, syncope, chest pain, nausea, vomiting, orthopnea, PND, LE edema, or weakness. She reports still having pain at her  site, but tolerable except when moving quickly from laying to sitting. She reports compliance with all her medications and is wearing her LifeVest at all times except when showering. Her baby is doing well, but remains in the NICU for ongoing management at this time and unclear how much longer he will be hospitalized for.     PAST MEDICAL HISTORY:  Patient Active Problem List   Diagnosis     Acute decompensated heart failure (H)   NICM  NSVT. Frequent PVCs  Obesity    FAMILY HISTORY:  1. Father  at 30 years of age secondary to 'enlarged heart'.  Patient believes his cardiomyopathy was infectious in etiology.  No other history of sudden cardiac death or cardiomyopathy in any other members of the family.  2. Grandma: DM.  3.   SOCIAL HISTORY:    Lives at home in  "[place], [place] with her spouse who is a ; and now has 4 children.    Parents in their 60s, functionally independent.  Multiple siblings, good social support network.     No history of alcohol, tobacco, marijuana or recreational drug use.     ALLERGIES:  No Known Allergies    CURRENT MEDICATIONS:  Current Outpatient Medications   Medication Sig Dispense Refill     enalapril (VASOTEC) 2.5 MG tablet Take 1 tablet (2.5 mg) by mouth daily for 90 days 90 tablet 0     enoxaparin ANTICOAGULANT (LOVENOX) 120 MG/0.8ML syringe Inject 0.8 mLs (120 mg) Subcutaneous every 12 hours for 42 days 67.2 mL 0     furosemide (LASIX) 20 MG tablet Take 1 tablet (20 mg) by mouth daily for 90 days 90 tablet 0     metoprolol succinate ER (TOPROL XL) 25 MG 24 hr tablet Take 2 tablets (50 mg) by mouth daily for 90 days 180 tablet 0     spironolactone (ALDACTONE) 25 MG tablet Take 0.5 tablets (12.5 mg) by mouth daily for 90 days 45 tablet 0     acetaminophen (TYLENOL) 325 MG tablet Take 2 tablets (650 mg) by mouth every 6 hours as needed for mild pain 60 tablet 3       ROS:   A complete 12-point ROS was negative except as above.    EXAM:  /68 (BP Location: Right arm, Patient Position: Chair, Cuff Size: Adult Large)   Pulse 75   Ht 1.73 m (5' 8.11\")   Wt 132.5 kg (292 lb 1.6 oz)   SpO2 99%   BMI 44.27 kg/m    General: appears comfortable, alert and interactive, in no acute distress  Head: normocephalic, atraumatic  Eyes: anicteric sclera, EOMI  Mouth: wearing mask per COVID-19 protocol  Neck: supple, no cervical adenopathy  CV: regular rate and rhythm, S1/S2, no murmur, gallop, rub, estimated JVP ~6 cm  Resp: clear to auscultation bilaterally, no rales or wheezing  GI: soft, nontender, nondistended, +BS  Extremities: warm, no peripheral edema, 2+ bilateral radial pulses  Neurological: normal speech and affect, no gross motor deficits  Psych: normal mood and affect  Derm: no rashes or lesions on exposed " surfaces    Weight  Wt Readings from Last 10 Encounters:   02/15/23 132.5 kg (292 lb 1.6 oz)   02/14/23 131.1 kg (289 lb)   01/31/23 135.2 kg (298 lb)       I personally reviewed recent labs and data as below and discussed the results with the patient in clinic today.  Labs:  CBC RESULTS:  Lab Results   Component Value Date    WBC 9.0 02/14/2023    RBC 4.89 02/14/2023    HGB 12.9 02/14/2023    HCT 40.4 02/14/2023    MCV 83 02/14/2023    MCH 26.4 (L) 02/14/2023    MCHC 31.9 02/14/2023    RDW 16.6 (H) 02/14/2023     02/14/2023       CMP RESULTS:  Lab Results   Component Value Date     02/15/2023    POTASSIUM 4.0 02/15/2023    POTASSIUM 4.5 01/23/2023    CHLORIDE 104 02/15/2023    CO2 25 02/15/2023    ANIONGAP 10 02/15/2023     (H) 02/15/2023     (H) 01/27/2023    BUN 17.4 02/15/2023    CR 0.87 02/15/2023    GFRESTIMATED >90 02/15/2023    AYDEN 9.7 02/15/2023    BILITOTAL 0.3 02/14/2023    ALBUMIN 4.1 02/14/2023    ALKPHOS 112 (H) 02/14/2023    ALT 28 02/14/2023    AST 50 (H) 02/14/2023      Testing/Procedures:  I personally visualized and interpreted:  Echocardiogram 12/6/22  Interpretation Summary  Left ventricular function is decreased. The ejection fraction is 20-25%  (severely reduced). Severe diffuse hypokinesis is present. There is wall  thinning and akinesis of the basal-mid inferior, basal-mid inferoseptal and  basal inferolateral segments. There is akinesis of the apical septum. This is  compatible with ischemic cardiomyopathy.  Mild right ventricular dilation is present. Global right ventricular function  is moderately reduced.  Mild tricuspid insufficiency is present.  Pulmonary artery systolic pressure is normal.  IVC diameter and respiratory changes fall into an intermediate range  suggesting an RA pressure of 8 mmHg.  No pericardial effusion is present.    Coronary Angiogram 12/7/22  Conclusion  Normal coronary arteries.    Echo 1/21/23  Interpretation Summary  Ischemic CM. Left  ventricular function is decreased. The ejection fraction is  30-35% (moderately reduced).  Global right ventricular function is mildly reduced.  Mild pulmonary hypertension is present.  IVC diameter >2.1 cm collapsing <50% with sniff suggests a high RA pressure  estimated at 15 mmHg or greater.  No pericardial effusion is present.    EKG 1/26/23 shows sinus rhythm with PVCs, PACs, and LVH    cMRI 2/1/23  Clinical history: 32 year old woman with new onset non ischemic cardiomyopathy during recent pregnancy.  Comparison CMR: none  1. The left ventricle is moderately dilated with moderate diffuse hypokinesis. The left ventricle is  hypertrabeculated. The global systolic function is moderately reduced. The LVEF is 36%.  2. The right ventricle is moderately dilated. The global systolic function is normal. The RVEF is 48%.   3. Both atria are normal in size.  4. There is mild tricuspid, mitral and pulmonic regurgitation. The aortic valve is tricuspid.     5. There is extensive late gadolinium enhancement in the pattern of subendocardial hyperenhancement in the  basal-mid inferior segments with extension into the adjacent inferoseptal and inferolateral segments. There  is transmural hyperenhancement in the mid-distal anterior, basal-distal lateral  segments.  Given the  increased burden of fibrosis in the absence of obstructive coronary disease, genetic cardiomyopathy is a  possibility.    T2 mapping - there is no significant myocardial edema (mean T2 value 48-50 ms).  T1 mapping - Native T1 is 1118 ms and the ECV is mildly elevated at 31%  6. There is no pericardial effusion.  7. There is no intracardiac thrombus.  CONCLUSIONS:  1. Moderately dilated and moderately reduced left ventricular function, LVEF 36%  2. Moderately dilated and mildly reduced right ventricular function, RVEF 48%.  3. Extensive myocardial fibrosis in the pattern of subendocardial and transmural hyperenhancement in the  left ventricle raising the  possibility of genetic cardiomyopathy given the absence of obstructive coronary  disease.     TTE 23  Interpretation Summary  Left ventricular function is decreased. The ejection fraction is 40-45%  (mildly reduced).  Global right ventricular function is normal.  Mild tricuspid insufficiency is present.  Pulmonary artery systolic pressure is normal.  The inferior vena cava was normal in size with preserved respiratory  variability.  No pericardial effusion is present.    ECG 23 shows sinus rhythm with PVCs and PACs, LVH, unchanged compared to recent priors    Outside results of note:  Outside records from [facility] prolonged hospital course were obtained, and relevant results/notes have been incorporated into HPI.    Assessment and Plan:     In summary, 32 year old female  with no significant past medical history who presented as a transfer from [place], [place] to [facility] on 2022 for further management of newly diagnosed acute decompensated systolic heart failure (LVEF 20%) with prolonged hospitalization and now s/p high-risk  on 23 who did well post-op and is here for hospital follow-up. Patient remains local while her child remains admitted to [facility].    Chronic systolic heart failure/HFrEF (EF previously 20-25% now 40-45%) secondary to nonischemic cardiomyopathy  NYHA Symptom Class II/III  Stage C  ACE-I/ARB/ARNi: Continue enalapril 2.5 mg daily  BB: continue metoprolol XL 50 mg daily  Aldosterone antagonist: continue spironolactone 12.5 mg daily  SGLT2i: deferred given no significant data in breastfeeding  SCD prophylaxis: wearing LifeVest, will reassess function in 6 weeks and if LVEF remains >35% will stop and not require an ICD  %BiV pacing: N/A  Fluid status: euvolemic on lasix 20 mg daily  Cardiac Rehab: deferred given recent  and unclear for now how long she will be in the area  Sleep Apnea Evaluation: Not indicated  Remote PA Pressure Monitoring  (CardioMems) Deferred while medical therapy is optimized   - Contraception plan: s/p tubal ligation  - Anticoagulation: currently using lovenox injections BID, will continue until repeat LVEF assessment in 6 weeks, if function remains >40% will stop    Frequent NSVT, PVCs  - Wearing LifeVest currently, but will reassess LVEF in 6 weeks and if LVEF >35% will discontinue vs recommend ICD  - Continue metoprolol as above    Obesity  Body mass index is 44.27 kg/m .   - Discussed importance of heart healthy diet and regular exercise    To Do:  - No change to medications today  - Continue LifeVest for now  - Follow up in 2 weeks here if still in [facility] at that time, otherwise will coordinate follow up in [place], [place]  - Plan to repeat TTE in 6 weeks    The patient states understanding and is agreeable with plan.   Feel free to contact myself regarding questions or concerns. It was a pleasure to see this patient today.    I spent 60 minutes in care of the patient today including obtaining recent medical history, personally reviewing recent cardiac testing and/or lab results, today's examination, discussion of testing results and care recommendations with patient.    Angeli Rogers MD   of Medicine, [facility]  Advanced Heart Failure and Transplant Cardiology     CC

## 2023-02-20 ENCOUNTER — OFFICE VISIT (OUTPATIENT)
Dept: MATERNAL FETAL MEDICINE | Facility: CLINIC | Age: 33
End: 2023-02-20
Attending: OBSTETRICS & GYNECOLOGY
Payer: COMMERCIAL

## 2023-02-20 VITALS
HEART RATE: 69 BPM | BODY MASS INDEX: 44.01 KG/M2 | WEIGHT: 290.4 LBS | SYSTOLIC BLOOD PRESSURE: 98 MMHG | DIASTOLIC BLOOD PRESSURE: 66 MMHG | OXYGEN SATURATION: 96 % | RESPIRATION RATE: 18 BRPM

## 2023-02-20 DIAGNOSIS — Z98.891 S/P CESAREAN SECTION: ICD-10-CM

## 2023-02-20 PROCEDURE — G0463 HOSPITAL OUTPT CLINIC VISIT: HCPCS | Performed by: ADVANCED PRACTICE MIDWIFE

## 2023-02-20 RX ORDER — PRENATAL VIT/IRON FUM/FOLIC AC 27MG-0.8MG
1 TABLET ORAL DAILY
Qty: 90 TABLET | Refills: 3 | Status: SHIPPED | OUTPATIENT
Start: 2023-02-20

## 2023-02-20 ASSESSMENT — EDINBURGH POSTNATAL DEPRESSION SCALE (EPDS)
I HAVE BEEN ABLE TO LAUGH AND SEE THE FUNNY SIDE OF THINGS: AS MUCH AS I ALWAYS COULD
I HAVE FELT SCARED OR PANICKY FOR NO GOOD REASON: NO, NOT AT ALL
I HAVE LOOKED FORWARD WITH ENJOYMENT TO THINGS: AS MUCH AS I EVER DID
TOTAL SCORE: 0
THE THOUGHT OF HARMING MYSELF HAS OCCURRED TO ME: NEVER
I HAVE BLAMED MYSELF UNNECESSARILY WHEN THINGS WENT WRONG: NO, NEVER
I HAVE BEEN ANXIOUS OR WORRIED FOR NO GOOD REASON: NO, NOT AT ALL
I HAVE BEEN SO UNHAPPY THAT I HAVE BEEN CRYING: NO, NEVER
I HAVE FELT SAD OR MISERABLE: NO, NOT AT ALL
I HAVE BEEN SO UNHAPPY THAT I HAVE HAD DIFFICULTY SLEEPING: NOT AT ALL
THINGS HAVE BEEN GETTING ON TOP OF ME: NO, I HAVE BEEN COPING AS WELL AS EVER

## 2023-02-20 ASSESSMENT — PAIN SCALES - GENERAL: PAINLEVEL: NO PAIN (0)

## 2023-02-20 NOTE — NURSING NOTE
Anjali seen in clinic today for postpartum visit following  delivery with BTL at 31w0d gestation for pregnancy complicated by heart failure, ventricular tachycardia, and GDMA1 (see report/notes). Partner Mukund present and is supportive. VSS. Pt reports no pain and is not taking pain medications at this time. Incision appears to be healing normally. Bleeding has tapered appropriately. She is pumping and states it is going okay but it's hard work. She and Mukudn have been staying at the [facility] and visiting baby in the NICU often. Baby Maged is doing pretty well but is still requiring a low amount of oxygen. Had a cardiology follow up appointment last week. Reports mood overall has been good, EPDS done today - see flow sheets. Ketty Sheth CNM met with pt and discussed POC. Plan for follow up PPV in 2-4 weeks if still in the area, otherwise encouraged to follow up with provider in [place] after getting home. Pt discharged stable and ambulatory.

## 2023-02-20 NOTE — PROGRESS NOTES
Midwife Postpartum 6 Week Visit    Anjali Carmen is a 32 year old here for a postpartum checkup.     Delivery date was 1/23/23 at 31w0d. Her pregnancy was complicated by acute on chronic heart failure with reduced ejection fraction, non-ischemic cardiomyopathy, and GDMA1. She had a repeat c/s of a viable baby boy, indicated for worsening cardiac status. Since delivery she is feeling much improved. She has been seen by cardiology 5 days ago with improved EF. She is continuing to wear a life vest for an additional 5-6 weeks. She is taking Lovenox BID and will do so until her next assessment with cardiology. She does have some bruising and keloid tissue noted at repeat injection sites on her arms.     Since delivery, she has been breast feeding and supplementing with formula She has not had any signs of infection, her lochia is minimal and her post-operative pain is well-controlled without use of medications. She had a PPTL with delivery.    She is voiding and having bowel movements without difficulty.       Mood is Stable  Patient screened for postpartum depression. EPDS score was: 0      ROS:  CONSTITUTIONAL: NEGATIVE for fever, chills, change in weight  INTEGUMENTARU/SKIN: NEGATIVE for worrisome rashes, moles or lesions  EYES: NEGATIVE for vision changes or irritation  ENT: NEGATIVE for ear, mouth and throat problems  RESP: NEGATIVE for significant cough or SOB  BREAST: NEGATIVE for masses, tenderness or discharge  CV: NEGATIVE for chest pain, palpitations or peripheral edema  GI: NEGATIVE for nausea, abdominal pain, heartburn, or change in bowel habits  : NEGATIVE for unusual urinary or vaginal symptoms.  MUSCULOSKELETAL: NEGATIVE for significant arthralgias or myalgia  NEURO: NEGATIVE for weakness, dizziness or paresthesias  PSYCHIATRIC: NEGATIVE for changes in mood or affect      Current Outpatient Medications:      enalapril (VASOTEC) 2.5 MG tablet, Take 1 tablet (2.5 mg) by mouth daily for 90 days, Disp:  90 tablet, Rfl: 0     enoxaparin ANTICOAGULANT (LOVENOX) 120 MG/0.8ML syringe, Inject 0.8 mLs (120 mg) Subcutaneous every 12 hours for 42 days, Disp: 67.2 mL, Rfl: 0     furosemide (LASIX) 20 MG tablet, Take 1 tablet (20 mg) by mouth daily for 90 days, Disp: 90 tablet, Rfl: 0     metoprolol succinate ER (TOPROL XL) 25 MG 24 hr tablet, Take 2 tablets (50 mg) by mouth daily for 90 days, Disp: 180 tablet, Rfl: 0     spironolactone (ALDACTONE) 25 MG tablet, Take 0.5 tablets (12.5 mg) by mouth daily for 90 days, Disp: 45 tablet, Rfl: 0     acetaminophen (TYLENOL) 325 MG tablet, Take 2 tablets (650 mg) by mouth every 6 hours as needed for mild pain (Patient not taking: Reported on 2023), Disp: 60 tablet, Rfl: 3.   OB History    Para Term  AB Living   4 4 3 1 0 4   SAB IAB Ectopic Multiple Live Births   0 0 0 0 4      # Outcome Date GA Lbr Henrique/2nd Weight Sex Delivery Anes PTL Lv   4  23 31w0d  1.86 kg (4 lb 1.6 oz) M CS-LTranv EPI  ANIA      Name: RASTA ROYAL-KELLEY      Apgar1: 7  Apgar5: 9   3 Term      CS-LTranv   ANIA   2 Term      Vag-Spont   ANIA   1 Term      Vag-Spont   ANIA         EXAM:  BP 98/66 (BP Location: Right arm, Patient Position: Sitting, Cuff Size: Adult Large)   Pulse 69   Resp 18   Wt 131.7 kg (290 lb 6.4 oz)   SpO2 96%   BMI 44.01 kg/m    BMI: Body mass index is 44.01 kg/m .  Exam:  Constitutional: healthy, alert and no distress  Head: Normocephalic. No masses, lesions, tenderness or abnormalities  Neck: Neck supple. No adenopathy. Thyroid symmetric, normal size,  ENT: ENT exam normal, no neck nodes or sinus tenderness  Cardiovascular: negative, PMI normal. No lifts, heaves, or thrills. RRR. No murmurs, clicks gallops or rub  Respiratory: negative, Percussion normal. Good diaphragmatic excursion. Lungs clear  Breasts: self exam is taught and encouraged. Deferred as patient is lactating  Gastrointestinal: Abdomen soft, non-tender. BS normal. No masses,  organomegaly. Surgical incision well-healed.  Musculoskeletal: extremities normal- no gross deformities noted, gait normal and normal muscle tone  Skin: no suspicious lesions or rashes  Neurologic: Gait normal. Reflexes normal and symmetric. Sensation grossly WNL.  Psychiatric: mentation appears normal and affect normal/bright        ASSESSMENT:   Normal postpartum exam after repeat c/s.    ICD-10-CM    1. S/P  section  Z98.891 [facility] Office Visit      2. Acute decompensated heart failure (H)  I50.9 [facility] Office Visit            PLAN:  Discussed routine 6-8 week postpartum visit can occur here in our office if patient's baby is still admitted to the NICU.  Reviewed typical postpartum lochia timeline.   Patient reports she had an updated pap at the beginning of her pregnancy back home in [place], thus it was not collected today.  Prenatal vitamin prescription renewed.  Discussed additional injections sites for Lovenox and massage of scar tissue.   Has cardiology follow up on 3/7 if still in the [facility]. Otherwise cards will help coordinate follow up in [place].  Recommend 2 hour GTT at 6-8 weeks pp to ensure resolution of diabetes.     20 minutes spent on the date of the encounter, doing chart review, history and exam, documentation and further activities as noted.      Ketty Sheth CNM on 2023 at 4:43 PM

## 2023-02-24 ENCOUNTER — DOCUMENTATION ONLY (OUTPATIENT)
Dept: CARE COORDINATION | Facility: CLINIC | Age: 33
End: 2023-02-24
Payer: COMMERCIAL

## 2023-02-24 NOTE — PROGRESS NOTES
EPDS was completed in the NICU today as part of our routine assessment at child's 1 month check in. Depression score was 1 and anxiety score was 1 with negative screen for suicidal ideation.     Copy of EPDS was given to Anjali today as well as educational material about  mood and anxiety disorders.     Recommendation:   Follow up at next routine PMAD screening interval..    Anjali agreed to this recommendation at this time.

## 2023-03-01 DIAGNOSIS — I50.9 ACUTE DECOMPENSATED HEART FAILURE (H): Primary | ICD-10-CM

## 2023-03-07 ENCOUNTER — TELEPHONE (OUTPATIENT)
Dept: CARDIOLOGY | Facility: CLINIC | Age: 33
End: 2023-03-07
Payer: COMMERCIAL

## 2023-03-07 DIAGNOSIS — I50.9 ACUTE DECOMPENSATED HEART FAILURE (H): Primary | ICD-10-CM

## 2023-03-07 NOTE — TELEPHONE ENCOUNTER
Returned call. Discussed that Anjali has moved home now. Looking to get her established with [facility].    We were planning for echocardiogram in early April to reassess heart function before determining if able to remove life vest. She will establish with Dr Loly Trevino at [facility] as well.       Date: 3/7/2023    Time of Call: 12:58 PM     Diagnosis:  Heart failure     [ VORB ] Ordering provider: Angeli Rogers MD    Order: echocardiogram     Order received by: Harmony Casarez RN      Follow-up/additional notes: order placed. Will fax to [facility] at [phone].     *will also send recent clinic notes to Dr Trevino as well at [phone]

## 2023-03-07 NOTE — TELEPHONE ENCOUNTER
[facility] Call Center    Phone Message    May a detailed message be left on voicemail: yes     Reason for Call: Other: Suma from [facility] was reaching out because they are trying to help the patient with aftercare from her hospital visit. She explained the patient moved back to [place] so they want her to possibly be seen at [facility] instead of [facility] but [facility] needs an order for a echocardiogram from  if they are wanting one. She explained the patient has a life vest on since the hospital stay and is working on getting it removed. Please call her back at [phone] to discuss, thank you!     Action Taken: Message routed to:  Other: Cardiology    Travel Screening: Not Applicable

## 2023-09-02 PROBLEM — I42.8 NICM (NONISCHEMIC CARDIOMYOPATHY) (H): Status: ACTIVE | Noted: 2023-07-06

## 2023-09-02 PROBLEM — R06.02 SHORTNESS OF BREATH: Status: ACTIVE | Noted: 2022-11-30

## 2023-09-02 PROBLEM — I50.22 CHRONIC SYSTOLIC CHF (CONGESTIVE HEART FAILURE) (H): Status: ACTIVE | Noted: 2023-07-06

## 2023-09-02 PROBLEM — I50.9 ACUTE EXACERBATION OF CHF (CONGESTIVE HEART FAILURE) (H): Status: ACTIVE | Noted: 2023-08-08

## 2023-09-05 NOTE — PROGRESS NOTES
Advanced Heart Failure/Transplant Clinic Note    CC:   Dr. Loly Rogers     Dear colleagues,     I the pleasure of seeing Anjali Carmen in the [facility] advanced heart failure clinic in [place] on 2023.    Her cardiac history is as follows:     Anjali was first diagnosed with decompensated systolic heart failure during the third trimester of her fourth pregnancy in .  She was transferred given decompensation in the high risk nature of her pregnancy to the [facility].     Given frequent nonsustained VT and ongoing decompensation she underwent  section at 34 weeks of gestation.  Her to the NICU and she was kept in the hospital and uptitrated on goal-directed medical therapy.  She had a tubal ligation at the time of her .     She has been following up in [place] since that time and her medical therapy has been further up titrated.     Unfortunately she has been admitted for decompensated systolic heart failure several times since discharge from her delivery (approximately 1 every 1-1/2 months).     Prior to admission she feels worsening shortness of breath and eventually PND orthopnea and then acute onset of shortness of breath.  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  She does report that prior to these admissions her diet was not the best and she has gained some weight.     She has been taking all medications as prescribed and notes her medications very well today.     She presently denies PND orthopnea she denies lower extremity edema.  She denies palpitations she is still wearing her LifeVest.  She has not had any episodes of dizziness or syncope.  She did not tolerate an SGL 2 inhibitor due to a feeling in her throat.     She has a repeat echocardiogram scheduled for later this week and a visit with her primary  cardiologist in [place] Dr. Loly Trevino.     In the [facility] her primary cardiologist is Dr. Angeli Rogers-I am filling in based on the fact that I see patients in [place] and it is hard for Anjali to make it to the [facility].       PAST MEDICAL HISTORY:  NICM  NSVT. Frequent PVCs  Obesity      FAMILY HISTORY:  Father  at 30 years of age secondary to 'enlarged heart'.  Patient believes his cardiomyopathy was infectious in etiology.  No other history of sudden cardiac death or cardiomyopathy in any other members of the family.  Grandma: DM.      SOCIAL HISTORY:  Lives at home in [place], [place] with her spouse who is a ; and now has 4 children.  Parents in their 60s, functionally independent.  Multiple siblings, good social support network.   No history of alcohol, tobacco, marijuana or recreational drug use.     CURRENT MEDICATIONS:  Current Outpatient Medications   Medication Sig Dispense Refill    acetaminophen (TYLENOL) 325 MG tablet Take 2 tablets (650 mg) by mouth every 6 hours as needed for mild pain (Patient not taking: Reported on 2023) 60 tablet 3    enalapril (VASOTEC) 2.5 MG tablet Take 1 tablet (2.5 mg) by mouth daily for 90 days 90 tablet 0    furosemide (LASIX) 20 MG tablet Take 1 tablet (20 mg) by mouth daily for 90 days 90 tablet 0    metoprolol succinate ER (TOPROL XL) 25 MG 24 hr tablet Take 2 tablets (50 mg) by mouth daily for 90 days 180 tablet 0    Prenatal Vit-Fe Fumarate-FA (PRENATAL MULTIVITAMIN W/IRON) 27-0.8 MG tablet Take 1 tablet by mouth daily 90 tablet 3    sacubitril-valsartan (ENTRESTO)  MG per tablet Take 1 tablet by mouth 2 times daily      spironolactone (ALDACTONE) 25 MG tablet Take 0.5 tablets (12.5 mg) by mouth daily for 90 days 45 tablet 0       ROS:   Constitutional: No fever, chills, or sweats. Weight gain since delivery . + fatigue   ENT: No visual disturbance, ear ache, epistaxis, sore throat.   Allergies/Immunologic:  Negative.   Respiratory: No cough, hemoptysis.   Cardiovascular: As per HPI.   GI: No nausea, vomiting, hematemesis, melena, or hematochezia.   : No urinary frequency, dysuria, or hematuria.   Integument: Negative.   Psychiatric: Negative.   Neuro: Negative.   Endocrinology: Negative.   Musculoskeletal: Negative.    EXAM:  Weight 297. 6  /70  HR 68   General: appears comfortable, alert and interactive, in no acute distress, obese, breathing is comfortable   Head: normocephalic, atraumatic  Eyes: anicteric sclera, EOMI  Mouth: wearing mask per COVID-19 protocol  Neck: supple, no cervical adenopathy  CV: regular rate and rhythm, S1/S2, no murmur, gallop, rub, estimated JVP 10 cm  Resp: clear to auscultation bilaterally, no rales or wheezing  GI: soft, nontender, nondistended, +BS  Extremities: warm, no peripheral edema, 2+ bilateral radial pulses  Neurological: normal speech and affect, no gross motor deficits  Psych: normal mood and affect  Derm: no rashes or lesions on exposed surfaces      I personally reviewed recent labs and data as below and discussed the results with the patient in clinic today.      Labs:    BNP: 149            Ref Range & Units 2 wk ago    Glucose 70 - 100 95   BUN 7 - 18 13   Creatinine 0.55 - 1.02 0.87   BUN/Creatinine Ratio     Sodium 136 - 145 136   Potassium 3.6 - 5.0 4.0 Abnormal    Chloride 98 - 107 102   CO2 21 - 32 27   Anion Gap     Calcium 8.4 - 10.3 9.3   EGFRCR(AS) >60 >60   eGFR Non-     eGFR          Testing/Procedures:    Echo 8/2023      Left Ventricle: The left ventricle is moderately dilated. There is mild   concentric left ventricular hypertrophy. Severely reduced left ventricular   systolic function. The EF is visually estimated to be 25-30%.     Right Ventricle: The right ventricle is mildly dilated.     Mitral Valve: There is mild regurgitation.     Tricuspid Valve: There is mild regurgitation.       Coronary Angiogram  12/7/22  Conclusion  Normal coronary arteries.      EKG 1/26/23 shows sinus rhythm with PVCs, PACs, and LVH    cMRI 2/1/23    CONCLUSIONS:  1. Moderately dilated and moderately reduced left ventricular function, LVEF 36%  2. Moderately dilated and mildly reduced right ventricular function, RVEF 48%.  3. Extensive myocardial fibrosis in the pattern of subendocardial and transmural hyperenhancement in the  left ventricle raising the possibility of genetic cardiomyopathy given the absence of obstructive coronary  disease.       Echo 8/9/2023    Left Ventricle: The left ventricle is moderately dilated. There is mild   concentric left ventricular hypertrophy. Severely reduced left ventricular   systolic function. The EF is visually estimated to be 25-30%.     Right Ventricle: The right ventricle is mildly dilated.     Mitral Valve: There is mild regurgitation.     Tricuspid Valve: There is mild regurgitation.     ECG 8/2023     Sinus rhythm  Ventricular premature complex  LVH with secondary repolarization abnormality  Inferior infarct, old       Assessment and Plan:   In summary, 33 year old female with nonischemic cardiomyopathy who developed decompensated heart failure in her third trimester of her fourth pregnancy.  Based on the size of her left ventricle our suspicion is that the heart failure predated the pregnancy and that this was not peripartum.  She also has a strong family history suggesting this may be genetic.     She still needs genetic testing- will refer for this today again as this will matter potentially implanting sudden cardiac death prevention strategy and also in identifying other family members at risk.       By problem:   Nonischemic cardiomyopathy, NYHA class II-III, stage C  Mildly hypervolemic on exam today  GDMT: We will continue Entresto, Aldactone and metoprolol at current doses-she did not tolerate SGL 2 inhibition.   Increasing Lasix to 40 in the morning and 20 in the  afternoon      Frequent decompensations: Would like to consider her for cardiomems, she is going to work on diet and now is no longer working all night long which I think should help      Sudden cardiac death prevention: She is wearing a LifeVest, having a repeat echo this week if ejection fraction is below 35% I would favor just getting a defibrillator at this point- she has a high PVC burden and I suspect this is genetic.       Advanced heart failure therapy planning: I am reassured that she feels as well as she does between decompensations, she also just changed her schedule so that she can focus more on her health and is going to be limiting salty foods.  She also has intact endorgan function.  I do think we have some time performed needing to move forward.  In the meantime this is a good time for her to work on her weight (see below)      Contraception plan: s/p tubal ligation    Peripartum hypercoagulability: She is status post Lovenox for 6 weeks    Obesity-we will refer to weight management here as getting her BMI closer to 38 will be important if she should ever need heart transplantation in the future    Return to clinic in 6 months-would like to get a CPX at that time to get a baseline    If in when she needs to come to the [facility] for advanced heart failure planning would have her follow-up with Dr. Angeli Rogers.     Overall summary of plan:   Genetics testing referral at the [facility]  Weight management clinic  Fit for cardio mems on Friday (See if chest circumference within goal for optimal device performance)   Consider ICD if ejection fraction below 35% this week   Increasing diuretics to Lasix 40 in the morning 20 in the afternoon      I will see her back in 6 months here, sooner if needed     Monika Jones MD                CC

## 2023-09-06 ENCOUNTER — OFFICE VISIT (OUTPATIENT)
Dept: CARDIOLOGY | Facility: OTHER | Age: 33
End: 2023-09-06
Payer: MEDICAID

## 2023-09-06 DIAGNOSIS — I50.22 CHRONIC SYSTOLIC HEART FAILURE (H): Primary | ICD-10-CM

## 2023-09-06 PROCEDURE — 99214 OFFICE O/P EST MOD 30 MIN: CPT | Performed by: INTERNAL MEDICINE

## 2023-09-06 NOTE — LETTER
2023      RE: Anjali Carmen  [phone]  [place] [address]       Dear Colleague,    Thank you for the opportunity to participate in the care of your patient, Anjali Carmen, at the [facility]. Please see a copy of my visit note below.    Advanced Heart Failure/Transplant Clinic Note    CC:   Dr. Loly Rogers     Dear colleagues,     I the pleasure of seeing Anjali Carmen in the [facility] advanced heart failure clinic in [place] on 2023.    Her cardiac history is as follows:     Anjali was first diagnosed with decompensated systolic heart failure during the third trimester of her fourth pregnancy in .  She was transferred given decompensation in the high risk nature of her pregnancy to the [facility].     Given frequent nonsustained VT and ongoing decompensation she underwent  section at 34 weeks of gestation.  Her to the NICU and she was kept in the hospital and uptitrated on goal-directed medical therapy.  She had a tubal ligation at the time of her .     She has been following up in [place] since that time and her medical therapy has been further up titrated.     Unfortunately she has been admitted for decompensated systolic heart failure several times since discharge from her delivery (approximately 1 every 1-1/2 months).     Prior to admission she feels worsening shortness of breath and eventually PND orthopnea and then acute onset of shortness of breath.  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  She does report that prior to these admissions her diet was not the best and she has gained some weight.     She has been taking all medications as prescribed and notes her medications very well today.     She  presently denies PND orthopnea she denies lower extremity edema.  She denies palpitations she is still wearing her LifeVest.  She has not had any episodes of dizziness or syncope.  She did not tolerate an SGL 2 inhibitor due to a feeling in her throat.     She has a repeat echocardiogram scheduled for later this week and a visit with her primary cardiologist in [place] Dr. Loly Trevino.     In the [facility] her primary cardiologist is Dr. Angeli Rogers-I am filling in based on the fact that I see patients in [place] and it is hard for Anjali to make it to the [facility].       PAST MEDICAL HISTORY:  NICM  NSVT. Frequent PVCs  Obesity      FAMILY HISTORY:  Father  at 30 years of age secondary to 'enlarged heart'.  Patient believes his cardiomyopathy was infectious in etiology.  No other history of sudden cardiac death or cardiomyopathy in any other members of the family.  Grandma: DM.      SOCIAL HISTORY:  Lives at home in [place], [place] with her spouse who is a ; and now has 4 children.  Parents in their 60s, functionally independent.  Multiple siblings, good social support network.   No history of alcohol, tobacco, marijuana or recreational drug use.     CURRENT MEDICATIONS:  Current Outpatient Medications   Medication Sig Dispense Refill    acetaminophen (TYLENOL) 325 MG tablet Take 2 tablets (650 mg) by mouth every 6 hours as needed for mild pain (Patient not taking: Reported on 2023) 60 tablet 3    enalapril (VASOTEC) 2.5 MG tablet Take 1 tablet (2.5 mg) by mouth daily for 90 days 90 tablet 0    furosemide (LASIX) 20 MG tablet Take 1 tablet (20 mg) by mouth daily for 90 days 90 tablet 0    metoprolol succinate ER (TOPROL XL) 25 MG 24 hr tablet Take 2 tablets (50 mg) by mouth daily for 90 days 180 tablet 0    Prenatal Vit-Fe Fumarate-FA (PRENATAL MULTIVITAMIN W/IRON) 27-0.8 MG tablet Take 1 tablet by mouth daily 90 tablet 3    sacubitril-valsartan (ENTRESTO)  MG per  tablet Take 1 tablet by mouth 2 times daily      spironolactone (ALDACTONE) 25 MG tablet Take 0.5 tablets (12.5 mg) by mouth daily for 90 days 45 tablet 0       ROS:   Constitutional: No fever, chills, or sweats. Weight gain since delivery . + fatigue   ENT: No visual disturbance, ear ache, epistaxis, sore throat.   Allergies/Immunologic: Negative.   Respiratory: No cough, hemoptysis.   Cardiovascular: As per HPI.   GI: No nausea, vomiting, hematemesis, melena, or hematochezia.   : No urinary frequency, dysuria, or hematuria.   Integument: Negative.   Psychiatric: Negative.   Neuro: Negative.   Endocrinology: Negative.   Musculoskeletal: Negative.    EXAM:  Weight 297. 6  /70  HR 68   General: appears comfortable, alert and interactive, in no acute distress, obese, breathing is comfortable   Head: normocephalic, atraumatic  Eyes: anicteric sclera, EOMI  Mouth: wearing mask per COVID-19 protocol  Neck: supple, no cervical adenopathy  CV: regular rate and rhythm, S1/S2, no murmur, gallop, rub, estimated JVP 10 cm  Resp: clear to auscultation bilaterally, no rales or wheezing  GI: soft, nontender, nondistended, +BS  Extremities: warm, no peripheral edema, 2+ bilateral radial pulses  Neurological: normal speech and affect, no gross motor deficits  Psych: normal mood and affect  Derm: no rashes or lesions on exposed surfaces      I personally reviewed recent labs and data as below and discussed the results with the patient in clinic today.      Labs:    BNP: 149            Ref Range & Units 2 wk ago    Glucose 70 - 100 95   BUN 7 - 18 13   Creatinine 0.55 - 1.02 0.87   BUN/Creatinine Ratio     Sodium 136 - 145 136   Potassium 3.6 - 5.0 4.0 Abnormal    Chloride 98 - 107 102   CO2 21 - 32 27   Anion Gap     Calcium 8.4 - 10.3 9.3   EGFRCR(AS) >60 >60   eGFR Non-     eGFR          Testing/Procedures:    Echo 8/2023      Left Ventricle: The left ventricle is moderately dilated. There  is mild   concentric left ventricular hypertrophy. Severely reduced left ventricular   systolic function. The EF is visually estimated to be 25-30%.     Right Ventricle: The right ventricle is mildly dilated.     Mitral Valve: There is mild regurgitation.     Tricuspid Valve: There is mild regurgitation.       Coronary Angiogram 12/7/22  Conclusion  Normal coronary arteries.      EKG 1/26/23 shows sinus rhythm with PVCs, PACs, and LVH    cMRI 2/1/23    CONCLUSIONS:  1. Moderately dilated and moderately reduced left ventricular function, LVEF 36%  2. Moderately dilated and mildly reduced right ventricular function, RVEF 48%.  3. Extensive myocardial fibrosis in the pattern of subendocardial and transmural hyperenhancement in the  left ventricle raising the possibility of genetic cardiomyopathy given the absence of obstructive coronary  disease.       Echo 8/9/2023    Left Ventricle: The left ventricle is moderately dilated. There is mild   concentric left ventricular hypertrophy. Severely reduced left ventricular   systolic function. The EF is visually estimated to be 25-30%.     Right Ventricle: The right ventricle is mildly dilated.     Mitral Valve: There is mild regurgitation.     Tricuspid Valve: There is mild regurgitation.     ECG 8/2023     Sinus rhythm  Ventricular premature complex  LVH with secondary repolarization abnormality  Inferior infarct, old       Assessment and Plan:   In summary, 33 year old female with nonischemic cardiomyopathy who developed decompensated heart failure in her third trimester of her fourth pregnancy.  Based on the size of her left ventricle our suspicion is that the heart failure predated the pregnancy and that this was not peripartum.  She also has a strong family history suggesting this may be genetic.     She still needs genetic testing- will refer for this today again as this will matter potentially implanting sudden cardiac death prevention strategy and also in identifying  other family members at risk.       By problem:   Nonischemic cardiomyopathy, NYHA class II-III, stage C  Mildly hypervolemic on exam today  GDMT: We will continue Entresto, Aldactone and metoprolol at current doses-she did not tolerate SGL 2 inhibition.   Increasing Lasix to 40 in the morning and 20 in the afternoon      Frequent decompensations: Would like to consider her for cardiomems, she is going to work on diet and now is no longer working all night long which I think should help      Sudden cardiac death prevention: She is wearing a LifeVest, having a repeat echo this week if ejection fraction is below 35% I would favor just getting a defibrillator at this point- she has a high PVC burden and I suspect this is genetic.       Advanced heart failure therapy planning: I am reassured that she feels as well as she does between decompensations, she also just changed her schedule so that she can focus more on her health and is going to be limiting salty foods.  She also has intact endorgan function.  I do think we have some time performed needing to move forward.  In the meantime this is a good time for her to work on her weight (see below)      Contraception plan: s/p tubal ligation    Peripartum hypercoagulability: She is status post Lovenox for 6 weeks    Obesity-we will refer to weight management here as getting her BMI closer to 38 will be important if she should ever need heart transplantation in the future    Return to clinic in 6 months-would like to get a CPX at that time to get a baseline    If in when she needs to come to the [facility] for advanced heart failure planning would have her follow-up with Dr. Angeli Rogers.     Overall summary of plan:   Genetics testing referral at the [facility]  Weight management clinic  Fit for cardio mems on Friday (See if chest circumference within goal for optimal device performance)   Consider ICD if ejection fraction below 35% this week   Increasing  diuretics to Lasix 40 in the morning 20 in the afternoon      I will see her back in 6 months here, sooner if needed     Monika Jones MD

## 2023-09-06 NOTE — NURSING NOTE
Chief Complaint   Patient presents with    Follow Up       Rooming, including medication review and vitals, done by Alirio OG and can be viewed in Care Everywhere.     Yuri Teague [facility]  Advanced Heart Failure   Heart Failure & CORE  Referral     [facility]  Cardiology  Office: [phone] [email or web address]

## 2023-09-08 NOTE — NURSING NOTE
Updated Anjali that a genetics referral had been placed and to know to watch for that phone call.

## 2024-03-08 RX ORDER — ASPIRIN 81 MG/1
81 TABLET ORAL DAILY
COMMUNITY
Start: 2023-10-03

## 2024-03-10 ENCOUNTER — HEALTH MAINTENANCE LETTER (OUTPATIENT)
Age: 34
End: 2024-03-10

## 2025-01-31 NOTE — TELEPHONE ENCOUNTER
2/2/2023    Attempted to call patient to discuss recommendation for continuation of therapeutic anticoagulation with Lovenox for 6 weeks postpartum. There was no answer and was unable to leave a voicemail. Will try to call patient again.    Johnnie Gacria MD  Maternal-Fetal Medicine Fellow     31-Jan-2025 03:33

## 2025-02-09 ENCOUNTER — APPOINTMENT (OUTPATIENT)
Dept: CT IMAGING | Facility: HOSPITAL | Age: 35
DRG: 292 | End: 2025-02-09

## 2025-02-09 ENCOUNTER — APPOINTMENT (OUTPATIENT)
Dept: RADIOLOGY | Facility: HOSPITAL | Age: 35
DRG: 292 | End: 2025-02-09

## 2025-02-09 ENCOUNTER — HOSPITAL ENCOUNTER (INPATIENT)
Facility: HOSPITAL | Age: 35
LOS: 1 days | Discharge: HOME | DRG: 292 | End: 2025-02-11
Attending: EMERGENCY MEDICINE | Admitting: FAMILY MEDICINE

## 2025-02-09 DIAGNOSIS — I21.4 NSTEMI (NON-ST ELEVATED MYOCARDIAL INFARCTION) (CMS/HCC): Primary | ICD-10-CM

## 2025-02-09 DIAGNOSIS — I50.9 CHF (CONGESTIVE HEART FAILURE) (CMS/HCC): ICD-10-CM

## 2025-02-09 PROBLEM — R07.9 CHEST PAIN: Status: ACTIVE | Noted: 2025-02-09

## 2025-02-09 LAB
ALBUMIN SERPL-MCNC: 4 G/DL (ref 3.5–5.3)
ALP SERPL-CCNC: 72 U/L (ref 37–98)
ALT SERPL-CCNC: 21 U/L (ref 7–52)
ANION GAP SERPL CALC-SCNC: 9 MMOL/L (ref 3–11)
APTT PPP: 35.3 SECONDS (ref 25.1–36.5)
AST SERPL-CCNC: 36 U/L
B PARAP IS1001 DNA NPH QL NAA+NON-PROBE: NEGATIVE
B PERT.PT PRMT NPH QL NAA+NON-PROBE: NEGATIVE
BACTERIA #/AREA URNS HPF: NORMAL /HPF
BASOPHILS # BLD AUTO: 0.1 10*3/UL
BASOPHILS NFR BLD AUTO: 1.1 % (ref 0–2)
BILIRUB SERPL-MCNC: 0.44 MG/DL (ref 0.2–1.4)
BILIRUB UR QL: NEGATIVE
BNP SERPL-MCNC: 242 PG/ML (ref 0–100)
BUN SERPL-MCNC: 12 MG/DL (ref 7–25)
C PNEUM DNA NPH QL NAA+NON-PROBE: NEGATIVE
CALCIUM ALBUM COR SERPL-MCNC: 9.1 MG/DL (ref 8.6–10.3)
CALCIUM SERPL-MCNC: 9.1 MG/DL (ref 8.6–10.3)
CHLORIDE SERPL-SCNC: 108 MMOL/L (ref 98–107)
CHOLEST SERPL-MCNC: 55 MG/DL (ref 0–199)
CLARITY UR: CLEAR
CO2 SERPL-SCNC: 21 MMOL/L (ref 21–32)
COLOR UR: NORMAL
CREAT SERPL-MCNC: 0.75 MG/DL (ref 0.6–1.1)
DELTA HIGH SENSITIVITY TROPONIN I, LATE DRAW: 2.9
EGFRCR SERPLBLD CKD-EPI 2021: 108 ML/MIN/1.73M*2
EOSINOPHIL # BLD AUTO: 0.1 10*3/UL
EOSINOPHIL NFR BLD AUTO: 1.2 % (ref 0–3)
ERYTHROCYTE [DISTWIDTH] IN BLOOD BY AUTOMATED COUNT: 15.5 % (ref 11.5–14)
ERYTHROCYTE [DISTWIDTH] IN BLOOD BY AUTOMATED COUNT: 15.6 % (ref 11.5–14)
EST. AVERAGE GLUCOSE BLD GHB EST-MCNC: 108.3 MG/DL
FLUAV RNA NPH QL NAA+NON-PROBE: NEGATIVE
FLUBV RNA NPH QL NAA+NON-PROBE: NEGATIVE
GLUCOSE SERPL-MCNC: 105 MG/DL (ref 70–105)
GLUCOSE UR QL: NEGATIVE MG/DL
HADV DNA NPH QL NAA+NON-PROBE: NEGATIVE
HBA1C MFR BLD: 5.4 % (ref 4–6)
HCG SERPL-ACNC: <1 MIU/ML
HCOV 229E RNA NPH QL NAA+NON-PROBE: NEGATIVE
HCOV HKU1 RNA NPH QL NAA+NON-PROBE: NEGATIVE
HCOV NL63 RNA NPH QL NAA+NON-PROBE: NEGATIVE
HCOV OC43 RNA NPH QL NAA+NON-PROBE: NEGATIVE
HCT VFR BLD AUTO: 36 % (ref 34–45)
HCT VFR BLD AUTO: 37.5 % (ref 34–45)
HDLC SERPL-MCNC: 23 MG/DL
HGB BLD-MCNC: 12.5 G/DL (ref 11.5–15.5)
HGB BLD-MCNC: 13 G/DL (ref 11.5–15.5)
HGB UR QL: NEGATIVE
HMPV RNA NPH QL NAA+NON-PROBE: NEGATIVE
HPIV1 RNA NPH QL NAA+NON-PROBE: NEGATIVE
HPIV2 RNA NPH QL NAA+NON-PROBE: NEGATIVE
HPIV3 RNA NPH QL NAA+NON-PROBE: NEGATIVE
HPIV4 RNA NPH QL NAA+NON-PROBE: NEGATIVE
INR BLD: 1.1
INR BLD: 1.2
KETONES UR-MCNC: NEGATIVE MG/DL
LDLC SERPL CALC-MCNC: 23 MG/DL (ref 20–99)
LEUKOCYTE ESTERASE UR QL STRIP: NEGATIVE
LIPASE SERPL-CCNC: 19 U/L (ref 11–82)
LYMPHOCYTES # BLD AUTO: 2.8 10*3/UL
LYMPHOCYTES NFR BLD AUTO: 36.3 % (ref 11–47)
M PNEUMO DNA NPH QL NAA+NON-PROBE: NEGATIVE
MAGNESIUM SERPL-MCNC: 1.7 MG/DL (ref 1.8–2.4)
MCH RBC QN AUTO: 28.9 PG (ref 28–33)
MCH RBC QN AUTO: 29.1 PG (ref 28–33)
MCHC RBC AUTO-ENTMCNC: 34.7 G/DL (ref 32–36)
MCHC RBC AUTO-ENTMCNC: 34.8 G/DL (ref 32–36)
MCV RBC AUTO: 83.3 FL (ref 81–97)
MCV RBC AUTO: 83.9 FL (ref 81–97)
MONOCYTES # BLD AUTO: 0.5 10*3/UL
MONOCYTES NFR BLD AUTO: 7 % (ref 3–11)
NEUTROPHILS # BLD AUTO: 4.2 10*3/UL
NEUTROPHILS NFR BLD AUTO: 54.4 % (ref 41–81)
NITRITE UR QL: NEGATIVE
PH UR: 5.5 PH
PLATELET # BLD AUTO: 230 10*3/UL (ref 140–350)
PLATELET # BLD AUTO: 237 10*3/UL (ref 140–350)
PMV BLD AUTO: 8.5 FL (ref 6.9–10.8)
PMV BLD AUTO: 8.7 FL (ref 6.9–10.8)
POTASSIUM SERPL-SCNC: 3.7 MMOL/L (ref 3.5–5.1)
PROT SERPL-MCNC: 7.4 G/DL (ref 6–8.3)
PROT UR STRIP-MCNC: NEGATIVE MG/DL
PROTHROMBIN TIME: 12.4 SECONDS (ref 9.4–12.5)
PROTHROMBIN TIME: 12.9 SECONDS (ref 9.4–12.5)
RBC # BLD AUTO: 4.29 10*6/UL (ref 3.7–5.3)
RBC # BLD AUTO: 4.5 10*6/UL (ref 3.7–5.3)
RBC #/AREA URNS HPF: NORMAL /HPF
RSV RNA NPH QL NAA+NON-PROBE: NEGATIVE
RV+EV RNA NPH QL NAA+NON-PROBE: NEGATIVE
SARS-COV-2 RNA NPH QL NAA+NON-PROBE: NEGATIVE
SODIUM SERPL-SCNC: 138 MMOL/L (ref 135–145)
SP GR UR: 1.02 (ref 1–1.03)
SQUAMOUS #/AREA URNS HPF: NORMAL /HPF
TRIGL SERPL-MCNC: 43 MG/DL
TROPONIN I SERPL-MCNC: 300.8 PG/ML
TROPONIN I SERPL-MCNC: 303.7 PG/ML
TROPONIN I SERPL-MCNC: 305.2 PG/ML
UFH PPP CHRO-ACNC: 0.16 U/ML (ref 0.3–0.7)
UFH PPP CHRO-ACNC: <0.05 U/ML (ref 0.3–0.7)
UROBILINOGEN UR-MCNC: NORMAL MG/DL
WBC # BLD AUTO: 7.8 10*3/UL (ref 4.5–10.5)
WBC # BLD AUTO: 9.1 10*3/UL (ref 4.5–10.5)
WBC #/AREA URNS HPF: NORMAL /HPF
WBC CLUMPS #/AREA URNS HPF: NORMAL /HPF

## 2025-02-09 PROCEDURE — G0378 HOSPITAL OBSERVATION PER HR: HCPCS

## 2025-02-09 PROCEDURE — 84484 ASSAY OF TROPONIN QUANT: CPT | Performed by: INTERNAL MEDICINE

## 2025-02-09 PROCEDURE — 99222 1ST HOSP IP/OBS MODERATE 55: CPT | Performed by: INTERNAL MEDICINE

## 2025-02-09 PROCEDURE — 71275 CT ANGIOGRAPHY CHEST: CPT

## 2025-02-09 PROCEDURE — 85520 HEPARIN ASSAY: CPT | Performed by: EMERGENCY MEDICINE

## 2025-02-09 PROCEDURE — 99285 EMERGENCY DEPT VISIT HI MDM: CPT | Performed by: EMERGENCY MEDICINE

## 2025-02-09 PROCEDURE — 84484 ASSAY OF TROPONIN QUANT: CPT | Performed by: EMERGENCY MEDICINE

## 2025-02-09 PROCEDURE — 2550000100 HC RX 255: Performed by: INTERNAL MEDICINE

## 2025-02-09 PROCEDURE — 81001 URINALYSIS AUTO W/SCOPE: CPT | Performed by: EMERGENCY MEDICINE

## 2025-02-09 PROCEDURE — 87633 RESP VIRUS 12-25 TARGETS: CPT | Performed by: EMERGENCY MEDICINE

## 2025-02-09 PROCEDURE — 84702 CHORIONIC GONADOTROPIN TEST: CPT | Performed by: INTERNAL MEDICINE

## 2025-02-09 PROCEDURE — 83690 ASSAY OF LIPASE: CPT | Performed by: EMERGENCY MEDICINE

## 2025-02-09 PROCEDURE — 80053 COMPREHEN METABOLIC PANEL: CPT | Performed by: EMERGENCY MEDICINE

## 2025-02-09 PROCEDURE — 2580000300 HC RX 258: Performed by: INTERNAL MEDICINE

## 2025-02-09 PROCEDURE — 93005 ELECTROCARDIOGRAM TRACING: CPT | Performed by: EMERGENCY MEDICINE

## 2025-02-09 PROCEDURE — 80061 LIPID PANEL: CPT | Performed by: INTERNAL MEDICINE

## 2025-02-09 PROCEDURE — 85730 THROMBOPLASTIN TIME PARTIAL: CPT | Performed by: EMERGENCY MEDICINE

## 2025-02-09 PROCEDURE — 85610 PROTHROMBIN TIME: CPT | Performed by: EMERGENCY MEDICINE

## 2025-02-09 PROCEDURE — 83735 ASSAY OF MAGNESIUM: CPT | Performed by: EMERGENCY MEDICINE

## 2025-02-09 PROCEDURE — 36415 COLL VENOUS BLD VENIPUNCTURE: CPT | Performed by: EMERGENCY MEDICINE

## 2025-02-09 PROCEDURE — 6360000200 HC RX 636 W HCPCS (ALT 250 FOR IP): Performed by: EMERGENCY MEDICINE

## 2025-02-09 PROCEDURE — 71045 X-RAY EXAM CHEST 1 VIEW: CPT

## 2025-02-09 PROCEDURE — 83880 ASSAY OF NATRIURETIC PEPTIDE: CPT | Performed by: EMERGENCY MEDICINE

## 2025-02-09 PROCEDURE — 83036 HEMOGLOBIN GLYCOSYLATED A1C: CPT | Performed by: INTERNAL MEDICINE

## 2025-02-09 PROCEDURE — 85027 COMPLETE CBC AUTOMATED: CPT | Performed by: EMERGENCY MEDICINE

## 2025-02-09 PROCEDURE — 6360000200 HC RX 636 W HCPCS (ALT 250 FOR IP): Performed by: INTERNAL MEDICINE

## 2025-02-09 PROCEDURE — 85025 COMPLETE CBC W/AUTO DIFF WBC: CPT | Performed by: EMERGENCY MEDICINE

## 2025-02-09 RX ORDER — METOPROLOL TARTRATE 25 MG/1
6.25 TABLET, FILM COATED ORAL ONCE AS NEEDED
OUTPATIENT
Start: 2025-02-09

## 2025-02-09 RX ORDER — HEPARIN SODIUM 5000 [USP'U]/ML
4000 INJECTION, SOLUTION INTRAVENOUS; SUBCUTANEOUS ONCE
Status: COMPLETED | OUTPATIENT
Start: 2025-02-09 | End: 2025-02-09

## 2025-02-09 RX ORDER — IOPAMIDOL 755 MG/ML
80 INJECTION, SOLUTION INTRAVASCULAR ONCE
Status: COMPLETED | OUTPATIENT
Start: 2025-02-09 | End: 2025-02-09

## 2025-02-09 RX ORDER — METOPROLOL SUCCINATE 25 MG/1
25 TABLET, EXTENDED RELEASE ORAL DAILY
COMMUNITY

## 2025-02-09 RX ORDER — HEPARIN SODIUM 10000 [USP'U]/100ML
.5-4 INJECTION, SOLUTION INTRAVENOUS
Status: DISCONTINUED | OUTPATIENT
Start: 2025-02-09 | End: 2025-02-10

## 2025-02-09 RX ORDER — METOPROLOL SUCCINATE 25 MG/1
25 TABLET, EXTENDED RELEASE ORAL DAILY
Status: DISCONTINUED | OUTPATIENT
Start: 2025-02-10 | End: 2025-02-11 | Stop reason: HOSPADM

## 2025-02-09 RX ORDER — FUROSEMIDE 10 MG/ML
20 INJECTION INTRAMUSCULAR; INTRAVENOUS ONCE
Status: COMPLETED | OUTPATIENT
Start: 2025-02-09 | End: 2025-02-09

## 2025-02-09 RX ORDER — SPIRONOLACTONE 25 MG/1
12.5 TABLET ORAL EVERY MORNING
Status: DISCONTINUED | OUTPATIENT
Start: 2025-02-10 | End: 2025-02-09

## 2025-02-09 RX ORDER — FUROSEMIDE 40 MG/1
40 TABLET ORAL EVERY MORNING
COMMUNITY
End: 2025-02-11 | Stop reason: HOSPADM

## 2025-02-09 RX ORDER — ACETAMINOPHEN 325 MG/1
650 TABLET ORAL EVERY 6 HOURS PRN
Status: DISCONTINUED | OUTPATIENT
Start: 2025-02-09 | End: 2025-02-11 | Stop reason: HOSPADM

## 2025-02-09 RX ORDER — HEPARIN SODIUM 5000 [USP'U]/ML
2500-4000 INJECTION, SOLUTION INTRAVENOUS; SUBCUTANEOUS EVERY 6 HOURS PRN
Status: DISCONTINUED | OUTPATIENT
Start: 2025-02-09 | End: 2025-02-10

## 2025-02-09 RX ORDER — TALC
3 POWDER (GRAM) TOPICAL NIGHTLY PRN
Status: DISCONTINUED | OUTPATIENT
Start: 2025-02-09 | End: 2025-02-11 | Stop reason: HOSPADM

## 2025-02-09 RX ORDER — NAPROXEN SODIUM 220 MG/1
81 TABLET, FILM COATED ORAL DAILY
Status: DISCONTINUED | OUTPATIENT
Start: 2025-02-10 | End: 2025-02-11 | Stop reason: HOSPADM

## 2025-02-09 RX ORDER — SPIRONOLACTONE 25 MG/1
12.5 TABLET ORAL EVERY MORNING
Status: DISCONTINUED | OUTPATIENT
Start: 2025-02-10 | End: 2025-02-11 | Stop reason: HOSPADM

## 2025-02-09 RX ORDER — FUROSEMIDE 40 MG/1
40 TABLET ORAL EVERY MORNING
Status: DISCONTINUED | OUTPATIENT
Start: 2025-02-10 | End: 2025-02-10

## 2025-02-09 RX ORDER — NAPROXEN SODIUM 220 MG/1
324 TABLET, FILM COATED ORAL ONCE
Status: COMPLETED | OUTPATIENT
Start: 2025-02-09 | End: 2025-02-09

## 2025-02-09 RX ORDER — METOPROLOL TARTRATE 1 MG/ML
10 INJECTION, SOLUTION INTRAVENOUS AS NEEDED
Status: CANCELLED | OUTPATIENT
Start: 2025-02-09

## 2025-02-09 RX ORDER — SPIRONOLACTONE 25 MG/1
12.5 TABLET ORAL EVERY MORNING
COMMUNITY

## 2025-02-09 RX ORDER — FENTANYL CITRATE/PF 50 MCG/ML
50 PLASTIC BAG, INJECTION (ML) INTRAVENOUS
Status: DISCONTINUED | OUTPATIENT
Start: 2025-02-09 | End: 2025-02-09

## 2025-02-09 RX ORDER — SODIUM CHLORIDE 9 MG/ML
10 INJECTION, SOLUTION INTRAVENOUS CONTINUOUS PRN
Status: DISCONTINUED | OUTPATIENT
Start: 2025-02-09 | End: 2025-02-11 | Stop reason: HOSPADM

## 2025-02-09 RX ORDER — MAGNESIUM SULFATE HEPTAHYDRATE 40 MG/ML
2 INJECTION, SOLUTION INTRAVENOUS ONCE
Status: COMPLETED | OUTPATIENT
Start: 2025-02-09 | End: 2025-02-09

## 2025-02-09 RX ORDER — SODIUM CHLORIDE 0.9 % (FLUSH) 0.9 %
3 SYRINGE (ML) INJECTION AS NEEDED
Status: DISCONTINUED | OUTPATIENT
Start: 2025-02-09 | End: 2025-02-11 | Stop reason: HOSPADM

## 2025-02-09 RX ADMIN — IOPAMIDOL 80 ML: 755 INJECTION, SOLUTION INTRAVENOUS at 20:00

## 2025-02-09 RX ADMIN — FENTANYL CITRATE 50 MCG: 50 INJECTION INTRAMUSCULAR; INTRAVENOUS at 15:49

## 2025-02-09 RX ADMIN — MAGNESIUM SULFATE HEPTAHYDRATE 2 G: 40 INJECTION, SOLUTION INTRAVENOUS at 17:30

## 2025-02-09 RX ADMIN — FUROSEMIDE 20 MG: 10 INJECTION, SOLUTION INTRAMUSCULAR; INTRAVENOUS at 16:59

## 2025-02-09 RX ADMIN — HEPARIN SODIUM 7.6 UNITS/KG/HR: 10000 INJECTION, SOLUTION INTRAVENOUS at 16:10

## 2025-02-09 RX ADMIN — HEPARIN SODIUM 4000 UNITS: 5000 INJECTION INTRAVENOUS; SUBCUTANEOUS at 16:05

## 2025-02-09 RX ADMIN — NAPROXEN SODIUM 324 MG: 220 TABLET, FILM COATED ORAL at 15:50

## 2025-02-09 RX ADMIN — SODIUM CHLORIDE 10 ML/HR: 9 INJECTION, SOLUTION INTRAVENOUS at 17:43

## 2025-02-09 ASSESSMENT — ENCOUNTER SYMPTOMS
DIARRHEA: 0
DYSURIA: 0
BACK PAIN: 0
SHORTNESS OF BREATH: 1
FEVER: 0
HEADACHES: 0
VOMITING: 0
SORE THROAT: 0
COUGH: 0
EYE PAIN: 0

## 2025-02-09 ASSESSMENT — PAIN DESCRIPTION - DESCRIPTORS: DESCRIPTORS: STABBING

## 2025-02-09 NOTE — ED PROVIDER NOTES
"    HPI:  Chief Complaint   Patient presents with    Chest Pain     Pt arrives with c/o chest pain and difficulty breathing. She states she has heart hx and is usually seen in [place]         Chest Pain  Associated symptoms: shortness of breath    Associated symptoms: no back pain, no cough, no fever, no headache and no vomiting    Patient is a very pleasant 34-year-old female states she is in town [facility].  She lives in [place].  Previous cardiac care at [facility] in [place].  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  She developed CHF and \"it never went away\".  She states she is on Entresto, Lasix, spironolactone and metoprolol.  She states has been compliant with medications.  Today she started developing anterior chest heaviness tightness.  She states like a squeezing sensation.  Intermittently sharp.  She has had this before with congestive heart failure.  She denies any changes in weight.  No leg swelling over baseline.  She states she has more shortness of breath with exertion.  She thought she should have it checked.  She states she has been doing quite well in the last couple of years since the initial diagnosis.  She denies any recent illness cough or congestion.  No fever.  No leg pain.  No history of PE or DVT.  Not on blood thinners.  Denies any chance of pregnancy.  States she had her tubes tied after the last pregnancy with this complication.  She is not taking thing extra for symptoms.  Not been anything to make it better.  No vomiting or diarrhea.  No dysuria.  Pain pressure 5/10    HISTORY:  History reviewed. No pertinent past medical history.    History reviewed. No pertinent surgical history.    History reviewed. No pertinent family history.           ROS:  Review of Systems   Constitutional:  Negative for fever.   HENT:  Negative for congestion, ear pain and sore throat.    Eyes:  Negative for pain.   Respiratory:  Positive for shortness of " breath. Negative for cough.    Cardiovascular:  Positive for chest pain.   Gastrointestinal:  Negative for diarrhea and vomiting.   Genitourinary:  Negative for dysuria.   Musculoskeletal:  Negative for back pain.   Neurological:  Negative for headaches.   All other systems reviewed and are negative.      PE:  ED Triage Vitals   Temp Heart Rate Resp BP SpO2   02/09/25 1408 02/09/25 1401 02/09/25 1408 02/09/25 1408 02/09/25 1401   36.7 °C (98.1 °F) 97 20 128/61 98 %      Mean BP (mmHg) Temp Source Heart Rate Source Patient Position BP Location   02/09/25 1408 02/09/25 1408 02/09/25 1815 02/09/25 1408 02/09/25 1408   79 Oral Monitor In bed Left arm      FiO2 (%)       --                  Physical Exam  Vitals and nursing note reviewed.   Constitutional:       General: She is not in acute distress.     Appearance: She is well-developed. She is not toxic-appearing.   HENT:      Head: Normocephalic and atraumatic.   Eyes:      Extraocular Movements: Extraocular movements intact.      Conjunctiva/sclera: Conjunctivae normal.   Cardiovascular:      Rate and Rhythm: Normal rate and regular rhythm.      Heart sounds: No murmur heard.  Pulmonary:      Effort: Pulmonary effort is normal. No respiratory distress.      Breath sounds: Normal breath sounds. No stridor. No wheezing.   Abdominal:      General: There is no distension.      Palpations: Abdomen is soft.      Tenderness: There is no abdominal tenderness.   Musculoskeletal:         General: No tenderness or deformity. Normal range of motion.      Cervical back: Normal range of motion and neck supple.      Right lower leg: No edema.      Left lower leg: No edema.   Skin:     General: Skin is warm and dry.      Capillary Refill: Capillary refill takes less than 2 seconds.   Neurological:      General: No focal deficit present.      Mental Status: She is alert and oriented to person, place, and time.      Cranial Nerves: No cranial nerve deficit.   Psychiatric:         Mood  and Affect: Mood normal.         Behavior: Behavior normal.         Thought Content: Thought content normal.         Judgment: Judgment normal.         ED LABS:  Labs Reviewed   CBC WITH AUTO DIFFERENTIAL - Abnormal       Result Value    WBC 7.8      RBC 4.29      Hemoglobin 12.5      Hematocrit 36.0      MCV 83.9      MCH 29.1      MCHC 34.7      RDW 15.6 (*)     Platelets 237      MPV 8.7      Neutrophils% 54.4      Lymphocytes% 36.3      Monocytes% 7.0      Eosinophils% 1.2      Basophils% 1.1      ANC (auto diff) 4.20      Lymphocytes Absolute 2.80      Monocytes Absolute 0.50      Eosinophils Absolute 0.10      Basophils Absolute 0.10     COMPREHENSIVE METABOLIC PANEL - Abnormal    Sodium 138      Potassium 3.7      Chloride 108 (*)     CO2 21      Anion Gap 9      BUN 12      Creatinine 0.75      Glucose 105      Calcium 9.1      AST 36      ALT (SGPT) 21      Alkaline Phosphatase 72      Total Protein 7.4      Albumin 4.0      Total Bilirubin 0.44      Corrected Calcium 9.1      eGFR 108      Narrative:     Calculation based on the 2021 Chronic Kidney Disease Epidemiology Collaboration (CKD-EPI) equation refit without adjustment for race.   MAGNESIUM - Abnormal    Magnesium 1.7 (*)    B-TYPE NATRIURETIC PEPTIDE (BNP) - Abnormal     (*)    HIGH SENSITIVE TROPONIN I - Abnormal    hsTnI 0 Hour 300.8 (*)    HIGH SENSITIVE TROPONIN I, 1 HOUR - Abnormal    hsTnI 1 Hour 303.7 (*)     Late Draw Delta from 0 Hour 2.9     PROTIME-INR - Abnormal    Protime 12.9 (*)     INR 1.2 (*)    CBC - Abnormal    WBC 9.1      RBC 4.50      Hemoglobin 13.0      Hematocrit 37.5      MCV 83.3      MCH 28.9      MCHC 34.8      RDW 15.5 (*)     Platelets 230      MPV 8.5     ANTI-XA (UNFRACTIONATED HEPARIN) - Abnormal    Anti-Xa (Unfractionated Heparin) <0.05 (*)    LIPID PANEL - Abnormal    Triglycerides 43      Cholesterol 55      HDL 23 (*)     LDL Calculated 23     RESPIRATORY PATHOGEN PANEL, PCR - Normal    Adenovirus  Detection by PCR Negative      SARS-COV-2 PCR Negative      Human Metapneumovirus Detection by PCR Negative      Rhinovirus/Enterovirus Detection by PCR Negative      Influenza A  Detection by PCR Negative      Influenza B Detection by PCR Negative      Respiratory Syncytial Virus Detection by PCR Negative      Bordetella Pertussis Detection by PCR Negative      Chlamydophila Pneumoniae Detection by PCR Negative      Mycoplasma pneumo by PCR Negative      Bordetella Parapertussis Detection by PCR Negative      Coronavirus 229E Detection by PCR Negative      Coronavirus HKU1 Detection by PCR Negative      Coronavirus NL63  Detection by PCR Negative      Coronavirus OC43 Detection by PCR Negative      Parainfluenza 1 Detection by PCR Negative      Parainfluenza 2 Detection by PCR Negative      Parainfluenza 3 Detection by PCR Negative      Parainfluenza 4 Detection by PCR Negative      Narrative:     This assay is performed using the FilmArray Respiratory Panel ([employer] Results from this test must be correlated with the clinical history, epidemiological data, and other data available to the clinician evaluating the patient. A negative FilmArray RP result does not exclude the possibility of viral or bacterial infection. Negative test results may occur from the presence of sequence variants in the region targeted by the assay, the presence of inhibitors or an infection caused by an organism not detected by the panel.   PROTIME-INR - Normal    Protime 12.4      INR 1.1     PTT (ACTIVATED PARTIAL THROMBOPLASTIN TIME) - Normal    PTT 35.3     LIPASE - Normal    Lipase 19     URINALYSIS, MICROSCOPIC ONLY - Normal    RBC, Urine 0-2      WBC, Urine 0-4      WBC Clumps, Urine None seen      Squamous Epithelial, Urine 0-4      Bacteria, Urine None seen      Narrative:     If patient has an indwelling urinary catheter, follow the Adult Indwelling Urinary Catheter Removal and Replacement Protocol to remove the  catheter prior to obtaining the Urinalysis. If questions, please contact the primary provider for guidance.   URINALYSIS, DIPSTICK ONLY - Normal    Color, Urine Light Yellow      Clarity, Urine Clear      Specific Gravity, Urine 1.019      Leukocytes, Urine Negative      Nitrite, Urine Negative      Protein, Urine Negative      Ketones, Urine Negative      Urobilinogen, Urine Normal      Bilirubin, Urine Negative      Blood, Urine Negative      Glucose, Urine Negative      pH, Urine 5.5      Narrative:     If patient has an indwelling urinary catheter, follow the Adult Indwelling Urinary Catheter Removal and Replacement Protocol to remove the catheter prior to obtaining the Urinalysis. If questions, please contact the primary provider for guidance.   HIGH SENSITIVE TROPONIN I PANEL (OHR, 1HR)    Narrative:     The following orders were created for panel order HS Troponin I Panel (0HR, 1HR) Blood, Venous.  Procedure                               Abnormality         Status                     ---------                               -----------         ------                     HS Troponin I[[id number]]                Abnormal            Final result               1HR High Sensitive Trop I[[id number]]    Abnormal            Final result                 Please view results for these tests on the individual orders.   URINALYSIS WITH MICROSCOPIC    Narrative:     The following orders were created for panel order Urinalysis w/microscopic Urine, Clean Catch.  Procedure                               Abnormality         Status                     ---------                               -----------         ------                     Urinalysis, microscopic ...[[id number]]  Normal              Final result               Urinalysis, dipstick Uri...[[id number]]  Normal              Final result                 Please view results for these tests on the individual orders.   HCG, QUANTITATIVE, PREGNANCY    hCG Quant <1      Narrative:      REFERENCE UNITS:     Females Post-menopausal: <1-12    Healthy, non-pregnant individuals are typically: < 5 mIU/mL     Weeks after Conception       Expected HCG Value (mIU/mL)      0.2-1 Week                         5-50      1-2 Weeks                             2-3 Weeks                      100-5,000      3-4 Weeks                      500-10,000      4-5 Weeks                    1,000-50,000      5-6 Weeks                   10,000-100,000      6-8 Weeks                   15,000-200,000      2-3 Months                  10,000-100,000   ANTI-XA (UNFRACTIONATED HEPARIN)   HIGH SENSITIVE TROPONIN I   LAVENDER TOP RAINBOW         ED IMAGES:  CT angiogram chest PE with IV contrast   Final Result   IMPRESSION:   1.  Heart is mildly enlarged.   2.  Mild septal thickening and groundglass in both lung bases. The appearance would be typical for pulmonary edema. Mild inflammatory process could appear similar.   3.  4 mm pulmonary nodule in the right lung does not require follow-up in the absence of a known intrathoracic malignancy.   4.  No pulmonary embolism.            XR chest portable 1 view   Final Result   IMPRESSION:   No acute disease.      US Echo complete    (Results Pending)   CT angiogram cardiac heart cardiology    (Results Pending)       ED PROCEDURES:  ECG 12 lead, Chest Pain    Performed by: Laura Colunga MD  Authorized by: Laura Colunga MD    ECG reviewed by ED Physician in the absence of a cardiologist: yes    Previous ECG:     Previous ECG:  Unavailable  Interpretation:     Interpretation: abnormal    Comments:      Normal sinus rhythm, rate 83, PACs, PVCs, LVH, ST abnormality lead I and V1, avl, lead 3 but no old for comparison at this time.  Attempting to obtain one from [facility].    Care everywhere has been found, can now compare ECG from 2023 and appears similar      ED COURSE:          Sepsis Quality Bundle   Financial and social constraints, proximity to healthcare facilities, and  comorbidities were considered in the treatment plan for this patient.    Medical records reviewed through external source.  I can see that she has been followed by cardiology in 2023 at [facility] in [place].  We did call and obtain an old ECG for comparison, cannot see echo results.    Continuous telemetry monitoring with occasional PVCs, PACs but no other dysrhythmia        MDM:  Medical Decision Making  Patient is a 34-year-old female who presents for chest pressure tightness heaviness.  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  She states this feels similar to when she has had fluid overload.  She states has been compliant with her medications which do include diuretics.  She is on Entresto.  Will proceed with full ACS evaluation, CHF evaluation, include infectious evaluation as well though no significant infectious symptoms.    ECG shows significant abnormality.  Reached out immediately to [facility].  Was able to obtain an old ECG about an hour later which does not show any significant change today.  Does have a history of a previous elevated troponin 0.5 in 2023 when I look through care everywhere.    Troponin here is elevated.  Concerning for ACS with her chest tightness.  She does not have a significant amount of pulmonary edema on her chest x-ray though this still may be elevated secondary to CHF.  She is not hypoxic.  Still awaiting BN P.  There is been a delay in labs ability to run that test.  Will continue to trend troponins.  Consult with cardiology and start heparin bolus and drip for ACS    Second troponin relatively flat.  Have spoken with Dr. Jordin queen.  He recommends admission to the hospitalist service for ongoing treatment for CHF.  Suspect that is the etiology of her elevated troponin.    Lasix IV dose ordered.    Hospitalist consulted for CIU telemetry placement.  Patient updated on the plan.  She is  comfortable with this plan.  Unclear if this is her baseline or not.  I can see that she had a heart cath in 2023 there is no mention of coronary artery disease no stents or bypass but I cannot see the actual heart cath results.    I spoke with Dr. Prince CONNER telemetry    Final diagnoses:   [I21.4] NSTEMI (non-ST elevated myocardial infarction) (CMS/[facility])   [I50.9] CHF (congestive heart failure) (CMS/[facility])     2/9/2025  6:43 PM        A voice recognition program was used to aid in documentation of this record.  Sometimes words are not printed exactly as they were spoken.  While efforts were made to carefully edit and correct any inaccuracies, some areas may be present; please take these into context.  Please contact the physician if areas are identified.          Laura Colunga MD  02/09/25 1913

## 2025-02-10 ENCOUNTER — APPOINTMENT (OUTPATIENT)
Dept: CARDIOLOGY | Facility: HOSPITAL | Age: 35
DRG: 292 | End: 2025-02-10

## 2025-02-10 PROBLEM — I50.21: Status: ACTIVE | Noted: 2025-02-10

## 2025-02-10 LAB
ALBUMIN SERPL-MCNC: 3.8 G/DL (ref 3.5–5.3)
ALP SERPL-CCNC: 63 U/L (ref 37–98)
ALT SERPL-CCNC: 17 U/L (ref 7–52)
AMPHET UR QL SCN: NORMAL
ANION GAP SERPL CALC-SCNC: 5 MMOL/L (ref 3–11)
ANION GAP SERPL CALC-SCNC: 7 MMOL/L (ref 3–11)
ASCENDING AORTA: 2.74 CM
AST SERPL-CCNC: 33 U/L
AV LVOT PEAK GRADIENT: 3.8 MMHG
AV MEAN GRADIENT: 4.39 MMHG
AV PEAK GRADIENT: 7.84 MMHG
BARBITURATES UR QL SCN: NORMAL
BASOPHILS # BLD AUTO: 0 10*3/UL
BASOPHILS NFR BLD AUTO: 0.5 % (ref 0–2)
BENZODIAZ UR QL SCN: NORMAL
BILIRUB SERPL-MCNC: 0.57 MG/DL (ref 0.2–1.4)
BSA FOR ECHO PROCEDURE: 2.49 M2
BUN SERPL-MCNC: 11 MG/DL (ref 7–25)
BUN SERPL-MCNC: 14 MG/DL (ref 7–25)
CALCIUM ALBUM COR SERPL-MCNC: 9.3 MG/DL (ref 8.6–10.3)
CALCIUM SERPL-MCNC: 9.1 MG/DL (ref 8.6–10.3)
CALCIUM SERPL-MCNC: 9.9 MG/DL (ref 8.6–10.3)
CANNABINOIDS UR QL SCN: NORMAL
CHLORIDE SERPL-SCNC: 100 MMOL/L (ref 98–107)
CHLORIDE SERPL-SCNC: 106 MMOL/L (ref 98–107)
CO2 SERPL-SCNC: 26 MMOL/L (ref 21–32)
CO2 SERPL-SCNC: 26 MMOL/L (ref 21–32)
COCAINE UR QL SCN: NORMAL
CREAT SERPL-MCNC: 0.76 MG/DL (ref 0.6–1.1)
CREAT SERPL-MCNC: 0.83 MG/DL (ref 0.6–1.1)
DOP CALC AO PEAK VEL: 1.4 M/S
DOP CALC AO VTI: 26.9 CM
DOP CALC LVOT DIAMETER: 2.18 CM
DOP CALC LVOT STROKE VOLUME: 70 CM3
DOP CALC MV VTI: 30.78 CM
DOP CALC RVOT PEAK VEL: 0.7 M/S
DOP CALCLVOT PEAK VEL VTI: 18.9 CM
E/A RATIO: 3.7
E/E' RATIO (AVERAGE): 12
E/E' RATIO: 15.4
EGFRCR SERPLBLD CKD-EPI 2021: 107 ML/MIN/1.73M*2
EGFRCR SERPLBLD CKD-EPI 2021: 96 ML/MIN/1.73M*2
EJECTION FRACTION: 29 %
EOSINOPHIL # BLD AUTO: 0.1 10*3/UL
EOSINOPHIL NFR BLD AUTO: 1.3 % (ref 0–3)
ERAP: 5 MMHG
ERYTHROCYTE [DISTWIDTH] IN BLOOD BY AUTOMATED COUNT: 15.7 % (ref 11.5–14)
GLUCOSE SERPL-MCNC: 77 MG/DL (ref 70–105)
GLUCOSE SERPL-MCNC: 78 MG/DL (ref 70–105)
HCT VFR BLD AUTO: 36.9 % (ref 34–45)
HGB BLD-MCNC: 12.3 G/DL (ref 11.5–15.5)
INTERVENTRICULAR SEPTUM: 0.9 CM (ref 0.6–1.1)
IVC PROX: 2.09 CM
LA AREA A4C SYSTOLE: 75.1 CM3
LEFT ATRIUM SIZE: 4.38 CM
LEFT ATRIUM VOLUME INDEX: 30 ML/M2
LEFT ATRIUM VOLUME: 70.1 CM3
LEFT INTERNAL DIMENSION IN SYSTOLE: 5.9 CM (ref 2.1–4)
LEFT VENTRICLE DIASTOLIC VOLUME: 325 CM3
LEFT VENTRICLE SYSTOLIC VOLUME: 230 CM3
LEFT VENTRICULAR INTERNAL DIMENSION IN DIASTOLE: 6.7 CM (ref 3.5–6)
LVAD-AP2: 66.3 CM2
LYMPHOCYTES # BLD AUTO: 3 10*3/UL
LYMPHOCYTES NFR BLD AUTO: 40 % (ref 11–47)
MAGNESIUM SERPL-MCNC: 2 MG/DL (ref 1.8–2.4)
MAGNESIUM SERPL-MCNC: 2.1 MG/DL (ref 1.8–2.4)
MCH RBC QN AUTO: 28.3 PG (ref 28–33)
MCHC RBC AUTO-ENTMCNC: 33.5 G/DL (ref 32–36)
MCV RBC AUTO: 84.5 FL (ref 81–97)
METHADONE UR QL SCN: NORMAL
METHAMPHET UR QL SCN: NORMAL
ML OF DILUTED DEFINITY INJECTED: 5 ML
MONOCYTES # BLD AUTO: 0.5 10*3/UL
MONOCYTES NFR BLD AUTO: 6.7 % (ref 3–11)
MV DEC SLOPE: 9180 MM/S2
MV DT: 114 MS
MV MEAN GRADIENT: 2 MMHG
MV PEAK A VEL: 27 CM/S
MV PEAK E VEL: 99 CM/S
MV PEAK GRADIENT: 7 MMHG
MV STENOSIS PRESSURE HALF TIME: 44 MS
MV VALVE AREA P 1/2 METHOD: 5 CM2
MV VMAX: 135 CM/S
MVA (VTI): 2.29 CM2
NEUTROPHILS # BLD AUTO: 3.9 10*3/UL
NEUTROPHILS NFR BLD AUTO: 51.5 % (ref 41–81)
OPIATES UR QL SCN: NORMAL
OXYCODONE UR QL SCN: NORMAL
PCP UR QL SCN: NORMAL
PHOSPHATE SERPL-MCNC: 3.2 MG/DL (ref 2.5–4.9)
PLATELET # BLD AUTO: 228 10*3/UL (ref 140–350)
PMV BLD AUTO: 8.3 FL (ref 6.9–10.8)
POSTERIOR WALL: 0.9 CM (ref 0.6–1.1)
POTASSIUM SERPL-SCNC: 3.6 MMOL/L (ref 3.5–5.1)
POTASSIUM SERPL-SCNC: 4 MMOL/L (ref 3.5–5.1)
PROT SERPL-MCNC: 7 G/DL (ref 6–8.3)
PV PEAK GRADIENT: 6.25 MMHG
PV VMAX: 1.25 M/S
RA AREA: 24.4 CM2
RBC # BLD AUTO: 4.37 10*6/UL (ref 3.7–5.3)
RH CV ECHO AV VALVE AREA VEL: 2.6 CM2
RH CV ECHO AV VALVE AREA VTI: 2.6 CM2
RH LVOT PEAK VELOCITY REST: 1 M/S
RIGHT VENTRICULAR INTERNAL DIMENSION IN DIASTOLE: 3 CM
RV AP4 BASE: 3.6 CM
S': 11 CM/S
SODIUM SERPL-SCNC: 133 MMOL/L (ref 135–145)
SODIUM SERPL-SCNC: 137 MMOL/L (ref 135–145)
TDI: 6.4 CM/S
TDILATERAL: 11.5 CM/S
TR MAX PG: 31.58 MMHG
TRICUSPID ANNULAR PLANE SYSTOLIC EXCURSION: 1.9 CM
TRICUSPID VALVE PEAK REGURGITATION VELOCITY: 2.81 M/S
TRICYCLICS UR QL SCN: NORMAL
TROPONIN I SERPL-MCNC: 284.5 PG/ML
TROPONIN I SERPL-MCNC: 293.7 PG/ML
TV REST PULMONARY ARTERY PRESSURE: 37 MMHG
UFH PPP CHRO-ACNC: 0.39 U/ML (ref 0.3–0.7)
WBC # BLD AUTO: 7.6 10*3/UL (ref 4.5–10.5)

## 2025-02-10 PROCEDURE — 83735 ASSAY OF MAGNESIUM: CPT | Performed by: INTERNAL MEDICINE

## 2025-02-10 PROCEDURE — 36415 COLL VENOUS BLD VENIPUNCTURE: CPT | Performed by: INTERNAL MEDICINE

## 2025-02-10 PROCEDURE — 6360000200 HC RX 636 W HCPCS (ALT 250 FOR IP): Performed by: EMERGENCY MEDICINE

## 2025-02-10 PROCEDURE — 80048 BASIC METABOLIC PNL TOTAL CA: CPT | Mod: NCB | Performed by: STUDENT IN AN ORGANIZED HEALTH CARE EDUCATION/TRAINING PROGRAM

## 2025-02-10 PROCEDURE — 2550000100 HC RX 255: Performed by: INTERNAL MEDICINE

## 2025-02-10 PROCEDURE — 83735 ASSAY OF MAGNESIUM: CPT | Performed by: STUDENT IN AN ORGANIZED HEALTH CARE EDUCATION/TRAINING PROGRAM

## 2025-02-10 PROCEDURE — 6370000100 HC RX 637 (ALT 250 FOR IP): Performed by: STUDENT IN AN ORGANIZED HEALTH CARE EDUCATION/TRAINING PROGRAM

## 2025-02-10 PROCEDURE — 85025 COMPLETE CBC W/AUTO DIFF WBC: CPT | Performed by: INTERNAL MEDICINE

## 2025-02-10 PROCEDURE — G0378 HOSPITAL OBSERVATION PER HR: HCPCS

## 2025-02-10 PROCEDURE — 85520 HEPARIN ASSAY: CPT | Performed by: INTERNAL MEDICINE

## 2025-02-10 PROCEDURE — 84484 ASSAY OF TROPONIN QUANT: CPT | Performed by: INTERNAL MEDICINE

## 2025-02-10 PROCEDURE — 84100 ASSAY OF PHOSPHORUS: CPT | Performed by: INTERNAL MEDICINE

## 2025-02-10 PROCEDURE — 99232 SBSQ HOSP IP/OBS MODERATE 35: CPT | Performed by: FAMILY MEDICINE

## 2025-02-10 PROCEDURE — 99204 OFFICE O/P NEW MOD 45 MIN: CPT | Performed by: NURSE PRACTITIONER

## 2025-02-10 PROCEDURE — 6370000100 HC RX 637 (ALT 250 FOR IP): Performed by: INTERNAL MEDICINE

## 2025-02-10 PROCEDURE — 80053 COMPREHEN METABOLIC PANEL: CPT | Performed by: INTERNAL MEDICINE

## 2025-02-10 PROCEDURE — 80306 DRUG TEST PRSMV INSTRMNT: CPT | Performed by: STUDENT IN AN ORGANIZED HEALTH CARE EDUCATION/TRAINING PROGRAM

## 2025-02-10 PROCEDURE — 93306 TTE W/DOPPLER COMPLETE: CPT

## 2025-02-10 PROCEDURE — 93306 TTE W/DOPPLER COMPLETE: CPT | Mod: 26 | Performed by: INTERNAL MEDICINE

## 2025-02-10 RX ORDER — FUROSEMIDE 40 MG/1
40 TABLET ORAL
Status: DISCONTINUED | OUTPATIENT
Start: 2025-02-10 | End: 2025-02-11 | Stop reason: HOSPADM

## 2025-02-10 RX ADMIN — ACETAMINOPHEN 650 MG: 325 TABLET ORAL at 20:39

## 2025-02-10 RX ADMIN — PERFLUTREN 5 ML: 6.52 INJECTION, SUSPENSION INTRAVENOUS at 10:15

## 2025-02-10 RX ADMIN — EMPAGLIFLOZIN 10 MG: 10 TABLET, FILM COATED ORAL at 10:45

## 2025-02-10 RX ADMIN — NAPROXEN SODIUM 81 MG: 220 TABLET, FILM COATED ORAL at 08:22

## 2025-02-10 RX ADMIN — FUROSEMIDE 40 MG: 40 TABLET ORAL at 08:22

## 2025-02-10 RX ADMIN — HEPARIN SODIUM 2500 UNITS: 5000 INJECTION, SOLUTION INTRAVENOUS; SUBCUTANEOUS at 00:45

## 2025-02-10 RX ADMIN — FUROSEMIDE 40 MG: 40 TABLET ORAL at 15:29

## 2025-02-10 NOTE — PROGRESS NOTES
Daily Progress Note       LOS: 0 days     Subjective     Interval History: has no complaint of chest pain or shortness of breath when I see her. She is getting her ECHO. Discussed with cardiology who plan for cardiac CT tomorrow. Remains on heparin gtt.     Objective     Vital signs in last 24 hours:  Temp:  [36.4 °C (97.5 °F)-36.7 °C (98.1 °F)] 36.5 °C (97.7 °F)  Heart Rate:  [54-98] 68  Resp:  [15-23] 18  SpO2:  [95 %-98 %] 98 %  BP: (102-120)/(53-80) 104/71    Intake/Output last 3 shifts:  I/O last 3 completed shifts:  In: 285.3 [P.O.:250; I.V.:35.3]  Out: 800 [Urine:800]  Intake/Output this shift:  I/O this shift:  In: 480 [P.O.:480]  Out: 950 [Urine:950]    Physical Exam  Vitals and nursing note reviewed.   Constitutional:       Appearance: Normal appearance.   Cardiovascular:      Rate and Rhythm: Normal rate and regular rhythm.      Heart sounds: Normal heart sounds. No murmur heard.  Pulmonary:      Effort: No respiratory distress.      Breath sounds: Normal breath sounds.   Abdominal:      General: Bowel sounds are normal. There is no distension.      Palpations: Abdomen is soft.   Skin:     General: Skin is warm.      Capillary Refill: Capillary refill takes less than 2 seconds.   Neurological:      General: No focal deficit present.      Mental Status: She is alert and oriented to person, place, and time.   Psychiatric:         Mood and Affect: Mood normal.         Behavior: Behavior normal.         Medications:    Current Facility-Administered Medications:     furosemide (LASIX) tablet 40 mg, 40 mg, oral, 2x daily diuretic, Ramirez Gilliland MD, 40 mg at 02/10/25 1529    empagliflozin (JARDIANCE) tablet 10 mg, 10 mg, oral, Daily, Ramirez Gilliland MD, 10 mg at 02/10/25 1045    Maintain IV access, , , Until discontinued **AND** [CANCELED] Saline lock IV, , , Once **AND** sodium chloride flush 3 mL, 3 mL, intravenous, PRN, Rene Armenta R, DO    acetaminophen (TYLENOL) tablet 650 mg, 650 mg, oral, q6h PRN, Prince  "Rene DAHL DO    melatonin tablet 3 mg, 3 mg, oral, Nightly PRN, Rene Armenta DO    sodium chloride 0.9% (NS) carrier infusion, 10 mL/hr, intravenous, Continuous PRN, Rene Armenta DO, Last Rate: 10 mL/hr at 02/09/25 1950, Rate Verify at 02/09/25 1950    metoprolol succinate XL (TOPROL-XL) 24 hr tablet 25 mg, 25 mg, oral, Daily, Rene Armenta DO    [Held by provider] sacubitriL-valsartan (ENTRESTO) 24-26 mg per tablet 1 tablet, 1 tablet, oral, 2x daily, Rene Armenta DO    aspirin chewable tablet 81 mg, 81 mg, oral, Daily, Rene Armenta DO, 81 mg at 02/10/25 0822    [Held by provider] spironolactone (ALDACTONE) split tablet 12.5 mg, 12.5 mg, oral, q AM, Rene Armenta DO    Labs:  CBC:   Lab Results   Component Value Date    WBC 7.6 02/10/2025    RBC 4.37 02/10/2025    HGB 12.3 02/10/2025    HCT 36.9 02/10/2025     02/10/2025     CMP:   Lab Results   Component Value Date     02/10/2025    K 3.6 02/10/2025     02/10/2025    CO2 26 02/10/2025    GLUCOSE 78 02/10/2025    CREATININE 0.76 02/10/2025    CALCIUM 9.1 02/10/2025    ALBUMIN 3.8 02/10/2025    ALKPHOS 63 02/10/2025    BILITOT 0.57 02/10/2025    ALT 17 02/10/2025    AST 33 02/10/2025    BUN 11 02/10/2025    ANIONGAP 5 02/10/2025     Coagulation:   Lab Results   Component Value Date    PT 12.9 (H) 02/09/2025    INR 1.2 (H) 02/09/2025    APTT 35.3 02/09/2025     ABGs: No results found for: \"PHART\", \"PHCAP\", \"PHVEN\", \"PO2\", \"PO2ART\", \"PO2CAP\", \"UVC3JJI\", \"TYY0ODG\", \"PCO2\", \"BASEEXCESS\"    Imaging:  Reviewed    Principal Problem:    Chest pain  Active Problems:    Acute heart failure with reduced ejection fraction (HFrEF, <= 40%) (CMS/[facility])        Assessment/Plan:    Bekah is a 34-year-old female with a past medical history significant for postpartum cardiomyopathy who is in town visiting and presented to our \[facility]\"" ED on 2/9 with chest pain and dyspnea.  She states that the chest pain started 3 days prior " with a sharp stabbing and localized pain over her left chest which did not radiate or worsen with exertion.  She said that this pain can come on at rest last for several minutes and oral before resolving and then it comes back again.  She feels like the pain has been getting more and more frequent and now it is associated with episodic dyspnea which is new for her so she came in to be evaluated.  She denied having any fevers or chills, abdominal pain nausea or vomiting, urinary symptoms.  She denies any lightheadedness or syncopal episodes.  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  She is compliant with her home medications including Entresto metoprolol and Lasix.  She also had a cardiac catheterization in December 2022 that was unremarkable per her report.    Labs significant for elevated troponins of 300.8-->303.7 with delta of 2.9, , and magnesium of 1.7.  Otherwise normal CBC, CMP, respiratory pathogen panel, coagulation studies.  A chest x-ray showed no acute disease.  A CTA of the chest PE with IV contrast showed a mildly enlarged heart with mild septal thickening and groundglass in both lungs, typical for pulmonary edema.  Also 4 mm pulmonary nodule in the right lung.  No PE.  In the emergency room she was given aspirin and started on a heparin GTT.  Cardiology consult was requested in the emergency room and they recommended heparin GTT.  She was admitted to the hospitalist service.    Atypical left-sided chest pain  Dyspnea on exertion  Acute heart failure with reduced ejection fraction  History of nonischemic/peripartum cardiomyopathy  -Presentation as above to [facility] ED on 2/9 with chest pain and shortness of breath; workup as above with enlarged heart, elevated troponins although flat and elevated BNP  -Initiated on heparin gtt., cardiology  consulted  -Admitted to inpatient, telemetry, A1c and lipid panel ordered, resumed on home Lasix  -Cardiac MRI in February 2023 showed an EF of 36% with an RV moderately dilated and mildly reduced RV function, extensive myocardial fibrosis  -Had a previous coronary angiogram in November 2022 that was normal  -Echocardiogram here showed severe LV dilation with severely reduced systolic function, EF 29%, severe diffuse hypokinesis with no LV thrombus identified, grade 3 diastolic abnormality, mild tricuspid regurg.  -Continued on Lasix 80 twice daily and metoprolol, holding Entresto and Aldactone, initiated on Jardiance  - plan for cardiac CT tomorrow    DVT prophylaxis: Heparin  CODE STATUS: Full code  Disposition: To be determined based on cardiology plan    Jessica Mendoza MD

## 2025-02-10 NOTE — PLAN OF CARE
Problem: Infection Control  Goal: MINIMIZE THE ACQUISITION AND TRANSMISSION OF INFECTIOUS AGENTS  Description: INTERVENTIONS:  1. Isolate patient with suspected/diagnosed communicable disease  2. Place on designated isolation precautions  3. Maintain isolation techniques  4. Perform hand hygiene before and after each patient care activity  5. [place] universal precautions  6. Wear PPE as directed for type of isolation  7. Administer antibiotic therapy as ordered  8. Clean the environment appropriately after each patient use  9. Clean patient care equipment after each patient use as it leaves the room  10. Limit number of visitors, as appropriate  Outcome: Progressing  Flowsheets (Taken 2/10/2025 1209)  MINIMIZE THE ACQUISITION AND TRANSMISSION OF INFECT AGENTS:   Isolate patient with suspected/diagnosed communicable disease   Maintain isolation techniques   [place] universal precautions   Administer antibiotic therapy as ordered   Clean patient care equipment after each patient use as it leaves the room   Educate the patient/visitors on hand hygiene technique and need to complete upon entering/exiting the patient's room   Educate the patient/visitors on how to avoid the spread of infection   Limit number of visitors, as appropriate   Clean the environment appropriately after each patient use   Wear PPE as directed for type of isolation   Perform hand hygiene before and after each patient care activity   Place on designated isolation precautions     Problem: Pain - Adult  Goal: Verbalizes/displays adequate comfort level or baseline comfort level  Description: INTERVENTIONS:  1. Encourage patient to monitor pain and request interventions  2. Assess pain using the appropriate pain scale  3. Administer analgesics based on type and severity of pain and evaluate response  4. Educate/Implement non-pharmacological measures as appropriate and evaluate response  5. Consider cultural, developmental and social influences  on pain and pain management  6. Notify Provider if interventions unsuccessful or patient reports new pain  Outcome: Progressing  Flowsheets (Taken 2/10/2025 1209)  Verbalizes/displays adequate comfort level or baseline comfort level:   Encourage patient to monitor pain and request interventions   Assess pain using the appropriate pain scale   Administer analgesics based on type and severity of pain and evaluate response   Educate/Implement non-pharmacological measures as appropriate and evaluate response   Consider cultural, developmental and social influences on pain and pain management   Notify Provider if interventions unsuccessful or patient reports new pain     Problem: Knowledge Deficit  Goal: Patient/family/caregiver demonstrates understanding of disease process, treatment plan, medications, and discharge instructions  Description: INTERVENTIONS:   1. Complete learning assessment and assess knowledge base  2. Provide teaching at level of understanding   3. Provide teaching via preferred learning methods  Outcome: Progressing  Flowsheets (Taken 2/10/2025 1209)  Patient/family/caregiver demonstrates understanding of disease process, treatment plan, medications, and discharge instructions:   Complete learning assessment and assess knowledge base   Provide teaching via preferred learning methods   Provide teaching at level of understanding

## 2025-02-10 NOTE — H&P
Chief Complaint   Patient presents with    Chest Pain     Pt arrives with c/o chest pain and difficulty breathing. She states she has heart hx and is usually seen in [place]       HPI:  Bekah Cee is an 34 y.o. female with history of postpartum cardiomyopathy presenting for evaluation of chest pain and dyspnea.  Resides in another city and currently in [facility].  Endorses a 3-day history of episodic sharp stabbing, localized pain over the left chest, which does not radiate or worsen with exertion.  The pain typically comes on at rest and last for several minutes before resolving.  Of note, states that she has experienced this sharp left-sided chest pain off and on for some time now (specifics unclear).  However, states that the pain is now more frequent and prolonged in duration and also associated with episodic dyspnea (which she had not been experiencing prior).  Chest pain and dyspnea abated shortly after ER arrival.  No endorsed fevers, chills, sweats, cough, wheezing, nausea, vomiting, diarrhea, constipation, abdominal pain, or other acute concerning symptoms.  States that she was diagnosed with peripartum/postpartum cardiomyopathy in 2022 and has been following up regularly with cardiology since that time.  Endorses compliance with home Entresto, metoprolol, and Lasix.  States that cardiac catheterization in December 2022 was unremarkable without evidence of occlusive disease.    Administered aspirin and started on heparin drip in the ER.  Consulting cardiologist (Dr. Brenner) was consulted in the ER and recommended admission and continuing heparin drip for now per report.    Past Medical history:  Postpartum cardiomyopathy    Past Surgical History:  [facility]    Home medications:  Metoprolol  Eliquis  Lasix    Allergies:  None    Social history:  No tobacco  No regular/heavy alcohol      Review of Systems: See HPI    /54   Pulse 72   Temp 36.7 °C (98.1 °F) (Oral)   Resp 17   Ht  "1.702 m (5' 7\")   Wt 131.5 kg (289 lb 14.5 oz)   LMP  (LMP Unknown)   SpO2 98%   BMI 45.41 kg/m²     Physical Exam  Constitutional:       General: She is not in acute distress.     Appearance: She is obese. She is not toxic-appearing.   HENT:      Head: Normocephalic and atraumatic.      Nose: Nose normal.   Cardiovascular:      Rate and Rhythm: Normal rate and regular rhythm.   Pulmonary:      Effort: Pulmonary effort is normal.      Breath sounds: Normal breath sounds. No wheezing or rales.   Abdominal:      General: Bowel sounds are normal.      Palpations: Abdomen is soft.      Tenderness: There is no abdominal tenderness.   Musculoskeletal:      Right lower leg: No edema.      Left lower leg: No edema.   Neurological:      Mental Status: She is alert.   Psychiatric:         Mood and Affect: Mood normal.         Behavior: Behavior normal.       Significant results:  Sodium: 138  BUN/creatinine: 12/0.75  Magnesium: 1.7  Troponin: 300, 303  WBC: 9.1  BNP: 242  H&H: 13/38  Viral respiratory panel: Negative  Urinalysis: Negative    Chest x-ray:  - No acute process    EKG:  - NSR  - LVH  - Prolonged QTc    Assessment/plan:    Summary:  Adult obese female with history of postpartum cardiomyopathy admitted for acute atypical left-sided chest pain and dyspnea of unclear etiology in the setting of elevated troponin.    Assessment:  Atypical left-sided chest pain/dyspnea-etiology unclear.  Troponin significantly elevated at 300 without clear acute ischemic EKG changes.  [facility] in December 2022 was unremarkable per report. Chest pain/dyspnea resolved.  Unclear whether troponin chronically elevated in the setting of Hx of cardiomyopathy.  Administered full dose aspirin and started on heparin drip in the ER.  Cardiology (Dr. Brenner) was consulted in the ER and recommended continuing heparin drip for now per report.  PE vs other intrathoracic process also on differential. Spoke with Dr Brenner (cardiology), who recommends " continuing heparin drip for now and ordering a combination CTA chest PE/coronary protocol for further evaluation (also recommends giving low dose beta blocker prior to study in attempt to get HR < 60 bpm).   Elevated troponin-see above  Elevated BNP- Mild without associated clinical or radiographic evidence of fluid overload at this time. On PO Lasix at home. Administered a dose of IV Lasix on ER arrival.   Prolonged QTc-hypomagnesemia noted on lab analysis.  Hypomagnesemia-repleting    Plan:  - Admit as observation on telemetry.  - Cardiology consulted.  - TTE ordered.  Trending troponin.  - Aspirin 325 mg x 1.  Continue heparin drip for now pending further cardiology recommendations.  - HCG ordered. If negative order CTA chest PE protocol with CTA coronary study if possible per cardiology. Administer Metoprolol 6.25 mg x 1 prior to study in effort to decrease HR < 60 bpm per cardio.   - Fasting lipid panel, hemoglobin A1c ordered.  --Monitor fluid/HD status closely. Resume home Lasix after med rec complete.   - Replete magnesium.  Monitor electrolyte levels and QTc.  - Pharmacy consulted for formal med rec to clarify home medication doses.  Resume appropriate home medications after med rec complete.  - DVT prophylaxis: Heparin    Rene Armenta DO  Hospitalist    Addendum: CTA chest significant of mild pulmonary edema without PE or other acute process. Radiology unable to complete CT coronary study until tomorrow. BP currently at LLN after IV Lasix dose. Med rec complete. Continue home Lasix and Toprol XL (hold for SBP < 110). Holding Entresto and Spironolactone for now until assurance BP can tolerate (if so, resume in the AM).

## 2025-02-10 NOTE — INTERDISCIPLINARY/THERAPY
Case Management Admission Note    Phone # [phone]    Living Situation: Family members Private residence            ADLs: Independent  Stairs: No    HME/CPAP: None      Oxygen: No      Home Health:No     Current Resources: [facility]      Diabetes/supplies: Do you have Diabetes?: No  PCP: No, Pcp  Funding: [facility]  [facility]    Source of Information: Patient Interview  Support Person: Primary Emergency Contact: ASKED,DECLINED, Mobile Phone: [phone], Relation: Unknown     Transportation: When discharged, who will be providing your transportation?: Self     Admission Diagnosis: Chest Pain    Narrative: Chart reviewed. Updates provided by RN. Pt admitted with chest pain. Cardiology following. Heparin drip now off. Plan for CTA tomorrow. CM visited with pt at the bedside. She denies any discharge needs or concerns. CM will continue to follow.     Dispo: Home

## 2025-02-10 NOTE — MEDICATION HISTORY SPECIALIST NOTES
[facility] OU [phone]    CSN: [id number]  : [id number]    Information sources:  [facility]     Patient interviewed in person who provided all history. Patient verified medications and provided last doses.     New medications added:  Furosemide 40 mg   Metoprolol er 25 mg   Entresto 24-26 mg   Spironolactone 25 mg    Profile checked for  medications.

## 2025-02-10 NOTE — CONSULTATION
"Cardiology Consult      Chief Complaint   Patient presents with    Chest Pain     Pt arrives with c/o chest pain and difficulty breathing. She states she has heart hx and is usually seen in [place]         HPI:  Patient is a 34 year-old female with PMH significant for CM (acquired post-partum in 2022; normally seen at [facility] in [place]) who presented to [facility] ED on 2/9/25 with complaints of chest \"pressure, tightness\" and dyspnea.  On exam patient states that she is visiting from out of town.  She states that for the past several days she has been experiencing intermittent episodes of chest \"heaviness, tightness\" accompanied by progressively worsening dyspnea.  Patient states \"I feel like I've been taking on water.\"  Patient has CM which was dx in 2022 during week 24 of pregnancy.    Patient states the chest pressure/tightness was worse with inspiration and unrelated to positional changes, ambulation, activity.  She is currently chest pain free.  She states that she is feeling much better after receiving lasix IV.  She states \"I don't feel as heavy.\"  She does continue to endorse mild dyspnea with lying flat.     Patient endorses compliance with outpatient HF medication regimen, specifically, Entresto, metoprolol succinate, lasix, spironolactone.       PMH:  No specialty comments available.     History reviewed. No pertinent past medical history.     History reviewed. No pertinent surgical history.    No current outpatient medications on file.    No Known Allergies    History reviewed. No pertinent family history.    Social History     Socioeconomic History    Marital status: Legally      Spouse name: Not on file    Number of children: Not on file    Years of education: Not on file    Highest education level: Not on file   Occupational History    Not on file   Tobacco Use    Smoking status: Not on file    Smokeless tobacco: Not on file   Substance and Sexual Activity    Alcohol use: Not on file    " Drug use: Not on file    Sexual activity: Not on file   Other Topics Concern    Not on file   Social History Narrative    Not on file     Social Drivers of Health     Financial Resource Strain: Low Risk  (5/9/2023)    Received from [facility]    Overall Financial Resource Strain (CARDIA)     Difficulty of Paying Living Expenses: Not hard at all   Food Insecurity: No Food Insecurity (2/9/2025)    Hunger Vital Sign     Worried About Running Out of Food in the Last Year: Never true     Ran Out of Food in the Last Year: Never true   Transportation Needs: No Transportation Needs (2/9/2025)    PRAPARE - Transportation     Lack of Transportation (Medical): No     Lack of Transportation (Non-Medical): No   Physical Activity: Not on file   Stress: Not on file   Social Connections: Not on file   Intimate Partner Violence: Not At Risk (2/9/2025)    Humiliation, Afraid, Rape, and Kick questionnaire     Fear of Current or Ex-Partner: No     Emotionally Abused: No     Physically Abused: No     Sexually Abused: No   Housing Stability: Low Risk  (2/9/2025)    Housing Stability Vital Sign     Unable to Pay for Housing in the Last Year: No     Number of Times Moved in the Last Year: 1     Homeless in the Last Year: No       REVIEW OF SYMPTOMS  10 point review of systems performed.  Pertinent positives listed in the HPI and all others are negative.    PHYSICAL EXAM  General: alert, awake, no acute distress noted  HEENT: Normocephalic, atraumatic  Chest: breath sounds diminished bilateral bases  CVS: sinus rhythm, sinus arrhythmia  Neurologic evaluation: No focal deficits, oriented x3  Skin: No ecchymosis, bruises or rashes    IMAGING  TTE (2/10/25)  Pending       CTA cardiac (2/11/25)  Pending     LAB REVIEW  Results from last 4 days   Lab Units 02/10/25  0622 02/09/25  1659 02/09/25  1658 02/09/25  1436   WBC AUTO 10*3/uL 7.6  --  9.1 7.8   RBC AUTO 10*6/uL 4.37  --  4.50 4.29   HEMOGLOBIN g/dL 12.3  --  13.0 12.5    HEMATOCRIT % 36.9  --  37.5 36.0   PLATELETS AUTO 10*3/uL 228  --  230 237   BUN mg/dL 11  --   --  12   CREATININE mg/dL 0.76  --   --  0.75   POTASSIUM MMOL/L 3.6  --   --  3.7   CHLORIDE MMOL/L 106  --   --  108*   SODIUM MMOL/L 137  --   --  138   CO2 MMOL/L 26  --   --  21   INR   --  1.2*  --  1.1   BNP pg/mL  --   --   --  242*       TELEMETRY  SR, SA, PAC's      ASSESSMENT AND PLAN      CM  -diagnosed in 2022   -  -NYHA class  -GDMT:  ACE-I/ARB/ARNI: continue outpatient Entresto 24/26mg BID  BB: continue outpatient metoprolol succinate 25mg QD  MRA: continue outpatient spironolactone 12.5mg QD  Diuretic: lasix 40mg BID    -TTE pending     NSTEMI  -low suspicion for ACS; likely secondary to above  -troponin trend: 284 --> 303  -ECG without evidence of acute ischemic changes  -TTE pending   -lipid panel: TC 55, HDL 23, triglycerides 44, LDL 23  -pending CTA cardiac in AM (2/11/25)  -discontinue heparin gtt        Plan and exam formulated in conjunction with Dr. Gilliland. Further recommendation per his review of the patient.         Maye Renteria, CNP  2/10/2025

## 2025-02-11 ENCOUNTER — APPOINTMENT (OUTPATIENT)
Dept: CARDIAC REHAB | Facility: HOSPITAL | Age: 35
DRG: 292 | End: 2025-02-11

## 2025-02-11 ENCOUNTER — HOSPITAL ENCOUNTER (OUTPATIENT)
Dept: CT IMAGING | Facility: HOSPITAL | Age: 35
Setting detail: OBSERVATION
Discharge: HOME | DRG: 292 | End: 2025-02-11

## 2025-02-11 VITALS
TEMPERATURE: 97.9 F | DIASTOLIC BLOOD PRESSURE: 64 MMHG | OXYGEN SATURATION: 95 % | HEIGHT: 67 IN | SYSTOLIC BLOOD PRESSURE: 101 MMHG | HEART RATE: 71 BPM | BODY MASS INDEX: 43.18 KG/M2 | RESPIRATION RATE: 18 BRPM | WEIGHT: 275.13 LBS

## 2025-02-11 PROBLEM — Z98.51 H/O TUBAL LIGATION: Status: ACTIVE | Noted: 2025-02-11

## 2025-02-11 PROBLEM — Z98.890 HISTORY OF CARDIAC CATH: Status: ACTIVE | Noted: 2025-02-11

## 2025-02-11 PROBLEM — I47.29 NONSUSTAINED VENTRICULAR TACHYCARDIA (CMS/HCC): Status: ACTIVE | Noted: 2025-02-11

## 2025-02-11 PROBLEM — I21.4 NSTEMI (NON-ST ELEVATED MYOCARDIAL INFARCTION) (CMS/HCC): Status: ACTIVE | Noted: 2025-02-11

## 2025-02-11 PROBLEM — I42.0 DILATED CARDIOMYOPATHY (CMS/HCC): Status: ACTIVE | Noted: 2025-02-11

## 2025-02-11 LAB
ANION GAP SERPL CALC-SCNC: 9 MMOL/L (ref 3–11)
BASOPHILS # BLD AUTO: 0 10*3/UL
BASOPHILS NFR BLD AUTO: 0.6 % (ref 0–2)
BNP SERPL-MCNC: 176 PG/ML (ref 0–100)
BUN SERPL-MCNC: 18 MG/DL (ref 7–25)
CALCIUM SERPL-MCNC: 9.2 MG/DL (ref 8.6–10.3)
CHLORIDE SERPL-SCNC: 103 MMOL/L (ref 98–107)
CO2 SERPL-SCNC: 23 MMOL/L (ref 21–32)
CREAT SERPL-MCNC: 0.82 MG/DL (ref 0.6–1.1)
EGFRCR SERPLBLD CKD-EPI 2021: 97 ML/MIN/1.73M*2
EOSINOPHIL # BLD AUTO: 0.1 10*3/UL
EOSINOPHIL NFR BLD AUTO: 1.2 % (ref 0–3)
ERYTHROCYTE [DISTWIDTH] IN BLOOD BY AUTOMATED COUNT: 15.3 % (ref 11.5–14)
GLUCOSE SERPL-MCNC: 78 MG/DL (ref 70–105)
HCT VFR BLD AUTO: 39.8 % (ref 34–45)
HGB BLD-MCNC: 13.5 G/DL (ref 11.5–15.5)
LYMPHOCYTES # BLD AUTO: 3.3 10*3/UL
LYMPHOCYTES NFR BLD AUTO: 44.1 % (ref 11–47)
MCH RBC QN AUTO: 28.6 PG (ref 28–33)
MCHC RBC AUTO-ENTMCNC: 33.9 G/DL (ref 32–36)
MCV RBC AUTO: 84.3 FL (ref 81–97)
MONOCYTES # BLD AUTO: 0.5 10*3/UL
MONOCYTES NFR BLD AUTO: 7.1 % (ref 3–11)
NEUTROPHILS # BLD AUTO: 3.6 10*3/UL
NEUTROPHILS NFR BLD AUTO: 47 % (ref 41–81)
PLATELET # BLD AUTO: 243 10*3/UL (ref 140–350)
PMV BLD AUTO: 8.1 FL (ref 6.9–10.8)
POTASSIUM SERPL-SCNC: 3.8 MMOL/L (ref 3.5–5.1)
RBC # BLD AUTO: 4.72 10*6/UL (ref 3.7–5.3)
SODIUM SERPL-SCNC: 135 MMOL/L (ref 135–145)
TROPONIN I SERPL-MCNC: 241.8 PG/ML
WBC # BLD AUTO: 7.6 10*3/UL (ref 4.5–10.5)

## 2025-02-11 PROCEDURE — 84484 ASSAY OF TROPONIN QUANT: CPT | Performed by: STUDENT IN AN ORGANIZED HEALTH CARE EDUCATION/TRAINING PROGRAM

## 2025-02-11 PROCEDURE — 80048 BASIC METABOLIC PNL TOTAL CA: CPT | Performed by: FAMILY MEDICINE

## 2025-02-11 PROCEDURE — 6370000100 HC RX 637 (ALT 250 FOR IP): Performed by: STUDENT IN AN ORGANIZED HEALTH CARE EDUCATION/TRAINING PROGRAM

## 2025-02-11 PROCEDURE — 99239 HOSP IP/OBS DSCHRG MGMT >30: CPT | Performed by: FAMILY MEDICINE

## 2025-02-11 PROCEDURE — 83880 ASSAY OF NATRIURETIC PEPTIDE: CPT | Performed by: STUDENT IN AN ORGANIZED HEALTH CARE EDUCATION/TRAINING PROGRAM

## 2025-02-11 PROCEDURE — 6370000100 HC RX 637 (ALT 250 FOR IP): Performed by: NURSE PRACTITIONER

## 2025-02-11 PROCEDURE — (BLANK) HC ROOM PRIVATE

## 2025-02-11 PROCEDURE — 99232 SBSQ HOSP IP/OBS MODERATE 35: CPT | Performed by: NURSE PRACTITIONER

## 2025-02-11 PROCEDURE — 36415 COLL VENOUS BLD VENIPUNCTURE: CPT | Performed by: FAMILY MEDICINE

## 2025-02-11 PROCEDURE — 85025 COMPLETE CBC W/AUTO DIFF WBC: CPT | Performed by: FAMILY MEDICINE

## 2025-02-11 RX ORDER — FUROSEMIDE 20 MG/1
20 TABLET ORAL DAILY
Qty: 30 TABLET | Refills: 1 | Status: SHIPPED | OUTPATIENT
Start: 2025-02-11 | End: 2026-02-11

## 2025-02-11 RX ORDER — NAPROXEN SODIUM 220 MG/1
81 TABLET, FILM COATED ORAL DAILY
Qty: 30 TABLET | Refills: 1 | Status: SHIPPED | OUTPATIENT
Start: 2025-02-12 | End: 2025-04-13

## 2025-02-11 RX ORDER — METOPROLOL TARTRATE 25 MG/1
100 TABLET, FILM COATED ORAL ONCE AS NEEDED
OUTPATIENT
Start: 2025-02-11

## 2025-02-11 RX ORDER — METOPROLOL TARTRATE 25 MG/1
75 TABLET, FILM COATED ORAL ONCE AS NEEDED
OUTPATIENT
Start: 2025-02-11

## 2025-02-11 RX ORDER — METOPROLOL TARTRATE 25 MG/1
50 TABLET, FILM COATED ORAL ONCE AS NEEDED
OUTPATIENT
Start: 2025-02-11

## 2025-02-11 RX ORDER — METOPROLOL TARTRATE 25 MG/1
25 TABLET, FILM COATED ORAL ONCE AS NEEDED
OUTPATIENT
Start: 2025-02-11

## 2025-02-11 RX ORDER — NITROGLYCERIN 0.4 MG/1
0.4 TABLET SUBLINGUAL ONCE
Status: CANCELLED | OUTPATIENT
Start: 2025-02-11 | End: 2025-02-11

## 2025-02-11 RX ORDER — ACETAMINOPHEN 500 MG
1000 TABLET ORAL ONCE AS NEEDED
Status: CANCELLED | OUTPATIENT
Start: 2025-02-11 | End: 2025-02-12

## 2025-02-11 RX ORDER — POTASSIUM CHLORIDE 750 MG/1
20 TABLET, FILM COATED, EXTENDED RELEASE ORAL ONCE
Status: COMPLETED | OUTPATIENT
Start: 2025-02-11 | End: 2025-02-11

## 2025-02-11 RX ORDER — METOPROLOL TARTRATE 1 MG/ML
10 INJECTION, SOLUTION INTRAVENOUS AS NEEDED
Status: CANCELLED | OUTPATIENT
Start: 2025-02-11

## 2025-02-11 RX ORDER — FUROSEMIDE 40 MG/1
20 TABLET ORAL 2 TIMES DAILY
Qty: 30 TABLET | Refills: 0 | Status: CANCELLED | OUTPATIENT
Start: 2025-02-11 | End: 2025-03-13

## 2025-02-11 RX ORDER — IVABRADINE 7.5 MG/1
15 TABLET, FILM COATED ORAL ONCE AS NEEDED
OUTPATIENT
Start: 2025-02-11

## 2025-02-11 RX ADMIN — NAPROXEN SODIUM 81 MG: 220 TABLET, FILM COATED ORAL at 08:24

## 2025-02-11 RX ADMIN — SPIRONOLACTONE 12.5 MG: 25 TABLET ORAL at 08:24

## 2025-02-11 RX ADMIN — EMPAGLIFLOZIN 10 MG: 10 TABLET, FILM COATED ORAL at 08:24

## 2025-02-11 RX ADMIN — SACUBITRIL AND VALSARTAN 1 TABLET: 24; 26 TABLET, FILM COATED ORAL at 08:24

## 2025-02-11 RX ADMIN — FUROSEMIDE 40 MG: 40 TABLET ORAL at 08:24

## 2025-02-11 RX ADMIN — POTASSIUM CHLORIDE 20 MEQ: 750 TABLET, EXTENDED RELEASE ORAL at 11:09

## 2025-02-11 NOTE — PROGRESS NOTES
Cardiology Progress Note     Patient ID: Bekah Cee is a 34 y.o. female.    Subjective:   Patient is sitting up in the chair with no complaints.  She states she has been up walking numerous times, she even made her own bed.  She denies any chest pain, worsening shortness of breath, she states that she is lying flat to breathe.  She is very anxious to go home.  She is a mother of 4 children and she needs to get back to her children.  She lives in [place] which is about 3 [place] half hours from [place].  She is followed by a cardiologist from [place].    Labs today show potassium 3.8.  Her magnesium upon admission was low at 1.7.  Brain atretic peptide upon admission was 242.    LOS:   LOS: 0 days       Current Facility-Administered Medications:     furosemide (LASIX) tablet 40 mg, 40 mg, oral, 2x daily diuretic, Ramirez Gilliland MD, 40 mg at 02/11/25 0824    empagliflozin (JARDIANCE) tablet 10 mg, 10 mg, oral, Daily, Ramirez Gilliland MD, 10 mg at 02/11/25 0824    Maintain IV access, , , Until discontinued **AND** [CANCELED] Saline lock IV, , , Once **AND** sodium chloride flush 3 mL, 3 mL, intravenous, PRN, Rene Armenta DO    acetaminophen (TYLENOL) tablet 650 mg, 650 mg, oral, q6h PRN, Rene Armenta DO, 650 mg at 02/10/25 2039    melatonin tablet 3 mg, 3 mg, oral, Nightly PRN, Rene Armenta DO    sodium chloride 0.9% (NS) carrier infusion, 10 mL/hr, intravenous, Continuous PRN, Rene Armenta DO, Stopped at 02/09/25 2134    metoprolol succinate XL (TOPROL-XL) 24 hr tablet 25 mg, 25 mg, oral, Daily, eRne Armenta DO    sacubitriL-valsartan (ENTRESTO) 24-26 mg per tablet 1 tablet, 1 tablet, oral, 2x daily, Ramirez Gilliland MD, 1 tablet at 02/11/25 0824    aspirin chewable tablet 81 mg, 81 mg, oral, Daily, Rene Armenta DO, 81 mg at 02/11/25 0824    spironolactone (ALDACTONE) split tablet 12.5 mg, 12.5 mg, oral, q AM, Ramirez Gilliland MD,  12.5 mg at 02/11/25 0824      Objective     Vital signs in last 24 hours:   Temp:  [36.2 °C (97.2 °F)-36.9 °C (98.4 °F)] 36.6 °C (97.9 °F)  Heart Rate:  [63-82] 64  Resp:  [18] 18  SpO2:  [94 %-98 %] 94 %  BP: (101-118)/(53-78) 108/67  Daily Weight: 127.3 kg (280 lb 10.3 oz)  Admit Weight:Weight: 131.5 kg (289 lb 14.5 oz)     Telemetry: Normal sinus rhythm.  She does have some multifocal PVCs.  1 -3 beat kyler.  She had a 4 beat run of nonsustained VT at 2:13 AM    Intake/Output last 3 shifts:  I/O last 3 completed shifts:  In: 1242.2 [P.O.:1030; I.V.:212.2]  Out: 1750 [Urine:1750]    Intake/Output this shift:  No intake/output data recorded.      Physical Exam  Constitutional:       Appearance: She is obese.   HENT:      Head: Normocephalic.      Nose: Nose normal. No congestion.      Mouth/Throat:      Mouth: Mucous membranes are moist.   Eyes:      General: No scleral icterus.  Neck:      Vascular: No JVD.      Comments: No JVD  Cardiovascular:      Rate and Rhythm: Normal rate and regular rhythm.      Pulses: Normal pulses.      Heart sounds: No murmur heard.     No friction rub. No gallop.   Pulmonary:      Effort: Pulmonary effort is normal.      Breath sounds: Normal breath sounds.   Musculoskeletal:         General: Normal range of motion.      Cervical back: Normal range of motion and neck supple.      Comments: Trace edema above the sock line   Skin:     General: Skin is warm and dry.      Capillary Refill: Capillary refill takes less than 2 seconds.   Neurological:      General: No focal deficit present.      Mental Status: She is alert and oriented to person, place, and time.   Psychiatric:         Mood and Affect: Mood normal.         Behavior: Behavior normal.         Labs:  BMP:  Lab Results   Component Value Date     02/11/2025    K 3.8 02/11/2025     02/11/2025    CO2 23 02/11/2025    BUN 18 02/11/2025    CREATININE 0.82 02/11/2025    GLUCOSE 78 02/11/2025    CALCIUM 9.2 02/11/2025  "    CBC:   Lab Results   Component Value Date    WBC 7.6 02/11/2025    RBC 4.72 02/11/2025    HGB 13.5 02/11/2025    HCT 39.8 02/11/2025     02/11/2025     CMP:  Lab Results   Component Value Date     02/11/2025    K 3.8 02/11/2025     02/11/2025    CO2 23 02/11/2025    GLUCOSE 78 02/11/2025    CREATININE 0.82 02/11/2025    CALCIUM 9.2 02/11/2025    ALBUMIN 3.8 02/10/2025    ALKPHOS 63 02/10/2025    BILITOT 0.57 02/10/2025    ALT 17 02/10/2025    AST 33 02/10/2025    BUN 18 02/11/2025    ANIONGAP 9 02/11/2025     Lab Results   Component Value Date     (H) 02/09/2025     Lipid:   Lab Results   Component Value Date    CHOL 55 02/09/2025    HDL 23 (L) 02/09/2025    TRIG 43 02/09/2025    LDLCALC 23 02/09/2025     TSH:  No results found for: \"TSH\"  Magnesium:  Lab Results   Component Value Date    MG 2.0 02/10/2025     PT/INR:   Lab Results   Component Value Date    PT 12.9 (H) 02/09/2025    PT 12.4 02/09/2025    INR 1.2 (H) 02/09/2025    INR 1.1 02/09/2025     Testing:  EKG:  Echo:  Stress test:  Cath:      Assessment:  Principal Problem:    Chest pain  Active Problems:    Acute heart failure with reduced ejection fraction (HFrEF, <= 40%) (CMS/[facility])      Plan:  Acute heart failure with reduced ejection fraction  - echo EF 29%  - admit   -Possible exacerbated by  not taking furosemide for the last 2 to 3 days prior to admit.  History of nonischemic/peripartum cardiomyopathy:  Chest pain  The patient was diagnosed in October/November 2022.  Ejection fraction per patient was 5%.  Per [facility] record review EF 11/30/2022 was 20%.  Cardiac MRI 2/2023: EF 36%.  RV moderately dilated with mildly reduced RV function.  RVEF 48%.  Extensive myocardial fibrosis in the pattern of subendocardial and transmural hyperenhancement in the LV raising the possibility of genetic cardiomyopathy given the a sense of obstructive coronary artery disease.  Coronary angiogram 12/2022: Normal coronaries.  Also per " "records review, it appears that the patient has similar symptoms to this presentation when she gets into an acute exacerbation of heart failure.  On Entresto, spironolactone and Toprol.  Jardiance made her feel fuzzy in the past.  Questionable history of paroxysmal atrial fibrillation:  Per [facility] record review the patient had 1 episode 11/2022 at outside facility with possible AF.  No AF since then.  Anticoagulation was stopped/never started in [place].  No evidence of recurrence so far.  NS-VT  -Short burst of nonsustained VT noted on telemetry.  -After initial diagnosis she wore a LifeVest for 1 year and per her report based on improved EF this was discontinued.  She denies ever having a shock.    Plan  1.  Dr. Gilliland recommendation would be to stay in the hospital additional day however she is very insistent that she is doing well and needs to be discharged for she has 4 kids at home and she is a single parent.  She does have someone who can drive her home.  She is aware of the risks of having chest pain or worsening shortness of breath not being fully diuresed she is aware of these risks and still would like to be discharged.  2.  She takes furosemide 10 mg at home.  Will send her with a home dose of furosemide 20 mg a day.  3.  Jardiance is a new medication.  We will continue this for systolic heart failure.  She will need a basic metabolic panel this week when she returns home.  However looking back through a note from October 2023 she had been on Jardiance in the past but felt fuzzy and foggy and did not want to continue it. [facility] does not cover Farxiga  4.  She feels that her home weight is usually around 275.  Noting that her weight had gone into the 280s is what prompted her concern she was retaining fluid.  She describes this as \"feeling heavy\".  5.  Reviewing the last telephone note from October 2024 with [facility] cardiology states she was on Entresto 24-26 twice daily, Toprol XL 25 mg a day, she had " stopped her spironolactone in the past for she states she did not feel well on it, her metoprolol dose had also been decreased and in the past, and also her Entresto dose had been decreased  in the past based on note from October 2024  -It also states she is followed by heart failure clinic there    In regards to her abnormal cardiac MRI there is a note from October 2023 that she saw a Dr. Morton at [facility] recommended genetic testing when she presented there in 2023.  This has not been completed.  All would recommend she follow-up with [facility] cardiology and pursue genetic testing.    We will have Jardiance filled for 1 month at our hospital before she is discharged.  Also will send in a prescription for furosemide 20 mg.  No other medication changes.  If she is stable after her afternoon dose of furosemide she may be discharged from a cardiology standpoint.    Addendum  Output has been recorded, not sure it is visible on your end. 2/10: 800mL @ 0700, 150mL @900, 425mL @1600. 2/11: 750mL@ 0650, 200mL @ 0930. This morning has been busy so just got her output for this morning charted.       Electronically signed by: Angie Villanueva CNP  2/11/2025  10:21 AM

## 2025-02-11 NOTE — PLAN OF CARE
Problem: Infection Control  Goal: MINIMIZE THE ACQUISITION AND TRANSMISSION OF INFECTIOUS AGENTS  Description: INTERVENTIONS:  1. Isolate patient with suspected/diagnosed communicable disease  2. Place on designated isolation precautions  3. Maintain isolation techniques  4. Perform hand hygiene before and after each patient care activity  5. [place] universal precautions  6. Wear PPE as directed for type of isolation  7. Administer antibiotic therapy as ordered  8. Clean the environment appropriately after each patient use  9. Clean patient care equipment after each patient use as it leaves the room  10. Limit number of visitors, as appropriate  Outcome: Progressing  Flowsheets (Taken 2/10/2025 1209)  MINIMIZE THE ACQUISITION AND TRANSMISSION OF INFECT AGENTS:   Isolate patient with suspected/diagnosed communicable disease   Maintain isolation techniques   [place] universal precautions   Administer antibiotic therapy as ordered   Clean patient care equipment after each patient use as it leaves the room   Educate the patient/visitors on hand hygiene technique and need to complete upon entering/exiting the patient's room   Educate the patient/visitors on how to avoid the spread of infection   Limit number of visitors, as appropriate   Clean the environment appropriately after each patient use   Wear PPE as directed for type of isolation   Perform hand hygiene before and after each patient care activity   Place on designated isolation precautions     Problem: Daily Care  Goal: Daily care needs are met  Description: INTERVENTIONS:   1. Assess and monitor skin integrity  2. Identify patients at risk for skin breakdown on admission and per policy  3. Assess and monitor ability to perform self care and identify potential discharge needs  4. Assess skin integrity/risk for skin breakdown  5. Assist patient with activities of daily living as needed  6. Encourage independent activity per ability   7. Provide mouth care    8. Include patient/family/caregiver in decisions related to daily care   Outcome: Progressing  Flowsheets (Taken 2/11/2025 1141)  Daily care needs are met:   Assess and monitor skin integrity   Assess and monitor ability to perform self care and identify potential discharge needs   Assist patient with activities of daily living as needed   Include patient/family/caregiver in decisions related to daily care   Provide mouth care   Encourage independent activity per ability   Assess skin integrity/risk for skin breakdown   Identify patients at risk for skin breakdown on admission and per policy     Problem: Discharge Barriers  Goal: Patient's discharge needs are met  Description: INTERVENTIONS:  1. Assess patient for self-management skills  2. Encourage participation in management  3. Identify potential discharge barriers on admission and throughout hospital stay  4. Involve patient/family/caregiver in discharge planning process  5. Collaborate with case management/ for discharge needs  Outcome: Progressing  Flowsheets (Taken 2/11/2025 1141)  Patient discharge needs are met:   Assess patient for self-management skills   Encourage participation in management   Identify potential discharge barriers on admission and throughout hospital stay   Involve patient/family/caregiver in discharge planning process   Collaborate with case management/ for discharge needs

## 2025-02-11 NOTE — INTERDISCIPLINARY/THERAPY
02/11/25 0946   Phase I Resting    Comment RN [email or web address]'kermit education.   Cardiac Rehab Phase I   Amount of Time Spent with Patient (mins) 5 minutes   Phase 2 Referral Site Self-Referral   Comment CHF/Phase 2 CR referral discussed w/pt. CR is signing off at this time and will no longer follow inpt. Pt was provided information regarding Phase 2 CR.

## 2025-02-11 NOTE — DISCHARGE INSTRUCTIONS
You are being discharged from the hospital.  Please take your prescriptions as prescribed from cardiology.  [facility] pharmacy will not take electronic prescriptions so I have printed them for you.  Please follow-up with your cardiologist in [place] as already scheduled this Friday.  Should you experience chest pain, worsening shortness of breath, syncope or near syncope, lightheadedness or any other symptoms you should return to the emergency room.

## 2025-02-11 NOTE — DISCHARGE SUMMARY
HOSPITALIST DISCHARGE SUMMARY    Date of Service: 2/11/2025    Admission Date: 2/9/2025  Discharge Date: 02/11/25    Admitting Physician: Rene Armenta DO  Discharging Hospitalist Team:Jessica Mendoza MD    PCP: Vera, Pcp    DISCHARGE DIAGNOSIS   Principal Problem:    Chest pain  Active Problems:    Acute heart failure with reduced ejection fraction (HFrEF, <= 40%) (CMS/HCC)    Dilated cardiomyopathy (CMS/HCC)    H/O tubal ligation    History of cardiac cath    Nonsustained ventricular tachycardia (CMS/HCC)      Primary Discharge Diagnosis  Acute heart failure with reduced EF  History of nonischemic/peripartum cardiomyopathy  Chest pain, resolved  NSVT      Consultants:  Cardiology    Imaging Studies / Procedures   US Echo complete    Result Date: 2/10/2025  Narrative: Severe left ventricular dilation with severely reduced systolic function.  LVIDD 6.7 cm. Ejection fraction 29% by biplane measurement. Severe diffuse hypokinesis.  No LV thrombus identified. Grade 3 diastolic abnormality with elevated LV filling pressure. Normal right ventricular size with low-normal systolic function. Normal left atrial size, right atrial dilation. Mild tricuspid regurgitation. No other significant valvular abnormality. Mildly elevated pulmonary pressure, RVSP 37 mmHg. No pericardial effusion. No prior studies for comparison.  Patient has known history of systolic dysfunction according to outside records.  Currently being seen in consultation with cardiology.     CT angiogram chest PE with IV contrast    Result Date: 2/9/2025  Narrative: EXAM: CT ANGIOGRAM CHEST PE W IV CONTRAST DATE: 2/9/2025 7:48 PM INDICATION:  Chest pain COMPARISON: Same-day radiograph. TECHNIQUE: Postcontrast CTA images of the chest. 3D/MIP reformats were constructed and reviewed. Dose reduction technique utilized; automatic/anatomic modulation of X-ray tube current (Auto mA). CONTRAST: 80 mL of Isovue-370 was administered intravenously from a multiuse  vial. FINDINGS: Mediastinum: There is satisfactory opacification of the central and peripheral pulmonary arteries. There is a small focal mural calcification within the left lower lobe central pulmonary artery. No pulmonary embolus. No changes of right heart strain.  The heart is enlarged. No coronary artery calcifications.  Normal thoracic aorta.  There is no mediastinal or hilar adenopathy. Lungs: There is mild groundglass in both lung bases, with minimal septal thickening. Findings are typical for mild pulmonary edema. Mild inflammatory process could appear similar. No airspace consolidation. 4 mm nodule in the peripheral right lower lobe. This does not require follow-up in the absence of a known extrathoracic malignancy. Chest wall and axillary regions: There are no chest wall masses or inflammatory changes. There is no axillary lymphadenopathy. No acute osseous abnormalities. Visualized upper abdomen: No acute abnormalities.     Impression: IMPRESSION: 1.  Heart is mildly enlarged. 2.  Mild septal thickening and groundglass in both lung bases. The appearance would be typical for pulmonary edema. Mild inflammatory process could appear similar. 3.  4 mm pulmonary nodule in the right lung does not require follow-up in the absence of a known intrathoracic malignancy. 4.  No pulmonary embolism.     XR chest portable 1 view    Result Date: 2/9/2025  Narrative: EXAM: DX CHEST PORTABLE 1 VW DATE: 2/9/2025 2:21 PM INDICATION:  Shortness of breath; shortness of breath COMPARISON: None available. Findings: The lungs are clear. The heart size prominent but pulmonary vascularity are within normal limits.     Impression: IMPRESSION: No acute disease.       Pertinent Laboratory Studies      Results from last 4 days   Lab Units 02/11/25  0505 02/10/25  0622 02/09/25  1658   WBC AUTO 10*3/uL 7.6 7.6 9.1   HEMOGLOBIN g/dL 13.5 12.3 13.0   HEMATOCRIT % 39.8 36.9 37.5   PLATELETS AUTO 10*3/uL 243 228 230     Results from last 4  days   Lab Units 02/11/25  0505 02/10/25  1552 02/10/25  0622 02/09/25  1436   SODIUM MMOL/L 135 133* 137 138   POTASSIUM MMOL/L 3.8 4.0 3.6 3.7   CHLORIDE MMOL/L 103 100 106 108*   CO2 MMOL/L 23 26 26 21   BUN mg/dL 18 14 11 12   CREATININE mg/dL 0.82 0.83 0.76 0.75   CALCIUM MG/DL 9.2 9.9 9.1 9.1   MAGNESIUM MG/DL  --  2.0 2.1 1.7*   PHOSPHORUS mg/dL  --   --  3.2  --    ALBUMIN g/dL  --   --  3.8 4.0   TOTAL PROTEIN g/dL  --   --  7.0 7.4   BILIRUBIN TOTAL mg/dL  --   --  0.57 0.44   ALK PHOS U/L  --   --  63 72   ALT U/L  --   --  17 21   AST U/L  --   --  33 36   GLUCOSE MG/DL 78 77 78 105       Lab Results   Component Value Date    CALCIUM 9.2 02/11/2025    PHOS 3.2 02/10/2025       Results from last 4 days   Lab Units 02/09/25  1659 02/09/25  1436   APTT SECONDS  --  35.3   INR  1.2* 1.1       Lab Results   Component Value Date    CHOL 55 02/09/2025     Lab Results   Component Value Date    HDL 23 (L) 02/09/2025     Lab Results   Component Value Date    LDLCALC 23 02/09/2025     Lab Results   Component Value Date    TRIG 43 02/09/2025       Results from last 4 days   Lab Units 02/09/25  1758   HEMOGLOBIN A1C % 5.4       Exam  Temp:  [36.2 °C (97.2 °F)-36.9 °C (98.4 °F)] 36.6 °C (97.9 °F)  Heart Rate:  [63-82] 64  Resp:  [18] 18  SpO2:  [94 %-98 %] 94 %  BP: (101-118)/(53-78) 108/67    Physical Exam  Vitals reviewed.   Constitutional:       Appearance: Normal appearance.   HENT:      Head: Normocephalic and atraumatic.   Cardiovascular:      Rate and Rhythm: Normal rate and regular rhythm.      Pulses: Normal pulses.      Heart sounds: Normal heart sounds. No murmur heard.  Pulmonary:      Effort: No respiratory distress.      Breath sounds: Normal breath sounds. No wheezing.   Abdominal:      General: Bowel sounds are normal. There is no distension.      Palpations: Abdomen is soft.   Musculoskeletal:      Cervical back: Neck supple.      Right lower leg: No edema.      Left lower leg: No edema.   Skin:      Capillary Refill: Capillary refill takes less than 2 seconds.   Neurological:      Mental Status: She is alert and oriented to person, place, and time.   Psychiatric:         Mood and Affect: Mood normal.         Behavior: Behavior normal.         Hospital Course  Bekah Cee is an 34 y.o. female with a PMHx significant for postpartum cardiomyopathy who is in town visiting and presented to our [facility] ED on 2/9 with chest pain and dyspnea.  She states that the chest pain started 3 days prior with a sharp stabbing and localized pain over her left chest which did not radiate or worsen with exertion.  She said that this pain can come on at rest last for several minutes and oral before resolving and then it comes back again.  She feels like the pain has been getting more and more frequent and now it is associated with episodic dyspnea which is new for her so she came in to be evaluated.  She denied having any fevers or chills, abdominal pain nausea or vomiting, urinary symptoms.  She denies any lightheadedness or syncopal episodes.  She states that she was previously diagnosed with peripartum or postpartum cardiomyopathy in 2022 and has been following up regularly with cardiology in [place] ever since.  She is compliant with her home medications including Entresto metoprolol and Lasix.  She also had a cardiac catheterization in December 2022 that was unremarkable per her report.    Labs significant for elevated troponins of 300.8-->303.7 with delta of 2.9, , and magnesium of 1.7.  Otherwise normal CBC, CMP, respiratory pathogen panel, coagulation studies.  A chest x-ray showed no acute disease.  A CTA of the chest PE with IV contrast showed a mildly enlarged heart with mild septal thickening and groundglass in both lungs, typical for pulmonary edema.  Also 4 mm pulmonary nodule in the right lung.  No PE.  In the emergency room she was given aspirin and started on a heparin GTT.  Cardiology  consult was requested in the emergency room and they recommended heparin GTT.  She was admitted to the hospitalist service.  She was placed on telemetry, and started to be diuresed with IV Lasix.  Patient had not taken her Lasix for a couple of days prior to admit.  In review of her [place] records, she had a cardiac MRI in February 2023 that showed a EF of 36% with RV moderately dilated and mildly reduced RV function with extensive myocardial fibrosis.  She also had a previous coronary angiogram in November 2022 that was normal with no obstructive disease.  We did get an echocardiogram here that showed severe LV dilation with severely reduced systolic function and an EF of 29% with severe diffuse hypokinesis and no LV thrombus identified but grade 3 diastolic abnormality.  She was initially placed on Lasix 80 twice daily and then cardiology decrease this to her home dose.  In the past she has not done well on Jardiance but was amenable to trying it again and this was initiated in addition to the medications including Entresto and Aldactone that she was already taking.  Cardiology really wanted to keep her 1 additional day on 2/11 to continue to diurese and get daily weights but she is adamant that she be discharged today.  She is urinating well with the p.o. Lasix and her lab workup is stable.  She tolerated the Jardiance and therefore we are going to prescribe that at discharge.  She does have an upcoming appointment with her cardiologist on the 14th.  To note, she was prescribed a LifeVest and wore it for 1 year but when her cardiac function improved this was discontinued.  Her Lasix dose will be 20 daily, Jardiance has been prescribed as it is new.  The below instructions were gone over with her at the time of discharge.      Full Code   Condition on Discharge.  Stable        Discharge medication list        START taking these medications        Instructions   aspirin 81 mg chewable tablet  Start taking on:  February 12, 2025   Take 1 tablet (81 mg total) by mouth daily     empagliflozin 10 mg tablet  Commonly known as: Jardiance   Take 1 tablet (10 mg total) by mouth daily     empagliflozin 10 mg tablet  Commonly known as: JARDIANCE  Start taking on: February 12, 2025   Take 1 tablet (10 mg total) by mouth daily for 6 days            CHANGE how you take these medications        Instructions   furosemide 20 mg tablet  Commonly known as: Lasix  What changed:   medication strength  how much to take  when to take this   Take 1 tablet (20 mg total) by mouth daily            CONTINUE taking these medications        Instructions   Entresto 24-26 mg tablet  Generic drug: sacubitriL-valsartan      metoprolol succinate XL 25 mg 24 hr tablet  Commonly known as: TOPROL-XL      spironolactone 25 mg tablet  Commonly known as: ALDACTONE                Where to Get Your Medications        These medications were sent to [facility] ([facility])  [address] [place] [id number]      Hours: 7:30AM-6PM Mon-Fri, 9AM-5PM Sat-Sun-Holidays Phone: [phone]   empagliflozin 10 mg tablet  furosemide 20 mg tablet       You can get these medications from any pharmacy    Bring a paper prescription for each of these medications  aspirin 81 mg chewable tablet  empagliflozin 10 mg tablet         Discharge Instructions and Follow-up  01 - Home or Self-Care  Diet will be regular.   Activity will be as tolerated.    Follow up: Call to schedule an appointment with No, Pcp in the next 7-10 days  Patient was given the following instructions at the time of discharge:    You are being discharged from the hospital.  Please take your prescriptions as prescribed from cardiology.  [facility] pharmacy will not take electronic prescriptions so I have printed them for you.  Please follow-up with your cardiologist in [place] as already scheduled this Friday.  Should you experience chest pain, worsening shortness of breath, syncope or near  syncope, lightheadedness or any other symptoms you should return to the emergency room.       Time spent coordinating discharge: 40 min, of which > 25 mins on was spent on coordinating care & Counseling.    Signed:  Jessica Mendoza MD    A voice recognition program was used to aid in documentation of this record. Sometimes words are not printed exactly as they were spoken.  While efforts were made to carefully edit and correct any inaccuracies, some areas may be present; please take these into context.  Please contact the provider if areas are identified.

## 2025-02-12 ENCOUNTER — PATIENT OUTREACH (OUTPATIENT)
Dept: FAMILY MEDICINE | Facility: HOSPITAL | Age: 35
End: 2025-02-12

## 2025-02-13 NOTE — PROGRESS NOTES
Bekah Castañeda Coleman was contacted following recent hospitalization at [facility]. The patient was discharged from the hospital on 2/11/2025 .The Discharge Summary and After Visit Summary were reviewed. The patient's main diagnosis during the hospitalization was Chest Pain.  Followup appointment: 2/14/25 with Cardiology in [place]. Any additional testing and labs will be discussed at the patient's upcoming post-hospital follow up appointment.    Transitional Care Management Follow Up (most recent)       Transitional Care Management - 02/13/25 1503          OTHER    Date of post hospital outreach 02/13/25     Contact Status Contact done     Speaking with the Patient or Patient's Caregiver? Patient     Is the patient on the Diabetes registry? N     Were patient's home medications changed or did they have any new medications added during the hospitalization? Y     Did someone go over the discharge summary with the patient or the patient's caregiver and discuss the medications listed on it? Y     Does the patient or patient's caregiver have any questions about the medication changes? N     Patient verbalized understanding of when to contact health care provider or when to get help right away? Y     Discharge instructions reviewed with patient or patient's caregiver and all questions answered? Y     Is there a Home Health/DME Plan being enacted? Please note name of HH agency, DME vendor, contacted/received N     Does patient have psychosocial issues that might impact their health status? None     Does patient have financial barriers to care? None                      Elliot Humphrey RN  February 13, 2025 3:03 PM

## 2025-03-16 ENCOUNTER — HEALTH MAINTENANCE LETTER (OUTPATIENT)
Age: 35
End: 2025-03-16

## (undated) DEVICE — KIT HAND CONTROL ACIST 016795

## (undated) DEVICE — LINEN TOWEL PACK X6 WHITE 5487

## (undated) DEVICE — SPONGE RAY-TEC 4X8" 7318

## (undated) DEVICE — SYR BULB IRRIG DOVER 60 ML LATEX FREE 67000

## (undated) DEVICE — DRAPE IOBAN INCISE 23X17" 6650EZ

## (undated) DEVICE — MANIFOLD KIT ANGIO AUTOMATED 014613

## (undated) DEVICE — SLEEVE TR BAND RADIAL COMPRESSION DEVICE 24CM TRB24-REG

## (undated) DEVICE — ESU ELEC BLADE E-SEP INSULATED NEPTUNE 70MM 0703-070-002

## (undated) DEVICE — SUCTION TIP YANKAUER STR K87

## (undated) DEVICE — PACK POST PARTUM

## (undated) DEVICE — SHTH INTRO 0.021IN ID 6FR DIA

## (undated) DEVICE — TUBING PRESSURE 30"

## (undated) DEVICE — LINEN TOWEL PACK X30 5481

## (undated) DEVICE — GW VASC .035IN DIA 260CML 7CML 3 MM RADIUS J CURVE 502455

## (undated) DEVICE — SUCTION SLEEVE NEPTUNE 2 165MM 0703-005-165

## (undated) DEVICE — SU MONOCRYL 4-0 PS-2 18" UND Y496G

## (undated) DEVICE — PACK HEART LEFT CUSTOM

## (undated) DEVICE — SU PDS II 0 TP-1 60" Z991G

## (undated) DEVICE — SU VICRYL 2-0 TIE 54" J615H

## (undated) DEVICE — CATH ANGIO INFINITI JR4 4FRX100CM 538421

## (undated) DEVICE — BLADE KNIFE SURG 15 371115

## (undated) DEVICE — SU VICRYL 2-0 CT-2 27" UND J269H

## (undated) DEVICE — SU VICRYL 0 CTX 36" J370H

## (undated) DEVICE — SYSTEM PANNUS RETENTION 4 PAD 2 STRAP CZ-PRS-04

## (undated) DEVICE — SU MONOCRYL 0 CT-1 36" Y346H

## (undated) DEVICE — DRAPE TIBURON CARDIOVASCULAR PERI-GROIN LF 9154

## (undated) DEVICE — LINEN GOWN XLG 5407

## (undated) DEVICE — CATH TRAY FOLEY SURESTEP 16FR W/URNE MTR STLK LATEX A303316A

## (undated) DEVICE — SU VICRYL 0 CT-1 36" J346H

## (undated) DEVICE — FASTENER CATH BALLOON CLAMPX2 STATLOCK 0684-00-493

## (undated) DEVICE — COVER CAMERA IN-LIGHT DISP LT-C02

## (undated) DEVICE — DRAPE SHEET REV FOLD 3/4 9349

## (undated) DEVICE — ESU GROUND PAD ADULT W/CORD E7507

## (undated) DEVICE — CATH ANGIO SUPERTORQUE PLUS JL4 6FRX100CM 533620

## (undated) DEVICE — ESU PENCIL SMOKE EVAC W/ROCKER SWITCH 0703-047-000

## (undated) DEVICE — BLADE 10MM 11-1753

## (undated) DEVICE — RETR PANNICULUS TRAXI FOR PT POSITIONING PRS-1030

## (undated) DEVICE — PACK C-SECTION LF PL15OTA83B

## (undated) DEVICE — CATH ANGIO INFINITI JL4 4FRX100CM 538420

## (undated) DEVICE — CATH ANGIO SUPERTORQUE PLUS JR4 6FRX100CM 533621

## (undated) DEVICE — ESU LIGASURE ATLAS 20CM  LS1020

## (undated) DEVICE — SUCTION MANIFOLD NEPTUNE 2 SYS 4 PORT 0702-020-000

## (undated) RX ORDER — FENTANYL CITRATE-0.9 % NACL/PF 10 MCG/ML
PLASTIC BAG, INJECTION (ML) INTRAVENOUS
Status: DISPENSED
Start: 2022-12-07

## (undated) RX ORDER — IODIXANOL 320 MG/ML
INJECTION, SOLUTION INTRAVASCULAR
Status: DISPENSED
Start: 2023-01-23

## (undated) RX ORDER — LIDOCAINE HYDROCHLORIDE 10 MG/ML
INJECTION, SOLUTION EPIDURAL; INFILTRATION; INTRACAUDAL; PERINEURAL
Status: DISPENSED
Start: 2023-01-11

## (undated) RX ORDER — LIDOCAINE HYDROCHLORIDE 10 MG/ML
INJECTION, SOLUTION EPIDURAL; INFILTRATION; INTRACAUDAL; PERINEURAL
Status: DISPENSED
Start: 2022-12-07

## (undated) RX ORDER — HEPARIN SODIUM 1000 [USP'U]/ML
INJECTION, SOLUTION INTRAVENOUS; SUBCUTANEOUS
Status: DISPENSED
Start: 2022-12-07

## (undated) RX ORDER — IOPAMIDOL 510 MG/ML
INJECTION, SOLUTION INTRAVASCULAR
Status: DISPENSED
Start: 2023-01-23

## (undated) RX ORDER — NICARDIPINE HCL-0.9% SOD CHLOR 1 MG/10 ML
SYRINGE (ML) INTRAVENOUS
Status: DISPENSED
Start: 2022-12-07

## (undated) RX ORDER — NITROGLYCERIN 5 MG/ML
VIAL (ML) INTRAVENOUS
Status: DISPENSED
Start: 2022-12-07

## (undated) RX ORDER — FENTANYL CITRATE 50 UG/ML
INJECTION, SOLUTION INTRAMUSCULAR; INTRAVENOUS
Status: DISPENSED
Start: 2022-12-07